# Patient Record
Sex: MALE | Race: WHITE | Employment: OTHER | URBAN - METROPOLITAN AREA
[De-identification: names, ages, dates, MRNs, and addresses within clinical notes are randomized per-mention and may not be internally consistent; named-entity substitution may affect disease eponyms.]

---

## 2017-01-09 ENCOUNTER — OFFICE VISIT (OUTPATIENT)
Dept: INTERNAL MEDICINE CLINIC | Age: 77
End: 2017-01-09

## 2017-01-09 VITALS
HEART RATE: 72 BPM | SYSTOLIC BLOOD PRESSURE: 104 MMHG | WEIGHT: 194 LBS | DIASTOLIC BLOOD PRESSURE: 70 MMHG | HEIGHT: 73 IN | BODY MASS INDEX: 25.71 KG/M2

## 2017-01-09 DIAGNOSIS — I48.0 PAROXYSMAL ATRIAL FIBRILLATION (HCC): Primary | ICD-10-CM

## 2017-01-09 DIAGNOSIS — Z79.01 ANTICOAGULATED ON COUMADIN: ICD-10-CM

## 2017-01-09 LAB
INTERNATIONAL NORMALIZATION RATIO, POC: 1.5
PROTHROMBIN TIME, POC: NORMAL

## 2017-01-09 PROCEDURE — 99212 OFFICE O/P EST SF 10 MIN: CPT | Performed by: NURSE PRACTITIONER

## 2017-01-09 PROCEDURE — 85610 PROTHROMBIN TIME: CPT | Performed by: NURSE PRACTITIONER

## 2017-01-30 ENCOUNTER — OFFICE VISIT (OUTPATIENT)
Dept: CARDIOLOGY CLINIC | Age: 77
End: 2017-01-30

## 2017-01-30 VITALS
DIASTOLIC BLOOD PRESSURE: 72 MMHG | BODY MASS INDEX: 25.33 KG/M2 | HEIGHT: 73 IN | WEIGHT: 191.12 LBS | SYSTOLIC BLOOD PRESSURE: 110 MMHG | HEART RATE: 68 BPM

## 2017-01-30 DIAGNOSIS — I42.9 CARDIOMYOPATHY (HCC): Chronic | ICD-10-CM

## 2017-01-30 DIAGNOSIS — Z95.2 S/P AVR (AORTIC VALVE REPLACEMENT): Chronic | ICD-10-CM

## 2017-01-30 DIAGNOSIS — I48.0 PAROXYSMAL ATRIAL FIBRILLATION (HCC): Primary | ICD-10-CM

## 2017-01-30 DIAGNOSIS — I34.0 NON-RHEUMATIC MITRAL REGURGITATION: Chronic | ICD-10-CM

## 2017-01-30 DIAGNOSIS — E78.00 PURE HYPERCHOLESTEROLEMIA: ICD-10-CM

## 2017-01-30 PROCEDURE — 99214 OFFICE O/P EST MOD 30 MIN: CPT | Performed by: INTERNAL MEDICINE

## 2017-01-30 PROCEDURE — 93000 ELECTROCARDIOGRAM COMPLETE: CPT | Performed by: INTERNAL MEDICINE

## 2017-02-06 PROCEDURE — 93224 XTRNL ECG REC UP TO 48 HRS: CPT | Performed by: INTERNAL MEDICINE

## 2017-02-10 ENCOUNTER — OFFICE VISIT (OUTPATIENT)
Dept: INTERNAL MEDICINE CLINIC | Age: 77
End: 2017-02-10

## 2017-02-10 VITALS
HEART RATE: 64 BPM | WEIGHT: 190.6 LBS | DIASTOLIC BLOOD PRESSURE: 68 MMHG | BODY MASS INDEX: 25.26 KG/M2 | SYSTOLIC BLOOD PRESSURE: 116 MMHG | HEIGHT: 73 IN

## 2017-02-10 DIAGNOSIS — I48.0 PAROXYSMAL ATRIAL FIBRILLATION (HCC): ICD-10-CM

## 2017-02-10 DIAGNOSIS — Z79.01 ANTICOAGULATED ON COUMADIN: Primary | ICD-10-CM

## 2017-02-10 LAB
INTERNATIONAL NORMALIZATION RATIO, POC: 1.5
PROTHROMBIN TIME, POC: NORMAL

## 2017-02-10 PROCEDURE — 85610 PROTHROMBIN TIME: CPT | Performed by: NURSE PRACTITIONER

## 2017-02-10 PROCEDURE — 99212 OFFICE O/P EST SF 10 MIN: CPT | Performed by: NURSE PRACTITIONER

## 2017-02-14 ENCOUNTER — TELEPHONE (OUTPATIENT)
Dept: CARDIOLOGY CLINIC | Age: 77
End: 2017-02-14

## 2017-02-15 ENCOUNTER — TELEPHONE (OUTPATIENT)
Dept: CARDIOLOGY CLINIC | Age: 77
End: 2017-02-15

## 2017-05-01 ENCOUNTER — OFFICE VISIT (OUTPATIENT)
Dept: CARDIOLOGY CLINIC | Age: 77
End: 2017-05-01

## 2017-05-01 ENCOUNTER — HOSPITAL ENCOUNTER (OUTPATIENT)
Dept: NON INVASIVE DIAGNOSTICS | Age: 77
Discharge: HOME | End: 2017-05-01
Attending: INTERNAL MEDICINE | Admitting: INTERNAL MEDICINE

## 2017-05-01 DIAGNOSIS — Z95.2 S/P AVR (AORTIC VALVE REPLACEMENT): Chronic | ICD-10-CM

## 2017-05-01 DIAGNOSIS — I48.0 PAROXYSMAL ATRIAL FIBRILLATION (HCC): ICD-10-CM

## 2017-05-01 DIAGNOSIS — M16.10 PRIMARY LOCALIZED OSTEOARTHROSIS, PELVIC REGION AND THIGH: ICD-10-CM

## 2017-05-01 DIAGNOSIS — I50.42 CHRONIC COMBINED SYSTOLIC AND DIASTOLIC CHF (CONGESTIVE HEART FAILURE) (HCC): Primary | Chronic | ICD-10-CM

## 2017-05-01 DIAGNOSIS — E55.9 VITAMIN D INSUFFICIENCY: ICD-10-CM

## 2017-05-01 DIAGNOSIS — I50.42 CHRONIC COMBINED SYSTOLIC AND DIASTOLIC CHF (CONGESTIVE HEART FAILURE) (HCC): Chronic | ICD-10-CM

## 2017-05-01 LAB
A/G RATIO: 1.4 (ref 1.1–2.2)
ALBUMIN SERPL-MCNC: 4.1 G/DL (ref 3.4–5)
ALP BLD-CCNC: 62 U/L (ref 40–129)
ALT SERPL-CCNC: 9 U/L (ref 10–40)
ANION GAP SERPL CALCULATED.3IONS-SCNC: 13 MMOL/L (ref 3–16)
AST SERPL-CCNC: 17 U/L (ref 15–37)
BASOPHILS ABSOLUTE: 0 K/UL (ref 0–0.2)
BASOPHILS RELATIVE PERCENT: 1.1 %
BILIRUB SERPL-MCNC: 0.5 MG/DL (ref 0–1)
BUN BLDV-MCNC: 27 MG/DL (ref 7–20)
CALCIUM SERPL-MCNC: 9.5 MG/DL (ref 8.3–10.6)
CHLORIDE BLD-SCNC: 101 MMOL/L (ref 99–110)
CHOLESTEROL, TOTAL: 108 MG/DL (ref 0–199)
CO2: 27 MMOL/L (ref 21–32)
CREAT SERPL-MCNC: 1 MG/DL (ref 0.8–1.3)
EOSINOPHILS ABSOLUTE: 0.1 K/UL (ref 0–0.6)
EOSINOPHILS RELATIVE PERCENT: 1.8 %
GFR AFRICAN AMERICAN: >60
GFR NON-AFRICAN AMERICAN: >60
GLOBULIN: 2.9 G/DL
GLUCOSE BLD-MCNC: 72 MG/DL (ref 70–99)
HCT VFR BLD CALC: 35.8 % (ref 40.5–52.5)
HDLC SERPL-MCNC: 61 MG/DL (ref 40–60)
HEMOGLOBIN: 11.9 G/DL (ref 13.5–17.5)
LDL CHOLESTEROL CALCULATED: 35 MG/DL
LV EF: 45 %
LVEF MODALITY: NORMAL
LYMPHOCYTES ABSOLUTE: 1.3 K/UL (ref 1–5.1)
LYMPHOCYTES RELATIVE PERCENT: 36.2 %
MCH RBC QN AUTO: 31.4 PG (ref 26–34)
MCHC RBC AUTO-ENTMCNC: 33.2 G/DL (ref 31–36)
MCV RBC AUTO: 94.5 FL (ref 80–100)
MONOCYTES ABSOLUTE: 0.3 K/UL (ref 0–1.3)
MONOCYTES RELATIVE PERCENT: 8.8 %
NEUTROPHILS ABSOLUTE: 1.9 K/UL (ref 1.7–7.7)
NEUTROPHILS RELATIVE PERCENT: 52.1 %
PDW BLD-RTO: 14.2 % (ref 12.4–15.4)
PLATELET # BLD: 149 K/UL (ref 135–450)
PMV BLD AUTO: 9.1 FL (ref 5–10.5)
POTASSIUM SERPL-SCNC: 4.4 MMOL/L (ref 3.5–5.1)
PRO-BNP: 393 PG/ML (ref 0–449)
RBC # BLD: 3.79 M/UL (ref 4.2–5.9)
SODIUM BLD-SCNC: 141 MMOL/L (ref 136–145)
TOTAL PROTEIN: 7 G/DL (ref 6.4–8.2)
TRIGL SERPL-MCNC: 58 MG/DL (ref 0–150)
VITAMIN D 25-HYDROXY: 40.6 NG/ML
VLDLC SERPL CALC-MCNC: 12 MG/DL
WBC # BLD: 3.6 K/UL (ref 4–11)

## 2017-05-01 PROCEDURE — 99214 OFFICE O/P EST MOD 30 MIN: CPT | Performed by: INTERNAL MEDICINE

## 2017-05-02 VITALS
HEART RATE: 64 BPM | WEIGHT: 193 LBS | HEIGHT: 73 IN | BODY MASS INDEX: 25.58 KG/M2 | DIASTOLIC BLOOD PRESSURE: 72 MMHG | SYSTOLIC BLOOD PRESSURE: 108 MMHG

## 2017-05-04 ENCOUNTER — OFFICE VISIT (OUTPATIENT)
Dept: CARDIOLOGY CLINIC | Age: 77
End: 2017-05-04

## 2017-05-04 VITALS
HEART RATE: 67 BPM | DIASTOLIC BLOOD PRESSURE: 62 MMHG | WEIGHT: 191.5 LBS | SYSTOLIC BLOOD PRESSURE: 98 MMHG | BODY MASS INDEX: 25.27 KG/M2

## 2017-05-04 DIAGNOSIS — I42.9 CARDIOMYOPATHY (HCC): Chronic | ICD-10-CM

## 2017-05-04 DIAGNOSIS — Z95.2 S/P AVR (AORTIC VALVE REPLACEMENT): Chronic | ICD-10-CM

## 2017-05-04 DIAGNOSIS — I48.0 PAROXYSMAL ATRIAL FIBRILLATION (HCC): Primary | Chronic | ICD-10-CM

## 2017-05-04 PROCEDURE — 93000 ELECTROCARDIOGRAM COMPLETE: CPT | Performed by: NURSE PRACTITIONER

## 2017-05-04 PROCEDURE — 99213 OFFICE O/P EST LOW 20 MIN: CPT | Performed by: NURSE PRACTITIONER

## 2017-06-01 ENCOUNTER — OFFICE VISIT (OUTPATIENT)
Dept: INTERNAL MEDICINE CLINIC | Age: 77
End: 2017-06-01

## 2017-06-01 VITALS
WEIGHT: 192 LBS | HEIGHT: 73 IN | HEART RATE: 64 BPM | BODY MASS INDEX: 25.45 KG/M2 | DIASTOLIC BLOOD PRESSURE: 64 MMHG | SYSTOLIC BLOOD PRESSURE: 98 MMHG

## 2017-06-01 DIAGNOSIS — I48.0 PAROXYSMAL ATRIAL FIBRILLATION (HCC): Primary | Chronic | ICD-10-CM

## 2017-06-01 DIAGNOSIS — M54.2 NECK PAIN: ICD-10-CM

## 2017-06-01 DIAGNOSIS — I50.42 CHRONIC COMBINED SYSTOLIC AND DIASTOLIC CHF (CONGESTIVE HEART FAILURE) (HCC): Chronic | ICD-10-CM

## 2017-06-01 PROCEDURE — 3288F FALL RISK ASSESSMENT DOCD: CPT | Performed by: INTERNAL MEDICINE

## 2017-06-01 PROCEDURE — G8510 SCR DEP NEG, NO PLAN REQD: HCPCS | Performed by: INTERNAL MEDICINE

## 2017-06-01 PROCEDURE — 99214 OFFICE O/P EST MOD 30 MIN: CPT | Performed by: INTERNAL MEDICINE

## 2017-06-01 ASSESSMENT — PATIENT HEALTH QUESTIONNAIRE - PHQ9
2. FEELING DOWN, DEPRESSED OR HOPELESS: 0
1. LITTLE INTEREST OR PLEASURE IN DOING THINGS: 0
SUM OF ALL RESPONSES TO PHQ QUESTIONS 1-9: 0
SUM OF ALL RESPONSES TO PHQ9 QUESTIONS 1 & 2: 0

## 2017-07-05 RX ORDER — ATORVASTATIN CALCIUM 10 MG/1
TABLET, FILM COATED ORAL
Qty: 30 TABLET | Refills: 11 | Status: SHIPPED | OUTPATIENT
Start: 2017-07-05 | End: 2018-07-05 | Stop reason: SDUPTHER

## 2017-08-31 ENCOUNTER — TELEPHONE (OUTPATIENT)
Dept: CARDIOLOGY CLINIC | Age: 77
End: 2017-08-31

## 2017-09-08 RX ORDER — SPIRONOLACTONE 25 MG/1
12.5 TABLET ORAL
Qty: 45 TABLET | Refills: 1 | Status: SHIPPED | OUTPATIENT
Start: 2017-09-08 | End: 2018-07-22 | Stop reason: SDUPTHER

## 2017-09-20 ENCOUNTER — NURSE ONLY (OUTPATIENT)
Dept: INTERNAL MEDICINE CLINIC | Age: 77
End: 2017-09-20

## 2017-09-20 DIAGNOSIS — Z23 NEED FOR INFLUENZA VACCINATION: Primary | ICD-10-CM

## 2017-09-20 PROCEDURE — 90662 IIV NO PRSV INCREASED AG IM: CPT | Performed by: INTERNAL MEDICINE

## 2017-09-20 PROCEDURE — G0008 ADMIN INFLUENZA VIRUS VAC: HCPCS | Performed by: INTERNAL MEDICINE

## 2017-10-06 ENCOUNTER — TELEPHONE (OUTPATIENT)
Dept: CARDIOLOGY CLINIC | Age: 77
End: 2017-10-06

## 2017-10-06 RX ORDER — METOPROLOL SUCCINATE 25 MG/1
12.5 TABLET, EXTENDED RELEASE ORAL 2 TIMES DAILY
Qty: 90 TABLET | Refills: 3 | Status: SHIPPED | OUTPATIENT
Start: 2017-10-06 | End: 2018-08-04 | Stop reason: SDUPTHER

## 2017-10-06 RX ORDER — HYDROCHLOROTHIAZIDE 25 MG/1
25 TABLET ORAL DAILY
Qty: 90 TABLET | Refills: 3 | Status: SHIPPED | OUTPATIENT
Start: 2017-10-06 | End: 2018-10-21 | Stop reason: SDUPTHER

## 2017-10-06 RX ORDER — LISINOPRIL 2.5 MG/1
TABLET ORAL
Qty: 90 TABLET | Refills: 3 | Status: SHIPPED | OUTPATIENT
Start: 2017-10-06 | End: 2018-10-23 | Stop reason: SDUPTHER

## 2017-10-06 NOTE — TELEPHONE ENCOUNTER
Medication Refill    When was your last appointment with cardiology?  (if 1year or longer, please schedule an appointment)    Medication needing refilled:metoprolol 25mg  Lisinopril 2.5mg     Doseage of the medication:    How are you taking this medication (QD, BID, TID, QID, PRN):    Patient want a 30 or 90 day supply called in:needs charged to a 90 day supply     Which Pharmacy are we sending the medication to:    Pharmacy Phone number:    Pharmacy Fax number:

## 2017-11-13 RX ORDER — MIDODRINE HYDROCHLORIDE 2.5 MG/1
2.5 TABLET ORAL DAILY
Qty: 90 TABLET | Refills: 3 | Status: SHIPPED | OUTPATIENT
Start: 2017-11-13 | End: 2018-11-07 | Stop reason: SDUPTHER

## 2017-11-20 ENCOUNTER — OFFICE VISIT (OUTPATIENT)
Dept: CARDIOLOGY CLINIC | Age: 77
End: 2017-11-20

## 2017-11-20 ENCOUNTER — HOSPITAL ENCOUNTER (OUTPATIENT)
Dept: OTHER | Age: 77
Discharge: HOME | End: 2017-11-20
Attending: INTERNAL MEDICINE | Admitting: INTERNAL MEDICINE

## 2017-11-20 VITALS
OXYGEN SATURATION: 97 % | SYSTOLIC BLOOD PRESSURE: 100 MMHG | BODY MASS INDEX: 24.25 KG/M2 | HEIGHT: 73 IN | WEIGHT: 183 LBS | HEART RATE: 72 BPM | DIASTOLIC BLOOD PRESSURE: 76 MMHG

## 2017-11-20 DIAGNOSIS — E55.9 VITAMIN D INSUFFICIENCY: ICD-10-CM

## 2017-11-20 DIAGNOSIS — I48.0 PAROXYSMAL ATRIAL FIBRILLATION (HCC): Chronic | ICD-10-CM

## 2017-11-20 DIAGNOSIS — I50.42 CHRONIC COMBINED SYSTOLIC AND DIASTOLIC CHF (CONGESTIVE HEART FAILURE) (HCC): Primary | Chronic | ICD-10-CM

## 2017-11-20 DIAGNOSIS — Z95.2 S/P AVR (AORTIC VALVE REPLACEMENT): Chronic | ICD-10-CM

## 2017-11-20 DIAGNOSIS — I50.42 CHRONIC COMBINED SYSTOLIC AND DIASTOLIC CHF (CONGESTIVE HEART FAILURE) (HCC): Chronic | ICD-10-CM

## 2017-11-20 DIAGNOSIS — E78.00 PURE HYPERCHOLESTEROLEMIA: ICD-10-CM

## 2017-11-20 DIAGNOSIS — I49.9 IRREGULAR HEART RATE: ICD-10-CM

## 2017-11-20 LAB
A/G RATIO: 1.2 (ref 1.1–2.2)
ALBUMIN SERPL-MCNC: 4.3 G/DL (ref 3.4–5)
ALP BLD-CCNC: 74 U/L (ref 40–129)
ALT SERPL-CCNC: 10 U/L (ref 10–40)
ANION GAP SERPL CALCULATED.3IONS-SCNC: 17 MMOL/L (ref 3–16)
AST SERPL-CCNC: 20 U/L (ref 15–37)
BILIRUB SERPL-MCNC: 0.4 MG/DL (ref 0–1)
BUN BLDV-MCNC: 20 MG/DL (ref 7–20)
CALCIUM SERPL-MCNC: 9.5 MG/DL (ref 8.3–10.6)
CHLORIDE BLD-SCNC: 97 MMOL/L (ref 99–110)
CHOLESTEROL, TOTAL: 123 MG/DL (ref 0–199)
CO2: 25 MMOL/L (ref 21–32)
CREAT SERPL-MCNC: 0.8 MG/DL (ref 0.8–1.3)
GFR AFRICAN AMERICAN: >60
GFR NON-AFRICAN AMERICAN: >60
GLOBULIN: 3.6 G/DL
GLUCOSE BLD-MCNC: 75 MG/DL (ref 70–99)
HCT VFR BLD CALC: 38.6 % (ref 40.5–52.5)
HDLC SERPL-MCNC: 74 MG/DL (ref 40–60)
HEMOGLOBIN: 12.9 G/DL (ref 13.5–17.5)
LDL CHOLESTEROL CALCULATED: 37 MG/DL
MCH RBC QN AUTO: 30.7 PG (ref 26–34)
MCHC RBC AUTO-ENTMCNC: 33.3 G/DL (ref 31–36)
MCV RBC AUTO: 92.1 FL (ref 80–100)
PDW BLD-RTO: 16.1 % (ref 12.4–15.4)
PLATELET # BLD: 154 K/UL (ref 135–450)
PMV BLD AUTO: 8.8 FL (ref 5–10.5)
POTASSIUM SERPL-SCNC: 4.7 MMOL/L (ref 3.5–5.1)
PRO-BNP: 611 PG/ML (ref 0–449)
RBC # BLD: 4.19 M/UL (ref 4.2–5.9)
SODIUM BLD-SCNC: 139 MMOL/L (ref 136–145)
TOTAL PROTEIN: 7.9 G/DL (ref 6.4–8.2)
TRIGL SERPL-MCNC: 59 MG/DL (ref 0–150)
VITAMIN D 25-HYDROXY: 48.4 NG/ML
VLDLC SERPL CALC-MCNC: 12 MG/DL
WBC # BLD: 3.8 K/UL (ref 4–11)

## 2017-11-20 PROCEDURE — 93000 ELECTROCARDIOGRAM COMPLETE: CPT | Performed by: INTERNAL MEDICINE

## 2017-11-20 PROCEDURE — 99214 OFFICE O/P EST MOD 30 MIN: CPT | Performed by: INTERNAL MEDICINE

## 2017-11-20 NOTE — PROGRESS NOTES
5 MG TABA Take 1-2 tablets by mouth      docusate sodium (COLACE) 100 MG capsule Take 100 mg by mouth 2 times daily as needed for Constipation      aspirin 81 MG tablet Take 81 mg by mouth 2 times daily       Ascorbic Acid (VITAMIN C) 500 MG tablet Take 1,000 mg by mouth daily. No current facility-administered medications for this visit.         Immunization History   Administered Date(s) Administered    Influenza Virus Vaccine 10/10/2012, 09/19/2013, 10/23/2014    Influenza, High Dose 09/29/2016, 09/20/2017    Pneumococcal 13-valent Conjugate ([id number]) 12/01/2015    Pneumococcal Conjugate 7-valent 11/19/2007    Pneumococcal Polysaccharide ([id number]) 11/19/2007    Tdap (Boostrix, Adacel) 10/12/2014       Patient Active Problem List   Diagnosis    Cardiomyopathy ([facility] [address]    Mitral regurgitation    Microscopic hematuria    Atrial fibrillation (HCC)    S/P AVR (aortic valve replacement)    Chronic combined systolic and diastolic CHF (congestive heart failure) (formerly [facility])    Arthritis of knee    Primary localized osteoarthrosis of shoulder region    Primary localized osteoarthrosis, pelvic region and thigh    H/O total shoulder replacement    Pure hypercholesterolemia       Past Medical History:   Diagnosis Date    Aortic insufficiency     Atrial fibrillation ([address]     cardioversion 8/10/12    Breast nodule 8/2012  right    excision-lumpectomy 8/2012    CAD (coronary artery disease)     Cardiomyopathy ([address] 8/2012    EF 20 %    CHF (congestive heart failure) (formerly [facility])     Hyperlipidemia     Hypertension     Microscopic hematuria 7/29/2012    Mitral regurgitation     Nausea & vomiting 7/29/2012    Osteoarthritis of knee     bilateral knees    Pneumonia     Rotator cuff tear 7/2/2013    Ventricular ectopy 7/29/2012     Past Surgical History:   Procedure Laterality Date    AORTIC VALVE REPLACEMENT  8/3/2012    moderate mitral regurg    CARDIAC SURGERY      COLONOSCOPY      CYST REMOVAL No headache, diplopia, change in muscle strength, numbness or tingling. No change in gait, balance, coordination, mood, affect, memory, mentation, behavior. · Psychiatric: No anxiety, no depression. · Endocrine: No malaise, fatigue or temperature intolerance. No excessive thirst, fluid intake, or urination. No tremor. · Hematologic/Lymphatic: No abnormal bruising or bleeding, blood clots or swollen lymph nodes. · Allergic/Immunologic: No nasal congestion or hives. Physical Examination:    Vitals:    11/20/17 1043   BP: 100/76   Pulse: 72   SpO2: 97%   Weight: 183 lb (83 kg)   Height: 6' 1\" (1.854 m)     Body mass index is 24.14 kg/m². Wt Readings from Last 3 Encounters:   11/20/17 183 lb (83 kg)   06/01/17 192 lb (87.1 kg)   05/04/17 191 lb 8 oz (86.9 kg)     BP Readings from Last 3 Encounters:   11/20/17 100/76   06/01/17 98/64   05/04/17 98/62      Retaken /72  Constitutional and General Appearance: Warm and dry, no apparent distress, normal coloration  HEENT:  Normocephalic, atraumatic  Respiratory:  · Normal excursion and expansion without use of accessory muscles  · Resp Auscultation: Normal breath sounds without dullness  Cardiovascular:  · The apical impulses not displaced  · Heart tones are crisp and normal  · JVP less than 8 cm H2O  · Regular rate and regular rhythm, normal D5A5, soft systolic murmur, no g/r/c  · Peripheral pulses are symmetrical and full  · There is no clubbing, cyanosis of the extremities.   · No edema  · Pedal Pulses: 2+ and equal   Abdomen:  · No masses or tenderness  · Liver/Spleen: No Abnormalities Noted  Neurological/Psychiatric:  · Alert and oriented in all spheres  · Moves all extremities well  · Exhibits normal gait balance and coordination  · No abnormalities of mood, affect, memory, mentation, or behavior are noted    Lab Data:  CBC:   Lab Results   Component Value Date    WBC 3.6 05/01/2017    WBC 4.1 10/17/2016    WBC 4.1 06/02/2016    RBC 3.79 05/01/2017 RBC 3.73 10/17/2016    RBC 3.39 06/02/2016    HGB 11.9 05/01/2017    HGB 11.7 10/17/2016    HGB 11.0 06/02/2016    HCT 35.8 05/01/2017    HCT 35.9 10/17/2016    HCT 32.5 06/02/2016    MCV 94.5 05/01/2017    MCV 96.2 10/17/2016    MCV 95.9 06/02/2016    RDW 14.2 05/01/2017    RDW 14.8 10/17/2016    RDW 16.0 06/02/2016     05/01/2017     10/17/2016     06/02/2016     BMP:   Lab Results   Component Value Date     05/01/2017     10/13/2016     04/26/2016    K 4.4 05/01/2017    K 5.0 10/13/2016    K 4.9 04/26/2016     05/01/2017     10/13/2016    CL 99 04/26/2016    CO2 27 05/01/2017    CO2 28 10/13/2016    CO2 26 04/26/2016    PHOS 3.0 12/19/2013    PHOS 3.7 12/06/2013    BUN 27 05/01/2017    BUN 24 10/13/2016    BUN 29 04/26/2016    CREATININE 1.0 05/01/2017    CREATININE 1.2 10/13/2016    CREATININE 1.3 04/26/2016     BNP:   Lab Results   Component Value Date    BNP 44 01/13/2014    BNP 59 06/12/2013     11/28/2012     PT/INR:   Lab Results   Component Value Date    PROTIME 25.7 01/08/2016    PROTIME 45.1 10/16/2015    PROTIME 32.8 08/07/2015    INR 1.5 02/10/2017    INR 1.5 01/09/2017    INR 2.0 12/01/2016    INR 2.8 03/04/2016    INR 2.5 02/05/2016    INR 2.1 01/08/2016     FASTING LIPID PANEL:  Lab Results   Component Value Date    CHOL 108 05/01/2017    HDL 61 05/01/2017    TRIG 58 05/01/2017       Echo 5/1/2017  Arrhythmia noted during exam.  LV systolic function is mildly reduced with an estimated ejection fraction  of 45%. There is inferior-lateral akinesis. There is concentric left ventricular  hypertrophy. Mild to moderate mitral regurgitation is present. The left atrium is dilated. A bioprosthetic aortic valve appears well seated with a maximum gradient of  41 mmHg and a mean gradient of 25 mmHg. Trivial aortic regurgitation is  present. There is mild tricuspid regurgitation with RVSP estimated at 36 mmHg.     4/9/14 echo: EF 45%, mild to moderate

## 2017-11-20 NOTE — PROGRESS NOTES
[address]   Advanced Heart Failure/Pulmonary Hypertension  Cardiac Evaluation      Nata Herrera  YOB: 1940    Date of Visit:  11/20/17      Chief Complaint   Patient presents with    Cardiac Valve Problem     No cardiac complaints    Congestive Heart Failure      History of Present Illness:   Hobart Genin Schirmer seen in follow up for CHF, CMP, s/p AVR 8/2012) and AF. An echo 11/2016 shows EF 92-82%, diastolic dysfunction, aortic valve replacement without stenosis RVSP normal. He had a holter and no atrial fibrillation found. Sees Dr. Claudene Hasting and was on amiodarone for ~ 4 years but it  has been discontinued 2/2017. Today, Nurys Mcgill states that he is doing well. His wife is here with him. He is active and his wife states he is busy doing a lot of things. He thinks that his HB is in regular rhythm. He will see Dr. Burton García in a few weeks. He denies chest pain, shortness of breath, palpitations or dizziness. We discussed that his echo  5/2017 showed an EF was normal . He has recent labs and they all looks good. His weight is down over the past 10 pounds and he has kept it down. He is still taking midodrine. Recent Hospitalization or Testing:   ECHO 4/2015  Summary  Moderate concentric left ventricular hypertrophy is present. EF is   estimated  at 55%  Mitral annular calcification is present. Mild mitral regurgitation is present. A porcine aortic valve appears well seated with a maximum gradient of 27  mmHg and a mean gradient of 17 mmHg. The aortic root is at the upper limits of normal.  There is mild tricuspid regurgitation with RVSP estimated at 33 mmHg. Trivial pulmonic regurgitation present. Echo Apr 2013: Upper limits of normal left ventricle size with borderline concentric left ventricular hypertrophy. The left ventricular systolic function is borderline reduced with an inferior wall motion abnormality and an ejection fraction of 45-50 %.  There is reversal of E/A inflow velocities across the mitral valve suggesting impaired left ventricular relaxation. Normally functioning porcine bioprosthetic valve in aortic position. Mean prosthetic gradient is 18 mm Hg. Mild MAC. There is mild tricuspid and pulmonic regurgitation. Trace of mitral regurgitation. RVSP estimated at 34 mmHg. Compared to last echo on 9/26/2012 with EF of 15%, the left ventricular systolic function has improved markedly. Echo Nov 2012: LVEF < 20%     Echo Sept 2012:   1. Severely dilated left ventricle with severe left ventricular dysfunction (15%)  2. Bioprosthetic noted in the aortic position with slight flow acceleration and slight perivalvular insufficiency. 3. Left ventricular hypertrophy. 4. Large pleural effusion. 5. Mildly elevated right ventricular systolic pressure of 40 mmHg. 6. Biatrial enlargement along with borderline right ventricle enlargement. Echo Jul 2012:   1. Severe LV systolic dysfunction. Severe left ventricular dilation. Ejection fraction is estimated at 20%. 2. Moderate to severe aortic regurgitation. 3. Moderate mitral regurgitation. 4. Recommend transesophageal echocardiography if clinically indicated for assessment of severe aortic regurgitation.      NYHA:  I-II    No Known Allergies  Current Outpatient Prescriptions   Medication Sig Dispense Refill    midodrine (PROAMATINE) 2.5 MG tablet TAKE 1 TABLET BY MOUTH DAILY 90 tablet 3    lisinopril (PRINIVIL;ZESTRIL) 2.5 MG tablet TAKE ONE TABLET BY MOUTH DAILY 90 tablet 3    metoprolol succinate (TOPROL XL) 25 MG extended release tablet Take 0.5 tablets by mouth 2 times daily 90 tablet 3    hydrochlorothiazide (HYDRODIURIL) 25 MG tablet Take 1 tablet by mouth daily 90 tablet 3    spironolactone (ALDACTONE) 25 MG tablet Take 0.5 tablets by mouth three times a week 45 tablet 1    atorvastatin (LIPITOR) 10 MG tablet TAKE 1 TABLET BY MOUTH ONE TIME A DAY  30 tablet 11    Cholecalciferol (VITAMIN D3) 3000 UNITS TABS 7/29/2012     Past Surgical History:   Procedure Laterality Date    AORTIC VALVE REPLACEMENT  8/3/2012    moderate mitral regurg    CARDIAC SURGERY      COLONOSCOPY      CYST REMOVAL      chest    ENDOSCOPY, COLON, DIAGNOSTIC      INGUINAL HERNIA REPAIR      bilateral in [address]      both    OTHER SURGICAL HISTORY  8/2/12    excision right breast mass (lumpectomy)    SHOULDER ARTHROPLASTY Right 2/2/15    right reverse total shoulder arthroplasty    TOTAL KNEE ARTHROPLASTY Left 12/4/13    TOTAL KNEE ARTHROPLASTY Right 2/3/14    RIGHT TOTAL KNEE REPLACEMENT-BAR       UPPER GASTROINTESTINAL ENDOSCOPY  12/24/13     Family History   Problem Relation Age of Onset    Marfan Syndrome Brother     Heart Disease Brother     Stroke Sister     Diabetes Sister     Heart Disease Sister     Heart Disease Sister     Cancer Brother      Social History     Social History    Marital status:      Spouse name: N/A    Number of children: N/A    Years of education: N/A     Occupational History    Not on file. Social History Main Topics    Smoking status: Former Smoker     Packs/day: 2.00     Years: 20.00     Quit date: 7/28/1970    Smokeless tobacco: Never Used    Alcohol use 1.2 oz/week     2 Cans of beer per week      Comment: rare    Drug use: No    Sexual activity: Not on file     Other Topics Concern    Not on file     Social History Narrative    No narrative on file       Review of Systems:   · Constitutional: there has been no unanticipated weight loss. There's been no change in energy level, sleep pattern, or activity level. · Eyes: No visual changes or diplopia. No scleral icterus. · ENT: No Headaches, hearing loss or vertigo. No mouth sores or sore throat. · Cardiovascular: Reviewed in HPI  · Respiratory: No cough or wheezing, no sputum production. No hematemesis. No SOB  · Gastrointestinal: No abdominal pain, appetite loss, blood in stools.  No change in bowel memory, mentation, or behavior are noted    Lab Data:  CBC:   Lab Results   Component Value Date    WBC 3.6 05/01/2017    WBC 4.1 10/17/2016    WBC 4.1 06/02/2016    RBC 3.79 05/01/2017    RBC 3.73 10/17/2016    RBC 3.39 06/02/2016    HGB 11.9 05/01/2017    HGB 11.7 10/17/2016    HGB 11.0 06/02/2016    HCT 35.8 05/01/2017    HCT 35.9 10/17/2016    HCT 32.5 06/02/2016    MCV 94.5 05/01/2017    MCV 96.2 10/17/2016    MCV 95.9 06/02/2016    RDW 14.2 05/01/2017    RDW 14.8 10/17/2016    RDW 16.0 06/02/2016     05/01/2017     10/17/2016     06/02/2016     BMP:   Lab Results   Component Value Date     05/01/2017     10/13/2016     04/26/2016    K 4.4 05/01/2017    K 5.0 10/13/2016    K 4.9 04/26/2016     05/01/2017     10/13/2016    CL 99 04/26/2016    CO2 27 05/01/2017    CO2 28 10/13/2016    CO2 26 04/26/2016    PHOS 3.0 12/19/2013    PHOS 3.7 12/06/2013    BUN 27 05/01/2017    BUN 24 10/13/2016    BUN 29 04/26/2016    CREATININE 1.0 05/01/2017    CREATININE 1.2 10/13/2016    CREATININE 1.3 04/26/2016     BNP:   Lab Results   Component Value Date    BNP 44 01/13/2014    BNP 59 06/12/2013     11/28/2012     PT/INR:   Lab Results   Component Value Date    PROTIME 25.7 01/08/2016    PROTIME 45.1 10/16/2015    PROTIME 32.8 08/07/2015    INR 1.5 02/10/2017    INR 1.5 01/09/2017    INR 2.0 12/01/2016    INR 2.8 03/04/2016    INR 2.5 02/05/2016    INR 2.1 01/08/2016     FASTING LIPID PANEL:  Lab Results   Component Value Date    CHOL 108 05/01/2017    HDL 61 05/01/2017    TRIG 58 05/01/2017       Echo 5/1/2017  Arrhythmia noted during exam.  LV systolic function is mildly reduced with an estimated ejection fraction  of 45%. There is inferior-lateral akinesis. There is concentric left ventricular  hypertrophy. Mild to moderate mitral regurgitation is present. The left atrium is dilated.   A bioprosthetic aortic valve appears well seated with a maximum gradient of  41 mmHg and a mean gradient of 25 mmHg. Trivial aortic regurgitation is  present. There is mild tricuspid regurgitation with RVSP estimated at 36 mmHg. 4/9/14 echo: EF 45%, mild to moderate TR, RVSP 46 mmHg. 4/22/15 echo: Moderate concentric left ventricular hypertrophy is present. Mitral annular calcification is present. Mild mitral regurgitation is present. A porcine aortic valve appears well seated with a maximum gradient of 27 mmHg and a mean gradient of 17 mmHg. The aortic root is at the upper limits of normal.  There is mild tricuspid regurgitation with RVSP estimated at 33 mmHg. Trivial pulmonic regurgitation present. Assessment:    1. Aortic insufficiency - 4/2013 normally functioning bioprosthetic AVR. 2. Atrial fibrillation - On Coumadin with INRs checked through [facility] coumadin clinic. 3. Combined, chronic heart failure - compensated on exam.     4. Hyponatremia -  Stable. 5. S/P AVR (aortic valve replacement) -  Normally functioning porcine bioprosthetic valve in aortic position. Mean prosthetic gradient is 18 mm Hg. 4/9/14 echo: bioprosthetic AV appears well seated with a maximum gradient of 35 mmHg and a mean gradient of 22 mmHg. 4/22/15 echo:  A porcine aortic valve appears well seated with a maximum gradient of 27 mmHg and a mean gradient of 17 mmHg  5/2017 echo  moderate mitral regurgitation is present. The left atrium is dilated. A bioprosthetic aortic valve appears well seated with a maximum gradient of 41 mmHg and a mean gradient of 25 mmHg. Trivial aortic regurgitation is present. There is mild tricuspid regurgitation with RVSP estimated at 36 mmHg. 6. SOB (shortness of breath) - no current complaints. 7.   Hyperkalemia-  5/2017 K+ 4.4  creat 1.0 Stable and managed by nephrology. 8.   Hyperlipidemia--TC  108   TG 58 HDL 35  LDL 61    Plan: Doing well from a CV standpoint. We made no change at this time in his treatment. EKG today shows SR with pvc.  Fasting labs

## 2017-12-07 ENCOUNTER — OFFICE VISIT (OUTPATIENT)
Dept: INTERNAL MEDICINE CLINIC | Age: 77
End: 2017-12-07

## 2017-12-07 VITALS
HEIGHT: 73 IN | SYSTOLIC BLOOD PRESSURE: 92 MMHG | BODY MASS INDEX: 24.52 KG/M2 | WEIGHT: 185 LBS | DIASTOLIC BLOOD PRESSURE: 64 MMHG | HEART RATE: 56 BPM

## 2017-12-07 DIAGNOSIS — Z86.79 HISTORY OF ATRIAL FIBRILLATION: ICD-10-CM

## 2017-12-07 DIAGNOSIS — I50.42 CHRONIC COMBINED SYSTOLIC AND DIASTOLIC CHF (CONGESTIVE HEART FAILURE) (HCC): Primary | Chronic | ICD-10-CM

## 2017-12-07 DIAGNOSIS — E78.00 PURE HYPERCHOLESTEROLEMIA: ICD-10-CM

## 2017-12-07 PROCEDURE — 99214 OFFICE O/P EST MOD 30 MIN: CPT | Performed by: INTERNAL MEDICINE

## 2017-12-07 NOTE — PROGRESS NOTES
Subjective:      Patient ID: Charles Todd is a 78 y.o. male. Chief complaint: CHF, A. fib, hyperlipidemia  HPI  The patient has congestive heart failure which is chronic. In 2012 his ejection fraction was reduced to less than 20%. He also has a history of bioprosthetic aortic valve. He is treated with medical management. His most recent echocardiogram showed a 45% LVEF. He is currently asymptomatic. He has a history of atrial fibrillation. He was seen by Dr. Dianne Hancock. Based on the absence of symptoms and a Holter monitor study which showed no atrial fibrillation, he was taken off of amiodarone and warfarin. He is currently taking aspirin 162 mg daily. He also has hyperlipidemia which is treated with atorvastatin. He takes this as directed, and he tolerates it well. Social History   Substance Use Topics    Smoking status: Former Smoker     Packs/day: 2.00     Years: 20.00     Quit date: 7/28/1970    Smokeless tobacco: Never Used    Alcohol use 1.2 oz/week     2 Cans of beer per week      Comment: rare      Review of Systems  Negative for chest pain, shortness of breath, urinary problems, change in bowel habits  Objective:   Physical Exam  BP 92/64 (Site: Left Arm, Position: Sitting, Cuff Size: Large Adult)   Pulse 56   Ht 6' 1\" (1.854 m)   Wt 185 lb (83.9 kg)   BMI 24.41 kg/m²    GEN: WN/WD, NAD  CV: regular rate and XYPZRE,9/1 systolic murmur  Resp: normal effort, clear auscultation bilaterally  No peripheral edema   Abd: soft, nontender to palpation.      Lab Results   Component Value Date    CREATININE 0.8 11/20/2017    BUN 20 11/20/2017     11/20/2017    K 4.7 11/20/2017    CL 97 (L) 11/20/2017    CO2 25 11/20/2017      Lab Results   Component Value Date    CHOL 123 11/20/2017    CHOL 108 05/01/2017    CHOL 122 10/29/2015     Lab Results   Component Value Date    TRIG 59 11/20/2017    TRIG 58 05/01/2017    TRIG 73 10/29/2015     Lab Results   Component Value Date    HDL 74 (H) 11/20/2017    HDL 61 (H) 05/01/2017    HDL 69 (H) 10/29/2015     Lab Results   Component Value Date    LDLCALC 37 11/20/2017    LDLCALC 35 05/01/2017    LDLCALC 38 10/29/2015     Lab Results   Component Value Date    LABVLDL 12 11/20/2017    LABVLDL 12 05/01/2017    LABVLDL 15 10/29/2015     No results found for: CHOLHDLRATIO     Assessment/Plan:      1. Chronic combined systolic and diastolic CHF (congestive heart failure) ([facility] [address]  He is asymptomatic. He has had a remarkable increase in his ejection fraction. He will remain on medical management with beta blocker and ARB. 2. Pure hypercholesterolemia  Well controlled  Continue atorvastatin      3. History of atrial fibrillation  Recent evidence of resolution. He was taken off of amiodarone and warfarin by electrophysiologist.  Continue aspirin 162 mg daily.             RTO 6 months or PRN

## 2018-01-03 ENCOUNTER — TELEPHONE (OUTPATIENT)
Dept: CARDIOLOGY CLINIC | Age: 78
End: 2018-01-03

## 2018-05-24 ENCOUNTER — HOSPITAL ENCOUNTER (OUTPATIENT)
Dept: NON INVASIVE DIAGNOSTICS | Age: 78
Discharge: HOME | End: 2018-05-24
Attending: INTERNAL MEDICINE | Admitting: INTERNAL MEDICINE

## 2018-05-24 ENCOUNTER — OFFICE VISIT (OUTPATIENT)
Dept: CARDIOLOGY CLINIC | Age: 78
End: 2018-05-24

## 2018-05-24 ENCOUNTER — HOSPITAL ENCOUNTER (OUTPATIENT)
Dept: OTHER | Age: 78
Discharge: HOME | End: 2018-05-24
Attending: INTERNAL MEDICINE | Admitting: INTERNAL MEDICINE

## 2018-05-24 VITALS
HEART RATE: 72 BPM | SYSTOLIC BLOOD PRESSURE: 104 MMHG | WEIGHT: 190 LBS | DIASTOLIC BLOOD PRESSURE: 74 MMHG | HEIGHT: 73 IN | BODY MASS INDEX: 25.18 KG/M2

## 2018-05-24 DIAGNOSIS — I50.42 CHRONIC COMBINED SYSTOLIC AND DIASTOLIC HEART FAILURE (HCC): ICD-10-CM

## 2018-05-24 DIAGNOSIS — I50.42 CHRONIC COMBINED SYSTOLIC AND DIASTOLIC CHF (CONGESTIVE HEART FAILURE) (HCC): Primary | Chronic | ICD-10-CM

## 2018-05-24 DIAGNOSIS — E78.00 PURE HYPERCHOLESTEROLEMIA: ICD-10-CM

## 2018-05-24 DIAGNOSIS — E55.9 VITAMIN D INSUFFICIENCY: ICD-10-CM

## 2018-05-24 DIAGNOSIS — I50.42 CHRONIC COMBINED SYSTOLIC AND DIASTOLIC CHF (CONGESTIVE HEART FAILURE) (HCC): Chronic | ICD-10-CM

## 2018-05-24 DIAGNOSIS — Z95.2 S/P AVR (AORTIC VALVE REPLACEMENT): Chronic | ICD-10-CM

## 2018-05-24 DIAGNOSIS — I48.0 PAROXYSMAL ATRIAL FIBRILLATION (HCC): Chronic | ICD-10-CM

## 2018-05-24 DIAGNOSIS — I34.0 NON-RHEUMATIC MITRAL REGURGITATION: Chronic | ICD-10-CM

## 2018-05-24 LAB
BASOPHILS ABSOLUTE: 0 K/UL (ref 0–0.2)
BASOPHILS RELATIVE PERCENT: 1.1 %
CHOLESTEROL, TOTAL: 124 MG/DL (ref 0–199)
EOSINOPHILS ABSOLUTE: 0.1 K/UL (ref 0–0.6)
EOSINOPHILS RELATIVE PERCENT: 2 %
HCT VFR BLD CALC: 37.2 % (ref 40.5–52.5)
HDLC SERPL-MCNC: 65 MG/DL (ref 40–60)
HEMOGLOBIN: 12.9 G/DL (ref 13.5–17.5)
LDL CHOLESTEROL CALCULATED: 45 MG/DL
LV EF: 45 %
LVEF MODALITY: NORMAL
LYMPHOCYTES ABSOLUTE: 1 K/UL (ref 1–5.1)
LYMPHOCYTES RELATIVE PERCENT: 36.1 %
MCH RBC QN AUTO: 31.4 PG (ref 26–34)
MCHC RBC AUTO-ENTMCNC: 34.6 G/DL (ref 31–36)
MCV RBC AUTO: 90.6 FL (ref 80–100)
MONOCYTES ABSOLUTE: 0.4 K/UL (ref 0–1.3)
MONOCYTES RELATIVE PERCENT: 12.5 %
NEUTROPHILS ABSOLUTE: 1.4 K/UL (ref 1.7–7.7)
NEUTROPHILS RELATIVE PERCENT: 48.3 %
PDW BLD-RTO: 14.7 % (ref 12.4–15.4)
PLATELET # BLD: 136 K/UL (ref 135–450)
PMV BLD AUTO: 8.6 FL (ref 5–10.5)
PRO-BNP: 443 PG/ML (ref 0–449)
RBC # BLD: 4.1 M/UL (ref 4.2–5.9)
TRIGL SERPL-MCNC: 68 MG/DL (ref 0–150)
VITAMIN D 25-HYDROXY: 42.8 NG/ML
VLDLC SERPL CALC-MCNC: 14 MG/DL
WBC # BLD: 2.8 K/UL (ref 4–11)

## 2018-05-24 PROCEDURE — 99214 OFFICE O/P EST MOD 30 MIN: CPT | Performed by: INTERNAL MEDICINE

## 2018-06-07 ENCOUNTER — OFFICE VISIT (OUTPATIENT)
Dept: INTERNAL MEDICINE CLINIC | Age: 78
End: 2018-06-07

## 2018-06-07 VITALS
BODY MASS INDEX: 25.08 KG/M2 | HEIGHT: 73 IN | SYSTOLIC BLOOD PRESSURE: 102 MMHG | WEIGHT: 189.2 LBS | HEART RATE: 64 BPM | DIASTOLIC BLOOD PRESSURE: 66 MMHG

## 2018-06-07 DIAGNOSIS — G89.29 CHRONIC NECK PAIN: ICD-10-CM

## 2018-06-07 DIAGNOSIS — E78.00 PURE HYPERCHOLESTEROLEMIA: ICD-10-CM

## 2018-06-07 DIAGNOSIS — I50.42 CHRONIC COMBINED SYSTOLIC AND DIASTOLIC CHF (CONGESTIVE HEART FAILURE) (HCC): Primary | ICD-10-CM

## 2018-06-07 DIAGNOSIS — M54.2 CHRONIC NECK PAIN: ICD-10-CM

## 2018-06-07 DIAGNOSIS — Z86.79 HISTORY OF ATRIAL FIBRILLATION: ICD-10-CM

## 2018-06-07 PROCEDURE — 3288F FALL RISK ASSESSMENT DOCD: CPT | Performed by: INTERNAL MEDICINE

## 2018-06-07 PROCEDURE — 99214 OFFICE O/P EST MOD 30 MIN: CPT | Performed by: INTERNAL MEDICINE

## 2018-06-07 PROCEDURE — G8510 SCR DEP NEG, NO PLAN REQD: HCPCS | Performed by: INTERNAL MEDICINE

## 2018-06-07 ASSESSMENT — PATIENT HEALTH QUESTIONNAIRE - PHQ9
SUM OF ALL RESPONSES TO PHQ QUESTIONS 1-9: 0
2. FEELING DOWN, DEPRESSED OR HOPELESS: 0
1. LITTLE INTEREST OR PLEASURE IN DOING THINGS: 0
SUM OF ALL RESPONSES TO PHQ9 QUESTIONS 1 & 2: 0

## 2018-07-05 RX ORDER — ATORVASTATIN CALCIUM 10 MG/1
TABLET, FILM COATED ORAL
Qty: 90 TABLET | Refills: 3 | Status: SHIPPED | OUTPATIENT
Start: 2018-07-05 | End: 2019-01-01 | Stop reason: SDUPTHER

## 2018-07-23 RX ORDER — SPIRONOLACTONE 25 MG/1
12.5 TABLET ORAL
Qty: 45 TABLET | Refills: 1 | Status: SHIPPED | OUTPATIENT
Start: 2018-07-23 | End: 2019-01-01 | Stop reason: SDUPTHER

## 2018-07-26 ENCOUNTER — ANTI-COAG VISIT (OUTPATIENT)
Dept: INTERNAL MEDICINE CLINIC | Age: 78
End: 2018-07-26

## 2018-08-06 RX ORDER — METOPROLOL SUCCINATE 25 MG/1
12.5 TABLET, EXTENDED RELEASE ORAL 2 TIMES DAILY
Qty: 90 TABLET | Refills: 3 | Status: SHIPPED | OUTPATIENT
Start: 2018-08-06 | End: 2019-02-15 | Stop reason: SDUPTHER

## 2018-10-10 DIAGNOSIS — Z23 NEED FOR INFLUENZA VACCINATION: Primary | ICD-10-CM

## 2018-10-10 PROCEDURE — G0008 ADMIN INFLUENZA VIRUS VAC: HCPCS | Performed by: INTERNAL MEDICINE

## 2018-10-10 PROCEDURE — 90662 IIV NO PRSV INCREASED AG IM: CPT | Performed by: INTERNAL MEDICINE

## 2018-10-22 RX ORDER — HYDROCHLOROTHIAZIDE 25 MG/1
25 TABLET ORAL DAILY
Qty: 90 TABLET | Refills: 3 | Status: SHIPPED | OUTPATIENT
Start: 2018-10-22 | End: 2019-01-01 | Stop reason: SDUPTHER

## 2018-10-23 RX ORDER — LISINOPRIL 2.5 MG/1
TABLET ORAL
Qty: 90 TABLET | Refills: 3 | Status: SHIPPED | OUTPATIENT
Start: 2018-10-23 | End: 2019-01-01 | Stop reason: SDUPTHER

## 2018-11-07 RX ORDER — MIDODRINE HYDROCHLORIDE 2.5 MG/1
2.5 TABLET ORAL DAILY
Qty: 90 TABLET | Refills: 3 | Status: SHIPPED | OUTPATIENT
Start: 2018-11-07 | End: 2019-01-01 | Stop reason: SDUPTHER

## 2018-11-15 ENCOUNTER — OFFICE VISIT (OUTPATIENT)
Dept: CARDIOLOGY CLINIC | Age: 78
End: 2018-11-15
Payer: COMMERCIAL

## 2018-11-15 ENCOUNTER — HOSPITAL ENCOUNTER (OUTPATIENT)
Age: 78
Discharge: HOME | End: 2018-11-15
Payer: COMMERCIAL

## 2018-11-15 VITALS
HEART RATE: 72 BPM | BODY MASS INDEX: 25.18 KG/M2 | HEIGHT: 73 IN | DIASTOLIC BLOOD PRESSURE: 74 MMHG | WEIGHT: 190 LBS | SYSTOLIC BLOOD PRESSURE: 100 MMHG

## 2018-11-15 DIAGNOSIS — I50.42 CHRONIC COMBINED SYSTOLIC AND DIASTOLIC CHF (CONGESTIVE HEART FAILURE) (HCC): Primary | Chronic | ICD-10-CM

## 2018-11-15 DIAGNOSIS — I50.42 CHRONIC COMBINED SYSTOLIC AND DIASTOLIC CHF (CONGESTIVE HEART FAILURE) (HCC): Chronic | ICD-10-CM

## 2018-11-15 DIAGNOSIS — Z95.2 S/P AVR (AORTIC VALVE REPLACEMENT): Chronic | ICD-10-CM

## 2018-11-15 DIAGNOSIS — E78.00 PURE HYPERCHOLESTEROLEMIA: Chronic | ICD-10-CM

## 2018-11-15 DIAGNOSIS — E55.9 VITAMIN D INSUFFICIENCY: ICD-10-CM

## 2018-11-15 DIAGNOSIS — I34.0 NON-RHEUMATIC MITRAL REGURGITATION: Chronic | ICD-10-CM

## 2018-11-15 LAB
A/G RATIO: 1.4 (ref 1.1–2.2)
ALBUMIN SERPL-MCNC: 4.5 G/DL (ref 3.4–5)
ALP BLD-CCNC: 77 U/L (ref 40–129)
ALT SERPL-CCNC: 9 U/L (ref 10–40)
ANION GAP SERPL CALCULATED.3IONS-SCNC: 13 MMOL/L (ref 3–16)
AST SERPL-CCNC: 17 U/L (ref 15–37)
ATYPICAL LYMPHOCYTE RELATIVE PERCENT: 1 % (ref 0–6)
BASOPHILS ABSOLUTE: 0 K/UL (ref 0–0.2)
BASOPHILS RELATIVE PERCENT: 0 %
BILIRUB SERPL-MCNC: 0.5 MG/DL (ref 0–1)
BUN BLDV-MCNC: 23 MG/DL (ref 7–20)
CALCIUM SERPL-MCNC: 9.7 MG/DL (ref 8.3–10.6)
CHLORIDE BLD-SCNC: 101 MMOL/L (ref 99–110)
CHOLESTEROL, TOTAL: 115 MG/DL (ref 0–199)
CO2: 27 MMOL/L (ref 21–32)
CREAT SERPL-MCNC: 1.1 MG/DL (ref 0.8–1.3)
EOSINOPHILS ABSOLUTE: 0 K/UL (ref 0–0.6)
EOSINOPHILS RELATIVE PERCENT: 1 %
GFR AFRICAN AMERICAN: >60
GFR NON-AFRICAN AMERICAN: >60
GLOBULIN: 3.2 G/DL
GLUCOSE BLD-MCNC: 80 MG/DL (ref 70–99)
HCT VFR BLD CALC: 37.9 % (ref 40.5–52.5)
HDLC SERPL-MCNC: 59 MG/DL (ref 40–60)
HEMATOLOGY PATH CONSULT: YES
HEMOGLOBIN: 12.7 G/DL (ref 13.5–17.5)
LDL CHOLESTEROL CALCULATED: 44 MG/DL
LYMPHOCYTES ABSOLUTE: 0.9 K/UL (ref 1–5.1)
LYMPHOCYTES RELATIVE PERCENT: 50 %
MCH RBC QN AUTO: 29.6 PG (ref 26–34)
MCHC RBC AUTO-ENTMCNC: 33.6 G/DL (ref 31–36)
MCV RBC AUTO: 87.9 FL (ref 80–100)
MONOCYTES ABSOLUTE: 0.3 K/UL (ref 0–1.3)
MONOCYTES RELATIVE PERCENT: 18 %
NEUTROPHILS ABSOLUTE: 0.5 K/UL (ref 1.7–7.7)
NEUTROPHILS RELATIVE PERCENT: 30 %
PDW BLD-RTO: 14.6 % (ref 12.4–15.4)
PLATELET # BLD: 139 K/UL (ref 135–450)
PMV BLD AUTO: 8.7 FL (ref 5–10.5)
POTASSIUM SERPL-SCNC: 4.4 MMOL/L (ref 3.5–5.1)
PRO-BNP: 517 PG/ML (ref 0–449)
RBC # BLD: 4.31 M/UL (ref 4.2–5.9)
SLIDE REVIEW: ABNORMAL
SODIUM BLD-SCNC: 141 MMOL/L (ref 136–145)
TOTAL PROTEIN: 7.7 G/DL (ref 6.4–8.2)
TRIGL SERPL-MCNC: 59 MG/DL (ref 0–150)
VITAMIN D 25-HYDROXY: 44.1 NG/ML
VLDLC SERPL CALC-MCNC: 12 MG/DL
WBC # BLD: 1.8 K/UL (ref 4–11)

## 2018-11-15 PROCEDURE — 36415 COLL VENOUS BLD VENIPUNCTURE: CPT

## 2018-11-15 PROCEDURE — 99214 OFFICE O/P EST MOD 30 MIN: CPT | Performed by: INTERNAL MEDICINE

## 2018-11-15 PROCEDURE — 82306 VITAMIN D 25 HYDROXY: CPT

## 2018-11-15 PROCEDURE — 80053 COMPREHEN METABOLIC PANEL: CPT

## 2018-11-15 PROCEDURE — 80061 LIPID PANEL: CPT

## 2018-11-15 PROCEDURE — 85025 COMPLETE CBC W/AUTO DIFF WBC: CPT

## 2018-11-15 PROCEDURE — 83880 ASSAY OF NATRIURETIC PEPTIDE: CPT

## 2018-11-15 NOTE — PROGRESS NOTES
KNEE ARTHROPLASTY Right 2/3/14    RIGHT TOTAL KNEE REPLACEMENT-BAR       UPPER GASTROINTESTINAL ENDOSCOPY  12/24/13     Family History   Problem Relation Age of Onset    Marfan Syndrome Brother     Heart Disease Brother     Stroke Sister     Diabetes Sister     Heart Disease Sister     Heart Disease Sister     Cancer Brother      Social History     Social History    Marital status:      Spouse name: N/A    Number of children: N/A    Years of education: N/A     Occupational History    Not on file. Social History Main Topics    Smoking status: Former Smoker     Packs/day: 2.00     Years: 20.00     Quit date: 7/28/1970    Smokeless tobacco: Never Used    Alcohol use 1.2 oz/week     2 Cans of beer per week      Comment: rare    Drug use: No    Sexual activity: Not on file     Other Topics Concern    Not on file     Social History Narrative    No narrative on file       Review of Systems:   · Constitutional: there has been no unanticipated weight loss. There's been no change in energy level, sleep pattern, or activity level. · Eyes: No visual changes or diplopia. No scleral icterus. · ENT: No Headaches, hearing loss or vertigo. No mouth sores or sore throat. · Cardiovascular: Reviewed in HPI  · Respiratory: No cough or wheezing, no sputum production. No hematemesis. No SOB  · Gastrointestinal: No abdominal pain, appetite loss, blood in stools. No change in bowel or bladder habits. · Genitourinary: No dysuria, trouble voiding, or hematuria. · Musculoskeletal:  No gait disturbance, weakness. · Integumentary: No rash or pruritis. · Neurological: No headache, diplopia, change in muscle strength, numbness or tingling. No change in gait, balance, coordination, mood, affect, memory, mentation, behavior. · Psychiatric: No anxiety, no depression. · Endocrine: No malaise, fatigue or temperature intolerance. No excessive thirst, fluid intake, or urination.  No 11/20/2017     05/01/2017     BMP:   Lab Results   Component Value Date     11/20/2017     05/01/2017     10/13/2016    K 4.7 11/20/2017    K 4.4 05/01/2017    K 5.0 10/13/2016    CL 97 11/20/2017     05/01/2017     10/13/2016    CO2 25 11/20/2017    CO2 27 05/01/2017    CO2 28 10/13/2016    PHOS 3.0 12/19/2013    PHOS 3.7 12/06/2013    BUN 20 11/20/2017    BUN 27 05/01/2017    BUN 24 10/13/2016    CREATININE 0.8 11/20/2017    CREATININE 1.0 05/01/2017    CREATININE 1.2 10/13/2016     BNP:   Lab Results   Component Value Date    BNP 44 01/13/2014    BNP 59 06/12/2013     11/28/2012     PT/INR:   Lab Results   Component Value Date    PROTIME 25.7 01/08/2016    PROTIME 45.1 10/16/2015    PROTIME 32.8 08/07/2015    INR 1.5 02/10/2017    INR 1.5 01/09/2017    INR 2.0 12/01/2016    INR 2.8 03/04/2016    INR 2.5 02/05/2016    INR 2.1 01/08/2016     FASTING LIPID PANEL:  Lab Results   Component Value Date    CHOL 124 05/24/2018    HDL 65 05/24/2018    TRIG 68 05/24/2018       Diagnostics    Echo 5/24/2018  Mildly dilated left ventricle. Mild concentric left ventricular hypertrophy. Mildly decreased left ventricular systolic function with anterolateral and  posterolateral hypokinesis. EStimated EF 45%. E/e'=5. Diastolic filling parameters suggests grade II diastolic  dysfunction. Mild mitral regurgitation. A bioprosthetic aortic valve appears well seated with a maximum gradient of  29 mmHg and a mean gradient of 17 mmHg. Trace aortic regurgitation. The aortic root is mildly dilated. The ascending aorta is mildly dilated. 4.0cm  Mild-to-moderate tricuspid regurgitation. RVSP 34mmHg    . 4/9/14 echo: EF 45%, mild to moderate TR, RVSP 46 mmHg. 4/22/15 echo: Moderate concentric left ventricular hypertrophy is present. Mitral annular calcification is present. Mild mitral regurgitation is present.  A porcine aortic valve appears well seated with a maximum gradient of 27

## 2018-11-16 ENCOUNTER — TELEPHONE (OUTPATIENT)
Dept: INTERNAL MEDICINE CLINIC | Age: 78
End: 2018-11-16

## 2018-11-16 DIAGNOSIS — D72.819 LEUKOPENIA, UNSPECIFIED TYPE: Primary | ICD-10-CM

## 2018-11-16 LAB — HEMATOLOGY PATH CONSULT: NORMAL

## 2019-01-01 ENCOUNTER — OFFICE VISIT (OUTPATIENT)
Dept: INTERNAL MEDICINE CLINIC | Age: 79
End: 2019-01-01
Payer: COMMERCIAL

## 2019-01-01 ENCOUNTER — APPOINTMENT (OUTPATIENT)
Dept: GENERAL RADIOLOGY | Age: 79
End: 2019-01-01
Payer: COMMERCIAL

## 2019-01-01 ENCOUNTER — HOSPITAL ENCOUNTER (OUTPATIENT)
Dept: OCCUPATIONAL THERAPY | Age: 79
Setting detail: THERAPIES SERIES
Discharge: HOME | End: 2019-08-19
Payer: COMMERCIAL

## 2019-01-01 ENCOUNTER — OFFICE VISIT (OUTPATIENT)
Dept: ORTHOPEDIC SURGERY | Age: 79
End: 2019-01-01
Payer: COMMERCIAL

## 2019-01-01 ENCOUNTER — HOSPITAL ENCOUNTER (OUTPATIENT)
Dept: OCCUPATIONAL THERAPY | Age: 79
Setting detail: THERAPIES SERIES
Discharge: HOME | End: 2019-08-07
Payer: COMMERCIAL

## 2019-01-01 ENCOUNTER — OFFICE VISIT (OUTPATIENT)
Dept: CARDIOLOGY CLINIC | Age: 79
End: 2019-01-01
Payer: COMMERCIAL

## 2019-01-01 ENCOUNTER — HOSPITAL ENCOUNTER (OUTPATIENT)
Dept: NON INVASIVE DIAGNOSTICS | Age: 79
Discharge: HOME | End: 2019-09-11
Payer: COMMERCIAL

## 2019-01-01 ENCOUNTER — HOSPITAL ENCOUNTER (OUTPATIENT)
Dept: CARDIAC CATH/INVASIVE PROCEDURES | Age: 79
Discharge: HOME | End: 2019-08-02
Attending: INTERNAL MEDICINE | Admitting: INTERNAL MEDICINE
Payer: COMMERCIAL

## 2019-01-01 ENCOUNTER — HOSPITAL ENCOUNTER (OUTPATIENT)
Dept: NON INVASIVE DIAGNOSTICS | Age: 79
Discharge: HOME | End: 2019-07-25
Payer: COMMERCIAL

## 2019-01-01 ENCOUNTER — TELEPHONE (OUTPATIENT)
Dept: INTERNAL MEDICINE CLINIC | Age: 79
End: 2019-01-01

## 2019-01-01 ENCOUNTER — HOSPITAL ENCOUNTER (EMERGENCY)
Age: 79
Discharge: HOME | End: 2019-07-31
Attending: EMERGENCY MEDICINE
Payer: COMMERCIAL

## 2019-01-01 ENCOUNTER — TELEPHONE (OUTPATIENT)
Dept: CARDIOLOGY CLINIC | Age: 79
End: 2019-01-01

## 2019-01-01 ENCOUNTER — HOSPITAL ENCOUNTER (OUTPATIENT)
Dept: NON INVASIVE DIAGNOSTICS | Age: 79
Discharge: HOME | End: 2019-06-05
Payer: COMMERCIAL

## 2019-01-01 ENCOUNTER — HOSPITAL ENCOUNTER (OUTPATIENT)
Age: 79
Discharge: HOME | End: 2019-06-05
Payer: COMMERCIAL

## 2019-01-01 VITALS
TEMPERATURE: 98.3 F | BODY MASS INDEX: 26.55 KG/M2 | SYSTOLIC BLOOD PRESSURE: 112 MMHG | DIASTOLIC BLOOD PRESSURE: 76 MMHG | HEIGHT: 72 IN | HEART RATE: 86 BPM | WEIGHT: 196 LBS

## 2019-01-01 VITALS
BODY MASS INDEX: 25.06 KG/M2 | DIASTOLIC BLOOD PRESSURE: 72 MMHG | HEIGHT: 72 IN | HEART RATE: 84 BPM | SYSTOLIC BLOOD PRESSURE: 110 MMHG | WEIGHT: 185 LBS

## 2019-01-01 VITALS
WEIGHT: 193 LBS | HEART RATE: 70 BPM | HEIGHT: 72 IN | DIASTOLIC BLOOD PRESSURE: 60 MMHG | SYSTOLIC BLOOD PRESSURE: 110 MMHG | BODY MASS INDEX: 26.14 KG/M2

## 2019-01-01 VITALS
OXYGEN SATURATION: 95 % | HEART RATE: 75 BPM | WEIGHT: 189 LBS | HEIGHT: 73 IN | SYSTOLIC BLOOD PRESSURE: 110 MMHG | BODY MASS INDEX: 25.05 KG/M2 | DIASTOLIC BLOOD PRESSURE: 68 MMHG

## 2019-01-01 VITALS
SYSTOLIC BLOOD PRESSURE: 110 MMHG | BODY MASS INDEX: 24.92 KG/M2 | DIASTOLIC BLOOD PRESSURE: 70 MMHG | HEART RATE: 76 BPM | HEIGHT: 73 IN | WEIGHT: 188 LBS

## 2019-01-01 VITALS
DIASTOLIC BLOOD PRESSURE: 64 MMHG | WEIGHT: 189 LBS | SYSTOLIC BLOOD PRESSURE: 102 MMHG | BODY MASS INDEX: 25.6 KG/M2 | HEART RATE: 72 BPM | TEMPERATURE: 98.5 F | HEIGHT: 72 IN

## 2019-01-01 VITALS
HEIGHT: 73 IN | RESPIRATION RATE: 16 BRPM | HEART RATE: 86 BPM | BODY MASS INDEX: 24.92 KG/M2 | WEIGHT: 188 LBS | TEMPERATURE: 98 F | DIASTOLIC BLOOD PRESSURE: 75 MMHG | OXYGEN SATURATION: 96 % | SYSTOLIC BLOOD PRESSURE: 114 MMHG

## 2019-01-01 VITALS
SYSTOLIC BLOOD PRESSURE: 110 MMHG | HEART RATE: 81 BPM | BODY MASS INDEX: 24.65 KG/M2 | OXYGEN SATURATION: 96 % | TEMPERATURE: 97.7 F | HEIGHT: 73 IN | RESPIRATION RATE: 18 BRPM | DIASTOLIC BLOOD PRESSURE: 76 MMHG | WEIGHT: 186 LBS

## 2019-01-01 VITALS
HEIGHT: 73 IN | DIASTOLIC BLOOD PRESSURE: 80 MMHG | WEIGHT: 190 LBS | OXYGEN SATURATION: 74 % | BODY MASS INDEX: 25.18 KG/M2 | SYSTOLIC BLOOD PRESSURE: 106 MMHG | HEART RATE: 97 BPM

## 2019-01-01 VITALS
OXYGEN SATURATION: 95 % | WEIGHT: 186 LBS | SYSTOLIC BLOOD PRESSURE: 108 MMHG | HEIGHT: 73 IN | DIASTOLIC BLOOD PRESSURE: 68 MMHG | BODY MASS INDEX: 24.65 KG/M2 | HEART RATE: 72 BPM

## 2019-01-01 VITALS
BODY MASS INDEX: 26.14 KG/M2 | WEIGHT: 193 LBS | HEART RATE: 68 BPM | SYSTOLIC BLOOD PRESSURE: 108 MMHG | HEIGHT: 72 IN | DIASTOLIC BLOOD PRESSURE: 68 MMHG

## 2019-01-01 VITALS — WEIGHT: 188.05 LBS | HEIGHT: 73 IN | BODY MASS INDEX: 24.92 KG/M2

## 2019-01-01 VITALS
OXYGEN SATURATION: 95 % | DIASTOLIC BLOOD PRESSURE: 74 MMHG | RESPIRATION RATE: 18 BRPM | SYSTOLIC BLOOD PRESSURE: 110 MMHG | HEART RATE: 86 BPM

## 2019-01-01 VITALS
BODY MASS INDEX: 25.34 KG/M2 | WEIGHT: 192 LBS | DIASTOLIC BLOOD PRESSURE: 64 MMHG | SYSTOLIC BLOOD PRESSURE: 112 MMHG | HEART RATE: 80 BPM

## 2019-01-01 DIAGNOSIS — I50.42 CHRONIC COMBINED SYSTOLIC AND DIASTOLIC CHF (CONGESTIVE HEART FAILURE) (HCC): Chronic | ICD-10-CM

## 2019-01-01 DIAGNOSIS — I35.0 MODERATE AORTIC STENOSIS: ICD-10-CM

## 2019-01-01 DIAGNOSIS — I34.0 NON-RHEUMATIC MITRAL REGURGITATION: Chronic | ICD-10-CM

## 2019-01-01 DIAGNOSIS — I35.0 MODERATE AORTIC STENOSIS: Primary | ICD-10-CM

## 2019-01-01 DIAGNOSIS — I10 ESSENTIAL HYPERTENSION: ICD-10-CM

## 2019-01-01 DIAGNOSIS — Z00.00 ROUTINE GENERAL MEDICAL EXAMINATION AT A HEALTH CARE FACILITY: Primary | ICD-10-CM

## 2019-01-01 DIAGNOSIS — I35.0 AORTIC VALVE STENOSIS, ETIOLOGY OF CARDIAC VALVE DISEASE UNSPECIFIED: ICD-10-CM

## 2019-01-01 DIAGNOSIS — E78.00 PURE HYPERCHOLESTEROLEMIA: Chronic | ICD-10-CM

## 2019-01-01 DIAGNOSIS — I48.0 PAF (PAROXYSMAL ATRIAL FIBRILLATION) (HCC): ICD-10-CM

## 2019-01-01 DIAGNOSIS — Z79.01 ANTICOAGULATED ON COUMADIN: ICD-10-CM

## 2019-01-01 DIAGNOSIS — S68.119A FINGER AMPUTATION, TRAUMATIC, INITIAL ENCOUNTER: Primary | ICD-10-CM

## 2019-01-01 DIAGNOSIS — I50.42 CHRONIC COMBINED SYSTOLIC AND DIASTOLIC CHF (CONGESTIVE HEART FAILURE) (HCC): Primary | Chronic | ICD-10-CM

## 2019-01-01 DIAGNOSIS — Z95.2 S/P AVR (AORTIC VALVE REPLACEMENT): ICD-10-CM

## 2019-01-01 DIAGNOSIS — I48.0 PAF (PAROXYSMAL ATRIAL FIBRILLATION) (HCC): Primary | ICD-10-CM

## 2019-01-01 DIAGNOSIS — I35.1 NONRHEUMATIC AORTIC VALVE INSUFFICIENCY: Primary | ICD-10-CM

## 2019-01-01 DIAGNOSIS — Z95.2 S/P AVR (AORTIC VALVE REPLACEMENT): Chronic | ICD-10-CM

## 2019-01-01 DIAGNOSIS — D68.32 WARFARIN-INDUCED COAGULOPATHY (HCC): Primary | ICD-10-CM

## 2019-01-01 DIAGNOSIS — E78.00 HYPERCHOLESTEREMIA: ICD-10-CM

## 2019-01-01 DIAGNOSIS — J06.9 ACUTE URI: Primary | ICD-10-CM

## 2019-01-01 DIAGNOSIS — T45.515A WARFARIN-INDUCED COAGULOPATHY (HCC): Primary | ICD-10-CM

## 2019-01-01 DIAGNOSIS — Z23 NEED FOR INFLUENZA VACCINATION: ICD-10-CM

## 2019-01-01 DIAGNOSIS — I35.1 NONRHEUMATIC AORTIC VALVE INSUFFICIENCY: ICD-10-CM

## 2019-01-01 DIAGNOSIS — D72.819 LEUKOPENIA, UNSPECIFIED TYPE: Primary | ICD-10-CM

## 2019-01-01 DIAGNOSIS — E78.00 PURE HYPERCHOLESTEROLEMIA: ICD-10-CM

## 2019-01-01 DIAGNOSIS — J06.9 ACUTE URI: ICD-10-CM

## 2019-01-01 DIAGNOSIS — S61.320A LACERATION OF RIGHT INDEX FINGER WITH FOREIGN BODY AND DAMAGE TO NAIL, INITIAL ENCOUNTER: Primary | ICD-10-CM

## 2019-01-01 DIAGNOSIS — Z23 NEED FOR 23-POLYVALENT PNEUMOCOCCAL POLYSACCHARIDE VACCINE: ICD-10-CM

## 2019-01-01 LAB
A/G RATIO: 1.2 (ref 1.1–2.2)
A/G RATIO: 1.2 (ref 1.1–2.2)
ALBUMIN SERPL-MCNC: 3.9 G/DL (ref 3.4–5)
ALBUMIN SERPL-MCNC: 4.1 G/DL (ref 3.4–5)
ALBUMIN SERPL-MCNC: 4.4 G/DL (ref 3.4–5)
ALP BLD-CCNC: 75 U/L (ref 40–129)
ALP BLD-CCNC: 98 U/L (ref 40–129)
ALT SERPL-CCNC: 10 U/L (ref 10–40)
ALT SERPL-CCNC: 16 U/L (ref 10–40)
ANION GAP SERPL CALCULATED.3IONS-SCNC: 10 MMOL/L (ref 3–16)
ANION GAP SERPL CALCULATED.3IONS-SCNC: 10 MMOL/L (ref 3–16)
ANION GAP SERPL CALCULATED.3IONS-SCNC: 17 MMOL/L (ref 3–16)
AST SERPL-CCNC: 16 U/L (ref 15–37)
AST SERPL-CCNC: 25 U/L (ref 15–37)
BILIRUB SERPL-MCNC: 0.4 MG/DL (ref 0–1)
BILIRUB SERPL-MCNC: 0.5 MG/DL (ref 0–1)
BUN BLDV-MCNC: 18 MG/DL (ref 7–20)
BUN BLDV-MCNC: 28 MG/DL (ref 7–20)
BUN BLDV-MCNC: 29 MG/DL (ref 7–20)
CALCIUM SERPL-MCNC: 9.3 MG/DL (ref 8.3–10.6)
CALCIUM SERPL-MCNC: 9.7 MG/DL (ref 8.3–10.6)
CALCIUM SERPL-MCNC: 9.7 MG/DL (ref 8.3–10.6)
CHLORIDE BLD-SCNC: 95 MMOL/L (ref 99–110)
CHLORIDE BLD-SCNC: 98 MMOL/L (ref 99–110)
CHLORIDE BLD-SCNC: 99 MMOL/L (ref 99–110)
CHOLESTEROL, TOTAL: 98 MG/DL (ref 0–199)
CO2: 27 MMOL/L (ref 21–32)
CO2: 28 MMOL/L (ref 21–32)
CO2: 31 MMOL/L (ref 21–32)
CREAT SERPL-MCNC: 1 MG/DL (ref 0.8–1.3)
CREAT SERPL-MCNC: 1.1 MG/DL (ref 0.8–1.3)
CREAT SERPL-MCNC: 1.2 MG/DL (ref 0.8–1.3)
GFR AFRICAN AMERICAN: >60
GFR NON-AFRICAN AMERICAN: 59
GFR NON-AFRICAN AMERICAN: >60
GFR NON-AFRICAN AMERICAN: >60
GLOBULIN: 3.3 G/DL
GLOBULIN: 3.4 G/DL
GLUCOSE BLD-MCNC: 83 MG/DL (ref 70–99)
GLUCOSE BLD-MCNC: 87 MG/DL (ref 70–99)
GLUCOSE BLD-MCNC: 88 MG/DL (ref 70–99)
HCT VFR BLD CALC: 37 % (ref 40.5–52.5)
HDLC SERPL-MCNC: 38 MG/DL (ref 40–60)
HEMOGLOBIN: 12.3 G/DL (ref 13.5–17.5)
INTERNATIONAL NORMALIZATION RATIO, POC: 1.7
INTERNATIONAL NORMALIZATION RATIO, POC: 2.1
INTERNATIONAL NORMALIZATION RATIO, POC: 2.5
INTERNATIONAL NORMALIZATION RATIO, POC: 2.7
INTERNATIONAL NORMALIZATION RATIO, POC: 5.9
LDL CHOLESTEROL CALCULATED: 46 MG/DL
LEFT VENTRICULAR EJECTION FRACTION MODE: NORMAL
LV EF: 40 %
LV EF: 40 %
LV EF: 50 %
LVEF MODALITY: NORMAL
LVEF MODALITY: NORMAL
MCH RBC QN AUTO: 28.9 PG (ref 26–34)
MCHC RBC AUTO-ENTMCNC: 33.1 G/DL (ref 31–36)
MCV RBC AUTO: 87.2 FL (ref 80–100)
PDW BLD-RTO: 15.9 % (ref 12.4–15.4)
PHOSPHORUS: 3.4 MG/DL (ref 2.5–4.9)
PLATELET # BLD: 158 K/UL (ref 135–450)
PMV BLD AUTO: 8.5 FL (ref 5–10.5)
POTASSIUM SERPL-SCNC: 4.6 MMOL/L (ref 3.5–5.1)
POTASSIUM SERPL-SCNC: 4.8 MMOL/L (ref 3.5–5.1)
POTASSIUM SERPL-SCNC: 4.8 MMOL/L (ref 3.5–5.1)
PRO-BNP: 643 PG/ML (ref 0–449)
PROTHROMBIN TIME, POC: NORMAL
RBC # BLD: 4.24 M/UL (ref 4.2–5.9)
SODIUM BLD-SCNC: 136 MMOL/L (ref 136–145)
SODIUM BLD-SCNC: 139 MMOL/L (ref 136–145)
SODIUM BLD-SCNC: 140 MMOL/L (ref 136–145)
TOTAL PROTEIN: 7.2 G/DL (ref 6.4–8.2)
TOTAL PROTEIN: 7.5 G/DL (ref 6.4–8.2)
TRIGL SERPL-MCNC: 70 MG/DL (ref 0–150)
VLDLC SERPL CALC-MCNC: 14 MG/DL
WBC # BLD: 2.1 K/UL (ref 4–11)

## 2019-01-01 PROCEDURE — 85610 PROTHROMBIN TIME: CPT | Performed by: NURSE PRACTITIONER

## 2019-01-01 PROCEDURE — G0438 PPPS, INITIAL VISIT: HCPCS | Performed by: NURSE PRACTITIONER

## 2019-01-01 PROCEDURE — 90653 IIV ADJUVANT VACCINE IM: CPT | Performed by: NURSE PRACTITIONER

## 2019-01-01 PROCEDURE — 99283 EMERGENCY DEPT VISIT LOW MDM: CPT

## 2019-01-01 PROCEDURE — 93306 TTE W/DOPPLER COMPLETE: CPT

## 2019-01-01 PROCEDURE — 99214 OFFICE O/P EST MOD 30 MIN: CPT | Performed by: INTERNAL MEDICINE

## 2019-01-01 PROCEDURE — 93350 STRESS TTE ONLY: CPT

## 2019-01-01 PROCEDURE — 36415 COLL VENOUS BLD VENIPUNCTURE: CPT

## 2019-01-01 PROCEDURE — G0009 ADMIN PNEUMOCOCCAL VACCINE: HCPCS | Performed by: NURSE PRACTITIONER

## 2019-01-01 PROCEDURE — 99212 OFFICE O/P EST SF 10 MIN: CPT | Performed by: NURSE PRACTITIONER

## 2019-01-01 PROCEDURE — 99213 OFFICE O/P EST LOW 20 MIN: CPT | Performed by: NURSE PRACTITIONER

## 2019-01-01 PROCEDURE — G8510 SCR DEP NEG, NO PLAN REQD: HCPCS | Performed by: INTERNAL MEDICINE

## 2019-01-01 PROCEDURE — 93312 ECHO TRANSESOPHAGEAL: CPT

## 2019-01-01 PROCEDURE — 90732 PPSV23 VACC 2 YRS+ SUBQ/IM: CPT | Performed by: NURSE PRACTITIONER

## 2019-01-01 PROCEDURE — 99215 OFFICE O/P EST HI 40 MIN: CPT | Performed by: INTERNAL MEDICINE

## 2019-01-01 PROCEDURE — 2580000003 HC RX 258: Performed by: INTERNAL MEDICINE

## 2019-01-01 PROCEDURE — 99212 OFFICE O/P EST SF 10 MIN: CPT | Performed by: ORTHOPAEDIC SURGERY

## 2019-01-01 PROCEDURE — G0008 ADMIN INFLUENZA VIRUS VAC: HCPCS | Performed by: NURSE PRACTITIONER

## 2019-01-01 PROCEDURE — 99203 OFFICE O/P NEW LOW 30 MIN: CPT | Performed by: ORTHOPAEDIC SURGERY

## 2019-01-01 PROCEDURE — 73140 X-RAY EXAM OF FINGER(S): CPT

## 2019-01-01 PROCEDURE — 93017 CV STRESS TEST TRACING ONLY: CPT

## 2019-01-01 PROCEDURE — 93325 DOPPLER ECHO COLOR FLOW MAPG: CPT

## 2019-01-01 PROCEDURE — L3933 FO W/O JOINTS CF: HCPCS

## 2019-01-01 PROCEDURE — 99152 MOD SED SAME PHYS/QHP 5/>YRS: CPT

## 2019-01-01 PROCEDURE — 83880 ASSAY OF NATRIURETIC PEPTIDE: CPT

## 2019-01-01 PROCEDURE — 99213 OFFICE O/P EST LOW 20 MIN: CPT | Performed by: INTERNAL MEDICINE

## 2019-01-01 PROCEDURE — 6360000002 HC RX W HCPCS: Performed by: INTERNAL MEDICINE

## 2019-01-01 PROCEDURE — 6370000000 HC RX 637 (ALT 250 FOR IP): Performed by: EMERGENCY MEDICINE

## 2019-01-01 PROCEDURE — 80053 COMPREHEN METABOLIC PANEL: CPT

## 2019-01-01 PROCEDURE — 7100000010 HC PHASE II RECOVERY - FIRST 15 MIN

## 2019-01-01 RX ORDER — MIDODRINE HYDROCHLORIDE 2.5 MG/1
2.5 TABLET ORAL DAILY
Qty: 30 TABLET | Refills: 11 | Status: ON HOLD | OUTPATIENT
Start: 2019-01-01 | End: 2020-01-01 | Stop reason: HOSPADM

## 2019-01-01 RX ORDER — HYDROCHLOROTHIAZIDE 25 MG/1
25 TABLET ORAL DAILY
Qty: 90 TABLET | Refills: 3 | Status: ON HOLD | OUTPATIENT
Start: 2019-01-01 | End: 2020-01-01 | Stop reason: HOSPADM

## 2019-01-01 RX ORDER — SPIRONOLACTONE 25 MG/1
12.5 TABLET ORAL
Qty: 45 TABLET | Refills: 1 | Status: ON HOLD | OUTPATIENT
Start: 2019-01-01 | End: 2020-01-01 | Stop reason: HOSPADM

## 2019-01-01 RX ORDER — DOBUTAMINE HYDROCHLORIDE 200 MG/100ML
5 INJECTION INTRAVENOUS CONTINUOUS
Status: DISCONTINUED | OUTPATIENT
Start: 2019-01-01 | End: 2019-01-01 | Stop reason: HOSPADM

## 2019-01-01 RX ORDER — CEFDINIR 300 MG/1
300 CAPSULE ORAL 2 TIMES DAILY
Qty: 14 CAPSULE | Refills: 0 | Status: SHIPPED | OUTPATIENT
Start: 2019-01-01 | End: 2019-01-01

## 2019-01-01 RX ORDER — ATORVASTATIN CALCIUM 10 MG/1
TABLET, FILM COATED ORAL
Qty: 90 TABLET | Refills: 2 | Status: ON HOLD | OUTPATIENT
Start: 2019-01-01 | End: 2020-01-01 | Stop reason: HOSPADM

## 2019-01-01 RX ORDER — CLINDAMYCIN HYDROCHLORIDE 150 MG/1
450 CAPSULE ORAL 3 TIMES DAILY
Qty: 90 CAPSULE | Refills: 0 | Status: SHIPPED | OUTPATIENT
Start: 2019-01-01 | End: 2019-01-01

## 2019-01-01 RX ORDER — SODIUM CHLORIDE 9 MG/ML
INJECTION, SOLUTION INTRAVENOUS CONTINUOUS
Status: DISCONTINUED | OUTPATIENT
Start: 2019-01-01 | End: 2019-01-01 | Stop reason: HOSPADM

## 2019-01-01 RX ORDER — LISINOPRIL 2.5 MG/1
TABLET ORAL
Qty: 90 TABLET | Refills: 3 | Status: ON HOLD | OUTPATIENT
Start: 2019-01-01 | End: 2020-01-01 | Stop reason: HOSPADM

## 2019-01-01 RX ORDER — HYDROCODONE BITARTRATE AND ACETAMINOPHEN 5; 325 MG/1; MG/1
1 TABLET ORAL EVERY 6 HOURS PRN
Qty: 14 TABLET | Refills: 0 | Status: SHIPPED | OUTPATIENT
Start: 2019-01-01 | End: 2019-01-01

## 2019-01-01 RX ORDER — CLINDAMYCIN HYDROCHLORIDE 150 MG/1
450 CAPSULE ORAL ONCE
Status: COMPLETED | OUTPATIENT
Start: 2019-01-01 | End: 2019-01-01

## 2019-01-01 RX ORDER — WARFARIN SODIUM 5 MG/1
5 TABLET ORAL DAILY
Qty: 90 TABLET | Refills: 3 | Status: ON HOLD | OUTPATIENT
Start: 2019-01-01 | End: 2020-01-01 | Stop reason: HOSPADM

## 2019-01-01 RX ADMIN — SODIUM CHLORIDE: 900 INJECTION, SOLUTION INTRAVENOUS at 09:41

## 2019-01-01 RX ADMIN — CLINDAMYCIN HYDROCHLORIDE 450 MG: 150 CAPSULE ORAL at 20:40

## 2019-01-01 ASSESSMENT — LIFESTYLE VARIABLES
HOW OFTEN DO YOU HAVE SIX OR MORE DRINKS ON ONE OCCASION: 0
AUDIT-C TOTAL SCORE: 3
HOW OFTEN DO YOU HAVE A DRINK CONTAINING ALCOHOL: 3
HOW MANY STANDARD DRINKS CONTAINING ALCOHOL DO YOU HAVE ON A TYPICAL DAY: 0

## 2019-01-01 ASSESSMENT — ENCOUNTER SYMPTOMS
SINUS PAIN: 1
NAUSEA: 0
BLOOD IN STOOL: 0
DIARRHEA: 0
BLOOD IN STOOL: 0
SWOLLEN GLANDS: 0
SORE THROAT: 1
COUGH: 1
WHEEZING: 0
RHINORRHEA: 1
COUGH: 1

## 2019-01-01 ASSESSMENT — PATIENT HEALTH QUESTIONNAIRE - PHQ9
SUM OF ALL RESPONSES TO PHQ QUESTIONS 1-9: 0
2. FEELING DOWN, DEPRESSED OR HOPELESS: 0
SUM OF ALL RESPONSES TO PHQ9 QUESTIONS 1 & 2: 0
1. LITTLE INTEREST OR PLEASURE IN DOING THINGS: 0
SUM OF ALL RESPONSES TO PHQ QUESTIONS 1-9: 0

## 2019-02-15 RX ORDER — METOPROLOL SUCCINATE 25 MG/1
12.5 TABLET, EXTENDED RELEASE ORAL 2 TIMES DAILY
Qty: 90 TABLET | Refills: 3 | Status: ON HOLD | OUTPATIENT
Start: 2019-02-15 | End: 2020-01-01 | Stop reason: HOSPADM

## 2019-02-25 ENCOUNTER — TELEPHONE (OUTPATIENT)
Dept: INTERNAL MEDICINE CLINIC | Age: 79
End: 2019-02-25

## 2019-06-05 NOTE — PROGRESS NOTES
spironolactone (ALDACTONE) 25 MG tablet TAKE 0.5 TABLETS BY MOUTH THREE TIMES A WEEK 45 tablet 1    atorvastatin (LIPITOR) 10 MG tablet TAKE 1 TABLET BY MOUTH ONE TIME A DAY  90 tablet 3    Cholecalciferol (VITAMIN D3) 3000 UNITS TABS Take 2,000 Units by mouth daily       ondansetron (ZOFRAN-ODT) 4 MG disintegrating tablet Take 4 mg by mouth every 8 hours as needed for Nausea or Vomiting      aspirin 81 MG tablet Take 81 mg by mouth 2 times daily       Ascorbic Acid (VITAMIN C) 500 MG tablet Take 1,000 mg by mouth daily. No current facility-administered medications for this visit.         Immunization History   Administered Date(s) Administered    Influenza Virus Vaccine 10/10/2012, 09/19/2013, 10/23/2014    Influenza, High Dose (Fluzone 65 yrs and older) 09/29/2016, 09/20/2017, 10/10/2018    Pneumococcal 13-valent Conjugate ([id number]) 12/01/2015    Pneumococcal Conjugate 7-valent 11/19/2007    Pneumococcal Polysaccharide ([id number]) 11/19/2007    Tdap (Boostrix, Adacel) 10/12/2014       Patient Active Problem List   Diagnosis    Cardiomyopathy ([address]    Mitral regurgitation    Microscopic hematuria    S/P AVR (aortic valve replacement)    Chronic combined systolic and diastolic CHF (congestive heart failure) (HCC)    Arthritis of knee    Primary localized osteoarthrosis of shoulder region    Primary localized osteoarthrosis, pelvic region and thigh    H/O total shoulder replacement    Pure hypercholesterolemia       Past Medical History:   Diagnosis Date    Aortic insufficiency     Atrial fibrillation ([address]     cardioversion 8/10/12    Breast nodule 8/2012  right    excision-lumpectomy 8/2012    CAD (coronary artery disease)     Cardiomyopathy ([address] 8/2012    EF 20 %    CHF (congestive heart failure) (HCC)     Hyperlipidemia     Hypertension     Microscopic hematuria 7/29/2012    Mitral regurgitation     Nausea & vomiting 7/29/2012    Osteoarthritis of knee     bilateral knees    Pneumonia     Rotator cuff tear 2013    Ventricular ectopy 2012     Past Surgical History:   Procedure Laterality Date    AORTIC VALVE REPLACEMENT  8/3/2012    moderate mitral regurg    CARDIAC SURGERY      COLONOSCOPY      CYST REMOVAL      chest    ENDOSCOPY, COLON, DIAGNOSTIC      INGUINAL HERNIA REPAIR      bilateral in [address] [place] [address]      both    OTHER SURGICAL HISTORY  12    excision right breast mass (lumpectomy)    SHOULDER ARTHROPLASTY Right 2/2/15    right reverse total shoulder arthroplasty    TOTAL KNEE ARTHROPLASTY Left 13    TOTAL KNEE ARTHROPLASTY Right 2/3/14    RIGHT TOTAL KNEE REPLACEMENT-BAR       UPPER GASTROINTESTINAL ENDOSCOPY  13     Family History   Problem Relation Age of Onset    Marfan Syndrome Brother     Heart Disease Brother     Stroke Sister     Diabetes Sister     Heart Disease Sister     Heart Disease Sister     Cancer Brother      Social History     Socioeconomic History    Marital status:      Spouse name: Not on file    Number of children: Not on file    Years of education: Not on file    Highest education level: Not on file   Occupational History    Not on file   Social Needs    Financial resource strain: Not on file    Food insecurity:     Worry: Not on file     Inability: Not on file    Transportation needs:     Medical: Not on file     Non-medical: Not on file   Tobacco Use    Smoking status: Former Smoker     Packs/day: 2.00     Years: 20.00     Pack years: 40.00     Last attempt to quit: 1970     Years since quittin.8    Smokeless tobacco: Never Used   Substance and Sexual Activity    Alcohol use:  Yes     Alcohol/week: 1.2 oz     Types: 2 Cans of beer per week     Comment: rare    Drug use: No    Sexual activity: Not on file   Lifestyle    Physical activity:     Days per week: Not on file     Minutes per session: Not on file    Stress: Not on file   Relationships    Social connections:     Talks on phone: Not on file     Gets together: Not on file     Attends Jehovah's witness service: Not on file     Active member of club or organization: Not on file     Attends meetings of clubs or organizations: Not on file     Relationship status: Not on file    Intimate partner violence:     Fear of current or ex partner: Not on file     Emotionally abused: Not on file     Physically abused: Not on file     Forced sexual activity: Not on file   Other Topics Concern    Not on file   Social History Narrative    Not on file       Review of Systems:   · Constitutional: there has been no unanticipated weight loss. There's been no change in energy level, sleep pattern, or activity level. · Eyes: No visual changes or diplopia. No scleral icterus. · ENT: No Headaches, hearing loss or vertigo. No mouth sores or sore throat. · Cardiovascular: Reviewed in HPI  · Respiratory: No cough or wheezing, no sputum production. No hematemesis. No SOB  · Gastrointestinal: No abdominal pain, appetite loss, blood in stools. No change in bowel or bladder habits. · Genitourinary: No dysuria, trouble voiding, or hematuria. · Musculoskeletal:  No gait disturbance, weakness. · Integumentary: No rash or pruritis. · Neurological: No headache, diplopia, change in muscle strength, numbness or tingling. No change in gait, balance, coordination, mood, affect, memory, mentation, behavior. · Psychiatric: No anxiety, no depression. · Endocrine: No malaise, fatigue or temperature intolerance. No excessive thirst, fluid intake, or urination. No tremor. · Hematologic/Lymphatic: No abnormal bruising or bleeding, blood clots or swollen lymph nodes. · Allergic/Immunologic: No nasal congestion or hives.     Physical Examination:    Vitals:    06/05/19 1503   BP: 106/80   Pulse: 97   SpO2: (!) 74%   Weight: 190 lb (86.2 kg)   Height: 6' 1\" (1.854 m)       Wt Readings from Last 3 Encounters:   06/05/19 190 lb (86.2 kg)   03/25/19 diastolic  dysfunction. Mild mitral regurgitation. A bioprosthetic aortic valve appears well seated with a maximum gradient of  29 mmHg and a mean gradient of 17 mmHg. Trace aortic regurgitation. The aortic root is mildly dilated. The ascending aorta is mildly dilated. 4.0cm  Mild-to-moderate tricuspid regurgitation. RVSP 34mmHg      Echo 5/1/2017  Arrhythmia noted during exam.  LV systolic function is mildly reduced with an estimated ejection fraction  of 45%. There is inferior-lateral akinesis. There is concentric left ventricular  hypertrophy. Mild to moderate mitral regurgitation is present. The left atrium is dilated. A bioprosthetic aortic valve appears well seated with a maximum gradient of  41 mmHg and a mean gradient of 25 mmHg. Trivial aortic regurgitation is  present. There is mild tricuspid regurgitation with RVSP estimated at 36 mmHg      4/22/15 echo: Moderate concentric left ventricular hypertrophy is present. Mitral annular calcification is present. Mild mitral regurgitation is present. A porcine aortic valve appears well seated with a maximum gradient of 27 mmHg and a mean gradient of 17 mmHg. The aortic root is at the upper limits of normal. There is mild tricuspid regurgitation with RVSP estimated at 33 mmHg. Trivial pulmonic regurgitation present. EF estimated at 55%. Mitral annular calcification is present. Mild mitral regurgitation is present. A porcine aortic valve appears well seated with a maximum gradient of 27 mmHg and a mean gradient of 17 mmHg. The aortic root is at the upper limits of normal.There is mild tricuspid regurgitation with RVSP estimated at 33 mmHg. Trivial pulmonic regurgitation present. 4/9/14 echo: EF 45%, mild to moderate TR, RVSP 46 mmHg. Echo Apr 2013:   Upper limits of normal left ventricle size with borderline concentric left ventricular hypertrophy.  The left ventricular systolic function is borderline reduced with an inferior wall motion abnormality and an ejection fraction of 45-50 %. There is reversal of E/A inflow velocities across the mitral valve suggesting impaired left ventricular relaxation. Normally functioning porcine bioprosthetic valve in aortic position. Mean prosthetic gradient is 18 mm Hg. Mild MAC. There is mild tricuspid and pulmonic regurgitation. Trace of mitral regurgitation. RVSP estimated at 34 mmHg. Compared to last echo on 9/26/2012 with EF of 15%, the left ventricular systolic function has improved markedly. Echo Nov 2012: LVEF < 20%     Echo Sept 2012:   1. Severely dilated left ventricle with severe left ventricular dysfunction (15%)  2. Bioprosthetic noted in the aortic position with slight flow acceleration and slight perivalvular insufficiency. 3. Left ventricular hypertrophy. 4. Large pleural effusion. 5. Mildly elevated right ventricular systolic pressure of 40 mmHg. 6. Biatrial enlargement along with borderline right ventricle enlargement. Echo Jul 2012:   1. Severe LV systolic dysfunction. Severe left ventricular dilation. Ejection fraction is estimated at 20%. 2. Moderate to severe aortic regurgitation. 3. Moderate mitral regurgitation. 4. Recommend transesophageal echocardiography if clinically indicated for assessment of severe aortic regurgitation. Dr Cesar  Note  PROCEDURE: 8-3-2012 (post op course complicated by A-fib and Hypotension)     1. Aortic valve replacement with a 25 mm Epic porcine valve. 2.  Transesophageal echocardiography performed by Anesthesia. 3.  Bilateral parasternal intercostal nerve block x5 with 0.25% Marcaine. Labs were reviewed including labs from other hospital systems through [facility]. Cardiac testing was reviewed including echos, nuclear scans, cardiac catheterization, including from other hospital systems through [facility]. Assessment:    1. Moderate aortic stenosis    2. S/P AVR (aortic valve replacement)    3.  Chronic combined systolic and diastolic CHF (congestive heart failure) ([facility]    4. Pure hypercholesterolemia    5. Non-rheumatic mitral regurgitation        1. Moderate aortic stenosis:  6-5-19 Echo A bioprosthetic artificial aortic valve appears well seated with a maximum  gradient of 61 mmHg and a mean gradient of 35mmHg, gradients are higher than previously reported. This suggest aortic stenosis that could be severe given   the LV dysfunction. He is not SOB. No leg edema. Referral to Dr. Altagracia Cordova for evaluation for TAVR/surveillance of aortic valve. 2. S/P AVR (aortic valve replacement) 8-2012 per Dr. Juana Mccall. 3. Chronic combined systolic and diastolic CHF (congestive heart failure) ([address] :  Compensated by exam.  Has some fatigue. EF 40% which is a slight drop. 4. Pure hypercholesterolemia:  11-15-18  Chol 115, HDL 59, LDL 44, TG 59.  Well   Controlled. Taking Lipitor daily. 5. Non-rheumatic mitral regurgitation:  6-5-19 Echo Mild mitral Regurg. Stable           Plan:   1. Labs CMP, BNP  2. Referral to Dr. Altagracia Cordova for Aortic Stenosis. AVR in 2012. 3.  See Dr. Ferrell Me in 6 months. 4.  Continue same medications. Scribe's attestation: This note was scribed in the presence of Margie Lang M.D. By Conrad Mensah RN     The scribe's documentation has been prepared under my direction and personally reviewed by me in its entirety. I confirm that the note above accurately reflects all work, treatment, procedures, and medical decision making performed by me. Spent 50 minutes with patient reviewing chart, examining patient, and developing plan of care.         QUALITY MEASURES  1. Tobacco Cessation Counseling: NA  2. Retake of BP if >140/90: NA   3. Documentation to PCP/referring for new patient:  Sent to PCP at close of office visit  4. CAD patient on anti-platelet: NA  5. CAD patient on STATIN therapy: Yes  6.  Patient with CHF and aFib on anticoagulation: NA      I appreciate the opportunity of cooperating in the care of this individual.    Hardik Lucio M.D/, [facility]

## 2019-06-05 NOTE — PATIENT INSTRUCTIONS
Plan:   1. Labs CMP, BNP  2. Referral to Dr. Lois Jacobson for Aortic Stenosis. AVR in 2012. 3.  See Dr. Antoni Luevano in 6 months. 4.  Continue same medications.

## 2019-07-17 NOTE — PROGRESS NOTES
[address]     H+P // CONSULT // OUTPATIENT VISIT // [facility]     Referring Doctor Rosy Zavaleta MD   Encounter Type Chronic     CHIEF COMPLAINT     Visit Type Followup   Symptoms None   Problems AI s/p SAVR, AFIB, HTN, CHOL     HISTORY OF PRESENT ILLNESS      GEN - Doing well. Mild fatigue and sob. Recent echo with moderate narrowing of bio AVR.  AI - Denies cp, sob, dizziness, syncope, palpitations.  AFIB - postop, no known recurrence.  HTN - Ambulatory BP readings in good range   CHOL - Last cholesterol reviewed and in good range.  MED - Compliant with CV meds listed below without notable side effects     COMPLIANCE     Category Meds Diet Salt Exercise Tobacco Alcohol Drugs   Compliant [x] [x] [x] [] [x] [x] [x]   [x]Counseling given on all above above categories    HISTORY/ALLERGIES/ROS     MedHx:  has a past medical history of Aortic insufficiency, Atrial fibrillation ([facility] Breast nodule, CAD (coronary artery disease), Cardiomyopathy ([address] CHF (congestive heart failure) ([facility] Hyperlipidemia, Hypertension, Microscopic hematuria, Mitral regurgitation, Nausea & vomiting, Osteoarthritis of knee, Pneumonia, Rotator cuff tear, and Ventricular ectopy. SurgHx:  has a past surgical history that includes Inguinal hernia repair; other surgical history (8/2/12); Aortic valve replacement (8/3/2012); Cardiac surgery; Colonoscopy; Endoscopy, colon, diagnostic; Total knee arthroplasty (Left, 12/4/13); Upper gastrointestinal endoscopy (12/24/13); Total knee arthroplasty (Right, 2/3/14); joint replacement; cyst removal; and Total shoulder arthroplasty (Right, 2/2/15). SocHx:   reports that he quit smoking about 49 years ago. He has a 40.00 pack-year smoking history. He has never used smokeless tobacco. He reports that he drinks about 2.0 standard drinks of alcohol per week. He reports that he does not use drugs.    FamHx: family history includes Cancer in his brother; Diabetes in

## 2019-07-31 NOTE — ED NOTES
Bed: 24  Expected date:   Expected time:   Means of arrival:   Comments:  Avel Ferrell 2., RN  07/31/19 3220

## 2019-08-01 NOTE — ED PROVIDER NOTES
ARTHROPLASTY Right 2/3/14    RIGHT TOTAL KNEE REPLACEMENT-BAR       UPPER GASTROINTESTINAL ENDOSCOPY  13     Family History   Problem Relation Age of Onset    Marfan Syndrome Brother     Heart Disease Brother     Stroke Sister     Diabetes Sister     Heart Disease Sister     Heart Disease Sister     Cancer Brother      Social History     Socioeconomic History    Marital status:      Spouse name: Not on file    Number of children: Not on file    Years of education: Not on file    Highest education level: Not on file   Occupational History    Not on file   Social Needs    Financial resource strain: Not on file    Food insecurity:     Worry: Not on file     Inability: Not on file    Transportation needs:     Medical: Not on file     Non-medical: Not on file   Tobacco Use    Smoking status: Former Smoker     Packs/day: 2.00     Years: 20.00     Pack years: 40.00     Last attempt to quit: 1970     Years since quittin.0    Smokeless tobacco: Never Used   Substance and Sexual Activity    Alcohol use:  Yes     Alcohol/week: 2.0 standard drinks     Types: 2 Cans of beer per week     Comment: rare    Drug use: No    Sexual activity: Not on file   Lifestyle    Physical activity:     Days per week: Not on file     Minutes per session: Not on file    Stress: Not on file   Relationships    Social connections:     Talks on phone: Not on file     Gets together: Not on file     Attends Anabaptism service: Not on file     Active member of club or organization: Not on file     Attends meetings of clubs or organizations: Not on file     Relationship status: Not on file    Intimate partner violence:     Fear of current or ex partner: Not on file     Emotionally abused: Not on file     Physically abused: Not on file     Forced sexual activity: Not on file   Other Topics Concern    Not on file   Social History Narrative    Not on file     Current Facility-Administered Medications

## 2019-08-19 PROBLEM — S68.119A FINGER AMPUTATION, TRAUMATIC, INITIAL ENCOUNTER: Status: ACTIVE | Noted: 2019-01-01

## 2019-08-19 NOTE — PROGRESS NOTES
Assessment: 72-year-old male with right index finger  tip injury with associated avulsion while using hedge clippers  Soft tissue injury with no obvious exposed bone, possible small tuft fracture and limited nailbed involvement    Treatment Plan: The patient is doing well. He has completed a course of oral antibiotics with no signs of infection. His wound appears stable with small eschar remaining distally and dried blood and clot. I did not remove the remainder of the clot secondary the patient being on anticoagulation but his wound appears healthy. I recommended a soft dressing and remodeling his tip protector today. He may continue and begin to perform finger range of motion formally. I did discuss with him monitoring his healing and he should begin to develop secondary healing of this area over the next 2 to 3 weeks. If he has continued to have any delayed healing, new swelling redness or pain he is to call for follow-up. The patient otherwise prefers to follow-up on an as-needed basis but I did offer him follow-up in 2 to 3 weeks for repeat evaluation    No follow-ups on file. History of Present Illness  Isela Moreno is a 70 y.o. male, right-hand-dominant, retired, presenting with history of right index finger injury  Date of injury: 7/31/2019  Mechanism of injury: The patient was working at home and using a  when the tool slipped and cut the tip of his right index finger. He sustained a soft tissue avulsion and immediately presented to the emergency department at [facility]  There, the wound was cleansed thoroughly and hemostasis was achieved with an occlusive dressing. Images obtained demonstrated possibly a small edward of fracture at the tuft along with arthritic changes. He was seen last week and provided with a tip protector and wound care instructions. He has completed a course of oral antibiotics as prescribed by emergency department.   He has been doing well Findings:    Office Procedures:  No orders of the defined types were placed in this encounter. Quintin May MD  Orthopaedic Surgeon, [address]    Contact Information:  Javier Casillas: [phone]  Clinical )    This dictation was performed with a verbal recognition program [facility]) and it was checked for errors. It is possible that there are still dictated errors within this office note. If so, please bring any errors to my attention for an addendum. All efforts were made to ensure that this office note is accurate.

## 2019-08-21 NOTE — FLOWSHEET NOTE
[facility] [address]    Occupational Therapy Daily Treatment Note    Date:  2019    Patient Name:  Linda Resendiz    :  1940 MRN: [id number]    Restrictions/Precautions:  Wound tip of thumb  Medical/Treatment Diagnosis Information:  ·   S68.119A finger amputation, traumatic R IF                      Insurance/Certification information:     Physician Information:   Dr. Madeleine Alex      Visit# / total visits: 2    Subjective: Don't need as much bandage as I did before.     Objective:    Splinting:  [] Fabrication of:  [x] Orthotic/Prosthetic Management, subsequent encounter (18591)  [] Orthotic management and training (fitting and assessment) (37453)  [x] Comments: adjusted tip protector to accommodate decreased edema and decreased girth of needed bandage    Total Treatment Minutes:      Assessment:     [x]  Patient tolerated treatment well []  Patient limited by fatique    []  Patient limited by pain []  Patient limited by other medical complications   []  Other:     Patient Requires Follow-up:  []  Yes  []  No    Plan: Hand Therapy to follow up for splint modifications as needed    Electronically signed by: GEORGINA Chang/LUKE, [address]

## 2019-09-06 PROBLEM — I48.0 PAF (PAROXYSMAL ATRIAL FIBRILLATION) (HCC): Status: ACTIVE | Noted: 2019-01-01

## 2019-09-06 PROBLEM — I10 ESSENTIAL HYPERTENSION: Status: ACTIVE | Noted: 2019-01-01

## 2019-09-06 NOTE — PROGRESS NOTES
PLAN     ~AI   Date EF Detail   Sx   Sob, fatigue   Hx 8/12  SAVR with 25mm Epic Porcine   NYHA   []I         [x]II           []III          []IV   TTE 7/12  7/12  9/12  4/13  4/14  4/15  4/16  5/17  5/18  6/19  9/19 20%  20%  15%  45%  45%  55%  55%  45%  45%  40%  50% Sev AI, mod MR  SAVR Max gradient 47  SAVR MG 27, Mild perivalvular regurg  SAVR MG 18  SAVR MG 22, tr AI  SAVR MG 17  SAVR MG 21  SAVR MG 25, tr AI  SAVR MG 17, trivial AI  SAVR MG 35  SAVR MG 34, tr AI   DEBBIE 8/19  Unexplained elevation in gradients, possible microthrombus   Plan   Continue observation, repeat echo 6 months  Continue AC for possible microthrombus  Transition from eliquis to coumadin due to cost and patient preference. ~Afib   Date Detail   Type  Parox, postop, ac stopped by ep given no recurrence   R/R  regular   R/RM  Reviewed   CVS  >1   AC/AP  None   Plan  Continue current medical regimen at doses noted above   ~HTN  Today BP Controlled   Counseling Counseled on diet/salt, exercise and ideal body weight   Plan Continue current meds at doses above   ~Hyperchol  Goal LDL <70   Counseling Counseled on diet, exercise and ideal body weight   Plan PCP liver/lipid surveillance, continue current meds at doses above   ~Followup  Interval: 6 months with echo    [address], am scribing for and in the presence of Irene Salcedo MD.   Abhijeet Siu 09/06/19 11:19 AM   Provider Fabian Juarez is working as a scribe for and in the presence of me (Irene Salcedo MD). Working as a scribe, Abhijeet Hankins may have prepopulated components of this note with my historical  intellectual property under my direct supervision. Any additions to this intellectual property were performed in my presence and at my direction.   Furthermore, the content and accuracy of this note have been reviewed by me Irene Salcedo MD).  9/11/2019 12:58 PM

## 2019-09-11 NOTE — LETTER
Gen Alert, coop, no distress Heart  Rrr, 3/6   Head NC, AT, no abnorm Abd  Soft, NT, +BS, no mass, no OM   Eyes PER, conj/corn clear Ext  Ext nl, AT, no C/C/E   Nose Nares nl, no drain, NT Pulse 2+ and symmetric   Throat Lips, mucosa, tongue nl Skin Col/text/turg nl, no vis rash/les   Neck S/S, TM, NT, no bruit/JVD Psych Nl mood and affect   Lung CTA-B, unlabored, no DTP Lymph   No cervical or axillary LA   Ch wall NT, no deform Neuro  Nl gross M/S exam     ASSESSMENT AND PLAN     ~AI   Date EF Detail   Sx   Sob, fatigue   Hx 8/12  SAVR with 25mm Epic Porcine   NYHA   []I         [x]II           []III          []IV   TTE 7/12  7/12  9/12  4/13  4/14  4/15  4/16  5/17  5/18  6/19  9/19 20%  20%  15%  45%  45%  55%  55%  45%  45%  40%  50% Sev AI, mod MR  SAVR Max gradient 47  SAVR MG 27, Mild perivalvular regurg  SAVR MG 18  SAVR MG 22, tr AI  SAVR MG 17  SAVR MG 21  SAVR MG 25, tr AI  SAVR MG 17, trivial AI  SAVR MG 35  SAVR MG 34, tr AI   DEBBIE 8/19  Unexplained elevation in gradients, possible microthrombus   Plan   Continue observation, repeat echo 6 months  Continue AC for possible microthrombus  Transition from eliquis to coumadin due to cost and patient preference.     ~Afib   Date Detail   Type  Parox, postop, ac stopped by ep given no recurrence   R/R  regular   R/RM  Reviewed   CVS  >1   AC/AP  None   Plan  Continue current medical regimen at doses noted above   ~HTN  Today BP Controlled   Counseling Counseled on diet/salt, exercise and ideal body weight   Plan Continue current meds at doses above   ~Hyperchol  Goal LDL <70   Counseling Counseled on diet, exercise and ideal body weight   Plan PCP liver/lipid surveillance, continue current meds at doses above   ~Followup  Interval: 6 months with echo    [address], am scribing for and in the presence of Maria M Sneed MD.   Baylee Siu 09/06/19 11:19 AM   Provider Attestation

## 2020-01-01 ENCOUNTER — TELEPHONE (OUTPATIENT)
Dept: INTERNAL MEDICINE CLINIC | Age: 80
End: 2020-01-01

## 2020-01-01 ENCOUNTER — APPOINTMENT (OUTPATIENT)
Dept: GENERAL RADIOLOGY | Age: 80
DRG: 853 | End: 2020-01-01
Payer: COMMERCIAL

## 2020-01-01 ENCOUNTER — ANESTHESIA EVENT (OUTPATIENT)
Dept: OPERATING ROOM | Age: 80
DRG: 853 | End: 2020-01-01
Payer: COMMERCIAL

## 2020-01-01 ENCOUNTER — OFFICE VISIT (OUTPATIENT)
Dept: INTERNAL MEDICINE CLINIC | Age: 80
End: 2020-01-01
Payer: COMMERCIAL

## 2020-01-01 ENCOUNTER — APPOINTMENT (OUTPATIENT)
Dept: GENERAL RADIOLOGY | Age: 80
DRG: 840 | End: 2020-01-01
Attending: INTERNAL MEDICINE
Payer: COMMERCIAL

## 2020-01-01 ENCOUNTER — ANESTHESIA (OUTPATIENT)
Dept: OPERATING ROOM | Age: 80
DRG: 853 | End: 2020-01-01
Payer: COMMERCIAL

## 2020-01-01 ENCOUNTER — APPOINTMENT (OUTPATIENT)
Dept: CT IMAGING | Age: 80
DRG: 853 | End: 2020-01-01
Payer: COMMERCIAL

## 2020-01-01 ENCOUNTER — TELEPHONE (OUTPATIENT)
Dept: CARDIOLOGY CLINIC | Age: 80
End: 2020-01-01

## 2020-01-01 ENCOUNTER — HOSPITAL ENCOUNTER (INPATIENT)
Age: 80
LOS: 18 days | Discharge: HOSPICE | DRG: 840 | End: 2020-03-10
Attending: INTERNAL MEDICINE | Admitting: INTERNAL MEDICINE
Payer: COMMERCIAL

## 2020-01-01 ENCOUNTER — ANESTHESIA (OUTPATIENT)
Dept: ENDOSCOPY | Age: 80
DRG: 853 | End: 2020-01-01
Payer: COMMERCIAL

## 2020-01-01 ENCOUNTER — HOSPITAL ENCOUNTER (INPATIENT)
Age: 80
LOS: 24 days | Discharge: ANOTHER HOSPITAL | DRG: 853 | End: 2020-02-21
Attending: EMERGENCY MEDICINE | Admitting: HOSPITALIST
Payer: COMMERCIAL

## 2020-01-01 ENCOUNTER — TELEPHONE (OUTPATIENT)
Dept: OTHER | Age: 80
End: 2020-01-01

## 2020-01-01 ENCOUNTER — ANESTHESIA EVENT (OUTPATIENT)
Dept: ENDOSCOPY | Age: 80
DRG: 853 | End: 2020-01-01
Payer: COMMERCIAL

## 2020-01-01 VITALS
WEIGHT: 184.97 LBS | HEIGHT: 73 IN | DIASTOLIC BLOOD PRESSURE: 55 MMHG | RESPIRATION RATE: 22 BRPM | SYSTOLIC BLOOD PRESSURE: 87 MMHG | OXYGEN SATURATION: 95 % | TEMPERATURE: 98.4 F | BODY MASS INDEX: 24.51 KG/M2 | HEART RATE: 80 BPM

## 2020-01-01 VITALS — SYSTOLIC BLOOD PRESSURE: 109 MMHG | OXYGEN SATURATION: 100 % | DIASTOLIC BLOOD PRESSURE: 78 MMHG | TEMPERATURE: 97.2 F

## 2020-01-01 VITALS
HEIGHT: 73 IN | BODY MASS INDEX: 25.71 KG/M2 | SYSTOLIC BLOOD PRESSURE: 90 MMHG | WEIGHT: 194 LBS | RESPIRATION RATE: 16 BRPM | HEART RATE: 118 BPM | DIASTOLIC BLOOD PRESSURE: 71 MMHG | TEMPERATURE: 98.5 F | OXYGEN SATURATION: 97 %

## 2020-01-01 VITALS
SYSTOLIC BLOOD PRESSURE: 96 MMHG | DIASTOLIC BLOOD PRESSURE: 64 MMHG | HEART RATE: 56 BPM | WEIGHT: 174 LBS | HEIGHT: 72 IN | BODY MASS INDEX: 23.57 KG/M2

## 2020-01-01 VITALS
TEMPERATURE: 98.5 F | HEIGHT: 72 IN | DIASTOLIC BLOOD PRESSURE: 60 MMHG | BODY MASS INDEX: 24.11 KG/M2 | SYSTOLIC BLOOD PRESSURE: 72 MMHG | WEIGHT: 178 LBS | HEART RATE: 60 BPM

## 2020-01-01 VITALS
WEIGHT: 181 LBS | HEART RATE: 68 BPM | TEMPERATURE: 98.5 F | OXYGEN SATURATION: 98 % | SYSTOLIC BLOOD PRESSURE: 122 MMHG | DIASTOLIC BLOOD PRESSURE: 76 MMHG | BODY MASS INDEX: 24.55 KG/M2

## 2020-01-01 VITALS — DIASTOLIC BLOOD PRESSURE: 58 MMHG | OXYGEN SATURATION: 99 % | SYSTOLIC BLOOD PRESSURE: 88 MMHG

## 2020-01-01 VITALS — RESPIRATION RATE: 18 BRPM | DIASTOLIC BLOOD PRESSURE: 58 MMHG | SYSTOLIC BLOOD PRESSURE: 76 MMHG

## 2020-01-01 VITALS
HEART RATE: 64 BPM | BODY MASS INDEX: 24.43 KG/M2 | WEIGHT: 180.4 LBS | HEIGHT: 72 IN | DIASTOLIC BLOOD PRESSURE: 64 MMHG | TEMPERATURE: 99.2 F | SYSTOLIC BLOOD PRESSURE: 100 MMHG

## 2020-01-01 LAB
A/G RATIO: 0.8 (ref 1.1–2.2)
A/G RATIO: 1 (ref 1.1–2.2)
ABO/RH: NORMAL
ACANTHOCYTES: ABNORMAL
ALBUMIN SERPL-MCNC: 1.9 G/DL (ref 3.4–5)
ALBUMIN SERPL-MCNC: 2 G/DL (ref 3.4–5)
ALBUMIN SERPL-MCNC: 2 G/DL (ref 3.4–5)
ALBUMIN SERPL-MCNC: 2.1 G/DL (ref 3.4–5)
ALBUMIN SERPL-MCNC: 2.2 G/DL (ref 3.4–5)
ALBUMIN SERPL-MCNC: 2.3 G/DL (ref 3.4–5)
ALBUMIN SERPL-MCNC: 2.4 G/DL (ref 3.4–5)
ALBUMIN SERPL-MCNC: 2.5 G/DL (ref 3.4–5)
ALBUMIN SERPL-MCNC: 2.6 G/DL (ref 3.4–5)
ALBUMIN SERPL-MCNC: 2.6 G/DL (ref 3.4–5)
ALBUMIN SERPL-MCNC: 2.7 G/DL (ref 3.4–5)
ALBUMIN SERPL-MCNC: 2.8 G/DL (ref 3.4–5)
ALBUMIN SERPL-MCNC: 3 G/DL (ref 3.4–5)
ALP BLD-CCNC: 60 U/L (ref 40–129)
ALP BLD-CCNC: 64 U/L (ref 40–129)
ALP BLD-CCNC: 78 U/L (ref 40–129)
ALP BLD-CCNC: 81 U/L (ref 40–129)
ALT SERPL-CCNC: 15 U/L (ref 10–40)
ALT SERPL-CCNC: 19 U/L (ref 10–40)
ALT SERPL-CCNC: 7 U/L (ref 10–40)
ALT SERPL-CCNC: 9 U/L (ref 10–40)
AMIODARONE LEVEL: 1.1 UG/ML (ref 0.5–2)
ANION GAP SERPL CALCULATED.3IONS-SCNC: 10 MMOL/L (ref 3–16)
ANION GAP SERPL CALCULATED.3IONS-SCNC: 11 MMOL/L (ref 3–16)
ANION GAP SERPL CALCULATED.3IONS-SCNC: 12 MMOL/L (ref 3–16)
ANION GAP SERPL CALCULATED.3IONS-SCNC: 13 MMOL/L (ref 3–16)
ANION GAP SERPL CALCULATED.3IONS-SCNC: 14 MMOL/L (ref 3–16)
ANION GAP SERPL CALCULATED.3IONS-SCNC: 15 MMOL/L (ref 3–16)
ANION GAP SERPL CALCULATED.3IONS-SCNC: 16 MMOL/L (ref 3–16)
ANION GAP SERPL CALCULATED.3IONS-SCNC: 18 MMOL/L (ref 3–16)
ANION GAP SERPL CALCULATED.3IONS-SCNC: 5 MMOL/L (ref 3–16)
ANION GAP SERPL CALCULATED.3IONS-SCNC: 5 MMOL/L (ref 3–16)
ANION GAP SERPL CALCULATED.3IONS-SCNC: 7 MMOL/L (ref 3–16)
ANION GAP SERPL CALCULATED.3IONS-SCNC: 8 MMOL/L (ref 3–16)
ANION GAP SERPL CALCULATED.3IONS-SCNC: 9 MMOL/L (ref 3–16)
ANION GAP SERPL CALCULATED.3IONS-SCNC: 9 MMOL/L (ref 3–16)
ANISOCYTOSIS: ABNORMAL
ANTIBODY SCREEN: NORMAL
APTT: 23.4 SEC (ref 24.2–36.2)
APTT: 25.5 SEC (ref 24.2–36.2)
APTT: 48.6 SEC (ref 24.2–36.2)
APTT: 50.9 SEC (ref 24.2–36.2)
APTT: 53 SEC (ref 24.2–36.2)
APTT: 62.5 SEC (ref 24.2–36.2)
APTT: 82.2 SEC (ref 24.2–36.2)
AST SERPL-CCNC: 16 U/L (ref 15–37)
AST SERPL-CCNC: 16 U/L (ref 15–37)
AST SERPL-CCNC: 29 U/L (ref 15–37)
AST SERPL-CCNC: 8 U/L (ref 15–37)
ATYPICAL LYMPHOCYTE RELATIVE PERCENT: 3 % (ref 0–6)
ATYPICAL LYMPHOCYTE RELATIVE PERCENT: 4 % (ref 0–6)
ATYPICAL LYMPHOCYTE RELATIVE PERCENT: 8 % (ref 0–6)
BANDED NEUTROPHILS RELATIVE PERCENT: 1 % (ref 0–7)
BANDED NEUTROPHILS RELATIVE PERCENT: 1 % (ref 0–7)
BANDED NEUTROPHILS RELATIVE PERCENT: 2 % (ref 0–7)
BANDED NEUTROPHILS RELATIVE PERCENT: 2 % (ref 0–7)
BANDED NEUTROPHILS RELATIVE PERCENT: 3 % (ref 0–7)
BANDED NEUTROPHILS RELATIVE PERCENT: 3 % (ref 0–7)
BANDED NEUTROPHILS RELATIVE PERCENT: 4 % (ref 0–7)
BANDED NEUTROPHILS RELATIVE PERCENT: 6 % (ref 0–7)
BANDED NEUTROPHILS RELATIVE PERCENT: 6 % (ref 0–7)
BANDED NEUTROPHILS RELATIVE PERCENT: 9 % (ref 0–7)
BASE EXCESS VENOUS: 6.3 MMOL/L (ref -2–3)
BASE EXCESS VENOUS: 8 (ref -3–3)
BASOPHILIC STIPPLING: ABNORMAL
BASOPHILS ABSOLUTE: 0 K/UL (ref 0–0.2)
BASOPHILS ABSOLUTE: 0.1 K/UL (ref 0–0.2)
BASOPHILS RELATIVE PERCENT: 0 %
BASOPHILS RELATIVE PERCENT: 0.8 %
BASOPHILS RELATIVE PERCENT: 0.9 %
BASOPHILS RELATIVE PERCENT: 1 %
BASOPHILS RELATIVE PERCENT: 1.2 %
BASOPHILS RELATIVE PERCENT: 2.1 %
BETA-2 MICROGLOBULIN: 7.3 MG/L (ref 1.1–2.4)
BILIRUB SERPL-MCNC: 0.4 MG/DL (ref 0–1)
BILIRUB SERPL-MCNC: 0.5 MG/DL (ref 0–1)
BILIRUB SERPL-MCNC: 0.7 MG/DL (ref 0–1)
BILIRUB SERPL-MCNC: 0.7 MG/DL (ref 0–1)
BILIRUBIN DIRECT: 0.3 MG/DL (ref 0–0.3)
BILIRUBIN DIRECT: 0.4 MG/DL (ref 0–0.3)
BILIRUBIN URINE: ABNORMAL
BILIRUBIN URINE: NEGATIVE
BILIRUBIN URINE: NEGATIVE
BILIRUBIN, INDIRECT: 0.3 MG/DL (ref 0–1)
BILIRUBIN, INDIRECT: 0.4 MG/DL (ref 0–1)
BLASTS RELATIVE PERCENT: 1 %
BLOOD BANK DISPENSE STATUS: NORMAL
BLOOD BANK PRODUCT CODE: NORMAL
BLOOD CULTURE, ROUTINE: ABNORMAL
BLOOD CULTURE, ROUTINE: ABNORMAL
BLOOD CULTURE, ROUTINE: NORMAL
BLOOD, URINE: ABNORMAL
BLOOD, URINE: ABNORMAL
BLOOD, URINE: NEGATIVE
BPU ID: NORMAL
BUN BLDV-MCNC: 19 MG/DL (ref 7–20)
BUN BLDV-MCNC: 21 MG/DL (ref 7–20)
BUN BLDV-MCNC: 23 MG/DL (ref 7–20)
BUN BLDV-MCNC: 29 MG/DL (ref 7–20)
BUN BLDV-MCNC: 30 MG/DL (ref 7–20)
BUN BLDV-MCNC: 30 MG/DL (ref 7–20)
BUN BLDV-MCNC: 31 MG/DL (ref 7–20)
BUN BLDV-MCNC: 31 MG/DL (ref 7–20)
BUN BLDV-MCNC: 32 MG/DL (ref 7–20)
BUN BLDV-MCNC: 32 MG/DL (ref 7–20)
BUN BLDV-MCNC: 33 MG/DL (ref 7–20)
BUN BLDV-MCNC: 34 MG/DL (ref 7–20)
BUN BLDV-MCNC: 35 MG/DL (ref 7–20)
BUN BLDV-MCNC: 35 MG/DL (ref 7–20)
BUN BLDV-MCNC: 36 MG/DL (ref 7–20)
BUN BLDV-MCNC: 36 MG/DL (ref 7–20)
BUN BLDV-MCNC: 38 MG/DL (ref 7–20)
BUN BLDV-MCNC: 39 MG/DL (ref 7–20)
BUN BLDV-MCNC: 39 MG/DL (ref 7–20)
BUN BLDV-MCNC: 40 MG/DL (ref 7–20)
BUN BLDV-MCNC: 40 MG/DL (ref 7–20)
BUN BLDV-MCNC: 41 MG/DL (ref 7–20)
BUN BLDV-MCNC: 43 MG/DL (ref 7–20)
BUN BLDV-MCNC: 44 MG/DL (ref 7–20)
BUN BLDV-MCNC: 45 MG/DL (ref 7–20)
BUN BLDV-MCNC: 46 MG/DL (ref 7–20)
BUN BLDV-MCNC: 47 MG/DL (ref 7–20)
BUN BLDV-MCNC: 48 MG/DL (ref 7–20)
BUN BLDV-MCNC: 49 MG/DL (ref 7–20)
BUN BLDV-MCNC: 50 MG/DL (ref 7–20)
BUN BLDV-MCNC: 54 MG/DL (ref 7–20)
BUN BLDV-MCNC: 54 MG/DL (ref 7–20)
BUN BLDV-MCNC: 56 MG/DL (ref 7–20)
BUN BLDV-MCNC: 57 MG/DL (ref 7–20)
BUN BLDV-MCNC: 57 MG/DL (ref 7–20)
BUN BLDV-MCNC: 59 MG/DL (ref 7–20)
BUN BLDV-MCNC: 59 MG/DL (ref 7–20)
BURR CELLS: ABNORMAL
C DIFF TOXIN/ANTIGEN: ABNORMAL
CALCIUM SERPL-MCNC: 7.2 MG/DL (ref 8.3–10.6)
CALCIUM SERPL-MCNC: 7.2 MG/DL (ref 8.3–10.6)
CALCIUM SERPL-MCNC: 7.3 MG/DL (ref 8.3–10.6)
CALCIUM SERPL-MCNC: 7.4 MG/DL (ref 8.3–10.6)
CALCIUM SERPL-MCNC: 7.5 MG/DL (ref 8.3–10.6)
CALCIUM SERPL-MCNC: 7.6 MG/DL (ref 8.3–10.6)
CALCIUM SERPL-MCNC: 7.7 MG/DL (ref 8.3–10.6)
CALCIUM SERPL-MCNC: 7.8 MG/DL (ref 8.3–10.6)
CALCIUM SERPL-MCNC: 7.9 MG/DL (ref 8.3–10.6)
CALCIUM SERPL-MCNC: 7.9 MG/DL (ref 8.3–10.6)
CALCIUM SERPL-MCNC: 8 MG/DL (ref 8.3–10.6)
CALCIUM SERPL-MCNC: 8 MG/DL (ref 8.3–10.6)
CALCIUM SERPL-MCNC: 8.3 MG/DL (ref 8.3–10.6)
CALCIUM SERPL-MCNC: 8.4 MG/DL (ref 8.3–10.6)
CALCIUM SERPL-MCNC: 8.5 MG/DL (ref 8.3–10.6)
CALCIUM SERPL-MCNC: 8.6 MG/DL (ref 8.3–10.6)
CALCIUM SERPL-MCNC: 8.7 MG/DL (ref 8.3–10.6)
CALCIUM SERPL-MCNC: 8.8 MG/DL (ref 8.3–10.6)
CALCIUM SERPL-MCNC: 8.9 MG/DL (ref 8.3–10.6)
CALCIUM SERPL-MCNC: 8.9 MG/DL (ref 8.3–10.6)
CALCIUM SERPL-MCNC: 9 MG/DL (ref 8.3–10.6)
CALCIUM SERPL-MCNC: 9 MG/DL (ref 8.3–10.6)
CARBOXYHEMOGLOBIN: 1.9 % (ref 0–1.5)
CASTS: ABNORMAL /LPF
CASTS: ABNORMAL /LPF
CHLORIDE BLD-SCNC: 101 MMOL/L (ref 99–110)
CHLORIDE BLD-SCNC: 103 MMOL/L (ref 99–110)
CHLORIDE BLD-SCNC: 104 MMOL/L (ref 99–110)
CHLORIDE BLD-SCNC: 105 MMOL/L (ref 99–110)
CHLORIDE BLD-SCNC: 105 MMOL/L (ref 99–110)
CHLORIDE BLD-SCNC: 106 MMOL/L (ref 99–110)
CHLORIDE BLD-SCNC: 107 MMOL/L (ref 99–110)
CHLORIDE BLD-SCNC: 85 MMOL/L (ref 99–110)
CHLORIDE BLD-SCNC: 88 MMOL/L (ref 99–110)
CHLORIDE BLD-SCNC: 90 MMOL/L (ref 99–110)
CHLORIDE BLD-SCNC: 92 MMOL/L (ref 99–110)
CHLORIDE BLD-SCNC: 94 MMOL/L (ref 99–110)
CHLORIDE BLD-SCNC: 95 MMOL/L (ref 99–110)
CHLORIDE BLD-SCNC: 96 MMOL/L (ref 99–110)
CHLORIDE BLD-SCNC: 98 MMOL/L (ref 99–110)
CHLORIDE BLD-SCNC: 99 MMOL/L (ref 99–110)
CHLORIDE BLD-SCNC: 99 MMOL/L (ref 99–110)
CLARITY: ABNORMAL
CO2: 19 MMOL/L (ref 21–32)
CO2: 20 MMOL/L (ref 21–32)
CO2: 22 MMOL/L (ref 21–32)
CO2: 23 MMOL/L (ref 21–32)
CO2: 24 MMOL/L (ref 21–32)
CO2: 25 MMOL/L (ref 21–32)
CO2: 26 MMOL/L (ref 21–32)
CO2: 26 MMOL/L (ref 21–32)
CO2: 27 MMOL/L (ref 21–32)
CO2: 28 MMOL/L (ref 21–32)
CO2: 29 MMOL/L (ref 21–32)
CO2: 30 MMOL/L (ref 21–32)
CO2: 32 MMOL/L (ref 21–32)
CO2: 33 MMOL/L (ref 21–32)
CO2: 35 MMOL/L (ref 21–32)
CO2: 38 MMOL/L (ref 21–32)
CO2: 38 MMOL/L (ref 21–32)
CO2: 40 MMOL/L (ref 21–32)
CO2: 42 MMOL/L (ref 21–32)
CO2: 43 MMOL/L (ref 21–32)
COLOR: YELLOW
CORTISOL TOTAL: 17 UG/DL
CREAT SERPL-MCNC: 1 MG/DL (ref 0.8–1.3)
CREAT SERPL-MCNC: 1.1 MG/DL (ref 0.8–1.3)
CREAT SERPL-MCNC: 1.2 MG/DL (ref 0.8–1.3)
CREAT SERPL-MCNC: 1.3 MG/DL (ref 0.8–1.3)
CREAT SERPL-MCNC: 1.4 MG/DL (ref 0.8–1.3)
CREAT SERPL-MCNC: 1.5 MG/DL (ref 0.8–1.3)
CREAT SERPL-MCNC: 1.5 MG/DL (ref 0.8–1.3)
CREAT SERPL-MCNC: 1.9 MG/DL (ref 0.8–1.3)
CULTURE, BLOOD 2: NORMAL
DES-AMIOD: 0.4 UG/ML
DESCRIPTION BLOOD BANK: NORMAL
DIGOXIN LEVEL: 1.1 NG/ML (ref 0.8–2)
DIGOXIN LEVEL: 1.1 NG/ML (ref 0.8–2)
DIGOXIN LEVEL: 1.3 NG/ML (ref 0.8–2)
DIGOXIN LEVEL: 1.5 NG/ML (ref 0.8–2)
DIGOXIN LEVEL: 1.8 NG/ML (ref 0.8–2)
DIGOXIN LEVEL: 2.3 NG/ML (ref 0.8–2)
DIGOXIN LEVEL: 2.4 NG/ML (ref 0.8–2)
DIGOXIN LEVEL: 3.4 NG/ML (ref 0.8–2)
EKG ATRIAL RATE: 111 BPM
EKG ATRIAL RATE: 138 BPM
EKG ATRIAL RATE: 142 BPM
EKG ATRIAL RATE: 236 BPM
EKG ATRIAL RATE: 292 BPM
EKG ATRIAL RATE: 70 BPM
EKG DIAGNOSIS: NORMAL
EKG P AXIS: 216 DEGREES
EKG P AXIS: 33 DEGREES
EKG P AXIS: 9 DEGREES
EKG P-R INTERVAL: 330 MS
EKG P-R INTERVAL: 96 MS
EKG Q-T INTERVAL: 258 MS
EKG Q-T INTERVAL: 282 MS
EKG Q-T INTERVAL: 292 MS
EKG Q-T INTERVAL: 316 MS
EKG Q-T INTERVAL: 360 MS
EKG Q-T INTERVAL: 370 MS
EKG QRS DURATION: 104 MS
EKG QRS DURATION: 106 MS
EKG QRS DURATION: 108 MS
EKG QRS DURATION: 110 MS
EKG QRS DURATION: 112 MS
EKG QRS DURATION: 98 MS
EKG QTC CALCULATION (BAZETT): 399 MS
EKG QTC CALCULATION (BAZETT): 402 MS
EKG QTC CALCULATION (BAZETT): 409 MS
EKG QTC CALCULATION (BAZETT): 433 MS
EKG QTC CALCULATION (BAZETT): 459 MS
EKG QTC CALCULATION (BAZETT): 489 MS
EKG R AXIS: -18 DEGREES
EKG R AXIS: -39 DEGREES
EKG R AXIS: -42 DEGREES
EKG R AXIS: -45 DEGREES
EKG R AXIS: -46 DEGREES
EKG R AXIS: 37 DEGREES
EKG T AXIS: 110 DEGREES
EKG T AXIS: 119 DEGREES
EKG T AXIS: 125 DEGREES
EKG T AXIS: 144 DEGREES
EKG T AXIS: 237 DEGREES
EKG T AXIS: 91 DEGREES
EKG VENTRICULAR RATE: 111 BPM
EKG VENTRICULAR RATE: 118 BPM
EKG VENTRICULAR RATE: 127 BPM
EKG VENTRICULAR RATE: 142 BPM
EKG VENTRICULAR RATE: 146 BPM
EKG VENTRICULAR RATE: 70 BPM
EOSINOPHILS ABSOLUTE: 0 K/UL (ref 0–0.6)
EOSINOPHILS ABSOLUTE: 0.1 K/UL (ref 0–0.6)
EOSINOPHILS ABSOLUTE: 0.2 K/UL (ref 0–0.6)
EOSINOPHILS ABSOLUTE: 0.3 K/UL (ref 0–0.6)
EOSINOPHILS RELATIVE PERCENT: 0 %
EOSINOPHILS RELATIVE PERCENT: 0.2 %
EOSINOPHILS RELATIVE PERCENT: 0.3 %
EOSINOPHILS RELATIVE PERCENT: 0.7 %
EOSINOPHILS RELATIVE PERCENT: 1 %
EOSINOPHILS RELATIVE PERCENT: 2 %
EOSINOPHILS RELATIVE PERCENT: 2 %
EOSINOPHILS RELATIVE PERCENT: 2.9 %
EOSINOPHILS RELATIVE PERCENT: 3 %
EOSINOPHILS RELATIVE PERCENT: 4 %
EOSINOPHILS RELATIVE PERCENT: 8.2 %
EPITHELIAL CELLS, UA: 0 /HPF (ref 0–5)
EPITHELIAL CELLS, UA: 1 /HPF (ref 0–5)
EPITHELIAL CELLS, UA: 3 /HPF (ref 0–5)
FERRITIN: 1461 NG/ML (ref 30–400)
FINAL REPORT: NORMAL
GFR AFRICAN AMERICAN: 42
GFR AFRICAN AMERICAN: 55
GFR AFRICAN AMERICAN: 55
GFR AFRICAN AMERICAN: 59
GFR AFRICAN AMERICAN: >60
GFR NON-AFRICAN AMERICAN: 35
GFR NON-AFRICAN AMERICAN: 45
GFR NON-AFRICAN AMERICAN: 45
GFR NON-AFRICAN AMERICAN: 49
GFR NON-AFRICAN AMERICAN: 53
GFR NON-AFRICAN AMERICAN: 59
GFR NON-AFRICAN AMERICAN: >60
GLOBULIN: 2.7 G/DL
GLOBULIN: 4 G/DL
GLUCOSE BLD-MCNC: 101 MG/DL (ref 70–99)
GLUCOSE BLD-MCNC: 103 MG/DL (ref 70–99)
GLUCOSE BLD-MCNC: 106 MG/DL (ref 70–99)
GLUCOSE BLD-MCNC: 107 MG/DL (ref 70–99)
GLUCOSE BLD-MCNC: 110 MG/DL (ref 70–99)
GLUCOSE BLD-MCNC: 110 MG/DL (ref 70–99)
GLUCOSE BLD-MCNC: 114 MG/DL (ref 70–99)
GLUCOSE BLD-MCNC: 114 MG/DL (ref 70–99)
GLUCOSE BLD-MCNC: 115 MG/DL (ref 70–99)
GLUCOSE BLD-MCNC: 115 MG/DL (ref 70–99)
GLUCOSE BLD-MCNC: 117 MG/DL (ref 70–99)
GLUCOSE BLD-MCNC: 118 MG/DL (ref 70–99)
GLUCOSE BLD-MCNC: 118 MG/DL (ref 70–99)
GLUCOSE BLD-MCNC: 119 MG/DL (ref 70–99)
GLUCOSE BLD-MCNC: 119 MG/DL (ref 70–99)
GLUCOSE BLD-MCNC: 120 MG/DL (ref 70–99)
GLUCOSE BLD-MCNC: 121 MG/DL (ref 70–99)
GLUCOSE BLD-MCNC: 122 MG/DL (ref 70–99)
GLUCOSE BLD-MCNC: 122 MG/DL (ref 70–99)
GLUCOSE BLD-MCNC: 123 MG/DL (ref 70–99)
GLUCOSE BLD-MCNC: 123 MG/DL (ref 70–99)
GLUCOSE BLD-MCNC: 124 MG/DL (ref 70–99)
GLUCOSE BLD-MCNC: 124 MG/DL (ref 70–99)
GLUCOSE BLD-MCNC: 125 MG/DL (ref 70–99)
GLUCOSE BLD-MCNC: 128 MG/DL (ref 70–99)
GLUCOSE BLD-MCNC: 128 MG/DL (ref 70–99)
GLUCOSE BLD-MCNC: 129 MG/DL (ref 70–99)
GLUCOSE BLD-MCNC: 131 MG/DL (ref 70–99)
GLUCOSE BLD-MCNC: 133 MG/DL (ref 70–99)
GLUCOSE BLD-MCNC: 136 MG/DL (ref 70–99)
GLUCOSE BLD-MCNC: 136 MG/DL (ref 70–99)
GLUCOSE BLD-MCNC: 137 MG/DL (ref 70–99)
GLUCOSE BLD-MCNC: 137 MG/DL (ref 70–99)
GLUCOSE BLD-MCNC: 138 MG/DL (ref 70–99)
GLUCOSE BLD-MCNC: 138 MG/DL (ref 70–99)
GLUCOSE BLD-MCNC: 139 MG/DL (ref 70–99)
GLUCOSE BLD-MCNC: 140 MG/DL (ref 70–99)
GLUCOSE BLD-MCNC: 140 MG/DL (ref 70–99)
GLUCOSE BLD-MCNC: 143 MG/DL (ref 70–99)
GLUCOSE BLD-MCNC: 147 MG/DL (ref 70–99)
GLUCOSE BLD-MCNC: 148 MG/DL (ref 70–99)
GLUCOSE BLD-MCNC: 148 MG/DL (ref 70–99)
GLUCOSE BLD-MCNC: 151 MG/DL (ref 70–99)
GLUCOSE BLD-MCNC: 151 MG/DL (ref 70–99)
GLUCOSE BLD-MCNC: 157 MG/DL (ref 70–99)
GLUCOSE BLD-MCNC: 157 MG/DL (ref 70–99)
GLUCOSE BLD-MCNC: 158 MG/DL (ref 70–99)
GLUCOSE BLD-MCNC: 159 MG/DL (ref 70–99)
GLUCOSE BLD-MCNC: 159 MG/DL (ref 70–99)
GLUCOSE BLD-MCNC: 162 MG/DL (ref 70–99)
GLUCOSE BLD-MCNC: 162 MG/DL (ref 70–99)
GLUCOSE BLD-MCNC: 176 MG/DL (ref 70–99)
GLUCOSE BLD-MCNC: 177 MG/DL (ref 70–99)
GLUCOSE BLD-MCNC: 178 MG/DL (ref 70–99)
GLUCOSE BLD-MCNC: 178 MG/DL (ref 70–99)
GLUCOSE BLD-MCNC: 179 MG/DL (ref 70–99)
GLUCOSE BLD-MCNC: 182 MG/DL (ref 70–99)
GLUCOSE BLD-MCNC: 182 MG/DL (ref 70–99)
GLUCOSE BLD-MCNC: 184 MG/DL (ref 70–99)
GLUCOSE BLD-MCNC: 188 MG/DL (ref 70–99)
GLUCOSE BLD-MCNC: 195 MG/DL (ref 70–99)
GLUCOSE BLD-MCNC: 199 MG/DL (ref 70–99)
GLUCOSE BLD-MCNC: 199 MG/DL (ref 70–99)
GLUCOSE BLD-MCNC: 207 MG/DL (ref 70–99)
GLUCOSE BLD-MCNC: 208 MG/DL (ref 70–99)
GLUCOSE BLD-MCNC: 208 MG/DL (ref 70–99)
GLUCOSE BLD-MCNC: 209 MG/DL (ref 70–99)
GLUCOSE BLD-MCNC: 210 MG/DL (ref 70–99)
GLUCOSE BLD-MCNC: 214 MG/DL (ref 70–99)
GLUCOSE BLD-MCNC: 216 MG/DL (ref 70–99)
GLUCOSE BLD-MCNC: 221 MG/DL (ref 70–99)
GLUCOSE BLD-MCNC: 227 MG/DL (ref 70–99)
GLUCOSE BLD-MCNC: 232 MG/DL (ref 70–99)
GLUCOSE BLD-MCNC: 238 MG/DL (ref 70–99)
GLUCOSE BLD-MCNC: 242 MG/DL (ref 70–99)
GLUCOSE BLD-MCNC: 246 MG/DL (ref 70–99)
GLUCOSE BLD-MCNC: 250 MG/DL (ref 70–99)
GLUCOSE BLD-MCNC: 254 MG/DL (ref 70–99)
GLUCOSE BLD-MCNC: 268 MG/DL (ref 70–99)
GLUCOSE BLD-MCNC: 275 MG/DL (ref 70–99)
GLUCOSE BLD-MCNC: 277 MG/DL (ref 70–99)
GLUCOSE BLD-MCNC: 280 MG/DL (ref 70–99)
GLUCOSE BLD-MCNC: 280 MG/DL (ref 70–99)
GLUCOSE BLD-MCNC: 282 MG/DL (ref 70–99)
GLUCOSE BLD-MCNC: 297 MG/DL (ref 70–99)
GLUCOSE BLD-MCNC: 310 MG/DL (ref 70–99)
GLUCOSE BLD-MCNC: 321 MG/DL (ref 70–99)
GLUCOSE BLD-MCNC: 77 MG/DL (ref 70–99)
GLUCOSE BLD-MCNC: 80 MG/DL (ref 70–99)
GLUCOSE BLD-MCNC: 83 MG/DL (ref 70–99)
GLUCOSE BLD-MCNC: 87 MG/DL (ref 70–99)
GLUCOSE BLD-MCNC: 94 MG/DL (ref 70–99)
GLUCOSE BLD-MCNC: 94 MG/DL (ref 70–99)
GLUCOSE BLD-MCNC: 95 MG/DL (ref 70–99)
GLUCOSE BLD-MCNC: 95 MG/DL (ref 70–99)
GLUCOSE BLD-MCNC: 97 MG/DL (ref 70–99)
GLUCOSE BLD-MCNC: 99 MG/DL (ref 70–99)
GLUCOSE URINE: NEGATIVE MG/DL
GRAM STAIN RESULT: ABNORMAL
HCO3 VENOUS: 28.1 MMOL/L (ref 24–28)
HCO3 VENOUS: 31.7 MMOL/L (ref 23–29)
HCT VFR BLD CALC: 20.1 % (ref 40.5–52.5)
HCT VFR BLD CALC: 20.2 % (ref 40.5–52.5)
HCT VFR BLD CALC: 20.4 % (ref 40.5–52.5)
HCT VFR BLD CALC: 21.1 % (ref 40.5–52.5)
HCT VFR BLD CALC: 21.4 % (ref 40.5–52.5)
HCT VFR BLD CALC: 22.2 % (ref 40.5–52.5)
HCT VFR BLD CALC: 22.4 % (ref 40.5–52.5)
HCT VFR BLD CALC: 22.4 % (ref 40.5–52.5)
HCT VFR BLD CALC: 22.5 % (ref 40.5–52.5)
HCT VFR BLD CALC: 22.5 % (ref 40.5–52.5)
HCT VFR BLD CALC: 22.7 % (ref 40.5–52.5)
HCT VFR BLD CALC: 22.7 % (ref 40.5–52.5)
HCT VFR BLD CALC: 22.9 % (ref 40.5–52.5)
HCT VFR BLD CALC: 23 % (ref 40.5–52.5)
HCT VFR BLD CALC: 23.2 % (ref 40.5–52.5)
HCT VFR BLD CALC: 23.7 % (ref 40.5–52.5)
HCT VFR BLD CALC: 23.8 % (ref 40.5–52.5)
HCT VFR BLD CALC: 23.8 % (ref 40.5–52.5)
HCT VFR BLD CALC: 23.9 % (ref 40.5–52.5)
HCT VFR BLD CALC: 24 % (ref 40.5–52.5)
HCT VFR BLD CALC: 24.1 % (ref 40.5–52.5)
HCT VFR BLD CALC: 24.1 % (ref 40.5–52.5)
HCT VFR BLD CALC: 24.4 % (ref 40.5–52.5)
HCT VFR BLD CALC: 24.5 % (ref 40.5–52.5)
HCT VFR BLD CALC: 24.7 % (ref 40.5–52.5)
HCT VFR BLD CALC: 24.8 % (ref 40.5–52.5)
HCT VFR BLD CALC: 24.8 % (ref 40.5–52.5)
HCT VFR BLD CALC: 24.9 % (ref 40.5–52.5)
HCT VFR BLD CALC: 24.9 % (ref 40.5–52.5)
HCT VFR BLD CALC: 25.3 % (ref 40.5–52.5)
HCT VFR BLD CALC: 25.4 % (ref 40.5–52.5)
HCT VFR BLD CALC: 25.6 % (ref 40.5–52.5)
HCT VFR BLD CALC: 26.1 % (ref 40.5–52.5)
HCT VFR BLD CALC: 26.3 % (ref 40.5–52.5)
HCT VFR BLD CALC: 26.8 % (ref 40.5–52.5)
HCT VFR BLD CALC: 27.2 % (ref 40.5–52.5)
HCT VFR BLD CALC: 27.2 % (ref 40.5–52.5)
HCT VFR BLD CALC: 27.4 % (ref 40.5–52.5)
HCT VFR BLD CALC: 27.8 % (ref 40.5–52.5)
HCT VFR BLD CALC: 28.1 % (ref 40.5–52.5)
HCT VFR BLD CALC: 28.2 % (ref 40.5–52.5)
HCT VFR BLD CALC: 28.4 % (ref 40.5–52.5)
HCT VFR BLD CALC: 28.5 % (ref 40.5–52.5)
HCT VFR BLD CALC: 30.1 % (ref 40.5–52.5)
HCT VFR BLD CALC: 30.4 % (ref 40.5–52.5)
HCT VFR BLD CALC: 31.1 % (ref 40.5–52.5)
HCT VFR BLD CALC: 31.3 % (ref 40.5–52.5)
HCT VFR BLD CALC: 32.7 % (ref 40.5–52.5)
HCT VFR BLD CALC: 34.4 % (ref 40.5–52.5)
HEMATOLOGY PATH CONSULT: NO
HEMATOLOGY PATH CONSULT: NORMAL
HEMATOLOGY PATH CONSULT: NORMAL
HEMATOLOGY PATH CONSULT: YES
HEMATOLOGY PATH CONSULT: YES
HEMOGLOBIN, VEN, REDUCED: 40.6 %
HEMOGLOBIN: 10.2 G/DL (ref 13.5–17.5)
HEMOGLOBIN: 10.3 G/DL (ref 13.5–17.5)
HEMOGLOBIN: 10.6 G/DL (ref 13.5–17.5)
HEMOGLOBIN: 11.1 G/DL (ref 13.5–17.5)
HEMOGLOBIN: 6.4 G/DL (ref 13.5–17.5)
HEMOGLOBIN: 6.5 G/DL (ref 13.5–17.5)
HEMOGLOBIN: 6.7 G/DL (ref 13.5–17.5)
HEMOGLOBIN: 6.9 G/DL (ref 13.5–17.5)
HEMOGLOBIN: 7 G/DL (ref 13.5–17.5)
HEMOGLOBIN: 7.3 G/DL (ref 13.5–17.5)
HEMOGLOBIN: 7.3 G/DL (ref 13.5–17.5)
HEMOGLOBIN: 7.4 G/DL (ref 13.5–17.5)
HEMOGLOBIN: 7.5 G/DL (ref 13.5–17.5)
HEMOGLOBIN: 7.6 G/DL (ref 13.5–17.5)
HEMOGLOBIN: 7.7 G/DL (ref 13.5–17.5)
HEMOGLOBIN: 7.8 G/DL (ref 13.5–17.5)
HEMOGLOBIN: 7.9 G/DL (ref 13.5–17.5)
HEMOGLOBIN: 8 G/DL (ref 13.5–17.5)
HEMOGLOBIN: 8.1 G/DL (ref 13.5–17.5)
HEMOGLOBIN: 8.2 G/DL (ref 13.5–17.5)
HEMOGLOBIN: 8.3 G/DL (ref 13.5–17.5)
HEMOGLOBIN: 8.4 G/DL (ref 13.5–17.5)
HEMOGLOBIN: 8.5 G/DL (ref 13.5–17.5)
HEMOGLOBIN: 8.7 G/DL (ref 13.5–17.5)
HEMOGLOBIN: 8.8 G/DL (ref 13.5–17.5)
HEMOGLOBIN: 8.8 G/DL (ref 13.5–17.5)
HEMOGLOBIN: 8.9 G/DL (ref 13.5–17.5)
HEMOGLOBIN: 8.9 G/DL (ref 13.5–17.5)
HEMOGLOBIN: 9.1 G/DL (ref 13.5–17.5)
HEMOGLOBIN: 9.1 G/DL (ref 13.5–17.5)
HEMOGLOBIN: 9.2 G/DL (ref 13.5–17.5)
HEMOGLOBIN: 9.3 G/DL (ref 13.5–17.5)
HEMOGLOBIN: 9.3 G/DL (ref 13.5–17.5)
HEMOGLOBIN: 9.8 G/DL (ref 13.5–17.5)
HEMOGLOBIN: 9.8 G/DL (ref 13.5–17.5)
HIV AG/AB: NORMAL
HIV ANTIGEN: NORMAL
HIV-1 ANTIBODY: NORMAL
HIV-2 AB: NORMAL
HYALINE CASTS: 2 /LPF (ref 0–8)
HYPOCHROMIA: ABNORMAL
IMMATURE RETIC FRACT: 0.56 (ref 0.21–0.37)
INFLUENZA A ANTIBODY: NORMAL
INFLUENZA B ANTIBODY: NORMAL
INR BLD: 1.19 (ref 0.86–1.14)
INR BLD: 1.2 (ref 0.86–1.14)
INR BLD: 1.24 (ref 0.86–1.14)
INR BLD: 1.25 (ref 0.86–1.14)
INR BLD: 1.26 (ref 0.86–1.14)
INR BLD: 1.39 (ref 0.86–1.14)
INR BLD: 1.41 (ref 0.86–1.14)
INR BLD: 1.46 (ref 0.86–1.14)
INR BLD: 1.49 (ref 0.86–1.14)
INR BLD: 1.5 (ref 0.86–1.14)
INR BLD: 1.54 (ref 0.86–1.14)
INR BLD: 1.64 (ref 0.86–1.14)
INR BLD: 1.67 (ref 0.86–1.14)
INR BLD: 1.99 (ref 0.86–1.14)
INR BLD: 10.3 (ref 0.86–1.14)
INR BLD: 2 (ref 0.86–1.14)
INR BLD: 2.05 (ref 0.86–1.14)
INR BLD: 2.1 (ref 0.86–1.14)
INR BLD: 2.65 (ref 0.86–1.14)
INR BLD: 5.68 (ref 0.86–1.14)
INR BLD: 8.81 (ref 0.86–1.14)
INTERNATIONAL NORMALIZATION RATIO, POC: 3.7
IRON SATURATION: 7 % (ref 20–50)
IRON: 12 UG/DL (ref 59–158)
KETONES, URINE: 15 MG/DL
KETONES, URINE: NEGATIVE MG/DL
KETONES, URINE: NEGATIVE MG/DL
L. PNEUMOPHILA SEROGP 1 UR AG: NORMAL
L. PNEUMOPHILA SEROGP 1 UR AG: NORMAL
LACTATE DEHYDROGENASE: 182 U/L (ref 100–190)
LACTATE DEHYDROGENASE: 187 U/L (ref 100–190)
LACTATE DEHYDROGENASE: 194 U/L (ref 100–190)
LACTATE DEHYDROGENASE: 200 U/L (ref 100–190)
LACTATE DEHYDROGENASE: 202 U/L (ref 100–190)
LACTATE DEHYDROGENASE: 204 U/L (ref 100–190)
LACTATE DEHYDROGENASE: 204 U/L (ref 100–190)
LACTATE DEHYDROGENASE: 209 U/L (ref 100–190)
LACTATE DEHYDROGENASE: 209 U/L (ref 100–190)
LACTATE DEHYDROGENASE: 210 U/L (ref 100–190)
LACTATE DEHYDROGENASE: 211 U/L (ref 100–190)
LACTATE DEHYDROGENASE: 219 U/L (ref 100–190)
LACTATE DEHYDROGENASE: 220 U/L (ref 100–190)
LACTATE DEHYDROGENASE: 220 U/L (ref 100–190)
LACTATE DEHYDROGENASE: 231 U/L (ref 100–190)
LACTATE DEHYDROGENASE: 251 U/L (ref 100–190)
LACTATE DEHYDROGENASE: 276 U/L (ref 100–190)
LACTATE DEHYDROGENASE: 421 U/L (ref 100–190)
LACTATE: 0.68 MMOL/L (ref 0.4–2)
LACTIC ACID, SEPSIS: 1.1 MMOL/L (ref 0.4–1.9)
LACTIC ACID, SEPSIS: 1.9 MMOL/L (ref 0.4–1.9)
LACTIC ACID: 0.7 MMOL/L (ref 0.4–2)
LACTIC ACID: 1.3 MMOL/L (ref 0.4–2)
LACTIC ACID: 1.5 MMOL/L (ref 0.4–2)
LACTIC ACID: 2 MMOL/L (ref 0.4–2)
LEFT VENTRICULAR EJECTION FRACTION MODE: NORMAL
LEUKOCYTE ESTERASE, URINE: NEGATIVE
LIPASE: 24 U/L (ref 13–60)
LV EF: 30 %
LV EF: 30 %
LV EF: 38 %
LVEF MODALITY: NORMAL
LVEF MODALITY: NORMAL
LYMPHOCYTES ABSOLUTE: 0.7 K/UL (ref 1–5.1)
LYMPHOCYTES ABSOLUTE: 1.2 K/UL (ref 1–5.1)
LYMPHOCYTES ABSOLUTE: 1.3 K/UL (ref 1–5.1)
LYMPHOCYTES ABSOLUTE: 1.3 K/UL (ref 1–5.1)
LYMPHOCYTES ABSOLUTE: 1.4 K/UL (ref 1–5.1)
LYMPHOCYTES ABSOLUTE: 1.4 K/UL (ref 1–5.1)
LYMPHOCYTES ABSOLUTE: 1.5 K/UL (ref 1–5.1)
LYMPHOCYTES ABSOLUTE: 1.6 K/UL (ref 1–5.1)
LYMPHOCYTES ABSOLUTE: 1.6 K/UL (ref 1–5.1)
LYMPHOCYTES ABSOLUTE: 1.7 K/UL (ref 1–5.1)
LYMPHOCYTES ABSOLUTE: 1.8 K/UL (ref 1–5.1)
LYMPHOCYTES ABSOLUTE: 2 K/UL (ref 1–5.1)
LYMPHOCYTES ABSOLUTE: 2 K/UL (ref 1–5.1)
LYMPHOCYTES ABSOLUTE: 2.2 K/UL (ref 1–5.1)
LYMPHOCYTES ABSOLUTE: 2.6 K/UL (ref 1–5.1)
LYMPHOCYTES ABSOLUTE: 2.9 K/UL (ref 1–5.1)
LYMPHOCYTES ABSOLUTE: 3 K/UL (ref 1–5.1)
LYMPHOCYTES ABSOLUTE: 3 K/UL (ref 1–5.1)
LYMPHOCYTES ABSOLUTE: 3.1 K/UL (ref 1–5.1)
LYMPHOCYTES ABSOLUTE: 3.5 K/UL (ref 1–5.1)
LYMPHOCYTES RELATIVE PERCENT: 30 %
LYMPHOCYTES RELATIVE PERCENT: 34 %
LYMPHOCYTES RELATIVE PERCENT: 37 %
LYMPHOCYTES RELATIVE PERCENT: 38 %
LYMPHOCYTES RELATIVE PERCENT: 39 %
LYMPHOCYTES RELATIVE PERCENT: 40 %
LYMPHOCYTES RELATIVE PERCENT: 41 %
LYMPHOCYTES RELATIVE PERCENT: 41 %
LYMPHOCYTES RELATIVE PERCENT: 42.6 %
LYMPHOCYTES RELATIVE PERCENT: 43 %
LYMPHOCYTES RELATIVE PERCENT: 46 %
LYMPHOCYTES RELATIVE PERCENT: 50.5 %
LYMPHOCYTES RELATIVE PERCENT: 51.9 %
LYMPHOCYTES RELATIVE PERCENT: 57 %
LYMPHOCYTES RELATIVE PERCENT: 58.1 %
LYMPHOCYTES RELATIVE PERCENT: 59 %
LYMPHOCYTES RELATIVE PERCENT: 61 %
LYMPHOCYTES RELATIVE PERCENT: 69 %
LYMPHOCYTES RELATIVE PERCENT: 70 %
LYMPHOCYTES RELATIVE PERCENT: 77 %
LYMPHOCYTES RELATIVE PERCENT: 77.8 %
LYMPHOCYTES RELATIVE PERCENT: 78.1 %
LYMPHOCYTES RELATIVE PERCENT: 82.8 %
MACROCYTES: ABNORMAL
MACROCYTES: ABNORMAL
MAGNESIUM: 1.6 MG/DL (ref 1.8–2.4)
MAGNESIUM: 1.6 MG/DL (ref 1.8–2.4)
MAGNESIUM: 1.7 MG/DL (ref 1.8–2.4)
MAGNESIUM: 1.8 MG/DL (ref 1.8–2.4)
MAGNESIUM: 1.9 MG/DL (ref 1.8–2.4)
MAGNESIUM: 2 MG/DL (ref 1.8–2.4)
MAGNESIUM: 2 MG/DL (ref 1.8–2.4)
MAGNESIUM: 2.1 MG/DL (ref 1.8–2.4)
MAGNESIUM: 2.2 MG/DL (ref 1.8–2.4)
MAGNESIUM: 2.4 MG/DL (ref 1.8–2.4)
MCH RBC QN AUTO: 26.2 PG (ref 26–34)
MCH RBC QN AUTO: 26.2 PG (ref 26–34)
MCH RBC QN AUTO: 26.3 PG (ref 26–34)
MCH RBC QN AUTO: 26.4 PG (ref 26–34)
MCH RBC QN AUTO: 26.5 PG (ref 26–34)
MCH RBC QN AUTO: 26.7 PG (ref 26–34)
MCH RBC QN AUTO: 26.9 PG (ref 26–34)
MCH RBC QN AUTO: 27.1 PG (ref 26–34)
MCH RBC QN AUTO: 27.3 PG (ref 26–34)
MCH RBC QN AUTO: 27.4 PG (ref 26–34)
MCH RBC QN AUTO: 27.6 PG (ref 26–34)
MCH RBC QN AUTO: 27.7 PG (ref 26–34)
MCH RBC QN AUTO: 27.8 PG (ref 26–34)
MCH RBC QN AUTO: 27.9 PG (ref 26–34)
MCH RBC QN AUTO: 28 PG (ref 26–34)
MCH RBC QN AUTO: 28 PG (ref 26–34)
MCH RBC QN AUTO: 28.2 PG (ref 26–34)
MCH RBC QN AUTO: 28.5 PG (ref 26–34)
MCHC RBC AUTO-ENTMCNC: 31.6 G/DL (ref 31–36)
MCHC RBC AUTO-ENTMCNC: 32.1 G/DL (ref 31–36)
MCHC RBC AUTO-ENTMCNC: 32.1 G/DL (ref 31–36)
MCHC RBC AUTO-ENTMCNC: 32.2 G/DL (ref 31–36)
MCHC RBC AUTO-ENTMCNC: 32.3 G/DL (ref 31–36)
MCHC RBC AUTO-ENTMCNC: 32.4 G/DL (ref 31–36)
MCHC RBC AUTO-ENTMCNC: 32.5 G/DL (ref 31–36)
MCHC RBC AUTO-ENTMCNC: 32.6 G/DL (ref 31–36)
MCHC RBC AUTO-ENTMCNC: 32.7 G/DL (ref 31–36)
MCHC RBC AUTO-ENTMCNC: 32.8 G/DL (ref 31–36)
MCHC RBC AUTO-ENTMCNC: 32.9 G/DL (ref 31–36)
MCHC RBC AUTO-ENTMCNC: 33 G/DL (ref 31–36)
MCHC RBC AUTO-ENTMCNC: 33.1 G/DL (ref 31–36)
MCHC RBC AUTO-ENTMCNC: 33.2 G/DL (ref 31–36)
MCHC RBC AUTO-ENTMCNC: 33.3 G/DL (ref 31–36)
MCHC RBC AUTO-ENTMCNC: 33.4 G/DL (ref 31–36)
MCHC RBC AUTO-ENTMCNC: 33.5 G/DL (ref 31–36)
MCHC RBC AUTO-ENTMCNC: 33.5 G/DL (ref 31–36)
MCHC RBC AUTO-ENTMCNC: 33.6 G/DL (ref 31–36)
MCHC RBC AUTO-ENTMCNC: 33.6 G/DL (ref 31–36)
MCHC RBC AUTO-ENTMCNC: 34 G/DL (ref 31–36)
MCV RBC AUTO: 80.6 FL (ref 80–100)
MCV RBC AUTO: 80.7 FL (ref 80–100)
MCV RBC AUTO: 80.8 FL (ref 80–100)
MCV RBC AUTO: 80.8 FL (ref 80–100)
MCV RBC AUTO: 80.9 FL (ref 80–100)
MCV RBC AUTO: 81 FL (ref 80–100)
MCV RBC AUTO: 81.1 FL (ref 80–100)
MCV RBC AUTO: 81.3 FL (ref 80–100)
MCV RBC AUTO: 81.3 FL (ref 80–100)
MCV RBC AUTO: 81.7 FL (ref 80–100)
MCV RBC AUTO: 81.8 FL (ref 80–100)
MCV RBC AUTO: 81.9 FL (ref 80–100)
MCV RBC AUTO: 82 FL (ref 80–100)
MCV RBC AUTO: 82.5 FL (ref 80–100)
MCV RBC AUTO: 82.9 FL (ref 80–100)
MCV RBC AUTO: 82.9 FL (ref 80–100)
MCV RBC AUTO: 83.3 FL (ref 80–100)
MCV RBC AUTO: 83.3 FL (ref 80–100)
MCV RBC AUTO: 83.4 FL (ref 80–100)
MCV RBC AUTO: 83.6 FL (ref 80–100)
MCV RBC AUTO: 83.6 FL (ref 80–100)
MCV RBC AUTO: 83.7 FL (ref 80–100)
MCV RBC AUTO: 83.9 FL (ref 80–100)
MCV RBC AUTO: 84 FL (ref 80–100)
MCV RBC AUTO: 84 FL (ref 80–100)
MCV RBC AUTO: 84.1 FL (ref 80–100)
MCV RBC AUTO: 84.3 FL (ref 80–100)
MCV RBC AUTO: 84.3 FL (ref 80–100)
MCV RBC AUTO: 84.4 FL (ref 80–100)
MCV RBC AUTO: 84.5 FL (ref 80–100)
MCV RBC AUTO: 84.6 FL (ref 80–100)
MCV RBC AUTO: 84.6 FL (ref 80–100)
MCV RBC AUTO: 84.8 FL (ref 80–100)
MCV RBC AUTO: 84.9 FL (ref 80–100)
MCV RBC AUTO: 84.9 FL (ref 80–100)
MCV RBC AUTO: 85.2 FL (ref 80–100)
MCV RBC AUTO: 86.3 FL (ref 80–100)
MCV RBC AUTO: 86.5 FL (ref 80–100)
MCV RBC AUTO: 86.6 FL (ref 80–100)
METAMYELOCYTES RELATIVE PERCENT: 1 %
METAMYELOCYTES RELATIVE PERCENT: 1 %
METAMYELOCYTES RELATIVE PERCENT: 2 %
METAMYELOCYTES RELATIVE PERCENT: 3 %
METAMYELOCYTES RELATIVE PERCENT: 4 %
METHEMOGLOBIN VENOUS: 0.3 % (ref 0–1.5)
MICROCYTES: ABNORMAL
MICROSCOPIC EXAMINATION: YES
MONO TEST: NEGATIVE
MONOCYTES ABSOLUTE: 0 K/UL (ref 0–1.3)
MONOCYTES ABSOLUTE: 0.1 K/UL (ref 0–1.3)
MONOCYTES ABSOLUTE: 0.1 K/UL (ref 0–1.3)
MONOCYTES ABSOLUTE: 0.3 K/UL (ref 0–1.3)
MONOCYTES ABSOLUTE: 0.3 K/UL (ref 0–1.3)
MONOCYTES ABSOLUTE: 0.4 K/UL (ref 0–1.3)
MONOCYTES ABSOLUTE: 0.5 K/UL (ref 0–1.3)
MONOCYTES ABSOLUTE: 0.6 K/UL (ref 0–1.3)
MONOCYTES ABSOLUTE: 0.7 K/UL (ref 0–1.3)
MONOCYTES ABSOLUTE: 0.8 K/UL (ref 0–1.3)
MONOCYTES ABSOLUTE: 1.1 K/UL (ref 0–1.3)
MONOCYTES ABSOLUTE: 1.1 K/UL (ref 0–1.3)
MONOCYTES ABSOLUTE: 2.6 K/UL (ref 0–1.3)
MONOCYTES RELATIVE PERCENT: 1 %
MONOCYTES RELATIVE PERCENT: 1.3 %
MONOCYTES RELATIVE PERCENT: 1.8 %
MONOCYTES RELATIVE PERCENT: 1.9 %
MONOCYTES RELATIVE PERCENT: 10 %
MONOCYTES RELATIVE PERCENT: 12 %
MONOCYTES RELATIVE PERCENT: 13 %
MONOCYTES RELATIVE PERCENT: 13 %
MONOCYTES RELATIVE PERCENT: 14 %
MONOCYTES RELATIVE PERCENT: 14 %
MONOCYTES RELATIVE PERCENT: 16 %
MONOCYTES RELATIVE PERCENT: 18 %
MONOCYTES RELATIVE PERCENT: 18 %
MONOCYTES RELATIVE PERCENT: 20 %
MONOCYTES RELATIVE PERCENT: 21 %
MONOCYTES RELATIVE PERCENT: 22 %
MONOCYTES RELATIVE PERCENT: 22.9 %
MONOCYTES RELATIVE PERCENT: 28 %
MONOCYTES RELATIVE PERCENT: 3 %
MONOCYTES RELATIVE PERCENT: 4.8 %
MONOCYTES RELATIVE PERCENT: 5 %
MONOCYTES RELATIVE PERCENT: 8 %
MONOCYTES RELATIVE PERCENT: 9 %
MONONUCLEAR UNIDENTIFIED CELLS: 2 %
MRSA SCREEN RT-PCR: NORMAL
MYELOCYTE PERCENT: 1 %
MYELOCYTE PERCENT: 1 %
MYELOCYTE PERCENT: 2 %
MYELOCYTE PERCENT: 2 %
NEUTROPHILS ABSOLUTE: 0.2 K/UL (ref 1.7–7.7)
NEUTROPHILS ABSOLUTE: 0.3 K/UL (ref 1.7–7.7)
NEUTROPHILS ABSOLUTE: 0.4 K/UL (ref 1.7–7.7)
NEUTROPHILS ABSOLUTE: 0.5 K/UL (ref 1.7–7.7)
NEUTROPHILS ABSOLUTE: 0.8 K/UL (ref 1.7–7.7)
NEUTROPHILS ABSOLUTE: 1 K/UL (ref 1.7–7.7)
NEUTROPHILS ABSOLUTE: 1.2 K/UL (ref 1.7–7.7)
NEUTROPHILS ABSOLUTE: 1.6 K/UL (ref 1.7–7.7)
NEUTROPHILS ABSOLUTE: 1.6 K/UL (ref 1.7–7.7)
NEUTROPHILS ABSOLUTE: 1.7 K/UL (ref 1.7–7.7)
NEUTROPHILS ABSOLUTE: 1.8 K/UL (ref 1.7–7.7)
NEUTROPHILS ABSOLUTE: 2 K/UL (ref 1.7–7.7)
NEUTROPHILS ABSOLUTE: 2.1 K/UL (ref 1.7–7.7)
NEUTROPHILS ABSOLUTE: 2.8 K/UL (ref 1.7–7.7)
NEUTROPHILS ABSOLUTE: 3.3 K/UL (ref 1.7–7.7)
NEUTROPHILS ABSOLUTE: 3.4 K/UL (ref 1.7–7.7)
NEUTROPHILS ABSOLUTE: 3.6 K/UL (ref 1.7–7.7)
NEUTROPHILS RELATIVE PERCENT: 12 %
NEUTROPHILS RELATIVE PERCENT: 12 %
NEUTROPHILS RELATIVE PERCENT: 13.7 %
NEUTROPHILS RELATIVE PERCENT: 17.4 %
NEUTROPHILS RELATIVE PERCENT: 19.2 %
NEUTROPHILS RELATIVE PERCENT: 19.5 %
NEUTROPHILS RELATIVE PERCENT: 20 %
NEUTROPHILS RELATIVE PERCENT: 23 %
NEUTROPHILS RELATIVE PERCENT: 27 %
NEUTROPHILS RELATIVE PERCENT: 29 %
NEUTROPHILS RELATIVE PERCENT: 29 %
NEUTROPHILS RELATIVE PERCENT: 32.7 %
NEUTROPHILS RELATIVE PERCENT: 34.7 %
NEUTROPHILS RELATIVE PERCENT: 35 %
NEUTROPHILS RELATIVE PERCENT: 35 %
NEUTROPHILS RELATIVE PERCENT: 36 %
NEUTROPHILS RELATIVE PERCENT: 37.6 %
NEUTROPHILS RELATIVE PERCENT: 43 %
NEUTROPHILS RELATIVE PERCENT: 45 %
NEUTROPHILS RELATIVE PERCENT: 45 %
NEUTROPHILS RELATIVE PERCENT: 46 %
NEUTROPHILS RELATIVE PERCENT: 47 %
NEUTROPHILS RELATIVE PERCENT: 49 %
NITRITE, URINE: NEGATIVE
NUCLEATED RED BLOOD CELLS: 1 /100 WBC
NUCLEATED RED BLOOD CELLS: 1 /100 WBC
O2 SAT, VEN: 59 %
O2 SAT, VEN: 70 %
OCCULT BLOOD SCREENING: NORMAL
ORGANISM: ABNORMAL
ORGANISM: ABNORMAL
OVALOCYTES: ABNORMAL
PCO2, VEN: 28.5 MMHG (ref 41–51)
PCO2, VEN: 47.3 MM HG (ref 40–50)
PDW BLD-RTO: 17.7 % (ref 12.4–15.4)
PDW BLD-RTO: 18.1 % (ref 12.4–15.4)
PDW BLD-RTO: 18.2 % (ref 12.4–15.4)
PDW BLD-RTO: 18.3 % (ref 12.4–15.4)
PDW BLD-RTO: 18.4 % (ref 12.4–15.4)
PDW BLD-RTO: 18.5 % (ref 12.4–15.4)
PDW BLD-RTO: 18.6 % (ref 12.4–15.4)
PDW BLD-RTO: 18.7 % (ref 12.4–15.4)
PDW BLD-RTO: 18.8 % (ref 12.4–15.4)
PDW BLD-RTO: 18.9 % (ref 12.4–15.4)
PDW BLD-RTO: 19 % (ref 12.4–15.4)
PDW BLD-RTO: 19.1 % (ref 12.4–15.4)
PDW BLD-RTO: 19.2 % (ref 12.4–15.4)
PDW BLD-RTO: 19.3 % (ref 12.4–15.4)
PDW BLD-RTO: 19.4 % (ref 12.4–15.4)
PDW BLD-RTO: 19.5 % (ref 12.4–15.4)
PDW BLD-RTO: 19.6 % (ref 12.4–15.4)
PDW BLD-RTO: 19.7 % (ref 12.4–15.4)
PDW BLD-RTO: 19.8 % (ref 12.4–15.4)
PDW BLD-RTO: 19.9 % (ref 12.4–15.4)
PDW BLD-RTO: 19.9 % (ref 12.4–15.4)
PDW BLD-RTO: 20.2 % (ref 12.4–15.4)
PDW BLD-RTO: 20.4 % (ref 12.4–15.4)
PERFORMED ON: ABNORMAL
PERFORMED ON: NORMAL
PH UA: 5 (ref 5–8)
PH UA: 5 (ref 5–8)
PH UA: 5.5 (ref 5–8)
PH VENOUS: 7.43 (ref 7.35–7.45)
PH VENOUS: 7.6 (ref 7.35–7.45)
PHOSPHORUS: 1.8 MG/DL (ref 2.5–4.9)
PHOSPHORUS: 2.2 MG/DL (ref 2.5–4.9)
PHOSPHORUS: 2.4 MG/DL (ref 2.5–4.9)
PHOSPHORUS: 2.5 MG/DL (ref 2.5–4.9)
PHOSPHORUS: 2.6 MG/DL (ref 2.5–4.9)
PHOSPHORUS: 2.6 MG/DL (ref 2.5–4.9)
PHOSPHORUS: 2.7 MG/DL (ref 2.5–4.9)
PHOSPHORUS: 2.8 MG/DL (ref 2.5–4.9)
PHOSPHORUS: 2.9 MG/DL (ref 2.5–4.9)
PHOSPHORUS: 3 MG/DL (ref 2.5–4.9)
PHOSPHORUS: 3.2 MG/DL (ref 2.5–4.9)
PHOSPHORUS: 3.4 MG/DL (ref 2.5–4.9)
PHOSPHORUS: 3.7 MG/DL (ref 2.5–4.9)
PLATELET # BLD: 100 K/UL (ref 135–450)
PLATELET # BLD: 100 K/UL (ref 135–450)
PLATELET # BLD: 103 K/UL (ref 135–450)
PLATELET # BLD: 105 K/UL (ref 135–450)
PLATELET # BLD: 107 K/UL (ref 135–450)
PLATELET # BLD: 107 K/UL (ref 135–450)
PLATELET # BLD: 109 K/UL (ref 135–450)
PLATELET # BLD: 110 K/UL (ref 135–450)
PLATELET # BLD: 111 K/UL (ref 135–450)
PLATELET # BLD: 112 K/UL (ref 135–450)
PLATELET # BLD: 116 K/UL (ref 135–450)
PLATELET # BLD: 117 K/UL (ref 135–450)
PLATELET # BLD: 118 K/UL (ref 135–450)
PLATELET # BLD: 119 K/UL (ref 135–450)
PLATELET # BLD: 120 K/UL (ref 135–450)
PLATELET # BLD: 121 K/UL (ref 135–450)
PLATELET # BLD: 122 K/UL (ref 135–450)
PLATELET # BLD: 125 K/UL (ref 135–450)
PLATELET # BLD: 126 K/UL (ref 135–450)
PLATELET # BLD: 128 K/UL (ref 135–450)
PLATELET # BLD: 128 K/UL (ref 135–450)
PLATELET # BLD: 134 K/UL (ref 135–450)
PLATELET # BLD: 140 K/UL (ref 135–450)
PLATELET # BLD: 150 K/UL (ref 135–450)
PLATELET # BLD: 155 K/UL (ref 135–450)
PLATELET # BLD: 156 K/UL (ref 135–450)
PLATELET # BLD: 157 K/UL (ref 135–450)
PLATELET # BLD: 158 K/UL (ref 135–450)
PLATELET # BLD: 158 K/UL (ref 135–450)
PLATELET # BLD: 160 K/UL (ref 135–450)
PLATELET # BLD: 162 K/UL (ref 135–450)
PLATELET # BLD: 164 K/UL (ref 135–450)
PLATELET # BLD: 172 K/UL (ref 135–450)
PLATELET # BLD: 173 K/UL (ref 135–450)
PLATELET # BLD: 173 K/UL (ref 135–450)
PLATELET # BLD: 186 K/UL (ref 135–450)
PLATELET # BLD: 70 K/UL (ref 135–450)
PLATELET # BLD: 80 K/UL (ref 135–450)
PLATELET # BLD: 82 K/UL (ref 135–450)
PLATELET # BLD: 84 K/UL (ref 135–450)
PLATELET SLIDE REVIEW: ABNORMAL
PLATELET SLIDE REVIEW: ADEQUATE
PMV BLD AUTO: 7.5 FL (ref 5–10.5)
PMV BLD AUTO: 7.5 FL (ref 5–10.5)
PMV BLD AUTO: 7.6 FL (ref 5–10.5)
PMV BLD AUTO: 7.6 FL (ref 5–10.5)
PMV BLD AUTO: 7.7 FL (ref 5–10.5)
PMV BLD AUTO: 7.7 FL (ref 5–10.5)
PMV BLD AUTO: 7.8 FL (ref 5–10.5)
PMV BLD AUTO: 7.9 FL (ref 5–10.5)
PMV BLD AUTO: 7.9 FL (ref 5–10.5)
PMV BLD AUTO: 8 FL (ref 5–10.5)
PMV BLD AUTO: 8.1 FL (ref 5–10.5)
PMV BLD AUTO: 8.2 FL (ref 5–10.5)
PMV BLD AUTO: 8.3 FL (ref 5–10.5)
PMV BLD AUTO: 8.4 FL (ref 5–10.5)
PMV BLD AUTO: 8.5 FL (ref 5–10.5)
PMV BLD AUTO: 8.5 FL (ref 5–10.5)
PMV BLD AUTO: 8.6 FL (ref 5–10.5)
PMV BLD AUTO: 8.7 FL (ref 5–10.5)
PMV BLD AUTO: 8.8 FL (ref 5–10.5)
PMV BLD AUTO: 8.9 FL (ref 5–10.5)
PMV BLD AUTO: 8.9 FL (ref 5–10.5)
PMV BLD AUTO: 9 FL (ref 5–10.5)
PO2, VEN: 25.4 MMHG (ref 25–40)
PO2, VEN: 36 MM HG
POC SAMPLE TYPE: ABNORMAL
POC SAMPLE TYPE: ABNORMAL
POIKILOCYTES: ABNORMAL
POLYCHROMASIA: ABNORMAL
POTASSIUM REFLEX MAGNESIUM: 4.7 MMOL/L (ref 3.5–5.1)
POTASSIUM SERPL-SCNC: 3.1 MMOL/L (ref 3.5–5.1)
POTASSIUM SERPL-SCNC: 3.4 MMOL/L (ref 3.5–5.1)
POTASSIUM SERPL-SCNC: 3.5 MMOL/L (ref 3.5–5.1)
POTASSIUM SERPL-SCNC: 3.5 MMOL/L (ref 3.5–5.1)
POTASSIUM SERPL-SCNC: 3.6 MMOL/L (ref 3.5–5.1)
POTASSIUM SERPL-SCNC: 3.7 MMOL/L (ref 3.5–5.1)
POTASSIUM SERPL-SCNC: 3.8 MMOL/L (ref 3.5–5.1)
POTASSIUM SERPL-SCNC: 3.9 MMOL/L (ref 3.5–5.1)
POTASSIUM SERPL-SCNC: 3.9 MMOL/L (ref 3.5–5.1)
POTASSIUM SERPL-SCNC: 4 MMOL/L (ref 3.5–5.1)
POTASSIUM SERPL-SCNC: 4.1 MMOL/L (ref 3.5–5.1)
POTASSIUM SERPL-SCNC: 4.2 MMOL/L (ref 3.5–5.1)
POTASSIUM SERPL-SCNC: 4.3 MMOL/L (ref 3.5–5.1)
POTASSIUM SERPL-SCNC: 4.4 MMOL/L (ref 3.5–5.1)
POTASSIUM SERPL-SCNC: 4.5 MMOL/L (ref 3.5–5.1)
POTASSIUM SERPL-SCNC: 4.6 MMOL/L (ref 3.5–5.1)
POTASSIUM SERPL-SCNC: 4.6 MMOL/L (ref 3.5–5.1)
POTASSIUM SERPL-SCNC: 4.8 MMOL/L (ref 3.5–5.1)
POTASSIUM SERPL-SCNC: 4.9 MMOL/L (ref 3.5–5.1)
PREALBUMIN: 7.3 MG/DL (ref 20–40)
PRELIMINARY: NORMAL
PRO-BNP: 3707 PG/ML (ref 0–449)
PRO-BNP: 6971 PG/ML (ref 0–449)
PRO-BNP: 7910 PG/ML (ref 0–449)
PROCALCITONIN: 1.11 NG/ML (ref 0–0.15)
PROCALCITONIN: 1.18 NG/ML (ref 0–0.15)
PROCALCITONIN: 1.39 NG/ML (ref 0–0.15)
PROCALCITONIN: 1.81 NG/ML (ref 0–0.15)
PROCALCITONIN: 2.42 NG/ML (ref 0–0.15)
PROCALCITONIN: 2.71 NG/ML (ref 0–0.15)
PROTEIN UA: 30 MG/DL
PROTEIN UA: 30 MG/DL
PROTEIN UA: NEGATIVE MG/DL
PROTHROMBIN TIME, POC: NORMAL
PROTHROMBIN TIME: 104.5 SEC (ref 10–13.2)
PROTHROMBIN TIME: 122.3 SEC (ref 10–13.2)
PROTHROMBIN TIME: 13.8 SEC (ref 10–13.2)
PROTHROMBIN TIME: 14 SEC (ref 10–13.2)
PROTHROMBIN TIME: 14.4 SEC (ref 10–13.2)
PROTHROMBIN TIME: 14.5 SEC (ref 10–13.2)
PROTHROMBIN TIME: 14.6 SEC (ref 10–13.2)
PROTHROMBIN TIME: 16.2 SEC (ref 10–13.2)
PROTHROMBIN TIME: 16.4 SEC (ref 10–13.2)
PROTHROMBIN TIME: 17 SEC (ref 10–13.2)
PROTHROMBIN TIME: 17.3 SEC (ref 10–13.2)
PROTHROMBIN TIME: 17.5 SEC (ref 10–13.2)
PROTHROMBIN TIME: 18 SEC (ref 10–13.2)
PROTHROMBIN TIME: 19.1 SEC (ref 10–13.2)
PROTHROMBIN TIME: 19.5 SEC (ref 10–13.2)
PROTHROMBIN TIME: 23.3 SEC (ref 10–13.2)
PROTHROMBIN TIME: 23.4 SEC (ref 10–13.2)
PROTHROMBIN TIME: 24 SEC (ref 10–13.2)
PROTHROMBIN TIME: 24.6 SEC (ref 10–13.2)
PROTHROMBIN TIME: 31 SEC (ref 10–13.2)
PROTHROMBIN TIME: 67.1 SEC (ref 10–13.2)
RAPID INFLUENZA  B AGN: NEGATIVE
RAPID INFLUENZA  B AGN: NEGATIVE
RAPID INFLUENZA A AGN: NEGATIVE
RAPID INFLUENZA A AGN: NEGATIVE
RBC # BLD: 2.36 M/UL (ref 4.2–5.9)
RBC # BLD: 2.4 M/UL (ref 4.2–5.9)
RBC # BLD: 2.52 M/UL (ref 4.2–5.9)
RBC # BLD: 2.62 M/UL (ref 4.2–5.9)
RBC # BLD: 2.65 M/UL (ref 4.2–5.9)
RBC # BLD: 2.67 M/UL (ref 4.2–5.9)
RBC # BLD: 2.68 M/UL (ref 4.2–5.9)
RBC # BLD: 2.68 M/UL (ref 4.2–5.9)
RBC # BLD: 2.69 M/UL (ref 4.2–5.9)
RBC # BLD: 2.71 M/UL (ref 4.2–5.9)
RBC # BLD: 2.73 M/UL (ref 4.2–5.9)
RBC # BLD: 2.74 M/UL (ref 4.2–5.9)
RBC # BLD: 2.75 M/UL (ref 4.2–5.9)
RBC # BLD: 2.77 M/UL (ref 4.2–5.9)
RBC # BLD: 2.78 M/UL (ref 4.2–5.9)
RBC # BLD: 2.8 M/UL (ref 4.2–5.9)
RBC # BLD: 2.82 M/UL (ref 4.2–5.9)
RBC # BLD: 2.84 M/UL (ref 4.2–5.9)
RBC # BLD: 2.84 M/UL (ref 4.2–5.9)
RBC # BLD: 2.86 M/UL (ref 4.2–5.9)
RBC # BLD: 2.88 M/UL (ref 4.2–5.9)
RBC # BLD: 2.88 M/UL (ref 4.2–5.9)
RBC # BLD: 2.93 M/UL (ref 4.2–5.9)
RBC # BLD: 2.96 M/UL (ref 4.2–5.9)
RBC # BLD: 2.96 M/UL (ref 4.2–5.9)
RBC # BLD: 2.98 M/UL (ref 4.2–5.9)
RBC # BLD: 3.04 M/UL (ref 4.2–5.9)
RBC # BLD: 3.05 M/UL (ref 4.2–5.9)
RBC # BLD: 3.06 M/UL (ref 4.2–5.9)
RBC # BLD: 3.1 M/UL (ref 4.2–5.9)
RBC # BLD: 3.1 M/UL (ref 4.2–5.9)
RBC # BLD: 3.13 M/UL (ref 4.2–5.9)
RBC # BLD: 3.31 M/UL (ref 4.2–5.9)
RBC # BLD: 3.49 M/UL (ref 4.2–5.9)
RBC # BLD: 3.52 M/UL (ref 4.2–5.9)
RBC # BLD: 3.74 M/UL (ref 4.2–5.9)
RBC # BLD: 3.74 M/UL (ref 4.2–5.9)
RBC # BLD: 3.79 M/UL (ref 4.2–5.9)
RBC # BLD: 3.87 M/UL (ref 4.2–5.9)
RBC # BLD: 4.04 M/UL (ref 4.2–5.9)
RBC # BLD: 4.2 M/UL (ref 4.2–5.9)
RBC UA: 291 /HPF (ref 0–4)
RBC UA: 9 /HPF (ref 0–4)
RBC UA: ABNORMAL /HPF (ref 0–4)
REASON FOR REJECTION: NORMAL
REJECTED TEST: NORMAL
REPORT: NORMAL
RESPIRATORY PANEL PCR: NORMAL
RESPIRATORY PANEL PCR: NORMAL
RETICULOCYTE ABSOLUTE COUNT: 0.22 M/UL
RETICULOCYTE COUNT PCT: 6.29 % (ref 0.5–2.18)
S PYO AG THROAT QL: POSITIVE
SCHISTOCYTES: ABNORMAL
SICKLE CELLS: ABNORMAL
SLIDE REVIEW: ABNORMAL
SMUDGE CELLS: PRESENT
SMUDGE CELLS: PRESENT
SODIUM BLD-SCNC: 130 MMOL/L (ref 136–145)
SODIUM BLD-SCNC: 132 MMOL/L (ref 136–145)
SODIUM BLD-SCNC: 133 MMOL/L (ref 136–145)
SODIUM BLD-SCNC: 134 MMOL/L (ref 136–145)
SODIUM BLD-SCNC: 135 MMOL/L (ref 136–145)
SODIUM BLD-SCNC: 136 MMOL/L (ref 136–145)
SODIUM BLD-SCNC: 136 MMOL/L (ref 136–145)
SODIUM BLD-SCNC: 137 MMOL/L (ref 136–145)
SODIUM BLD-SCNC: 138 MMOL/L (ref 136–145)
SODIUM BLD-SCNC: 139 MMOL/L (ref 136–145)
SODIUM BLD-SCNC: 140 MMOL/L (ref 136–145)
SODIUM BLD-SCNC: 141 MMOL/L (ref 136–145)
SODIUM BLD-SCNC: 142 MMOL/L (ref 136–145)
SODIUM BLD-SCNC: 142 MMOL/L (ref 136–145)
SODIUM BLD-SCNC: 143 MMOL/L (ref 136–145)
SPECIFIC GRAVITY UA: 1.01 (ref 1–1.03)
SPECIFIC GRAVITY UA: 1.02 (ref 1–1.03)
SPECIFIC GRAVITY UA: 1.02 (ref 1–1.03)
STOMATOCYTES: ABNORMAL
STREP PNEUMONIAE ANTIGEN, URINE: NORMAL
STREP PNEUMONIAE ANTIGEN, URINE: NORMAL
TCO2 CALC VENOUS: 29 MMOL/L
TCO2 CALC VENOUS: 33 MMOL/L
TEAR DROP CELLS: ABNORMAL
THROAT CULTURE: NORMAL
TOTAL IRON BINDING CAPACITY: 162 UG/DL (ref 260–445)
TOTAL PROTEIN: 5.3 G/DL (ref 6.4–8.2)
TOTAL PROTEIN: 5.3 G/DL (ref 6.4–8.2)
TOTAL PROTEIN: 6 G/DL (ref 6.4–8.2)
TOTAL PROTEIN: 7 G/DL (ref 6.4–8.2)
TRANSFERRIN: 123 MG/DL (ref 200–360)
TRANSFERRIN: 131 MG/DL (ref 200–360)
TROPONIN: 0.04 NG/ML
TROPONIN: 0.09 NG/ML
TSH REFLEX: 3.91 UIU/ML (ref 0.27–4.2)
URIC ACID, SERUM: 3.7 MG/DL (ref 3.5–7.2)
URIC ACID, SERUM: 3.8 MG/DL (ref 3.5–7.2)
URIC ACID, SERUM: 3.9 MG/DL (ref 3.5–7.2)
URIC ACID, SERUM: 3.9 MG/DL (ref 3.5–7.2)
URIC ACID, SERUM: 4.1 MG/DL (ref 3.5–7.2)
URIC ACID, SERUM: 4.1 MG/DL (ref 3.5–7.2)
URIC ACID, SERUM: 4.3 MG/DL (ref 3.5–7.2)
URIC ACID, SERUM: 4.4 MG/DL (ref 3.5–7.2)
URIC ACID, SERUM: 4.4 MG/DL (ref 3.5–7.2)
URIC ACID, SERUM: 4.7 MG/DL (ref 3.5–7.2)
URIC ACID, SERUM: 5.3 MG/DL (ref 3.5–7.2)
URIC ACID, SERUM: 5.3 MG/DL (ref 3.5–7.2)
URIC ACID, SERUM: 6.4 MG/DL (ref 3.5–7.2)
URIC ACID, SERUM: 6.7 MG/DL (ref 3.5–7.2)
URIC ACID, SERUM: 6.9 MG/DL (ref 3.5–7.2)
URIC ACID, SERUM: 7.1 MG/DL (ref 3.5–7.2)
URINE CULTURE, ROUTINE: NORMAL
URINE TYPE: ABNORMAL
UROBILINOGEN, URINE: 0.2 E.U./DL
UROBILINOGEN, URINE: 0.2 E.U./DL
UROBILINOGEN, URINE: 1 E.U./DL
VACUOLATED NEUTROPHILS: PRESENT
VANCOMYCIN TROUGH: 11.4 UG/ML (ref 10–20)
VANCOMYCIN TROUGH: 18.7 UG/ML (ref 10–20)
VITAMIN B-12: 420 PG/ML (ref 211–911)
VRE CULTURE: NORMAL
WBC # BLD: 0.9 K/UL (ref 4–11)
WBC # BLD: 0.9 K/UL (ref 4–11)
WBC # BLD: 1.3 K/UL (ref 4–11)
WBC # BLD: 1.4 K/UL (ref 4–11)
WBC # BLD: 1.9 K/UL (ref 4–11)
WBC # BLD: 10.4 K/UL (ref 4–11)
WBC # BLD: 2 K/UL (ref 4–11)
WBC # BLD: 2.6 K/UL (ref 4–11)
WBC # BLD: 2.9 K/UL (ref 4–11)
WBC # BLD: 3 K/UL (ref 4–11)
WBC # BLD: 3.1 K/UL (ref 4–11)
WBC # BLD: 3.2 K/UL (ref 4–11)
WBC # BLD: 3.2 K/UL (ref 4–11)
WBC # BLD: 3.3 K/UL (ref 4–11)
WBC # BLD: 3.3 K/UL (ref 4–11)
WBC # BLD: 3.4 K/UL (ref 4–11)
WBC # BLD: 3.7 K/UL (ref 4–11)
WBC # BLD: 3.8 K/UL (ref 4–11)
WBC # BLD: 3.9 K/UL (ref 4–11)
WBC # BLD: 4 K/UL (ref 4–11)
WBC # BLD: 4.1 K/UL (ref 4–11)
WBC # BLD: 4.1 K/UL (ref 4–11)
WBC # BLD: 4.3 K/UL (ref 4–11)
WBC # BLD: 4.5 K/UL (ref 4–11)
WBC # BLD: 4.6 K/UL (ref 4–11)
WBC # BLD: 4.7 K/UL (ref 4–11)
WBC # BLD: 5.6 K/UL (ref 4–11)
WBC # BLD: 5.6 K/UL (ref 4–11)
WBC # BLD: 5.7 K/UL (ref 4–11)
WBC # BLD: 6 K/UL (ref 4–11)
WBC # BLD: 6.3 K/UL (ref 4–11)
WBC # BLD: 6.5 K/UL (ref 4–11)
WBC # BLD: 6.9 K/UL (ref 4–11)
WBC # BLD: 6.9 K/UL (ref 4–11)
WBC # BLD: 7.1 K/UL (ref 4–11)
WBC # BLD: 8.2 K/UL (ref 4–11)
WBC # BLD: 8.6 K/UL (ref 4–11)
WBC # BLD: 9.4 K/UL (ref 4–11)
WBC # BLD: 9.9 K/UL (ref 4–11)
WBC UA: 1 /HPF (ref 0–5)
WBC UA: 3 /HPF (ref 0–5)
WBC UA: 4 /HPF (ref 0–5)
WOUND/ABSCESS: ABNORMAL

## 2020-01-01 PROCEDURE — 6360000002 HC RX W HCPCS: Performed by: NURSE PRACTITIONER

## 2020-01-01 PROCEDURE — 83735 ASSAY OF MAGNESIUM: CPT

## 2020-01-01 PROCEDURE — 80048 BASIC METABOLIC PNL TOTAL CA: CPT

## 2020-01-01 PROCEDURE — 6360000002 HC RX W HCPCS: Performed by: INTERNAL MEDICINE

## 2020-01-01 PROCEDURE — 97535 SELF CARE MNGMENT TRAINING: CPT

## 2020-01-01 PROCEDURE — 86923 COMPATIBILITY TEST ELECTRIC: CPT

## 2020-01-01 PROCEDURE — 6370000000 HC RX 637 (ALT 250 FOR IP): Performed by: PHYSICIAN ASSISTANT

## 2020-01-01 PROCEDURE — 6370000000 HC RX 637 (ALT 250 FOR IP): Performed by: INTERNAL MEDICINE

## 2020-01-01 PROCEDURE — 94660 CPAP INITIATION&MGMT: CPT

## 2020-01-01 PROCEDURE — 80069 RENAL FUNCTION PANEL: CPT

## 2020-01-01 PROCEDURE — 38525 BIOPSY/REMOVAL LYMPH NODES: CPT | Performed by: SURGERY

## 2020-01-01 PROCEDURE — 2000000000 HC ICU R&B

## 2020-01-01 PROCEDURE — 94150 VITAL CAPACITY TEST: CPT

## 2020-01-01 PROCEDURE — 84145 PROCALCITONIN (PCT): CPT

## 2020-01-01 PROCEDURE — 36592 COLLECT BLOOD FROM PICC: CPT

## 2020-01-01 PROCEDURE — 2580000003 HC RX 258: Performed by: INTERNAL MEDICINE

## 2020-01-01 PROCEDURE — 99221 1ST HOSP IP/OBS SF/LOW 40: CPT | Performed by: NURSE PRACTITIONER

## 2020-01-01 PROCEDURE — 2709999900 HC NON-CHARGEABLE SUPPLY: Performed by: INTERNAL MEDICINE

## 2020-01-01 PROCEDURE — 6360000002 HC RX W HCPCS: Performed by: SURGERY

## 2020-01-01 PROCEDURE — 97110 THERAPEUTIC EXERCISES: CPT

## 2020-01-01 PROCEDURE — 85018 HEMOGLOBIN: CPT

## 2020-01-01 PROCEDURE — 6370000000 HC RX 637 (ALT 250 FOR IP): Performed by: NURSE PRACTITIONER

## 2020-01-01 PROCEDURE — 2580000003 HC RX 258: Performed by: NURSE PRACTITIONER

## 2020-01-01 PROCEDURE — 3600000002 HC SURGERY LEVEL 2 BASE: Performed by: SURGERY

## 2020-01-01 PROCEDURE — 85027 COMPLETE CBC AUTOMATED: CPT

## 2020-01-01 PROCEDURE — 92526 ORAL FUNCTION THERAPY: CPT

## 2020-01-01 PROCEDURE — APPNB30 APP NON BILLABLE TIME 0-30 MINS: Performed by: NURSE PRACTITIONER

## 2020-01-01 PROCEDURE — 99233 SBSQ HOSP IP/OBS HIGH 50: CPT | Performed by: INTERNAL MEDICINE

## 2020-01-01 PROCEDURE — 6370000000 HC RX 637 (ALT 250 FOR IP): Performed by: SURGERY

## 2020-01-01 PROCEDURE — 36415 COLL VENOUS BLD VENIPUNCTURE: CPT

## 2020-01-01 PROCEDURE — 97166 OT EVAL MOD COMPLEX 45 MIN: CPT

## 2020-01-01 PROCEDURE — 2720000010 HC SURG SUPPLY STERILE: Performed by: SURGERY

## 2020-01-01 PROCEDURE — 94760 N-INVAS EAR/PLS OXIMETRY 1: CPT

## 2020-01-01 PROCEDURE — 93005 ELECTROCARDIOGRAM TRACING: CPT | Performed by: NURSE PRACTITIONER

## 2020-01-01 PROCEDURE — 87253 VIRUS INOCULATE TISSUE ADDL: CPT

## 2020-01-01 PROCEDURE — 2700000000 HC OXYGEN THERAPY PER DAY

## 2020-01-01 PROCEDURE — 2060000000 HC ICU INTERMEDIATE R&B

## 2020-01-01 PROCEDURE — 97530 THERAPEUTIC ACTIVITIES: CPT

## 2020-01-01 PROCEDURE — 94761 N-INVAS EAR/PLS OXIMETRY MLT: CPT

## 2020-01-01 PROCEDURE — 93306 TTE W/DOPPLER COMPLETE: CPT

## 2020-01-01 PROCEDURE — 6360000002 HC RX W HCPCS: Performed by: ANESTHESIOLOGY

## 2020-01-01 PROCEDURE — 2580000003 HC RX 258: Performed by: STUDENT IN AN ORGANIZED HEALTH CARE EDUCATION/TRAINING PROGRAM

## 2020-01-01 PROCEDURE — 71045 X-RAY EXAM CHEST 1 VIEW: CPT

## 2020-01-01 PROCEDURE — 85730 THROMBOPLASTIN TIME PARTIAL: CPT

## 2020-01-01 PROCEDURE — 74176 CT ABD & PELVIS W/O CONTRAST: CPT

## 2020-01-01 PROCEDURE — 6370000000 HC RX 637 (ALT 250 FOR IP): Performed by: HOSPITALIST

## 2020-01-01 PROCEDURE — 97116 GAIT TRAINING THERAPY: CPT

## 2020-01-01 PROCEDURE — 36430 TRANSFUSION BLD/BLD COMPNT: CPT

## 2020-01-01 PROCEDURE — 1200000000 HC SEMI PRIVATE

## 2020-01-01 PROCEDURE — 6370000000 HC RX 637 (ALT 250 FOR IP): Performed by: STUDENT IN AN ORGANIZED HEALTH CARE EDUCATION/TRAINING PROGRAM

## 2020-01-01 PROCEDURE — 87040 BLOOD CULTURE FOR BACTERIA: CPT

## 2020-01-01 PROCEDURE — 07BJ0ZZ EXCISION OF LEFT INGUINAL LYMPHATIC, OPEN APPROACH: ICD-10-PCS | Performed by: SURGERY

## 2020-01-01 PROCEDURE — 86900 BLOOD TYPING SEROLOGIC ABO: CPT

## 2020-01-01 PROCEDURE — 38500 BIOPSY/REMOVAL LYMPH NODES: CPT | Performed by: SURGERY

## 2020-01-01 PROCEDURE — 99024 POSTOP FOLLOW-UP VISIT: CPT | Performed by: SURGERY

## 2020-01-01 PROCEDURE — 2580000003 HC RX 258: Performed by: HOSPITALIST

## 2020-01-01 PROCEDURE — 2580000003 HC RX 258: Performed by: ANESTHESIOLOGY

## 2020-01-01 PROCEDURE — 85014 HEMATOCRIT: CPT

## 2020-01-01 PROCEDURE — P9016 RBC LEUKOCYTES REDUCED: HCPCS

## 2020-01-01 PROCEDURE — 2500000003 HC RX 250 WO HCPCS: Performed by: NURSE PRACTITIONER

## 2020-01-01 PROCEDURE — 85610 PROTHROMBIN TIME: CPT

## 2020-01-01 PROCEDURE — 99223 1ST HOSP IP/OBS HIGH 75: CPT | Performed by: INTERNAL MEDICINE

## 2020-01-01 PROCEDURE — 87449 NOS EACH ORGANISM AG IA: CPT

## 2020-01-01 PROCEDURE — 3609012400 HC EGD TRANSORAL BIOPSY SINGLE/MULTIPLE: Performed by: INTERNAL MEDICINE

## 2020-01-01 PROCEDURE — 99291 CRITICAL CARE FIRST HOUR: CPT | Performed by: INTERNAL MEDICINE

## 2020-01-01 PROCEDURE — 85025 COMPLETE CBC W/AUTO DIFF WBC: CPT

## 2020-01-01 PROCEDURE — 88342 IMHCHEM/IMCYTCHM 1ST ANTB: CPT

## 2020-01-01 PROCEDURE — 93010 ELECTROCARDIOGRAM REPORT: CPT | Performed by: INTERNAL MEDICINE

## 2020-01-01 PROCEDURE — 83615 LACTATE (LD) (LDH) ENZYME: CPT

## 2020-01-01 PROCEDURE — 83605 ASSAY OF LACTIC ACID: CPT

## 2020-01-01 PROCEDURE — G0328 FECAL BLOOD SCRN IMMUNOASSAY: HCPCS

## 2020-01-01 PROCEDURE — 84443 ASSAY THYROID STIM HORMONE: CPT

## 2020-01-01 PROCEDURE — 84550 ASSAY OF BLOOD/URIC ACID: CPT

## 2020-01-01 PROCEDURE — 2580000003 HC RX 258: Performed by: SURGERY

## 2020-01-01 PROCEDURE — 80202 ASSAY OF VANCOMYCIN: CPT

## 2020-01-01 PROCEDURE — 2709999900 HC NON-CHARGEABLE SUPPLY: Performed by: SURGERY

## 2020-01-01 PROCEDURE — 3600000012 HC SURGERY LEVEL 2 ADDTL 15MIN: Performed by: SURGERY

## 2020-01-01 PROCEDURE — 0DB68ZX EXCISION OF STOMACH, VIA NATURAL OR ARTIFICIAL OPENING ENDOSCOPIC, DIAGNOSTIC: ICD-10-PCS | Performed by: INTERNAL MEDICINE

## 2020-01-01 PROCEDURE — 80053 COMPREHEN METABOLIC PANEL: CPT

## 2020-01-01 PROCEDURE — 99231 SBSQ HOSP IP/OBS SF/LOW 25: CPT | Performed by: INTERNAL MEDICINE

## 2020-01-01 PROCEDURE — 86850 RBC ANTIBODY SCREEN: CPT

## 2020-01-01 PROCEDURE — 6360000002 HC RX W HCPCS: Performed by: HOSPITALIST

## 2020-01-01 PROCEDURE — 2500000003 HC RX 250 WO HCPCS: Performed by: HOSPITALIST

## 2020-01-01 PROCEDURE — 2580000003 HC RX 258

## 2020-01-01 PROCEDURE — 74018 RADEX ABDOMEN 1 VIEW: CPT

## 2020-01-01 PROCEDURE — 3E04305 INTRODUCTION OF OTHER ANTINEOPLASTIC INTO CENTRAL VEIN, PERCUTANEOUS APPROACH: ICD-10-PCS | Performed by: INTERNAL MEDICINE

## 2020-01-01 PROCEDURE — 0DBP8ZX EXCISION OF RECTUM, VIA NATURAL OR ARTIFICIAL OPENING ENDOSCOPIC, DIAGNOSTIC: ICD-10-PCS | Performed by: INTERNAL MEDICINE

## 2020-01-01 PROCEDURE — 99233 SBSQ HOSP IP/OBS HIGH 50: CPT | Performed by: NURSE PRACTITIONER

## 2020-01-01 PROCEDURE — 88184 FLOWCYTOMETRY/ TC 1 MARKER: CPT

## 2020-01-01 PROCEDURE — 82947 ASSAY GLUCOSE BLOOD QUANT: CPT

## 2020-01-01 PROCEDURE — 87804 INFLUENZA ASSAY W/OPTIC: CPT

## 2020-01-01 PROCEDURE — 51702 INSERT TEMP BLADDER CATH: CPT

## 2020-01-01 PROCEDURE — 84134 ASSAY OF PREALBUMIN: CPT

## 2020-01-01 PROCEDURE — 99232 SBSQ HOSP IP/OBS MODERATE 35: CPT | Performed by: NURSE PRACTITIONER

## 2020-01-01 PROCEDURE — 3700000000 HC ANESTHESIA ATTENDED CARE: Performed by: SURGERY

## 2020-01-01 PROCEDURE — 87641 MR-STAPH DNA AMP PROBE: CPT

## 2020-01-01 PROCEDURE — 82728 ASSAY OF FERRITIN: CPT

## 2020-01-01 PROCEDURE — 88185 FLOWCYTOMETRY/TC ADD-ON: CPT

## 2020-01-01 PROCEDURE — 80299 QUANTITATIVE ASSAY DRUG: CPT

## 2020-01-01 PROCEDURE — C9113 INJ PANTOPRAZOLE SODIUM, VIA: HCPCS | Performed by: HOSPITALIST

## 2020-01-01 PROCEDURE — 3609008300 HC SIGMOIDOSCOPY W/BIOPSY SINGLE/MULTIPLE: Performed by: INTERNAL MEDICINE

## 2020-01-01 PROCEDURE — 87324 CLOSTRIDIUM AG IA: CPT

## 2020-01-01 PROCEDURE — 0D9670Z DRAINAGE OF STOMACH WITH DRAINAGE DEVICE, VIA NATURAL OR ARTIFICIAL OPENING: ICD-10-PCS | Performed by: INTERNAL MEDICINE

## 2020-01-01 PROCEDURE — 87390 HIV-1 AG IA: CPT

## 2020-01-01 PROCEDURE — 6370000000 HC RX 637 (ALT 250 FOR IP)

## 2020-01-01 PROCEDURE — 81001 URINALYSIS AUTO W/SCOPE: CPT

## 2020-01-01 PROCEDURE — 80162 ASSAY OF DIGOXIN TOTAL: CPT

## 2020-01-01 PROCEDURE — 99232 SBSQ HOSP IP/OBS MODERATE 35: CPT | Performed by: SURGERY

## 2020-01-01 PROCEDURE — 6370000000 HC RX 637 (ALT 250 FOR IP): Performed by: FAMILY MEDICINE

## 2020-01-01 PROCEDURE — 85610 PROTHROMBIN TIME: CPT | Performed by: NURSE PRACTITIONER

## 2020-01-01 PROCEDURE — 93005 ELECTROCARDIOGRAM TRACING: CPT | Performed by: STUDENT IN AN ORGANIZED HEALTH CARE EDUCATION/TRAINING PROGRAM

## 2020-01-01 PROCEDURE — 6360000002 HC RX W HCPCS: Performed by: STUDENT IN AN ORGANIZED HEALTH CARE EDUCATION/TRAINING PROGRAM

## 2020-01-01 PROCEDURE — 83540 ASSAY OF IRON: CPT

## 2020-01-01 PROCEDURE — 84100 ASSAY OF PHOSPHORUS: CPT

## 2020-01-01 PROCEDURE — 94640 AIRWAY INHALATION TREATMENT: CPT

## 2020-01-01 PROCEDURE — 2580000003 HC RX 258: Performed by: PHYSICIAN ASSISTANT

## 2020-01-01 PROCEDURE — 0100U HC RESPIRPTHGN MULT REV TRANS & AMP PRB TECH 21 TRGT: CPT

## 2020-01-01 PROCEDURE — 93005 ELECTROCARDIOGRAM TRACING: CPT | Performed by: INTERNAL MEDICINE

## 2020-01-01 PROCEDURE — 86901 BLOOD TYPING SEROLOGIC RH(D): CPT

## 2020-01-01 PROCEDURE — 2500000003 HC RX 250 WO HCPCS: Performed by: INTERNAL MEDICINE

## 2020-01-01 PROCEDURE — 6360000002 HC RX W HCPCS

## 2020-01-01 PROCEDURE — 86701 HIV-1ANTIBODY: CPT

## 2020-01-01 PROCEDURE — APPSS15 APP SPLIT SHARED TIME 0-15 MINUTES: Performed by: NURSE PRACTITIONER

## 2020-01-01 PROCEDURE — 84466 ASSAY OF TRANSFERRIN: CPT

## 2020-01-01 PROCEDURE — 80076 HEPATIC FUNCTION PANEL: CPT

## 2020-01-01 PROCEDURE — 87081 CULTURE SCREEN ONLY: CPT

## 2020-01-01 PROCEDURE — 88341 IMHCHEM/IMCYTCHM EA ADD ANTB: CPT

## 2020-01-01 PROCEDURE — 82533 TOTAL CORTISOL: CPT

## 2020-01-01 PROCEDURE — 84484 ASSAY OF TROPONIN QUANT: CPT

## 2020-01-01 PROCEDURE — 3700000001 HC ADD 15 MINUTES (ANESTHESIA): Performed by: SURGERY

## 2020-01-01 PROCEDURE — 3700000000 HC ANESTHESIA ATTENDED CARE: Performed by: INTERNAL MEDICINE

## 2020-01-01 PROCEDURE — 99232 SBSQ HOSP IP/OBS MODERATE 35: CPT | Performed by: INTERNAL MEDICINE

## 2020-01-01 PROCEDURE — 2580000003 HC RX 258: Performed by: NURSE ANESTHETIST, CERTIFIED REGISTERED

## 2020-01-01 PROCEDURE — 83880 ASSAY OF NATRIURETIC PEPTIDE: CPT

## 2020-01-01 PROCEDURE — 3700000001 HC ADD 15 MINUTES (ANESTHESIA): Performed by: INTERNAL MEDICINE

## 2020-01-01 PROCEDURE — 7100000000 HC PACU RECOVERY - FIRST 15 MIN: Performed by: SURGERY

## 2020-01-01 PROCEDURE — C9113 INJ PANTOPRAZOLE SODIUM, VIA: HCPCS | Performed by: INTERNAL MEDICINE

## 2020-01-01 PROCEDURE — 2500000003 HC RX 250 WO HCPCS: Performed by: ANESTHESIOLOGY

## 2020-01-01 PROCEDURE — 87086 URINE CULTURE/COLONY COUNT: CPT

## 2020-01-01 PROCEDURE — 92610 EVALUATE SWALLOWING FUNCTION: CPT

## 2020-01-01 PROCEDURE — 6360000002 HC RX W HCPCS: Performed by: FAMILY MEDICINE

## 2020-01-01 PROCEDURE — 88305 TISSUE EXAM BY PATHOLOGIST: CPT

## 2020-01-01 PROCEDURE — P9047 ALBUMIN (HUMAN), 25%, 50ML: HCPCS | Performed by: INTERNAL MEDICINE

## 2020-01-01 PROCEDURE — 99214 OFFICE O/P EST MOD 30 MIN: CPT | Performed by: INTERNAL MEDICINE

## 2020-01-01 PROCEDURE — 6360000002 HC RX W HCPCS: Performed by: EMERGENCY MEDICINE

## 2020-01-01 PROCEDURE — 36569 INSJ PICC 5 YR+ W/O IMAGING: CPT

## 2020-01-01 PROCEDURE — 87880 STREP A ASSAY W/OPTIC: CPT | Performed by: INTERNAL MEDICINE

## 2020-01-01 PROCEDURE — 83690 ASSAY OF LIPASE: CPT

## 2020-01-01 PROCEDURE — 7100000001 HC PACU RECOVERY - ADDTL 15 MIN: Performed by: SURGERY

## 2020-01-01 PROCEDURE — 2500000003 HC RX 250 WO HCPCS: Performed by: SURGERY

## 2020-01-01 PROCEDURE — 82232 ASSAY OF BETA-2 PROTEIN: CPT

## 2020-01-01 PROCEDURE — 0DB58ZX EXCISION OF ESOPHAGUS, VIA NATURAL OR ARTIFICIAL OPENING ENDOSCOPIC, DIAGNOSTIC: ICD-10-PCS | Performed by: INTERNAL MEDICINE

## 2020-01-01 PROCEDURE — 82803 BLOOD GASES ANY COMBINATION: CPT

## 2020-01-01 PROCEDURE — 87804 INFLUENZA ASSAY W/OPTIC: CPT | Performed by: NURSE PRACTITIONER

## 2020-01-01 PROCEDURE — 6360000002 HC RX W HCPCS: Performed by: NURSE ANESTHETIST, CERTIFIED REGISTERED

## 2020-01-01 PROCEDURE — 97162 PT EVAL MOD COMPLEX 30 MIN: CPT

## 2020-01-01 PROCEDURE — 6360000004 HC RX CONTRAST MEDICATION

## 2020-01-01 PROCEDURE — 93005 ELECTROCARDIOGRAM TRACING: CPT | Performed by: EMERGENCY MEDICINE

## 2020-01-01 PROCEDURE — 2500000003 HC RX 250 WO HCPCS: Performed by: PHYSICIAN ASSISTANT

## 2020-01-01 PROCEDURE — 99222 1ST HOSP IP/OBS MODERATE 55: CPT | Performed by: SURGERY

## 2020-01-01 PROCEDURE — 74230 X-RAY XM SWLNG FUNCJ C+: CPT

## 2020-01-01 PROCEDURE — 87252 VIRUS INOCULATION TISSUE: CPT

## 2020-01-01 PROCEDURE — 0DB48ZX EXCISION OF ESOPHAGOGASTRIC JUNCTION, VIA NATURAL OR ARTIFICIAL OPENING ENDOSCOPIC, DIAGNOSTIC: ICD-10-PCS | Performed by: INTERNAL MEDICINE

## 2020-01-01 PROCEDURE — 2500000003 HC RX 250 WO HCPCS: Performed by: NURSE ANESTHETIST, CERTIFIED REGISTERED

## 2020-01-01 PROCEDURE — 87205 SMEAR GRAM STAIN: CPT

## 2020-01-01 PROCEDURE — 87070 CULTURE OTHR SPECIMN AEROBIC: CPT

## 2020-01-01 PROCEDURE — C9113 INJ PANTOPRAZOLE SODIUM, VIA: HCPCS | Performed by: SURGERY

## 2020-01-01 PROCEDURE — 99231 SBSQ HOSP IP/OBS SF/LOW 25: CPT | Performed by: NURSE PRACTITIONER

## 2020-01-01 PROCEDURE — 71250 CT THORAX DX C-: CPT

## 2020-01-01 PROCEDURE — 85045 AUTOMATED RETICULOCYTE COUNT: CPT

## 2020-01-01 PROCEDURE — 86702 HIV-2 ANTIBODY: CPT

## 2020-01-01 PROCEDURE — 82607 VITAMIN B-12: CPT

## 2020-01-01 PROCEDURE — 2500000003 HC RX 250 WO HCPCS

## 2020-01-01 PROCEDURE — C1751 CATH, INF, PER/CENT/MIDLINE: HCPCS

## 2020-01-01 PROCEDURE — 87103 BLOOD FUNGUS CULTURE: CPT

## 2020-01-01 PROCEDURE — 92611 MOTION FLUOROSCOPY/SWALLOW: CPT

## 2020-01-01 PROCEDURE — APPSS45 APP SPLIT SHARED TIME 31-45 MINUTES: Performed by: NURSE PRACTITIONER

## 2020-01-01 PROCEDURE — 97168 OT RE-EVAL EST PLAN CARE: CPT

## 2020-01-01 PROCEDURE — 99285 EMERGENCY DEPT VISIT HI MDM: CPT

## 2020-01-01 PROCEDURE — 99213 OFFICE O/P EST LOW 20 MIN: CPT | Performed by: NURSE PRACTITIONER

## 2020-01-01 PROCEDURE — 97140 MANUAL THERAPY 1/> REGIONS: CPT

## 2020-01-01 PROCEDURE — 96413 CHEMO IV INFUSION 1 HR: CPT

## 2020-01-01 PROCEDURE — 82040 ASSAY OF SERUM ALBUMIN: CPT

## 2020-01-01 PROCEDURE — 2500000003 HC RX 250 WO HCPCS: Performed by: STUDENT IN AN ORGANIZED HEALTH CARE EDUCATION/TRAINING PROGRAM

## 2020-01-01 PROCEDURE — 2580000003 HC RX 258: Performed by: EMERGENCY MEDICINE

## 2020-01-01 PROCEDURE — 96368 THER/DIAG CONCURRENT INF: CPT

## 2020-01-01 PROCEDURE — 70450 CT HEAD/BRAIN W/O DYE: CPT

## 2020-01-01 PROCEDURE — 96365 THER/PROPH/DIAG IV INF INIT: CPT

## 2020-01-01 PROCEDURE — 07B60ZZ EXCISION OF LEFT AXILLARY LYMPHATIC, OPEN APPROACH: ICD-10-PCS | Performed by: SURGERY

## 2020-01-01 PROCEDURE — 6360000002 HC RX W HCPCS: Performed by: PHYSICIAN ASSISTANT

## 2020-01-01 PROCEDURE — 02HV33Z INSERTION OF INFUSION DEVICE INTO SUPERIOR VENA CAVA, PERCUTANEOUS APPROACH: ICD-10-PCS | Performed by: INTERNAL MEDICINE

## 2020-01-01 PROCEDURE — P9040 RBC LEUKOREDUCED IRRADIATED: HCPCS

## 2020-01-01 PROCEDURE — 86308 HETEROPHILE ANTIBODY SCREEN: CPT

## 2020-01-01 PROCEDURE — 87150 DNA/RNA AMPLIFIED PROBE: CPT

## 2020-01-01 PROCEDURE — 97164 PT RE-EVAL EST PLAN CARE: CPT

## 2020-01-01 RX ORDER — SODIUM CHLORIDE 9 MG/ML
INJECTION, SOLUTION INTRAVENOUS
Status: COMPLETED
Start: 2020-01-01 | End: 2020-01-01

## 2020-01-01 RX ORDER — CIPROFLOXACIN 500 MG/1
500 TABLET, FILM COATED ORAL EVERY 12 HOURS SCHEDULED
Status: DISCONTINUED | OUTPATIENT
Start: 2020-01-01 | End: 2020-01-01

## 2020-01-01 RX ORDER — DEXTROSE MONOHYDRATE 50 MG/ML
100 INJECTION, SOLUTION INTRAVENOUS PRN
Status: DISCONTINUED | OUTPATIENT
Start: 2020-01-01 | End: 2020-01-01 | Stop reason: SDUPTHER

## 2020-01-01 RX ORDER — PANTOPRAZOLE SODIUM 40 MG/10ML
40 INJECTION, POWDER, LYOPHILIZED, FOR SOLUTION INTRAVENOUS 2 TIMES DAILY
Status: DISCONTINUED | OUTPATIENT
Start: 2020-01-01 | End: 2020-01-01

## 2020-01-01 RX ORDER — 0.9 % SODIUM CHLORIDE 0.9 %
20 INTRAVENOUS SOLUTION INTRAVENOUS ONCE
Status: COMPLETED | OUTPATIENT
Start: 2020-01-01 | End: 2020-01-01

## 2020-01-01 RX ORDER — PROPOFOL 10 MG/ML
INJECTION, EMULSION INTRAVENOUS PRN
Status: DISCONTINUED | OUTPATIENT
Start: 2020-01-01 | End: 2020-01-01 | Stop reason: SDUPTHER

## 2020-01-01 RX ORDER — BUPIVACAINE HYDROCHLORIDE AND EPINEPHRINE 2.5; 5 MG/ML; UG/ML
INJECTION, SOLUTION INFILTRATION; PERINEURAL
Status: COMPLETED | OUTPATIENT
Start: 2020-01-01 | End: 2020-01-01

## 2020-01-01 RX ORDER — LACTOBACILLUS RHAMNOSUS GG 10B CELL
1 CAPSULE ORAL
Status: DISCONTINUED | OUTPATIENT
Start: 2020-01-01 | End: 2020-01-01

## 2020-01-01 RX ORDER — DIGOXIN 0.25 MG/ML
250 INJECTION INTRAMUSCULAR; INTRAVENOUS EVERY 8 HOURS
Status: COMPLETED | OUTPATIENT
Start: 2020-01-01 | End: 2020-01-01

## 2020-01-01 RX ORDER — INSULIN LISPRO 100 [IU]/ML
0-3 INJECTION, SOLUTION INTRAVENOUS; SUBCUTANEOUS NIGHTLY
Status: DISCONTINUED | OUTPATIENT
Start: 2020-01-01 | End: 2020-01-01

## 2020-01-01 RX ORDER — FUROSEMIDE 10 MG/ML
40 INJECTION INTRAMUSCULAR; INTRAVENOUS ONCE
Status: COMPLETED | OUTPATIENT
Start: 2020-01-01 | End: 2020-01-01

## 2020-01-01 RX ORDER — DEXTROSE MONOHYDRATE 50 MG/ML
INJECTION, SOLUTION INTRAVENOUS
Status: DISPENSED
Start: 2020-01-01 | End: 2020-01-01

## 2020-01-01 RX ORDER — LIDOCAINE 4 G/G
1 PATCH TOPICAL DAILY PRN
Status: DISCONTINUED | OUTPATIENT
Start: 2020-01-01 | End: 2020-01-01 | Stop reason: HOSPADM

## 2020-01-01 RX ORDER — VALACYCLOVIR HYDROCHLORIDE 1 G/1
1000 TABLET, FILM COATED ORAL 2 TIMES DAILY
Qty: 6 TABLET | Refills: 0 | Status: SHIPPED | OUTPATIENT
Start: 2020-01-01 | End: 2020-01-01

## 2020-01-01 RX ORDER — AMIODARONE HYDROCHLORIDE 200 MG/1
400 TABLET ORAL 2 TIMES DAILY
Status: DISCONTINUED | OUTPATIENT
Start: 2020-01-01 | End: 2020-01-01 | Stop reason: HOSPADM

## 2020-01-01 RX ORDER — ALBUMIN (HUMAN) 12.5 G/50ML
25 SOLUTION INTRAVENOUS EVERY 12 HOURS
Status: COMPLETED | OUTPATIENT
Start: 2020-01-01 | End: 2020-01-01

## 2020-01-01 RX ORDER — ATORVASTATIN CALCIUM 10 MG/1
10 TABLET, FILM COATED ORAL DAILY
Status: DISCONTINUED | OUTPATIENT
Start: 2020-01-01 | End: 2020-01-01

## 2020-01-01 RX ORDER — ALLOPURINOL 100 MG/1
200 TABLET ORAL DAILY
Status: DISCONTINUED | OUTPATIENT
Start: 2020-01-01 | End: 2020-01-01

## 2020-01-01 RX ORDER — LIDOCAINE 4 G/G
1 PATCH TOPICAL DAILY PRN
COMMUNITY
Start: 2020-01-01

## 2020-01-01 RX ORDER — 0.9 % SODIUM CHLORIDE 0.9 %
1000 INTRAVENOUS SOLUTION INTRAVENOUS ONCE
Status: COMPLETED | OUTPATIENT
Start: 2020-01-01 | End: 2020-01-01

## 2020-01-01 RX ORDER — ONDANSETRON 2 MG/ML
8 INJECTION INTRAMUSCULAR; INTRAVENOUS EVERY 6 HOURS PRN
Status: CANCELLED | OUTPATIENT
Start: 2020-01-01

## 2020-01-01 RX ORDER — LORAZEPAM 2 MG/ML
1 CONCENTRATE ORAL EVERY 4 HOURS PRN
Qty: 30 ML | Refills: 0 | Status: SHIPPED | OUTPATIENT
Start: 2020-01-01 | End: 2020-03-17

## 2020-01-01 RX ORDER — DIGOXIN 125 MCG
125 TABLET ORAL DAILY
Status: DISCONTINUED | OUTPATIENT
Start: 2020-01-01 | End: 2020-01-01

## 2020-01-01 RX ORDER — MIDAZOLAM HYDROCHLORIDE 1 MG/ML
4 INJECTION INTRAMUSCULAR; INTRAVENOUS ONCE
Status: COMPLETED | OUTPATIENT
Start: 2020-01-01 | End: 2020-01-01

## 2020-01-01 RX ORDER — PREDNISONE 20 MG/1
20 TABLET ORAL DAILY
Status: COMPLETED | OUTPATIENT
Start: 2020-01-01 | End: 2020-01-01

## 2020-01-01 RX ORDER — SODIUM CHLORIDE 0.9 % (FLUSH) 0.9 %
10 SYRINGE (ML) INJECTION PRN
Status: CANCELLED | OUTPATIENT
Start: 2020-01-01

## 2020-01-01 RX ORDER — MORPHINE SULFATE 2 MG/ML
1 INJECTION, SOLUTION INTRAMUSCULAR; INTRAVENOUS EVERY 4 HOURS PRN
Status: DISCONTINUED | OUTPATIENT
Start: 2020-01-01 | End: 2020-01-01

## 2020-01-01 RX ORDER — DOBUTAMINE HYDROCHLORIDE 200 MG/100ML
2.5 INJECTION INTRAVENOUS CONTINUOUS
Status: DISCONTINUED | OUTPATIENT
Start: 2020-01-01 | End: 2020-01-01

## 2020-01-01 RX ORDER — FUROSEMIDE 10 MG/ML
20 INJECTION INTRAMUSCULAR; INTRAVENOUS ONCE
Status: COMPLETED | OUTPATIENT
Start: 2020-01-01 | End: 2020-01-01

## 2020-01-01 RX ORDER — UREA 10 %
3 LOTION (ML) TOPICAL NIGHTLY
Qty: 30 TABLET | COMMUNITY
Start: 2020-01-01

## 2020-01-01 RX ORDER — SODIUM CHLORIDE 0.9 % (FLUSH) 0.9 %
10 SYRINGE (ML) INJECTION PRN
Status: DISCONTINUED | OUTPATIENT
Start: 2020-01-01 | End: 2020-01-01 | Stop reason: HOSPADM

## 2020-01-01 RX ORDER — AMIODARONE HYDROCHLORIDE 200 MG/1
200 TABLET ORAL 2 TIMES DAILY
Status: DISCONTINUED | OUTPATIENT
Start: 2020-01-01 | End: 2020-01-01

## 2020-01-01 RX ORDER — POLYETHYLENE GLYCOL 3350 17 G/17G
17 POWDER, FOR SOLUTION ORAL 2 TIMES DAILY
Status: DISCONTINUED | OUTPATIENT
Start: 2020-01-01 | End: 2020-01-01

## 2020-01-01 RX ORDER — SODIUM CHLORIDE 0.9 % (FLUSH) 0.9 %
10 SYRINGE (ML) INJECTION EVERY 12 HOURS SCHEDULED
Status: DISCONTINUED | OUTPATIENT
Start: 2020-01-01 | End: 2020-01-01 | Stop reason: SDUPTHER

## 2020-01-01 RX ORDER — METOPROLOL TARTRATE 5 MG/5ML
INJECTION INTRAVENOUS
Status: COMPLETED
Start: 2020-01-01 | End: 2020-01-01

## 2020-01-01 RX ORDER — SODIUM CHLORIDE 0.9 % (FLUSH) 0.9 %
10 SYRINGE (ML) INJECTION EVERY 12 HOURS SCHEDULED
Status: DISCONTINUED | OUTPATIENT
Start: 2020-01-01 | End: 2020-01-01

## 2020-01-01 RX ORDER — MEXILETINE HYDROCHLORIDE 200 MG/1
200 CAPSULE ORAL EVERY 8 HOURS SCHEDULED
Status: DISCONTINUED | OUTPATIENT
Start: 2020-01-01 | End: 2020-01-01 | Stop reason: HOSPADM

## 2020-01-01 RX ORDER — MAGNESIUM HYDROXIDE 1200 MG/15ML
LIQUID ORAL CONTINUOUS PRN
Status: COMPLETED | OUTPATIENT
Start: 2020-01-01 | End: 2020-01-01

## 2020-01-01 RX ORDER — EPHEDRINE SULFATE/0.9% NACL/PF 50 MG/5 ML
SYRINGE (ML) INTRAVENOUS PRN
Status: DISCONTINUED | OUTPATIENT
Start: 2020-01-01 | End: 2020-01-01 | Stop reason: SDUPTHER

## 2020-01-01 RX ORDER — POTASSIUM CHLORIDE 20 MEQ/1
40 TABLET, EXTENDED RELEASE ORAL ONCE
Status: COMPLETED | OUTPATIENT
Start: 2020-01-01 | End: 2020-01-01

## 2020-01-01 RX ORDER — HYDROMORPHONE HCL 110MG/55ML
0.5 PATIENT CONTROLLED ANALGESIA SYRINGE INTRAVENOUS EVERY 5 MIN PRN
Status: DISCONTINUED | OUTPATIENT
Start: 2020-01-01 | End: 2020-01-01 | Stop reason: HOSPADM

## 2020-01-01 RX ORDER — AMIODARONE HYDROCHLORIDE 200 MG/1
400 TABLET ORAL DAILY
Status: DISCONTINUED | OUTPATIENT
Start: 2020-01-01 | End: 2020-01-01

## 2020-01-01 RX ORDER — SODIUM CHLORIDE 9 MG/ML
INJECTION, SOLUTION INTRAVENOUS CONTINUOUS
Status: DISCONTINUED | OUTPATIENT
Start: 2020-01-01 | End: 2020-01-01

## 2020-01-01 RX ORDER — HYDROCHLOROTHIAZIDE 25 MG/1
1 TABLET ORAL DAILY
Status: DISCONTINUED | OUTPATIENT
Start: 2020-01-01 | End: 2020-01-01

## 2020-01-01 RX ORDER — MIDODRINE HYDROCHLORIDE 5 MG/1
10 TABLET ORAL
Status: DISCONTINUED | OUTPATIENT
Start: 2020-01-01 | End: 2020-01-01 | Stop reason: HOSPADM

## 2020-01-01 RX ORDER — SODIUM CHLORIDE 0.9 % (FLUSH) 0.9 %
10 SYRINGE (ML) INJECTION EVERY 12 HOURS SCHEDULED
Status: DISCONTINUED | OUTPATIENT
Start: 2020-01-01 | End: 2020-01-01 | Stop reason: HOSPADM

## 2020-01-01 RX ORDER — PROCHLORPERAZINE MALEATE 10 MG
10 TABLET ORAL EVERY 6 HOURS PRN
Qty: 30 TABLET | Refills: 3 | Status: SHIPPED | OUTPATIENT
Start: 2020-01-01

## 2020-01-01 RX ORDER — AMIODARONE HYDROCHLORIDE 400 MG/1
400 TABLET ORAL DAILY
Qty: 30 TABLET | Refills: 3 | Status: ON HOLD | OUTPATIENT
Start: 2020-01-01 | End: 2020-01-01 | Stop reason: HOSPADM

## 2020-01-01 RX ORDER — ONDANSETRON 2 MG/ML
4 INJECTION INTRAMUSCULAR; INTRAVENOUS EVERY 6 HOURS PRN
Status: DISCONTINUED | OUTPATIENT
Start: 2020-01-01 | End: 2020-01-01 | Stop reason: HOSPADM

## 2020-01-01 RX ORDER — AMIODARONE HYDROCHLORIDE 200 MG/1
200 TABLET ORAL DAILY
Status: DISCONTINUED | OUTPATIENT
Start: 2020-01-01 | End: 2020-01-01

## 2020-01-01 RX ORDER — DIGOXIN 0.25 MG/ML
250 INJECTION INTRAMUSCULAR; INTRAVENOUS DAILY
Status: DISCONTINUED | OUTPATIENT
Start: 2020-01-01 | End: 2020-01-01 | Stop reason: CLARIF

## 2020-01-01 RX ORDER — DIGOXIN 0.25 MG/ML
250 INJECTION INTRAMUSCULAR; INTRAVENOUS ONCE
Status: DISCONTINUED | OUTPATIENT
Start: 2020-01-01 | End: 2020-01-01 | Stop reason: ALTCHOICE

## 2020-01-01 RX ORDER — PHYTONADIONE 5 MG/1
2.5 TABLET ORAL ONCE
Status: COMPLETED | OUTPATIENT
Start: 2020-01-01 | End: 2020-01-01

## 2020-01-01 RX ORDER — LEVOFLOXACIN 5 MG/ML
750 INJECTION, SOLUTION INTRAVENOUS ONCE
Status: COMPLETED | OUTPATIENT
Start: 2020-01-01 | End: 2020-01-01

## 2020-01-01 RX ORDER — WARFARIN SODIUM 2.5 MG/1
2.5 TABLET ORAL DAILY
Status: DISCONTINUED | OUTPATIENT
Start: 2020-01-01 | End: 2020-01-01

## 2020-01-01 RX ORDER — DEXTROSE MONOHYDRATE 50 MG/ML
100 INJECTION, SOLUTION INTRAVENOUS PRN
Status: DISCONTINUED | OUTPATIENT
Start: 2020-01-01 | End: 2020-01-01 | Stop reason: HOSPADM

## 2020-01-01 RX ORDER — NICOTINE POLACRILEX 4 MG
15 LOZENGE BUCCAL PRN
Status: DISCONTINUED | OUTPATIENT
Start: 2020-01-01 | End: 2020-01-01 | Stop reason: SDUPTHER

## 2020-01-01 RX ORDER — AMIODARONE HYDROCHLORIDE 200 MG/1
TABLET ORAL
Status: COMPLETED
Start: 2020-01-01 | End: 2020-01-01

## 2020-01-01 RX ORDER — MIDODRINE HYDROCHLORIDE 5 MG/1
10 TABLET ORAL
Status: DISCONTINUED | OUTPATIENT
Start: 2020-01-01 | End: 2020-01-01

## 2020-01-01 RX ORDER — PREDNISONE 1 MG/1
5 TABLET ORAL DAILY
Status: DISCONTINUED | OUTPATIENT
Start: 2020-01-01 | End: 2020-01-01

## 2020-01-01 RX ORDER — VALACYCLOVIR HYDROCHLORIDE 1 G/1
1000 TABLET, FILM COATED ORAL 2 TIMES DAILY
Status: DISCONTINUED | OUTPATIENT
Start: 2020-01-01 | End: 2020-01-01 | Stop reason: HOSPADM

## 2020-01-01 RX ORDER — FUROSEMIDE 20 MG/1
20 TABLET ORAL EVERY OTHER DAY
Qty: 60 TABLET | Refills: 3 | Status: ON HOLD | OUTPATIENT
Start: 2020-01-01 | End: 2020-01-01 | Stop reason: HOSPADM

## 2020-01-01 RX ORDER — SPIRONOLACTONE 25 MG/1
12.5 TABLET ORAL DAILY
Status: DISCONTINUED | OUTPATIENT
Start: 2020-01-01 | End: 2020-01-01

## 2020-01-01 RX ORDER — MORPHINE SULFATE 100 MG/5ML
10 SOLUTION ORAL
Qty: 30 ML | Refills: 0 | Status: SHIPPED | OUTPATIENT
Start: 2020-01-01 | End: 2020-03-17

## 2020-01-01 RX ORDER — MIDODRINE HYDROCHLORIDE 5 MG/1
5 TABLET ORAL
Status: DISCONTINUED | OUTPATIENT
Start: 2020-01-01 | End: 2020-01-01

## 2020-01-01 RX ORDER — DIGOXIN 0.25 MG/ML
125 INJECTION INTRAMUSCULAR; INTRAVENOUS ONCE
Status: COMPLETED | OUTPATIENT
Start: 2020-01-01 | End: 2020-01-01

## 2020-01-01 RX ORDER — SODIUM CHLORIDE 9 MG/ML
INJECTION, SOLUTION INTRAVENOUS CONTINUOUS PRN
Status: DISCONTINUED | OUTPATIENT
Start: 2020-01-01 | End: 2020-01-01 | Stop reason: SDUPTHER

## 2020-01-01 RX ORDER — POTASSIUM CHLORIDE 29.8 MG/ML
20 INJECTION INTRAVENOUS
Status: COMPLETED | OUTPATIENT
Start: 2020-01-01 | End: 2020-01-01

## 2020-01-01 RX ORDER — POLYETHYLENE GLYCOL 3350 17 G/17G
17 POWDER, FOR SOLUTION ORAL DAILY PRN
Status: DISCONTINUED | OUTPATIENT
Start: 2020-01-01 | End: 2020-01-01

## 2020-01-01 RX ORDER — PANTOPRAZOLE SODIUM 40 MG/1
40 TABLET, DELAYED RELEASE ORAL
Status: DISCONTINUED | OUTPATIENT
Start: 2020-01-01 | End: 2020-01-01 | Stop reason: HOSPADM

## 2020-01-01 RX ORDER — MIDODRINE HYDROCHLORIDE 5 MG/1
5 TABLET ORAL 3 TIMES DAILY PRN
Status: DISCONTINUED | OUTPATIENT
Start: 2020-01-01 | End: 2020-01-01 | Stop reason: HOSPADM

## 2020-01-01 RX ORDER — DOBUTAMINE HYDROCHLORIDE 200 MG/100ML
5 INJECTION INTRAVENOUS CONTINUOUS
Status: DISCONTINUED | OUTPATIENT
Start: 2020-01-01 | End: 2020-01-01

## 2020-01-01 RX ORDER — ONDANSETRON 2 MG/ML
4 INJECTION INTRAMUSCULAR; INTRAVENOUS ONCE
Status: DISCONTINUED | OUTPATIENT
Start: 2020-01-01 | End: 2020-01-01

## 2020-01-01 RX ORDER — ACETAMINOPHEN 650 MG/1
650 SUPPOSITORY RECTAL EVERY 6 HOURS PRN
Status: DISCONTINUED | OUTPATIENT
Start: 2020-01-01 | End: 2020-01-01 | Stop reason: HOSPADM

## 2020-01-01 RX ORDER — HEPARIN SODIUM 1000 [USP'U]/ML
40 INJECTION, SOLUTION INTRAVENOUS; SUBCUTANEOUS PRN
Status: DISCONTINUED | OUTPATIENT
Start: 2020-01-01 | End: 2020-01-01

## 2020-01-01 RX ORDER — VALACYCLOVIR HYDROCHLORIDE 500 MG/1
500 TABLET, FILM COATED ORAL 2 TIMES DAILY
Status: DISCONTINUED | OUTPATIENT
Start: 2020-01-01 | End: 2020-01-01

## 2020-01-01 RX ORDER — DIGOXIN 0.25 MG/ML
INJECTION INTRAMUSCULAR; INTRAVENOUS
Status: COMPLETED
Start: 2020-01-01 | End: 2020-01-01

## 2020-01-01 RX ORDER — WARFARIN SODIUM 1 MG/1
0.5 TABLET ORAL DAILY
Status: DISCONTINUED | OUTPATIENT
Start: 2020-01-01 | End: 2020-01-01

## 2020-01-01 RX ORDER — POLYETHYLENE GLYCOL 3350 17 G/17G
17 POWDER, FOR SOLUTION ORAL 2 TIMES DAILY
Status: DISCONTINUED | OUTPATIENT
Start: 2020-01-01 | End: 2020-01-01 | Stop reason: HOSPADM

## 2020-01-01 RX ORDER — HYDRALAZINE HYDROCHLORIDE 10 MG/1
10 TABLET, FILM COATED ORAL 4 TIMES DAILY
Status: DISCONTINUED | OUTPATIENT
Start: 2020-01-01 | End: 2020-01-01

## 2020-01-01 RX ORDER — MIDODRINE HYDROCHLORIDE 5 MG/1
2.5 TABLET ORAL DAILY
Status: DISCONTINUED | OUTPATIENT
Start: 2020-01-01 | End: 2020-01-01

## 2020-01-01 RX ORDER — ATORVASTATIN CALCIUM 10 MG/1
10 TABLET, FILM COATED ORAL DAILY
Status: DISCONTINUED | OUTPATIENT
Start: 2020-01-01 | End: 2020-01-01 | Stop reason: HOSPADM

## 2020-01-01 RX ORDER — MORPHINE SULFATE 2 MG/ML
4 INJECTION, SOLUTION INTRAMUSCULAR; INTRAVENOUS
Status: CANCELLED | OUTPATIENT
Start: 2020-01-01

## 2020-01-01 RX ORDER — FLUCONAZOLE 200 MG/1
200 TABLET ORAL DAILY
Status: DISCONTINUED | OUTPATIENT
Start: 2020-01-01 | End: 2020-01-01 | Stop reason: HOSPADM

## 2020-01-01 RX ORDER — INSULIN LISPRO 100 [IU]/ML
0-6 INJECTION, SOLUTION INTRAVENOUS; SUBCUTANEOUS
Status: DISCONTINUED | OUTPATIENT
Start: 2020-01-01 | End: 2020-01-01

## 2020-01-01 RX ORDER — DEXTROSE, SODIUM CHLORIDE, AND POTASSIUM CHLORIDE 5; .45; .15 G/100ML; G/100ML; G/100ML
INJECTION INTRAVENOUS
Status: DISPENSED
Start: 2020-01-01 | End: 2020-01-01

## 2020-01-01 RX ORDER — ONDANSETRON 2 MG/ML
INJECTION INTRAMUSCULAR; INTRAVENOUS PRN
Status: DISCONTINUED | OUTPATIENT
Start: 2020-01-01 | End: 2020-01-01 | Stop reason: SDUPTHER

## 2020-01-01 RX ORDER — POLYETHYLENE GLYCOL 3350 17 G/17G
17 POWDER, FOR SOLUTION ORAL DAILY PRN
Status: DISCONTINUED | OUTPATIENT
Start: 2020-01-01 | End: 2020-01-01 | Stop reason: HOSPADM

## 2020-01-01 RX ORDER — 0.9 % SODIUM CHLORIDE 0.9 %
20 INTRAVENOUS SOLUTION INTRAVENOUS ONCE
Status: DISCONTINUED | OUTPATIENT
Start: 2020-01-01 | End: 2020-01-01 | Stop reason: HOSPADM

## 2020-01-01 RX ORDER — HEPARIN SODIUM 10000 [USP'U]/100ML
18 INJECTION, SOLUTION INTRAVENOUS CONTINUOUS
Status: DISCONTINUED | OUTPATIENT
Start: 2020-01-01 | End: 2020-01-01

## 2020-01-01 RX ORDER — METHYLPREDNISOLONE SODIUM SUCCINATE 125 MG/2ML
60 INJECTION, POWDER, LYOPHILIZED, FOR SOLUTION INTRAMUSCULAR; INTRAVENOUS ONCE
Status: COMPLETED | OUTPATIENT
Start: 2020-01-01 | End: 2020-01-01

## 2020-01-01 RX ORDER — ACETAMINOPHEN 325 MG/1
650 TABLET ORAL EVERY 4 HOURS PRN
Status: DISCONTINUED | OUTPATIENT
Start: 2020-01-01 | End: 2020-01-01 | Stop reason: HOSPADM

## 2020-01-01 RX ORDER — BACITRACIN, NEOMYCIN, POLYMYXIN B 400; 3.5; 5 [USP'U]/G; MG/G; [USP'U]/G
OINTMENT TOPICAL
Status: ON HOLD | COMMUNITY
Start: 2020-01-01 | End: 2020-01-01 | Stop reason: HOSPADM

## 2020-01-01 RX ORDER — MAGNESIUM SULFATE IN WATER 40 MG/ML
2 INJECTION, SOLUTION INTRAVENOUS ONCE
Status: COMPLETED | OUTPATIENT
Start: 2020-01-01 | End: 2020-01-01

## 2020-01-01 RX ORDER — CIPROFLOXACIN 2 MG/ML
400 INJECTION, SOLUTION INTRAVENOUS EVERY 12 HOURS
Status: DISCONTINUED | OUTPATIENT
Start: 2020-01-01 | End: 2020-01-01

## 2020-01-01 RX ORDER — DIGOXIN 0.25 MG/ML
125 INJECTION INTRAMUSCULAR; INTRAVENOUS DAILY
Status: DISCONTINUED | OUTPATIENT
Start: 2020-01-01 | End: 2020-01-01 | Stop reason: SDUPTHER

## 2020-01-01 RX ORDER — 0.9 % SODIUM CHLORIDE 0.9 %
2000 INTRAVENOUS SOLUTION INTRAVENOUS ONCE
Status: COMPLETED | OUTPATIENT
Start: 2020-01-01 | End: 2020-01-01

## 2020-01-01 RX ORDER — FLUCONAZOLE 200 MG/1
200 TABLET ORAL DAILY
Qty: 7 TABLET | Refills: 0 | Status: SHIPPED | OUTPATIENT
Start: 2020-01-01 | End: 2020-03-17

## 2020-01-01 RX ORDER — DIGOXIN 0.25 MG/ML
INJECTION INTRAMUSCULAR; INTRAVENOUS
Status: DISPENSED
Start: 2020-01-01 | End: 2020-01-01

## 2020-01-01 RX ORDER — 0.9 % SODIUM CHLORIDE 0.9 %
500 INTRAVENOUS SOLUTION INTRAVENOUS ONCE
Status: COMPLETED | OUTPATIENT
Start: 2020-01-01 | End: 2020-01-01

## 2020-01-01 RX ORDER — GREEN TEA/HOODIA GORDONII 315-12.5MG
1 CAPSULE ORAL 2 TIMES DAILY
Qty: 42 TABLET | Refills: 0 | Status: ON HOLD | OUTPATIENT
Start: 2020-01-01 | End: 2020-01-01 | Stop reason: HOSPADM

## 2020-01-01 RX ORDER — DIGOXIN 125 MCG
125 TABLET ORAL DAILY
Qty: 30 TABLET | Refills: 3 | Status: ON HOLD | OUTPATIENT
Start: 2020-01-01 | End: 2020-01-01 | Stop reason: HOSPADM

## 2020-01-01 RX ORDER — CASTOR OIL AND BALSAM, PERU 788; 87 MG/G; MG/G
OINTMENT TOPICAL 2 TIMES DAILY
Qty: 1 TUBE | Refills: 0
Start: 2020-01-01

## 2020-01-01 RX ORDER — BACITRACIN, NEOMYCIN, POLYMYXIN B 400; 3.5; 5 [USP'U]/G; MG/G; [USP'U]/G
OINTMENT TOPICAL 2 TIMES DAILY PRN
Status: DISCONTINUED | OUTPATIENT
Start: 2020-01-01 | End: 2020-01-01 | Stop reason: HOSPADM

## 2020-01-01 RX ORDER — MIDAZOLAM HYDROCHLORIDE 1 MG/ML
INJECTION INTRAMUSCULAR; INTRAVENOUS
Status: DISPENSED
Start: 2020-01-01 | End: 2020-01-01

## 2020-01-01 RX ORDER — 0.9 % SODIUM CHLORIDE 0.9 %
250 INTRAVENOUS SOLUTION INTRAVENOUS ONCE
Status: DISCONTINUED | OUTPATIENT
Start: 2020-01-01 | End: 2020-01-01

## 2020-01-01 RX ORDER — MORPHINE SULFATE 2 MG/ML
2 INJECTION, SOLUTION INTRAMUSCULAR; INTRAVENOUS
Status: CANCELLED | OUTPATIENT
Start: 2020-01-01

## 2020-01-01 RX ORDER — DIPHENHYDRAMINE HYDROCHLORIDE 50 MG/ML
INJECTION INTRAMUSCULAR; INTRAVENOUS
Status: COMPLETED
Start: 2020-01-01 | End: 2020-01-01

## 2020-01-01 RX ORDER — DIPHENHYDRAMINE HYDROCHLORIDE 50 MG/ML
25 INJECTION INTRAMUSCULAR; INTRAVENOUS ONCE
Status: COMPLETED | OUTPATIENT
Start: 2020-01-01 | End: 2020-01-01

## 2020-01-01 RX ORDER — NOREPINEPHRINE BITARTRATE 1 MG/ML
INJECTION, SOLUTION INTRAVENOUS
Status: DISPENSED
Start: 2020-01-01 | End: 2020-01-01

## 2020-01-01 RX ORDER — SODIUM CHLORIDE 9 MG/ML
INJECTION, SOLUTION INTRAVENOUS CONTINUOUS
Status: CANCELLED | OUTPATIENT
Start: 2020-01-01

## 2020-01-01 RX ORDER — DOCOSANOL 100 MG/G
CREAM TOPICAL
Status: DISCONTINUED | OUTPATIENT
Start: 2020-01-01 | End: 2020-01-01 | Stop reason: HOSPADM

## 2020-01-01 RX ORDER — PREDNISONE 10 MG/1
10 TABLET ORAL DAILY
Status: DISCONTINUED | OUTPATIENT
Start: 2020-01-01 | End: 2020-01-01 | Stop reason: HOSPADM

## 2020-01-01 RX ORDER — POTASSIUM CHLORIDE 29.8 MG/ML
40 INJECTION INTRAVENOUS PRN
Status: DISCONTINUED | OUTPATIENT
Start: 2020-01-01 | End: 2020-01-01 | Stop reason: HOSPADM

## 2020-01-01 RX ORDER — 0.9 % SODIUM CHLORIDE 0.9 %
500 INTRAVENOUS SOLUTION INTRAVENOUS ONCE
Status: DISCONTINUED | OUTPATIENT
Start: 2020-01-01 | End: 2020-01-01 | Stop reason: HOSPADM

## 2020-01-01 RX ORDER — DIPHENHYDRAMINE HYDROCHLORIDE 50 MG/ML
25 INJECTION INTRAMUSCULAR; INTRAVENOUS EVERY 6 HOURS PRN
COMMUNITY
Start: 2020-01-01

## 2020-01-01 RX ORDER — INSULIN LISPRO 100 [IU]/ML
5 INJECTION, SOLUTION INTRAVENOUS; SUBCUTANEOUS
Status: DISCONTINUED | OUTPATIENT
Start: 2020-01-01 | End: 2020-01-01 | Stop reason: HOSPADM

## 2020-01-01 RX ORDER — DOBUTAMINE HYDROCHLORIDE 200 MG/100ML
1 INJECTION INTRAVENOUS CONTINUOUS
Status: DISCONTINUED | OUTPATIENT
Start: 2020-01-01 | End: 2020-01-01

## 2020-01-01 RX ORDER — SODIUM CHLORIDE 0.9 % (FLUSH) 0.9 %
10 SYRINGE (ML) INJECTION PRN
Status: DISCONTINUED | OUTPATIENT
Start: 2020-01-01 | End: 2020-01-01 | Stop reason: SDUPTHER

## 2020-01-01 RX ORDER — FUROSEMIDE 20 MG/1
20 TABLET ORAL EVERY OTHER DAY
Status: DISCONTINUED | OUTPATIENT
Start: 2020-01-01 | End: 2020-01-01 | Stop reason: HOSPADM

## 2020-01-01 RX ORDER — FENTANYL CITRATE 50 UG/ML
50 INJECTION, SOLUTION INTRAMUSCULAR; INTRAVENOUS EVERY 5 MIN PRN
Status: DISCONTINUED | OUTPATIENT
Start: 2020-01-01 | End: 2020-01-01 | Stop reason: HOSPADM

## 2020-01-01 RX ORDER — SODIUM CHLORIDE 9 MG/ML
INJECTION, SOLUTION INTRAVENOUS
Status: DISPENSED
Start: 2020-01-01 | End: 2020-01-01

## 2020-01-01 RX ORDER — DIPHENHYDRAMINE HCL 25 MG
25 TABLET ORAL ONCE
Status: COMPLETED | OUTPATIENT
Start: 2020-01-01 | End: 2020-01-01

## 2020-01-01 RX ORDER — ONDANSETRON 2 MG/ML
4 INJECTION INTRAMUSCULAR; INTRAVENOUS EVERY 6 HOURS PRN
Status: DISCONTINUED | OUTPATIENT
Start: 2020-01-01 | End: 2020-01-01

## 2020-01-01 RX ORDER — KETAMINE HCL IN NACL, ISO-OSM 100MG/10ML
SYRINGE (ML) INJECTION PRN
Status: DISCONTINUED | OUTPATIENT
Start: 2020-01-01 | End: 2020-01-01 | Stop reason: SDUPTHER

## 2020-01-01 RX ORDER — MIDAZOLAM HYDROCHLORIDE 1 MG/ML
INJECTION INTRAMUSCULAR; INTRAVENOUS
Status: COMPLETED
Start: 2020-01-01 | End: 2020-01-01

## 2020-01-01 RX ORDER — ACETAMINOPHEN 325 MG/1
650 TABLET ORAL ONCE
Status: COMPLETED | OUTPATIENT
Start: 2020-01-01 | End: 2020-01-01

## 2020-01-01 RX ORDER — PROCHLORPERAZINE EDISYLATE 5 MG/ML
10 INJECTION INTRAMUSCULAR; INTRAVENOUS EVERY 4 HOURS PRN
Status: DISCONTINUED | OUTPATIENT
Start: 2020-01-01 | End: 2020-01-01 | Stop reason: HOSPADM

## 2020-01-01 RX ORDER — AMIODARONE HYDROCHLORIDE 200 MG/1
400 TABLET ORAL DAILY
Status: DISCONTINUED | OUTPATIENT
Start: 2020-01-01 | End: 2020-01-01 | Stop reason: HOSPADM

## 2020-01-01 RX ORDER — DIPHENHYDRAMINE HYDROCHLORIDE 50 MG/ML
25 INJECTION INTRAMUSCULAR; INTRAVENOUS EVERY 6 HOURS PRN
Status: DISCONTINUED | OUTPATIENT
Start: 2020-01-01 | End: 2020-01-01 | Stop reason: HOSPADM

## 2020-01-01 RX ORDER — INSULIN LISPRO 100 [IU]/ML
0-6 INJECTION, SOLUTION INTRAVENOUS; SUBCUTANEOUS NIGHTLY
Status: DISCONTINUED | OUTPATIENT
Start: 2020-01-01 | End: 2020-01-01 | Stop reason: HOSPADM

## 2020-01-01 RX ORDER — MEXILETINE HYDROCHLORIDE 200 MG/1
200 CAPSULE ORAL EVERY 8 HOURS SCHEDULED
Qty: 90 CAPSULE | Refills: 3 | Status: SHIPPED | OUTPATIENT
Start: 2020-01-01

## 2020-01-01 RX ORDER — PROCHLORPERAZINE EDISYLATE 5 MG/ML
10 INJECTION INTRAMUSCULAR; INTRAVENOUS EVERY 6 HOURS PRN
Qty: 240 ML | Status: ON HOLD | COMMUNITY
Start: 2020-01-01 | End: 2020-01-01 | Stop reason: HOSPADM

## 2020-01-01 RX ORDER — FUROSEMIDE 10 MG/ML
40 INJECTION INTRAMUSCULAR; INTRAVENOUS DAILY
Status: DISCONTINUED | OUTPATIENT
Start: 2020-01-01 | End: 2020-01-01

## 2020-01-01 RX ORDER — UREA 10 %
3 LOTION (ML) TOPICAL NIGHTLY
Status: DISCONTINUED | OUTPATIENT
Start: 2020-01-01 | End: 2020-01-01 | Stop reason: HOSPADM

## 2020-01-01 RX ORDER — DIGOXIN 0.25 MG/ML
125 INJECTION INTRAMUSCULAR; INTRAVENOUS DAILY
Status: DISCONTINUED | OUTPATIENT
Start: 2020-01-01 | End: 2020-01-01

## 2020-01-01 RX ORDER — DIGOXIN 125 MCG
125 TABLET ORAL DAILY
Status: CANCELLED | OUTPATIENT
Start: 2020-01-01

## 2020-01-01 RX ORDER — ONDANSETRON 2 MG/ML
8 INJECTION INTRAMUSCULAR; INTRAVENOUS EVERY 6 HOURS PRN
Status: DISCONTINUED | OUTPATIENT
Start: 2020-01-01 | End: 2020-01-01

## 2020-01-01 RX ORDER — METRONIDAZOLE 250 MG/1
500 TABLET ORAL EVERY 8 HOURS SCHEDULED
Status: DISCONTINUED | OUTPATIENT
Start: 2020-01-01 | End: 2020-01-01

## 2020-01-01 RX ORDER — AMIODARONE HYDROCHLORIDE 200 MG/1
200 TABLET ORAL 2 TIMES DAILY
Status: COMPLETED | OUTPATIENT
Start: 2020-01-01 | End: 2020-01-01

## 2020-01-01 RX ORDER — HYDROMORPHONE HCL 110MG/55ML
0.25 PATIENT CONTROLLED ANALGESIA SYRINGE INTRAVENOUS EVERY 5 MIN PRN
Status: DISCONTINUED | OUTPATIENT
Start: 2020-01-01 | End: 2020-01-01 | Stop reason: HOSPADM

## 2020-01-01 RX ORDER — ONDANSETRON 2 MG/ML
8 INJECTION INTRAMUSCULAR; INTRAVENOUS EVERY 8 HOURS PRN
Status: DISCONTINUED | OUTPATIENT
Start: 2020-01-01 | End: 2020-01-01 | Stop reason: HOSPADM

## 2020-01-01 RX ORDER — MAGNESIUM SULFATE IN WATER 40 MG/ML
2 INJECTION, SOLUTION INTRAVENOUS PRN
Status: DISCONTINUED | OUTPATIENT
Start: 2020-01-01 | End: 2020-01-01 | Stop reason: HOSPADM

## 2020-01-01 RX ORDER — PROMETHAZINE HYDROCHLORIDE 25 MG/ML
12.5 INJECTION, SOLUTION INTRAMUSCULAR; INTRAVENOUS EVERY 6 HOURS PRN
Status: DISCONTINUED | OUTPATIENT
Start: 2020-01-01 | End: 2020-01-01

## 2020-01-01 RX ORDER — DIGOXIN 125 MCG
125 TABLET ORAL DAILY
Status: DISCONTINUED | OUTPATIENT
Start: 2020-01-01 | End: 2020-01-01 | Stop reason: HOSPADM

## 2020-01-01 RX ORDER — VASOPRESSIN 20 U/ML
INJECTION PARENTERAL PRN
Status: DISCONTINUED | OUTPATIENT
Start: 2020-01-01 | End: 2020-01-01 | Stop reason: SDUPTHER

## 2020-01-01 RX ORDER — MORPHINE SULFATE 4 MG/ML
4 INJECTION, SOLUTION INTRAMUSCULAR; INTRAVENOUS
Status: DISCONTINUED | OUTPATIENT
Start: 2020-01-01 | End: 2020-01-01

## 2020-01-01 RX ORDER — DOXYCYCLINE HYCLATE 100 MG
100 TABLET ORAL EVERY 12 HOURS SCHEDULED
Status: DISCONTINUED | OUTPATIENT
Start: 2020-01-01 | End: 2020-01-01

## 2020-01-01 RX ORDER — METHYLPREDNISOLONE SODIUM SUCCINATE 40 MG/ML
40 INJECTION, POWDER, LYOPHILIZED, FOR SOLUTION INTRAMUSCULAR; INTRAVENOUS DAILY
Status: DISCONTINUED | OUTPATIENT
Start: 2020-01-01 | End: 2020-01-01

## 2020-01-01 RX ORDER — ONDANSETRON 2 MG/ML
4 INJECTION INTRAMUSCULAR; INTRAVENOUS
Status: DISCONTINUED | OUTPATIENT
Start: 2020-01-01 | End: 2020-01-01 | Stop reason: HOSPADM

## 2020-01-01 RX ORDER — METHYLPREDNISOLONE SODIUM SUCCINATE 40 MG/ML
INJECTION, POWDER, LYOPHILIZED, FOR SOLUTION INTRAMUSCULAR; INTRAVENOUS
Status: DISPENSED
Start: 2020-01-01 | End: 2020-01-01

## 2020-01-01 RX ORDER — PREDNISONE 20 MG/1
20 TABLET ORAL DAILY
Status: DISCONTINUED | OUTPATIENT
Start: 2020-01-01 | End: 2020-01-01

## 2020-01-01 RX ORDER — LIDOCAINE HYDROCHLORIDE 20 MG/ML
INJECTION, SOLUTION INFILTRATION; PERINEURAL PRN
Status: DISCONTINUED | OUTPATIENT
Start: 2020-01-01 | End: 2020-01-01 | Stop reason: SDUPTHER

## 2020-01-01 RX ORDER — 0.9 % SODIUM CHLORIDE 0.9 %
300 INTRAVENOUS SOLUTION INTRAVENOUS ONCE
Status: COMPLETED | OUTPATIENT
Start: 2020-01-01 | End: 2020-01-01

## 2020-01-01 RX ORDER — FUROSEMIDE 40 MG/1
40 TABLET ORAL DAILY
Status: DISCONTINUED | OUTPATIENT
Start: 2020-01-01 | End: 2020-01-01

## 2020-01-01 RX ORDER — POTASSIUM CHLORIDE 29.8 MG/ML
INJECTION INTRAVENOUS
Status: COMPLETED
Start: 2020-01-01 | End: 2020-01-01

## 2020-01-01 RX ORDER — TRAMADOL HYDROCHLORIDE 50 MG/1
50 TABLET ORAL EVERY 8 HOURS PRN
Qty: 10 TABLET | Refills: 0 | Status: SHIPPED | OUTPATIENT
Start: 2020-01-01 | End: 2020-01-01

## 2020-01-01 RX ORDER — ONDANSETRON 2 MG/ML
4 INJECTION INTRAMUSCULAR; INTRAVENOUS EVERY 6 HOURS PRN
Status: CANCELLED | OUTPATIENT
Start: 2020-01-01

## 2020-01-01 RX ORDER — PROCHLORPERAZINE EDISYLATE 5 MG/ML
10 INJECTION INTRAMUSCULAR; INTRAVENOUS EVERY 6 HOURS PRN
Status: DISCONTINUED | OUTPATIENT
Start: 2020-01-01 | End: 2020-01-01 | Stop reason: HOSPADM

## 2020-01-01 RX ORDER — PANTOPRAZOLE SODIUM 40 MG/10ML
40 INJECTION, POWDER, LYOPHILIZED, FOR SOLUTION INTRAVENOUS DAILY
Status: DISCONTINUED | OUTPATIENT
Start: 2020-01-01 | End: 2020-01-01

## 2020-01-01 RX ORDER — SODIUM CHLORIDE 0.9 % (FLUSH) 0.9 %
10 SYRINGE (ML) INJECTION EVERY 12 HOURS SCHEDULED
Status: CANCELLED | OUTPATIENT
Start: 2020-01-01

## 2020-01-01 RX ORDER — SODIUM CHLORIDE 0.9 % (FLUSH) 0.9 %
10 SYRINGE (ML) INJECTION PRN
Status: DISCONTINUED | OUTPATIENT
Start: 2020-01-01 | End: 2020-01-01

## 2020-01-01 RX ORDER — METHYLPREDNISOLONE 4 MG/1
TABLET ORAL
Qty: 1 KIT | Refills: 0 | Status: SHIPPED | OUTPATIENT
Start: 2020-01-01 | End: 2020-01-01

## 2020-01-01 RX ORDER — ONDANSETRON 4 MG/1
8 TABLET, ORALLY DISINTEGRATING ORAL EVERY 8 HOURS PRN
Status: DISCONTINUED | OUTPATIENT
Start: 2020-01-01 | End: 2020-01-01 | Stop reason: HOSPADM

## 2020-01-01 RX ORDER — WARFARIN SODIUM 2 MG/1
2 TABLET ORAL DAILY
Status: DISCONTINUED | OUTPATIENT
Start: 2020-01-01 | End: 2020-01-01

## 2020-01-01 RX ORDER — ACETAMINOPHEN 325 MG/1
650 TABLET ORAL EVERY 4 HOURS PRN
Status: DISCONTINUED | OUTPATIENT
Start: 2020-01-01 | End: 2020-01-01

## 2020-01-01 RX ORDER — LACTOBACILLUS RHAMNOSUS GG 10B CELL
1 CAPSULE ORAL
Status: ON HOLD | COMMUNITY
Start: 2020-01-01 | End: 2020-01-01 | Stop reason: HOSPADM

## 2020-01-01 RX ORDER — PREDNISONE 10 MG/1
10 TABLET ORAL DAILY
Qty: 7 TABLET | Refills: 0 | Status: SHIPPED | OUTPATIENT
Start: 2020-01-01 | End: 2020-03-17

## 2020-01-01 RX ORDER — HEPARIN SODIUM 1000 [USP'U]/ML
80 INJECTION, SOLUTION INTRAVENOUS; SUBCUTANEOUS PRN
Status: DISCONTINUED | OUTPATIENT
Start: 2020-01-01 | End: 2020-01-01

## 2020-01-01 RX ORDER — DEXTROSE MONOHYDRATE 25 G/50ML
12.5 INJECTION, SOLUTION INTRAVENOUS PRN
Status: DISCONTINUED | OUTPATIENT
Start: 2020-01-01 | End: 2020-01-01 | Stop reason: HOSPADM

## 2020-01-01 RX ORDER — OXYCODONE HYDROCHLORIDE 5 MG/1
5 TABLET ORAL EVERY 4 HOURS PRN
Status: CANCELLED | OUTPATIENT
Start: 2020-01-01

## 2020-01-01 RX ORDER — OXYCODONE HYDROCHLORIDE 5 MG/1
5 TABLET ORAL EVERY 4 HOURS PRN
Refills: 0 | Status: ON HOLD | COMMUNITY
Start: 2020-01-01 | End: 2020-01-01

## 2020-01-01 RX ORDER — OXYCODONE HYDROCHLORIDE 5 MG/1
5 TABLET ORAL EVERY 4 HOURS PRN
Status: DISCONTINUED | OUTPATIENT
Start: 2020-01-01 | End: 2020-01-01 | Stop reason: HOSPADM

## 2020-01-01 RX ORDER — PROMETHAZINE HYDROCHLORIDE 25 MG/ML
12.5 INJECTION, SOLUTION INTRAMUSCULAR; INTRAVENOUS EVERY 6 HOURS PRN
Status: ON HOLD | COMMUNITY
Start: 2020-01-01 | End: 2020-01-01 | Stop reason: HOSPADM

## 2020-01-01 RX ORDER — LACTOBACILLUS RHAMNOSUS GG 10B CELL
1 CAPSULE ORAL
Status: DISCONTINUED | OUTPATIENT
Start: 2020-01-01 | End: 2020-01-01 | Stop reason: HOSPADM

## 2020-01-01 RX ORDER — ONDANSETRON 8 MG/1
8 TABLET, ORALLY DISINTEGRATING ORAL EVERY 8 HOURS PRN
Qty: 30 TABLET | Refills: 1 | Status: SHIPPED | OUTPATIENT
Start: 2020-01-01

## 2020-01-01 RX ORDER — BUPIVACAINE HYDROCHLORIDE AND EPINEPHRINE 5; 5 MG/ML; UG/ML
INJECTION, SOLUTION PERINEURAL
Status: COMPLETED | OUTPATIENT
Start: 2020-01-01 | End: 2020-01-01

## 2020-01-01 RX ORDER — ACETAMINOPHEN 325 MG/1
650 TABLET ORAL EVERY 4 HOURS PRN
Status: DISCONTINUED | OUTPATIENT
Start: 2020-01-01 | End: 2020-01-01 | Stop reason: SDUPTHER

## 2020-01-01 RX ORDER — POLYETHYLENE GLYCOL 3350 17 G/17G
17 POWDER, FOR SOLUTION ORAL 2 TIMES DAILY
Qty: 527 G | Refills: 1 | Status: SHIPPED | OUTPATIENT
Start: 2020-01-01 | End: 2020-03-22

## 2020-01-01 RX ORDER — PROCHLORPERAZINE EDISYLATE 5 MG/ML
10 INJECTION INTRAMUSCULAR; INTRAVENOUS EVERY 6 HOURS PRN
Status: DISCONTINUED | OUTPATIENT
Start: 2020-01-01 | End: 2020-01-01

## 2020-01-01 RX ORDER — PANTOPRAZOLE SODIUM 40 MG/1
40 TABLET, DELAYED RELEASE ORAL
Qty: 30 TABLET | Refills: 3 | Status: SHIPPED | OUTPATIENT
Start: 2020-01-01

## 2020-01-01 RX ORDER — POTASSIUM CHLORIDE 29.8 MG/ML
20 INJECTION INTRAVENOUS
Status: DISCONTINUED | OUTPATIENT
Start: 2020-01-01 | End: 2020-01-01

## 2020-01-01 RX ORDER — MORPHINE SULFATE 2 MG/ML
2 INJECTION, SOLUTION INTRAMUSCULAR; INTRAVENOUS
Status: DISCONTINUED | OUTPATIENT
Start: 2020-01-01 | End: 2020-01-01

## 2020-01-01 RX ORDER — CASTOR OIL AND BALSAM, PERU 788; 87 MG/G; MG/G
OINTMENT TOPICAL 2 TIMES DAILY
Status: DISCONTINUED | OUTPATIENT
Start: 2020-01-01 | End: 2020-01-01 | Stop reason: HOSPADM

## 2020-01-01 RX ORDER — ACETAMINOPHEN 325 MG/1
650 TABLET ORAL EVERY 6 HOURS PRN
Status: DISCONTINUED | OUTPATIENT
Start: 2020-01-01 | End: 2020-01-01 | Stop reason: HOSPADM

## 2020-01-01 RX ORDER — AMOXICILLIN AND CLAVULANATE POTASSIUM 875; 125 MG/1; MG/1
1 TABLET, FILM COATED ORAL 2 TIMES DAILY
Qty: 42 TABLET | Refills: 0 | Status: ON HOLD | OUTPATIENT
Start: 2020-01-01 | End: 2020-01-01 | Stop reason: HOSPADM

## 2020-01-01 RX ORDER — DIGOXIN 125 MCG
250 TABLET ORAL DAILY
Status: DISCONTINUED | OUTPATIENT
Start: 2020-01-01 | End: 2020-01-01

## 2020-01-01 RX ORDER — ASPIRIN 81 MG/1
81 TABLET ORAL DAILY
Status: DISCONTINUED | OUTPATIENT
Start: 2020-01-01 | End: 2020-01-01

## 2020-01-01 RX ORDER — SODIUM CHLORIDE, SODIUM LACTATE, POTASSIUM CHLORIDE, CALCIUM CHLORIDE 600; 310; 30; 20 MG/100ML; MG/100ML; MG/100ML; MG/100ML
INJECTION, SOLUTION INTRAVENOUS CONTINUOUS
Status: CANCELLED | OUTPATIENT
Start: 2020-01-01

## 2020-01-01 RX ORDER — DEXTROSE MONOHYDRATE 25 G/50ML
12.5 INJECTION, SOLUTION INTRAVENOUS PRN
Status: DISCONTINUED | OUTPATIENT
Start: 2020-01-01 | End: 2020-01-01 | Stop reason: SDUPTHER

## 2020-01-01 RX ORDER — NICOTINE POLACRILEX 4 MG
15 LOZENGE BUCCAL PRN
Status: DISCONTINUED | OUTPATIENT
Start: 2020-01-01 | End: 2020-01-01 | Stop reason: HOSPADM

## 2020-01-01 RX ORDER — LEVOFLOXACIN 5 MG/ML
500 INJECTION, SOLUTION INTRAVENOUS EVERY 24 HOURS
Status: DISCONTINUED | OUTPATIENT
Start: 2020-01-01 | End: 2020-01-01

## 2020-01-01 RX ORDER — MIDODRINE HYDROCHLORIDE 10 MG/1
10 TABLET ORAL
Qty: 90 TABLET | Refills: 3 | Status: ON HOLD | OUTPATIENT
Start: 2020-01-01 | End: 2020-01-01 | Stop reason: HOSPADM

## 2020-01-01 RX ORDER — NAPROXEN 250 MG/1
250 TABLET ORAL 2 TIMES DAILY WITH MEALS
Qty: 60 TABLET | Refills: 3 | Status: ON HOLD | OUTPATIENT
Start: 2020-01-01 | End: 2020-01-01 | Stop reason: HOSPADM

## 2020-01-01 RX ORDER — OXYCODONE HYDROCHLORIDE 5 MG/1
5 TABLET ORAL EVERY 4 HOURS PRN
Qty: 30 TABLET | Refills: 0
Start: 2020-01-01 | End: 2020-03-17

## 2020-01-01 RX ORDER — MEGESTROL ACETATE 40 MG/ML
200 SUSPENSION ORAL DAILY
Status: DISCONTINUED | OUTPATIENT
Start: 2020-01-01 | End: 2020-01-01

## 2020-01-01 RX ORDER — HYOSCYAMINE SULFATE 0.125 MG
125 TABLET ORAL EVERY 4 HOURS PRN
Qty: 30 TABLET | Refills: 1 | Status: SHIPPED | OUTPATIENT
Start: 2020-01-01 | End: 2020-03-17

## 2020-01-01 RX ORDER — DIGOXIN 0.25 MG/ML
500 INJECTION INTRAMUSCULAR; INTRAVENOUS ONCE
Status: COMPLETED | OUTPATIENT
Start: 2020-01-01 | End: 2020-01-01

## 2020-01-01 RX ORDER — LIDOCAINE HYDROCHLORIDE 10 MG/ML
5 INJECTION, SOLUTION EPIDURAL; INFILTRATION; INTRACAUDAL; PERINEURAL ONCE
Status: COMPLETED | OUTPATIENT
Start: 2020-01-01 | End: 2020-01-01

## 2020-01-01 RX ORDER — INSULIN LISPRO 100 [IU]/ML
0-12 INJECTION, SOLUTION INTRAVENOUS; SUBCUTANEOUS
Status: DISCONTINUED | OUTPATIENT
Start: 2020-01-01 | End: 2020-01-01 | Stop reason: HOSPADM

## 2020-01-01 RX ORDER — PROCHLORPERAZINE MALEATE 10 MG
10 TABLET ORAL EVERY 4 HOURS PRN
Status: DISCONTINUED | OUTPATIENT
Start: 2020-01-01 | End: 2020-01-01 | Stop reason: HOSPADM

## 2020-01-01 RX ORDER — ONDANSETRON 4 MG/1
8 TABLET, ORALLY DISINTEGRATING ORAL EVERY 8 HOURS PRN
Status: DISCONTINUED | OUTPATIENT
Start: 2020-01-01 | End: 2020-01-01

## 2020-01-01 RX ORDER — MAGNESIUM SULFATE IN WATER 40 MG/ML
INJECTION, SOLUTION INTRAVENOUS
Status: COMPLETED
Start: 2020-01-01 | End: 2020-01-01

## 2020-01-01 RX ORDER — NAPROXEN 250 MG/1
250 TABLET ORAL 2 TIMES DAILY WITH MEALS
Status: DISCONTINUED | OUTPATIENT
Start: 2020-01-01 | End: 2020-01-01 | Stop reason: HOSPADM

## 2020-01-01 RX ORDER — PROMETHAZINE HYDROCHLORIDE 25 MG/1
12.5 TABLET ORAL EVERY 6 HOURS PRN
Status: DISCONTINUED | OUTPATIENT
Start: 2020-01-01 | End: 2020-01-01

## 2020-01-01 RX ORDER — PROMETHAZINE HYDROCHLORIDE 25 MG/ML
12.5 INJECTION, SOLUTION INTRAMUSCULAR; INTRAVENOUS EVERY 6 HOURS PRN
Status: DISCONTINUED | OUTPATIENT
Start: 2020-01-01 | End: 2020-01-01 | Stop reason: HOSPADM

## 2020-01-01 RX ORDER — LABETALOL HYDROCHLORIDE 5 MG/ML
5 INJECTION, SOLUTION INTRAVENOUS EVERY 10 MIN PRN
Status: DISCONTINUED | OUTPATIENT
Start: 2020-01-01 | End: 2020-01-01 | Stop reason: HOSPADM

## 2020-01-01 RX ORDER — PREDNISONE 1 MG/1
5 TABLET ORAL
Status: DISCONTINUED | OUTPATIENT
Start: 2020-01-01 | End: 2020-01-01

## 2020-01-01 RX ORDER — FENTANYL CITRATE 50 UG/ML
25 INJECTION, SOLUTION INTRAMUSCULAR; INTRAVENOUS EVERY 5 MIN PRN
Status: DISCONTINUED | OUTPATIENT
Start: 2020-01-01 | End: 2020-01-01 | Stop reason: HOSPADM

## 2020-01-01 RX ORDER — AMIODARONE HYDROCHLORIDE 400 MG/1
400 TABLET ORAL 2 TIMES DAILY
Qty: 30 TABLET | Refills: 3 | Status: SHIPPED | OUTPATIENT
Start: 2020-01-01

## 2020-01-01 RX ORDER — ACETAMINOPHEN 325 MG/1
650 TABLET ORAL EVERY 4 HOURS PRN
Status: CANCELLED | OUTPATIENT
Start: 2020-01-01

## 2020-01-01 RX ORDER — OXYCODONE HYDROCHLORIDE 5 MG/1
10 TABLET ORAL EVERY 4 HOURS PRN
Status: CANCELLED | OUTPATIENT
Start: 2020-01-01

## 2020-01-01 RX ORDER — ONDANSETRON 4 MG/1
4 TABLET, ORALLY DISINTEGRATING ORAL ONCE
Status: COMPLETED | OUTPATIENT
Start: 2020-01-01 | End: 2020-01-01

## 2020-01-01 RX ORDER — OXYCODONE HYDROCHLORIDE 5 MG/1
10 TABLET ORAL EVERY 4 HOURS PRN
Status: DISCONTINUED | OUTPATIENT
Start: 2020-01-01 | End: 2020-01-01

## 2020-01-01 RX ORDER — ASPIRIN 81 MG/1
81 TABLET, CHEWABLE ORAL DAILY
Status: DISCONTINUED | OUTPATIENT
Start: 2020-01-01 | End: 2020-01-01 | Stop reason: HOSPADM

## 2020-01-01 RX ADMIN — INSULIN LISPRO 1 UNITS: 100 INJECTION, SOLUTION INTRAVENOUS; SUBCUTANEOUS at 21:27

## 2020-01-01 RX ADMIN — MEXILETINE HYDROCHLORIDE 200 MG: 200 CAPSULE ORAL at 13:49

## 2020-01-01 RX ADMIN — AMIODARONE HYDROCHLORIDE 0.5 MG/MIN: 50 INJECTION, SOLUTION INTRAVENOUS at 17:27

## 2020-01-01 RX ADMIN — SODIUM CHLORIDE: 9 INJECTION, SOLUTION INTRAVENOUS at 03:22

## 2020-01-01 RX ADMIN — INSULIN LISPRO 5 UNITS: 100 INJECTION, SOLUTION INTRAVENOUS; SUBCUTANEOUS at 17:39

## 2020-01-01 RX ADMIN — ASPIRIN 81 MG: 81 TABLET, COATED ORAL at 08:50

## 2020-01-01 RX ADMIN — MIDAZOLAM 5 MG: 1 INJECTION INTRAMUSCULAR; INTRAVENOUS at 04:35

## 2020-01-01 RX ADMIN — AMIODARONE HYDROCHLORIDE 400 MG: 200 TABLET ORAL at 09:17

## 2020-01-01 RX ADMIN — PREDNISONE 20 MG: 20 TABLET ORAL at 08:38

## 2020-01-01 RX ADMIN — ASPIRIN 81 MG 81 MG: 81 TABLET ORAL at 08:52

## 2020-01-01 RX ADMIN — Medication 10 ML: at 08:43

## 2020-01-01 RX ADMIN — NYSTATIN 5 ML: 100000 SUSPENSION ORAL at 10:41

## 2020-01-01 RX ADMIN — AMIODARONE HYDROCHLORIDE 200 MG: 200 TABLET ORAL at 09:02

## 2020-01-01 RX ADMIN — DOXYCYCLINE HYCLATE 100 MG: 100 TABLET, COATED ORAL at 19:47

## 2020-01-01 RX ADMIN — INSULIN LISPRO 2 UNITS: 100 INJECTION, SOLUTION INTRAVENOUS; SUBCUTANEOUS at 08:57

## 2020-01-01 RX ADMIN — Medication 10 ML: at 21:00

## 2020-01-01 RX ADMIN — PANTOPRAZOLE SODIUM 40 MG: 40 TABLET, DELAYED RELEASE ORAL at 06:28

## 2020-01-01 RX ADMIN — MIDODRINE HYDROCHLORIDE 10 MG: 5 TABLET ORAL at 20:24

## 2020-01-01 RX ADMIN — AZTREONAM 1 G: 1 INJECTION, POWDER, LYOPHILIZED, FOR SOLUTION INTRAMUSCULAR; INTRAVENOUS at 22:02

## 2020-01-01 RX ADMIN — ENOXAPARIN SODIUM 80 MG: 80 INJECTION SUBCUTANEOUS at 20:55

## 2020-01-01 RX ADMIN — PROPOFOL 20 MG: 10 INJECTION, EMULSION INTRAVENOUS at 13:48

## 2020-01-01 RX ADMIN — FLUCONAZOLE 200 MG: 200 TABLET ORAL at 14:29

## 2020-01-01 RX ADMIN — Medication 5 ML: at 12:04

## 2020-01-01 RX ADMIN — Medication 1 CAPSULE: at 09:41

## 2020-01-01 RX ADMIN — DOCOSANOL: 100 CREAM TOPICAL at 06:05

## 2020-01-01 RX ADMIN — PROMETHAZINE HYDROCHLORIDE 12.5 MG: 25 INJECTION INTRAMUSCULAR; INTRAVENOUS at 08:49

## 2020-01-01 RX ADMIN — Medication 10 ML: at 20:37

## 2020-01-01 RX ADMIN — PANTOPRAZOLE SODIUM 40 MG: 40 INJECTION, POWDER, FOR SOLUTION INTRAVENOUS at 10:17

## 2020-01-01 RX ADMIN — DOCOSANOL: 100 CREAM TOPICAL at 12:21

## 2020-01-01 RX ADMIN — PANTOPRAZOLE SODIUM 40 MG: 40 TABLET, DELAYED RELEASE ORAL at 06:31

## 2020-01-01 RX ADMIN — DOCOSANOL: 100 CREAM TOPICAL at 22:05

## 2020-01-01 RX ADMIN — TBO-FILGRASTIM 300 MCG: 300 INJECTION, SOLUTION SUBCUTANEOUS at 18:58

## 2020-01-01 RX ADMIN — MEXILETINE HYDROCHLORIDE 200 MG: 200 CAPSULE ORAL at 21:03

## 2020-01-01 RX ADMIN — MEXILETINE HYDROCHLORIDE 200 MG: 200 CAPSULE ORAL at 21:30

## 2020-01-01 RX ADMIN — AMIODARONE HYDROCHLORIDE 0.5 MG/MIN: 50 INJECTION, SOLUTION INTRAVENOUS at 14:33

## 2020-01-01 RX ADMIN — AMIODARONE HYDROCHLORIDE 200 MG: 200 TABLET ORAL at 09:01

## 2020-01-01 RX ADMIN — MUPIROCIN: 20 OINTMENT TOPICAL at 11:09

## 2020-01-01 RX ADMIN — AZTREONAM 1 G: 1 INJECTION, POWDER, LYOPHILIZED, FOR SOLUTION INTRAMUSCULAR; INTRAVENOUS at 20:40

## 2020-01-01 RX ADMIN — AMIODARONE HYDROCHLORIDE 0.5 MG/MIN: 50 INJECTION, SOLUTION INTRAVENOUS at 04:48

## 2020-01-01 RX ADMIN — PROPOFOL 20 MG: 10 INJECTION, EMULSION INTRAVENOUS at 08:40

## 2020-01-01 RX ADMIN — PANTOPRAZOLE SODIUM 40 MG: 40 TABLET, DELAYED RELEASE ORAL at 10:41

## 2020-01-01 RX ADMIN — MUPIROCIN: 20 OINTMENT TOPICAL at 22:24

## 2020-01-01 RX ADMIN — PANTOPRAZOLE SODIUM 40 MG: 40 TABLET, DELAYED RELEASE ORAL at 07:39

## 2020-01-01 RX ADMIN — Medication 10 ML: at 22:30

## 2020-01-01 RX ADMIN — DIGOXIN 125 MCG: 125 TABLET ORAL at 09:01

## 2020-01-01 RX ADMIN — Medication 10 ML: at 08:05

## 2020-01-01 RX ADMIN — INSULIN LISPRO 5 UNITS: 100 INJECTION, SOLUTION INTRAVENOUS; SUBCUTANEOUS at 12:46

## 2020-01-01 RX ADMIN — PHENYLEPHRINE HYDROCHLORIDE 100 MCG/MIN: 10 INJECTION INTRAVENOUS at 09:18

## 2020-01-01 RX ADMIN — VANCOMYCIN HYDROCHLORIDE 1000 MG: 1 INJECTION, POWDER, LYOPHILIZED, FOR SOLUTION INTRAVENOUS at 15:39

## 2020-01-01 RX ADMIN — Medication: at 10:24

## 2020-01-01 RX ADMIN — VANCOMYCIN HYDROCHLORIDE 1000 MG: 1 INJECTION, POWDER, LYOPHILIZED, FOR SOLUTION INTRAVENOUS at 02:36

## 2020-01-01 RX ADMIN — Medication 1 CAPSULE: at 08:29

## 2020-01-01 RX ADMIN — MIDODRINE HYDROCHLORIDE 10 MG: 5 TABLET ORAL at 16:52

## 2020-01-01 RX ADMIN — AZTREONAM 1 G: 1 INJECTION, POWDER, LYOPHILIZED, FOR SOLUTION INTRAMUSCULAR; INTRAVENOUS at 14:50

## 2020-01-01 RX ADMIN — MIDODRINE HYDROCHLORIDE 2.5 MG: 5 TABLET ORAL at 08:49

## 2020-01-01 RX ADMIN — INSULIN LISPRO 5 UNITS: 100 INJECTION, SOLUTION INTRAVENOUS; SUBCUTANEOUS at 12:07

## 2020-01-01 RX ADMIN — ALLOPURINOL 200 MG: 100 TABLET ORAL at 08:43

## 2020-01-01 RX ADMIN — DOXYCYCLINE HYCLATE 100 MG: 100 TABLET, COATED ORAL at 20:59

## 2020-01-01 RX ADMIN — FLUCONAZOLE 200 MG: 200 TABLET ORAL at 10:01

## 2020-01-01 RX ADMIN — Medication 10 ML: at 20:31

## 2020-01-01 RX ADMIN — Medication 10 ML: at 09:14

## 2020-01-01 RX ADMIN — PANTOPRAZOLE SODIUM 40 MG: 40 TABLET, DELAYED RELEASE ORAL at 05:02

## 2020-01-01 RX ADMIN — Medication 10 ML: at 10:02

## 2020-01-01 RX ADMIN — DOCOSANOL: 100 CREAM TOPICAL at 22:52

## 2020-01-01 RX ADMIN — ALLOPURINOL 200 MG: 100 TABLET ORAL at 08:16

## 2020-01-01 RX ADMIN — POTASSIUM CHLORIDE 20 MEQ: 400 INJECTION, SOLUTION INTRAVENOUS at 07:29

## 2020-01-01 RX ADMIN — ASPIRIN 81 MG: 81 TABLET, COATED ORAL at 08:16

## 2020-01-01 RX ADMIN — DOCOSANOL: 100 CREAM TOPICAL at 15:00

## 2020-01-01 RX ADMIN — ASPIRIN 81 MG: 81 TABLET, COATED ORAL at 10:20

## 2020-01-01 RX ADMIN — MEXILETINE HYDROCHLORIDE 200 MG: 200 CAPSULE ORAL at 05:41

## 2020-01-01 RX ADMIN — Medication 10 ML: at 21:58

## 2020-01-01 RX ADMIN — DOXYCYCLINE HYCLATE 100 MG: 100 TABLET, COATED ORAL at 21:45

## 2020-01-01 RX ADMIN — VALACYCLOVIR HYDROCHLORIDE 500 MG: 500 TABLET, FILM COATED ORAL at 22:29

## 2020-01-01 RX ADMIN — VALACYCLOVIR HYDROCHLORIDE 500 MG: 500 TABLET, FILM COATED ORAL at 21:30

## 2020-01-01 RX ADMIN — Medication 10 ML: at 09:42

## 2020-01-01 RX ADMIN — DOBUTAMINE IN DEXTROSE 2.5 MCG/KG/MIN: 200 INJECTION, SOLUTION INTRAVENOUS at 23:21

## 2020-01-01 RX ADMIN — Medication 125 MG: at 20:25

## 2020-01-01 RX ADMIN — INSULIN LISPRO 5 UNITS: 100 INJECTION, SOLUTION INTRAVENOUS; SUBCUTANEOUS at 11:28

## 2020-01-01 RX ADMIN — NYSTATIN 5 ML: 100000 SUSPENSION ORAL at 11:06

## 2020-01-01 RX ADMIN — ASPIRIN 81 MG: 81 TABLET, COATED ORAL at 10:01

## 2020-01-01 RX ADMIN — Medication 10 MG: at 12:32

## 2020-01-01 RX ADMIN — VALACYCLOVIR HYDROCHLORIDE 500 MG: 500 TABLET, FILM COATED ORAL at 08:51

## 2020-01-01 RX ADMIN — POTASSIUM CHLORIDE 20 MEQ: 29.8 INJECTION, SOLUTION INTRAVENOUS at 04:22

## 2020-01-01 RX ADMIN — PANTOPRAZOLE SODIUM 40 MG: 40 INJECTION, POWDER, FOR SOLUTION INTRAVENOUS at 08:49

## 2020-01-01 RX ADMIN — NYSTATIN 5 ML: 100000 SUSPENSION ORAL at 13:16

## 2020-01-01 RX ADMIN — PANTOPRAZOLE SODIUM 40 MG: 40 TABLET, DELAYED RELEASE ORAL at 06:32

## 2020-01-01 RX ADMIN — ENOXAPARIN SODIUM 80 MG: 80 INJECTION SUBCUTANEOUS at 09:40

## 2020-01-01 RX ADMIN — INSULIN LISPRO 1 UNITS: 100 INJECTION, SOLUTION INTRAVENOUS; SUBCUTANEOUS at 18:18

## 2020-01-01 RX ADMIN — NYSTATIN 5 ML: 100000 SUSPENSION ORAL at 20:14

## 2020-01-01 RX ADMIN — PREDNISONE 50 MG: 20 TABLET ORAL at 10:41

## 2020-01-01 RX ADMIN — MEXILETINE HYDROCHLORIDE 200 MG: 200 CAPSULE ORAL at 00:01

## 2020-01-01 RX ADMIN — DIGOXIN 125 MCG: 125 TABLET ORAL at 10:11

## 2020-01-01 RX ADMIN — MIDODRINE HYDROCHLORIDE 10 MG: 5 TABLET ORAL at 09:02

## 2020-01-01 RX ADMIN — VALACYCLOVIR HYDROCHLORIDE 1000 MG: 1 TABLET, FILM COATED ORAL at 10:20

## 2020-01-01 RX ADMIN — Medication 10 ML: at 20:19

## 2020-01-01 RX ADMIN — NYSTATIN 5 ML: 100000 SUSPENSION ORAL at 08:33

## 2020-01-01 RX ADMIN — INSULIN LISPRO 5 UNITS: 100 INJECTION, SOLUTION INTRAVENOUS; SUBCUTANEOUS at 08:58

## 2020-01-01 RX ADMIN — DOXYCYCLINE HYCLATE 100 MG: 100 TABLET, COATED ORAL at 08:33

## 2020-01-01 RX ADMIN — ATORVASTATIN CALCIUM 10 MG: 10 TABLET, FILM COATED ORAL at 08:38

## 2020-01-01 RX ADMIN — MUPIROCIN: 20 OINTMENT TOPICAL at 10:01

## 2020-01-01 RX ADMIN — AMIODARONE HYDROCHLORIDE 200 MG: 200 TABLET ORAL at 14:47

## 2020-01-01 RX ADMIN — ACETAMINOPHEN 650 MG: 325 TABLET, FILM COATED ORAL at 11:56

## 2020-01-01 RX ADMIN — ENOXAPARIN SODIUM 80 MG: 80 INJECTION SUBCUTANEOUS at 09:07

## 2020-01-01 RX ADMIN — MIDODRINE HYDROCHLORIDE 5 MG: 5 TABLET ORAL at 12:34

## 2020-01-01 RX ADMIN — SODIUM CHLORIDE 1000 ML: 9 INJECTION, SOLUTION INTRAVENOUS at 12:32

## 2020-01-01 RX ADMIN — Medication 125 MG: at 03:50

## 2020-01-01 RX ADMIN — MEXILETINE HYDROCHLORIDE 200 MG: 200 CAPSULE ORAL at 06:39

## 2020-01-01 RX ADMIN — ASPIRIN 81 MG 81 MG: 81 TABLET ORAL at 11:04

## 2020-01-01 RX ADMIN — FUROSEMIDE 40 MG: 10 INJECTION, SOLUTION INTRAMUSCULAR; INTRAVENOUS at 12:02

## 2020-01-01 RX ADMIN — WARFARIN SODIUM 2 MG: 2 TABLET ORAL at 17:59

## 2020-01-01 RX ADMIN — ONDANSETRON 4 MG: 2 INJECTION INTRAMUSCULAR; INTRAVENOUS at 07:50

## 2020-01-01 RX ADMIN — MORPHINE SULFATE 2 MG: 2 INJECTION, SOLUTION INTRAMUSCULAR; INTRAVENOUS at 06:38

## 2020-01-01 RX ADMIN — Medication 10 ML: at 22:20

## 2020-01-01 RX ADMIN — POTASSIUM CHLORIDE 20 MEQ: 29.8 INJECTION, SOLUTION INTRAVENOUS at 15:36

## 2020-01-01 RX ADMIN — ALLOPURINOL 200 MG: 100 TABLET ORAL at 09:19

## 2020-01-01 RX ADMIN — SODIUM CHLORIDE: 9 INJECTION, SOLUTION INTRAVENOUS at 08:03

## 2020-01-01 RX ADMIN — MIDODRINE HYDROCHLORIDE 10 MG: 5 TABLET ORAL at 12:00

## 2020-01-01 RX ADMIN — Medication: at 20:49

## 2020-01-01 RX ADMIN — INSULIN LISPRO 2 UNITS: 100 INJECTION, SOLUTION INTRAVENOUS; SUBCUTANEOUS at 20:30

## 2020-01-01 RX ADMIN — MORPHINE SULFATE 1 MG: 2 INJECTION, SOLUTION INTRAMUSCULAR; INTRAVENOUS at 21:45

## 2020-01-01 RX ADMIN — Medication 20 MG: at 13:41

## 2020-01-01 RX ADMIN — VALACYCLOVIR HYDROCHLORIDE 1000 MG: 1 TABLET, FILM COATED ORAL at 11:09

## 2020-01-01 RX ADMIN — Medication 3 MG: at 22:23

## 2020-01-01 RX ADMIN — Medication 125 MG: at 08:43

## 2020-01-01 RX ADMIN — DIGOXIN 125 MCG: 125 TABLET ORAL at 08:59

## 2020-01-01 RX ADMIN — Medication 10 ML: at 20:11

## 2020-01-01 RX ADMIN — NYSTATIN 5 ML: 100000 SUSPENSION ORAL at 14:07

## 2020-01-01 RX ADMIN — Medication 10 ML: at 21:04

## 2020-01-01 RX ADMIN — MEXILETINE HYDROCHLORIDE 200 MG: 200 CAPSULE ORAL at 06:01

## 2020-01-01 RX ADMIN — MEROPENEM 1 G: 1 INJECTION, POWDER, FOR SOLUTION INTRAVENOUS at 03:14

## 2020-01-01 RX ADMIN — SODIUM CHLORIDE 10 ML: 9 INJECTION, SOLUTION INTRAVENOUS at 11:05

## 2020-01-01 RX ADMIN — MORPHINE SULFATE 1 MG: 2 INJECTION, SOLUTION INTRAMUSCULAR; INTRAVENOUS at 08:27

## 2020-01-01 RX ADMIN — Medication 10 ML: at 11:18

## 2020-01-01 RX ADMIN — POLYETHYLENE GLYCOL 3350 17 G: 17 POWDER, FOR SOLUTION ORAL at 20:15

## 2020-01-01 RX ADMIN — SODIUM CHLORIDE 8 MG/HR: 9 INJECTION, SOLUTION INTRAVENOUS at 03:48

## 2020-01-01 RX ADMIN — INSULIN LISPRO 5 UNITS: 100 INJECTION, SOLUTION INTRAVENOUS; SUBCUTANEOUS at 08:50

## 2020-01-01 RX ADMIN — Medication 500 ML: at 09:22

## 2020-01-01 RX ADMIN — Medication 125 MG: at 09:06

## 2020-01-01 RX ADMIN — PANTOPRAZOLE SODIUM 40 MG: 40 TABLET, DELAYED RELEASE ORAL at 05:33

## 2020-01-01 RX ADMIN — NAPROXEN 250 MG: 250 TABLET ORAL at 08:29

## 2020-01-01 RX ADMIN — ATORVASTATIN CALCIUM 10 MG: 10 TABLET, FILM COATED ORAL at 08:09

## 2020-01-01 RX ADMIN — ACETAMINOPHEN 650 MG: 325 TABLET, FILM COATED ORAL at 22:17

## 2020-01-01 RX ADMIN — ENOXAPARIN SODIUM 80 MG: 80 INJECTION SUBCUTANEOUS at 21:44

## 2020-01-01 RX ADMIN — ATORVASTATIN CALCIUM 10 MG: 10 TABLET, FILM COATED ORAL at 11:04

## 2020-01-01 RX ADMIN — ENOXAPARIN SODIUM 80 MG: 80 INJECTION SUBCUTANEOUS at 20:48

## 2020-01-01 RX ADMIN — VALACYCLOVIR HYDROCHLORIDE 500 MG: 500 TABLET, FILM COATED ORAL at 20:57

## 2020-01-01 RX ADMIN — DIGOXIN 125 MCG: 0.25 INJECTION INTRAMUSCULAR; INTRAVENOUS at 09:41

## 2020-01-01 RX ADMIN — Medication: at 10:56

## 2020-01-01 RX ADMIN — TBO-FILGRASTIM 300 MCG: 300 INJECTION, SOLUTION SUBCUTANEOUS at 18:24

## 2020-01-01 RX ADMIN — VALACYCLOVIR HYDROCHLORIDE 500 MG: 500 TABLET, FILM COATED ORAL at 10:01

## 2020-01-01 RX ADMIN — HEPARIN SODIUM 18 UNITS/KG/HR: 10000 INJECTION, SOLUTION INTRAVENOUS at 13:26

## 2020-01-01 RX ADMIN — INSULIN LISPRO 2 UNITS: 100 INJECTION, SOLUTION INTRAVENOUS; SUBCUTANEOUS at 11:39

## 2020-01-01 RX ADMIN — DOXYCYCLINE HYCLATE 100 MG: 100 TABLET, COATED ORAL at 22:23

## 2020-01-01 RX ADMIN — ONDANSETRON 4 MG: 2 INJECTION INTRAMUSCULAR; INTRAVENOUS at 13:59

## 2020-01-01 RX ADMIN — ATORVASTATIN CALCIUM 10 MG: 10 TABLET, FILM COATED ORAL at 08:29

## 2020-01-01 RX ADMIN — ATORVASTATIN CALCIUM 10 MG: 10 TABLET, FILM COATED ORAL at 08:21

## 2020-01-01 RX ADMIN — MIDODRINE HYDROCHLORIDE 10 MG: 5 TABLET ORAL at 09:33

## 2020-01-01 RX ADMIN — Medication: at 21:01

## 2020-01-01 RX ADMIN — MIDODRINE HYDROCHLORIDE 10 MG: 5 TABLET ORAL at 17:25

## 2020-01-01 RX ADMIN — PANTOPRAZOLE SODIUM 40 MG: 40 TABLET, DELAYED RELEASE ORAL at 08:52

## 2020-01-01 RX ADMIN — WARFARIN SODIUM 0.5 MG: 1 TABLET ORAL at 18:13

## 2020-01-01 RX ADMIN — POLYETHYLENE GLYCOL 3350 17 G: 17 POWDER, FOR SOLUTION ORAL at 20:36

## 2020-01-01 RX ADMIN — VANCOMYCIN HYDROCHLORIDE 1000 MG: 1 INJECTION, POWDER, LYOPHILIZED, FOR SOLUTION INTRAVENOUS at 16:24

## 2020-01-01 RX ADMIN — INSULIN LISPRO 5 UNITS: 100 INJECTION, SOLUTION INTRAVENOUS; SUBCUTANEOUS at 09:18

## 2020-01-01 RX ADMIN — INSULIN LISPRO 2 UNITS: 100 INJECTION, SOLUTION INTRAVENOUS; SUBCUTANEOUS at 12:43

## 2020-01-01 RX ADMIN — MIDODRINE HYDROCHLORIDE 10 MG: 5 TABLET ORAL at 12:06

## 2020-01-01 RX ADMIN — ATORVASTATIN CALCIUM 10 MG: 10 TABLET, FILM COATED ORAL at 09:38

## 2020-01-01 RX ADMIN — ATORVASTATIN CALCIUM 10 MG: 10 TABLET, FILM COATED ORAL at 10:20

## 2020-01-01 RX ADMIN — INSULIN LISPRO 5 UNITS: 100 INJECTION, SOLUTION INTRAVENOUS; SUBCUTANEOUS at 17:27

## 2020-01-01 RX ADMIN — ATORVASTATIN CALCIUM 10 MG: 10 TABLET, FILM COATED ORAL at 08:16

## 2020-01-01 RX ADMIN — VANCOMYCIN HYDROCHLORIDE 1250 MG: 10 INJECTION, POWDER, LYOPHILIZED, FOR SOLUTION INTRAVENOUS at 14:02

## 2020-01-01 RX ADMIN — DIGOXIN 125 MCG: 0.25 INJECTION INTRAMUSCULAR; INTRAVENOUS at 05:15

## 2020-01-01 RX ADMIN — MIDODRINE HYDROCHLORIDE 10 MG: 5 TABLET ORAL at 09:32

## 2020-01-01 RX ADMIN — HEPARIN SODIUM 18 UNITS/KG/HR: 10000 INJECTION, SOLUTION INTRAVENOUS at 21:01

## 2020-01-01 RX ADMIN — DIPHENHYDRAMINE HYDROCHLORIDE 25 MG: 50 INJECTION, SOLUTION INTRAMUSCULAR; INTRAVENOUS at 15:50

## 2020-01-01 RX ADMIN — SODIUM PHOSPHATE, MONOBASIC, MONOHYDRATE 20 MMOL: 276; 142 INJECTION, SOLUTION INTRAVENOUS at 09:07

## 2020-01-01 RX ADMIN — INSULIN LISPRO 5 UNITS: 100 INJECTION, SOLUTION INTRAVENOUS; SUBCUTANEOUS at 12:56

## 2020-01-01 RX ADMIN — AMIODARONE HYDROCHLORIDE 200 MG: 200 TABLET ORAL at 20:59

## 2020-01-01 RX ADMIN — MEXILETINE HYDROCHLORIDE 200 MG: 200 CAPSULE ORAL at 20:14

## 2020-01-01 RX ADMIN — Medication 10 ML: at 21:02

## 2020-01-01 RX ADMIN — Medication 1 CAPSULE: at 08:49

## 2020-01-01 RX ADMIN — PROPOFOL 20 MG: 10 INJECTION, EMULSION INTRAVENOUS at 13:41

## 2020-01-01 RX ADMIN — AMIODARONE HYDROCHLORIDE 1 MG/MIN: 50 INJECTION, SOLUTION INTRAVENOUS at 18:49

## 2020-01-01 RX ADMIN — PREDNISONE 15 MG: 10 TABLET ORAL at 08:40

## 2020-01-01 RX ADMIN — POTASSIUM CHLORIDE 20 MEQ: 29.8 INJECTION, SOLUTION INTRAVENOUS at 09:23

## 2020-01-01 RX ADMIN — FUROSEMIDE 40 MG: 10 INJECTION, SOLUTION INTRAMUSCULAR; INTRAVENOUS at 16:05

## 2020-01-01 RX ADMIN — MIDODRINE HYDROCHLORIDE 10 MG: 5 TABLET ORAL at 08:28

## 2020-01-01 RX ADMIN — MIDODRINE HYDROCHLORIDE 10 MG: 5 TABLET ORAL at 10:07

## 2020-01-01 RX ADMIN — DOCOSANOL: 100 CREAM TOPICAL at 20:14

## 2020-01-01 RX ADMIN — DIGOXIN 500 MCG: 0.25 INJECTION INTRAMUSCULAR; INTRAVENOUS at 13:02

## 2020-01-01 RX ADMIN — MIDODRINE HYDROCHLORIDE 10 MG: 5 TABLET ORAL at 08:29

## 2020-01-01 RX ADMIN — Medication 10 ML: at 21:46

## 2020-01-01 RX ADMIN — PREDNISONE 10 MG: 10 TABLET ORAL at 11:09

## 2020-01-01 RX ADMIN — ENOXAPARIN SODIUM 80 MG: 80 INJECTION SUBCUTANEOUS at 09:23

## 2020-01-01 RX ADMIN — DOXYCYCLINE HYCLATE 100 MG: 100 TABLET, COATED ORAL at 21:43

## 2020-01-01 RX ADMIN — MIDODRINE HYDROCHLORIDE 10 MG: 5 TABLET ORAL at 17:22

## 2020-01-01 RX ADMIN — POTASSIUM CHLORIDE 20 MEQ: 29.8 INJECTION, SOLUTION INTRAVENOUS at 04:53

## 2020-01-01 RX ADMIN — MIDODRINE HYDROCHLORIDE 10 MG: 5 TABLET ORAL at 10:41

## 2020-01-01 RX ADMIN — MEXILETINE HYDROCHLORIDE 200 MG: 200 CAPSULE ORAL at 15:40

## 2020-01-01 RX ADMIN — MIDODRINE HYDROCHLORIDE 10 MG: 5 TABLET ORAL at 13:15

## 2020-01-01 RX ADMIN — PREDNISONE 20 MG: 20 TABLET ORAL at 08:32

## 2020-01-01 RX ADMIN — AZTREONAM 1 G: 1 INJECTION, POWDER, LYOPHILIZED, FOR SOLUTION INTRAMUSCULAR; INTRAVENOUS at 15:13

## 2020-01-01 RX ADMIN — ASPIRIN 81 MG: 81 TABLET, COATED ORAL at 08:39

## 2020-01-01 RX ADMIN — PREDNISONE 50 MG: 20 TABLET ORAL at 09:19

## 2020-01-01 RX ADMIN — MIDODRINE HYDROCHLORIDE 10 MG: 5 TABLET ORAL at 08:15

## 2020-01-01 RX ADMIN — AZTREONAM 1 G: 1 INJECTION, POWDER, LYOPHILIZED, FOR SOLUTION INTRAMUSCULAR; INTRAVENOUS at 05:58

## 2020-01-01 RX ADMIN — ENOXAPARIN SODIUM 80 MG: 80 INJECTION SUBCUTANEOUS at 22:24

## 2020-01-01 RX ADMIN — PANTOPRAZOLE SODIUM 40 MG: 40 INJECTION, POWDER, FOR SOLUTION INTRAVENOUS at 08:41

## 2020-01-01 RX ADMIN — ALLOPURINOL 200 MG: 100 TABLET ORAL at 08:45

## 2020-01-01 RX ADMIN — AMIODARONE HYDROCHLORIDE 400 MG: 200 TABLET ORAL at 08:25

## 2020-01-01 RX ADMIN — Medication 10 ML: at 09:46

## 2020-01-01 RX ADMIN — DIGOXIN 125 MCG: 0.25 INJECTION INTRAMUSCULAR; INTRAVENOUS at 03:54

## 2020-01-01 RX ADMIN — VALACYCLOVIR HYDROCHLORIDE 500 MG: 500 TABLET, FILM COATED ORAL at 20:24

## 2020-01-01 RX ADMIN — ACETAMINOPHEN 650 MG: 325 TABLET, FILM COATED ORAL at 18:51

## 2020-01-01 RX ADMIN — Medication: at 20:24

## 2020-01-01 RX ADMIN — MEXILETINE HYDROCHLORIDE 200 MG: 200 CAPSULE ORAL at 15:00

## 2020-01-01 RX ADMIN — TBO-FILGRASTIM 300 MCG: 300 INJECTION, SOLUTION SUBCUTANEOUS at 17:34

## 2020-01-01 RX ADMIN — INSULIN LISPRO 2 UNITS: 100 INJECTION, SOLUTION INTRAVENOUS; SUBCUTANEOUS at 20:27

## 2020-01-01 RX ADMIN — ACETAMINOPHEN 650 MG: 325 TABLET, FILM COATED ORAL at 18:34

## 2020-01-01 RX ADMIN — DOCOSANOL: 100 CREAM TOPICAL at 14:26

## 2020-01-01 RX ADMIN — PREDNISONE 20 MG: 20 TABLET ORAL at 09:17

## 2020-01-01 RX ADMIN — DOXYCYCLINE HYCLATE 100 MG: 100 TABLET, COATED ORAL at 20:00

## 2020-01-01 RX ADMIN — Medication 10 ML: at 20:15

## 2020-01-01 RX ADMIN — SODIUM CHLORIDE: 9 INJECTION, SOLUTION INTRAVENOUS at 13:13

## 2020-01-01 RX ADMIN — CIPROFLOXACIN HYDROCHLORIDE 500 MG: 500 TABLET, FILM COATED ORAL at 09:00

## 2020-01-01 RX ADMIN — ENOXAPARIN SODIUM 80 MG: 80 INJECTION SUBCUTANEOUS at 21:02

## 2020-01-01 RX ADMIN — Medication 125 MG: at 16:13

## 2020-01-01 RX ADMIN — DEXTROSE MONOHYDRATE 150 MG: 50 INJECTION, SOLUTION INTRAVENOUS at 14:11

## 2020-01-01 RX ADMIN — DOXYCYCLINE HYCLATE 100 MG: 100 TABLET, COATED ORAL at 08:48

## 2020-01-01 RX ADMIN — MIDODRINE HYDROCHLORIDE 10 MG: 5 TABLET ORAL at 17:30

## 2020-01-01 RX ADMIN — Medication 10 ML: at 20:25

## 2020-01-01 RX ADMIN — METHYLPREDNISOLONE SODIUM SUCCINATE 40 MG: 40 INJECTION, POWDER, FOR SOLUTION INTRAMUSCULAR; INTRAVENOUS at 15:37

## 2020-01-01 RX ADMIN — MIDODRINE HYDROCHLORIDE 10 MG: 5 TABLET ORAL at 12:20

## 2020-01-01 RX ADMIN — DIGOXIN 250 MCG: 0.25 INJECTION INTRAMUSCULAR; INTRAVENOUS at 21:03

## 2020-01-01 RX ADMIN — INSULIN LISPRO 1 UNITS: 100 INJECTION, SOLUTION INTRAVENOUS; SUBCUTANEOUS at 20:45

## 2020-01-01 RX ADMIN — TBO-FILGRASTIM 300 MCG: 300 INJECTION, SOLUTION SUBCUTANEOUS at 18:18

## 2020-01-01 RX ADMIN — METHYLPREDNISOLONE SODIUM SUCCINATE 40 MG: 40 INJECTION, POWDER, FOR SOLUTION INTRAMUSCULAR; INTRAVENOUS at 08:26

## 2020-01-01 RX ADMIN — POLYETHYLENE GLYCOL 3350 17 G: 17 POWDER, FOR SOLUTION ORAL at 22:33

## 2020-01-01 RX ADMIN — Medication 125 MG: at 04:36

## 2020-01-01 RX ADMIN — ENOXAPARIN SODIUM 80 MG: 80 INJECTION SUBCUTANEOUS at 22:43

## 2020-01-01 RX ADMIN — MIDODRINE HYDROCHLORIDE 5 MG: 5 TABLET ORAL at 10:46

## 2020-01-01 RX ADMIN — FUROSEMIDE 20 MG: 10 INJECTION, SOLUTION INTRAMUSCULAR; INTRAVENOUS at 01:07

## 2020-01-01 RX ADMIN — DIGOXIN 250 MCG: 0.25 INJECTION INTRAMUSCULAR; INTRAVENOUS at 05:25

## 2020-01-01 RX ADMIN — PREDNISONE 15 MG: 10 TABLET ORAL at 10:01

## 2020-01-01 RX ADMIN — Medication 10 ML: at 08:49

## 2020-01-01 RX ADMIN — METRONIDAZOLE 500 MG: 500 INJECTION, SOLUTION INTRAVENOUS at 21:28

## 2020-01-01 RX ADMIN — DOCOSANOL: 100 CREAM TOPICAL at 18:10

## 2020-01-01 RX ADMIN — DIGOXIN 125 MCG: 0.25 INJECTION INTRAMUSCULAR; INTRAVENOUS at 08:49

## 2020-01-01 RX ADMIN — MEXILETINE HYDROCHLORIDE 200 MG: 200 CAPSULE ORAL at 17:25

## 2020-01-01 RX ADMIN — INSULIN LISPRO 3 UNITS: 100 INJECTION, SOLUTION INTRAVENOUS; SUBCUTANEOUS at 20:14

## 2020-01-01 RX ADMIN — ENOXAPARIN SODIUM 80 MG: 80 INJECTION SUBCUTANEOUS at 20:30

## 2020-01-01 RX ADMIN — AMIODARONE HYDROCHLORIDE 200 MG: 200 TABLET ORAL at 20:01

## 2020-01-01 RX ADMIN — MUPIROCIN: 20 OINTMENT TOPICAL at 20:24

## 2020-01-01 RX ADMIN — FUROSEMIDE 10 MG/HR: 10 INJECTION, SOLUTION INTRAMUSCULAR; INTRAVENOUS at 13:57

## 2020-01-01 RX ADMIN — MEXILETINE HYDROCHLORIDE 200 MG: 200 CAPSULE ORAL at 21:44

## 2020-01-01 RX ADMIN — MEXILETINE HYDROCHLORIDE 200 MG: 200 CAPSULE ORAL at 14:45

## 2020-01-01 RX ADMIN — MUPIROCIN: 20 OINTMENT TOPICAL at 08:39

## 2020-01-01 RX ADMIN — DOCOSANOL: 100 CREAM TOPICAL at 12:09

## 2020-01-01 RX ADMIN — PROCHLORPERAZINE EDISYLATE 10 MG: 5 INJECTION INTRAMUSCULAR; INTRAVENOUS at 12:39

## 2020-01-01 RX ADMIN — MEXILETINE HYDROCHLORIDE 200 MG: 200 CAPSULE ORAL at 22:13

## 2020-01-01 RX ADMIN — PHENYLEPHRINE HYDROCHLORIDE 100 MCG: 10 INJECTION INTRAVENOUS at 13:47

## 2020-01-01 RX ADMIN — MEXILETINE HYDROCHLORIDE 200 MG: 200 CAPSULE ORAL at 21:02

## 2020-01-01 RX ADMIN — POLYETHYLENE GLYCOL 3350 17 G: 17 POWDER, FOR SOLUTION ORAL at 21:58

## 2020-01-01 RX ADMIN — MIDODRINE HYDROCHLORIDE 10 MG: 5 TABLET ORAL at 09:45

## 2020-01-01 RX ADMIN — MEXILETINE HYDROCHLORIDE 200 MG: 200 CAPSULE ORAL at 22:23

## 2020-01-01 RX ADMIN — VALACYCLOVIR HYDROCHLORIDE 500 MG: 500 TABLET, FILM COATED ORAL at 08:09

## 2020-01-01 RX ADMIN — DOXYCYCLINE HYCLATE 100 MG: 100 TABLET, COATED ORAL at 08:26

## 2020-01-01 RX ADMIN — Medication 10 ML: at 08:30

## 2020-01-01 RX ADMIN — AMIODARONE HYDROCHLORIDE 0.5 MG/MIN: 50 INJECTION, SOLUTION INTRAVENOUS at 03:50

## 2020-01-01 RX ADMIN — PANTOPRAZOLE SODIUM 40 MG: 40 TABLET, DELAYED RELEASE ORAL at 06:22

## 2020-01-01 RX ADMIN — SODIUM CHLORIDE: 9 INJECTION, SOLUTION INTRAVENOUS at 02:30

## 2020-01-01 RX ADMIN — Medication 10 MG: at 08:40

## 2020-01-01 RX ADMIN — MEXILETINE HYDROCHLORIDE 200 MG: 200 CAPSULE ORAL at 20:56

## 2020-01-01 RX ADMIN — INSULIN LISPRO 5 UNITS: 100 INJECTION, SOLUTION INTRAVENOUS; SUBCUTANEOUS at 13:11

## 2020-01-01 RX ADMIN — POTASSIUM CHLORIDE 20 MEQ: 400 INJECTION, SOLUTION INTRAVENOUS at 08:43

## 2020-01-01 RX ADMIN — Medication 10 ML: at 21:41

## 2020-01-01 RX ADMIN — DOCOSANOL: 100 CREAM TOPICAL at 10:42

## 2020-01-01 RX ADMIN — Medication 10 ML: at 08:38

## 2020-01-01 RX ADMIN — LIDOCAINE HYDROCHLORIDE 30 MG: 20 INJECTION, SOLUTION INFILTRATION; PERINEURAL at 08:34

## 2020-01-01 RX ADMIN — MIDODRINE HYDROCHLORIDE 2.5 MG: 5 TABLET ORAL at 09:00

## 2020-01-01 RX ADMIN — ENOXAPARIN SODIUM 80 MG: 80 INJECTION SUBCUTANEOUS at 20:26

## 2020-01-01 RX ADMIN — FUROSEMIDE 40 MG: 10 INJECTION, SOLUTION INTRAMUSCULAR; INTRAVENOUS at 08:26

## 2020-01-01 RX ADMIN — MIDODRINE HYDROCHLORIDE 10 MG: 5 TABLET ORAL at 11:51

## 2020-01-01 RX ADMIN — AMIODARONE HYDROCHLORIDE 0.5 MG/MIN: 50 INJECTION, SOLUTION INTRAVENOUS at 20:03

## 2020-01-01 RX ADMIN — PANTOPRAZOLE SODIUM 40 MG: 40 TABLET, DELAYED RELEASE ORAL at 06:53

## 2020-01-01 RX ADMIN — MIDODRINE HYDROCHLORIDE 5 MG: 5 TABLET ORAL at 08:26

## 2020-01-01 RX ADMIN — DIPHENHYDRAMINE HYDROCHLORIDE 25 MG: 50 INJECTION, SOLUTION INTRAMUSCULAR; INTRAVENOUS at 12:14

## 2020-01-01 RX ADMIN — Medication 10 ML: at 21:06

## 2020-01-01 RX ADMIN — VALACYCLOVIR HYDROCHLORIDE 500 MG: 500 TABLET, FILM COATED ORAL at 08:38

## 2020-01-01 RX ADMIN — ALLOPURINOL 200 MG: 100 TABLET ORAL at 08:39

## 2020-01-01 RX ADMIN — MIDODRINE HYDROCHLORIDE 10 MG: 5 TABLET ORAL at 10:18

## 2020-01-01 RX ADMIN — DIPHENHYDRAMINE HYDROCHLORIDE 25 MG: 50 INJECTION, SOLUTION INTRAMUSCULAR; INTRAVENOUS at 07:52

## 2020-01-01 RX ADMIN — Medication 10 ML: at 20:03

## 2020-01-01 RX ADMIN — ASPIRIN 81 MG 81 MG: 81 TABLET ORAL at 13:06

## 2020-01-01 RX ADMIN — INSULIN LISPRO 2 UNITS: 100 INJECTION, SOLUTION INTRAVENOUS; SUBCUTANEOUS at 18:50

## 2020-01-01 RX ADMIN — DIPHENHYDRAMINE HYDROCHLORIDE 25 MG: 50 INJECTION, SOLUTION INTRAMUSCULAR; INTRAVENOUS at 13:06

## 2020-01-01 RX ADMIN — PREDNISONE 20 MG: 20 TABLET ORAL at 08:20

## 2020-01-01 RX ADMIN — MIDODRINE HYDROCHLORIDE 5 MG: 5 TABLET ORAL at 08:19

## 2020-01-01 RX ADMIN — Medication 5 MG: at 13:42

## 2020-01-01 RX ADMIN — INSULIN LISPRO 5 UNITS: 100 INJECTION, SOLUTION INTRAVENOUS; SUBCUTANEOUS at 16:36

## 2020-01-01 RX ADMIN — Medication: at 20:53

## 2020-01-01 RX ADMIN — ASPIRIN 81 MG 81 MG: 81 TABLET ORAL at 10:27

## 2020-01-01 RX ADMIN — DOCOSANOL: 100 CREAM TOPICAL at 22:35

## 2020-01-01 RX ADMIN — MEXILETINE HYDROCHLORIDE 200 MG: 200 CAPSULE ORAL at 22:02

## 2020-01-01 RX ADMIN — ATORVASTATIN CALCIUM 10 MG: 10 TABLET, FILM COATED ORAL at 08:49

## 2020-01-01 RX ADMIN — HEPARIN SODIUM 18 UNITS/KG/HR: 10000 INJECTION, SOLUTION INTRAVENOUS at 04:17

## 2020-01-01 RX ADMIN — Medication 125 MG: at 09:00

## 2020-01-01 RX ADMIN — AZTREONAM 1 G: 1 INJECTION, POWDER, LYOPHILIZED, FOR SOLUTION INTRAMUSCULAR; INTRAVENOUS at 03:02

## 2020-01-01 RX ADMIN — NYSTATIN 5 ML: 100000 SUSPENSION ORAL at 22:51

## 2020-01-01 RX ADMIN — Medication 10 ML: at 08:40

## 2020-01-01 RX ADMIN — AMIODARONE HYDROCHLORIDE 400 MG: 200 TABLET ORAL at 12:42

## 2020-01-01 RX ADMIN — ENOXAPARIN SODIUM 80 MG: 80 INJECTION SUBCUTANEOUS at 20:24

## 2020-01-01 RX ADMIN — INSULIN LISPRO 2 UNITS: 100 INJECTION, SOLUTION INTRAVENOUS; SUBCUTANEOUS at 08:43

## 2020-01-01 RX ADMIN — PROCHLORPERAZINE EDISYLATE 10 MG: 5 INJECTION INTRAMUSCULAR; INTRAVENOUS at 09:48

## 2020-01-01 RX ADMIN — AMIODARONE HYDROCHLORIDE 200 MG: 200 TABLET ORAL at 10:45

## 2020-01-01 RX ADMIN — PHENYLEPHRINE HYDROCHLORIDE 80 MCG/MIN: 10 INJECTION INTRAVENOUS at 07:23

## 2020-01-01 RX ADMIN — AZTREONAM 1 G: 1 INJECTION, POWDER, LYOPHILIZED, FOR SOLUTION INTRAMUSCULAR; INTRAVENOUS at 22:04

## 2020-01-01 RX ADMIN — VANCOMYCIN HYDROCHLORIDE 1000 MG: 1 INJECTION, POWDER, LYOPHILIZED, FOR SOLUTION INTRAVENOUS at 02:49

## 2020-01-01 RX ADMIN — MEXILETINE HYDROCHLORIDE 200 MG: 200 CAPSULE ORAL at 06:32

## 2020-01-01 RX ADMIN — Medication 10 MG: at 08:35

## 2020-01-01 RX ADMIN — INSULIN LISPRO 5 UNITS: 100 INJECTION, SOLUTION INTRAVENOUS; SUBCUTANEOUS at 10:03

## 2020-01-01 RX ADMIN — SODIUM CHLORIDE: 9 INJECTION, SOLUTION INTRAVENOUS at 11:18

## 2020-01-01 RX ADMIN — MUPIROCIN: 20 OINTMENT TOPICAL at 21:01

## 2020-01-01 RX ADMIN — MEXILETINE HYDROCHLORIDE 200 MG: 200 CAPSULE ORAL at 15:50

## 2020-01-01 RX ADMIN — ASPIRIN 81 MG 81 MG: 81 TABLET ORAL at 08:32

## 2020-01-01 RX ADMIN — METRONIDAZOLE 500 MG: 250 TABLET, FILM COATED ORAL at 06:24

## 2020-01-01 RX ADMIN — VALACYCLOVIR HYDROCHLORIDE 500 MG: 500 TABLET, FILM COATED ORAL at 20:14

## 2020-01-01 RX ADMIN — MIDODRINE HYDROCHLORIDE 10 MG: 5 TABLET ORAL at 12:12

## 2020-01-01 RX ADMIN — DOCOSANOL: 100 CREAM TOPICAL at 11:28

## 2020-01-01 RX ADMIN — MEXILETINE HYDROCHLORIDE 200 MG: 200 CAPSULE ORAL at 21:27

## 2020-01-01 RX ADMIN — MIDODRINE HYDROCHLORIDE 10 MG: 5 TABLET ORAL at 09:01

## 2020-01-01 RX ADMIN — PROMETHAZINE HYDROCHLORIDE 12.5 MG: 25 INJECTION INTRAMUSCULAR; INTRAVENOUS at 02:57

## 2020-01-01 RX ADMIN — Medication 10 ML: at 09:17

## 2020-01-01 RX ADMIN — FUROSEMIDE 10 MG/HR: 10 INJECTION, SOLUTION INTRAMUSCULAR; INTRAVENOUS at 00:23

## 2020-01-01 RX ADMIN — Medication: at 21:08

## 2020-01-01 RX ADMIN — Medication 10 ML: at 10:43

## 2020-01-01 RX ADMIN — MIDODRINE HYDROCHLORIDE 10 MG: 5 TABLET ORAL at 13:43

## 2020-01-01 RX ADMIN — METHYLPREDNISOLONE SODIUM SUCCINATE 40 MG: 40 INJECTION, POWDER, FOR SOLUTION INTRAMUSCULAR; INTRAVENOUS at 13:28

## 2020-01-01 RX ADMIN — MIDODRINE HYDROCHLORIDE 10 MG: 5 TABLET ORAL at 17:38

## 2020-01-01 RX ADMIN — FUROSEMIDE 40 MG: 40 TABLET ORAL at 09:17

## 2020-01-01 RX ADMIN — INSULIN LISPRO 2 UNITS: 100 INJECTION, SOLUTION INTRAVENOUS; SUBCUTANEOUS at 08:25

## 2020-01-01 RX ADMIN — Medication: at 08:38

## 2020-01-01 RX ADMIN — PROCHLORPERAZINE EDISYLATE 10 MG: 5 INJECTION INTRAMUSCULAR; INTRAVENOUS at 08:30

## 2020-01-01 RX ADMIN — PHENYLEPHRINE HYDROCHLORIDE 100 MCG: 10 INJECTION INTRAVENOUS at 08:35

## 2020-01-01 RX ADMIN — Medication 10 ML: at 08:20

## 2020-01-01 RX ADMIN — Medication 10 ML: at 20:53

## 2020-01-01 RX ADMIN — ALLOPURINOL 200 MG: 100 TABLET ORAL at 09:33

## 2020-01-01 RX ADMIN — SODIUM CHLORIDE: 9 INJECTION, SOLUTION INTRAVENOUS at 21:44

## 2020-01-01 RX ADMIN — AMIODARONE HYDROCHLORIDE 400 MG: 200 TABLET ORAL at 09:41

## 2020-01-01 RX ADMIN — Medication 1 CAPSULE: at 10:11

## 2020-01-01 RX ADMIN — Medication 10 ML: at 10:25

## 2020-01-01 RX ADMIN — NYSTATIN 5 ML: 100000 SUSPENSION ORAL at 17:39

## 2020-01-01 RX ADMIN — Medication 10 ML: at 11:29

## 2020-01-01 RX ADMIN — DIGOXIN 125 MCG: 125 TABLET ORAL at 10:14

## 2020-01-01 RX ADMIN — AMIODARONE HYDROCHLORIDE 200 MG: 200 TABLET ORAL at 09:38

## 2020-01-01 RX ADMIN — AMIODARONE HYDROCHLORIDE 0.5 MG/MIN: 50 INJECTION, SOLUTION INTRAVENOUS at 05:38

## 2020-01-01 RX ADMIN — MIDODRINE HYDROCHLORIDE 5 MG: 5 TABLET ORAL at 11:51

## 2020-01-01 RX ADMIN — ATORVASTATIN CALCIUM 10 MG: 10 TABLET, FILM COATED ORAL at 08:51

## 2020-01-01 RX ADMIN — MIDODRINE HYDROCHLORIDE 10 MG: 5 TABLET ORAL at 17:01

## 2020-01-01 RX ADMIN — MEXILETINE HYDROCHLORIDE 200 MG: 200 CAPSULE ORAL at 21:51

## 2020-01-01 RX ADMIN — Medication 125 MG: at 22:42

## 2020-01-01 RX ADMIN — Medication 10 ML: at 10:46

## 2020-01-01 RX ADMIN — MIDODRINE HYDROCHLORIDE 10 MG: 5 TABLET ORAL at 17:16

## 2020-01-01 RX ADMIN — DOCOSANOL: 100 CREAM TOPICAL at 15:41

## 2020-01-01 RX ADMIN — ASPIRIN 81 MG 81 MG: 81 TABLET ORAL at 08:29

## 2020-01-01 RX ADMIN — DOXYCYCLINE HYCLATE 100 MG: 100 TABLET, COATED ORAL at 20:02

## 2020-01-01 RX ADMIN — PANTOPRAZOLE SODIUM 40 MG: 40 TABLET, DELAYED RELEASE ORAL at 08:45

## 2020-01-01 RX ADMIN — PROCHLORPERAZINE EDISYLATE 10 MG: 5 INJECTION INTRAMUSCULAR; INTRAVENOUS at 15:55

## 2020-01-01 RX ADMIN — Medication 10 ML: at 21:03

## 2020-01-01 RX ADMIN — MIDODRINE HYDROCHLORIDE 10 MG: 5 TABLET ORAL at 07:50

## 2020-01-01 RX ADMIN — PROMETHAZINE HYDROCHLORIDE 12.5 MG: 25 INJECTION INTRAMUSCULAR; INTRAVENOUS at 12:01

## 2020-01-01 RX ADMIN — SODIUM CHLORIDE: 9 INJECTION, SOLUTION INTRAVENOUS at 12:24

## 2020-01-01 RX ADMIN — Medication 10 MG: at 12:37

## 2020-01-01 RX ADMIN — ATORVASTATIN CALCIUM 10 MG: 10 TABLET, FILM COATED ORAL at 09:58

## 2020-01-01 RX ADMIN — MEXILETINE HYDROCHLORIDE 200 MG: 200 CAPSULE ORAL at 15:32

## 2020-01-01 RX ADMIN — ENOXAPARIN SODIUM 80 MG: 80 INJECTION SUBCUTANEOUS at 10:14

## 2020-01-01 RX ADMIN — ALLOPURINOL 200 MG: 100 TABLET ORAL at 09:17

## 2020-01-01 RX ADMIN — PREDNISONE 15 MG: 10 TABLET ORAL at 09:06

## 2020-01-01 RX ADMIN — Medication 10 ML: at 10:24

## 2020-01-01 RX ADMIN — POLYETHYLENE GLYCOL 3350 17 G: 17 POWDER, FOR SOLUTION ORAL at 09:01

## 2020-01-01 RX ADMIN — METOPROLOL TARTRATE 2.5 MG: 5 INJECTION INTRAVENOUS at 03:54

## 2020-01-01 RX ADMIN — CIPROFLOXACIN HYDROCHLORIDE 500 MG: 500 TABLET, FILM COATED ORAL at 21:16

## 2020-01-01 RX ADMIN — MIDODRINE HYDROCHLORIDE 10 MG: 5 TABLET ORAL at 13:36

## 2020-01-01 RX ADMIN — Medication 10 ML: at 08:00

## 2020-01-01 RX ADMIN — MIDODRINE HYDROCHLORIDE 10 MG: 5 TABLET ORAL at 11:04

## 2020-01-01 RX ADMIN — SODIUM CHLORIDE: 9 INJECTION, SOLUTION INTRAVENOUS at 11:43

## 2020-01-01 RX ADMIN — MIDODRINE HYDROCHLORIDE 10 MG: 5 TABLET ORAL at 18:26

## 2020-01-01 RX ADMIN — ASPIRIN 81 MG 81 MG: 81 TABLET ORAL at 10:08

## 2020-01-01 RX ADMIN — POTASSIUM CHLORIDE 20 MEQ: 29.8 INJECTION, SOLUTION INTRAVENOUS at 07:29

## 2020-01-01 RX ADMIN — ENOXAPARIN SODIUM 80 MG: 80 INJECTION SUBCUTANEOUS at 08:11

## 2020-01-01 RX ADMIN — FLUCONAZOLE 200 MG: 200 TABLET ORAL at 09:06

## 2020-01-01 RX ADMIN — ACETAMINOPHEN 650 MG: 325 TABLET, FILM COATED ORAL at 23:24

## 2020-01-01 RX ADMIN — ATORVASTATIN CALCIUM 10 MG: 10 TABLET, FILM COATED ORAL at 10:08

## 2020-01-01 RX ADMIN — MEXILETINE HYDROCHLORIDE 200 MG: 200 CAPSULE ORAL at 06:28

## 2020-01-01 RX ADMIN — Medication 10 ML: at 20:14

## 2020-01-01 RX ADMIN — NYSTATIN 5 ML: 100000 SUSPENSION ORAL at 20:18

## 2020-01-01 RX ADMIN — VANCOMYCIN HYDROCHLORIDE 1000 MG: 10 INJECTION, POWDER, LYOPHILIZED, FOR SOLUTION INTRAVENOUS at 00:44

## 2020-01-01 RX ADMIN — Medication 10 ML: at 20:26

## 2020-01-01 RX ADMIN — PREDNISONE 50 MG: 20 TABLET ORAL at 08:09

## 2020-01-01 RX ADMIN — INSULIN LISPRO 2 UNITS: 100 INJECTION, SOLUTION INTRAVENOUS; SUBCUTANEOUS at 17:36

## 2020-01-01 RX ADMIN — MIDODRINE HYDROCHLORIDE 10 MG: 5 TABLET ORAL at 08:52

## 2020-01-01 RX ADMIN — AMIODARONE HYDROCHLORIDE 1 MG/MIN: 50 INJECTION, SOLUTION INTRAVENOUS at 10:12

## 2020-01-01 RX ADMIN — SODIUM CHLORIDE 8 MG/HR: 9 INJECTION, SOLUTION INTRAVENOUS at 18:08

## 2020-01-01 RX ADMIN — ENOXAPARIN SODIUM 80 MG: 80 INJECTION SUBCUTANEOUS at 20:13

## 2020-01-01 RX ADMIN — Medication: at 11:43

## 2020-01-01 RX ADMIN — AMIODARONE HYDROCHLORIDE 1 MG/MIN: 50 INJECTION, SOLUTION INTRAVENOUS at 21:02

## 2020-01-01 RX ADMIN — VALACYCLOVIR HYDROCHLORIDE 500 MG: 500 TABLET, FILM COATED ORAL at 08:45

## 2020-01-01 RX ADMIN — HEPARIN SODIUM 18 UNITS/KG/HR: 10000 INJECTION, SOLUTION INTRAVENOUS at 11:47

## 2020-01-01 RX ADMIN — DIGOXIN 125 MCG: 0.25 INJECTION INTRAMUSCULAR; INTRAVENOUS at 08:41

## 2020-01-01 RX ADMIN — NYSTATIN 5 ML: 100000 SUSPENSION ORAL at 22:34

## 2020-01-01 RX ADMIN — DIGOXIN 125 MCG: 125 TABLET ORAL at 10:45

## 2020-01-01 RX ADMIN — ASPIRIN 81 MG: 81 TABLET, COATED ORAL at 08:38

## 2020-01-01 RX ADMIN — DIGOXIN 125 MCG: 125 TABLET ORAL at 08:29

## 2020-01-01 RX ADMIN — DIGOXIN 125 MCG: 0.25 INJECTION INTRAMUSCULAR; INTRAVENOUS at 13:20

## 2020-01-01 RX ADMIN — PROMETHAZINE HYDROCHLORIDE 12.5 MG: 25 INJECTION INTRAMUSCULAR; INTRAVENOUS at 13:25

## 2020-01-01 RX ADMIN — PROCHLORPERAZINE EDISYLATE 10 MG: 5 INJECTION INTRAMUSCULAR; INTRAVENOUS at 10:40

## 2020-01-01 RX ADMIN — INSULIN LISPRO 5 UNITS: 100 INJECTION, SOLUTION INTRAVENOUS; SUBCUTANEOUS at 12:02

## 2020-01-01 RX ADMIN — ASPIRIN 81 MG: 81 TABLET, COATED ORAL at 09:34

## 2020-01-01 RX ADMIN — INSULIN LISPRO 2 UNITS: 100 INJECTION, SOLUTION INTRAVENOUS; SUBCUTANEOUS at 17:55

## 2020-01-01 RX ADMIN — MUPIROCIN: 20 OINTMENT TOPICAL at 22:29

## 2020-01-01 RX ADMIN — AMIODARONE HYDROCHLORIDE 200 MG: 200 TABLET ORAL at 08:59

## 2020-01-01 RX ADMIN — AMIODARONE HYDROCHLORIDE 400 MG: 200 TABLET ORAL at 08:09

## 2020-01-01 RX ADMIN — Medication 125 MG: at 13:48

## 2020-01-01 RX ADMIN — PANTOPRAZOLE SODIUM 40 MG: 40 TABLET, DELAYED RELEASE ORAL at 05:56

## 2020-01-01 RX ADMIN — PREDNISONE 20 MG: 20 TABLET ORAL at 08:45

## 2020-01-01 RX ADMIN — WARFARIN SODIUM 0.5 MG: 1 TABLET ORAL at 17:16

## 2020-01-01 RX ADMIN — AMIODARONE HYDROCHLORIDE 0.5 MG/MIN: 50 INJECTION, SOLUTION INTRAVENOUS at 12:41

## 2020-01-01 RX ADMIN — DEXTROSE MONOHYDRATE 150 MG: 50 INJECTION, SOLUTION INTRAVENOUS at 16:39

## 2020-01-01 RX ADMIN — NYSTATIN 5 ML: 100000 SUSPENSION ORAL at 10:29

## 2020-01-01 RX ADMIN — FUROSEMIDE 20 MG: 20 TABLET ORAL at 08:29

## 2020-01-01 RX ADMIN — Medication 1 CAPSULE: at 08:28

## 2020-01-01 RX ADMIN — VALACYCLOVIR HYDROCHLORIDE 500 MG: 500 TABLET, FILM COATED ORAL at 09:06

## 2020-01-01 RX ADMIN — FLUCONAZOLE 200 MG: 200 TABLET ORAL at 08:40

## 2020-01-01 RX ADMIN — Medication 125 MG: at 02:49

## 2020-01-01 RX ADMIN — Medication 1 CAPSULE: at 09:44

## 2020-01-01 RX ADMIN — DIPHENHYDRAMINE HYDROCHLORIDE 25 MG: 50 INJECTION, SOLUTION INTRAMUSCULAR; INTRAVENOUS at 21:00

## 2020-01-01 RX ADMIN — AMIODARONE HYDROCHLORIDE 0.5 MG/MIN: 50 INJECTION, SOLUTION INTRAVENOUS at 08:36

## 2020-01-01 RX ADMIN — PREDNISONE 20 MG: 20 TABLET ORAL at 08:51

## 2020-01-01 RX ADMIN — Medication 1 CAPSULE: at 08:41

## 2020-01-01 RX ADMIN — SODIUM CHLORIDE: 9 INJECTION, SOLUTION INTRAVENOUS at 11:52

## 2020-01-01 RX ADMIN — Medication 10 ML: at 20:57

## 2020-01-01 RX ADMIN — VALACYCLOVIR HYDROCHLORIDE 500 MG: 500 TABLET, FILM COATED ORAL at 21:00

## 2020-01-01 RX ADMIN — AZTREONAM 1 G: 1 INJECTION, POWDER, LYOPHILIZED, FOR SOLUTION INTRAMUSCULAR; INTRAVENOUS at 22:09

## 2020-01-01 RX ADMIN — DIGOXIN 125 MCG: 125 TABLET ORAL at 10:08

## 2020-01-01 RX ADMIN — MIDODRINE HYDROCHLORIDE 10 MG: 5 TABLET ORAL at 08:09

## 2020-01-01 RX ADMIN — MIDODRINE HYDROCHLORIDE 10 MG: 5 TABLET ORAL at 12:39

## 2020-01-01 RX ADMIN — SODIUM CHLORIDE: 9 INJECTION, SOLUTION INTRAVENOUS at 04:16

## 2020-01-01 RX ADMIN — DOXYCYCLINE HYCLATE 100 MG: 100 TABLET, COATED ORAL at 08:19

## 2020-01-01 RX ADMIN — AMIODARONE HYDROCHLORIDE 1 MG/MIN: 50 INJECTION, SOLUTION INTRAVENOUS at 22:36

## 2020-01-01 RX ADMIN — FUROSEMIDE 40 MG: 10 INJECTION, SOLUTION INTRAMUSCULAR; INTRAVENOUS at 12:17

## 2020-01-01 RX ADMIN — PANTOPRAZOLE SODIUM 40 MG: 40 INJECTION, POWDER, FOR SOLUTION INTRAVENOUS at 09:42

## 2020-01-01 RX ADMIN — DOBUTAMINE IN DEXTROSE 1 MCG/KG/MIN: 200 INJECTION, SOLUTION INTRAVENOUS at 22:18

## 2020-01-01 RX ADMIN — DOCOSANOL: 100 CREAM TOPICAL at 23:12

## 2020-01-01 RX ADMIN — AZTREONAM 1 G: 1 INJECTION, POWDER, LYOPHILIZED, FOR SOLUTION INTRAMUSCULAR; INTRAVENOUS at 16:56

## 2020-01-01 RX ADMIN — DIGOXIN 125 MCG: 0.25 INJECTION INTRAMUSCULAR; INTRAVENOUS at 11:17

## 2020-01-01 RX ADMIN — Medication: at 08:11

## 2020-01-01 RX ADMIN — AMIODARONE HYDROCHLORIDE 1 MG/MIN: 50 INJECTION, SOLUTION INTRAVENOUS at 22:06

## 2020-01-01 RX ADMIN — DIGOXIN 125 MCG: 125 TABLET ORAL at 10:41

## 2020-01-01 RX ADMIN — PROCHLORPERAZINE EDISYLATE 10 MG: 5 INJECTION INTRAMUSCULAR; INTRAVENOUS at 09:02

## 2020-01-01 RX ADMIN — DIGOXIN 125 MCG: 125 TABLET ORAL at 09:37

## 2020-01-01 RX ADMIN — ATORVASTATIN CALCIUM 10 MG: 10 TABLET, FILM COATED ORAL at 08:45

## 2020-01-01 RX ADMIN — MIDAZOLAM HYDROCHLORIDE 4 MG: 1 INJECTION INTRAMUSCULAR; INTRAVENOUS at 05:05

## 2020-01-01 RX ADMIN — Medication 10 ML: at 20:01

## 2020-01-01 RX ADMIN — Medication: at 09:40

## 2020-01-01 RX ADMIN — ALBUMIN (HUMAN) 25 G: 0.25 INJECTION, SOLUTION INTRAVENOUS at 03:33

## 2020-01-01 RX ADMIN — NAPROXEN 250 MG: 250 TABLET ORAL at 16:24

## 2020-01-01 RX ADMIN — PROCHLORPERAZINE EDISYLATE 10 MG: 5 INJECTION INTRAMUSCULAR; INTRAVENOUS at 08:14

## 2020-01-01 RX ADMIN — AMIODARONE HYDROCHLORIDE 1 MG/MIN: 50 INJECTION, SOLUTION INTRAVENOUS at 13:44

## 2020-01-01 RX ADMIN — Medication: at 09:00

## 2020-01-01 RX ADMIN — SODIUM CHLORIDE: 9 INJECTION, SOLUTION INTRAVENOUS at 03:31

## 2020-01-01 RX ADMIN — MIDODRINE HYDROCHLORIDE 10 MG: 5 TABLET ORAL at 18:00

## 2020-01-01 RX ADMIN — METRONIDAZOLE 500 MG: 250 TABLET, FILM COATED ORAL at 22:01

## 2020-01-01 RX ADMIN — MEXILETINE HYDROCHLORIDE 200 MG: 200 CAPSULE ORAL at 06:23

## 2020-01-01 RX ADMIN — ATORVASTATIN CALCIUM 10 MG: 10 TABLET, FILM COATED ORAL at 21:15

## 2020-01-01 RX ADMIN — DIGOXIN 125 MCG: 125 TABLET ORAL at 09:58

## 2020-01-01 RX ADMIN — Medication: at 10:02

## 2020-01-01 RX ADMIN — ASPIRIN 81 MG 81 MG: 81 TABLET ORAL at 08:49

## 2020-01-01 RX ADMIN — MIDODRINE HYDROCHLORIDE 10 MG: 5 TABLET ORAL at 09:34

## 2020-01-01 RX ADMIN — SODIUM CHLORIDE 300 ML: 9 INJECTION, SOLUTION INTRAVENOUS at 15:54

## 2020-01-01 RX ADMIN — Medication 1 CAPSULE: at 09:01

## 2020-01-01 RX ADMIN — PANTOPRAZOLE SODIUM 40 MG: 40 TABLET, DELAYED RELEASE ORAL at 06:39

## 2020-01-01 RX ADMIN — ALTEPLASE 1 MG: 2.2 INJECTION, POWDER, LYOPHILIZED, FOR SOLUTION INTRAVENOUS at 21:29

## 2020-01-01 RX ADMIN — AMIODARONE HYDROCHLORIDE 200 MG: 200 TABLET ORAL at 08:52

## 2020-01-01 RX ADMIN — ALLOPURINOL 200 MG: 100 TABLET ORAL at 10:41

## 2020-01-01 RX ADMIN — PANTOPRAZOLE SODIUM 40 MG: 40 TABLET, DELAYED RELEASE ORAL at 05:58

## 2020-01-01 RX ADMIN — NOREPINEPHRINE BITARTRATE 10 MCG/MIN: 1 INJECTION, SOLUTION, CONCENTRATE INTRAVENOUS at 08:04

## 2020-01-01 RX ADMIN — Medication 10 ML: at 09:00

## 2020-01-01 RX ADMIN — Medication 10 ML: at 20:12

## 2020-01-01 RX ADMIN — SODIUM CHLORIDE: 9 INJECTION, SOLUTION INTRAVENOUS at 14:32

## 2020-01-01 RX ADMIN — MEXILETINE HYDROCHLORIDE 200 MG: 200 CAPSULE ORAL at 20:53

## 2020-01-01 RX ADMIN — POTASSIUM CHLORIDE 20 MEQ: 29.8 INJECTION, SOLUTION INTRAVENOUS at 06:05

## 2020-01-01 RX ADMIN — Medication 10 MG: at 12:47

## 2020-01-01 RX ADMIN — MEXILETINE HYDROCHLORIDE 200 MG: 200 CAPSULE ORAL at 15:10

## 2020-01-01 RX ADMIN — ASPIRIN 81 MG 81 MG: 81 TABLET ORAL at 08:19

## 2020-01-01 RX ADMIN — DOCOSANOL: 100 CREAM TOPICAL at 12:02

## 2020-01-01 RX ADMIN — VASOPRESSIN 1 UNITS: 20 INJECTION INTRAVENOUS at 12:38

## 2020-01-01 RX ADMIN — ATORVASTATIN CALCIUM 10 MG: 10 TABLET, FILM COATED ORAL at 08:40

## 2020-01-01 RX ADMIN — PANTOPRAZOLE SODIUM 40 MG: 40 TABLET, DELAYED RELEASE ORAL at 07:02

## 2020-01-01 RX ADMIN — DOCOSANOL: 100 CREAM TOPICAL at 19:05

## 2020-01-01 RX ADMIN — AMIODARONE HYDROCHLORIDE 400 MG: 200 TABLET ORAL at 21:08

## 2020-01-01 RX ADMIN — INSULIN LISPRO 5 UNITS: 100 INJECTION, SOLUTION INTRAVENOUS; SUBCUTANEOUS at 17:52

## 2020-01-01 RX ADMIN — NYSTATIN 5 ML: 100000 SUSPENSION ORAL at 16:01

## 2020-01-01 RX ADMIN — NAPROXEN 250 MG: 250 TABLET ORAL at 16:51

## 2020-01-01 RX ADMIN — ENOXAPARIN SODIUM 80 MG: 80 INJECTION SUBCUTANEOUS at 09:13

## 2020-01-01 RX ADMIN — DOBUTAMINE IN DEXTROSE 5 MCG/KG/MIN: 200 INJECTION, SOLUTION INTRAVENOUS at 16:04

## 2020-01-01 RX ADMIN — Medication 0.15 MCG/KG/MIN: at 21:20

## 2020-01-01 RX ADMIN — Medication 10 ML: at 15:43

## 2020-01-01 RX ADMIN — Medication 10 ML: at 21:56

## 2020-01-01 RX ADMIN — ATORVASTATIN CALCIUM 10 MG: 10 TABLET, FILM COATED ORAL at 09:20

## 2020-01-01 RX ADMIN — MIDODRINE HYDROCHLORIDE 10 MG: 5 TABLET ORAL at 12:46

## 2020-01-01 RX ADMIN — MEXILETINE HYDROCHLORIDE 200 MG: 200 CAPSULE ORAL at 07:39

## 2020-01-01 RX ADMIN — MIDODRINE HYDROCHLORIDE 10 MG: 5 TABLET ORAL at 11:56

## 2020-01-01 RX ADMIN — Medication 10 ML: at 20:06

## 2020-01-01 RX ADMIN — VANCOMYCIN HYDROCHLORIDE 1250 MG: 10 INJECTION, POWDER, LYOPHILIZED, FOR SOLUTION INTRAVENOUS at 09:20

## 2020-01-01 RX ADMIN — DOCOSANOL: 100 CREAM TOPICAL at 11:04

## 2020-01-01 RX ADMIN — MEXILETINE HYDROCHLORIDE 200 MG: 200 CAPSULE ORAL at 09:23

## 2020-01-01 RX ADMIN — ENOXAPARIN SODIUM 80 MG: 80 INJECTION SUBCUTANEOUS at 08:10

## 2020-01-01 RX ADMIN — MIDODRINE HYDROCHLORIDE 10 MG: 5 TABLET ORAL at 17:35

## 2020-01-01 RX ADMIN — PROMETHAZINE HYDROCHLORIDE 12.5 MG: 25 INJECTION INTRAMUSCULAR; INTRAVENOUS at 07:59

## 2020-01-01 RX ADMIN — SODIUM CHLORIDE 2000 ML: 9 INJECTION, SOLUTION INTRAVENOUS at 09:12

## 2020-01-01 RX ADMIN — MIDODRINE HYDROCHLORIDE 5 MG: 5 TABLET ORAL at 07:41

## 2020-01-01 RX ADMIN — Medication 10 ML: at 08:12

## 2020-01-01 RX ADMIN — Medication 10 ML: at 11:14

## 2020-01-01 RX ADMIN — Medication 1 CAPSULE: at 08:59

## 2020-01-01 RX ADMIN — INSULIN LISPRO 5 UNITS: 100 INJECTION, SOLUTION INTRAVENOUS; SUBCUTANEOUS at 13:13

## 2020-01-01 RX ADMIN — NYSTATIN 5 ML: 100000 SUSPENSION ORAL at 13:11

## 2020-01-01 RX ADMIN — INSULIN LISPRO 4 UNITS: 100 INJECTION, SOLUTION INTRAVENOUS; SUBCUTANEOUS at 11:57

## 2020-01-01 RX ADMIN — AMIODARONE HYDROCHLORIDE 1 MG/MIN: 50 INJECTION, SOLUTION INTRAVENOUS at 10:50

## 2020-01-01 RX ADMIN — DOXYCYCLINE HYCLATE 100 MG: 100 TABLET, COATED ORAL at 20:06

## 2020-01-01 RX ADMIN — INSULIN LISPRO 2 UNITS: 100 INJECTION, SOLUTION INTRAVENOUS; SUBCUTANEOUS at 11:20

## 2020-01-01 RX ADMIN — MIDODRINE HYDROCHLORIDE 10 MG: 5 TABLET ORAL at 17:34

## 2020-01-01 RX ADMIN — ONDANSETRON 4 MG: 2 INJECTION INTRAMUSCULAR; INTRAVENOUS at 08:33

## 2020-01-01 RX ADMIN — Medication 10 ML: at 22:35

## 2020-01-01 RX ADMIN — Medication 1 CAPSULE: at 10:41

## 2020-01-01 RX ADMIN — DOCOSANOL: 100 CREAM TOPICAL at 10:08

## 2020-01-01 RX ADMIN — AMIODARONE HYDROCHLORIDE 400 MG: 200 TABLET ORAL at 08:16

## 2020-01-01 RX ADMIN — SPIRONOLACTONE 12.5 MG: 25 TABLET ORAL at 09:01

## 2020-01-01 RX ADMIN — ATORVASTATIN CALCIUM 10 MG: 10 TABLET, FILM COATED ORAL at 08:50

## 2020-01-01 RX ADMIN — PROMETHAZINE HYDROCHLORIDE 12.5 MG: 25 INJECTION INTRAMUSCULAR; INTRAVENOUS at 05:53

## 2020-01-01 RX ADMIN — ALTEPLASE 2 MG: 2.2 INJECTION, POWDER, LYOPHILIZED, FOR SOLUTION INTRAVENOUS at 11:09

## 2020-01-01 RX ADMIN — MIDODRINE HYDROCHLORIDE 10 MG: 5 TABLET ORAL at 12:07

## 2020-01-01 RX ADMIN — ATORVASTATIN CALCIUM 10 MG: 10 TABLET, FILM COATED ORAL at 09:41

## 2020-01-01 RX ADMIN — Medication: at 09:07

## 2020-01-01 RX ADMIN — SODIUM CHLORIDE: 9 INJECTION, SOLUTION INTRAVENOUS at 18:26

## 2020-01-01 RX ADMIN — FUROSEMIDE 20 MG: 20 TABLET ORAL at 10:18

## 2020-01-01 RX ADMIN — ASPIRIN 81 MG 81 MG: 81 TABLET ORAL at 09:57

## 2020-01-01 RX ADMIN — INSULIN LISPRO 2 UNITS: 100 INJECTION, SOLUTION INTRAVENOUS; SUBCUTANEOUS at 17:16

## 2020-01-01 RX ADMIN — ONDANSETRON 4 MG: 2 INJECTION INTRAMUSCULAR; INTRAVENOUS at 10:51

## 2020-01-01 RX ADMIN — METHYLPREDNISOLONE SODIUM SUCCINATE 40 MG: 40 INJECTION, POWDER, FOR SOLUTION INTRAMUSCULAR; INTRAVENOUS at 08:41

## 2020-01-01 RX ADMIN — PANTOPRAZOLE SODIUM 40 MG: 40 TABLET, DELAYED RELEASE ORAL at 06:44

## 2020-01-01 RX ADMIN — PANTOPRAZOLE SODIUM 40 MG: 40 INJECTION, POWDER, FOR SOLUTION INTRAVENOUS at 08:26

## 2020-01-01 RX ADMIN — MIDODRINE HYDROCHLORIDE 10 MG: 5 TABLET ORAL at 10:27

## 2020-01-01 RX ADMIN — DOCOSANOL: 100 CREAM TOPICAL at 07:00

## 2020-01-01 RX ADMIN — ENOXAPARIN SODIUM 80 MG: 80 INJECTION SUBCUTANEOUS at 21:03

## 2020-01-01 RX ADMIN — PROMETHAZINE HYDROCHLORIDE 12.5 MG: 25 INJECTION INTRAMUSCULAR; INTRAVENOUS at 19:00

## 2020-01-01 RX ADMIN — Medication 125 MG: at 08:50

## 2020-01-01 RX ADMIN — PREDNISONE 20 MG: 20 TABLET ORAL at 08:09

## 2020-01-01 RX ADMIN — ASPIRIN 81 MG 81 MG: 81 TABLET ORAL at 09:37

## 2020-01-01 RX ADMIN — ALLOPURINOL 200 MG: 100 TABLET ORAL at 08:50

## 2020-01-01 RX ADMIN — ATORVASTATIN CALCIUM 10 MG: 10 TABLET, FILM COATED ORAL at 10:01

## 2020-01-01 RX ADMIN — ATORVASTATIN CALCIUM 10 MG: 10 TABLET, FILM COATED ORAL at 08:59

## 2020-01-01 RX ADMIN — INSULIN LISPRO 5 UNITS: 100 INJECTION, SOLUTION INTRAVENOUS; SUBCUTANEOUS at 18:52

## 2020-01-01 RX ADMIN — ASPIRIN 81 MG 81 MG: 81 TABLET ORAL at 10:18

## 2020-01-01 RX ADMIN — DOCOSANOL: 100 CREAM TOPICAL at 15:13

## 2020-01-01 RX ADMIN — Medication 10 ML: at 20:22

## 2020-01-01 RX ADMIN — AMIODARONE HYDROCHLORIDE 0.5 MG/MIN: 50 INJECTION, SOLUTION INTRAVENOUS at 14:51

## 2020-01-01 RX ADMIN — MIDODRINE HYDROCHLORIDE 5 MG: 5 TABLET ORAL at 17:30

## 2020-01-01 RX ADMIN — FUROSEMIDE 5 MG/HR: 10 INJECTION, SOLUTION INTRAMUSCULAR; INTRAVENOUS at 17:04

## 2020-01-01 RX ADMIN — MIDODRINE HYDROCHLORIDE 5 MG: 5 TABLET ORAL at 12:02

## 2020-01-01 RX ADMIN — ATORVASTATIN CALCIUM 10 MG: 10 TABLET, FILM COATED ORAL at 08:33

## 2020-01-01 RX ADMIN — MIDODRINE HYDROCHLORIDE 10 MG: 5 TABLET ORAL at 11:38

## 2020-01-01 RX ADMIN — Medication 10 ML: at 08:42

## 2020-01-01 RX ADMIN — MAGNESIUM SULFATE IN WATER 2 G: 40 INJECTION, SOLUTION INTRAVENOUS at 04:51

## 2020-01-01 RX ADMIN — VALACYCLOVIR HYDROCHLORIDE 500 MG: 500 TABLET, FILM COATED ORAL at 22:23

## 2020-01-01 RX ADMIN — TBO-FILGRASTIM 300 MCG: 300 INJECTION, SOLUTION SUBCUTANEOUS at 18:52

## 2020-01-01 RX ADMIN — AMIODARONE HYDROCHLORIDE 0.5 MG/MIN: 50 INJECTION, SOLUTION INTRAVENOUS at 20:07

## 2020-01-01 RX ADMIN — Medication 125 MG: at 02:00

## 2020-01-01 RX ADMIN — ACETAMINOPHEN 650 MG: 325 TABLET, FILM COATED ORAL at 17:24

## 2020-01-01 RX ADMIN — DIGOXIN 125 MCG: 125 TABLET ORAL at 08:52

## 2020-01-01 RX ADMIN — MIDODRINE HYDROCHLORIDE 2.5 MG: 5 TABLET ORAL at 15:09

## 2020-01-01 RX ADMIN — MORPHINE SULFATE 1 MG: 2 INJECTION, SOLUTION INTRAMUSCULAR; INTRAVENOUS at 11:41

## 2020-01-01 RX ADMIN — SODIUM CHLORIDE: 9 INJECTION, SOLUTION INTRAVENOUS at 04:36

## 2020-01-01 RX ADMIN — Medication 125 MG: at 14:29

## 2020-01-01 RX ADMIN — ALBUMIN (HUMAN) 25 G: 0.25 INJECTION, SOLUTION INTRAVENOUS at 15:36

## 2020-01-01 RX ADMIN — ASPIRIN 81 MG 81 MG: 81 TABLET ORAL at 08:59

## 2020-01-01 RX ADMIN — AMIODARONE HYDROCHLORIDE 1 MG/MIN: 50 INJECTION, SOLUTION INTRAVENOUS at 14:15

## 2020-01-01 RX ADMIN — ENOXAPARIN SODIUM 80 MG: 80 INJECTION SUBCUTANEOUS at 10:22

## 2020-01-01 RX ADMIN — MAGNESIUM SULFATE HEPTAHYDRATE 2 G: 40 INJECTION, SOLUTION INTRAVENOUS at 09:23

## 2020-01-01 RX ADMIN — ENOXAPARIN SODIUM 80 MG: 80 INJECTION SUBCUTANEOUS at 09:18

## 2020-01-01 RX ADMIN — VALACYCLOVIR HYDROCHLORIDE 500 MG: 500 TABLET, FILM COATED ORAL at 21:03

## 2020-01-01 RX ADMIN — PANTOPRAZOLE SODIUM 40 MG: 40 TABLET, DELAYED RELEASE ORAL at 06:06

## 2020-01-01 RX ADMIN — MEXILETINE HYDROCHLORIDE 200 MG: 200 CAPSULE ORAL at 06:40

## 2020-01-01 RX ADMIN — FUROSEMIDE 40 MG: 10 INJECTION, SOLUTION INTRAMUSCULAR; INTRAVENOUS at 15:37

## 2020-01-01 RX ADMIN — DOBUTAMINE IN DEXTROSE 2.5 MCG/KG/MIN: 200 INJECTION, SOLUTION INTRAVENOUS at 12:40

## 2020-01-01 RX ADMIN — Medication 10 ML: at 07:47

## 2020-01-01 RX ADMIN — Medication 125 MG: at 10:02

## 2020-01-01 RX ADMIN — VANCOMYCIN HYDROCHLORIDE 1250 MG: 10 INJECTION, POWDER, LYOPHILIZED, FOR SOLUTION INTRAVENOUS at 10:28

## 2020-01-01 RX ADMIN — ONDANSETRON 4 MG: 2 INJECTION INTRAMUSCULAR; INTRAVENOUS at 09:31

## 2020-01-01 RX ADMIN — Medication 1 CAPSULE: at 08:52

## 2020-01-01 RX ADMIN — INSULIN LISPRO 2 UNITS: 100 INJECTION, SOLUTION INTRAVENOUS; SUBCUTANEOUS at 12:05

## 2020-01-01 RX ADMIN — PANTOPRAZOLE SODIUM 40 MG: 40 TABLET, DELAYED RELEASE ORAL at 06:30

## 2020-01-01 RX ADMIN — SODIUM CHLORIDE 20 ML: 9 INJECTION, SOLUTION INTRAVENOUS at 05:11

## 2020-01-01 RX ADMIN — Medication 125 MG: at 15:50

## 2020-01-01 RX ADMIN — Medication 1 CAPSULE: at 09:45

## 2020-01-01 RX ADMIN — DIGOXIN 125 MCG: 0.25 INJECTION INTRAMUSCULAR; INTRAVENOUS at 08:27

## 2020-01-01 RX ADMIN — Medication 10 ML: at 07:51

## 2020-01-01 RX ADMIN — POLYETHYLENE GLYCOL 3350 17 G: 17 POWDER, FOR SOLUTION ORAL at 20:18

## 2020-01-01 RX ADMIN — ACETAMINOPHEN 650 MG: 325 TABLET ORAL at 00:34

## 2020-01-01 RX ADMIN — MIDODRINE HYDROCHLORIDE 10 MG: 5 TABLET ORAL at 12:50

## 2020-01-01 RX ADMIN — NOREPINEPHRINE BITARTRATE 5 MCG/MIN: 1 INJECTION, SOLUTION, CONCENTRATE INTRAVENOUS at 21:31

## 2020-01-01 RX ADMIN — MEXILETINE HYDROCHLORIDE 200 MG: 200 CAPSULE ORAL at 15:24

## 2020-01-01 RX ADMIN — MEXILETINE HYDROCHLORIDE 200 MG: 200 CAPSULE ORAL at 14:30

## 2020-01-01 RX ADMIN — ALLOPURINOL 200 MG: 100 TABLET ORAL at 08:09

## 2020-01-01 RX ADMIN — Medication 125 MG: at 02:09

## 2020-01-01 RX ADMIN — MIDODRINE HYDROCHLORIDE 10 MG: 5 TABLET ORAL at 12:42

## 2020-01-01 RX ADMIN — FLUCONAZOLE 200 MG: 200 TABLET ORAL at 11:08

## 2020-01-01 RX ADMIN — PHYTONADIONE 2.5 MG: 5 TABLET ORAL at 21:16

## 2020-01-01 RX ADMIN — INSULIN LISPRO 5 UNITS: 100 INJECTION, SOLUTION INTRAVENOUS; SUBCUTANEOUS at 13:19

## 2020-01-01 RX ADMIN — MIDODRINE HYDROCHLORIDE 10 MG: 5 TABLET ORAL at 18:50

## 2020-01-01 RX ADMIN — AMIODARONE HYDROCHLORIDE 200 MG: 200 TABLET ORAL at 10:08

## 2020-01-01 RX ADMIN — MIDODRINE HYDROCHLORIDE 10 MG: 5 TABLET ORAL at 08:59

## 2020-01-01 RX ADMIN — MEXILETINE HYDROCHLORIDE 200 MG: 200 CAPSULE ORAL at 13:48

## 2020-01-01 RX ADMIN — Medication 125 MG: at 03:30

## 2020-01-01 RX ADMIN — Medication 10 ML: at 22:51

## 2020-01-01 RX ADMIN — PHENYLEPHRINE HYDROCHLORIDE 100 MCG: 10 INJECTION INTRAVENOUS at 13:40

## 2020-01-01 RX ADMIN — MIDODRINE HYDROCHLORIDE 10 MG: 5 TABLET ORAL at 17:17

## 2020-01-01 RX ADMIN — ASPIRIN 81 MG 81 MG: 81 TABLET ORAL at 08:26

## 2020-01-01 RX ADMIN — AMIODARONE HYDROCHLORIDE 200 MG: 200 TABLET ORAL at 10:41

## 2020-01-01 RX ADMIN — MAGNESIUM SULFATE HEPTAHYDRATE 2 G: 40 INJECTION, SOLUTION INTRAVENOUS at 04:51

## 2020-01-01 RX ADMIN — DIGOXIN 125 MCG: 125 TABLET ORAL at 10:27

## 2020-01-01 RX ADMIN — PHENYLEPHRINE HYDROCHLORIDE 100 MCG: 10 INJECTION INTRAVENOUS at 08:45

## 2020-01-01 RX ADMIN — SODIUM CHLORIDE: 9 INJECTION, SOLUTION INTRAVENOUS at 13:52

## 2020-01-01 RX ADMIN — INSULIN LISPRO 1 UNITS: 100 INJECTION, SOLUTION INTRAVENOUS; SUBCUTANEOUS at 20:19

## 2020-01-01 RX ADMIN — PHENYLEPHRINE HYDROCHLORIDE 150 MCG/MIN: 10 INJECTION INTRAVENOUS at 15:40

## 2020-01-01 RX ADMIN — MIDODRINE HYDROCHLORIDE 10 MG: 5 TABLET ORAL at 11:57

## 2020-01-01 RX ADMIN — AMIODARONE HYDROCHLORIDE 1 MG/MIN: 50 INJECTION, SOLUTION INTRAVENOUS at 12:47

## 2020-01-01 RX ADMIN — PANTOPRAZOLE SODIUM 40 MG: 40 INJECTION, POWDER, FOR SOLUTION INTRAVENOUS at 15:37

## 2020-01-01 RX ADMIN — WARFARIN SODIUM 2.5 MG: 2.5 TABLET ORAL at 16:52

## 2020-01-01 RX ADMIN — MIDODRINE HYDROCHLORIDE 10 MG: 5 TABLET ORAL at 14:20

## 2020-01-01 RX ADMIN — DOCOSANOL: 100 CREAM TOPICAL at 18:48

## 2020-01-01 RX ADMIN — Medication 10 ML: at 17:15

## 2020-01-01 RX ADMIN — Medication 10 ML: at 08:58

## 2020-01-01 RX ADMIN — MEXILETINE HYDROCHLORIDE 200 MG: 200 CAPSULE ORAL at 14:29

## 2020-01-01 RX ADMIN — INSULIN LISPRO 1 UNITS: 100 INJECTION, SOLUTION INTRAVENOUS; SUBCUTANEOUS at 12:10

## 2020-01-01 RX ADMIN — VALACYCLOVIR HYDROCHLORIDE 500 MG: 500 TABLET, FILM COATED ORAL at 08:40

## 2020-01-01 RX ADMIN — AZTREONAM 1 G: 1 INJECTION, POWDER, LYOPHILIZED, FOR SOLUTION INTRAMUSCULAR; INTRAVENOUS at 06:04

## 2020-01-01 RX ADMIN — Medication: at 08:51

## 2020-01-01 RX ADMIN — VALACYCLOVIR HYDROCHLORIDE 500 MG: 500 TABLET, FILM COATED ORAL at 20:48

## 2020-01-01 RX ADMIN — MEXILETINE HYDROCHLORIDE 200 MG: 200 CAPSULE ORAL at 00:02

## 2020-01-01 RX ADMIN — AZTREONAM 1 G: 1 INJECTION, POWDER, LYOPHILIZED, FOR SOLUTION INTRAMUSCULAR; INTRAVENOUS at 06:55

## 2020-01-01 RX ADMIN — INSULIN LISPRO 4 UNITS: 100 INJECTION, SOLUTION INTRAVENOUS; SUBCUTANEOUS at 17:20

## 2020-01-01 RX ADMIN — POTASSIUM CHLORIDE 20 MEQ: 29.8 INJECTION, SOLUTION INTRAVENOUS at 14:02

## 2020-01-01 RX ADMIN — Medication 1 CAPSULE: at 10:45

## 2020-01-01 RX ADMIN — ENOXAPARIN SODIUM 80 MG: 80 INJECTION SUBCUTANEOUS at 21:27

## 2020-01-01 RX ADMIN — PROPOFOL 20 MG: 10 INJECTION, EMULSION INTRAVENOUS at 12:33

## 2020-01-01 RX ADMIN — VALACYCLOVIR HYDROCHLORIDE 500 MG: 500 TABLET, FILM COATED ORAL at 08:43

## 2020-01-01 RX ADMIN — INSULIN LISPRO 5 UNITS: 100 INJECTION, SOLUTION INTRAVENOUS; SUBCUTANEOUS at 18:19

## 2020-01-01 RX ADMIN — Medication: at 21:04

## 2020-01-01 RX ADMIN — NOREPINEPHRINE BITARTRATE 5 MCG/MIN: 1 INJECTION, SOLUTION, CONCENTRATE INTRAVENOUS at 19:05

## 2020-01-01 RX ADMIN — Medication 125 MG: at 03:22

## 2020-01-01 RX ADMIN — MEXILETINE HYDROCHLORIDE 200 MG: 200 CAPSULE ORAL at 06:14

## 2020-01-01 RX ADMIN — VASOPRESSIN 1 UNITS: 20 INJECTION INTRAVENOUS at 12:47

## 2020-01-01 RX ADMIN — MIDODRINE HYDROCHLORIDE 5 MG: 5 TABLET ORAL at 18:14

## 2020-01-01 RX ADMIN — ATORVASTATIN CALCIUM 10 MG: 10 TABLET, FILM COATED ORAL at 10:18

## 2020-01-01 RX ADMIN — Medication: at 21:30

## 2020-01-01 RX ADMIN — FUROSEMIDE 20 MG: 20 TABLET ORAL at 11:03

## 2020-01-01 RX ADMIN — VALACYCLOVIR HYDROCHLORIDE 500 MG: 500 TABLET, FILM COATED ORAL at 14:45

## 2020-01-01 RX ADMIN — ENOXAPARIN SODIUM 80 MG: 80 INJECTION SUBCUTANEOUS at 10:05

## 2020-01-01 RX ADMIN — METRONIDAZOLE 500 MG: 500 INJECTION, SOLUTION INTRAVENOUS at 11:18

## 2020-01-01 RX ADMIN — MIDODRINE HYDROCHLORIDE 10 MG: 5 TABLET ORAL at 09:18

## 2020-01-01 RX ADMIN — PHENYLEPHRINE HYDROCHLORIDE 100 MCG/MIN: 10 INJECTION INTRAVENOUS at 22:36

## 2020-01-01 RX ADMIN — Medication: at 22:24

## 2020-01-01 RX ADMIN — HEPARIN SODIUM 18 UNITS/KG/HR: 10000 INJECTION, SOLUTION INTRAVENOUS at 07:22

## 2020-01-01 RX ADMIN — MEROPENEM 1 G: 1 INJECTION, POWDER, FOR SOLUTION INTRAVENOUS at 20:13

## 2020-01-01 RX ADMIN — DIPHENHYDRAMINE HYDROCHLORIDE 25 MG: 50 INJECTION INTRAMUSCULAR; INTRAVENOUS at 13:37

## 2020-01-01 RX ADMIN — ENOXAPARIN SODIUM 80 MG: 80 INJECTION SUBCUTANEOUS at 11:44

## 2020-01-01 RX ADMIN — Medication 125 MG: at 14:15

## 2020-01-01 RX ADMIN — DOCOSANOL: 100 CREAM TOPICAL at 21:06

## 2020-01-01 RX ADMIN — Medication: at 20:27

## 2020-01-01 RX ADMIN — ASPIRIN 81 MG 81 MG: 81 TABLET ORAL at 09:01

## 2020-01-01 RX ADMIN — INSULIN LISPRO 5 UNITS: 100 INJECTION, SOLUTION INTRAVENOUS; SUBCUTANEOUS at 17:26

## 2020-01-01 RX ADMIN — DOCOSANOL: 100 CREAM TOPICAL at 05:56

## 2020-01-01 RX ADMIN — NYSTATIN 5 ML: 100000 SUSPENSION ORAL at 16:26

## 2020-01-01 RX ADMIN — HEPARIN SODIUM 18 UNITS/KG/HR: 10000 INJECTION, SOLUTION INTRAVENOUS at 01:34

## 2020-01-01 RX ADMIN — Medication 125 MG: at 08:32

## 2020-01-01 RX ADMIN — POTASSIUM CHLORIDE 20 MEQ: 29.8 INJECTION, SOLUTION INTRAVENOUS at 11:04

## 2020-01-01 RX ADMIN — MEXILETINE HYDROCHLORIDE 200 MG: 200 CAPSULE ORAL at 06:33

## 2020-01-01 RX ADMIN — NYSTATIN 5 ML: 100000 SUSPENSION ORAL at 20:36

## 2020-01-01 RX ADMIN — MIDODRINE HYDROCHLORIDE 10 MG: 5 TABLET ORAL at 12:05

## 2020-01-01 RX ADMIN — PREDNISONE 50 MG: 20 TABLET ORAL at 09:34

## 2020-01-01 RX ADMIN — MEXILETINE HYDROCHLORIDE 200 MG: 200 CAPSULE ORAL at 05:33

## 2020-01-01 RX ADMIN — INSULIN LISPRO 5 UNITS: 100 INJECTION, SOLUTION INTRAVENOUS; SUBCUTANEOUS at 14:12

## 2020-01-01 RX ADMIN — ALLOPURINOL 200 MG: 100 TABLET ORAL at 08:38

## 2020-01-01 RX ADMIN — INSULIN LISPRO 1 UNITS: 100 INJECTION, SOLUTION INTRAVENOUS; SUBCUTANEOUS at 21:01

## 2020-01-01 RX ADMIN — ATORVASTATIN CALCIUM 10 MG: 10 TABLET, FILM COATED ORAL at 09:17

## 2020-01-01 RX ADMIN — AMIODARONE HYDROCHLORIDE 1 MG/MIN: 50 INJECTION, SOLUTION INTRAVENOUS at 03:58

## 2020-01-01 RX ADMIN — FUROSEMIDE 40 MG: 40 TABLET ORAL at 08:09

## 2020-01-01 RX ADMIN — ACETAMINOPHEN 650 MG: 325 TABLET, FILM COATED ORAL at 07:41

## 2020-01-01 RX ADMIN — Medication 1 CAPSULE: at 07:50

## 2020-01-01 RX ADMIN — ASPIRIN 81 MG: 81 TABLET, COATED ORAL at 09:19

## 2020-01-01 RX ADMIN — INSULIN LISPRO 5 UNITS: 100 INJECTION, SOLUTION INTRAVENOUS; SUBCUTANEOUS at 08:14

## 2020-01-01 RX ADMIN — Medication 125 MG: at 21:03

## 2020-01-01 RX ADMIN — AMIODARONE HYDROCHLORIDE 200 MG: 200 TABLET ORAL at 10:28

## 2020-01-01 RX ADMIN — INSULIN LISPRO 4 UNITS: 100 INJECTION, SOLUTION INTRAVENOUS; SUBCUTANEOUS at 18:17

## 2020-01-01 RX ADMIN — NYSTATIN 5 ML: 100000 SUSPENSION ORAL at 20:07

## 2020-01-01 RX ADMIN — DIGOXIN 125 MCG: 125 TABLET ORAL at 09:38

## 2020-01-01 RX ADMIN — AMIODARONE HYDROCHLORIDE 400 MG: 200 TABLET ORAL at 08:50

## 2020-01-01 RX ADMIN — AMIODARONE HYDROCHLORIDE 200 MG: 200 TABLET ORAL at 11:03

## 2020-01-01 RX ADMIN — ASPIRIN 81 MG 81 MG: 81 TABLET ORAL at 09:38

## 2020-01-01 RX ADMIN — ATORVASTATIN CALCIUM 10 MG: 10 TABLET, FILM COATED ORAL at 10:28

## 2020-01-01 RX ADMIN — MEXILETINE HYDROCHLORIDE 200 MG: 200 CAPSULE ORAL at 13:31

## 2020-01-01 RX ADMIN — Medication 10 ML: at 19:59

## 2020-01-01 RX ADMIN — Medication 125 MG: at 21:08

## 2020-01-01 RX ADMIN — NYSTATIN 5 ML: 100000 SUSPENSION ORAL at 21:07

## 2020-01-01 RX ADMIN — SODIUM CHLORIDE 1000 ML: 9 INJECTION, SOLUTION INTRAVENOUS at 21:04

## 2020-01-01 RX ADMIN — MIDODRINE HYDROCHLORIDE 5 MG: 5 TABLET ORAL at 16:53

## 2020-01-01 RX ADMIN — DOCOSANOL: 100 CREAM TOPICAL at 14:20

## 2020-01-01 RX ADMIN — MEXILETINE HYDROCHLORIDE 200 MG: 200 CAPSULE ORAL at 06:30

## 2020-01-01 RX ADMIN — DOCOSANOL: 100 CREAM TOPICAL at 22:13

## 2020-01-01 RX ADMIN — MEROPENEM 1 G: 1 INJECTION, POWDER, FOR SOLUTION INTRAVENOUS at 11:25

## 2020-01-01 RX ADMIN — MIDODRINE HYDROCHLORIDE 5 MG: 5 TABLET ORAL at 13:06

## 2020-01-01 RX ADMIN — DOCOSANOL: 100 CREAM TOPICAL at 18:43

## 2020-01-01 RX ADMIN — PREDNISONE 10 MG: 10 TABLET ORAL at 10:20

## 2020-01-01 RX ADMIN — POLYETHYLENE GLYCOL 3350 17 G: 17 POWDER, FOR SOLUTION ORAL at 08:51

## 2020-01-01 RX ADMIN — METRONIDAZOLE 500 MG: 500 INJECTION, SOLUTION INTRAVENOUS at 12:59

## 2020-01-01 RX ADMIN — MIDAZOLAM 4 MG: 1 INJECTION INTRAMUSCULAR; INTRAVENOUS at 05:05

## 2020-01-01 RX ADMIN — Medication 125 MG: at 14:34

## 2020-01-01 RX ADMIN — PHENYLEPHRINE HYDROCHLORIDE 40 MCG/MIN: 10 INJECTION INTRAVENOUS at 03:22

## 2020-01-01 RX ADMIN — INSULIN LISPRO 2 UNITS: 100 INJECTION, SOLUTION INTRAVENOUS; SUBCUTANEOUS at 16:56

## 2020-01-01 RX ADMIN — SODIUM CHLORIDE 250 ML: 9 INJECTION, SOLUTION INTRAVENOUS at 12:00

## 2020-01-01 RX ADMIN — BRENTUXIMAB VEDOTIN 150 MG: 50 INJECTION, POWDER, LYOPHILIZED, FOR SOLUTION INTRAVENOUS at 18:22

## 2020-01-01 RX ADMIN — NYSTATIN 5 ML: 100000 SUSPENSION ORAL at 08:31

## 2020-01-01 RX ADMIN — DIGOXIN 125 MCG: 0.25 INJECTION INTRAMUSCULAR; INTRAVENOUS at 08:29

## 2020-01-01 RX ADMIN — DIPHENHYDRAMINE HYDROCHLORIDE 25 MG: 50 INJECTION, SOLUTION INTRAMUSCULAR; INTRAVENOUS at 13:37

## 2020-01-01 RX ADMIN — Medication: at 08:39

## 2020-01-01 RX ADMIN — INSULIN LISPRO 6 UNITS: 100 INJECTION, SOLUTION INTRAVENOUS; SUBCUTANEOUS at 17:28

## 2020-01-01 RX ADMIN — MIDODRINE HYDROCHLORIDE 10 MG: 5 TABLET ORAL at 08:10

## 2020-01-01 RX ADMIN — ASPIRIN 81 MG: 81 TABLET, COATED ORAL at 08:09

## 2020-01-01 RX ADMIN — Medication 0.03 MCG/KG/MIN: at 21:46

## 2020-01-01 RX ADMIN — INSULIN LISPRO 2 UNITS: 100 INJECTION, SOLUTION INTRAVENOUS; SUBCUTANEOUS at 13:05

## 2020-01-01 RX ADMIN — PROMETHAZINE HYDROCHLORIDE 12.5 MG: 25 INJECTION INTRAMUSCULAR; INTRAVENOUS at 00:13

## 2020-01-01 RX ADMIN — PANTOPRAZOLE SODIUM 40 MG: 40 TABLET, DELAYED RELEASE ORAL at 10:18

## 2020-01-01 RX ADMIN — ALBUMIN (HUMAN) 25 G: 0.25 INJECTION, SOLUTION INTRAVENOUS at 03:20

## 2020-01-01 RX ADMIN — NYSTATIN 5 ML: 100000 SUSPENSION ORAL at 16:56

## 2020-01-01 RX ADMIN — MIDODRINE HYDROCHLORIDE 10 MG: 5 TABLET ORAL at 18:05

## 2020-01-01 RX ADMIN — SODIUM CHLORIDE: 9 INJECTION, SOLUTION INTRAVENOUS at 16:35

## 2020-01-01 RX ADMIN — MIDODRINE HYDROCHLORIDE 10 MG: 5 TABLET ORAL at 17:06

## 2020-01-01 RX ADMIN — Medication: at 22:30

## 2020-01-01 RX ADMIN — ASPIRIN 81 MG: 81 TABLET, COATED ORAL at 08:43

## 2020-01-01 RX ADMIN — DIPHENHYDRAMINE HYDROCHLORIDE 25 MG: 50 INJECTION, SOLUTION INTRAMUSCULAR; INTRAVENOUS at 20:06

## 2020-01-01 RX ADMIN — MEXILETINE HYDROCHLORIDE 200 MG: 200 CAPSULE ORAL at 06:44

## 2020-01-01 RX ADMIN — DIGOXIN 250 MCG: 125 TABLET ORAL at 09:00

## 2020-01-01 RX ADMIN — DOCOSANOL: 100 CREAM TOPICAL at 21:52

## 2020-01-01 RX ADMIN — INSULIN LISPRO 1 UNITS: 100 INJECTION, SOLUTION INTRAVENOUS; SUBCUTANEOUS at 20:22

## 2020-01-01 RX ADMIN — MIDODRINE HYDROCHLORIDE 10 MG: 5 TABLET ORAL at 17:52

## 2020-01-01 RX ADMIN — MEXILETINE HYDROCHLORIDE 200 MG: 200 CAPSULE ORAL at 22:30

## 2020-01-01 RX ADMIN — PANTOPRAZOLE SODIUM 40 MG: 40 TABLET, DELAYED RELEASE ORAL at 05:41

## 2020-01-01 RX ADMIN — INSULIN LISPRO 2 UNITS: 100 INJECTION, SOLUTION INTRAVENOUS; SUBCUTANEOUS at 21:02

## 2020-01-01 RX ADMIN — Medication 125 MG: at 21:01

## 2020-01-01 RX ADMIN — ATORVASTATIN CALCIUM 10 MG: 10 TABLET, FILM COATED ORAL at 13:06

## 2020-01-01 RX ADMIN — METRONIDAZOLE 500 MG: 500 INJECTION, SOLUTION INTRAVENOUS at 04:39

## 2020-01-01 RX ADMIN — INSULIN LISPRO 5 UNITS: 100 INJECTION, SOLUTION INTRAVENOUS; SUBCUTANEOUS at 12:16

## 2020-01-01 RX ADMIN — Medication 125 MG: at 20:48

## 2020-01-01 RX ADMIN — AMIODARONE HYDROCHLORIDE 0.5 MG/MIN: 50 INJECTION, SOLUTION INTRAVENOUS at 23:03

## 2020-01-01 RX ADMIN — ONDANSETRON 4 MG: 2 INJECTION INTRAMUSCULAR; INTRAVENOUS at 10:17

## 2020-01-01 RX ADMIN — LIDOCAINE HYDROCHLORIDE 5 ML: 10 INJECTION, SOLUTION EPIDURAL; INFILTRATION; INTRACAUDAL; PERINEURAL at 17:57

## 2020-01-01 RX ADMIN — PROMETHAZINE HYDROCHLORIDE 12.5 MG: 25 INJECTION INTRAMUSCULAR; INTRAVENOUS at 21:55

## 2020-01-01 RX ADMIN — PROMETHAZINE HYDROCHLORIDE 12.5 MG: 25 INJECTION INTRAMUSCULAR; INTRAVENOUS at 04:32

## 2020-01-01 RX ADMIN — MIDODRINE HYDROCHLORIDE 10 MG: 5 TABLET ORAL at 13:53

## 2020-01-01 RX ADMIN — MAGNESIUM SULFATE HEPTAHYDRATE 2 G: 40 INJECTION, SOLUTION INTRAVENOUS at 10:09

## 2020-01-01 RX ADMIN — CIPROFLOXACIN 400 MG: 2 INJECTION, SOLUTION INTRAVENOUS at 11:18

## 2020-01-01 RX ADMIN — INSULIN LISPRO 5 UNITS: 100 INJECTION, SOLUTION INTRAVENOUS; SUBCUTANEOUS at 17:41

## 2020-01-01 RX ADMIN — VANCOMYCIN HYDROCHLORIDE 1500 MG: 10 INJECTION, POWDER, LYOPHILIZED, FOR SOLUTION INTRAVENOUS at 04:00

## 2020-01-01 RX ADMIN — PROCHLORPERAZINE EDISYLATE 10 MG: 5 INJECTION INTRAMUSCULAR; INTRAVENOUS at 03:38

## 2020-01-01 RX ADMIN — FLUCONAZOLE 200 MG: 200 TABLET ORAL at 10:20

## 2020-01-01 RX ADMIN — AMIODARONE HYDROCHLORIDE 200 MG: 200 TABLET ORAL at 08:32

## 2020-01-01 RX ADMIN — DOCOSANOL: 100 CREAM TOPICAL at 14:37

## 2020-01-01 RX ADMIN — PROCHLORPERAZINE EDISYLATE 10 MG: 5 INJECTION INTRAMUSCULAR; INTRAVENOUS at 08:59

## 2020-01-01 RX ADMIN — DIGOXIN 125 MCG: 125 TABLET ORAL at 08:28

## 2020-01-01 RX ADMIN — Medication 125 MG: at 09:10

## 2020-01-01 RX ADMIN — ATORVASTATIN CALCIUM 10 MG: 10 TABLET, FILM COATED ORAL at 10:45

## 2020-01-01 RX ADMIN — DIPHENHYDRAMINE HYDROCHLORIDE 25 MG: 25 TABLET ORAL at 17:29

## 2020-01-01 RX ADMIN — PREDNISONE 20 MG: 20 TABLET ORAL at 10:50

## 2020-01-01 RX ADMIN — VALACYCLOVIR HYDROCHLORIDE 1000 MG: 1 TABLET, FILM COATED ORAL at 21:08

## 2020-01-01 RX ADMIN — FUROSEMIDE 40 MG: 10 INJECTION, SOLUTION INTRAMUSCULAR; INTRAVENOUS at 11:41

## 2020-01-01 RX ADMIN — MIDODRINE HYDROCHLORIDE 10 MG: 5 TABLET ORAL at 08:57

## 2020-01-01 RX ADMIN — Medication 10 MG: at 13:48

## 2020-01-01 RX ADMIN — AMIODARONE HYDROCHLORIDE 0.5 MG/MIN: 50 INJECTION, SOLUTION INTRAVENOUS at 12:37

## 2020-01-01 RX ADMIN — AMIODARONE HYDROCHLORIDE 1 MG/MIN: 50 INJECTION, SOLUTION INTRAVENOUS at 05:25

## 2020-01-01 RX ADMIN — PANTOPRAZOLE SODIUM 40 MG: 40 TABLET, DELAYED RELEASE ORAL at 06:14

## 2020-01-01 RX ADMIN — AMIODARONE HYDROCHLORIDE 1 MG/MIN: 50 INJECTION, SOLUTION INTRAVENOUS at 14:26

## 2020-01-01 RX ADMIN — MIDODRINE HYDROCHLORIDE 5 MG: 5 TABLET ORAL at 12:01

## 2020-01-01 RX ADMIN — Medication 1 CAPSULE: at 11:06

## 2020-01-01 RX ADMIN — VANCOMYCIN HYDROCHLORIDE 1000 MG: 1 INJECTION, POWDER, LYOPHILIZED, FOR SOLUTION INTRAVENOUS at 17:24

## 2020-01-01 RX ADMIN — DOXYCYCLINE HYCLATE 100 MG: 100 TABLET, COATED ORAL at 08:41

## 2020-01-01 RX ADMIN — MIDODRINE HYDROCHLORIDE 10 MG: 5 TABLET ORAL at 09:17

## 2020-01-01 RX ADMIN — ASPIRIN 81 MG 81 MG: 81 TABLET ORAL at 10:40

## 2020-01-01 RX ADMIN — MIDODRINE HYDROCHLORIDE 5 MG: 5 TABLET ORAL at 17:16

## 2020-01-01 RX ADMIN — SODIUM CHLORIDE: 9 INJECTION, SOLUTION INTRAVENOUS at 00:34

## 2020-01-01 RX ADMIN — MIDODRINE HYDROCHLORIDE 10 MG: 5 TABLET ORAL at 12:17

## 2020-01-01 RX ADMIN — INSULIN LISPRO 3 UNITS: 100 INJECTION, SOLUTION INTRAVENOUS; SUBCUTANEOUS at 12:43

## 2020-01-01 RX ADMIN — Medication 5 MG: at 13:55

## 2020-01-01 RX ADMIN — Medication: at 20:14

## 2020-01-01 RX ADMIN — MEXILETINE HYDROCHLORIDE 200 MG: 200 CAPSULE ORAL at 20:24

## 2020-01-01 RX ADMIN — ATORVASTATIN CALCIUM 10 MG: 10 TABLET, FILM COATED ORAL at 09:01

## 2020-01-01 RX ADMIN — LEVOFLOXACIN 750 MG: 5 INJECTION, SOLUTION INTRAVENOUS at 12:10

## 2020-01-01 RX ADMIN — DOCOSANOL: 100 CREAM TOPICAL at 06:25

## 2020-01-01 RX ADMIN — ATORVASTATIN CALCIUM 10 MG: 10 TABLET, FILM COATED ORAL at 09:06

## 2020-01-01 RX ADMIN — DOCOSANOL: 100 CREAM TOPICAL at 06:03

## 2020-01-01 RX ADMIN — MIDODRINE HYDROCHLORIDE 10 MG: 5 TABLET ORAL at 16:24

## 2020-01-01 RX ADMIN — ENOXAPARIN SODIUM 80 MG: 80 INJECTION SUBCUTANEOUS at 09:24

## 2020-01-01 RX ADMIN — INSULIN LISPRO 6 UNITS: 100 INJECTION, SOLUTION INTRAVENOUS; SUBCUTANEOUS at 14:07

## 2020-01-01 RX ADMIN — Medication 10 ML: at 10:17

## 2020-01-01 RX ADMIN — MEXILETINE HYDROCHLORIDE 200 MG: 200 CAPSULE ORAL at 16:13

## 2020-01-01 RX ADMIN — DOXYCYCLINE HYCLATE 100 MG: 100 TABLET, COATED ORAL at 13:06

## 2020-01-01 RX ADMIN — MIDODRINE HYDROCHLORIDE 5 MG: 5 TABLET ORAL at 16:31

## 2020-01-01 RX ADMIN — DIGOXIN 250 MCG: 0.25 INJECTION INTRAMUSCULAR; INTRAVENOUS at 05:54

## 2020-01-01 RX ADMIN — NYSTATIN 5 ML: 100000 SUSPENSION ORAL at 20:15

## 2020-01-01 RX ADMIN — Medication 125 MG: at 20:40

## 2020-01-01 RX ADMIN — PHENYLEPHRINE HYDROCHLORIDE 80 MCG/MIN: 10 INJECTION INTRAVENOUS at 17:40

## 2020-01-01 RX ADMIN — Medication 10 ML: at 19:58

## 2020-01-01 RX ADMIN — Medication 10 ML: at 11:17

## 2020-01-01 RX ADMIN — ATORVASTATIN CALCIUM 10 MG: 10 TABLET, FILM COATED ORAL at 09:34

## 2020-01-01 RX ADMIN — Medication 10 ML: at 08:23

## 2020-01-01 RX ADMIN — DOCOSANOL: 100 CREAM TOPICAL at 05:03

## 2020-01-01 RX ADMIN — PREDNISONE 50 MG: 20 TABLET ORAL at 08:16

## 2020-01-01 RX ADMIN — NYSTATIN 5 ML: 100000 SUSPENSION ORAL at 17:09

## 2020-01-01 RX ADMIN — POTASSIUM PHOSPHATE, MONOBASIC AND POTASSIUM PHOSPHATE, DIBASIC 20 MMOL: 224; 236 INJECTION, SOLUTION, CONCENTRATE INTRAVENOUS at 08:52

## 2020-01-01 RX ADMIN — AMIODARONE HYDROCHLORIDE 0.5 MG/MIN: 50 INJECTION, SOLUTION INTRAVENOUS at 04:39

## 2020-01-01 RX ADMIN — NYSTATIN 5 ML: 100000 SUSPENSION ORAL at 12:44

## 2020-01-01 RX ADMIN — MEXILETINE HYDROCHLORIDE 200 MG: 200 CAPSULE ORAL at 14:26

## 2020-01-01 RX ADMIN — ASPIRIN 81 MG 81 MG: 81 TABLET ORAL at 08:42

## 2020-01-01 RX ADMIN — MEXILETINE HYDROCHLORIDE 200 MG: 200 CAPSULE ORAL at 15:07

## 2020-01-01 RX ADMIN — INSULIN LISPRO 3 UNITS: 100 INJECTION, SOLUTION INTRAVENOUS; SUBCUTANEOUS at 18:37

## 2020-01-01 RX ADMIN — POTASSIUM CHLORIDE 40 MEQ: 1500 TABLET, EXTENDED RELEASE ORAL at 17:24

## 2020-01-01 RX ADMIN — SPIRONOLACTONE 12.5 MG: 25 TABLET ORAL at 09:38

## 2020-01-01 RX ADMIN — PROPOFOL 30 MG: 10 INJECTION, EMULSION INTRAVENOUS at 12:37

## 2020-01-01 RX ADMIN — MIDODRINE HYDROCHLORIDE 10 MG: 5 TABLET ORAL at 16:51

## 2020-01-01 RX ADMIN — Medication: at 12:47

## 2020-01-01 RX ADMIN — ATORVASTATIN CALCIUM 10 MG: 10 TABLET, FILM COATED ORAL at 08:43

## 2020-01-01 RX ADMIN — SODIUM CHLORIDE 20 ML: 9 INJECTION, SOLUTION INTRAVENOUS at 06:50

## 2020-01-01 RX ADMIN — NYSTATIN 5 ML: 100000 SUSPENSION ORAL at 12:21

## 2020-01-01 RX ADMIN — MEXILETINE HYDROCHLORIDE 200 MG: 200 CAPSULE ORAL at 14:34

## 2020-01-01 RX ADMIN — MIDODRINE HYDROCHLORIDE 10 MG: 5 TABLET ORAL at 14:15

## 2020-01-01 RX ADMIN — Medication 125 MG: at 03:14

## 2020-01-01 RX ADMIN — AMIODARONE HYDROCHLORIDE 200 MG: 200 TABLET ORAL at 09:57

## 2020-01-01 RX ADMIN — POLYETHYLENE GLYCOL 3350 17 G: 17 POWDER, FOR SOLUTION ORAL at 12:44

## 2020-01-01 RX ADMIN — MEXILETINE HYDROCHLORIDE 200 MG: 200 CAPSULE ORAL at 22:42

## 2020-01-01 RX ADMIN — Medication 20 ML: at 06:50

## 2020-01-01 RX ADMIN — Medication: at 20:30

## 2020-01-01 RX ADMIN — ASPIRIN 81 MG: 81 TABLET, COATED ORAL at 08:45

## 2020-01-01 RX ADMIN — Medication 10 ML: at 20:49

## 2020-01-01 RX ADMIN — Medication 125 MG: at 08:38

## 2020-01-01 RX ADMIN — MIDODRINE HYDROCHLORIDE 10 MG: 5 TABLET ORAL at 17:08

## 2020-01-01 RX ADMIN — MIDODRINE HYDROCHLORIDE 10 MG: 5 TABLET ORAL at 09:41

## 2020-01-01 RX ADMIN — AMIODARONE HYDROCHLORIDE 0.5 MG/MIN: 50 INJECTION, SOLUTION INTRAVENOUS at 03:15

## 2020-01-01 RX ADMIN — ONDANSETRON 4 MG: 4 TABLET, ORALLY DISINTEGRATING ORAL at 13:57

## 2020-01-01 RX ADMIN — Medication 0.12 MCG/KG/MIN: at 15:55

## 2020-01-01 RX ADMIN — INSULIN LISPRO 6 UNITS: 100 INJECTION, SOLUTION INTRAVENOUS; SUBCUTANEOUS at 16:34

## 2020-01-01 RX ADMIN — MEXILETINE HYDROCHLORIDE 200 MG: 200 CAPSULE ORAL at 06:22

## 2020-01-01 RX ADMIN — MEXILETINE HYDROCHLORIDE 200 MG: 200 CAPSULE ORAL at 05:58

## 2020-01-01 RX ADMIN — MEXILETINE HYDROCHLORIDE 200 MG: 200 CAPSULE ORAL at 13:14

## 2020-01-01 RX ADMIN — INSULIN LISPRO 2 UNITS: 100 INJECTION, SOLUTION INTRAVENOUS; SUBCUTANEOUS at 17:39

## 2020-01-01 RX ADMIN — NYSTATIN 5 ML: 100000 SUSPENSION ORAL at 16:52

## 2020-01-01 RX ADMIN — ACETAMINOPHEN 650 MG: 325 TABLET, FILM COATED ORAL at 00:06

## 2020-01-01 RX ADMIN — Medication 10 ML: at 09:13

## 2020-01-01 RX ADMIN — DOCOSANOL: 100 CREAM TOPICAL at 16:01

## 2020-01-01 RX ADMIN — AMIODARONE HYDROCHLORIDE 1 MG/MIN: 50 INJECTION, SOLUTION INTRAVENOUS at 05:41

## 2020-01-01 RX ADMIN — Medication 10 ML: at 19:47

## 2020-01-01 RX ADMIN — DIGOXIN 125 MCG: 125 TABLET ORAL at 10:06

## 2020-01-01 RX ADMIN — SODIUM CHLORIDE: 9 INJECTION, SOLUTION INTRAVENOUS at 08:30

## 2020-01-01 RX ADMIN — IOHEXOL 50 ML: 240 INJECTION, SOLUTION INTRATHECAL; INTRAVASCULAR; INTRAVENOUS; ORAL at 09:42

## 2020-01-01 RX ADMIN — VANCOMYCIN HYDROCHLORIDE 1000 MG: 1 INJECTION, POWDER, LYOPHILIZED, FOR SOLUTION INTRAVENOUS at 02:44

## 2020-01-01 RX ADMIN — AMIODARONE HYDROCHLORIDE 0.5 MG/MIN: 50 INJECTION, SOLUTION INTRAVENOUS at 05:52

## 2020-01-01 RX ADMIN — MEXILETINE HYDROCHLORIDE 200 MG: 200 CAPSULE ORAL at 06:53

## 2020-01-01 RX ADMIN — Medication 5 MG: at 13:47

## 2020-01-01 RX ADMIN — ASPIRIN 81 MG: 81 TABLET, COATED ORAL at 09:41

## 2020-01-01 RX ADMIN — AMIODARONE HYDROCHLORIDE 1 MG/MIN: 50 INJECTION, SOLUTION INTRAVENOUS at 03:22

## 2020-01-01 RX ADMIN — ENOXAPARIN SODIUM 80 MG: 80 INJECTION SUBCUTANEOUS at 08:51

## 2020-01-01 RX ADMIN — Medication 1 CAPSULE: at 13:06

## 2020-01-01 RX ADMIN — FUROSEMIDE 5 MG/HR: 10 INJECTION, SOLUTION INTRAMUSCULAR; INTRAVENOUS at 08:17

## 2020-01-01 RX ADMIN — INSULIN LISPRO 3 UNITS: 100 INJECTION, SOLUTION INTRAVENOUS; SUBCUTANEOUS at 17:08

## 2020-01-01 RX ADMIN — SPIRONOLACTONE 12.5 MG: 25 TABLET ORAL at 08:59

## 2020-01-01 RX ADMIN — NYSTATIN 5 ML: 100000 SUSPENSION ORAL at 18:44

## 2020-01-01 RX ADMIN — AMIODARONE HYDROCHLORIDE 0.5 MG/MIN: 50 INJECTION, SOLUTION INTRAVENOUS at 00:51

## 2020-01-01 RX ADMIN — AMIODARONE HYDROCHLORIDE 200 MG: 200 TABLET ORAL at 22:17

## 2020-01-01 RX ADMIN — MEXILETINE HYDROCHLORIDE 200 MG: 200 CAPSULE ORAL at 06:06

## 2020-01-01 RX ADMIN — ONDANSETRON 4 MG: 2 INJECTION INTRAMUSCULAR; INTRAVENOUS at 07:44

## 2020-01-01 RX ADMIN — NYSTATIN 5 ML: 100000 SUSPENSION ORAL at 09:40

## 2020-01-01 RX ADMIN — DIGOXIN 125 MCG: 0.25 INJECTION INTRAMUSCULAR; INTRAVENOUS at 01:48

## 2020-01-01 RX ADMIN — ACETAMINOPHEN 650 MG: 325 TABLET ORAL at 17:29

## 2020-01-01 RX ADMIN — PANTOPRAZOLE SODIUM 40 MG: 40 TABLET, DELAYED RELEASE ORAL at 06:36

## 2020-01-01 RX ADMIN — Medication 250 ML: at 12:00

## 2020-01-01 RX ADMIN — DOCOSANOL: 100 CREAM TOPICAL at 15:10

## 2020-01-01 RX ADMIN — MORPHINE SULFATE 1 MG: 2 INJECTION, SOLUTION INTRAMUSCULAR; INTRAVENOUS at 17:29

## 2020-01-01 RX ADMIN — ALBUMIN (HUMAN) 25 G: 0.25 INJECTION, SOLUTION INTRAVENOUS at 15:05

## 2020-01-01 RX ADMIN — MAGNESIUM SULFATE HEPTAHYDRATE 2 G: 40 INJECTION, SOLUTION INTRAVENOUS at 17:09

## 2020-01-01 RX ADMIN — DOCOSANOL: 100 CREAM TOPICAL at 18:40

## 2020-01-01 RX ADMIN — VANCOMYCIN HYDROCHLORIDE 1000 MG: 10 INJECTION, POWDER, LYOPHILIZED, FOR SOLUTION INTRAVENOUS at 12:05

## 2020-01-01 RX ADMIN — WARFARIN SODIUM 0.5 MG: 1 TABLET ORAL at 21:29

## 2020-01-01 RX ADMIN — INSULIN LISPRO 4 UNITS: 100 INJECTION, SOLUTION INTRAVENOUS; SUBCUTANEOUS at 12:52

## 2020-01-01 RX ADMIN — Medication 5 ML: at 18:24

## 2020-01-01 RX ADMIN — AMIODARONE HYDROCHLORIDE 1 MG/MIN: 50 INJECTION, SOLUTION INTRAVENOUS at 20:31

## 2020-01-01 RX ADMIN — AZTREONAM 1 G: 1 INJECTION, POWDER, LYOPHILIZED, FOR SOLUTION INTRAMUSCULAR; INTRAVENOUS at 11:17

## 2020-01-01 RX ADMIN — Medication 1 CAPSULE: at 08:27

## 2020-01-01 RX ADMIN — MEGESTROL ACETATE 200 MG: 40 SUSPENSION ORAL at 08:23

## 2020-01-01 RX ADMIN — Medication 10 ML: at 10:56

## 2020-01-01 RX ADMIN — AMIODARONE HYDROCHLORIDE 200 MG: 200 TABLET ORAL at 08:21

## 2020-01-01 RX ADMIN — OXYCODONE 10 MG: 5 TABLET ORAL at 22:33

## 2020-01-01 RX ADMIN — MIDODRINE HYDROCHLORIDE 5 MG: 5 TABLET ORAL at 16:52

## 2020-01-01 RX ADMIN — DOXYCYCLINE HYCLATE 100 MG: 100 TABLET, COATED ORAL at 10:45

## 2020-01-01 RX ADMIN — PANTOPRAZOLE SODIUM 40 MG: 40 TABLET, DELAYED RELEASE ORAL at 06:01

## 2020-01-01 RX ADMIN — ENOXAPARIN SODIUM 80 MG: 80 INJECTION SUBCUTANEOUS at 08:39

## 2020-01-01 RX ADMIN — INSULIN LISPRO 4 UNITS: 100 INJECTION, SOLUTION INTRAVENOUS; SUBCUTANEOUS at 12:18

## 2020-01-01 RX ADMIN — NYSTATIN 5 ML: 100000 SUSPENSION ORAL at 10:20

## 2020-01-01 RX ADMIN — Medication: at 10:07

## 2020-01-01 RX ADMIN — MUPIROCIN: 20 OINTMENT TOPICAL at 09:08

## 2020-01-01 RX ADMIN — INSULIN LISPRO 2 UNITS: 100 INJECTION, SOLUTION INTRAVENOUS; SUBCUTANEOUS at 13:16

## 2020-01-01 RX ADMIN — ONDANSETRON 4 MG: 2 INJECTION INTRAMUSCULAR; INTRAVENOUS at 20:24

## 2020-01-01 RX ADMIN — AMIODARONE HYDROCHLORIDE 400 MG: 200 TABLET ORAL at 08:10

## 2020-01-01 RX ADMIN — Medication 10 ML: at 10:07

## 2020-01-01 RX ADMIN — ATORVASTATIN CALCIUM 10 MG: 10 TABLET, FILM COATED ORAL at 09:02

## 2020-01-01 RX ADMIN — ATORVASTATIN CALCIUM 10 MG: 10 TABLET, FILM COATED ORAL at 08:27

## 2020-01-01 RX ADMIN — PREDNISONE 20 MG: 20 TABLET ORAL at 08:43

## 2020-01-01 RX ADMIN — POTASSIUM CHLORIDE 20 MEQ: 400 INJECTION, SOLUTION INTRAVENOUS at 05:54

## 2020-01-01 RX ADMIN — Medication 1 CAPSULE: at 10:08

## 2020-01-01 RX ADMIN — INSULIN LISPRO 6 UNITS: 100 INJECTION, SOLUTION INTRAVENOUS; SUBCUTANEOUS at 12:00

## 2020-01-01 RX ADMIN — DIGOXIN 125 MCG: 125 TABLET ORAL at 09:02

## 2020-01-01 RX ADMIN — ATORVASTATIN CALCIUM 10 MG: 10 TABLET, FILM COATED ORAL at 09:00

## 2020-01-01 RX ADMIN — ASPIRIN 81 MG 81 MG: 81 TABLET ORAL at 08:30

## 2020-01-01 RX ADMIN — Medication 3 MG: at 21:08

## 2020-01-01 RX ADMIN — NYSTATIN 5 ML: 100000 SUSPENSION ORAL at 17:17

## 2020-01-01 RX ADMIN — CIPROFLOXACIN HYDROCHLORIDE 500 MG: 500 TABLET, FILM COATED ORAL at 19:58

## 2020-01-01 RX ADMIN — POTASSIUM CHLORIDE 20 MEQ: 400 INJECTION, SOLUTION INTRAVENOUS at 04:53

## 2020-01-01 RX ADMIN — AMIODARONE HYDROCHLORIDE 400 MG: 200 TABLET ORAL at 11:08

## 2020-01-01 RX ADMIN — MIDODRINE HYDROCHLORIDE 5 MG: 5 TABLET ORAL at 11:41

## 2020-01-01 RX ADMIN — ASPIRIN 81 MG: 81 TABLET, COATED ORAL at 09:17

## 2020-01-01 RX ADMIN — Medication 10 ML: at 09:38

## 2020-01-01 RX ADMIN — AMIODARONE HYDROCHLORIDE 1 MG/MIN: 50 INJECTION, SOLUTION INTRAVENOUS at 11:29

## 2020-01-01 RX ADMIN — INSULIN LISPRO 5 UNITS: 100 INJECTION, SOLUTION INTRAVENOUS; SUBCUTANEOUS at 16:57

## 2020-01-01 RX ADMIN — DOCOSANOL: 100 CREAM TOPICAL at 23:22

## 2020-01-01 RX ADMIN — AMIODARONE HYDROCHLORIDE 400 MG: 200 TABLET ORAL at 09:33

## 2020-01-01 RX ADMIN — INSULIN LISPRO 2 UNITS: 100 INJECTION, SOLUTION INTRAVENOUS; SUBCUTANEOUS at 08:11

## 2020-01-01 RX ADMIN — Medication 1 CAPSULE: at 09:00

## 2020-01-01 RX ADMIN — Medication: at 09:19

## 2020-01-01 RX ADMIN — NYSTATIN 5 ML: 100000 SUSPENSION ORAL at 12:07

## 2020-01-01 RX ADMIN — ASPIRIN 81 MG 81 MG: 81 TABLET ORAL at 10:45

## 2020-01-01 RX ADMIN — NYSTATIN 5 ML: 100000 SUSPENSION ORAL at 12:14

## 2020-01-01 RX ADMIN — AMIODARONE HYDROCHLORIDE 1 MG/MIN: 50 INJECTION, SOLUTION INTRAVENOUS at 06:21

## 2020-01-01 RX ADMIN — Medication 1 CAPSULE: at 09:34

## 2020-01-01 RX ADMIN — ATORVASTATIN CALCIUM 10 MG: 10 TABLET, FILM COATED ORAL at 08:42

## 2020-01-01 RX ADMIN — INSULIN LISPRO 5 UNITS: 100 INJECTION, SOLUTION INTRAVENOUS; SUBCUTANEOUS at 17:21

## 2020-01-01 RX ADMIN — PROPOFOL 20 MG: 10 INJECTION, EMULSION INTRAVENOUS at 08:34

## 2020-01-01 RX ADMIN — MEXILETINE HYDROCHLORIDE 200 MG: 200 CAPSULE ORAL at 21:21

## 2020-01-01 RX ADMIN — Medication 10 ML: at 10:19

## 2020-01-01 RX ADMIN — PHYTONADIONE 2.5 MG: 5 TABLET ORAL at 04:42

## 2020-01-01 RX ADMIN — AMIODARONE HYDROCHLORIDE 1 MG/MIN: 50 INJECTION, SOLUTION INTRAVENOUS at 03:40

## 2020-01-01 RX ADMIN — PHYTONADIONE 5 MG: 10 INJECTION, EMULSION INTRAMUSCULAR; INTRAVENOUS; SUBCUTANEOUS at 11:27

## 2020-01-01 RX ADMIN — TBO-FILGRASTIM 300 MCG: 300 INJECTION, SOLUTION SUBCUTANEOUS at 17:39

## 2020-01-01 RX ADMIN — ATORVASTATIN CALCIUM 10 MG: 10 TABLET, FILM COATED ORAL at 10:40

## 2020-01-01 RX ADMIN — METHYLPREDNISOLONE SODIUM SUCCINATE 60 MG: 125 INJECTION, POWDER, FOR SOLUTION INTRAMUSCULAR; INTRAVENOUS at 13:56

## 2020-01-01 RX ADMIN — ASPIRIN 81 MG: 81 TABLET, COATED ORAL at 09:06

## 2020-01-01 RX ADMIN — Medication 0.1 MCG/KG/MIN: at 05:44

## 2020-01-01 RX ADMIN — Medication 10 ML: at 08:53

## 2020-01-01 RX ADMIN — SODIUM CHLORIDE 500 ML: 9 INJECTION, SOLUTION INTRAVENOUS at 09:22

## 2020-01-01 RX ADMIN — ATORVASTATIN CALCIUM 10 MG: 10 TABLET, FILM COATED ORAL at 09:37

## 2020-01-01 RX ADMIN — DIGOXIN 125 MCG: 125 TABLET ORAL at 08:32

## 2020-01-01 RX ADMIN — ATORVASTATIN CALCIUM 10 MG: 10 TABLET, FILM COATED ORAL at 20:18

## 2020-01-01 RX ADMIN — DIGOXIN 125 MCG: 125 TABLET ORAL at 11:04

## 2020-01-01 RX ADMIN — PHENYLEPHRINE HYDROCHLORIDE 100 MCG: 10 INJECTION INTRAVENOUS at 13:53

## 2020-01-01 RX ADMIN — INSULIN LISPRO 4 UNITS: 100 INJECTION, SOLUTION INTRAVENOUS; SUBCUTANEOUS at 17:39

## 2020-01-01 RX ADMIN — NAPROXEN 250 MG: 250 TABLET ORAL at 17:33

## 2020-01-01 RX ADMIN — PANTOPRAZOLE SODIUM 40 MG: 40 TABLET, DELAYED RELEASE ORAL at 06:33

## 2020-01-01 RX ADMIN — DILTIAZEM HYDROCHLORIDE 5 MG/HR: 5 INJECTION INTRAVENOUS at 10:42

## 2020-01-01 RX ADMIN — FUROSEMIDE 40 MG: 40 TABLET ORAL at 14:31

## 2020-01-01 RX ADMIN — SODIUM CHLORIDE: 9 INJECTION, SOLUTION INTRAVENOUS at 07:42

## 2020-01-01 RX ADMIN — DIPHENHYDRAMINE HYDROCHLORIDE 25 MG: 50 INJECTION, SOLUTION INTRAMUSCULAR; INTRAVENOUS at 22:32

## 2020-01-01 RX ADMIN — MEXILETINE HYDROCHLORIDE 200 MG: 200 CAPSULE ORAL at 07:01

## 2020-01-01 ASSESSMENT — ENCOUNTER SYMPTOMS
EYE ITCHING: 0
TROUBLE SWALLOWING: 0
CONSTIPATION: 0
SINUS PAIN: 1
BACK PAIN: 0
SHORTNESS OF BREATH: 1
NAUSEA: 0
SORE THROAT: 0
SHORTNESS OF BREATH: 0
EYE REDNESS: 0
WHEEZING: 0
SHORTNESS OF BREATH: 0
WHEEZING: 0
DIARRHEA: 0
NAUSEA: 0
DIARRHEA: 0
SORE THROAT: 0
EYE DISCHARGE: 0
COUGH: 0
SORE THROAT: 0
SHORTNESS OF BREATH: 1
SORE THROAT: 0
SINUS PRESSURE: 1
RHINORRHEA: 1
COUGH: 0
SHORTNESS OF BREATH: 0
SHORTNESS OF BREATH: 1
COUGH: 0
NAUSEA: 0
TROUBLE SWALLOWING: 0
TROUBLE SWALLOWING: 0
COUGH: 0
VOMITING: 0
WHEEZING: 0
EYE REDNESS: 0
RHINORRHEA: 1
ABDOMINAL PAIN: 0
ABDOMINAL PAIN: 0
EYE DISCHARGE: 0
PHOTOPHOBIA: 0
SHORTNESS OF BREATH: 1
WHEEZING: 0
SINUS PAIN: 1
ABDOMINAL PAIN: 0
RHINORRHEA: 0
VOMITING: 0
CONSTIPATION: 0
WHEEZING: 0
SHORTNESS OF BREATH: 0
CONSTIPATION: 0
COUGH: 0
NAUSEA: 0
CONSTIPATION: 0
RHINORRHEA: 0
DIARRHEA: 0
TROUBLE SWALLOWING: 0
EYE PAIN: 0
SORE THROAT: 1
NAUSEA: 0
NAUSEA: 0
CHEST TIGHTNESS: 0
CONSTIPATION: 0
COUGH: 0
CONSTIPATION: 0
COUGH: 0
CONSTIPATION: 0
BACK PAIN: 0
NAUSEA: 0
COUGH: 0
EYE DISCHARGE: 0
EYE REDNESS: 0
BACK PAIN: 0
SHORTNESS OF BREATH: 0
TROUBLE SWALLOWING: 0
DIARRHEA: 0
SINUS PRESSURE: 1
RHINORRHEA: 0
RHINORRHEA: 0
NAUSEA: 0
RHINORRHEA: 0
SORE THROAT: 0
EYE DISCHARGE: 0
RHINORRHEA: 0
WHEEZING: 0
ABDOMINAL PAIN: 0
DIARRHEA: 0
DIARRHEA: 0
SHORTNESS OF BREATH: 1
SORE THROAT: 0
WHEEZING: 0
SORE THROAT: 0
COUGH: 0
EYE DISCHARGE: 0
CONSTIPATION: 0
BACK PAIN: 0
BLOOD IN STOOL: 0
NAUSEA: 0
EYE DISCHARGE: 0
EYE REDNESS: 0
RHINORRHEA: 0
EYE REDNESS: 0
SHORTNESS OF BREATH: 0
ABDOMINAL PAIN: 0
SHORTNESS OF BREATH: 0
WHEEZING: 0
TROUBLE SWALLOWING: 0
EYE REDNESS: 0
EYE REDNESS: 0
BACK PAIN: 0
CONSTIPATION: 0
EYE DISCHARGE: 0
ABDOMINAL PAIN: 0
WHEEZING: 0
SHORTNESS OF BREATH: 0
BACK PAIN: 0
EYE REDNESS: 0
SORE THROAT: 0
DIARRHEA: 0
EYE DISCHARGE: 0
SHORTNESS OF BREATH: 0
EYE DISCHARGE: 0
CONSTIPATION: 0
EYE REDNESS: 0
DIARRHEA: 0
EYE DISCHARGE: 0
TROUBLE SWALLOWING: 0
RHINORRHEA: 0
TROUBLE SWALLOWING: 0
BACK PAIN: 0
SORE THROAT: 1
ABDOMINAL PAIN: 0
DIARRHEA: 0
TROUBLE SWALLOWING: 0
RHINORRHEA: 0
WHEEZING: 0
COUGH: 0
EYE REDNESS: 0
ABDOMINAL PAIN: 0
DIARRHEA: 0
BACK PAIN: 0
SORE THROAT: 0
BACK PAIN: 0
TROUBLE SWALLOWING: 0
NAUSEA: 0

## 2020-01-01 ASSESSMENT — PAIN SCALES - GENERAL
PAINLEVEL_OUTOF10: 0
PAINLEVEL_OUTOF10: 4
PAINLEVEL_OUTOF10: 0
PAINLEVEL_OUTOF10: 6
PAINLEVEL_OUTOF10: 0
PAINLEVEL_OUTOF10: 6
PAINLEVEL_OUTOF10: 0
PAINLEVEL_OUTOF10: 6
PAINLEVEL_OUTOF10: 0
PAINLEVEL_OUTOF10: 1
PAINLEVEL_OUTOF10: 0
PAINLEVEL_OUTOF10: 1
PAINLEVEL_OUTOF10: 0
PAINLEVEL_OUTOF10: 0
PAINLEVEL_OUTOF10: 3
PAINLEVEL_OUTOF10: 0
PAINLEVEL_OUTOF10: 5
PAINLEVEL_OUTOF10: 7
PAINLEVEL_OUTOF10: 0
PAINLEVEL_OUTOF10: 3
PAINLEVEL_OUTOF10: 0
PAINLEVEL_OUTOF10: 5
PAINLEVEL_OUTOF10: 0
PAINLEVEL_OUTOF10: 8
PAINLEVEL_OUTOF10: 0
PAINLEVEL_OUTOF10: 3
PAINLEVEL_OUTOF10: 0
PAINLEVEL_OUTOF10: 3
PAINLEVEL_OUTOF10: 0
PAINLEVEL_OUTOF10: 4
PAINLEVEL_OUTOF10: 0
PAINLEVEL_OUTOF10: 5
PAINLEVEL_OUTOF10: 0
PAINLEVEL_OUTOF10: 1
PAINLEVEL_OUTOF10: 0

## 2020-01-01 ASSESSMENT — PAIN DESCRIPTION - PAIN TYPE
TYPE: ACUTE PAIN

## 2020-01-01 ASSESSMENT — PULMONARY FUNCTION TESTS
PIF_VALUE: 0
PIF_VALUE: 0
PIF_VALUE: 1
PIF_VALUE: 0
PIF_VALUE: 1
PIF_VALUE: 0
PIF_VALUE: 0
PIF_VALUE: 1
PIF_VALUE: 0
PIF_VALUE: 1
PIF_VALUE: 0
PIF_VALUE: 1
PIF_VALUE: 0
PIF_VALUE: 1
PIF_VALUE: 0
PIF_VALUE: 1
PIF_VALUE: 0
PIF_VALUE: 0
PIF_VALUE: 1
PIF_VALUE: 0
PIF_VALUE: 1
PIF_VALUE: 1
PIF_VALUE: 0
PIF_VALUE: 0
PIF_VALUE: 1
PIF_VALUE: 0
PIF_VALUE: 1
PIF_VALUE: 0
PIF_VALUE: 1
PIF_VALUE: 1
PIF_VALUE: 0
PIF_VALUE: 1
PIF_VALUE: 0
PIF_VALUE: 1
PIF_VALUE: 1
PIF_VALUE: 0
PIF_VALUE: 1
PIF_VALUE: 1
PIF_VALUE: 0
PIF_VALUE: 0

## 2020-01-01 ASSESSMENT — PAIN - FUNCTIONAL ASSESSMENT
PAIN_FUNCTIONAL_ASSESSMENT: 0-10
PAIN_FUNCTIONAL_ASSESSMENT: PREVENTS OR INTERFERES SOME ACTIVE ACTIVITIES AND ADLS

## 2020-01-01 ASSESSMENT — PAIN DESCRIPTION - LOCATION
LOCATION: HIP
LOCATION: NECK
LOCATION: HIP

## 2020-01-01 ASSESSMENT — PAIN DESCRIPTION - DESCRIPTORS
DESCRIPTORS: ACHING
DESCRIPTORS: ACHING

## 2020-01-01 ASSESSMENT — PAIN DESCRIPTION - PROGRESSION: CLINICAL_PROGRESSION: NOT CHANGED

## 2020-01-01 ASSESSMENT — PAIN DESCRIPTION - FREQUENCY: FREQUENCY: INTERMITTENT

## 2020-01-01 ASSESSMENT — PATIENT HEALTH QUESTIONNAIRE - PHQ9
2. FEELING DOWN, DEPRESSED OR HOPELESS: 0
SUM OF ALL RESPONSES TO PHQ QUESTIONS 1-9: 0
SUM OF ALL RESPONSES TO PHQ QUESTIONS 1-9: 0
1. LITTLE INTEREST OR PLEASURE IN DOING THINGS: 0
SUM OF ALL RESPONSES TO PHQ9 QUESTIONS 1 & 2: 0

## 2020-01-01 ASSESSMENT — PAIN SCALES - WONG BAKER

## 2020-01-01 ASSESSMENT — PAIN DESCRIPTION - ORIENTATION
ORIENTATION: LEFT
ORIENTATION: POSTERIOR
ORIENTATION: LEFT

## 2020-01-01 ASSESSMENT — COPD QUESTIONNAIRES: CAT_SEVERITY: MODERATE

## 2020-01-01 ASSESSMENT — PAIN DESCRIPTION - ONSET: ONSET: ON-GOING

## 2020-01-02 NOTE — PATIENT INSTRUCTIONS
your diabetes medications. It is not known whether this medicine will harm an unborn baby. Tell your doctor if you are pregnant or plan to become pregnant. It is not known whether methylprednisolone passes into breast milk or if it could affect the nursing baby. Tell your doctor if you are breast-feeding. How should I take methylprednisolone? Follow all directions on your prescription label. Your doctor may occasionally change your dose. Do not use this medicine in larger or smaller amounts or for longer than recommended. Methylprednisolone is sometimes taken every other day. Follow your doctor's dosing instructions very carefully. Your dose needs may change if you have unusual stress such as a serious illness, fever or infection, or if you have surgery or a medical emergency. Tell your doctor about any such situation that affects you. This medicine can cause unusual results with certain medical tests. Tell any doctor who treats you that you are using methylprednisolone. You should not stop using methylprednisolone suddenly. Follow your doctor's instructions about tapering your dose. Wear a medical alert tag or carry an ID card stating that you take methylprednisolone. Any medical care provider who treats you should know that you take steroid medication. If you need surgery, tell the surgeon ahead of time that you are using methylprednisolone. You may need to stop using the medicine for a short time. Store at room temperature away from moisture and heat. What happens if I miss a dose? Call your doctor for instructions if you miss a dose of methylprednisolone. What happens if I overdose? Seek emergency medical attention or call the Poison Help line at 1-158.986.8955. An overdose of methylprednisolone is not expected to produce life threatening symptoms.  However, long term use of high steroid doses can lead to symptoms such as thinning skin, easy bruising, changes in the shape or location of body fat

## 2020-01-02 NOTE — PROGRESS NOTES
Office Visit  1/2/2020    Subjective:  Chief Complaint   Patient presents with    Sinus Problem     x2 months. HPI:  Zak oBlivar is a 70 y.o. male who presents to the clinic today for follow up. Pt is here to f/u on a sinus infection. He was seen 11/27/19 and placed on cefdinir. He reports that he took this as prescribed. Symptoms improved, but did not resolve. Started flonase which helped as well as OTC regimens. States he can have a lot grade temp at home around . None at this time. No ear pain. No sore throat. Denies N/V, diarrhea or abdominal pain. Denies CP, SOB or wheezing. Recent ill contacts? No  Tobacco use or second hand smoke exposure - No   Condition better, worsening, or stable - Improving this week with mucinex    Review of Systems   Constitutional: Positive for fever (low grade). Negative for appetite change, chills, diaphoresis, fatigue and unexpected weight change. HENT: Positive for rhinorrhea and sinus pressure. Negative for congestion, drooling, ear discharge, ear pain, hearing loss, postnasal drip, sneezing, sore throat and trouble swallowing. Eyes: Negative for photophobia, pain, discharge, redness, itching and visual disturbance. Respiratory: Negative for cough, shortness of breath and wheezing. Cardiovascular: Negative for chest pain, palpitations and leg swelling. Gastrointestinal: Negative for abdominal pain, constipation, diarrhea, nausea and vomiting. Skin: Negative for pallor and rash. Neurological: Negative for dizziness, weakness, light-headedness, numbness and headaches. Allergies   Allergen Reactions    Clindamycin/Lincomycin Rash    Cefdinir Rash     Current Outpatient Rx   Medication Sig Dispense Refill    methylPREDNISolone (MEDROL DOSEPACK) 4 MG tablet Take by mouth.  1 kit 0    midodrine (PROAMATINE) 2.5 MG tablet TAKE 1 TABLET BY MOUTH DAILY 30 tablet 11    lisinopril (PRINIVIL;ZESTRIL) 2.5 MG tablet TAKE ONE TABLET BY MOUTH DAILY 90 tablet 3    hydrochlorothiazide (HYDRODIURIL) 25 MG tablet TAKE 1 TABLET BY MOUTH DAILY 90 tablet 3    spironolactone (ALDACTONE) 25 MG tablet TAKE 0.5 TABLETS BY MOUTH THREE TIMES A WEEK 45 tablet 1    warfarin (COUMADIN) 5 MG tablet Take 1 tablet by mouth daily 90 tablet 3    atorvastatin (LIPITOR) 10 MG tablet TAKE 1 TABLET BY MOUTH ONE TIME A DAY  90 tablet 2    metoprolol succinate (TOPROL XL) 25 MG extended release tablet TAKE 0.5 TABLETS BY MOUTH 2 TIMES DAILY 90 tablet 3    Cholecalciferol (VITAMIN D3) 3000 UNITS TABS Take 2,000 Units by mouth daily       ondansetron (ZOFRAN-ODT) 4 MG disintegrating tablet Take 4 mg by mouth every 8 hours as needed for Nausea or Vomiting      aspirin 81 MG tablet Take 81 mg by mouth daily       Ascorbic Acid (VITAMIN C) 500 MG tablet Take 1,000 mg by mouth once a week        Patient Active Problem List   Diagnosis    Cardiomyopathy ([facility] [address]    Nonrheumatic aortic valve insufficiency    Mitral regurgitation    Microscopic hematuria    S/P AVR (aortic valve replacement)    Chronic combined systolic and diastolic CHF (congestive heart failure) ([facility])    Arthritis of knee    Primary localized osteoarthrosis of shoulder region    Primary localized osteoarthrosis, pelvic region and thigh    H/O total shoulder replacement    Hypercholesteremia    Finger amputation, traumatic, initial encounter    PAF (paroxysmal atrial fibrillation) ([facility])    Essential hypertension     Wt Readings from Last 3 Encounters:   01/02/20 181 lb (82.1 kg)   12/16/19 185 lb (83.9 kg)   12/10/19 189 lb (85.7 kg)     BP Readings from Last 3 Encounters:   01/02/20 122/76   12/16/19 110/72   12/10/19 102/64     The ASCVD Risk score (Guerda Goncalves, et al., 2013) failed to calculate for the following reasons:     The valid total cholesterol range is 130 to 320 mg/dL    PHQ-9 Total Score: 0 (1/2/2020 10:54 AM)    Objective/Physical Exam:  /76   Pulse 68   Temp 98.5 °F (36.9 °C)

## 2020-01-13 NOTE — PROGRESS NOTES
MOUTH THREE TIMES A WEEK 45 tablet 1    warfarin (COUMADIN) 5 MG tablet Take 1 tablet by mouth daily 90 tablet 3    atorvastatin (LIPITOR) 10 MG tablet TAKE 1 TABLET BY MOUTH ONE TIME A DAY  90 tablet 2    metoprolol succinate (TOPROL XL) 25 MG extended release tablet TAKE 0.5 TABLETS BY MOUTH 2 TIMES DAILY 90 tablet 3    Cholecalciferol (VITAMIN D3) 3000 UNITS TABS Take 2,000 Units by mouth daily       ondansetron (ZOFRAN-ODT) 4 MG disintegrating tablet Take 4 mg by mouth every 8 hours as needed for Nausea or Vomiting      aspirin 81 MG tablet Take 81 mg by mouth daily       Ascorbic Acid (VITAMIN C) 500 MG tablet Take 1,000 mg by mouth once a week        No current facility-administered medications for this visit. Review of Systems   Constitutional: Negative for chills and fever. HENT: Positive for congestion and rhinorrhea. \"95% better\"   Gastrointestinal: Negative for blood in stool. Genitourinary: Negative for hematuria. All other systems reviewed and are negative. OBJECTIVE:  Physical Exam  Constitutional:       Appearance: Normal appearance. HENT:      Head: Normocephalic and atraumatic. Nose:      Right Sinus: No maxillary sinus tenderness or frontal sinus tenderness. Left Sinus: No maxillary sinus tenderness or frontal sinus tenderness. Pulmonary:      Effort: Pulmonary effort is normal.      Breath sounds: Normal breath sounds. Skin:     General: Skin is warm and dry. Neurological:      Mental Status: He is alert.         BP 96/64   Pulse 56   Ht 6' (1.829 m)   Wt 174 lb (78.9 kg)   BMI 23.60 kg/m²      PHQ Scores 1/2/2020 11/7/2019 3/25/2019 6/7/2018 6/1/2017 12/10/2014   PHQ2 Score 0 0 0 0 0 0   PHQ9 Score 0 0 0 0 0 0     Interpretation of Total Score Depression Severity: 1-4 = Minimal depression, 5-9 = Mild depression, 10-14 = Moderate depression, 15-19 = Moderately severe depression, 20-27 =Severe depression        ASSESSMENT/PLAN:  Ingrid Lowe was seen today for other and office visit for anticoagulation management. Diagnoses and all orders for this visit:    Protracted URI  -Protracted URI symptoms for about 6 weeks. He was given antibiotics and steroids. There was consideration for a sinus CT if not improving but today he states he is \"95% better. \"    PAF (paroxysmal atrial fibrillation) ([facility])  -Chronic, stable  -Continue anticoagulation    Anticoagulated on Coumadin  - INR 2.7 --> 5.9 --> 2.5 --> 3.7  - Fluctuations in INR are felt to be related to antibiotic and steroid for the above issues.   - Hold warfarin x1 day, see flow sheets  -     POCT INR      JEANIE Crockett - CNP

## 2020-01-23 NOTE — PROGRESS NOTES
for this visit. Review of Systems   Constitutional: Positive for fever. HENT: Positive for congestion, sinus pressure, sinus pain and sore throat. OBJECTIVE:  Physical Exam  Constitutional:       General: He is not in acute distress. Appearance: Normal appearance. He is ill-appearing. He is not toxic-appearing. HENT:      Head: Normocephalic and atraumatic. Nose:      Right Sinus: Frontal sinus tenderness present. Left Sinus: Frontal sinus tenderness present. Mouth/Throat:      Mouth: Mucous membranes are moist.      Pharynx: Oropharynx is clear. Pulmonary:      Effort: Pulmonary effort is normal.      Breath sounds: Normal breath sounds. Skin:     General: Skin is warm and dry. Neurological:      Mental Status: He is alert. /64 (Site: Left Upper Arm, Position: Sitting, Cuff Size: Large Adult)   Pulse 64   Temp 99.2 °F (37.3 °C) (Oral)   Ht 6' (1.829 m)   Wt 180 lb 6.4 oz (81.8 kg)   BMI 24.47 kg/m²      PHQ Scores 1/2/2020 11/7/2019 3/25/2019 6/7/2018 6/1/2017 12/10/2014   PHQ2 Score 0 0 0 0 0 0   PHQ9 Score 0 0 0 0 0 0     Interpretation of Total Score Depression Severity: 1-4 = Minimal depression, 5-9 = Mild depression, 10-14 = Moderate depression, 15-19 = Moderately severe depression, 20-27 =Severe depression        ASSESSMENT/PLAN:  Ankit Hopson was seen today for head congestion. Diagnoses and all orders for this visit:    Chronic pansinusitis  -     amoxicillin-clavulanate (AUGMENTIN) 875-125 MG per tablet; Take 1 tablet by mouth 2 times daily for 21 days  -     Lactobacillus (PROBIOTIC ACIDOPHILUS) TABS; Take 1 tablet by mouth 2 times daily for 21 days    Head congestion  -     POCT Influenza A/B    Nasal congestion  -     POCT Influenza A/B    Low grade fever  -     POCT Influenza A/B    -Flu negative  -Recurrent sinus symptoms after antibiotic and steroids. Given the recurrent nature, suspicious for chronic sinusitis.   We discussed options including sinus CT, referral to ENT, or treatment for chronic sinus infection. He is inclined to try treatment. We discussed the risks of long-term antibiotics and he would like to proceed. 3 weeks of antibiotics with probiotic. He will need weekly INR as previous antibiotic caused warfarin induced coagulopathy.   He will take one half his normal Coumadin while antibiotic until checked next week      Patricia Mackenzie, JEANIE - CNP

## 2020-01-28 PROBLEM — A41.9 SEPSIS (HCC): Status: ACTIVE | Noted: 2020-01-01

## 2020-01-28 NOTE — ED NOTES
Attempted to straight cath pt. Blood noted and stopped. Mynor Devlin made aware and no new orders received.      Ervin Sellers RN  01/28/20 1407

## 2020-01-28 NOTE — CONSULTS
has a 40.00 pack-year smoking history. He has never used smokeless tobacco.  · Alcohol use:   reports current alcohol use of about 2.0 standard drinks of alcohol per week. · Currently lives in: [place] [address]  ·  reports no history of drug use. Assessment:     The patient is a 70 y.o. old male who  has a past medical history of Aortic insufficiency, Atrial fibrillation ([facility] Breast nodule (8/2012  right), CAD (coronary artery disease), Cardiomyopathy ([facility] (8/2012), CHF (congestive heart failure) ([facility] Hyperlipidemia, Hypertension, Microscopic hematuria (7/29/2012), Mitral regurgitation, Nausea & vomiting (7/29/2012), Osteoarthritis of knee, Pneumonia, Rotator cuff tear (7/2/2013), and Ventricular ectopy (7/29/2012). with following problems:    · Sepsis with  tachypnea, tachycardia, hypotension, acute kidney injury  · Leukopenia, seems to be chronic  · Ethmoid sinusitis  · Bibasilar atelectasis versus pneumonia  · Has a bioprosthetic aortic valve  · Essential hypertension  · Mixed hyperlipidemia  · Multiple antibiotic allergies  · Ex-heavy smoker      Discussion:      The patient was having flulike symptoms with myalgias and nasal congestion for past few days. He apparently also had diarrhea. Strep antigen testing positive in PCPs office earlier today    In the ER, he was noted to be tachypneic, tachycardic and hypotensive. He was admitted.         Plan:     Diagnostic Workup:    · Agree with blood cultures  · We will order baseline HAV screen  · Urine Legionella and pneumococcal antigen  · Respiratory viral PCR panel  · We will order sputum culture  · Will order nasal MRSA screen  · Check pro calcitonin level  · 2D echo to rule out any valvular vegetations given bioprosthetic aortic valve  · Continue to follow fever curve, WBC count and blood cultures  · Follow up on liverand renal functions closely    Antimicrobials:    · I talked with the ER physician earlier today and have recommended IV vancomycin  Check Vancomycin trough before the 5th dose. Target vancomycin trough of around 15. Keep vancomycin trough below 20 at all times. Avoid increasing the dose of vancomycin above a total of 4 grams in a 24-hour period in patients younger than 45 years and above 3 grams in a 24-hour period in a patient of age 39 years or older. Continue to monitor serum creatinine and Vanco levels closely, while the patient is on I/v Vancomycin. · Also recommended IV Flagyl 500 mg every 8 hours  · I had recommended IV Levaquin due to his multiple allergies. The patient unfortunately developed allergic reaction to Levaquin as well and it has been held. · The patient has tolerated IV and oral ciprofloxacin well in 2012. We will start him on p.o. ciprofloxacin 500 mg every 12 hours  · Continue to monitor his vitals closely  · Cough and deep eating exercising  · Aspiration precaution  · If respiratory viral PCR panel comes back positive and his procalcitonin is low, will consider stopping antibiotics early on  · DVT prophylaxis  · Discussed the above plan with patient and RN   · Discussed all above with patient's family at bedside    Drug Monitoring:    · Continue serial monitoring for antibiotic toxicity as follows: *Vanco trough, CMP  · Continue to watch for following: new or worsening fever, hypotension, hives, lip swelling and redness or purulence at vascular access sites. I/v access Management:    · Continue to monitor i.v access sites for erythema, induration, discharge or tenderness. · As always, continue efforts to minimizetubes/lines/drains as clinically appropriate to reduce chances of line associated infections. Current isolation precautions: There are no current isolations documented for this patient.      Patient education and counseling:      · The patient was educated in detailabout the side-effects of various antibiotics and things to watch for like new rashes, lip swelling, severe reaction, worsening diarrhea, break through fever etc.  · Discussed patient'scondition and what to expect. All of the patient's questions were addressed in a satisfactory manner and patient verbalized understanding all instructions. Level of complexity of visit: High     Risk of Complications/Morbidity: High     · Illness(es)/ Infection present that pose threat to life/bodily function. · There is potential for severe exacerbation of infection/side effects of treatment. · Therapy requires intensive monitoring for antimicrobial agent toxicity. Thank you for involving me in the care of your patient. I will continue to follow. If you have any additional questions, please do not hesitate to contact me. Subjective:     Presenting complaint in ER:     Chief Complaint   Patient presents with    Sinusitis     pt arrived via squad. sore throat and sinus infection for a couple of months. pts doctor concerned for septic, low BP and low immune system. HPI: Meeta Urias is a 70 y.o. male patient, who was seen at the request of Dr. Laura Rangel MD.    History was obtained from chart review and the patient. The patient was admitted on 1/28/2020. I have been consulted to see the patient for above mentioned reason(s). The patient has multiple medical comorbidities, and presented to the ER for not feeling well for past few days. The patient apparently had sinus-like symptoms with the sore throat and fever and tachypnea and tachycardia and was apparently diagnosed with strep throat by his PCP recently via rapid throat screen for strep. He has been on Augmentin, however his symptoms are worsening and he was becoming hypotensive, so his PCP got concerned and patient was sent to the ER. In the ER, patient was noted to be hypotensive, tachycardic, tachypneic. He was also leukopenic. He was admitted for concern for sepsis      I have been asked to see the patient for this complicated infection. Past Medical History: All past medical history reviewed today. Past Medical History:   Diagnosis Date    Aortic insufficiency     Atrial fibrillation [facility])     cardioversion 8/10/12    Breast nodule 8/2012  right    excision-lumpectomy 8/2012    CAD (coronary artery disease)     Cardiomyopathy ([address] 8/2012    EF 20 %    [facility]congest[facility]     Hyperlipidemia     Hypertension     Microscopic hematuria 7/29/2012    Mitral regurgitation     Nausea & vomiting 7/29/2012    Osteoarthritis of knee     bilateral knees    Pneumonia     Rotator cuff tear 7/2/2013    Ventricular ectopy 7/29/2012         Past Surgical History: All pastsurgical history was reviewed today. Past Surgical History:   Procedure Laterality Date    AORTIC VALVE REPLACEMENT  8/3/2012    moderate mitral regurg    CARDIAC SURGERY      COLONOSCOPY      CYST REMOVAL      chest    ENDOSCOPY, COLON, DIAGNOSTIC      INGUINAL HERNIA REPAIR      bilateral in [address]      both    OTHER SURGICAL HISTORY  8/2/12    excision right breast mass (lumpectomy)    SHOULDER ARTHROPLASTY Right 2/2/15    right reverse total shoulder arthroplasty    TOTAL KNEE ARTHROPLASTY Left 12/4/13    TOTAL KNEE ARTHROPLASTY Right 2/3/14    [facility]       UPPER GASTROINTESTINAL ENDOSCOPY  12/24/13         Family History: All family history was reviewed today. Problem Relation Age of Onset    Marfan Syndrome Brother     Heart Disease Brother     Stroke Sister     Diabetes Sister     Heart Disease Sister     Heart Disease Sister     Cancer Brother          Medications: All current and past medications were reviewed. Not in a hospital admission.      sodium chloride flush  10 mL Intravenous 2 times per day    metroNIDAZOLE  500 mg Intravenous Once    [START ON 1/29/2020] atorvastatin  10 mg Oral Daily    [START ON 1/29/2020] aspirin  81 mg Oral Daily    sodium chloride flush  10 mL Intravenous 2 times per day    metroNIDAZOLE  500 mg Intravenous Q8H    levofloxacin  500 mg Intravenous Q24H    midodrine  2.5 mg Oral Daily    digoxin  125 mcg Intravenous Daily    methylPREDNISolone sodium              REVIEW OF SYSTEMS:       Review of Systems   Constitutional: Positive for fatigue and fever. Negative for chills and diaphoresis. HENT: Positive for congestion and sore throat. Negative for ear discharge, ear pain, rhinorrhea and trouble swallowing. Tends to get yellowish creamy discharge from the sinuses   Eyes: Negative for discharge and redness. Respiratory: Negative for cough, shortness of breath and wheezing. Cardiovascular: Negative for chest pain and leg swelling. Gastrointestinal: Negative for abdominal pain, constipation, diarrhea and nausea. Endocrine: Negative for polyuria. Genitourinary: Negative for dysuria, flank pain, frequency, hematuria and urgency. Musculoskeletal: Negative for back pain and myalgias. Skin: Negative for rash. Neurological: Negative for dizziness, seizures and headaches. Hematological: Does not bruise/bleed easily. Psychiatric/Behavioral: Negative for hallucinations and suicidal ideas. All other systems reviewed and are negative. Objective:       PHYSICAL EXAM:      Vitals:   Vitals:    01/28/20 1410 01/28/20 1420 01/28/20 1430 01/28/20 1440   BP: (!) 83/57 (!) 80/51 (!) 80/40    Pulse: 109 116 110 99   Resp: 18 23 22 21   Temp:       TempSrc:       SpO2: 98% 96% 97% 96%   Weight:       Height:           Physical Exam  Vitals signs and nursing note reviewed. Constitutional:       Appearance: Normal appearance. He is well-developed. Comments: Appears tired, hypotensive   HENT:      Head: Normocephalic and atraumatic. Right Ear: External ear normal.      Left Ear: External ear normal.      Nose: Nose normal. No congestion or rhinorrhea.       Mouth/Throat:      Mouth: Mucous membranes are moist. Pharynx: No oropharyngeal exudate or posterior oropharyngeal erythema. Eyes:      General: No scleral icterus. Right eye: No discharge. Left eye: No discharge. Conjunctiva/sclera: Conjunctivae normal.      Pupils: Pupils are equal, round, and reactive to light. Neck:      Musculoskeletal: Normal range of motion and neck supple. No neck rigidity or muscular tenderness. Cardiovascular:      Rate and Rhythm: Normal rate and regular rhythm. Pulses: Normal pulses. Heart sounds: No murmur. No friction rub. Pulmonary:      Effort: Pulmonary effort is normal. No respiratory distress. Breath sounds: Normal breath sounds. No stridor. No wheezing, rhonchi or rales. Abdominal:      General: Bowel sounds are normal.      Palpations: Abdomen is soft. Tenderness: There is no abdominal tenderness. There is no right CVA tenderness, left CVA tenderness, guarding or rebound. Musculoskeletal: Normal range of motion. General: No swelling or tenderness. Lymphadenopathy:      Cervical: No cervical adenopathy. Skin:     General: Skin is warm and dry. Coloration: Skin is not jaundiced. Findings: No erythema or rash. Neurological:      General: No focal deficit present. Mental Status: He is alert and oriented to person, place, and time. Mental status is at baseline. Motor: No abnormal muscle tone. Psychiatric:         Mood and Affect: Mood normal.         Behavior: Behavior normal.         Thought Content: Thought content normal.           Lines: All vascular access sites are healthy with no local erythema, discharge or tenderness. Intake and output:     I/O last 3 completed shifts: In: 3250 [IV Piggyback:3250]  Out: -     Lab Data:   All available labs were reviewed by me today.      CBC:   Recent Labs     01/28/20  0901   WBC 3.9*   RBC 4.20   HGB 11.1*   HCT 34.4*      MCV 81.9   MCH 26.3   MCHC 32.1   RDW 18.3*        BMP:  Recent Labs 01/28/20  0902   *   K 4.7   CL 92*   CO2 20*   BUN 56*   CREATININE 1.9*   CALCIUM 8.6   GLUCOSE 80        Hepatic FunctionPanel:   Lab Results   Component Value Date    ALKPHOS 64 01/28/2020    ALT 9 01/28/2020    AST 29 01/28/2020    PROT 7.0 01/28/2020    PROT 6.2 11/28/2012    BILITOT 0.5 01/28/2020    BILIDIR <0.2 10/17/2016    IBILI see below 10/17/2016    LABALBU 3.0 01/28/2020       CPK:   Lab Results   Component Value Date    CKTOTAL 168 07/28/2012     ESR:   Lab Results   Component Value Date    SEDRATE 75 (H) 11/30/2012     CRP: No results found for: CRP      Imaging: All pertinent images and reports for the current visit were reviewed by meduring this visit. XR CHEST PORTABLE   Final Result   1. Streaky bibasilar opacities likely reflect subsegmental atelectasis versus   scarring rather than an infectious/inflammatory process. 2. The visible lungs are without pneumothorax, pleural effusion or new focal   airspace opacity. 3. Stable enlargement of the cardiopericardial silhouette. CT Head WO Contrast   Final Result   No acute intracranial abnormality. Ethmoid sinusitis. Outside records:    Labs, Microbiology, Radiology and pertinent results from Care everywhere, if available, were reviewed as a part ofthe consultation.       Problem list:       Patient Active Problem List   Diagnosis Code    Elevated troponin R79.89    Cardiomyopathy ([facility] I42.9    Nonrheumatic aortic valve insufficiency I35.1    Mitral regurgitation I34.0    Microscopic hematuria R31.29    S/P AVR (aortic valve replacement) Z95.2    Chronic combined systolic and diastolic CHF (congestive heart failure) ([facility] I50.42    Arthritis of knee M17.10    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    Primary localized osteoarthrosis, 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    H/O total shoulder replacement Z96.619    Hypercholesteremia E78.00    Finger amputation, traumatic, initial

## 2020-01-28 NOTE — ED NOTES
Pt increased hoarseness and confusion. Mague Blanchard RN  01/28/20 5774      Dr Tobi Diaz made aware of this and vitals. Lactic order received.       Mague Blanchard RN  01/28/20 3249

## 2020-01-28 NOTE — ED PROVIDER NOTES
(AUGMENTIN) 875-125 MG PER TABLET    Take 1 tablet by mouth 2 times daily for 21 days    ASCORBIC ACID (VITAMIN C) 500 MG TABLET    Take 1,000 mg by mouth once a week     ASPIRIN 81 MG TABLET    Take 81 mg by mouth daily     ATORVASTATIN (LIPITOR) 10 MG TABLET    TAKE 1 TABLET BY MOUTH ONE TIME A DAY     CHOLECALCIFEROL (VITAMIN D3) 3000 UNITS TABS    Take 2,000 Units by mouth daily     HYDROCHLOROTHIAZIDE (HYDRODIURIL) 25 MG TABLET    TAKE 1 TABLET BY MOUTH DAILY    LACTOBACILLUS (PROBIOTIC ACIDOPHILUS) TABS    Take 1 tablet by mouth 2 times daily for 21 days    LISINOPRIL (PRINIVIL;ZESTRIL) 2.5 MG TABLET    TAKE ONE TABLET BY MOUTH DAILY    METOPROLOL SUCCINATE (TOPROL XL) 25 MG EXTENDED RELEASE TABLET    TAKE 0.5 TABLETS BY MOUTH 2 TIMES DAILY    MIDODRINE (PROAMATINE) 2.5 MG TABLET    TAKE 1 TABLET BY MOUTH DAILY    ONDANSETRON (ZOFRAN-ODT) 4 MG DISINTEGRATING TABLET    Take 4 mg by mouth every 8 hours as needed for Nausea or Vomiting    SPIRONOLACTONE (ALDACTONE) 25 MG TABLET    TAKE 0.5 TABLETS BY MOUTH THREE TIMES A WEEK    WARFARIN (COUMADIN) 5 MG TABLET    Take 1 tablet by mouth daily         ALLERGIES     Clindamycin/lincomycin and Cefdinir    FAMILYHISTORY       Family History   Problem Relation Age of Onset    Marfan Syndrome Brother     Heart Disease Brother     Stroke Sister     Diabetes Sister     Heart Disease Sister     Heart Disease Sister     Cancer Brother           SOCIAL HISTORY       Social History     Tobacco Use    Smoking status: Former Smoker     Packs/day: 2.00     Years: 20.00     Pack years: 40.00     Last attempt to quit: 1970     Years since quittin.5    Smokeless tobacco: Never Used   Substance Use Topics    Alcohol use:  Yes     Alcohol/week: 2.0 standard drinks     Types: 2 Cans of beer per week     Comment: rare    Drug use: No       SCREENINGS             PHYSICAL EXAM    (up to 7 for level 4, 8 or more for level 5)     ED Triage Vitals   BP Temp Temp src Abnormal; Notable for the following components:       Result Value    WBC 3.9 (*)     Hemoglobin 11.1 (*)     Hematocrit 34.4 (*)     RDW 18.3 (*)     Anisocytosis 1+ (*)     Polychromasia Occasional (*)     Poikilocytes Occasional (*)     Jessenia Cells Occasional (*)     Ovalocytes Occasional (*)     All other components within normal limits    Narrative:     Performed at:  [facility] [address] [facility] [place] [address] [employer]   Phone [phone]   COMPREHENSIVE METABOLIC PANEL W/ REFLEX TO MG FOR LOW K - Abnormal; Notable for the following components:    Sodium 130 (*)     Chloride 92 (*)     CO2 20 (*)     Anion Gap 18 (*)     BUN 56 (*)     CREATININE 1.9 (*)     GFR Non- 35 (*)     GFR  42 (*)     Alb 3.0 (*)     Albumin/Globulin Ratio 0.8 (*)     ALT 9 (*)     All other components within normal limits    Narrative:     Performed at:  [facility] [address], [place], [facility] [employer]   Phone [phone]   PROTIME-INR - Abnormal; Notable for the following components:    Protime 104.5 (*)     INR 8.81 (*)     All other components within normal limits    Narrative:     [facility] [phone],  Coag results called to and read back by araceli macias rn, 01/28/2020 09:44,  by Priscila Hayden  Performed at:  [facility] [address], [place], [facility] [employer]   Phone [phone]   TROPONIN - Abnormal; Notable for the following components:    Troponin 0.04 (*)     All other components within normal limits    Narrative:     Performed at:  [facility] [address] [place]   Phone [phone]   RAPID INFLUENZA A/B ANTIGENS    Narrative:     Performed at:  [facility] [address], [place], [facility] [employer]   Phone [phone]   CULTURE BLOOD #1   CULTURE BLOOD #2   URINE CULTURE   LEGIONELLA ANTIGEN, URINE in time range)   0.9 % sodium chloride bolus (0 mLs Intravenous Stopped 1/28/20 1012)       The patient's detailed history of present illness is documented as above. Upon arrival to the emergency department the patient's vital signs are as documented. The patient is noted to be hypotensive, tachycardic and afebrile. Physical examination findings are as above. IV access was obtained. Laboratory testing and work-up was initiated. Initial EKG demonstrates a atrial fibrillation with a rapid ventricular response. Rate in the room has been multiple and variable from 108 up to 140. Because the initial presentation appeared most consistent with sepsis we did initiate treatment with IV fluids. 2 L of normal saline were initiated. When it was noted the patient was still having ongoing difficulties with tachycardia and had improvement of his hypotension with this we then started a Cardizem drip. Patient's initial CBC does continue to demonstrate evidence of leukopenia but today is actually improved at 3.9. He is anemic at 11 and 34.4 respectively but there is no associated thrombocytopenia. Today's absolute neutrophil count is noted to be normal at 1.7 despite his history of neutropenia in the past.  BUN is 56 creatinine is 1.9 which is a mild acute kidney injury although he does normally have baseline elevated creatinine. His sodium is 130 chloride 92 CO2 at 20 and a gap of 18 but the patient is already receiving IV fluids as documented above has of his sepsis and associated hypotension. Influenza screening is negative rapid strep screening was positive mono was negative. Troponin has mild chronic elevation today at 0.04. Take acid is 2 when he is super therapeutic with an INR today of 8.81. Portable chest x-ray demonstrates streaky bibasilar opacities of atelectasis versus the possibility of infectious or inflammatory process. Patient had already been initiated on broad-spectrum antibiotics.   We did discuss this with Dr. Lucille Morales due to the acute kidney injury and the patient was placed on vancomycin, Levaquin, and Flagyl. Urinalysis is currently outstanding and will not change her treatment whereas Levaquin would be able to cover any potential for uropathogen. The head demonstrates no evidence of acute intercranial abnormality and the patient does have evidence of chronic ethmoid sinusitis clinically and radiographically. Antibiotic coverage again has already been initiated. Patient did have improvement in his blood pressure with his fluid resuscitation. He continues to have mild elevation his heart rate. Patient will require ongoing care management on an inpatient basis. Case was discussed directly with Dr. Mario Barraza. Patient will be admitted to the medical surgical services for ongoing care management. Of note there are documented pulse oximetry measures in the chart reported to be hypoxic which are not accurate and the patient has not had any evidence of hypoxia while he has been here in the emergency department setting. FINAL IMPRESSION      1. Septic shock ([address]    2. Atrial fibrillation with rapid ventricular response (HCC)    3. Elevated international normalized ratio (INR)    4. Streptococcal pharyngitis    5. Leukopenia, unspecified type    6. Chronic ethmoidal sinusitis    7.  TYRELL (acute kidney injury) ([address]    8. chronic elevated troponin          DISPOSITION/PLAN   DISPOSITION: Admit medical stable condition         (Please note that portions of this note were completed with a voice recognition program.  Efforts were made to edit the dictations but occasionally words are mis-transcribed.)    Lori Barajas PA-C (electronically signed)           Jannet Lambert PA-C  01/28/20 8808

## 2020-01-28 NOTE — ED NOTES
Dr Magali Lawrence and Te Barnes, [place] made aware pt and pts family requesting updated on POC.      Gabi Hancock RN  01/28/20 8930

## 2020-01-28 NOTE — ED NOTES
pts hands and feet more swollen. Pt denies SOB. Paged DR Bethany Mane.       Ricki Palomino, RN  01/28/20 4357

## 2020-01-28 NOTE — ED NOTES
Pharmacy Medication History Note      List of current medications patient is taking is complete. Source of information: Family    Changes made to medication list:  Medications flagged for removal (include reason, ex. noncompliance):  N/A    Medications removed (include reason, ex. therapy complete or physician discontinued):  N/A    Medications added/doses adjusted:  N/A    Other notes (ex. Recent course of antibiotics, Coumadin dosing):  Augmentin 875mg BID started 1/23 for 7 days  INR 2.5 on 12/16 taking 5mg QD, monitored by Dr. [place]  Denies use of other OTC or herbal medications. Last dose times updated. [facility]    No current facility-administered medications on file prior to encounter.         Current Outpatient Medications on File Prior to Encounter   Medication Sig Dispense Refill    amoxicillin-clavulanate (AUGMENTIN) 875-125 MG per tablet Take 1 tablet by mouth 2 times daily for 21 days 42 tablet 0    Lactobacillus (PROBIOTIC ACIDOPHILUS) TABS Take 1 tablet by mouth 2 times daily for 21 days 42 tablet 0    midodrine (PROAMATINE) 2.5 MG tablet TAKE 1 TABLET BY MOUTH DAILY 30 tablet 11    lisinopril (PRINIVIL;ZESTRIL) 2.5 MG tablet TAKE ONE TABLET BY MOUTH DAILY 90 tablet 3    hydrochlorothiazide (HYDRODIURIL) 25 MG tablet TAKE 1 TABLET BY MOUTH DAILY 90 tablet 3    spironolactone (ALDACTONE) 25 MG tablet TAKE 0.5 TABLETS BY MOUTH THREE TIMES A WEEK 45 tablet 1    warfarin (COUMADIN) 5 MG tablet Take 1 tablet by mouth daily 90 tablet 3    atorvastatin (LIPITOR) 10 MG tablet TAKE 1 TABLET BY MOUTH ONE TIME A DAY  90 tablet 2    metoprolol succinate (TOPROL XL) 25 MG extended release tablet TAKE 0.5 TABLETS BY MOUTH 2 TIMES DAILY 90 tablet 3    Cholecalciferol (VITAMIN D3) 3000 UNITS TABS Take 2,000 Units by mouth daily       ondansetron (ZOFRAN-ODT) 4 MG disintegrating tablet Take 4 mg by mouth every 8 hours as needed for Nausea or Vomiting      aspirin 81 MG tablet Take 81 mg by mouth daily Ascorbic Acid (VITAMIN C) 500 MG tablet Take 1,000 mg by mouth once a week

## 2020-01-28 NOTE — CONSULTS
ARTHROPLASTY Right 2/2/15    right reverse total shoulder arthroplasty    TOTAL KNEE ARTHROPLASTY Left 13    TOTAL KNEE ARTHROPLASTY Right 2/3/14    RIGHT TOTAL KNEE REPLACEMENT-BAR       UPPER GASTROINTESTINAL ENDOSCOPY  13       Allergies   Allergen Reactions    Clindamycin/Lincomycin Rash    Levaquin [Levofloxacin]      Rash, swollen neck    Cefdinir Rash       Social History:  Reviewed. reports that he quit smoking about 49 years ago. He has a 40.00 pack-year smoking history. He has never used smokeless tobacco. He reports current alcohol use of about 2.0 standard drinks of alcohol per week. He reports that he does not use drugs. Family History:  Reviewed. family history includes Cancer in his brother; Diabetes in his sister; Heart Disease in his brother, sister, and sister; Marfan Syndrome in his brother; Stroke in his sister. Review of System:  All other systems reviewed and are negative except for that noted above. Pertinent negatives are:     · General: negative for fever, chills   · Ophthalmic ROS: negative for - eye pain or loss of vision  · ENT ROS: negative for - headaches, sore throat   · Respiratory: negative for - cough, sputum  · Cardiovascular: Reviewed in HPI  · Gastrointestinal: negative for - abdominal pain, diarrhea, N/V  · Hematology: negative for - bleeding, blood clots, bruising or jaundice  · Genito-Urinary:  negative for - Dysuria or incontinence  · Musculoskeletal: negative for - Joint swelling, muscle pain  · Neurological: negative for - confusion, dizziness, headaches   · Psychiatric: No anxiety, no depression.   · Dermatological: negative for - rash    Physical Examination:  Vitals:    20 1610   BP: (!) 82/49   Pulse: 108   Resp: 26   Temp:    SpO2: 94%      In: 3250   Out: 175    Wt Readings from Last 3 Encounters:   20 178 lb (80.7 kg)   20 178 lb (80.7 kg)   20 180 lb 6.4 oz (81.8 kg)     Temp  Av °F (36.7 °C)  Min: 96.8 °F (36 shoulder region 02/02/2015    Arthritis of knee 12/05/2013    Chronic combined systolic and diastolic CHF (congestive heart failure) ([facility] 03/17/2013    S/P AVR (aortic valve replacement) 09/26/2012    Elevated troponin 07/29/2012    [facility] 07/29/2012    Nonrheumatic aortic valve insufficiency 07/29/2012    Mitral regurgitation 07/29/2012    Microscopic hematuria 07/29/2012      Active Hospital Problems    Diagnosis Date Noted    Sepsis ([facility] [A41.9] 01/28/2020    PAF (paroxysmal atrial fibrillation) ([facility] [I48.0] 09/06/2019    Essential hypertension [I10] 09/06/2019    Hypercholesteremia [E78.00] 12/01/2015    Chronic combined systolic and diastolic CHF (congestive heart failure) ([facility] [I50.42] 03/17/2013    S/P AVR (aortic valve replacement) [Z95.2] 09/26/2012    Cardiomyopathy ([facility] [I42.9] 07/29/2012    Mitral regurgitation [I34.0] 07/29/2012    Elevated troponin [R79.89] 07/29/2012       Assessment:       Plan:      - paroxysmal atrial fibrillation     This episode can be due to acute infection   Will decrease IV cardizem due to low BP and add PO digoxin. May use amiodarone for rate control if needed due to low BP     On warfarin   - if remains in Atrial fibrillation after current acute issues have resolved, will do cardioversion     - Elevated INR   Due to drug interaction and also likely infection   Warfarin is held    - SAVR   Followed by Dr. Kimo Wood      - sepsis and hypotension      On IV fluid and antibiotics   ID following   On midodrine now   Off BP meds      - Hx of cardiomyopathy      His last EF had improved from 20% to 50% but now has edema and need reassessment of EF   Echo    - Hyponatremia   Adequate intake    - TYRELL   Due to sepsis and hypoperfusion     I independently reviewed echo     Thank you for allowing me to participate in the care of Joann Romero Schirmer     NOTE: This report was transcribed using voice recognition software.  Every effort was made to ensure

## 2020-01-28 NOTE — ED PROVIDER NOTES
leukopenia. He sees Dr. Elton Pyle for regular checkups on that. Seen initially at Dr. Sally Sanchez office and referred here due to his symptoms as well as profoundly low blood pressure in the office. The patient reports poor p.o. intake for multiple days and has acute kidney injury. He is receiving hydration, IV antibiotics. We did consult with Dr. Nemo Malik, infectious disease specialist, for antibiotic recommendations due to his medication allergies and infectious process. He has recommended vancomycin, Levaquin, Flagyl and he will officially consult. The patient's rhythm is currently atrial fibrillation fibrillation with rapid response, currently on Cardizem drip though with careful management due to patient's low blood pressures. He has hyper coagulated on warfarin but no active bleeding so we have not initiated reversal.  Plan is to admit for further care. For further details of [facility] encounter, please see documentation by advanced practice provider Lottie Mayberry PA-C. Results for orders placed or performed during the hospital encounter of 01/28/20   Rapid influenza A/B antigens   Result Value Ref Range    Rapid Influenza A Ag Negative Negative    Rapid Influenza B Ag Negative Negative   CBC Auto Differential   Result Value Ref Range    WBC 3.9 (L) 4.0 - 11.0 K/uL    RBC 4.20 4. 20 - 5.90 M/uL    Hemoglobin 11.1 (L) 13.5 - 17.5 g/dL    Hematocrit 34.4 (L) 40.5 - 52.5 %    MCV 81.9 80.0 - 100.0 fL    MCH 26.3 26.0 - 34.0 pg    MCHC 32.1 31.0 - 36.0 g/dL    RDW 18.3 (H) 12.4 - 15.4 %    Platelets 392 179 - 182 K/uL    MPV 8.5 5.0 - 10.5 fL    PLATELET SLIDE REVIEW Adequate     SLIDE REVIEW see below     Neutrophils % 43.0 %    Lymphocytes % 46.0 %    Monocytes % 9.0 %    Eosinophils % 2.0 %    Basophils % 0.0 %    Neutrophils Absolute 1.7 1.7 - 7.7 K/uL    Lymphocytes Absolute 1.8 1.0 - 5.1 K/uL    Monocytes Absolute 0.4 0.0 - 1.3 K/uL    Eosinophils Absolute 0.1 0.0 - 0.6

## 2020-01-28 NOTE — PLAN OF CARE
Problem: OXYGENATION/RESPIRATORY FUNCTION  Goal: Patient will maintain patent airway  Outcome: Ongoing  Goal: Patient will achieve/maintain normal respiratory rate/effort  Description  Respiratory rate and effort will be within normal limits for the patient  Outcome: Ongoing     Problem: HEMODYNAMIC STATUS  Goal: Patient has stable vital signs and fluid balance  Outcome: Ongoing     Problem: FLUID AND ELECTROLYTE IMBALANCE  Goal: Fluid and electrolyte balance are achieved/maintained  Outcome: Ongoing     Problem: ACTIVITY INTOLERANCE/IMPAIRED MOBILITY  Goal: Mobility/activity is maintained at optimum level for patient  Outcome: Ongoing     Problem: Infection:  Goal: Will remain free from infection  Description  Will remain free from infection  Outcome: Ongoing     Problem: Safety:  Goal: Free from accidental physical injury  Description  Free from accidental physical injury  Outcome: Ongoing  Goal: Free from intentional harm  Description  Free from intentional harm  Outcome: Ongoing     Problem: Daily Care:  Goal: Daily care needs are met  Description  Daily care needs are met  Outcome: Ongoing     Problem: Pain:  Goal: Patient's pain/discomfort is manageable  Description  Patient's pain/discomfort is manageable  Outcome: Ongoing     Problem: Skin Integrity:  Goal: Skin integrity will stabilize  Description  Skin integrity will stabilize  Outcome: Ongoing     Problem: Discharge Planning:  Goal: Patients continuum of care needs are met  Description  Patients continuum of care needs are met  Outcome: Ongoing

## 2020-01-28 NOTE — PROGRESS NOTES
HCT 34.4 (L) 01/28/2020    MCV 81.9 01/28/2020     01/28/2020       Lab Results   Component Value Date    CREATININE 1.2 11/07/2019    BUN 28 (H) 11/07/2019     11/07/2019    K 4.8 11/07/2019    CL 95 (L) 11/07/2019    CO2 27 11/07/2019     Rapid strep +    A/P   Diagnosis Orders   1. Hypotension, unspecified hypotension type     2. Other decreased white blood cell (WBC) count     3. Sore throat  POCT rapid strep A     The patient is presenting with ongoing fevers, hypotension, tachycardia in spite of recent initiation of Augmentin for recurrent upper respiratory infection. This is complicated by chronic leukopenia. My concern is for sepsis secondary to acute streptococcal infection with underlying immunocompromise state and possible bacteremia. Given the acuity of illness I recommended the patient be taken to the emergency room by squad. The patient and his wife are in agreement. I contacted the emergency department and spoke with ED physician Dr. Wilder Morales to give patient report. The patient was taken emergently to [facility] for medical care.

## 2020-01-28 NOTE — H&P
GFRAA 42 01/28/2020    GFRAA >60 06/12/2013    LABGLOM 35 01/28/2020    GLUCOSE 80 01/28/2020       Chest Xray:   EKG:    I visualized CXR images and EKG strips        Problem List  Active Problems:    Cardiomyopathy [facility])    Mitral regurgitation    S/P AVR (aortic valve replacement)    Chronic combined systolic and diastolic CHF (congestive heart failure) (HCC)    Hypercholesteremia    PAF (paroxysmal atrial fibrillation) ([facility]    Essential hypertension    Sepsis ([facility]  Resolved Problems:    * No resolved hospital problems. *        Assessment/Plan:   Sepsis  -Patient with myelo dysplastic syndrome and chronic leukopenia  -Recently diagnosed with sinusitis but not improving.  -To be hypotensive sent to ER by PCP. -ID consult for ER continue bank Levaquin and Flagyl for now  -Await blood culture further recommendation    2.   Supratherapeutic INR  -Patient is status post aortic valve replacement  -INR was 8.9  -Active signs of bleeding will hold off any reversal for now  -Hold Coumadin pharmacy to dose    A. fib with RVR  -Started on Cardizem drip continue for now try to wean  -Cardiology consulted      Acute renal failure  -Likely prerenal in setting of above  =-Normal saline IV fluid  -Hold any diuretics for now        Fabian Vieyra MD    1/28/2020 11:56 AM

## 2020-01-29 NOTE — PROGRESS NOTES
Report received from night shift RN. Patient is awake in bed. A&O x4. A-fib on monitor. Levophed infusing for titration to MAP 65, BP 93/65 (73). SpO2 100% on RA. Shift assessment completed and charted, see flow sheet. Patient denies pain. Still feeling a little nausea and Zofran. Patient has no further request at this time. Questions answered. Call light within reach. Bed in low position with wheels locked. Encouraged to call for nurse as needed throughout the shift. Will continue to monitor. Plan of care discussed.  Sebastián Barraza RN

## 2020-01-29 NOTE — PROGRESS NOTES
· Mouth/Throat: Oropharynx is clear and moist.   · Eyes: Conjunctivae normal. EOM are normal.   · Neck: Neck supple. No rigidity. No JVD present. · Cardiovascular: Normal rate, irregular rhythm, S1&S2. · Pulmonary/Chest: Bilateral respiratory sounds. No wheezes, No rhonchi. · Abdominal: Soft. Bowel sounds present. No distension, No tenderness. · Musculoskeletal: No tenderness. + edema    · Lymphadenopathy: Has no cervical adenopathy. · Neurological: Alert and oriented. Cranial nerve appears intact, No Gross deficit   · Skin: Skin is warm and dry. No rash noted. · Psychiatric: Has a normal behavior     Labs, diagnostic and imaging results reviewed. Reviewed. Recent Labs     01/28/20  0902 01/29/20  0212   * 134*   K 4.7 3.6   CL 92* 98*   CO2 20* 20*   BUN 56* 44*   CREATININE 1.9* 1.5*     Recent Labs     01/28/20  0901 01/29/20  0212   WBC 3.9* 3.2*   HGB 11.1* 10.6*   HCT 34.4* 32.7*   MCV 81.9 81.0    173     Lab Results   Component Value Date    CKTOTAL 168 07/28/2012    TROPONINI 0.04 01/28/2020     Estimated Creatinine Clearance: 47 mL/min (A) (based on SCr of 1.5 mg/dL (H)).    Lab Results   Component Value Date    BNP 44 01/13/2014    BNP 59 06/12/2013     11/28/2012     Lab Results   Component Value Date    PROTIME 122.3 01/29/2020    PROTIME 104.5 01/28/2020    PROTIME 25.7 01/08/2016    PROTIME 45.1 10/16/2015    PROTIME 32.8 08/07/2015    INR 10.30 01/29/2020    INR 8.81 01/28/2020    INR 3.7 01/13/2020    INR 2.5 12/16/2019    INR 5.9 12/10/2019    INR 2.8 03/04/2016     Lab Results   Component Value Date    CHOL 98 11/07/2019    HDL 38 11/07/2019    TRIG 70 11/07/2019       Scheduled Meds:   lidocaine 1 % injection  5 mL Intradermal Once    sodium chloride flush  10 mL Intravenous 2 times per day    sodium chloride flush  10 mL Intravenous 2 times per day    aspirin  81 mg Oral Daily    sodium chloride flush  10 mL Intravenous 2 times per day    metroNIDAZOLE  500 mg Intravenous Q8H    midodrine  2.5 mg Oral Daily    digoxin  250 mcg Oral Daily    ciprofloxacin  500 mg Oral 2 times per day    atorvastatin  10 mg Oral Daily     Continuous Infusions:   diltiazem (CARDIZEM) 125 mg in dextrose 5% 125 mL infusion Stopped (01/28/20 2106)    sodium chloride 150 mL/hr at 01/29/20 0742    norepinephrine 0.03 mcg/kg/min (01/29/20 0659)     PRN Meds:sodium chloride, sodium chloride flush, sodium chloride flush, sodium chloride flush, magnesium hydroxide, ondansetron, acetaminophen, diphenhydrAMINE     Patient Active Problem List    Diagnosis Date Noted    Sepsis ([facility] 01/28/2020    Septic shock ([facility]     Atrial fibrillation with rapid ventricular response (HCC)     Elevated international normalized ratio (INR)     Streptococcal pharyngitis     Leukopenia     Chronic ethmoidal sinusitis     TYRELL (acute kidney injury) ([facility]     Ex-heavy cigarette smoker (20-39 per day)     PAF (paroxysmal atrial fibrillation) ([facility] 09/06/2019    Essential hypertension 09/06/2019    Finger amputation, traumatic, 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 Primary localized osteoarthrosis, pelvic region and thigh 05/04/2015    H/O total shoulder replacement 05/04/2015    Primary localized osteoarthrosis of shoulder region 02/02/2015    Arthritis of knee 12/05/2013    Chronic combined systolic and diastolic CHF (congestive heart failure) ([facility] 03/17/2013    S/P AVR (aortic valve replacement) 09/26/2012    Elevated troponin 07/29/2012    Cardi[facility] 07/29/2012    Nonrheumatic aortic valve insufficiency 07/29/2012    Mitral regurgitation 07/29/2012    Microscopic hematuria 07/29/2012      Active Hospital Problems    Diagnosis Date Noted    Sepsis ([facility] [A41.9] 01/28/2020    PAF (paroxysmal atrial fibrillation) ([facility] [I48.0] 09/06/2019    Essential hypertension [I10] 09/06/2019    Hypercholesteremia [E78.00] 12/01/2015    Chronic combined systolic and diastolic CHF (congestive heart failure) ([facility] [I50.42] 03/17/2013    S/P AVR (aortic valve replacement) [Z95.2] 09/26/2012    [facility] [I42.9] 07/29/2012    Mitral regurgitation [I34.0] 07/29/2012    Elevated troponin [R79.89] 07/29/2012       Assessment:       Plan:           - paroxysmal atrial fibrillation                 This episode can be due to acute infection. He has converted to sinus but has frequent PAC and PVC   Rate is acceptable for level of illness              PO digoxin to be continued for now in case Atrial fibrillation recurs.  Will reduce the dose   Dig level in 2 days   May use amiodarone for rate control if needed due to low BP and Atrial fibrillation recurs                 On warfarin                 - Elevated INR              Due to drug interaction and also likely infection              Warfarin is held   Increased today   Liver dysfunction due to hypoperfusion, need to assess RT heart function/possibility of severe TR/vegetation     - SAVR              Followed by Dr. Sheila Doyle   Mean gradient below 30 mmHg but EF is lower and underestimated it.        - sepsis and hypotension                            On IV fluid and antibiotics              ID following              On midodrine now              Off BP meds   On levophed and improved          - Hx of cardiomyopathy                            His last EF had improved from 20% to 50% but now has edema and   Echo shows reduction in EF   This can be due to stress (stress induced cardiomyopathy)   Will continue supportive care   Reduce IV fluid   Resume cardiac meds when acute illness is better and BP improves off pressors       - Hyponatremia              Adequate intake   improved     - TYRELL              Due to sepsis and hypoperfusion   improved                  There is a small mobile element on the mitral valve, I reviewed his previous echo's and can see the same in those studies and believe that it is a ruptured chordae and not a vegetation. Will continue monitoring his clinical course and if any doubts, a DEBBIE can be done. However, there is no need for it at this time. NOTE: This report was transcribed using voice recognition software. Every effort was made to ensure accuracy, however, inadvertent computerized transcription errors may be present.

## 2020-01-29 NOTE — PROGRESS NOTES
Dr. Lewis Sekiu at bedside with interdisciplinary team for rounds. Patient discussed, examined and orders received.  explained plan of care. No questions answered.   Alda Vazquez RN

## 2020-01-29 NOTE — PROGRESS NOTES
Shift assessment. Patient awake in bed, alert and oriented, family at bedside. Pt is hypotensive, order received for levo. PIV x2, IV fluids and cardizem infusing. Pt voids per urinal. Pt in a-fib on monitor with elevated INR. All questions answered, POC updated, call light in reach.

## 2020-01-29 NOTE — FLOWSHEET NOTE
01/29/20 1200   Vitals   Temp 98.5 °F (36.9 °C)   Temp Source Temporal   Pulse 73   Heart Rate Source Monitor   Resp 22   BP 85/60   MAP (mmHg) 67   BP Location Left upper arm   BP Upper/Lower Upper   BP Method Automatic   Patient Position Semi fowlers   Level of Consciousness 0   MEWS Score 3   Patient Currently in Pain No   Cardiac Rhythm Atrial fibrillation   Ectopy PVC   Ectopy Frequency Occasional   Oxygen Therapy   SpO2 94 %   Pulse Oximeter Device Mode Continuous   Pulse Oximeter Device Location Finger   O2 Device None (Room air)   Pain Assessment   Pain Assessment 0-10   Pain Level 0   Reassessment completed and charted, see flow. No acute changes noted. Patient resting quietly in bed. Titrating levophed map 65.

## 2020-01-29 NOTE — PROGRESS NOTES
and time. Motor skills grossly intact. SKIN:  normal skin color, texture, turgor and no jaundice. Appears intact. DATA:  General Labs:    CBC:   Recent Labs     01/28/20  0901 01/29/20 0212   WBC 3.9* 3.2*   HGB 11.1* 10.6*   HCT 34.4* 32.7*   MCV 81.9 81.0    173     BMP:   Recent Labs     01/28/20  0902 01/29/20 0212   * 134*   K 4.7 3.6   CL 92* 98*   CO2 20* 20*   BUN 56* 44*   CREATININE 1.9* 1.5*     LIVER PROFILE:   Recent Labs     01/28/20 0902   AST 29   ALT 9*   BILITOT 0.5   ALKPHOS 64     PT/INR:    Lab Results   Component Value Date    PROTIME 122.3 01/29/2020    PROTIME 104.5 01/28/2020    PROTIME 25.7 01/08/2016    PROTIME 45.1 10/16/2015    PROTIME 32.8 08/07/2015    INR 10.30 01/29/2020    INR 8.81 01/28/2020    INR 3.7 01/13/2020    INR 2.5 12/16/2019    INR 5.9 12/10/2019    INR 2.8 03/04/2016     PTT:    Lab Results   Component Value Date    APTT 30.9 02/02/2015    APTT 37.8 01/23/2015    APTT 44.0 10/12/2014     Magnesium:    Lab Results   Component Value Date    MG 2.4 09/26/2012    MG 2.6 09/25/2012    MG 2.2 08/10/2012       Imaging:  Ct Head Wo Contrast    Result Date: 1/28/2020  EXAMINATION: CT OF THE HEAD WITHOUT CONTRAST  1/28/2020 9:05 am TECHNIQUE: CT of the head was performed without the administration of intravenous contrast. Dose modulation, iterative reconstruction, and/or weight based adjustment of the mA/kV was utilized to reduce the radiation dose to as low as reasonably achievable. COMPARISON: None. HISTORY: ORDERING SYSTEM PROVIDED HISTORY: fever, chronic sinus pain TECHNOLOGIST PROVIDED HISTORY: Has a \"code stroke\" or \"stroke alert\" been called? ->No Reason for exam:->fever, chronic sinus pain Reason for Exam: fever, chronic sinus pain; mental status change Acuity: Unknown Type of Exam: Unknown FINDINGS: BRAIN/VENTRICLES: There is no acute intracranial hemorrhage, mass effect or midline shift. No abnormal extra-axial fluid collection.   The gray-white opacities are redemonstrated likely reflecting subsegmental atelectasis versus scarring. There is partial imaging of a right shoulder replacement. 1. Streaky bibasilar opacities likely reflect subsegmental atelectasis versus scarring rather than an infectious/inflammatory process. 2. The visible lungs are without pneumothorax, pleural effusion or new focal airspace opacity. 3. Stable enlargement of the cardiopericardial silhouette. Assessment & Plan:    Sepsis syndrome patient has had fever despite treatment with cefdinir and augmentin had allergic reaction to levaquin earlier today  Tested positive for strep in pcp office. Blood cultures pending    Coagulopathy :supratherapeutic inr no bleeding at present  Probably due to antibiotics with warfarin  -on Vit K-rec. Liq as GI sx w N and V,if not work may consider IV  -watch daily INR -today 10.30-got Vit K 5 mg in AM     Atrial fibrillation with rapid ventricular response    Renal failure with significant prerenal component receiving iv hydration    Myelodysplastic syndrome has adequate granulocyte count has not required treatment follows with dr Henry Crooks     MDS, IPSS intermediate risk-1. 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 Flow cytometry unremarkable. Blasts were less than 3%. Chromosomal study negative. FISH for MDS/myeloid disorder negative. Iron storage increased. Ringed sideroblasts not identified. Pancytopenia. B12, folate, iron study showed no deficiency. Serum protein electrophoresis did not show monoclonal protein. Arthritis with some stiffness. MICHELE and rheumatoid factor negative. CHF and aortic bioprosthetic valve well-seated with moderate stenosis. On coumadin.      WBC 3.2,HGB 10.6, no Transfusion needed  Still hypotensive ,on Levophed  INR 10.3,received Vit K 5 mg inb AM,no active bleeding,if any bleeding RN to inform ICU team ,need FFP  Pt with tissue AVR  Respiratory PCR pending    Other Mx per ICU team    [facility] team follow tomorrow  I have discussed the above stated plan with the patient and wife they verbalized understanding and agreed with the plan.      Corry Larkin MD  1/29/2020, 8:41 AM

## 2020-01-29 NOTE — CONSULTS
[facility]       UPPER GASTROINTESTINAL ENDOSCOPY  12/24/13      Current Medications:    Current Facility-Administered Medications   Medication Dose Route Frequency Provider Last Rate Last Dose    sodium chloride flush 0.9 % injection 10 mL  10 mL Intravenous 2 times per day Arley Aguilar PA-C        sodium chloride flush 0.9 % injection 10 mL  10 mL Intravenous PRN Arley Aguilar PA-C        diltiazem 125 mg in dextrose 5 % 125 mL infusion  5 mg/hr Intravenous Continuous Dayday Jane MD 2.5 mL/hr at 01/28/20 1700 2.5 mg/hr at 01/28/20 1700    [START ON 1/29/2020] atorvastatin (LIPITOR) tablet 10 mg  10 mg Oral Daily Dayday Jane MD       [employer] [START ON 1/29/2020] aspirin chewable tablet 81 mg  81 mg Oral Daily Dayday Jane MD        sodium chloride flush 0.9 % injection 10 mL  10 mL Intravenous 2 times per day Fabian Vieyra MD        sodium chloride flush 0.9 % injection 10 mL  10 mL Intravenous PRN Fabian Vieyra MD        magnesium hydroxide (MILK OF MAGNESIA) 400 MG/5ML suspension 30 mL  30 mL Oral Daily PRN Dayday Jane MD        ondansetron [facility]) injection 4 mg  4 mg Intravenous Q6H PRN Dayday Jane MD        acetaminophen (TYLENOL) tablet 650 mg  650 mg Oral Q4H PRN Dayday Jane MD        metronidazole (FLAGYL) 500 mg in NaCl 100 mL IVPB premix  500 mg Intravenous Q8H Dayday Jane MD        midodrine (PROAMATINE) tablet 2.5 mg  2.5 mg Oral Daily Dayday Jane MD   2.5 mg at 01/28/20 1509    0.9 % sodium chloride infusion   Intravenous Continuous Dayday Jane  mL/hr at 01/28/20 1352      methylPREDNISolone sodium (SOLU-MEDROL) 40 MG injection             [START ON 1/29/2020] digoxin (LANOXIN) tablet 250 mcg  250 mcg Oral Daily Nichol Seay MD        ciprofloxacin (CIPRO) tablet 500 mg  500 mg Oral 2 times per day Deonna Pugh MD         Allergies:     Allergies   Allergen Reactions    Clindamycin/Lincomycin Rash    Levaquin [Levofloxacin]      Rash, swollen neck    Adia Harris      Social History:    reports that he quit smoking about 49 years ago. He has a 40.00 pack-year smoking history. He has never used smokeless tobacco. He reports current alcohol use of about 2.0 standard drinks of alcohol per week. He reports that he does not use drugs.  lives at home with wife   Family History:     family history includes Cancer in his brother; Diabetes in his sister; Heart Disease in his brother, sister, and sister; Marfan Syndrome in his brother; Stroke in his sister. REVIEW OF SYSTEMS:      · Constitutional:has had fevers and chills has not been eating or drinking much Eyes: No visual changes or diplopia. No scleral icterus. · ENT: No Headaches, hearing loss or vertigo. No mouth sores or sore throat. Has sore throat and has noted enlarged l;ymph nodes in posterior cervical area  ·  cardiovascular has had aortic valve replacement   · Respiratory: No cough or wheezing, no sputum production. No hemoptysis. · Gastrointestinal: No abdominal pain, appetite loss, blood in stools. No change in bowel habits. · Genitourinary: No dysuria, trouble voiding, or hematuria. · Musculoskeletal:   No generalized weakness. No joint complaints. · Integumentary: No rash or pruritis. · Neurological: No headache, diplopia. No change in gait, balance, or coordination. No focal neurological deficits including weakness, numbness, or tingling. · Endocrine: No temperature intolerance. No excessive thirst, fluid intake, or urination. · Hematologic/Lymphatic: No abnormal bruising or ecchymoses, blood clots or swollen lymph nodes. · Allergic/Immunologic: No nasal congestion or hives.   ·     PHYSICAL EXAM:    Vitals:  Vitals:    01/28/20 1800   BP: (!) 92/52   Pulse: 95   Resp: 21   Temp:    SpO2: 96%      CONSTITUTIONAL:  awake, alert, cooperative, no apparent distress  EYES:  pupils equal, round and reactive to light, sclera clear and conjunctiva normal  ENT:  normocepalic, without obvious abnormality, atramatic  NECK:  supple, symmetrical, no jugular venous distension and no carotid bruits  HEMATOLOGIC/LYMPHATICS:has enlarged cervical nodes   LUNGS:  No increased work of breathing and clear to auscultation  CARDIOVASCULAR irregu;ar  rate and rhythm, normal S1 and S2, no murmur noted  ABDOMEN:  Normal bowel sounds x 4, soft, non-distended, non-tender, no masses palpated, no hepatosplenomegally  MUSCULOSKELETAL:  full range of motion noted, tone is normal  EXTREMITIES: no LE edema  NEUROLOGIC:  Awake, alert, oriented to name, place and time. Motor skills grossly intact. SKIN:  normal skin color, texture, turgor and no jaundice. Appears intact. DATA:  General Labs:    CBC:   Recent Labs     01/28/20  0901   WBC 3.9*   HGB 11.1*   HCT 34.4*   MCV 81.9        BMP:   Recent Labs     01/28/20  0902   *   K 4.7   CL 92*   CO2 20*   BUN 56*   CREATININE 1.9*     LIVER PROFILE:   Recent Labs     01/28/20  0902   AST 29   ALT 9*   BILITOT 0.5   ALKPHOS 64     PT/INR:    Lab Results   Component Value Date    PROTIME 104.5 01/28/2020    PROTIME 25.7 01/08/2016    PROTIME 45.1 10/16/2015    PROTIME 32.8 08/07/2015    INR 8.81 01/28/2020    INR 3.7 01/13/2020    INR 2.5 12/16/2019    INR 5.9 12/10/2019    INR 2.8 03/04/2016    INR 2.5 02/05/2016     PTT:    Lab Results   Component Value Date    APTT 30.9 02/02/2015    APTT 37.8 01/23/2015    APTT 44.0 10/12/2014     Magnesium:    Lab Results   Component Value Date    MG 2.4 09/26/2012    MG 2.6 09/25/2012    MG 2.2 08/10/2012       Imaging:  Ct Head Wo Contrast    Result Date: 1/28/2020  EXAMINATION: CT OF THE HEAD WITHOUT CONTRAST  1/28/2020 9:05 am TECHNIQUE: CT of the head was performed without the administration of intravenous contrast. Dose modulation, iterative reconstruction, and/or weight based adjustment of the mA/kV was utilized to reduce the radiation dose to as low as reasonably achievable. COMPARISON: None.  HISTORY: ORDERING SYSTEM negative. CHF and aortic bioprosthetic valve well-seated with moderate stenosis. On coumadin. Patient remains hypotensive tonight hospitalist is aware We will follow up tomorrow                I have discussed the above stated plan with the patient and they verbalized understanding and agreed with the plan. Thank you for allowing us to participate in this patients care.     Petra Martinez MD  1/28/2020, 8:01 PM

## 2020-01-29 NOTE — PLAN OF CARE
Problem: OXYGENATION/RESPIRATORY FUNCTION  Goal: Patient will maintain patent airway  Outcome: Ongoing  Goal: Patient will achieve/maintain normal respiratory rate/effort  Description  Respiratory rate and effort will be within normal limits for the patient  Outcome: Ongoing     Problem: HEMODYNAMIC STATUS  Goal: Patient has stable vital signs and fluid balance  Outcome: Ongoing     Problem: FLUID AND ELECTROLYTE IMBALANCE  Goal: Fluid and electrolyte balance are achieved/maintained  Outcome: Ongoing     Problem: ACTIVITY INTOLERANCE/IMPAIRED MOBILITY  Goal: Mobility/activity is maintained at optimum level for patient  Outcome: Ongoing     Problem: Infection:  Goal: Will remain free from infection  Description  Will remain free from infection  Outcome: Ongoing     Problem: Safety:  Goal: Free from accidental physical injury  Description  Free from accidental physical injury  Outcome: Ongoing  Goal: Free from intentional harm  Description  Free from intentional harm  Outcome: Ongoing     Problem: Daily Care:  Goal: Daily care needs are met  Description  Daily care needs are met  Outcome: Ongoing     Problem: Pain:  Goal: Patient's pain/discomfort is manageable  Description  Patient's pain/discomfort is manageable  Outcome: Ongoing     Problem: Skin Integrity:  Goal: Skin integrity will stabilize  Description  Skin integrity will stabilize  Outcome: Ongoing     Problem: Discharge Planning:  Goal: Patients continuum of care needs are met  Description  Patients continuum of care needs are met  Outcome: Ongoing     Problem: Falls - Risk of:  Goal: Will remain free from falls  Description  Will remain free from falls  Outcome: Ongoing  Goal: Absence of physical injury  Description  Absence of physical injury  Outcome: Ongoing

## 2020-01-29 NOTE — CARE COORDINATION
Discharge Planning Assessment    RN/SW discharge planner met with patient/ (and family member) to discuss reason for admission, current living situation, and potential needs at the time of discharge    Demographics/Insurance verified Yes      Current type of dwelling: house     Patient from ECF/SW confirmed with: N/A    Living arrangements: with wife    Level of function/Support: independent    PCP: Dr. Edwina Head    Last Visit to PCP: yesterday - sent to ER from office    DME: walker, cane, shower seat, BSC    Active with any community resources/agencies/skilled home care: No    Medication compliance issues: No    Financial issues that could impact healthcare: No    Currently admitted to ICU on Levophed gtt, IV Flagyl, and IV Vanco.    Tentative discharge plan:  Home w/c [facility])  and wife    Discussed with patient and/or family that on the day of discharge home tentative time of discharge will be between 10 AM and noon.     Transportation at the time of discharge:  Wife    Waleska Grandchild RN BSN  Case Management  [phone]

## 2020-01-29 NOTE — CONSULTS
 TOTAL KNEE ARTHROPLASTY Right 2/3/14    RIGHT TOTAL KNEE REPLACEMENT-BAR       UPPER GASTROINTESTINAL ENDOSCOPY  12/24/13     Current Medications:    Current Facility-Administered Medications: sodium chloride (OCEAN, BABY AYR) 0.65 % nasal spray 1 spray, 1 spray, Each Nostril, PRN  lidocaine PF 1 % injection 5 mL, 5 mL, Intradermal, Once  sodium chloride flush 0.9 % injection 10 mL, 10 mL, Intravenous, 2 times per day  sodium chloride flush 0.9 % injection 10 mL, 10 mL, Intravenous, PRN  vancomycin (VANCOCIN) 1,250 mg in dextrose 5 % 250 mL IVPB, 1,250 mg, Intravenous, Q24H  prochlorperazine (COMPAZINE) injection 10 mg, 10 mg, Intravenous, Q6H PRN  sodium chloride flush 0.9 % injection 10 mL, 10 mL, Intravenous, 2 times per day  sodium chloride flush 0.9 % injection 10 mL, 10 mL, Intravenous, PRN  diltiazem 125 mg in dextrose 5 % 125 mL infusion, 5 mg/hr, Intravenous, Continuous  aspirin chewable tablet 81 mg, 81 mg, Oral, Daily  sodium chloride flush 0.9 % injection 10 mL, 10 mL, Intravenous, 2 times per day  sodium chloride flush 0.9 % injection 10 mL, 10 mL, Intravenous, PRN  magnesium hydroxide (MILK OF MAGNESIA) 400 MG/5ML suspension 30 mL, 30 mL, Oral, Daily PRN  ondansetron (ZOFRAN) injection 4 mg, 4 mg, Intravenous, Q6H PRN  acetaminophen (TYLENOL) tablet 650 mg, 650 mg, Oral, Q4H PRN  metronidazole (FLAGYL) 500 mg in NaCl 100 mL IVPB premix, 500 mg, Intravenous, Q8H  midodrine (PROAMATINE) tablet 2.5 mg, 2.5 mg, Oral, Daily  0.9 % sodium chloride infusion, , Intravenous, Continuous  digoxin (LANOXIN) tablet 250 mcg, 250 mcg, Oral, Daily  ciprofloxacin (CIPRO) tablet 500 mg, 500 mg, Oral, 2 times per day  norepinephrine (LEVOPHED) 16 mg in dextrose 5% 250 mL infusion, 2 mcg/min, Intravenous, Continuous  atorvastatin (LIPITOR) tablet 10 mg, 10 mg, Oral, Daily  diphenhydrAMINE (BENADRYL) injection 25 mg, 25 mg, Intravenous, Q6H PRN    Allergies   Allergen Reactions    Clindamycin/Lincomycin Rash    VITALS:  BP (!) 80/58   Pulse 87   Temp 98.2 °F (36.8 °C) (Temporal)   Resp 22   Ht 6' 1\" (1.854 m)   Wt 180 lb 5.4 oz (81.8 kg)   SpO2 95%   BMI 23.79 kg/m²      24HR INTAKE/OUTPUT:      Intake/Output Summary (Last 24 hours) at 1/29/2020 1037  Last data filed at 1/29/2020 0439  Gross per 24 hour   Intake 5733 ml   Output 825 ml   Net 4908 ml     CONSTITUTIONAL:  awake, alert, cooperative, no apparent distress, and appears stated age  NECK:  Supple, symmetrical, trachea midline, no adenopathy, thyroid symmetric, not enlarged and no tenderness, skin normal  LUNGS:  no increased work of breathing and clear to auscultation. No accessory muscle use  CARDIOVASCULAR: S1 and S2, no edema and no JVD  ABDOMEN:  normal bowel sounds, non-distended and no masses palpated, and no tenderness to palpation. No hepatospleenomegaly  LYMPHADENOPATHY:  no axillary or supraclavicular adenopathy. No cervical adnenopathy  PSYCHIATRIC: Oriented to person place and time. No obvious depression or anxiety. MUSCULOSKELETAL: No obvious misalignment or effusion of the joints. No clubbing, cyanosis of the digits. SKIN:  normal skin color, texture, turgor and no redness, warmth, or swelling. No palpable nodules    DATA:    Old records have been reviewed    CBC:  Recent Labs     01/28/20  0901 01/29/20  0212   WBC 3.9* 3.2*   RBC 4.20 4.04*   HGB 11.1* 10.6*   HCT 34.4* 32.7*    173   MCV 81.9 81.0   MCH 26.3 26.2   MCHC 32.1 32.4   RDW 18.3* 17.7*      BMP:  Recent Labs     01/28/20  0902 01/29/20  0212   * 134*   K 4.7 3.6   CL 92* 98*   CO2 20* 20*   BUN 56* 44*   CREATININE 1.9* 1.5*   CALCIUM 8.6 7.9*   GLUCOSE 80 159*      ABG:  No results for input(s): PHART, CNJ3LFH, PO2ART, HKC2NKX, N1HWVSAK, BEART in the last 72 hours.     Lab Results   Component Value Date    BNP 44 01/13/2014     Lab Results   Component Value Date    CKTOTAL 168 07/28/2012    TROPONINI 0.04 (H) 01/28/2020       Cultures:     Abx:    Radiology Review:  All pertinent images / reports were reviewed as a part of this visit. Assessment:     1. Severe sepsis  · Febrile, tachycardic and hypotensive  · Initial lactic acid is borderline at 1.5  · Procalcitonin was elevated at 1.39  · I reviewed chest imaging  · Chest x-ray is clear of obvious infiltrate  · However, with elevated procalcitonin, obtain CT scan of the chest.  · He is on broad-spectrum antibiotics pending culture results  · ID is following  · He did receive fluid resuscitation he did receive fluid resuscitation of 4 L normal saline  · However, he is requiring norepinephrine to support mean arterial pressure greater than 65    2. Acute kidney injury  · Initial creatinine is 1.9  · BUN and creatinine have improved somewhat with fluid resuscitation  · Urine output is adequate    3. A. fib with RVR  · On digoxin and IV Cardizem  · Has converted back to normal sinus rhythm with frequent PACs and occasional PVCs  · Cardiology is following    4. Coagulopathy  · INR is 10  · Likely the combination of warfarin and antibiotics superimposed on his acute illness  · Received 2 doses of p.o. vitamin K 2.5 mg each  · Repeat INR  · May need IV vitamin K    5. Cardiomyopathy  · EF around 20%  · Chest x-ray revealing stable cardiomegaly without obvious pulmonary edema  · CT chest ordered    . Total critical care time caring for this patient with life threatening illness, including direct patient contact, management of life support systems, review of data including imaging and labs, discussions with other team members and physicians is at least 32 minutes so far today, excluding procedures.

## 2020-01-30 NOTE — PROGRESS NOTES
[address]   Electrophysiology Progress Note     Date: 1/30/2020  Admit Date: 1/28/2020     Reason for consultation: Atrial fibrillation    Chief Complaint:   Chief Complaint   Patient presents with    Sinusitis     pt arrived via squad. sore throat and sinus infection for a couple of months. pts doctor concerned for septic, low BP and low immune system. History of Present Illness: History obtained from patient and medical record. Rose Glass is a 70 y.o. male with a past medical history of aortic stenosis s/p AVR, atrial fibrillation, HLD, HTN, and microthrombus. He had atrial fibrillation following his valve surgery, but he was taken off anti-coagulation due to no recurrence for several years. However, he was recently restarted on Coumadin due to concern for microthrombus. Pt presented to hospital with flu like symptoms including myalgia and nasal congestion for the past week. In the ER, he was noted to be tachypneic, tachycardia, and hypotension. He was found to be in atrial fibrillation and was started on a cardizem gtt. Interval Hx: Today, he is being seen for follow-up. He is back in atrial fibrillation with accelerated rate up to 150s. He is having frequent PVCs with short runs of NSVT. He denies any symptoms with these rhythms. His biggest complaint is nausea and poor appetite. He is unable to take oral medications due to persistent nausea. Plans for lymph node biopsy tomorrow. Family at bedside. Patient seen and examined. Clinical notes reviewed. Telemetry reviewed. No major events overnight. Denies having chest pain, palpitations, shortness of breath, orthopnea/PND, cough, or dizziness at the time of this visit. Allergies: Allergies   Allergen Reactions    Clindamycin/Lincomycin Rash    Levaquin [Levofloxacin]      Rash, swollen neck    Cefdinir Rash     Home Meds:  Prior to Visit Medications    Medication Sig Taking?  Authorizing Provider amoxicillin-clavulanate (AUGMENTIN) 875-125 MG per tablet Take 1 tablet by mouth 2 times daily for 21 days Yes JEANIE Santamaria CNP   Lactobacillus (PROBIOTIC ACIDOPHILUS) TABS Take 1 tablet by mouth 2 times daily for 21 days Yes JEANIE Santamaria CNP   midodrine (PROAMATINE) 2.5 MG tablet TAKE 1 TABLET BY MOUTH DAILY Yes Marely Pearson MD   lisinopril (PRINIVIL;ZESTRIL) 2.5 MG tablet TAKE ONE TABLET BY MOUTH DAILY Yes Marely Pearson MD   hydrochlorothiazide (HYDRODIURIL) 25 MG tablet TAKE 1 TABLET BY MOUTH DAILY Yes Laci Cantu MD   spironolactone (ALDACTONE) 25 MG tablet TAKE 0.5 TABLETS BY MOUTH THREE TIMES A WEEK Yes Marely Pearson MD   warfarin (COUMADIN) 5 MG tablet Take 1 tablet by mouth daily Yes Laci Cantu MD   atorvastatin (LIPITOR) 10 MG tablet TAKE 1 TABLET BY MOUTH ONE TIME A DAY  Yes Marely Pearson MD   metoprolol succinate (TOPROL XL) 25 MG extended release tablet TAKE 0.5 TABLETS BY MOUTH 2 TIMES DAILY Yes Marely Pearson MD   Cholecalciferol (VITAMIN D3) 3000 UNITS TABS Take 2,000 Units by mouth daily  Yes Historical Provider, MD   ondansetron (ZOFRAN-ODT) 4 MG disintegrating tablet Take 4 mg by mouth every 8 hours as needed for Nausea or Vomiting Yes Historical Provider, MD   aspirin 81 MG tablet Take 81 mg by mouth daily  Yes Historical Provider, MD   Ascorbic Acid (VITAMIN C) 500 MG tablet Take 1,000 mg by mouth once a week  Yes Historical Provider, MD      Scheduled Meds:   pantoprazole  40 mg Intravenous Daily    ciprofloxacin  400 mg Intravenous Q12H    metroNIDAZOLE  500 mg Intravenous Q8H    digoxin  125 mcg Intravenous Daily    sodium chloride flush  10 mL Intravenous 2 times per day    vancomycin  1,250 mg Intravenous Q24H    lactobacillus  1 capsule Oral Daily with breakfast    aspirin  81 mg Oral Daily    midodrine  2.5 mg Oral Daily    atorvastatin  10 mg Oral Daily     Continuous Infusions:   heparin (porcine) 18 Units/kg/hr (01/30/20 1160)  diltiazem (CARDIZEM) 125 mg in dextrose 5% 125 mL infusion Stopped (01/28/20 2106)    sodium chloride 100 mL/hr at 01/30/20 1118    norepinephrine 0.09 mcg/kg/min (01/30/20 0306)     PRN Meds:promethazine, heparin (porcine), heparin (porcine), sodium chloride, sodium chloride flush, prochlorperazine, magnesium hydroxide, ondansetron, acetaminophen, diphenhydrAMINE     Past Medical History:  Past Medical History:   Diagnosis Date    Aortic insufficiency     Atrial fibrillation ([facility] [address]     cardioversion 8/10/12    Breast nodule 8/2012  right    excision-lumpectomy 8/2012    CAD (coronary artery disease)     Cardiomyopathy ([facility] [address] 8/2012    EF 20 %    CHF [facility]congestive heart failure) ([facility]     Hyperlipidemia     Hypertension     Microscopic hematuria 7/29/2012    Mitral regurgitation     Nausea & vomiting 7/29/2012    Osteoarthritis of knee     bilateral knees    Pneumonia     Rotator cuff tear 7/2/2013    Ventricular ectopy 7/29/2012        Past Surgical History:    has a past surgical history that includes Inguinal hernia repair; other surgical history (8/2/12); Aortic valve replacement (8/3/2012); Cardiac surgery; Colonoscopy; Endoscopy, colon, diagnostic; Total knee arthroplasty (Left, 12/4/13); Upper gastrointestinal endoscopy (12/24/13); Total knee arthroplasty (Right, 2/3/14); joint replacement; cyst removal; and Total shoulder arthroplasty (Right, 2/2/15). Social History:  Reviewed. reports that he quit smoking about 49 years ago. He has a 40.00 pack-year smoking history. He has never used smokeless tobacco. He reports current alcohol use of about 2.0 standard drinks of alcohol per week. He reports that he does not use drugs. Family History:  Reviewed. family history includes Cancer in his brother; Diabetes in his sister; Heart Disease in his brother, sister, and sister; Marfan Syndrome in his brother; Stroke in his sister. Review of Systems:  · Constitutional: Positive for fatigue. Negative for fever, night sweats, chills, weight changes  · Skin: + Lymphadenopathy. Negative for rash, dry skin, pruritus, bruising, bleeding, blood clots, or changes in skin pigment  · HEENT: Negative for vision changes, ringing in the ears, sore throat, dysphagia, or swollen lymph nodes  · Respiratory: Reviewed in HPI  · Cardiovascular: Reviewed in HPI  · Gastrointestinal: Positive for N/V, poor appetite  · Genito-Urinary: Negative for dysuria, incontinence, urgency, or hematuria  · Musculoskeletal: Positive for generalized weakness  · Neurological/Psych: Negative for confusion, seizures, headaches, balance issues or TIA-like symptoms. No anxiety, depression, or insomnia    Physical Examination:  Vitals:    01/30/20 1200   BP: (!) 96/52   Pulse: 120   Resp: 18   Temp: 98.8 °F (37.1 °C)   SpO2: 90%      In: 2194.8 [I.V.:2194.8]  Out: 665    Wt Readings from Last 3 Encounters:   01/30/20 186 lb 1.1 oz (84.4 kg)   01/28/20 178 lb (80.7 kg)   01/23/20 180 lb 6.4 oz (81.8 kg)       Intake/Output Summary (Last 24 hours) at 1/30/2020 1304  Last data filed at 1/30/2020 0600  Gross per 24 hour   Intake 3990.97 ml   Output 965 ml   Net 3025.97 ml       Telemetry: Personally Reviewed  - Atrial fibrillation with RVR  · Constitutional: Cooperative and in no apparent distress, and appears acutely ill  · Skin: Warm and pink; scattered bruising on extremities. No pallor, cyanosis, or clubbing. + Lymphadenopathy  · HEENT: Symmetric and normocephalic. PERRL, EOM intact. Conjunctiva pink with clear sclera. Mucus membranes pink and moist. Teeth intact. Thyroid smooth without nodules or goiter. · Cardiovascular: Tachycardic rate and irregular rhythm. S1/S2 present without murmurs, rubs, or gallops. Peripheral pulses 2+, capillary refill < 3 seconds. No elevation of JVP. 2+ BLE/1+ BUE edema  · Respiratory: Respirations symmetric and unlabored. Lungs clear to auscultation on left, no wheezing, crackles, or rhonchi.  Scant fine crackles with RVSP of 27mmHg    Echo: 1/29/20   -Left ventricular cavity size is mildly dilated. There is mild concentric   left ventricular hypertrophy.   -Overall left ventricular systolic function appears severely reduced with an   ejection fraction in the 30% range.   -There is severe hypokinesis of the apical wall and moderate hypokinesis of   the inferior walls.   -Indeterminate diastolic function due to irregular heart rhythm. -The mitral valve leaflets appear myxomatous. -Mild to moderate mitral regurgitation.   -A bioprosthetic artificial aortic valve appears well seated with a maximum   velocity of 3.3m/s and a mean gradient of 26mmHg. This suggests at least   moderate aortic stenosis. -Trivial perivalvular aortic regurgitation.   -Mild to moderate tricuspid regurgitation.   -Estimated pulmonary artery systolic pressure is mildly elevated at 40-45   mmHg assuming a right atrial pressure of 8mmHg.   -The left atrium is dilated. CT Chest: 1/29/20  1. Extensive lymphadenopathy as well as suspected mild splenomegaly,   suspicious for lymphoproliferative disorder.  New areas of nodular soft   tissue density in the left breast may represent associated intramammary lymph   nodes but could also represent subcutaneous involvement. 2. Trace bilateral pleural effusions with bilateral lower lobe passive   atelectasis. 3. Mild cardiomegaly status post aortic valve replacement.      Problem List:   Patient Active Problem List    Diagnosis Date Noted    Sepsis ([facility] [address] 01/28/2020    Septic shock (HCC)     Atrial fibrillation with rapid ventricular response (HCC)     Elevated international normalized ratio (INR)     Streptococcal pharyngitis     Leukopenia     Chronic ethmoidal sinusitis     TYRELL (acute kidney injury) ([address]     Ex-heavy cigarette smoker (20-39 per day)     PAF (paroxysmal atrial fibrillation) ([facility] [address] 09/06/2019    Essential hypertension 09/06/2019    Finger amputation, traumatic, 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 Primary localized osteoarthrosis, pelvic region and thigh 05/04/2015    H/O total shoulder replacement 05/04/2015    Hyponatremia 04/22/2015    Primary localized osteoarthrosis of shoulder region 02/02/2015    Arthritis of knee 12/05/2013    Chronic combined systolic and diastolic CHF (congestive heart failure) ([facility] [address] 03/17/2013    S/P AVR (aortic valve replacement) 09/26/2012    Elevated troponin 07/29/2012    Cardiomyopathy ([facility] [address] 07/29/2012    Nonrheumatic aortic valve insufficiency 07/29/2012    Mitral regurgitation 07/29/2012    Microscopic hematuria 07/29/2012        Assessment and Plan:     1. Paroxysmal permanent Atrial Fibrillation  - Currently in atrial fibrillation with elevated rate up to 150s   ~ Likely exacerbated by acute illness/infection     - Due to his hypotension and acute illness, amiodarone is the best option for rhythm control. I have discussed with patient side effects of amiodarone including but not limited to thyroid disease, discoloration of skin and cornea and pulmonary fibrosis. Patient/family would like to continue with it.     ~ Will bolus and start on IV infusion now; plan for short term use during acute illness    ~ AST 29, ALT 9 (1/28/20)     - [id number] score: at least 5 (Age x2, HTN,  CHF, CAD) ; [id number] Vasc score and anticoagulation discussed. High risk for stroke and thromboembolism. Anticoagulation is recommended. Risk of bleeding was discussed. ~ Coumadin on hold for possible procedures; placed on heparin gtt      - Treatment options including cardioversion, rate control strategy, antiarrhythmics, anticoagulation and possible ablation were discussed with patient. Risks, benefits and alternative of each treatment options were explained. All questions answered    2. PVC, NSVT   - Noted on tele; asymptomatic   - Will start amiodarone gtt to suppress   - Keep K+ >4 and mag >2    ~ Will replace    3.  Elevated

## 2020-01-30 NOTE — PROGRESS NOTES
[facility]   Progress Note      1/30/2020 11:20 AM        Name: Forbes Hawthorne Schirmer .               Admitted: 1/28/2020    SUBJECTIVE:  He is resting comfortably in bed, had nausea this morning, remains hypotensive    Reviewed interval ancillary notes    Current Medications  promethazine (PHENERGAN) injection 12.5 mg, Q6H PRN  pantoprazole (PROTONIX) injection 40 mg, Daily  heparin (porcine) injection 6,750 Units, PRN  heparin (porcine) injection 3,380 Units, PRN  heparin 25,000 units in dextrose 5% 250 mL infusion, Continuous  ciprofloxacin (CIPRO) IVPB 400 mg, Q12H  metronidazole (FLAGYL) 500 mg in NaCl 100 mL IVPB premix, Q8H  digoxin (LANOXIN) injection 125 mcg, Daily  sodium chloride (OCEAN, BABY AYR) 0.65 % nasal spray 1 spray, PRN  sodium chloride flush 0.9 % injection 10 mL, 2 times per day  sodium chloride flush 0.9 % injection 10 mL, PRN  vancomycin (VANCOCIN) 1,250 mg in dextrose 5 % 250 mL IVPB, Q24H  prochlorperazine (COMPAZINE) injection 10 mg, Q6H PRN  lactobacillus (CULTURELLE) capsule 1 capsule, Daily with breakfast  diltiazem 125 mg in dextrose 5 % 125 mL infusion, Continuous  aspirin chewable tablet 81 mg, Daily  magnesium hydroxide (MILK OF MAGNESIA) 400 MG/5ML suspension 30 mL, Daily PRN  ondansetron (ZOFRAN) injection 4 mg, Q6H PRN  acetaminophen (TYLENOL) tablet 650 mg, Q4H PRN  midodrine (PROAMATINE) tablet 2.5 mg, Daily  0.9 % sodium chloride infusion, Continuous  norepinephrine (LEVOPHED) 16 mg in dextrose 5% 250 mL infusion, Continuous  atorvastatin (LIPITOR) tablet 10 mg, Daily  diphenhydrAMINE (BENADRYL) injection 25 mg, Q6H PRN        Objective:  BP (!) 93/58   Pulse 115   Temp 98.8 °F (37.1 °C) (Temporal)   Resp 25   Ht 6' 1\" (1.854 m)   Wt 186 lb 1.1 oz (84.4 kg)   SpO2 (!) 88%   BMI 24.55 kg/m²     Intake/Output Summary (Last 24 hours) at 1/30/2020 1120  Last data filed at 1/30/2020 0600  Gross per 24 hour   Intake 3990.97 ml   Output 965 ml   Net 3025.97 ml      Wt Readings from Last 3 Encounters:   01/30/20 186 lb 1.1 oz (84.4 kg)   01/28/20 178 lb (80.7 kg)   01/23/20 180 lb 6.4 oz (81.8 kg)       General appearance:  Appears comfortable  Eyes: Sclera clear. Pupils equal.  ENT: Moist oral mucosa. Trachea midline, no adenopathy. Cardiovascular: Regular rhythm, normal S1, S2. No murmur. No edema in lower extremities  Respiratory: Not using accessory muscles. Good inspiratory effort. Clear to auscultation bilaterally, no wheeze or crackles. GI: Abdomen soft, no tenderness, not distended  Musculoskeletal: No cyanosis in digits, neck supple  Neurology: CN 2-12 grossly intact. No speech or motor deficits  Psych: Normal affect. Alert and oriented in time, place and person  Skin: Warm, dry, normal turgor    Labs and Tests:  CBC:   Recent Labs     01/28/20  0901 01/29/20  0212 01/30/20  0345   WBC 3.9* 3.2* 6.5   HGB 11.1* 10.6* 10.2*    173 158     BMP:    Recent Labs     01/28/20  0902 01/29/20  0212 01/30/20  0345   * 134* 140   K 4.7 3.6 3.5   CL 92* 98* 105   CO2 20* 20* 22   BUN 56* 44* 31*   CREATININE 1.9* 1.5* 1.1   GLUCOSE 80 159* 114*     Hepatic:   Recent Labs     01/28/20  0902   AST 29   ALT 9*   BILITOT 0.5   ALKPHOS 64       ASSESSMENT AND PLAN    Active Problems:    Elevated troponin    Cardiomyopathy (HCC)    Mitral regurgitation    S/P AVR (aortic valve replacement)    Chronic combined systolic and diastolic CHF (congestive heart failure) ([facility])    Hyponatremia    Hypercholesteremia    PAF (paroxysmal atrial fibrillation) ([facility])    Essential hypertension    Sepsis ([facility] [address]  Resolved Problems:    * No resolved hospital problems. *      Sepsis syndrome patient has had fever despite treatment with cefdinir and augmentin had allergic reaction to levaquin 1/29/2020. Tested positive for strep in pcp office.  Blood cultures pending    Lymphadenopathy on CT Chest  - general surgery consulted  for possible excisional biopsy     Coagulopathy :supratherapeutic inr

## 2020-01-30 NOTE — PROGRESS NOTES
Reassessment complete, see flowsheets. Pt remains oriented x4 and can follow commands. Pt currently sinus tachycardia. Pt remains on room air. Pt remains on Levophed gtt for BP management, see MAR. Pt currently having nausea/vomiting-PRN Compazine given @0338, see MAR. Pt repositioned for comfort and bed pad changed. Pt expresses no other needs at this time. Bed in lowest position and alarm on. Will continue to monitor.

## 2020-01-30 NOTE — PROGRESS NOTES
Reassessment completed, see doc flowsheets. Patient resting in bed, face flushed and warm to touch. Has rash on face and neck. Denies itching. Still has intermitten nausea. Afib on monitor. Rate controlled since starting amiodarone.

## 2020-01-30 NOTE — PROGRESS NOTES
Pt assessment complete, see flowsheets. Medications given, see MAR. Pt is oriented x4 and is able to follow commands. Pt currently in Afib. Pt on room air. Pt on Levophed gtt for BP management, see MAR. Pt has slight residual rash to L shoulder, present during AM shift. Pt able to turn self/repositioned and uses bedside urinal. Pt expresses no other needs at this time. Bed in lowest position and alarm on, . Will continue to monitor.

## 2020-01-30 NOTE — PLAN OF CARE
Problem: OXYGENATION/RESPIRATORY FUNCTION  Goal: Patient will maintain patent airway  1/29/2020 2226 by Hany Saini RN  Outcome: Ongoing  1/29/2020 1513 by Sona Corona RN  Outcome: Ongoing  Goal: Patient will achieve/maintain normal respiratory rate/effort  Description  Respiratory rate and effort will be within normal limits for the patient  1/29/2020 2226 by Hany Saini RN  Outcome: Ongoing  1/29/2020 1513 by Sona Corona RN  Outcome: Ongoing     Problem: HEMODYNAMIC STATUS  Goal: Patient has stable vital signs and fluid balance  1/29/2020 2226 by Hany Saini RN  Outcome: Ongoing  1/29/2020 1513 by Sona Corona RN  Outcome: Ongoing     Problem: FLUID AND ELECTROLYTE IMBALANCE  Goal: Fluid and electrolyte balance are achieved/maintained  1/29/2020 2226 by Hany Saini RN  Outcome: Ongoing  1/29/2020 1513 by Sona Corona RN  Outcome: Ongoing     Problem: ACTIVITY INTOLERANCE/IMPAIRED MOBILITY  Goal: Mobility/activity is maintained at optimum level for patient  1/29/2020 2226 by Hany Saini RN  Outcome: Ongoing  1/29/2020 1513 by Sona Corona RN  Outcome: Ongoing     Problem: Infection:  Goal: Will remain free from infection  Description  Will remain free from infection  1/29/2020 2226 by Hany Saini RN  Outcome: Ongoing  1/29/2020 1513 by Sona Corona RN  Outcome: Ongoing     Problem: Safety:  Goal: Free from accidental physical injury  Description  Free from accidental physical injury  1/29/2020 2226 by Hany Saini RN  Outcome: Ongoing  1/29/2020 1513 by Sona Corona RN  Outcome: Ongoing  Goal: Free from intentional harm  Description  Free from intentional harm  1/29/2020 2226 by Hany Saini RN  Outcome: Ongoing  1/29/2020 1513 by Sona Corona RN  Outcome: Ongoing     Problem: Daily Care:  Goal: Daily care needs are met  Description  Daily care needs are met  1/29/2020 2226 by Hany Saini RN  Outcome: Ongoing  1/29/2020 1513 by Raya Frost RN  Outcome: Ongoing     Problem: Pain:  Goal: Patient's pain/discomfort is manageable  Description  Patient's pain/discomfort is manageable  1/29/2020 2226 by Clarke Catalan RN  Outcome: Ongoing  1/29/2020 1513 by Raya Frost RN  Outcome: Ongoing     Problem: Skin Integrity:  Goal: Skin integrity will stabilize  Description  Skin integrity will stabilize  1/29/2020 2226 by Clarke Catalan RN  Outcome: Ongoing  1/29/2020 1513 by Raya Frost RN  Outcome: Ongoing     Problem: Discharge Planning:  Goal: Patients continuum of care needs are met  Description  Patients continuum of care needs are met  1/29/2020 2226 by Clarke Catalan RN  Outcome: Ongoing  1/29/2020 1513 by Raya Frost RN  Outcome: Ongoing     Problem: Falls - Risk of:  Goal: Will remain free from falls  Description  Will remain free from falls  1/29/2020 2226 by Clarke Catalan RN  Outcome: Ongoing  1/29/2020 1513 by Raya Frost RN  Outcome: Ongoing  Goal: Absence of physical injury  Description  Absence of physical injury  1/29/2020 2226 by Clarke Catalan RN  Outcome: Ongoing  1/29/2020 1513 by Raya Frost RN  Outcome: Ongoing

## 2020-01-30 NOTE — CONSULTS
regurg    CARDIAC SURGERY      COLONOSCOPY      CYST REMOVAL      chest    ENDOSCOPY, COLON, DIAGNOSTIC      INGUINAL HERNIA REPAIR      bilateral in 1996    JOINT REPLACEMENT      both    OTHER SURGICAL HISTORY  8/2/12    excision right breast mass (lumpectomy)    SHOULDER ARTHROPLASTY Right 2/2/15    right reverse total shoulder arthroplasty    TOTAL KNEE ARTHROPLASTY Left 12/4/13    TOTAL KNEE ARTHROPLASTY Right 2/3/14    RIGHT TOTAL KNEE REPLACEMENT-BAR       UPPER GASTROINTESTINAL ENDOSCOPY  12/24/13       Scheduled Meds:   pantoprazole  40 mg Intravenous Daily    ciprofloxacin  400 mg Intravenous Q12H    metroNIDAZOLE  500 mg Intravenous Q8H    digoxin  125 mcg Intravenous Daily    sodium chloride flush  10 mL Intravenous 2 times per day    vancomycin  1,250 mg Intravenous Q24H    lactobacillus  1 capsule Oral Daily with breakfast    aspirin  81 mg Oral Daily    midodrine  2.5 mg Oral Daily    atorvastatin  10 mg Oral Daily     Continuous Infusions:   heparin (porcine) 18 Units/kg/hr (01/30/20 1147)    amiodarone 1 mg/min (01/30/20 1426)    Followed by   Deion Barker amiodarone      sodium chloride 100 mL/hr at 01/30/20 1118    norepinephrine 0.09 mcg/kg/min (01/30/20 0306)     PRN Meds:.promethazine, heparin (porcine), heparin (porcine), sodium chloride, sodium chloride flush, prochlorperazine, magnesium hydroxide, ondansetron, acetaminophen, diphenhydrAMINE    Prior to Admission medications    Medication Sig Start Date End Date Taking?  Authorizing Provider   amoxicillin-clavulanate (AUGMENTIN) 875-125 MG per tablet Take 1 tablet by mouth 2 times daily for 21 days 1/23/20 2/13/20 Yes JEANIE Gaytan CNP   Lactobacillus (PROBIOTIC ACIDOPHILUS) TABS Take 1 tablet by mouth 2 times daily for 21 days 1/23/20 2/13/20 Yes JEANIE Gaytan CNP   midodrine (PROAMATINE) 2.5 MG tablet TAKE 1 TABLET BY MOUTH DAILY 10/29/19  Yes Michael Hogan MD   lisinopril (PRINIVIL;ZESTRIL) 2.5 MG tablet TAKE ONE TABLET BY MOUTH DAILY 10/14/19  Yes Desean Boswell MD   hydrochlorothiazide (HYDRODIURIL) 25 MG tablet TAKE 1 TABLET BY MOUTH DAILY 10/11/19  Yes Kamilah Sandhu MD   spironolactone (ALDACTONE) 25 MG tablet TAKE 0.5 TABLETS BY MOUTH THREE TIMES A WEEK 19  Yes Desean Boswell MD   warfarin (COUMADIN) 5 MG tablet Take 1 tablet by mouth daily 19  Yes Kamilah Sandhu MD   atorvastatin (LIPITOR) 10 MG tablet TAKE 1 TABLET BY MOUTH ONE TIME A DAY  19  Yes Desean Boswell MD   metoprolol succinate (TOPROL XL) 25 MG extended release tablet TAKE 0.5 TABLETS BY MOUTH 2 TIMES DAILY 2/15/19  Yes Desean Boswell MD   Cholecalciferol (VITAMIN D3) 3000 UNITS TABS Take 2,000 Units by mouth daily    Yes Historical Provider, MD   ondansetron (ZOFRAN-ODT) 4 MG disintegrating tablet Take 4 mg by mouth every 8 hours as needed for Nausea or Vomiting   Yes Historical Provider, MD   aspirin 81 MG tablet Take 81 mg by mouth daily    Yes Historical Provider, MD   Ascorbic Acid (VITAMIN C) 500 MG tablet Take 1,000 mg by mouth once a week    Yes Historical Provider, MD        Allergies:  Clindamycin/lincomycin; Levaquin [levofloxacin]; and Cefdinir    Social History:   Social History     Socioeconomic History    Marital status:      Spouse name: None    Number of children: None    Years of education: None    Highest education level: None   Occupational History    None   Social Needs    Financial resource strain: None    Food insecurity:     Worry: None     Inability: None    Transportation needs:     Medical: None     Non-medical: None   Tobacco Use    Smoking status: Former Smoker     Packs/day: 2.00     Years: 20.00     Pack years: 40.00     Last attempt to quit: 1970     Years since quittin.5    Smokeless tobacco: Never Used   Substance and Sexual Activity    Alcohol use:  Yes     Alcohol/week: 2.0 standard drinks     Types: 2 Cans of beer per week     Comment: rare    Drug 01/30/20 0415 95/61 -- -- 111 16 (!) 86 % --   01/30/20 0400 (!) 97/59 -- -- 118 18 (!) 89 % --   01/30/20 0345 119/86 -- -- 117 30 91 % --   01/30/20 0330 96/73 98.8 °F (37.1 °C) Temporal 125 14 92 % --   01/30/20 0315 (!) 84/55 -- -- 119 19 90 % --   01/30/20 0300 (!) 86/52 -- -- 112 20 (!) 88 % --   01/30/20 0245 (!) 75/55 -- -- 104 15 (!) 84 % --   01/30/20 0230 91/62 -- -- 95 20 92 % --   01/30/20 0215 91/70 -- -- 136 24 (!) 90 % --   01/30/20 0200 97/62 -- -- 114 22 92 % --   01/30/20 0145 (!) 81/51 -- -- 99 21 (!) 89 % --   01/30/20 0130 (!) 78/54 -- -- 101 18 91 % --   01/30/20 0115 (!) 78/53 -- -- 91 16 (!) 73 % --   01/30/20 0100 (!) 78/57 -- -- 108 12 (!) 89 % --   01/30/20 0045 (!) 86/55 -- -- 89 20 91 % --   01/30/20 0030 (!) 92/57 -- -- 118 18 (!) 87 % --   01/30/20 0015 99/66 -- -- 85 19 (!) 89 % --   01/30/20 0003 84/62 -- -- 103 (!) 32 (!) 89 % 186 lb 1.1 oz (84.4 kg)   01/30/20 0000 84/62 98.1 °F (36.7 °C) Temporal 93 18 (!) 88 % --   01/29/20 2345 (!) 86/53 -- -- 98 17 (!) 82 % --   01/29/20 2330 98/66 -- -- 90 19 91 % --   01/29/20 2315 (!) 92/56 -- -- 93 21 92 % --   01/29/20 2300 95/67 -- -- 102 27 92 % --   01/29/20 2245 (!) 83/52 -- -- 85 15 (!) 88 % --   01/29/20 2215 88/60 -- -- 97 21 (!) 89 % --   01/29/20 2200 93/64 -- -- 85 15 (!) 85 % --   01/29/20 2145 94/63 -- -- 106 20 (!) 88 % --   01/29/20 2130 93/65 -- -- 97 24 96 % --   01/29/20 2115 (!) 70/50 -- -- 90 19 91 % --   01/29/20 2100 91/61 -- -- 90 18 93 % --   01/29/20 2045 (!) 83/53 -- -- 91 22 92 % --   01/29/20 2030 89/62 -- -- 102 21 97 % --   01/29/20 2015 90/68 -- -- 96 19 94 % --   01/29/20 2000 (!) 86/51 -- -- 97 25 98 % --   01/29/20 1946 91/62 98.2 °F (36.8 °C) Temporal 93 25 96 % --   01/29/20 1945 91/62 -- -- 94 23 -- --   01/29/20 1930 (!) 97/54 -- -- 103 20 -- --   01/29/20 1915 (!) 83/49 -- -- 123 23 -- --   01/29/20 1600 (!) 90/52 98.2 °F (36.8 °C) Temporal 90 18 98 % --      TEMPERATURE HISTORY 24H: Temp (24hrs), Av.4 °F (36.9 °C), Min:98.1 °F (36.7 °C), Max:98.8 °F (37.1 °C)    BLOOD PRESSURE HISTORY: Systolic (42EFN), MFI:51 , Min:70 , AOP:755    Diastolic (83OXV), ZF, Min:45, Max:86    Admission Weight: 178 lb (80.7 kg)       Intake/Output Summary (Last 24 hours) at 2020 1549  Last data filed at 2020 0600  Gross per 24 hour   Intake 2194.78 ml   Output 665 ml   Net 1529.78 ml     Drain/tube Output:         Physical Exam:  CONSTITUTIONAL:  alert, no apparent distress   NEUROLOGIC:  Mental Status Exam:  Level of Alertness:   awake  Orientation:  Oriented to person, place, time  EYES:  sclera clear  HENT:  normocepalic, without obvious abnormality  NECK:  supple, symmetrical, trachea midline  LUNGS:  clear to auscultation  CARDIOVASCULAR:  regular rate and rhythm   ABDOMEN: soft, non-distended, non-tender  Lymph nodes: small left cervical, bilateral axillary and inguinal adenopathy, non-tender  SKIN:  no bruising or bleeding, normal skin color, texture, turgor and no redness, warmth, or swelling      Labs:    CBC:    Recent Labs     20  0901 20  0212 20  0345   WBC 3.9* 3.2* 6.5   HGB 11.1* 10.6* 10.2*   HCT 34.4* 32.7* 31.1*    173 158     BMP:    Recent Labs     20  0902 20  0212 20  0345   * 134* 140   K 4.7 3.6 3.5   CL 92* 98* 105   CO2 20* 20* 22   BUN 56* 44* 31*   CREATININE 1.9* 1.5* 1.1   GLUCOSE 80 159* 114*     Hepatic:   Recent Labs     20  0902   AST 29   ALT 9*   BILITOT 0.5   ALKPHOS 64     Amylase: No results for input(s): AMYLASE in the last 72 hours. Lipase: No results for input(s): LIPASE in the last 72 hours. Mag:      Recent Labs     20  1220   MG 1.60*      Phos:   No results for input(s): PHOS in the last 72 hours.    Coags:   Recent Labs     20  1022 20  1501 20  0345 20  1105   INR 5.68* 2.65* 1.50*  --    APTT  --   --   --  23.4*       Imaging:  I have personally reviewed the following films:  Ct Head Wo Contrast    Result Date: 1/28/2020  EXAMINATION: CT OF THE HEAD WITHOUT CONTRAST  1/28/2020 9:05 am TECHNIQUE: CT of the head was performed without the administration of intravenous contrast. Dose modulation, iterative reconstruction, and/or weight based adjustment of the mA/kV was utilized to reduce the radiation dose to as low as reasonably achievable. COMPARISON: None. HISTORY: ORDERING SYSTEM PROVIDED HISTORY: fever, chronic sinus pain TECHNOLOGIST PROVIDED HISTORY: Has a \"code stroke\" or \"stroke alert\" been called? ->No Reason for exam:->fever, chronic sinus pain Reason for Exam: fever, chronic sinus pain; mental status change Acuity: Unknown Type of Exam: Unknown FINDINGS: BRAIN/VENTRICLES: There is no acute intracranial hemorrhage, mass effect or midline shift. No abnormal extra-axial fluid collection. The gray-white differentiation is maintained without evidence of an acute infarct. Physiologic calcification is present within the basal ganglia. There is prominence of the ventricles and sulci due to global parenchymal volume loss. There are nonspecific areas of hypoattenuation within the periventricular and subcortical white matter, which likely represent chronic microvascular ischemic change. ORBITS: The visualized portion of the orbits demonstrate no acute abnormality. SINUSES: There is dense opacification of the bilateral ethmoid sinuses. SOFT TISSUES/SKULL: No acute abnormality of the visualized skull or soft tissues. No acute intracranial abnormality. Ethmoid sinusitis. Ct Chest Wo Contrast    Result Date: 1/29/2020  EXAMINATION: CT OF THE CHEST WITHOUT CONTRAST 1/29/2020 6:59 pm TECHNIQUE: CT of the chest was performed without the administration of intravenous contrast. Multiplanar reformatted images are provided for review.  Dose modulation, iterative reconstruction, and/or weight based adjustment of the mA/kV was utilized to reduce the radiation dose to as low as reasonably of hepatic segments 5 and 8. Suspected splenomegaly. Nonenlarged to mildly enlarged gastrohepatic and portohepatic lymph nodes. SOFT TISSUES/BONES:   Increased size of numerous nonenlarged bilateral cervical, supraclavicular, and axillary lymph nodes; for reference, 2.6 cm x 1.9 cm left level 1 axillary node. Unchanged 1.5 cm x 1.1 cm nodular soft tissue density in the right breast, suggesting a benign etiology. Areas of new nodular soft tissue density in the left breast; for reference, 0.0 cm x 0.7 cm lateral to the left nipple. Mild subcutaneous edema. Partial included changes of right shoulder arthroplasty. Healed median sternotomy. Diffuse osseous demineralization. No acute fractures nor suspicious osseous lesions. 1. Extensive lymphadenopathy as well as suspected mild splenomegaly, suspicious for lymphoproliferative disorder. New areas of nodular soft tissue density in the left breast may represent associated intramammary lymph nodes but could also represent subcutaneous involvement. 2. Trace bilateral pleural effusions with bilateral lower lobe passive atelectasis. 3. Mild cardiomegaly status post aortic valve replacement. Xr Chest Portable    Result Date: 1/28/2020  EXAMINATION: ONE XRAY VIEW OF THE CHEST 1/28/2020 3:47 pm COMPARISON: Prior study(s) most recent 01/28/2020. HISTORY: ORDERING SYSTEM PROVIDED HISTORY: swelling hands/feet TECHNOLOGIST PROVIDED HISTORY: Reason for exam:->swelling hands/feet FINDINGS: The heart is moderately enlarged but unchanged. Prior surgery evident with sternotomy wires as well as right shoulder replacement. No acute airspace disease. Pulmonary vessels are normal.  No significant change from prior study. Stable cardiomegaly. No acute cardiac or pulmonary disease evident. Xr Chest Portable    Result Date: 1/28/2020  EXAMINATION: ONE XRAY VIEW OF THE CHEST 1/28/2020 9:03 am COMPARISON: Frontal and lateral views of the chest 01/25/2015, 02/05/2014. (20-39 per day)     Adenopathy  Atrial fibrillation with RVR  History of mechanical heart valve requiring anticoagulation  Supratherapeutic INR  CHF  Hypotension  Acute renal failure    Plan:  1. Will check AXR to evaluate nausea and vomiting of unclear etiology. If ileus, may benefit from NG decompression. 2. Cervical, axillary, inguinal adenopathy palpable. Will image the abdomen over the weekend to evaluate inguinal adenopathy, abdominal lymph node basins as well as evaluate nausea and vomiting to clarify other etiologies. 3. Reduce diet to clear liquids as patient is nauseated and additionally as he is on pressors. Do not recommend advancement of diet while on levophed from surgical standpoint  4. Will need medical stabilization prior to general anesthesia. Tentatively on OR schedule for Monday 2/3/20 afternoon. If not able to be stabilized, will perform electively  5. May anticoagulate from surgical standpoint, can hold heparin 6 hours before and restart 8 hours after surgery  6. Defer management of remainder of medical comorbidities to primary and consulting teams    This plan was discussed at length with the patient. He was understanding and in agreement with the plan  Thank you for the consult and allowing me to participate in the care of this patient. I look forward to following him this admission    Rafael Napier MD, FACS  1/30/2020  3:49 PM

## 2020-01-30 NOTE — PROGRESS NOTES
 Zoster Live (Zostavax) 09/03/2013       Known drug allergies: All allergies were reviewed and updated    Allergies   Allergen Reactions    Clindamycin/Lincomycin Rash    Levaquin [Levofloxacin]      Rash, swollen neck    Cefdinir Rash       Social history:     Social History:  All social andepidemiologic history was reviewed and updated by me today as needed. · Tobacco use:   reports that he quit smoking about 49 years ago. He has a 40.00 pack-year smoking history. He has never used smokeless tobacco.  · Alcohol use:   reports current alcohol use of about 2.0 standard drinks of alcohol per week. · Currently lives in: [address] [place] [address] Road  ·  reports no history of drug use. Assessment:     The patient is a 70 y.o. old male who  has a past medical history of Aortic insufficiency, Atrial fibrillation ([facility] [place] [address] Breast nodule (8/2012  right), CAD (coronary artery disease), Cardiomyopathy ([facility] [place] [address] (8/2012), CHF (congestive heart failure) ([facility] [place] [address] Hyperlipidemia, Hypertension, Microscopic hematuria (7/29/2012), Mitral regurgitation, Nausea & vomiting (7/29/2012), Osteoarthritis of knee, Pneumonia, Rotator cuff tear (7/2/2013), and Ventricular ectopy (7/29/2012). with following problems:    · Sepsis with  tachypnea, tachycardia, hypotension, acute kidney injury-had a fever of 100.4  · Leukopenia-this is ongoing and chronic  · Ethmoid sinusitis-should be covered with Cipro  · Bibasilar atelectasis versus pneumonia  · Has a bioprosthetic aortic valve-no endocarditis on 2D echo  · Essential hypertension-blood pressure stable  · Mixed hyperlipidemia  · Multiple antibiotic allergies  · Ex-heavy smoker      Discussion:      The patient is on IV vancomycin, oral ciprofloxacin and Flagyl. He did spike a fever 100.4 today    Respiratory viral PCR panel came back negative. Blood cultures and urine cultures are running negative. Nasal MRSA screen is negative.   Serum creatinine is 1.1    He had a CT scan of the complexity of visit: High         Thank you for involving me in the care of your patient. I will continue to follow. If you have anyadditional questions, please do not hesitate to contact me. Subjective: Interval history: Patient was seen and examined at bedside. Interval history was obtained. The patient had fever today no diarrhea. Had A. fib with RVR today. REVIEW OF SYSTEMS:    Review of Systems   Constitutional: Positive for fatigue and fever. Negative for chills and diaphoresis. HENT: Negative for ear discharge, ear pain, rhinorrhea, sore throat and trouble swallowing. Eyes: Negative for discharge and redness. Respiratory: Negative for cough, shortness of breath and wheezing. Cardiovascular: Negative for chest pain and leg swelling. Gastrointestinal: Negative for abdominal pain, constipation, diarrhea and nausea. Endocrine: Negative for polyuria. Genitourinary: Negative for dysuria, flank pain, frequency, hematuria and urgency. Musculoskeletal: Negative for back pain and myalgias. Skin: Negative for rash. Neurological: Negative for dizziness, seizures and headaches. Hematological: Does not bruise/bleed easily. Psychiatric/Behavioral: Negative for hallucinations and suicidal ideas. All other systems reviewed and are negative. Past Medical History: All past medical history reviewed today.     Past Medical History:   Diagnosis Date    Aortic insufficiency     Atrial fibrillation [facility])     cardioversion 8/10/12    Breast nodule 8/2012  right    excision-lumpectomy 8/2012    CAD (coronary artery disease)     Cardiomyopathy ([facility] [place] [address] 8/2012    EF 20 %    CHF (congestive heart failure) (HCC)     Hyperlipidemia     Hypertension     Microscopic hematuria 7/29/2012    Mitral regurgitation     Nausea & vomiting 7/29/2012    Osteoarthritis of knee     bilateral knees    Pneumonia     Rotator cuff tear 7/2/2013    Ventricular ectopy 7/29/2012       Past Surgical History: All past surgical history was reviewed today. Past Surgical History:   Procedure Laterality Date    AORTIC VALVE REPLACEMENT  8/3/2012    moderate mitral regurg    CARDIAC SURGERY      COLONOSCOPY      CYST REMOVAL      chest    ENDOSCOPY, COLON, DIAGNOSTIC      INGUINAL HERNIA REPAIR      bilateral in [address]      both    OTHER SURGICAL HISTORY  8/2/12    excision right breast mass (lumpectomy)    SHOULDER ARTHROPLASTY Right 2/2/15    right reverse total shoulder arthroplasty    TOTAL KNEE ARTHROPLASTY Left 12/4/13    TOTAL KNEE ARTHROPLASTY Right 2/3/14    RIGHT TOTAL KNEE REPLACEMENT-BAR       UPPER GASTROINTESTINAL ENDOSCOPY  12/24/13       Family History: All family history was reviewed today. Problem Relation Age of Onset    Marfan Syndrome Brother     Heart Disease Brother     Stroke Sister     Diabetes Sister     Heart Disease Sister     Heart Disease Sister     Cancer Brother        Objective:       PHYSICAL EXAM:      Vitals:   Vitals:    01/30/20 1400 01/30/20 1500 01/30/20 1602 01/30/20 1629   BP: (!) 98/54 113/74 (!) 97/56    Pulse: 135 111 113    Resp: 20 19 21 24   Temp:   100.4 °F (38 °C)    TempSrc:   Temporal    SpO2: 91% 100% 94% 97%   Weight:       Height:           Physical Exam  Vitals signs and nursing note reviewed. Constitutional:       Appearance: Normal appearance. He is well-developed. Comments: Appears tired. HENT:      Head: Normocephalic and atraumatic. Right Ear: External ear normal.      Left Ear: External ear normal.      Nose: Nose normal. No congestion or rhinorrhea. Mouth/Throat:      Mouth: Mucous membranes are moist.      Pharynx: No oropharyngeal exudate or posterior oropharyngeal erythema. Eyes:      General: No scleral icterus. Right eye: No discharge. Left eye: No discharge. Conjunctiva/sclera: Conjunctivae normal.      Pupils: Pupils are equal, round, and reactive to light. 56* 44* 31*   CREATININE 1.9* 1.5* 1.1   CALCIUM 8.6 7.9* 7.9*   GLUCOSE 80 159* 114*        Hepatic Function Panel:   Lab Results   Component Value Date    ALKPHOS 64 01/28/2020    ALT 9 01/28/2020    AST 29 01/28/2020    PROT 7.0 01/28/2020    PROT 6.2 11/28/2012    BILITOT 0.5 01/28/2020    BILIDIR <0.2 10/17/2016    IBILI see below 10/17/2016    LABALBU 3.0 01/28/2020       CPK:   Lab Results   Component Value Date    CKTOTAL 168 07/28/2012     ESR:   Lab Results   Component Value Date    SEDRATE 75 (H) 11/30/2012     CRP: No results found for: CRP        Imaging: All pertinent images and reports for the current visit were reviewed by me during this visit. CT Chest WO Contrast   Final Result   1. Extensive lymphadenopathy as well as suspected mild splenomegaly,   suspicious for lymphoproliferative disorder. New areas of nodular soft   tissue density in the left breast may represent associated intramammary lymph   nodes but could also represent subcutaneous involvement. 2. Trace bilateral pleural effusions with bilateral lower lobe passive   atelectasis. 3. Mild cardiomegaly status post aortic valve replacement. XR CHEST PORTABLE   Final Result   Stable cardiomegaly. No acute cardiac or pulmonary disease evident. XR CHEST PORTABLE   Final Result   1. Streaky bibasilar opacities likely reflect subsegmental atelectasis versus   scarring rather than an infectious/inflammatory process. 2. The visible lungs are without pneumothorax, pleural effusion or new focal   airspace opacity. 3. Stable enlargement of the cardiopericardial silhouette. CT Head WO Contrast   Final Result   No acute intracranial abnormality. Ethmoid sinusitis. XR ABDOMEN (KUB) (SINGLE AP VIEW)    (Results Pending)       Medications: All current and past medications were reviewed.      pantoprazole  40 mg Intravenous Daily    ciprofloxacin  400 mg Intravenous Q12H    metroNIDAZOLE  500 mg Intravenous Q8H    digoxin  125 mcg Intravenous Daily    magnesium sulfate  2 g Intravenous Once    sodium chloride flush  10 mL Intravenous 2 times per day    vancomycin  1,250 mg Intravenous Q24H    lactobacillus  1 capsule Oral Daily with breakfast    aspirin  81 mg Oral Daily    midodrine  2.5 mg Oral Daily    atorvastatin  10 mg Oral Daily        heparin (porcine) 18 Units/kg/hr (01/30/20 1147)    amiodarone 1 mg/min (01/30/20 1426)    Followed by   Tomi Rodriguez amiodarone      sodium chloride 100 mL/hr at 01/30/20 1118    norepinephrine 0.12 mcg/kg/min (01/30/20 1555)       promethazine, heparin (porcine), heparin (porcine), sodium chloride, sodium chloride flush, prochlorperazine, magnesium hydroxide, ondansetron, acetaminophen, diphenhydrAMINE      Problem list:       Patient Active Problem List   Diagnosis Code    Elevated troponin R79.89    Cardiomyopathy ([facility] [address] I42.9    Nonrheumatic aortic valve insufficiency I35.1    Mitral regurgitation I34.0    Microscopic hematuria R31.29    S/P AVR (aortic valve replacement) Z95.2    Chronic combined systolic and diastolic CHF (congestive heart failure) ([facility]) I50.42    Arthritis of knee M17.10    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    Hyponatremia E87.1    Primary localized osteoarthrosis, 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    H/O total shoulder replacement Z96.619    Hypercholesteremia E78.00    Finger amputation, traumatic, 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    PAF (paroxysmal atrial fibrillation) ([facility] [address] I48.0    Essential hypertension I10    Sepsis ([facility] [address] A41.9    Septic shock ([facility] [address] A41.9, R65.21    Atrial fibrillation with rapid ventricular response ([address] I48.91    Elevated international normalized ratio (INR) R79.1    Streptococcal pharyngitis J02.0    Leukopenia D72.819    Chronic ethmoidal sinusitis J32.2    TYRELL (acute kidney injury) ([address] N17.9    Ex-heavy cigarette smoker (20-39 per day) C24.565       Please note that this

## 2020-01-30 NOTE — PROGRESS NOTES
[facility] PROGRESS NOTE    1/30/2020 1:22 PM        Name: Norberto Davis Schirmer . Admitted: 1/28/2020  Primary Care Provider: Sara Sadler MD (Tel: [phone])                        Subjective:  . No acute events overnight. Resting well. Pain control. Diet ok. Labs reviewed  Denies any chest pain sob.      Reviewed interval ancillary notes    Current Medications  promethazine (PHENERGAN) injection 12.5 mg, Q6H PRN  pantoprazole (PROTONIX) injection 40 mg, Daily  heparin (porcine) injection 6,750 Units, PRN  heparin (porcine) injection 3,380 Units, PRN  heparin 25,000 units in dextrose 5% 250 mL infusion, Continuous  ciprofloxacin (CIPRO) IVPB 400 mg, Q12H  metronidazole (FLAGYL) 500 mg in NaCl 100 mL IVPB premix, Q8H  digoxin (LANOXIN) injection 125 mcg, Daily  sodium chloride (OCEAN, BABY AYR) 0.65 % nasal spray 1 spray, PRN  sodium chloride flush 0.9 % injection 10 mL, 2 times per day  sodium chloride flush 0.9 % injection 10 mL, PRN  vancomycin (VANCOCIN) 1,250 mg in dextrose 5 % 250 mL IVPB, Q24H  prochlorperazine (COMPAZINE) injection 10 mg, Q6H PRN  lactobacillus (CULTURELLE) capsule 1 capsule, Daily with breakfast  diltiazem 125 mg in dextrose 5 % 125 mL infusion, Continuous  aspirin chewable tablet 81 mg, Daily  magnesium hydroxide (MILK OF MAGNESIA) 400 MG/5ML suspension 30 mL, Daily PRN  ondansetron (ZOFRAN) injection 4 mg, Q6H PRN  acetaminophen (TYLENOL) tablet 650 mg, Q4H PRN  midodrine (PROAMATINE) tablet 2.5 mg, Daily  0.9 % sodium chloride infusion, Continuous  norepinephrine (LEVOPHED) 16 mg in dextrose 5% 250 mL infusion, Continuous  atorvastatin (LIPITOR) tablet 10 mg, Daily  diphenhydrAMINE (BENADRYL) injection 25 mg, Q6H PRN        Objective:  BP (!) 96/52   Pulse 120   Temp 98.8 °F (37.1 °C) (Temporal)   Resp 18   Ht 6' 1\" (1.854 m)   [employer] 186 lb 1.1 oz (84.4 kg)   SpO2 90%   BMI 24.55 kg/m²     Intake/Output Summary (Last 24 hours) at 1/30/2020 1322  Last data filed at 1/30/2020 0600  Gross per 24 hour   Intake 3990.97 ml   Output 965 ml   Net 3025.97 ml      Wt Readings from Last 3 Encounters:   01/30/20 186 lb 1.1 oz (84.4 kg)   01/28/20 178 lb (80.7 kg)   01/23/20 180 lb 6.4 oz (81.8 kg)       General appearance:  Appears comfortable  Eyes: Sclera clear. Pupils equal.  ENT: Moist oral mucosa. Trachea midline, no adenopathy. Cardiovascular: Regular rhythm, normal S1, S2. No murmur. No edema in lower extremities  Respiratory: Not using accessory muscles. Good inspiratory effort. Clear to auscultation bilaterally, no wheeze or crackles. GI: Abdomen soft, no tenderness, not distended, normal bowel sounds  Musculoskeletal: No cyanosis in digits, neck supple  Neurology: CN 2-12 grossly intact. No speech or motor deficits  Psych: Normal affect.  Alert and oriented in time, place and person  Skin: Warm, dry, normal turgor    Labs and Tests:  CBC:   Recent Labs     01/28/20  0901 01/29/20  0212 01/30/20  0345   WBC 3.9* 3.2* 6.5   HGB 11.1* 10.6* 10.2*    173 158     BMP:    Recent Labs     01/28/20  0902 01/29/20  0212 01/30/20  0345   * 134* 140   K 4.7 3.6 3.5   CL 92* 98* 105   CO2 20* 20* 22   BUN 56* 44* 31*   CREATININE 1.9* 1.5* 1.1   GLUCOSE 80 159* 114*     Hepatic:   Recent Labs     01/28/20  0902   AST 29   ALT 9*   BILITOT 0.5   ALKPHOS 64       Discussed care with family and patient             Spent 30  minutes with patient and family at bedside and on unit reviewing medical records and labs, spent greater than 50% time counseling patient and family on diagnosis and plan   Problem List  Active Problems:    Elevated troponin    Cardiomyopathy ([facility] [address]    Mitral regurgitation    S/P AVR (aortic valve replacement)    Chronic combined systolic and diastolic CHF (congestive heart failure) ([address]    Hyponatremia

## 2020-01-30 NOTE — PROGRESS NOTES
Left message with answering service for Dr Smith Seen reguarding abdominal Xray and persistent nausea.

## 2020-01-30 NOTE — ANESTHESIA PRE PROCEDURE
Department of Anesthesiology  Preprocedure Note       Name:  Glenna Ontiveros   Age:  70 y.o.  :  1940                                          MRN:  [id number]         Date:  2020      Surgeon: Rula Ward):  Javed Chapa MD    Procedure: LEFT AXILLARY LYMPH NODE BIOPSY (Left Axilla)    Medications prior to admission:   Prior to Admission medications    Medication Sig Start Date End Date Taking?  Authorizing Provider   amoxicillin-clavulanate (AUGMENTIN) 875-125 MG per tablet Take 1 tablet by mouth 2 times daily for 21 days 20 Yes JEANIE Leahy CNP   Lactobacillus (PROBIOTIC ACIDOPHILUS) TABS Take 1 tablet by mouth 2 times daily for 21 days 20 Yes JEANIE Leahy CNP   midodrine (PROAMATINE) 2.5 MG tablet TAKE 1 TABLET BY MOUTH DAILY 10/29/19  Yes Bharat Packer MD   lisinopril (PRINIVIL;ZESTRIL) 2.5 MG tablet TAKE ONE TABLET BY MOUTH DAILY 10/14/19  Yes Bharat Packer MD   hydrochlorothiazide (HYDRODIURIL) 25 MG tablet TAKE 1 TABLET BY MOUTH DAILY 10/11/19  Yes Dalila Chapman MD   spironolactone (ALDACTONE) 25 MG tablet TAKE 0.5 TABLETS BY MOUTH THREE TIMES A WEEK 19  Yes Bharat Packer MD   warfarin (COUMADIN) 5 MG tablet Take 1 tablet by mouth daily 19  Yes Dalila Chapman MD   atorvastatin (LIPITOR) 10 MG tablet TAKE 1 TABLET BY MOUTH ONE TIME A DAY  19  Yes Bharat Packer MD   metoprolol succinate (TOPROL XL) 25 MG extended release tablet TAKE 0.5 TABLETS BY MOUTH 2 TIMES DAILY 2/15/19  Yes Bharat Packer MD   Cholecalciferol (VITAMIN D3) 3000 UNITS TABS Take 2,000 Units by mouth daily    Yes Historical Provider, MD   ondansetron (ZOFRAN-ODT) 4 MG disintegrating tablet Take 4 mg by mouth every 8 hours as needed for Nausea or Vomiting   Yes Historical Provider, MD   aspirin 81 MG tablet Take 81 mg by mouth daily    Yes Historical Provider, MD   Ascorbic Acid (VITAMIN C) 500 MG tablet Take 1,000 mg by mouth once a week    Yes Historical Provider, MD       Current medications:    Current Facility-Administered Medications   Medication Dose Route Frequency Provider Last Rate Last Dose    promethazine (PHENERGAN) injection 12.5 mg  12.5 mg Intravenous Q6H PRN Luz Maria Quintero MD   12.5 mg at 01/30/20 0849    pantoprazole (PROTONIX) injection 40 mg  40 mg Intravenous Daily Luz Maria Quintero MD   40 mg at 01/30/20 0849    heparin (porcine) injection 6,750 Units  80 Units/kg Intravenous PRN Rosa Isela Carlin MD        heparin (porcine) injection 3,380 Units  40 Units/kg Intravenous PRN Rosa Isela Carlin MD        heparin 25,000 units in dextrose 5% 250 mL infusion  18 Units/kg/hr Intravenous Continuous Rosa Isela Carlin MD 15.2 mL/hr at 01/30/20 1147 18 Units/kg/hr at 01/30/20 1147    ciprofloxacin (CIPRO) IVPB 400 mg  400 mg Intravenous Q12H Rosa Isela Carlin MD   Stopped at 01/30/20 1218    metronidazole (FLAGYL) 500 mg in NaCl 100 mL IVPB premix  500 mg Intravenous Q8H Rosa Isela Carlin MD   Stopped at 01/30/20 1218    digoxin (LANOXIN) injection 125 mcg  125 mcg Intravenous Daily Rosa Isela Carlin MD   125 mcg at 01/30/20 1117    sodium chloride (OCEAN, BABY AYR) 0.65 % nasal spray 1 spray  1 spray Each Nostril PRN Rosa Isela Carlin MD        sodium chloride flush 0.9 % injection 10 mL  10 mL Intravenous 2 times per day Karri Hurst MD   10 mL at 01/30/20 1118    sodium chloride flush 0.9 % injection 10 mL  10 mL Intravenous PRN Karri Hurst MD        vancomycin (VANCOCIN) 1,250 mg in dextrose 5 % 250 mL IVPB  1,250 mg Intravenous Q24H Eliz James MD   Stopped at 01/29/20 1050    prochlorperazine (COMPAZINE) injection 10 mg  10 mg Intravenous Q6H PRN Rosa Isela Carlin MD   10 mg at 01/30/20 4593    lactobacillus (CULTURELLE) capsule 1 capsule  1 capsule Oral Daily with breakfast Derrick Sanchez MD        diltiazem 125 mg in dextrose 5 % 125 mL infusion  5 mg/hr Intravenous Continuous Luz Maria Wilton, MD   Stopped at 01/28/20 2106    aspirin chewable tablet 81 mg  81 mg Oral Daily Dayday Jane MD   Stopped at 01/29/20 0900    magnesium hydroxide (MILK OF MAGNESIA) 400 MG/5ML suspension 30 mL  30 mL Oral Daily PRN Dayday Jane MD        ondansetron [facility]) injection 4 mg  4 mg Intravenous Q6H PRN Dayday Jane MD   4 mg at 01/29/20 0750    acetaminophen (TYLENOL) tablet 650 mg  650 mg Oral Q4H PRN Dayday Jane MD        midodrine (PROAMATINE) tablet 2.5 mg  2.5 mg Oral Daily Dayday Jane MD   2.5 mg at 01/29/20 0900    0.9 % sodium chloride infusion   Intravenous Continuous Dayday Jane  mL/hr at 01/30/20 1118      norepinephrine (LEVOPHED) 16 mg in dextrose 5% 250 mL infusion  2 mcg/min Intravenous Continuous Dayday Jane MD 6.9 mL/hr at 01/30/20 0306 0.09 mcg/kg/min at 01/30/20 0306    atorvastatin (LIPITOR) tablet 10 mg  10 mg Oral Daily Joleen Martines MD   10 mg at 01/29/20 0900    diphenhydrAMINE (BENADRYL) injection 25 mg  25 mg Intravenous Q6H PRN Joleen Martines MD           Allergies:     Allergies   Allergen Reactions    Clindamycin/Lincomycin Rash    Levaquin [Levofloxacin]      Rash, swollen neck    Cefdinir Rash       Problem List:    Patient Active Problem List   Diagnosis Code    Elevated troponin R79.89    Cardiomyopathy ([facility] [address] I42.9    Nonrheumatic aortic valve insufficiency I35.1    Mitral regurgitation I34.0    Microscopic hematuria R31.29    S/P AVR (aortic valve replacement) Z95.2    Chronic combined systolic and diastolic CHF (congestive heart failure) ([facility]) I50.42    Arthritis of knee M17.10    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    Hyponatremia E87.1    Primary localized osteoarthrosis, 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    H/O total shoulder replacement Z96.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    Hypercholesteremia E78.00    Finger amputation, traumatic, 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    PAF (paroxysmal atrial fibrillation) ([facility]) I48.0    Essential hypertension I10    per week     Comment: rare                                Counseling given: Not Answered      Vital Signs (Current):   Vitals:    01/30/20 1000 01/30/20 1100 01/30/20 1117 01/30/20 1200   BP: 107/79 (!) 81/50  (!) 96/52   Pulse: 103 94 115 120   Resp: 19 23 18   Temp:    37.1 °C (98.8 °F)   TempSrc:    Temporal   SpO2: 98% 97%  90%   Weight:       Height:                                                  BP Readings from Last 3 Encounters:   01/30/20 (!) 96/52   01/28/20 (!) 72/60   01/23/20 100/64       NPO Status:                                                                                 BMI:   Wt Readings from Last 3 Encounters:   01/30/20 186 lb 1.1 oz (84.4 kg)   01/28/20 178 lb (80.7 kg)   01/23/20 180 lb 6.4 oz (81.8 kg)     Body mass index is 24.55 kg/m². CBC:   Lab Results   Component Value Date    WBC 6.5 01/30/2020    RBC 3.79 01/30/2020    HGB 10.2 01/30/2020    HCT 31.1 01/30/2020    MCV 82.0 01/30/2020    RDW 18.3 01/30/2020     01/30/2020       CMP:   Lab Results   Component Value Date     01/30/2020    K 3.5 01/30/2020    K 4.7 01/28/2020     01/30/2020    CO2 22 01/30/2020    BUN 31 01/30/2020    CREATININE 1.1 01/30/2020    GFRAA >60 01/30/2020    GFRAA >60 06/12/2013    AGRATIO 0.8 01/28/2020    LABGLOM >60 01/30/2020    GLUCOSE 114 01/30/2020    PROT 7.0 01/28/2020    PROT 6.2 11/28/2012    CALCIUM 7.9 01/30/2020    BILITOT 0.5 01/28/2020    ALKPHOS 64 01/28/2020    AST 29 01/28/2020    ALT 9 01/28/2020       POC Tests: No results for input(s): POCGLU, POCNA, POCK, POCCL, POCBUN, POCHEMO, POCHCT in the last 72 hours.     Coags:   Lab Results   Component Value Date    PROTIME 17.5 01/30/2020    PROTIME 25.7 01/08/2016    INR 1.50 01/30/2020    APTT 23.4 01/30/2020       HCG (If Applicable): No results found for: PREGTESTUR, PREGSERUM, HCG, HCGQUANT     ABGs:   Lab Results   Component Value Date    PHART 7.39 08/04/2012    PO2ART 75 08/04/2012    FBB8NJR 38 08/04/2012

## 2020-01-30 NOTE — PROGRESS NOTES
Reassessment complete, see flowsheets. Pt remains oriented x4 and can follow commands. Pt in/out of Afib/NSR. Pt remains on room air. Pt remains on Levophed gtt for BP management, see MAR. Pt repositioned for comfort. Pt expresses no other needs at this time. Bed in lowest position and alarm on. Will continue to monitor.

## 2020-01-30 NOTE — PROGRESS NOTES
Patient still have bouts of nausea and vomiting, emesis looks like mucous. Cardiology NP here and updated on status. Orders given. Will continue to monitor.

## 2020-01-30 NOTE — PROGRESS NOTES
about 2.0 standard drinks of alcohol per week. Patient currently lives independently  Environmental/chemical exposure: None known    Family History:       Problem Relation Age of Onset    Marfan Syndrome Brother     Heart Disease Brother     Stroke Sister     Diabetes Sister     Heart Disease Sister     Heart Disease Sister     Cancer Brother      REVIEW OF SYSTEMS:    CONSTITUTIONAL:  negative for fevers, chills, diaphoresis, activity change, appetite change, fatigue, night sweats and unexpected weight change. EYES:  negative for blurred vision, eye discharge, visual disturbance and icterus  HEENT:  negative for hearing loss, tinnitus, ear drainage, sinus pressure, nasal congestion, epistaxis and snoring  RESPIRATORY:  See HPI  CARDIOVASCULAR:  negative for chest pain, palpitations, exertional chest pressure/discomfort, edema, syncope  GASTROINTESTINAL:  negative for nausea, vomiting, diarrhea, constipation, blood in stool and abdominal pain  GENITOURINARY:  negative for frequency, dysuria, urinary incontinence, decreased urine volume, and hematuria  HEMATOLOGIC/LYMPHATIC:  negative for easy bruising, bleeding and lymphadenopathy  ALLERGIC/IMMUNOLOGIC:  negative for recurrent infections, angioedema, anaphylaxis and drug reactions  ENDOCRINE:  negative for weight changes and diabetic symptoms including polyuria, polydipsia and polyphagia  MUSCULOSKELETAL:  negative for  pain, joint swelling, decreased range of motion and muscle weakness  NEUROLOGICAL:  negative for headaches, slurred speech, unilateral weakness  PSYCHIATRIC/BEHAVIORAL: negative for hallucinations, behavioral problems, confusion and agitation.      Objective:   PHYSICAL EXAM:      VITALS:  BP (!) 93/58   Pulse 148   Temp 98.8 °F (37.1 °C) (Temporal)   Resp 25   Ht 6' 1\" (1.854 m)   Wt 186 lb 1.1 oz (84.4 kg)   SpO2 (!) 88%   BMI 24.55 kg/m²      24HR INTAKE/OUTPUT:      Intake/Output Summary (Last 24 hours) at 1/30/2020 1018  Last data filed at 1/30/2020 0600  Gross per 24 hour   Intake 3990.97 ml   Output 1065 ml   Net 2925.97 ml     CONSTITUTIONAL:  awake, alert, cooperative, no apparent distress, and appears stated age  NECK:  Supple, symmetrical, trachea midline, no adenopathy, thyroid symmetric, not enlarged and no tenderness, skin normal  LUNGS:  no increased work of breathing and clear to auscultation. No accessory muscle use  CARDIOVASCULAR: S1 and S2, no edema and no JVD  ABDOMEN:  normal bowel sounds, non-distended and no masses palpated, and no tenderness to palpation. No hepatospleenomegaly  LYMPHADENOPATHY:  no axillary or supraclavicular adenopathy. No cervical adnenopathy  PSYCHIATRIC: Oriented to person place and time. No obvious depression or anxiety. MUSCULOSKELETAL: No obvious misalignment or effusion of the joints. No clubbing, cyanosis of the digits. SKIN:  normal skin color, texture, turgor and no redness, warmth, or swelling. No palpable nodules    DATA:    Old records have been reviewed    CBC:  Recent Labs     01/28/20  0901 01/29/20  0212 01/30/20  0345   WBC 3.9* 3.2* 6.5   RBC 4.20 4.04* 3.79*   HGB 11.1* 10.6* 10.2*   HCT 34.4* 32.7* 31.1*    173 158   MCV 81.9 81.0 82.0   MCH 26.3 26.2 26.9   MCHC 32.1 32.4 32.8   RDW 18.3* 17.7* 18.3*      BMP:  Recent Labs     01/28/20  0902 01/29/20  0212 01/30/20  0345   * 134* 140   K 4.7 3.6 3.5   CL 92* 98* 105   CO2 20* 20* 22   BUN 56* 44* 31*   CREATININE 1.9* 1.5* 1.1   CALCIUM 8.6 7.9* 7.9*   GLUCOSE 80 159* 114*      ABG:  No results for input(s): PHART, VKO8UYO, PO2ART, XXQ6JYH, Z1FAXRIL, BEART in the last 72 hours. Lab Results   Component Value Date    BNP 44 01/13/2014     Lab Results   Component Value Date    CKTOTAL 168 07/28/2012    TROPONINI 0.04 (H) 01/28/2020       Cultures:     Abx:    Radiology Review:  All pertinent images / reports were reviewed as a part of this visit. Assessment:     1.   Severe sepsis  · Febrile, tachycardic and

## 2020-01-31 NOTE — CARE COORDINATION
Chart reviewed for discharge planning. Barrier(s) to discharge-  Amiodarone gtt, Heparin gtt    Tentative discharge plan- wife wants to take home but has SNF list. PT/OT has not worked with him yet    Tentative discharge date-  TBD    Remains in ICU. NG tube. 1 L O2 - having surgical excision/biopsy of inguinal adenopathy palpable on Monday. *Case management will continue to follow progress and update discharge plan as needed.     Romeo Gonzalez RN BSN  Case Management

## 2020-01-31 NOTE — PROGRESS NOTES
Reassessment complete, see flowsheets. Pt in NSR. Pt remains oriented x4 and follows commands. Pt continues to have nausea, requesting PRN Phenergan, given @ 0432, see MAR. Pt remains on 1 L nasal cannula. Pt NG remains clamped. Pt off Levophed gtt, BP 93/68 MAP 76. Pt remains on Amiodarone, NS, and Heparin gtts, see MAR. Pt repositioned for comfort with gown/partial linen change. Pt expresses no other needs at this time. Bed in lowest position and alarm on. Call light within reach. Will continue to monitor.

## 2020-01-31 NOTE — PROGRESS NOTES
[facility]        Progress Note    Patient Name: Romain Sánchez  MRN: [id number]  YOB: 1940  Date of Evaluation: 1/31/2020    Chief Complaint / Reason for Consult: Adenopathy    Subjective:  No acute events overnight  Denies abdominal pain  No further nausea or vomiting since NGT was placed last night  Passing flatus, BM last night  Resting in bed at this time      Vital Signs:  Patient Vitals for the past 24 hrs:   BP Temp Temp src Pulse Resp SpO2 Weight   01/31/20 0915 81/60 -- -- 85 18 -- --   01/31/20 0900 90/61 -- -- 93 21 -- --   01/31/20 0845 (!) 88/52 -- -- 76 15 -- --   01/31/20 0841 96/60 99 °F (37.2 °C) -- 80 20 94 % --   01/31/20 0830 (!) 85/59 -- -- 76 16 -- --   01/31/20 0815 (!) 91/58 -- -- 111 21 -- --   01/31/20 0800 94/61 -- -- 105 23 -- --   01/31/20 0745 94/66 -- -- 72 17 -- --   01/31/20 0730 (!) 88/56 -- -- 95 20 -- --   01/31/20 0700 90/63 -- -- 77 19 90 % --   01/31/20 0645 (!) 93/58 -- -- 77 17 (!) 89 % --   01/31/20 0630 93/62 -- -- 79 17 (!) 90 % --   01/31/20 0615 97/61 -- -- 72 16 96 % --   01/31/20 0600 95/65 -- -- 77 15 96 % --   01/31/20 0545 (!) 106/57 -- -- 80 18 93 % --   01/31/20 0530 (!) 77/42 -- -- 85 14 93 % --   01/31/20 0515 (!) 72/52 -- -- 79 15 92 % --   01/31/20 0500 (!) 83/59 -- -- 94 17 (!) 85 % --   01/31/20 0445 (!) 86/50 -- -- 94 15 90 % --   01/31/20 0430 100/75 -- -- 77 19 (!) 89 % --   01/31/20 0415 93/68 98.6 °F (37 °C) Temporal 73 20 99 % --   01/31/20 0400 104/60 -- -- 74 19 99 % --   01/31/20 0345 95/70 -- -- 80 18 100 % --   01/31/20 0330 99/60 -- -- 85 23 100 % --   01/31/20 0315 100/62 -- -- 82 19 98 % --   01/31/20 0300 103/67 -- -- 80 19 99 % --   01/31/20 0245 98/61 -- -- 78 19 100 % --   01/31/20 0230 96/66 -- -- 89 19 100 % --   01/31/20 0215 100/61 -- -- 82 17 99 % --   01/31/20 0200 99/61 -- -- 89 18 98 % --   01/31/20 0145 94/73 -- -- 86 16 98 % --   01/31/20 0130 94/63 -- -- 80 18 98 % -- chronic sinus pain TECHNOLOGIST PROVIDED HISTORY: Has a \"code stroke\" or \"stroke alert\" been called? ->No Reason for exam:->fever, chronic sinus pain Reason for Exam: fever, chronic sinus pain; mental status change Acuity: Unknown Type of Exam: Unknown FINDINGS: BRAIN/VENTRICLES: There is no acute intracranial hemorrhage, mass effect or midline shift. No abnormal extra-axial fluid collection. The gray-white differentiation is maintained without evidence of an acute infarct. Physiologic calcification is present within the basal ganglia. There is prominence of the ventricles and sulci due to global parenchymal volume loss. There are nonspecific areas of hypoattenuation within the periventricular and subcortical white matter, which likely represent chronic microvascular ischemic change. ORBITS: The visualized portion of the orbits demonstrate no acute abnormality. SINUSES: There is dense opacification of the bilateral ethmoid sinuses. SOFT TISSUES/SKULL: No acute abnormality of the visualized skull or soft tissues. No acute intracranial abnormality. Ethmoid sinusitis. Ct Chest Wo Contrast    Result Date: 1/29/2020  EXAMINATION: CT OF THE CHEST WITHOUT CONTRAST 1/29/2020 6:59 pm TECHNIQUE: CT of the chest was performed without the administration of intravenous contrast. Multiplanar reformatted images are provided for review. Dose modulation, iterative reconstruction, and/or weight based adjustment of the mA/kV was utilized to reduce the radiation dose to as low as reasonably achievable. COMPARISON: Chest radiograph 01/28/2020; chest, abdomen, and pelvis CTA 07/29/2012 HISTORY: ORDERING SYSTEM PROVIDED HISTORY: dyspnea TECHNOLOGIST PROVIDED HISTORY: Reason for exam:->dyspnea Reason for Exam: dyspnea Acuity: Acute Type of Exam: Initial Relevant Medical/Surgical History: dyspnea FINDINGS: Lack of intravenous contrast administration, partially limiting evaluation of the soft tissues and vasculature.  MEDIASTINUM: soft tissue density in the left breast; for reference, 0.0 cm x 0.7 cm lateral to the left nipple. Mild subcutaneous edema. Partial included changes of right shoulder arthroplasty. Healed median sternotomy. Diffuse osseous demineralization. No acute fractures nor suspicious osseous lesions. 1. Extensive lymphadenopathy as well as suspected mild splenomegaly, suspicious for lymphoproliferative disorder. New areas of nodular soft tissue density in the left breast may represent associated intramammary lymph nodes but could also represent subcutaneous involvement. 2. Trace bilateral pleural effusions with bilateral lower lobe passive atelectasis. 3. Mild cardiomegaly status post aortic valve replacement. Xr Chest Portable    Result Date: 1/28/2020  EXAMINATION: ONE XRAY VIEW OF THE CHEST 1/28/2020 3:47 pm COMPARISON: Prior study(s) most recent 01/28/2020. HISTORY: ORDERING SYSTEM PROVIDED HISTORY: swelling hands/feet TECHNOLOGIST PROVIDED HISTORY: Reason for exam:->swelling hands/feet FINDINGS: The heart is moderately enlarged but unchanged. Prior surgery evident with sternotomy wires as well as right shoulder replacement. No acute airspace disease. Pulmonary vessels are normal.  No significant change from prior study. Stable cardiomegaly. No acute cardiac or pulmonary disease evident. Xr Chest Portable    Result Date: 1/28/2020  EXAMINATION: ONE XRAY VIEW OF THE CHEST 1/28/2020 9:03 am COMPARISON: Frontal and lateral views of the chest 01/25/2015, 02/05/2014. HISTORY: ORDERING SYSTEM PROVIDED HISTORY: fever TECHNOLOGIST PROVIDED HISTORY: Reason for exam:->fever Reason for Exam: fever Acuity: Acute Type of Exam: Initial FINDINGS: The cardiopericardial silhouette is again noted to be enlarged but stable. Atherosclerotic calcification of the thoracic aorta is again noted. Surgical changes of a median sternotomy again noted.  The visible lungs are without pneumothorax, pleural effusion or new focal airspace opacity. Streaky bibasilar opacities are redemonstrated likely reflecting subsegmental atelectasis versus scarring. There is partial imaging of a right shoulder replacement. 1. Streaky bibasilar opacities likely reflect subsegmental atelectasis versus scarring rather than an infectious/inflammatory process. 2. The visible lungs are without pneumothorax, pleural effusion or new focal airspace opacity. 3. Stable enlargement of the cardiopericardial silhouette. Xr Abdomen For Ng/og/ne Tube Placement    Result Date: 1/30/2020  EXAMINATION: ONE SUPINE XRAY VIEW(S) OF THE ABDOMEN 1/30/2020 7:02 pm COMPARISON: None. HISTORY: ORDERING SYSTEM PROVIDED HISTORY: Confirmation of course of NG/OG/NE tube and location of tip of tube TECHNOLOGIST PROVIDED HISTORY: Reason for exam:->Confirmation of course of NG/OG/NE tube and location of tip of tube Portable? ->Yes Reason for Exam: Confirmation of course of NG/OG/NE tube and location of tip of tube Acuity: Unknown Type of Exam: Unknown FINDINGS: The tip of the nasogastric tube and side hole/port are in satisfactory position in the region of the stomach. There is left-greater-than-right bibasilar airspace disease. Prior sternal splitting procedure. The tip of the nasogastric tube is in good position.        Scheduled Meds:   midodrine  5 mg Oral TID WC    furosemide  40 mg Intravenous Once    pantoprazole  40 mg Intravenous Daily    digoxin  125 mcg Intravenous Daily    doxycycline hyclate  100 mg Oral 2 times per day    sodium chloride flush  10 mL Intravenous 2 times per day    lactobacillus  1 capsule Oral Daily with breakfast    aspirin  81 mg Oral Daily    atorvastatin  10 mg Oral Daily     Continuous Infusions:   heparin (porcine) 18 Units/kg/hr (01/31/20 1763)    amiodarone 0.5 mg/min (01/30/20 2003)    sodium chloride 50 mL/hr at 01/31/20 1149    norepinephrine 0.03 mcg/kg/min (01/31/20 0917)     PRN Meds:.promethazine, heparin (porcine),

## 2020-01-31 NOTE — PLAN OF CARE
Problem: HEMODYNAMIC STATUS  Goal: Patient has stable vital signs and fluid balance  1/30/2020 1905 by Dina Crum RN  Outcome: Ongoing     Problem: FLUID AND ELECTROLYTE IMBALANCE  Goal: Fluid and electrolyte balance are achieved/maintained  1/30/2020 1905 by Dina Crum RN  Outcome: Ongoing     Problem: Infection:  Goal: Will remain free from infection  Description  Will remain free from infection  Outcome: Ongoing     Problem: Safety:  Goal: Free from accidental physical injury  Description  Free from accidental physical injury  Outcome: Ongoing     Problem: Falls - Risk of:  Goal: Will remain free from falls  Description  Will remain free from falls  Outcome: Ongoing

## 2020-01-31 NOTE — PROGRESS NOTES
amiodarone 0.5 mg/min (01/30/20 2003)    sodium chloride 100 mL/hr at 01/31/20 0416    norepinephrine 0.03 mcg/kg/min (01/31/20 0917)     PRN Meds:promethazine, heparin (porcine), heparin (porcine), sodium chloride, sodium chloride flush, prochlorperazine, magnesium hydroxide, ondansetron, acetaminophen, diphenhydrAMINE     Past Medical History:  Past Medical History:   Diagnosis Date    Aortic insufficiency     Atrial fibrillation ([facility] [address]     cardioversion 8/10/12    Breast nodule 8/2012  right    excision-lumpectomy 8/2012    CAD (coronary artery disease)     Cardiomyopathy ([facility] [address] 8/2012    EF 20 %    [facility]     Hyperlipidemia     Hypertension     Microscopic hematuria 7/29/2012    Mitral regurgitation     Nausea & vomiting 7/29/2012    Osteoarthritis of knee     bilateral knees    Pneumonia     Rotator cuff tear 7/2/2013    Ventricular ectopy 7/29/2012        Past Surgical History:    has a past surgical history that includes Inguinal hernia repair; other surgical history (8/2/12); Aortic valve replacement (8/3/2012); Cardiac surgery; Colonoscopy; Endoscopy, colon, diagnostic; Total knee arthroplasty (Left, 12/4/13); Upper gastrointestinal endoscopy (12/24/13); Total knee arthroplasty (Right, 2/3/14); joint replacement; cyst removal; and Total shoulder arthroplasty (Right, 2/2/15). Social History:  Reviewed. reports that he quit smoking about 49 years ago. He has a 40.00 pack-year smoking history. He has never used smokeless tobacco. He reports current alcohol use of about 2.0 standard drinks of alcohol per week. He reports that he does not use drugs. Family History:  Reviewed. family history includes Cancer in his brother; Diabetes in his sister; Heart Disease in his brother, sister, and sister; Marfan Syndrome in his brother; Stroke in his sister. Review of Systems:  · Constitutional: Positive for fatigue.  Negative for fever, night sweats, chills, weight moderate regurgitation with RVSP of 27mmHg    Echo: 1/29/20   -Left ventricular cavity size is mildly dilated. There is mild concentric   left ventricular hypertrophy.   -Overall left ventricular systolic function appears severely reduced with an   ejection fraction in the 30% range.   -There is severe hypokinesis of the apical wall and moderate hypokinesis of   the inferior walls.   -Indeterminate diastolic function due to irregular heart rhythm. -The mitral valve leaflets appear myxomatous. -Mild to moderate mitral regurgitation.   -A bioprosthetic artificial aortic valve appears well seated with a maximum   velocity of 3.3m/s and a mean gradient of 26mmHg. This suggests at least   moderate aortic stenosis. -Trivial perivalvular aortic regurgitation.   -Mild to moderate tricuspid regurgitation.   -Estimated pulmonary artery systolic pressure is mildly elevated at 40-45   mmHg assuming a right atrial pressure of 8mmHg.   -The left atrium is dilated. CT Chest: 1/29/20  1. Extensive lymphadenopathy as well as suspected mild splenomegaly,   suspicious for lymphoproliferative disorder.  New areas of nodular soft   tissue density in the left breast may represent associated intramammary lymph   nodes but could also represent subcutaneous involvement. 2. Trace bilateral pleural effusions with bilateral lower lobe passive   atelectasis. 3. Mild cardiomegaly status post aortic valve replacement.      Problem List:   Patient Active Problem List    Diagnosis Date Noted    Sepsis ([facility] [address] 01/28/2020    Septic shock (HCC)     Atrial fibrillation with rapid ventricular response (HCC)     Elevated international normalized ratio (INR)     Streptococcal pharyngitis     Leukopenia     Chronic ethmoidal sinusitis     TYRELL (acute kidney injury) ([address]     Ex-heavy cigarette smoker (20-39 per day)     PAF (paroxysmal atrial fibrillation) ([facility] [address] 09/06/2019    Essential hypertension 09/06/2019    Finger amputation, AVR (2012)   - Moderate stenosis per echo (1/29/20)   - Followed by Dr. Joshua Dorman    5. Sepsis, hypotension   - On ABX   - On midodrine and levophed gtt   - BP meds on hold    6. Chronic systolic heart failure  - Appears slightly decompensated   ~ EF 30% per echo; previously 50%   ~ Scant RLL crackles and generalized edema, now on 1L of oxygen and weight trending up  - Unable to resume BB, ACEI due to hypotension   - Will give one time dose of IV lasix 20 mg today   - Will reduce IVF to 50 ml/hr due to inability to take PO/fluids    7. TYRELL   - Resolved with IVF    - Continue to monitor I&Os    8. Lymphadenopathy   - Noted on CT   - Surgery consulted for possible excisional biopsy tomorrow    9. Ileus   - Noted on KUB    - NG tube in place    Multiple medical conditions with risk of decompensation. All pertinent information and plan of care discussed with the EP physician. All questions and concerns were addressed to the patient/family. Alternatives to my treatment were discussed. I have discussed the above stated plan with patient and family and the nurse. The patient and family verbalized understanding and agreed with the plan. Thank you for allowing to us to participate in the care of Kaiden Simon Schirmer.     JEANIE Willis-CNP  [address]   Office: [phone]

## 2020-01-31 NOTE — PROGRESS NOTES
Pt assessment complete, see flowsheets. Medications given, see MAR. Pt currently in Afib. Pt is awake and oriented x4 and can follow commands. Pt stating having nausea all day, does not want nausea medication at this time. Pt on 2L nasal cannula. Pt has NG to L nare clamped, initial Xray verified placement. Pt able to use urinal and bedside commode. Pt currently on Levophed, Amiodarone, NS, and Heparin gtt, see MAR. Pt able to turn self. Pt repositioned for comfort and sacral border placed on pt. Pt expresses no other needs at this time. Bed in lowest position and alarm on. Call light within reach. Will continue to monitor.

## 2020-01-31 NOTE — PROGRESS NOTES
Problems:    * No resolved hospital problems. *       Assessment & Plan:   1. Severe sepsis  -Unclear source likely is flulike symptoms  -Culture still negative.  -Patient was tested strep antigen positive at PCPs office earlier  -Continue antibiotics per ID recommendation  -Cardiac echo ordered to rule out any vegetation  -Continue pressors wean as tolerated  -Judicious use of fluid due to history of CHF      2. Supratherapeutic INR-now resolved. -Continue heparin drip for now. -Improved now.   Coumadin is below therapeutic now will probably need some sort of anticoagulation bridge until INR is therapeutic  -We will discuss with pharmacy    CASEY garrett with RVR  Heart rate still not controlled  -Continue amiodarone  -Likely secondary to sepsis  -Further recommendation per EP cardiology    Chronic combined CHF  -Compensated no acute concern            Diet: Diet NPO Effective Now Exceptions are: Ice Chips, Sips with Meds  Code:Full Code  DVT PPX lovenox   Continue ICU care for now      Kit Freeman MD   1/31/2020 11:01 AM

## 2020-01-31 NOTE — PROGRESS NOTES
P Pulmonary and Critical Care   Progress Note      Reason for Consult: Septic shock  Requesting Physician: Dr. Corine Dsouza:   [facility]:                The patient is a 70 y.o. male with significant past medical history of:      Diagnosis Date    Aortic insufficiency     Atrial fibrillation ([facility] [address]     cardioversion 8/10/12    Breast nodule 8/2012  right    excision-lumpectomy 8/2012    CAD (coronary artery disease)     Cardiomyopathy ([facility] [address] 8/2012    EF 20 %    CHF (congestive heart failure) ([facility] [address]     Hyperlipidemia     Hypertension     Microscopic hematuria 7/29/2012    Mitral regurgitation     Nausea & vomiting 7/29/2012    Osteoarthritis of knee     bilateral knees    Pneumonia     Rotator cuff tear 7/2/2013    Ventricular ectopy 7/29/2012     Interval history: Still having difficulty with nausea. Now has NG tube.       Past Surgical History:        Procedure Laterality Date    AORTIC VALVE REPLACEMENT  8/3/2012    moderate mitral regurg    CARDIAC SURGERY      COLONOSCOPY      CYST REMOVAL      chest    ENDOSCOPY, COLON, DIAGNOSTIC      INGUINAL HERNIA REPAIR      bilateral in 1996    JOINT REPLACEMENT      both    OTHER SURGICAL HISTORY  8/2/12    excision right breast mass (lumpectomy)    SHOULDER ARTHROPLASTY Right 2/2/15    right reverse total shoulder arthroplasty    TOTAL KNEE ARTHROPLASTY Left 12/4/13    TOTAL KNEE ARTHROPLASTY Right 2/3/14    RIGHT TOTAL KNEE REPLACEMENT-BAR       UPPER GASTROINTESTINAL ENDOSCOPY  12/24/13     Current Medications:    Current Facility-Administered Medications: midodrine (PROAMATINE) tablet 5 mg, 5 mg, Oral, TID WC  promethazine (PHENERGAN) injection 12.5 mg, 12.5 mg, Intravenous, Q6H PRN  pantoprazole (PROTONIX) injection 40 mg, 40 mg, Intravenous, Daily  heparin (porcine) injection 6,750 Units, 80 Units/kg, Intravenous, PRN  heparin (porcine) injection 3,380 Units, 40 Units/kg, Intravenous, PRN  heparin 25,000 units in independently  Environmental/chemical exposure: None known    Family History:       Problem Relation Age of Onset    Marfan Syndrome Brother     Heart Disease Brother     Stroke Sister     Diabetes Sister     Heart Disease Sister     Heart Disease Sister     Cancer Brother      REVIEW OF SYSTEMS:    CONSTITUTIONAL:  negative for fevers, chills, diaphoresis, activity change, appetite change, fatigue, night sweats and unexpected weight change. EYES:  negative for blurred vision, eye discharge, visual disturbance and icterus  HEENT:  negative for hearing loss, tinnitus, ear drainage, sinus pressure, nasal congestion, epistaxis and snoring  RESPIRATORY:  See HPI  CARDIOVASCULAR:  negative for chest pain, palpitations, exertional chest pressure/discomfort, edema, syncope  GASTROINTESTINAL:  negative for nausea, vomiting, diarrhea, constipation, blood in stool and abdominal pain  GENITOURINARY:  negative for frequency, dysuria, urinary incontinence, decreased urine volume, and hematuria  HEMATOLOGIC/LYMPHATIC:  negative for easy bruising, bleeding and lymphadenopathy  ALLERGIC/IMMUNOLOGIC:  negative for recurrent infections, angioedema, anaphylaxis and drug reactions  ENDOCRINE:  negative for weight changes and diabetic symptoms including polyuria, polydipsia and polyphagia  MUSCULOSKELETAL:  negative for  pain, joint swelling, decreased range of motion and muscle weakness  NEUROLOGICAL:  negative for headaches, slurred speech, unilateral weakness  PSYCHIATRIC/BEHAVIORAL: negative for hallucinations, behavioral problems, confusion and agitation.      Objective:   PHYSICAL EXAM:      VITALS:  BP 81/60   Pulse 85   Temp 99 °F (37.2 °C)   Resp 18   Ht 6' 1\" (1.854 m)   Wt 194 lb 0.1 oz (88 kg)   SpO2 94%   BMI 25.60 kg/m²      24HR INTAKE/OUTPUT:      Intake/Output Summary (Last 24 hours) at 1/31/2020 1000  Last data filed at 1/31/2020 0844  Gross per 24 hour   Intake 3483.66 ml   Output 1000 ml   Net 2483.66 require norepinephrine and support of persistent hypotension    2. Acute kidney injury  · Creatinine has improved    3. A. fib with RVR  · Heart rate controlled on amiodarone  · Cardiology following    4. Coagulopathy  · Therapeutic on heparin  · Coumadin on hold for now as he is likely to need additional procedures    5. Cardiomyopathy  · EF around 20%  · On amiodarone  · Cardiology following    6.  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

## 2020-01-31 NOTE — PROGRESS NOTES
[facility]   Progress Note      1/31/2020 8:53 AM        Name: Edd Mccune Schirmer . Admitted: 1/28/2020    SUBJECTIVE:  He is resting comfortably in bed, has NG in place, BP remains low.       Reviewed interval ancillary notes    Current Medications  promethazine (PHENERGAN) injection 12.5 mg, Q6H PRN  pantoprazole (PROTONIX) injection 40 mg, Daily  heparin (porcine) injection 6,750 Units, PRN  heparin (porcine) injection 3,380 Units, PRN  heparin 25,000 units in dextrose 5% 250 mL infusion, Continuous  digoxin (LANOXIN) injection 125 mcg, Daily  amiodarone (CORDARONE) 450 mg in sodium chloride 0.9 % 250 mL infusion, Continuous  doxycycline hyclate (VIBRA-TABS) tablet 100 mg, 2 times per day  sodium chloride (OCEAN, BABY AYR) 0.65 % nasal spray 1 spray, PRN  sodium chloride flush 0.9 % injection 10 mL, 2 times per day  sodium chloride flush 0.9 % injection 10 mL, PRN  prochlorperazine (COMPAZINE) injection 10 mg, Q6H PRN  lactobacillus (CULTURELLE) capsule 1 capsule, Daily with breakfast  aspirin chewable tablet 81 mg, Daily  magnesium hydroxide (MILK OF MAGNESIA) 400 MG/5ML suspension 30 mL, Daily PRN  ondansetron (ZOFRAN) injection 4 mg, Q6H PRN  acetaminophen (TYLENOL) tablet 650 mg, Q4H PRN  midodrine (PROAMATINE) tablet 2.5 mg, Daily  0.9 % sodium chloride infusion, Continuous  norepinephrine (LEVOPHED) 16 mg in dextrose 5% 250 mL infusion, Continuous  atorvastatin (LIPITOR) tablet 10 mg, Daily  diphenhydrAMINE (BENADRYL) injection 25 mg, Q6H PRN        Objective:  BP 96/60   Pulse 80   Temp 99 °F (37.2 °C)   Resp 20   Ht 6' 1\" (1.854 m)   Wt 194 lb 0.1 oz (88 kg)   SpO2 94%   BMI 25.60 kg/m²     Intake/Output Summary (Last 24 hours) at 1/31/2020 0853  Last data filed at 1/31/2020 0844  Gross per 24 hour   Intake 3483.66 ml   Output 1000 ml   Net 2483.66 ml      Wt Readings from Last 3 Encounters:   01/31/20 194 lb 0.1 oz (88 kg)   01/28/20 178 lb (80.7 kg)   01/23/20 180 lb 6.4 oz (81.8 kg)       General appearance:  Appears comfortable  Eyes: Sclera clear. Pupils equal.  ENT: Moist oral mucosa. Trachea midline, no adenopathy. Cardiovascular: Regular rhythm, normal S1, S2. No murmur. No edema in lower extremities  Respiratory: Not using accessory muscles. Good inspiratory effort. Clear to auscultation bilaterally, no wheeze or crackles. GI: Abdomen soft, no tenderness, not distended  Musculoskeletal: No cyanosis in digits, neck supple  Neurology: CN 2-12 grossly intact. No speech or motor deficits  Psych: Normal affect. Alert and oriented in time, place and person  Skin: Warm, dry, normal turgor    Labs and Tests:  CBC:   Recent Labs     01/30/20  0345 01/30/20  2350 01/31/20  0553   WBC 6.5 8.6 6.9   HGB 10.2* 9.8* 9.3*    140 118*     BMP:    Recent Labs     01/29/20  0212 01/30/20  0345 01/31/20  0553   * 140 140   K 3.6 3.5 3.8   CL 98* 105 106   CO2 20* 22 23   BUN 44* 31* 21*   CREATININE 1.5* 1.1 1.0   GLUCOSE 159* 114* 124*     Hepatic:   Recent Labs     01/28/20  0902 01/31/20  0553   AST 29 8*   ALT 9* 7*   BILITOT 0.5 0.4   ALKPHOS 64 60       ASSESSMENT AND PLAN    Active Problems:    Elevated troponin    Cardiomyopathy (HCC)    Mitral regurgitation    S/P AVR (aortic valve replacement)    Chronic combined systolic and diastolic CHF (congestive heart failure) ([facility])    Hyponatremia    Hypercholesteremia    PAF (paroxysmal atrial fibrillation) ([facility])    Essential hypertension    Sepsis ([facility] [address]  Resolved Problems:    * No resolved hospital problems. *      Sepsis syndrome patient has had fever despite treatment with cefdinir and augmentin had allergic reaction to levaquin 1/29/2020. Tested positive for strep in pcp office. Blood cultures pending    Lymphadenopathy on CT Chest  - general surgery consulted  for possible excisional biopsy     Coagulopathy :supratherapeutic inr no bleeding at present  Probably due to antibiotics with warfarin  -on Vit K-rec.  Liq as GI sx

## 2020-01-31 NOTE — PROGRESS NOTES
was reviewed and updated by me today as needed. · Tobacco use:   reports that he quit smoking about 49 years ago. He has a 40.00 pack-year smoking history. He has never used smokeless tobacco.  · Alcohol use:   reports current alcohol use of about 2.0 standard drinks of alcohol per week. · Currently lives in: [address] [place] [address] Road  ·  reports no history of drug use. Assessment:     The patient is a 70 y.o. old male who  has a past medical history of Aortic insufficiency, Atrial fibrillation ([facility] [address] Breast nodule (8/2012  right), CAD (coronary artery disease), Cardiomyopathy ([facility] [address] (8/2012), CHF (congestive heart failure) ([facility] [address] Hyperlipidemia, Hypertension, Microscopic hematuria (7/29/2012), Mitral regurgitation, Nausea & vomiting (7/29/2012), Osteoarthritis of knee, Pneumonia, Rotator cuff tear (7/2/2013), and Ventricular ectopy (7/29/2012). with following problems:    · Sepsis with  tachypnea, tachycardia, hypotension, acute kidney injury-improved  · Leukopenia-now resolved, white cell count is 6900  · Ethmoid sinusitis-covered with doxycycline  · Bibasilar atelectasis versus pneumonia  · Has a bioprosthetic aortic valve-no endocarditis on 2D echo  · Essential hypertension-BP controlled  · Mixed hyperlipidemia  · Multiple antibiotic allergies  · Ex-heavy smoker      Discussion:      I had de-escalated his antibiotics to oral doxycycline yesterday. He is tolerating the antibiotic okay. White cell count is 6900. Respiratory viral PCR panel negative. Blood cultures remain negative. Serum creatinine 1.0. On and off fever may be secondary to underlying lymphoproliferative disease    Plan:     Diagnostic Workup:    · General surgery plans for abdominal lymph node biopsy noted.   In addition to surgical pathology, if there is enough tissue, will recommend sending it for bacterial fungal and AFB cultures  · Continue to follow  fever curve, WBC count and blood cultures  · Follow up on liver and renal function    Antimicrobials:    · Will continue p.o. doxycycline 100 mg every 12 hour  · Continue to monitor his vitals closely  · Continue to watch for any worsening of respiratory status  · I if the work-up shows no signs of infection, may plan to stop the antibiotic next week  · DVT prophylaxis  · Discussed all above with patient and RN      Drug Monitoring:    · Continue monitoring for antibiotic toxicity as follows: CMP  · Continue to watch for following: new or worsening fever, new hypotension, hives, lip swelling and redness or purulence at vascular access sites. I/v access Management:    · Continue to monitor i.v access sites for erythema, induration, discharge or tenderness. · As always, continue efforts to minimize tubes/lines/drains as clinically appropriate to reduce chances of line associated infections. Patient education and counseling:       · The patient was educated in detail about the side-effects of various antibiotics and things to watch for like new rashes, lip swelling, severe reaction, worsening diarrhea, break through fever etc.  · Discussed patient's condition and what to expect. All of the patient's questions were addressed in a satisfactory manner and patient verbalized understanding all instructions. Thank you for involving me in the care of your patient. I will continue to follow. If you have anyadditional questions, please do not hesitate to contact me. Subjective: Interval history: Patient was seen and examined at bedside. Interval history was obtained. He is on doxycycline and is tolerating antibiotic okay. Fever curve is coming down. No diarrhea     REVIEW OF SYSTEMS:    Review of Systems   Constitutional: Positive for fatigue. Negative for chills, diaphoresis and fever. HENT: Negative for ear discharge, ear pain, rhinorrhea, sore throat and trouble swallowing. Eyes: Negative for discharge and redness. Respiratory: Positive for shortness of breath. Negative for cough and wheezing. Cardiovascular: Negative for chest pain and leg swelling. Gastrointestinal: Negative for abdominal pain, constipation, diarrhea and nausea. Endocrine: Negative for polyuria. Genitourinary: Negative for dysuria, flank pain, frequency, hematuria and urgency. Musculoskeletal: Negative for back pain and myalgias. Skin: Negative for rash. Neurological: Negative for dizziness, seizures and headaches. Hematological: Does not bruise/bleed easily. Psychiatric/Behavioral: Negative for hallucinations and suicidal ideas. All other systems reviewed and are negative. Past Medical History: All past medical history reviewed today. Past Medical History:   Diagnosis Date    Aortic insufficiency     Atrial fibrillation [facility])     cardioversion 8/10/12    Breast nodule 8/2012  right    excision-lumpectomy 8/2012    CAD (coronary artery disease)     Cardiomyopathy ([facility] [address] 8/2012    EF 20 %    CH[facility]congestive heart failure) ([facility])     Hyperlipidemia     Hypertension     Microscopic hematuria 7/29/2012    Mitral regurgitation     Nausea & vomiting 7/29/2012    Osteoarthritis of knee     bilateral knees    Pneumonia     Rotator cuff tear 7/2/2013    Ventricular ectopy 7/29/2012       Past Surgical History: All past surgical history was reviewed today.     Past Surgical History:   Procedure Laterality Date    AORTIC VALVE REPLACEMENT  8/3/2012    moderate mitral regurg    CARDIAC SURGERY      COLONOSCOPY      CYST REMOVAL      chest    ENDOSCOPY, COLON, DIAGNOSTIC      INGUINAL HERNIA REPAIR      bilateral in [facility]      both    OTHER SURGICAL HISTORY  8/2/12    excision right breast mass (lumpectomy)    SHOULDER ARTHROPLASTY Right 2/2/15    right reverse total shoulder arthroplasty    TOTAL KNEE ARTHROPLASTY Left 12/4/13    TOTAL KNEE ARTHROPLASTY Right 2/3/14    RIGHT TOTAL KNEE REPLACEMENT-BAR       UPPER GASTROINTESTINAL ENDOSCOPY 12/24/13       Family History: All family history was reviewed today. Problem Relation Age of Onset    Marfan Syndrome Brother     Heart Disease Brother     Stroke Sister     Diabetes Sister     Heart Disease Sister     Heart Disease Sister     Cancer Brother        Objective:       PHYSICAL EXAM:      Vitals:   Vitals:    01/31/20 1345 01/31/20 1400 01/31/20 1415 01/31/20 1430   BP: (!) 82/55 (!) 87/50 84/61 (!) 89/53   Pulse: 91 106 72 83   Resp: 15 24 17 20   Temp:       TempSrc:       SpO2:  99%  97%   Weight:       Height:           Physical Exam  Vitals signs and nursing note reviewed. Constitutional:       Appearance: Normal appearance. He is well-developed. HENT:      Head: Normocephalic and atraumatic. Right Ear: External ear normal.      Left Ear: External ear normal.      Nose: Nose normal. No congestion or rhinorrhea. Mouth/Throat:      Mouth: Mucous membranes are moist.      Pharynx: No oropharyngeal exudate or posterior oropharyngeal erythema. Eyes:      General: No scleral icterus. Right eye: No discharge. Left eye: No discharge. Conjunctiva/sclera: Conjunctivae normal.      Pupils: Pupils are equal, round, and reactive to light. Neck:      Musculoskeletal: Normal range of motion and neck supple. No neck rigidity or muscular tenderness. Cardiovascular:      Rate and Rhythm: Normal rate and regular rhythm. Pulses: Normal pulses. Heart sounds: No murmur. No friction rub. Pulmonary:      Effort: Pulmonary effort is normal. No respiratory distress. Breath sounds: No stridor. Rales (Bilateral, more at the bases) present. No wheezing or rhonchi. Abdominal:      General: Bowel sounds are normal.      Palpations: Abdomen is soft. Tenderness: There is no abdominal tenderness. There is no right CVA tenderness, left CVA tenderness, guarding or rebound. Musculoskeletal: Normal range of motion.          General: No swelling or tenderness. Lymphadenopathy:      Cervical: No cervical adenopathy. Skin:     General: Skin is warm and dry. Coloration: Skin is not jaundiced. Findings: No erythema or rash. Neurological:      General: No focal deficit present. Mental Status: He is alert and oriented to person, place, and time. Mental status is at baseline. Motor: No abnormal muscle tone. Psychiatric:         Mood and Affect: Mood normal.         Behavior: Behavior normal.         Thought Content: Thought content normal.         Lines: All vascular access sites are healthy with no local erythema, discharge or tenderness. Intake and output:    I/O last 3 completed shifts: In: 3107.9 [I.V.:3107.9]  Out: 2000 [Urine:2000]    Lab Data:   All available labs and old records have been reviewed by me. CBC:  Recent Labs     01/30/20  0345 01/30/20  2350 01/31/20  0553   WBC 6.5 8.6 6.9   RBC 3.79* 3.74* 3.52*   HGB 10.2* 9.8* 9.3*   HCT 31.1* 30.1* 28.4*    140 118*   MCV 82.0 80.6 80.9   MCH 26.9 26.2 26.5   MCHC 32.8 32.5 32.8   RDW 18.3* 18.1* 18.4*   BANDSPCT  --   --  9*        BMP:  Recent Labs     01/29/20  0212 01/30/20  0345 01/31/20  0553   * 140 140   K 3.6 3.5 3.8   CL 98* 105 106   CO2 20* 22 23   BUN 44* 31* 21*   CREATININE 1.5* 1.1 1.0   CALCIUM 7.9* 7.9* 7.6*   GLUCOSE 159* 114* 124*        Hepatic Function Panel:   Lab Results   Component Value Date    ALKPHOS 60 01/31/2020    ALT 7 01/31/2020    AST 8 01/31/2020    PROT 5.3 01/31/2020    PROT 6.2 11/28/2012    BILITOT 0.4 01/31/2020    BILIDIR <0.2 10/17/2016    IBILI see below 10/17/2016    LABALBU 2.6 01/31/2020       CPK:   Lab Results   Component Value Date    CKTOTAL 168 07/28/2012     ESR:   Lab Results   Component Value Date    SEDRATE 75 (H) 11/30/2012     CRP: No results found for: CRP        Imaging: All pertinent images and reports for the current visit were reviewed by me during this visit.     XR ABDOMEN FOR NG/OG/NE TUBE phone number provided below with my electronic signature. Any pictures or media included in this note were obtained after taking informed verbal consent from the patient and with their approval to include those in the patient's medical record.     Masha Engle MD, MPH  1/31/2020 , 4:32 PM   [facility]   Office: [phone]  Fax: [phone]  Tuesday AM clinic:   [address] [place] [address] 120  Thursday AM [facility]:[id number], [place]

## 2020-02-01 NOTE — PROGRESS NOTES
Patient was transported to and from CT scan via bed with portable monitor, oxygen and transport meds with RN in attendance. Patient tolerated well.

## 2020-02-01 NOTE — PROGRESS NOTES
[facility]   Electrophysiology Progress Note     Admit Date: 2020     Reason for follow up: Atrial fibrillation     HPI and Interval History:   Patient seen and examined. Clinical notes reviewed. Telemetry reviewed. No new complaint today. No major events overnight. Denies having chest pain, shortness of breath, dyspnea on exertion, Orthopnea, PND at the time of this visit. Still the same  Review of System:  All other systems reviewed and are negative except for that noted above. Pertinent negatives are:     · General: negative for fever, chills   · Ophthalmic ROS: negative for - eye pain or loss of vision  · ENT ROS: negative for - headaches, sore throat   · Respiratory: negative for - cough, sputum  · Cardiovascular: Reviewed in HPI  · Gastrointestinal: negative for - abdominal pain, diarrhea, N/V  · Hematology: negative for - bleeding, blood clots, bruising or jaundice  · Genito-Urinary:  negative for - Dysuria or incontinence  · Musculoskeletal: negative for - Joint swelling, muscle pain  · Neurological: negative for - confusion, dizziness, headaches   · Psychiatric: No anxiety, no depression. · Dermatological: negative for - rash      Physical Examination:  Vitals:    20 1200   BP: 99/71   Pulse: 95   Resp: 16   Temp: 98.8 °F (37.1 °C)   SpO2: 100%      In: 665 [I.V.:665]  Out: 0    Wt Readings from Last 3 Encounters:   20 192 lb 4.8 oz (87.2 kg)   20 178 lb (80.7 kg)   20 180 lb 6.4 oz (81.8 kg)     Temp  Av °F (37.2 °C)  Min: 98.6 °F (37 °C)  Max: 99.4 °F (37.4 °C)  Pulse  Av.2  Min: 83  Max: 121  BP  Min: 89/66  Max: 114/73  SpO2  Av.6 %  Min: 92 %  Max: 100 %    Intake/Output Summary (Last 24 hours) at 2020 1252  Last data filed at 2020 2200  Gross per 24 hour   Intake 1371 ml   Output 275 ml   Net 1096 ml       · Telemetry:Atrial fibrillation, PVC  · Constitutional: Oriented. No distress. Acute ill  · Head: Normocephalic and atraumatic.

## 2020-02-01 NOTE — PROGRESS NOTES
Assessment completed, opens eyes to name, oriented x 4, denies complaints of pain or discomfort. Heparin gtt infusing at 18 units/kg/hr, amiodarone gtt infusing at 0.5 mg/min, levophed gtt infusing at 0.03 mcg/kg/min. PICC line to left upper arm, site unremarkable. Peripheral IV to left forearm, site unremarkable. NG clamped.

## 2020-02-01 NOTE — PROGRESS NOTES
Assessment completed and unchanged from initial assessment this morning. Awakens easily to name, oriented x 4. Heparin and amiodarone gtts continue unchanged. PICC to left upper arm, site unremarkable. NG to left nares, clamped.

## 2020-02-01 NOTE — PROGRESS NOTES
[facility]    2/1/2020 1:44 PM        Name: Kaiden Leighirmer . Admitted: 1/28/2020  Primary Care Provider: Nathan Hodge MD (Tel: [phone])                        Subjective:  . No acute events overnight. Resting well. Pain control. .   Labs reviewed  Denies any chest pain sob. -Still having trouble with diet  -NG tube in place.   Some rest.    Reviewed interval ancillary notes    Current Medications  methylPREDNISolone sodium (SOLU-MEDROL) injection 40 mg, Daily  midodrine (PROAMATINE) tablet 5 mg, TID WC  promethazine (PHENERGAN) injection 12.5 mg, Q6H PRN  pantoprazole (PROTONIX) injection 40 mg, Daily  heparin (porcine) injection 6,750 Units, PRN  heparin (porcine) injection 3,380 Units, PRN  heparin 25,000 units in dextrose 5% 250 mL infusion, Continuous  digoxin (LANOXIN) injection 125 mcg, Daily  amiodarone (CORDARONE) 450 mg in sodium chloride 0.9 % 250 mL infusion, Continuous  doxycycline hyclate (VIBRA-TABS) tablet 100 mg, 2 times per day  sodium chloride (OCEAN, BABY AYR) 0.65 % nasal spray 1 spray, PRN  sodium chloride flush 0.9 % injection 10 mL, 2 times per day  sodium chloride flush 0.9 % injection 10 mL, PRN  prochlorperazine (COMPAZINE) injection 10 mg, Q6H PRN  lactobacillus (CULTURELLE) capsule 1 capsule, Daily with breakfast  aspirin chewable tablet 81 mg, Daily  magnesium hydroxide (MILK OF MAGNESIA) 400 MG/5ML suspension 30 mL, Daily PRN  ondansetron (ZOFRAN) injection 4 mg, Q6H PRN  acetaminophen (TYLENOL) tablet 650 mg, Q4H PRN  0.9 % sodium chloride infusion, Continuous  norepinephrine (LEVOPHED) 16 mg in dextrose 5% 250 mL infusion, Continuous  atorvastatin (LIPITOR) tablet 10 mg, Daily  diphenhydrAMINE (BENADRYL) injection 25 mg, Q6H PRN        Objective:  BP 98/61 Comment: back from CT  Pulse 101   Temp 99.8 °F (37.7 °C) (Temporal)   Resp 19   Ht 6' 1\" (1.854 m)   Wt 191 lb 12.8 oz (87 kg)   SpO2 96%   BMI 25.30 kg/m²     Intake/Output Summary (Last 24 hours) at 2/1/2020 1344  Last data filed at 2/1/2020 1051  Gross per 24 hour   Intake 3216.12 ml   Output 1390 ml   Net 1826.12 ml      Wt Readings from Last 3 Encounters:   02/01/20 191 lb 12.8 oz (87 kg)   01/28/20 178 lb (80.7 kg)   01/23/20 180 lb 6.4 oz (81.8 kg)       General appearance:  Appears comfortable  Eyes: Sclera clear. Pupils equal.  ENT: Moist oral mucosa. Trachea midline, no adenopathy. Cardiovascular: Regular rhythm, normal S1, S2. No murmur. No edema in lower extremities  Respiratory: Not using accessory muscles. Good inspiratory effort. Clear to auscultation bilaterally, no wheeze or crackles. GI: Abdomen soft, no tenderness, not distended, normal bowel sounds  Musculoskeletal: No cyanosis in digits, neck supple  Neurology: CN 2-12 grossly intact. No speech or motor deficits  Psych: Normal affect.  Alert and oriented in time, place and person  Skin: Warm, dry, normal turgor    Labs and Tests:  CBC:   Recent Labs     01/30/20  2350 01/31/20  0553 02/01/20  0500   WBC 8.6 6.9 7.1   HGB 9.8* 9.3* 9.8*    118* 109*     BMP:    Recent Labs     01/30/20  0345 01/31/20  0553 02/01/20  0500    140 142   K 3.5 3.8 3.6    106 106   CO2 22 23 25   BUN 31* 21* 19   CREATININE 1.1 1.0 1.1   GLUCOSE 114* 124* 101*     Hepatic:   Recent Labs     01/31/20  0553   AST 8*   ALT 7*   BILITOT 0.4   ALKPHOS 60       Discussed care with family and patient             Spent 30  minutes with patient and family at bedside and on unit reviewing medical records and labs, spent greater than 50% time counseling patient and family on diagnosis and plan   Problem List  Active Problems:    Elevated troponin    Cardiomyopathy ([address]    Mitral regurgitation    S/P AVR (aortic valve replacement)    Chronic combined systolic and diastolic CHF (congestive heart failure)

## 2020-02-01 NOTE — PROGRESS NOTES
Assessment completed and unchanged, alert and oriented x 4, heparin and amiodarone gtts continue unchanged. NS infusing at 50 ml/hr. NG remains clamped, denies complaints of pain or nausea. Patient requesting to get up into chair. Assisted up to chair with CGA and front wheeled rolling walker. Patient tolerated well without any difficulty.

## 2020-02-01 NOTE — PROGRESS NOTES
[facility] [address]   Electrophysiology Progress Note     Admit Date: 2020     Reason for follow up: Atrial fibrillation     HPI and Interval History:   Patient seen and examined. Clinical notes reviewed. Telemetry reviewed. No new complaint today. No major events overnight. Denies having chest pain, shortness of breath, dyspnea on exertion, Orthopnea, PND at the time of this visit. Still the same  Review of System:  All other systems reviewed and are negative except for that noted above. Pertinent negatives are:     · General: negative for fever, chills   · Ophthalmic ROS: negative for - eye pain or loss of vision  · ENT ROS: negative for - headaches, sore throat   · Respiratory: negative for - cough, sputum  · Cardiovascular: Reviewed in HPI  · Gastrointestinal: negative for - abdominal pain, diarrhea, N/V  · Hematology: negative for - bleeding, blood clots, bruising or jaundice  · Genito-Urinary:  negative for - Dysuria or incontinence  · Musculoskeletal: negative for - Joint swelling, muscle pain  · Neurological: negative for - confusion, dizziness, headaches   · Psychiatric: No anxiety, no depression. · Dermatological: negative for - rash      Physical Examination:  Vitals:    20 0700   BP: 96/71   Pulse: 90   Resp: 17   Temp:    SpO2: 100%      In: 1212.1 [I.V.:1212.1]  Out: 915    Wt Readings from Last 3 Encounters:   20 191 lb 12.8 oz (87 kg)   20 178 lb (80.7 kg)   20 180 lb 6.4 oz (81.8 kg)     Temp  Av.7 °F (37.1 °C)  Min: 97.9 °F (36.6 °C)  Max: 99 °F (37.2 °C)  Pulse  Av.5  Min: 72  Max: 112  BP  Min: 81/60  Max: 107/69  SpO2  Av.9 %  Min: 82 %  Max: 100 %    Intake/Output Summary (Last 24 hours) at 2020 0834  Last data filed at 2020 0600  Gross per 24 hour   Intake 2216.12 ml   Output 2615 ml   Net -398.88 ml       · Telemetry:Atrial fibrillation, PVC  · Constitutional: Oriented. No distress. Acute ill  · Head: Normocephalic and atraumatic. times per day    lactobacillus  1 capsule Oral Daily with breakfast    aspirin  81 mg Oral Daily    atorvastatin  10 mg Oral Daily     Continuous Infusions:   heparin (porcine) 18 Units/kg/hr (01/31/20 2101)    amiodarone 0.5 mg/min (02/01/20 0439)    sodium chloride 50 mL/hr at 01/31/20 1635    norepinephrine Stopped (01/31/20 2310)     PRN Meds:promethazine, heparin (porcine), heparin (porcine), sodium chloride, sodium chloride flush, prochlorperazine, magnesium hydroxide, ondansetron, acetaminophen, diphenhydrAMINE     Patient Active Problem List    Diagnosis Date Noted    Sepsis ([facility] 01/28/2020    Sept[facility] shock ([facility]     Atrial fibrillation with rapid ventricular response ([facility]     Elevated international normalized ratio (INR)     Streptococcal pharyngitis     Leukopenia     Chronic ethmoidal sinusitis     TYRELL (acute kidney injury) ([facility]     Ex-heavy cigarette smoker (20-39 per day)     PAF (paroxysmal atrial fibrillation) ([facility] 09/06/2019    Essential hypertension 09/06/2019    Finger amputation, traumatic, 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 Primary localized osteoarthrosis, pelvic region and thigh 05/04/2015    H/O total shoulder replacement 05/04/2015    Hyponatremia 04/22/2015    Primary localized osteoarthrosis of shoulder region 02/02/2015    Arthritis of knee 12/05/2013    Chronic combined systolic and diastolic CHF (congestive heart failure) ([facility] 03/17/2013    S/P AVR (aortic valve replacement) 09/26/2012    Elevated troponin 07/29/2012    Cardiomyopathy ([facility] 07/29/2012    Nonrheumatic aortic valve insufficiency 07/29/2012    Mitral regurgitation 07/29/2012    Microscopic hematuria 07/29/2012      Active Hospital Problems    Diagnosis Date Noted    Sepsis ([facility] [A41.9] 01/28/2020    PAF (paroxysmal atrial fibrillation) ([facility] [I48.0] 09/06/2019    Essential hypertension [I10] 09/06/2019    Hypercholesteremia [E78.00] 12/01/2015    Hyponatremia [E87.1] 04/22/2015    Chronic combined systolic and diastolic CHF (congestive heart failure) ([facility] [I50.42] 03/17/2013    S/P AVR (aortic valve replacement) [Z95.2] 09/26/2012    Cardiomyopathy ([facility] [I42.9] 07/29/2012    Mitral regurgitation [I34.0] 07/29/2012    Elevated troponin [R79.89] 07/29/2012       Assessment:       Plan:           - paroxysmal atrial fibrillation         Rate is acceptable for level of illness             continue amiodarone and digoxin for rate control due to low BP                 was On warfarin, now heparin                 - Elevated INR             resolved     - SAVR              Followed by Dr. Sallee Fothergill   Mean gradient below 30 mmHg but EF is lower and underestimated it.        - sepsis and hypotension                            On IV fluid and antibiotics              ID following              On midodrine now          - Hx of cardiomyopathy                              Resume cardiac meds when acute illness is better and BP improves off pressors    - lymphadenopathy/ MDS   Followed by Oncology   Excisional biopsy being considered                         There is a small mobile element on the mitral valve, I reviewed his previous echo's and can see the same in those studies and believe that it is a ruptured chordae and not a vegetation. Will continue monitoring his clinical course and if any doubts, a DEBBIE can be done. However, there is no need for it at this time. NOTE: This report was transcribed using voice recognition software. Every effort was made to ensure accuracy, however, inadvertent computerized transcription errors may be present.

## 2020-02-01 NOTE — PLAN OF CARE
Problem: OXYGENATION/RESPIRATORY FUNCTION  Goal: Patient will achieve/maintain normal respiratory rate/effort  Description  Respiratory rate and effort will be within normal limits for the patient  2/1/2020 1205 by Hany Flanagan RN  Outcome: Ongoing  2/1/2020 0147 by Venus Cabello RN  Outcome: Ongoing     Problem: HEMODYNAMIC STATUS  Goal: Patient has stable vital signs and fluid balance  2/1/2020 1205 by Hany Flanagan RN  Outcome: Ongoing  2/1/2020 0147 by Venus Cabello RN  Outcome: Ongoing     Problem: ACTIVITY INTOLERANCE/IMPAIRED MOBILITY  Goal: Mobility/activity is maintained at optimum level for patient  2/1/2020 1205 by Hany Flanagan RN  Outcome: Ongoing  2/1/2020 0147 by Venus Cabello RN  Outcome: Ongoing     Problem: Falls - Risk of:  Goal: Will remain free from falls  Description  Will remain free from falls  2/1/2020 1205 by Hany Flanagan RN  Outcome: Ongoing  2/1/2020 0147 by Venus Cabello RN  Outcome: Ongoing     Problem: Risk for Impaired Skin Integrity  Goal: Tissue integrity - skin and mucous membranes  Description  Structural intactness and normal physiological function of skin and  mucous membranes.   2/1/2020 1205 by Hany Flanagan RN  Outcome: Ongoing  2/1/2020 0147 by Venus Cabello RN  Outcome: Ongoing

## 2020-02-01 NOTE — PROGRESS NOTES
Assessment competed, alert and oriented x 4, denies complaints of pain or discomfort, does c/o slight nausea but states it is better than usual. NG clamped to left nares. Amiodarone gtt at 0.5 mg/min infusing via left upper arm PICC line, site unremarkable. Heparin gtt infusing at 18 units/kg/hr infusing via left forearm peripheral IV, site unremarkable. Attempted to wean off oxygen from 1L to room air but saturations dropped into the 80's after 10 minutes. Placed back on 1L/NC.

## 2020-02-01 NOTE — PROGRESS NOTES
Message sent to Dr. Flory Rivero via [employer] re: \"noticed the rash on patient's face is becoming a deeper pink and on neck . I gave benedryl right before going to CT. He's had this rash throughout his stay. I haven't given his doxycycline yet due to needing the abdominal CT, getting ready to catch up with his meds now, do you want me to do anything different? \" initially and after family voiced concerns this was a new reaction send additional message: \"do you need to stop anything? family is concerned he is having a new reaction to something\"  Orders received to treat symptomatically and orders were placed by MD.

## 2020-02-01 NOTE — PROGRESS NOTES
Reassessment complete, see flowsheets. Pt remains alert and oriented x4 and can follow commands. Pt stating having nausea and requesting PRN Phenergan, given @ 0013, see MAR. Pt remains on 1L nasal cannula. Pt NG remains clamped. Pt off Levophed, BP 88/60 MAP 65. Pt continues on Amiodarone, NS, and Heparin gtt, see MAR. Pt repositioned for comfort. Pt expresses no other needs at this time. Bed in lowest position and alarm on. Call light within reach. Will continue to monitor.

## 2020-02-02 NOTE — PROGRESS NOTES
P Pulmonary and Critical Care   Progress Note      Reason for Consult: Septic shock  Requesting Physician: Dr. Hess Go:   [address] [facility]:                The patient is a 70 y.o. male with significant past medical history of:      Diagnosis Date    Aortic insufficiency     Atrial fibrillation ([facility]     cardioversion 8/10/12    Breast nodule 8/2012  right    excision-lumpectomy 8/2012    CAD (coronary artery disease)     Cardiomyopathy ([facility] 8/2012    EF 20 %    CHF (congestive heart failure) ([facility]     Hyperlipidemia     Hypertension     Microscopic hematuria 7/29/2012    Mitral regurgitation     Nausea & vomiting 7/29/2012    Osteoarthritis of knee     bilateral knees    Pneumonia     Rotator cuff tear 7/2/2013    Ventricular ectopy 7/29/2012     Interval history: Nausea has improved. Continues to have NG tube to low wall suction. Had CT abdomen pelvis yesterday. This was most remarkable for lymphadenopathy. Also has some evidence of anasarca.       Past Surgical History:        Procedure Laterality Date    AORTIC VALVE REPLACEMENT  8/3/2012    moderate mitral regurg    CARDIAC SURGERY      COLONOSCOPY      CYST REMOVAL      chest    ENDOSCOPY, COLON, DIAGNOSTIC      INGUINAL HERNIA REPAIR      bilateral in [facility] [address]      both    OTHER SURGICAL HISTORY  8/2/12    excision right breast mass (lumpectomy)    SHOULDER ARTHROPLASTY Right 2/2/15    right reverse total shoulder arthroplasty    TOTAL KNEE ARTHROPLASTY Left 12/4/13    TOTAL KNEE ARTHROPLASTY Right 2/3/14    RIGHT TOTAL KNEE REPLACEMENT-BAR       UPPER GASTROINTESTINAL ENDOSCOPY  12/24/13     Current Medications:    Current Facility-Administered Medications: lidocaine 4 % external patch 1 patch, 1 patch, Transdermal, Daily PRN  methylPREDNISolone sodium (SOLU-MEDROL) injection 40 mg, 40 mg, Intravenous, Daily  lip balm petroleum free (PHYTOPLEX) stick, , Topical, PRN  midodrine (PROAMATINE) tablet 5 mg, 5 mg, Oral, TID WC  promethazine (PHENERGAN) injection 12.5 mg, 12.5 mg, Intravenous, Q6H PRN  pantoprazole (PROTONIX) injection 40 mg, 40 mg, Intravenous, Daily  heparin (porcine) injection 6,750 Units, 80 Units/kg, Intravenous, PRN  heparin (porcine) injection 3,380 Units, 40 Units/kg, Intravenous, PRN  heparin 25,000 units in dextrose 5% 250 mL infusion, 18 Units/kg/hr, Intravenous, Continuous  digoxin (LANOXIN) injection 125 mcg, 125 mcg, Intravenous, Daily  [COMPLETED] amiodarone (CORDARONE) 150 mg in dextrose 5 % 100 mL bolus, 150 mg, Intravenous, Once **FOLLOWED BY** [] amiodarone (CORDARONE) 450 mg in sodium chloride 0.9 % 250 mL infusion, 1 mg/min, Intravenous, Continuous **FOLLOWED BY** amiodarone (CORDARONE) 450 mg in sodium chloride 0.9 % 250 mL infusion, 0.5 mg/min, Intravenous, Continuous  doxycycline hyclate (VIBRA-TABS) tablet 100 mg, 100 mg, Oral, 2 times per day  sodium chloride (OCEAN, BABY AYR) 0.65 % nasal spray 1 spray, 1 spray, Each Nostril, PRN  sodium chloride flush 0.9 % injection 10 mL, 10 mL, Intravenous, 2 times per day  sodium chloride flush 0.9 % injection 10 mL, 10 mL, Intravenous, PRN  prochlorperazine (COMPAZINE) injection 10 mg, 10 mg, Intravenous, Q6H PRN  lactobacillus (CULTURELLE) capsule 1 capsule, 1 capsule, Oral, Daily with breakfast  aspirin chewable tablet 81 mg, 81 mg, Oral, Daily  magnesium hydroxide (MILK OF MAGNESIA) 400 MG/5ML suspension 30 mL, 30 mL, Oral, Daily PRN  ondansetron (ZOFRAN) injection 4 mg, 4 mg, Intravenous, Q6H PRN  acetaminophen (TYLENOL) tablet 650 mg, 650 mg, Oral, Q4H PRN  0.9 % sodium chloride infusion, , Intravenous, Continuous  norepinephrine (LEVOPHED) 16 mg in dextrose 5% 250 mL infusion, 2 mcg/min, Intravenous, Continuous  atorvastatin (LIPITOR) tablet 10 mg, 10 mg, Oral, Daily  diphenhydrAMINE (BENADRYL) injection 25 mg, 25 mg, Intravenous, Q6H PRN    Allergies   Allergen Reactions    Clindamycin/Lincomycin Rash   

## 2020-02-02 NOTE — PROGRESS NOTES
Shift assessment complete, see flowsheet. Pt awake, A/Ox4. Complaining of hip pain, and itching. PRN tylenol and benadryl given. Pt on O2 via NC at 1LPM. NG to left nare clamped. Pt currently on amiodarone, heparin and NS gtts. Pt expresses no other needs at this time, will continue to monitor.

## 2020-02-02 NOTE — PROGRESS NOTES
Physical Therapy    Facility/Department: [facility] ICU  Initial Assessment    NAME: Jack Capone  : 1940  MRN: [id number]    Date of Service: 2020    Discharge Recommendations:  Breck Ledger Schirmer scored a 18/24 on the AM-PAC short mobility form. Current research shows that an AM-PAC score of 18 or greater is typically associated with a discharge to the patient's home setting. Based on the patients AM-PAC score and their current functional mobility deficits, it is recommended that the patient have 2-3 sessions per week of Physical Therapy at d/c to increase the patients independence. S Level 1   PT Equipment Recommendations  Equipment Needed: No    Assessment   Body structures, Functions, Activity limitations: Decreased functional mobility ; Decreased strength;Decreased endurance;Decreased balance  Assessment: Patient not at baseline function and would benefit from skilled PT to address above decficits and facilitate return to baseline function. Would benefit from 24 hour supervision initially upon discharge in which spouse is able to provide. Treatment Diagnosis: decreased functional mobility, impaired gait, decreased endurance  Prognosis: Good  Decision Making: Medium Complexity  Clinical Presentation: evolving  PT Education: Goals; Home Exercise Program;PT Role;Plan of Care;General Safety  Patient Education: d/c recommendations  Barriers to Learning: none  REQUIRES PT FOLLOW UP: Yes  Activity Tolerance  Activity Tolerance: Patient Tolerated treatment well       Patient Diagnosis(es): The primary encounter diagnosis was Septic shock ([address] Diagnoses of Atrial fibrillation with rapid ventricular response ([address] Elevated international normalized ratio (INR), Streptococcal pharyngitis, Leukopenia, unspecified type, Chronic ethmoidal sinusitis, TYRELL (acute kidney injury) ([address] and chronic elevated troponin were also pertinent to this visit.      has a past medical history of Aortic insufficiency,

## 2020-02-02 NOTE — PROGRESS NOTES
Occupational Therapy. Occupational Therapy Initial Assessment  Date: 2020   Patient Name: Luis Alberto Fallon  MRN: [id number]     : 1940    Date of Service: 2020    Discharge Recommendations:. Mathilda Dallas Schirmer scored a 14/24 on the AM-PAC ADL Inpatient form. Current research shows that an AM-PAC score of 18 or greater is typically associated with a discharge to the patient's home setting. Based on the patients AM-PAC score and their current ADL deficits, it is recommended that the patient have 2-3 sessions per week of Occupational Therapy at d/c to increase the patients independence. HOME HEALTH CARE: LEVEL 3 SAFETY     - Initial home health evaluation to occur within 24-48 hours, in patient home   - Therapy evaluations in home within 24-48 hours of discharge; including DME and home safety   - Frontload therapy 5 days, then 3x a week   - Therapy to evaluate if patient has [address] needs for personal care   -  evaluation within 24-48 hours, includes evaluation of resources and insurance to determine [place], [place], LTC, and Medicaid options        OT Equipment Recommendations  Equipment Needed: No    Assessment   Performance deficits / Impairments: Decreased functional mobility ; Decreased high-level IADLs;Decreased ADL status; Decreased endurance;Decreased balance;Decreased strength;Decreased fine motor control  Assessment: Pt presents with the above deficits impacting daily occupational performance and would benefit from continued skilled OT services.    Treatment Diagnosis: Decreased mobility, ADL status, ADL transfers, high level IADL's, endurance, FM control, strength and balance associated with Sepsis  Prognosis: Good  Decision Making: Medium Complexity  OT Education: OT Role;Plan of Care;Orientation;Precautions;Transfer Training;Equipment  REQUIRES OT FOLLOW UP: Yes  Activity Tolerance  Activity Tolerance: Patient Tolerated treatment well  Safety Devices  Safety Devices in place: Yes  Type of devices: All fall risk precautions in place; Patient at risk for falls;Call light within reach; Left in chair;Chair alarm in place;Nurse notified;Gait belt  Restraints  Initially in place: No           Patient Diagnosis(es): The primary encounter diagnosis was Septic shock ([facility] Diagnoses of Atrial fibrillation with rapid ventricular response ([facility] Elevated international normalized ratio (INR), Streptococcal pharyngitis, Leukopenia, unspecified type, Chronic ethmoidal sinusitis, TYRELL (acute kidney injury) ([facility] and chronic elevated troponin were also pertinent to this visit. has a past medical history of Aortic insufficiency, Atrial fibrillation ([facility] Breast nodule, CAD (coronary artery disease), Cardiomyopathy ([facility] CHF (congestive heart failure) ([facility] Hyperlipidemia, Hypertension, Microscopic hematuria, Mitral regurgitation, Nausea & vomiting, Osteoarthritis of knee, Pneumonia, Rotator cuff tear, and Ventricular ectopy. has a past surgical history that includes Inguinal hernia repair; other surgical history (8/2/12); Aortic valve replacement (8/3/2012); Cardiac surgery; Colonoscopy; Endoscopy, colon, diagnostic; Total knee arthroplasty (Left, 12/4/13); Upper gastrointestinal endoscopy (12/24/13); Total knee arthroplasty (Right, 2/3/14); joint replacement; cyst removal; and Total shoulder arthroplasty (Right, 2/2/15). Treatment Diagnosis: Decreased mobility, ADL status, ADL transfers, high level IADL's, endurance, FM control, strength and balance associated with Sepsis      Restrictions  Restrictions/Precautions  Restrictions/Precautions: General Precautions, Fall Risk(HIGH FALL RISK)  Required Braces or Orthoses?: No  Position Activity Restriction  Other position/activity restrictions: Diapk Santo is a 70 y.o. male presents to the emergency department by emergency medical services from the office of Dr. Boby Sims.   Is reported that the patient has not been feeling well for the last couple of days. He has been on antibiotics for sinus-like symptoms as well sore throat pain. He has had febrile illness despite this. Concerns arose today because the patient had a febrile illness was tachycardic and hypotensive with documented infection positive for streptococcal pharyngitis. Found to have severe sepsis. Concern for lymphoma with L axillary lymph node biopsy performed. Subjective   General  Chart Reviewed: Yes  Family / Caregiver Present: Yes  Diagnosis: Sepsis  Subjective  Subjective: Pt supine in bed on arrival and agreeable for session. RN approval prior to session. Patient Currently in Pain: Yes  Pain Assessment  Pain Assessment: 0-10  Pain Level: 3  Pain Type: Acute pain  Pain Location: Hip  Pain Orientation: Left    Social/Functional History  Social/Functional History  Lives With: Spouse  Type of Home: House  Home Layout: Two level, Bed/Bath upstairs(13 steps between levels with rail)  Home Access: Stairs to enter without rails  Entrance Stairs - Number of Steps: 2 OLLIE  Bathroom Shower/Tub: Walk-in shower  Bathroom Toilet: Standard  Bathroom Equipment: Shower chair, 3-in-1 commode, Toilet raiser  Home Equipment: Rolling walker, Cane  Receives Help From: Family  ADL Assistance: Independent  Homemaking Assistance: Independent  Homemaking Responsibilities: Yes  Ambulation Assistance: Independent  Transfer Assistance: Independent  Active : Yes  Mode of Transportation: Car  Additional Comments: Reports 1 \"fall\" involving slipping off side of bed when sitting. Objective   Vision: Impaired  Vision Exceptions: Wears glasses for reading  Hearing: Exceptions to [facility]  Hearing Exceptions: Hard of hearing/hearing concerns    Orientation  Overall Orientation Status: Within Functional Limits  Observation/Palpation  Posture: Good  Observation: RUE edema - reported significant increase in edema compared to yesterday - alerted nursing. Retrograde massage performed on RUE.  Pt tolerated well. Pt also educated on self AROM exercises for hand, wrist, elbow and feet for edema management. Pt verbalized and demo'd understanding. Balance  Sitting Balance: Supervision  Standing Balance: Contact guard assistance  Functional Mobility  Activity: Other  Assist Level: Contact guard assistance  Functional Mobility Comments: CGA ambulation in room and hallway x>270' total using RW with IV follow. 1 LOB but pt able to self-correct. Steady gait otherwise. No c/o SOB or pain. Pt reporting relief in pain in L hip after walking. ADL  Feeding: Setup  Tone RUE  RUE Tone: Normotonic  Tone LUE  LUE Tone: Normotonic  Coordination  Coordination and Movement description: Fine motor impairments;Right UE;Left UE  Quality of Movement Other  Comment: Noted edema in B hands. Pt reporting this date edema in R hand increased overnight--no noted warmth in RUE. RN aware. Transfers  Stand Step Transfers: Contact guard assistance  Sit to stand: Contact guard assistance  Stand to sit: Contact guard assistance  Vision - Basic Assessment  Prior Vision: Wears glasses only for reading  Visual History: No significant visual history  Patient Visual Report: No visual complaint reported.   Cognition  Overall Cognitive Status: WFL  Perception  Overall Perceptual Status: WFL     Sensation  Overall Sensation Status: WFL        LUE AROM (degrees)  LUE AROM : WFL  Left Hand AROM (degrees)  Left Hand AROM: WFL  RUE AROM (degrees)  RUE AROM : WFL  Right Hand AROM (degrees)  Right Hand AROM: WFL  LUE Strength  Gross LUE Strength: WFL  RUE Strength  Gross RUE Strength: WFL     Hand Dominance  Hand Dominance: Right             Plan   Plan  Times per week: 3-5x/week  Times per day: Daily  Current Treatment Recommendations: Strengthening, Gait Training, Safety Education & Training, Balance Training, Patient/Caregiver Education & Training, Self-Care / ADL, Functional Mobility Training, Equipment Evaluation, Education, & procurement,

## 2020-02-02 NOTE — PROGRESS NOTES
Jennifer Fay is a 70 y.o. male patient.     CC-N/V lymphadenopathy    HPI-80 year old male seen in follow up N/V and lymphadenopathy   Current Facility-Administered Medications   Medication Dose Route Frequency Provider Last Rate Last Dose    lidocaine 4 % external patch 1 patch  1 patch Transdermal Daily PRN Nathan Morris MD   1 patch at 02/02/20 0840    morphine (PF) injection 1 mg  1 mg Intravenous Q4H PRN Dayday Jane MD   1 mg at 02/02/20 1141    methylPREDNISolone sodium (SOLU-MEDROL) injection 40 mg  40 mg Intravenous Daily Dayday Jane MD   40 mg at 02/02/20 0841    lip balm petroleum free (PHYTOPLEX) stick   Topical PRN Lawanda Marie MD        midodrine (PROAMATINE) tablet 5 mg  5 mg Oral TID WC Lawanda Marie MD   5 mg at 02/02/20 1141    promethazine (PHENERGAN) injection 12.5 mg  12.5 mg Intravenous Q6H PRN Nathan Morris MD   12.5 mg at 02/02/20 0257    pantoprazole (PROTONIX) injection 40 mg  40 mg Intravenous Daily Nathan Morris MD   40 mg at 02/02/20 0841    heparin (porcine) injection 6,750 Units  80 Units/kg Intravenous PRN Lawanda Marie MD        heparin (porcine) injection 3,380 Units  40 Units/kg Intravenous PRN Lawanda Marie MD        heparin 25,000 units in dextrose 5% 250 mL infusion  18 Units/kg/hr Intravenous Continuous Lawanda Marie MD 15.2 mL/hr at 02/02/20 0722 18 Units/kg/hr at 02/02/20 0722    digoxin (LANOXIN) injection 125 mcg  125 mcg Intravenous Daily Lawanda Marie MD   125 mcg at 02/02/20 0841    amiodarone (CORDARONE) 450 mg in sodium chloride 0.9 % 250 mL infusion  0.5 mg/min Intravenous Continuous JEANIE Yanez CNP 16.7 mL/hr at 02/01/20 2007 0.5 mg/min at 02/01/20 2007    doxycycline hyclate (VIBRA-TABS) tablet 100 mg  100 mg Oral 2 times per day Ashli Daily MD   100 mg at 02/02/20 0841    sodium chloride (OCEAN, BABY AYR) 0.65 % nasal spray 1 spray  1 spray Each Nostril PRN Lawanda Marie MD        sodium chloride flush 0.9 % injection 10 mL  10 mL Intravenous 2 times per day Radha Hussein MD   10 mL at 02/02/20 0840    sodium chloride flush 0.9 % injection 10 mL  10 mL Intravenous PRN Radha Hussein MD        prochlorperazine (COMPAZINE) injection 10 mg  10 mg Intravenous Q6H PRN Ari Dsouza MD   10 mg at 02/01/20 0948    lactobacillus (CULTURELLE) capsule 1 capsule  1 capsule Oral Daily with breakfast Vani Rock MD   1 capsule at 02/02/20 0841    aspirin chewable tablet 81 mg  81 mg Oral Daily Dayday Jane MD   81 mg at 02/02/20 0842    magnesium hydroxide (MILK OF MAGNESIA) 400 MG/5ML suspension 30 mL  30 mL Oral Daily PRN Dayday Jane MD        [facility]etron [facility]) injection 4 mg  4 mg Intravenous Q6H PRN Dayday Jane MD   4 mg at 01/29/20 0750    acetaminophen (TYLENOL) tablet 650 mg  650 mg Oral Q4H PRN Dayday Jane MD   650 mg at 02/02/20 0741    0.9 % sodium chloride infusion   Intravenous Continuous JEANIE Brown - CNP 50 mL/hr at 02/01/20 1152      norepinephrine (LEVOPHED) 16 mg in dextrose 5% 250 mL infusion  2 mcg/min Intravenous Continuous Ari Dsouza MD   Stopped at 01/31/20 2310    atorvastatin (LIPITOR) tablet 10 mg  10 mg Oral Daily Ari Dsouza MD   10 mg at 02/02/20 9452    diphenhydrAMINE (BENADRYL) injection 25 mg  25 mg Intravenous Q6H PRN Ari Dsouza MD   25 mg at 02/02/20 1554     Allergies   Allergen Reactions    Clindamycin/Lincomycin Rash    Levaquin [Levofloxacin]      Rash, swollen neck    Cefdinir Rash     Active Problems:    Elevated troponin    Cardiomyopathy (HCC)    Mitral regurgitation    S/P AVR (aortic valve replacement)    Chronic combined systolic and diastolic CHF (congestive heart failure) (HCC)    Hyponatremia    Hypercholesteremia    PAF (paroxysmal atrial fibrillation) ([facility] [address]    Essential hypertension    Sepsis ([facility] [address]  Resolved Problems:    * No resolved hospital problems.  *    Blood pressure 99/71, pulse 95,

## 2020-02-02 NOTE — PROGRESS NOTES
Shift assessment. Patient awake in bed, alert and oriented, O2 via NC at 1 Lpm. Heparin and amiodarone gtts infusing by PICC in LUE, patient voids per urinal. NG clamped at 63cm. Patient denies needs, call light in reach.

## 2020-02-02 NOTE — PROGRESS NOTES
regurgitation    S/P AVR (aortic valve replacement)    Chronic combined systolic and diastolic CHF (congestive heart failure) ([facility])    Hyponatremia    Hypercholesteremia    PAF (paroxysmal atrial fibrillation) ([facility])    Essential hypertension    Sepsis ([facility] [address]  Resolved Problems:    * No resolved hospital problems. *       Assessment & Plan:   1. Severe sepsis  -Unclear source likely is flulike symptoms  -Culture still negative.  -Patient was tested strep antigen positive at PCPs office earlier  -Continue antibiotics per ID recommendation  Off pressors. D  -Judicious use of fluid due to history of CHF      2. Supratherapeutic INR-now resolved. -Continue heparin drip for now. With goal to switch to Coumadin on p.o.         A. fib with RVR  Heart rate still not controlled  -Continue amiodarone  -Likely secondary to sepsis  -Further recommendation per EP cardiology    Lymphadenopathy  -Plan for biopsy tomorrow remove NG tube today      Rash  -Noted on body for patient  -Unclear etiology.  -At this time patient is on minimal medication that would have possibly cause it  -Symptomatic c treatment at this time      Chronic combined CHF  -Compensated no acute concern            Diet: Diet NPO, After Midnight Exceptions are: Sips with Meds  DIET CLEAR LIQUID;  Diet NPO, After Midnight Exceptions are: Sips with Meds  Code:Full Code  DVT PPX lovenox   Continue ICU care for now      Angie Tadeo MD   2/2/2020 1:01 PM

## 2020-02-03 NOTE — PROGRESS NOTES
are: Sips with Meds  Code:Full Code  DVT PPX lovenox   Continue ICU care for now      Corazon Hernandez MD   2/3/2020 1:29 PM

## 2020-02-03 NOTE — ANESTHESIA PRE PROCEDURE
medications:    No current facility-administered medications for this visit. No current outpatient medications on file.      Facility-Administered Medications Ordered in Other Visits   Medication Dose Route Frequency Provider Last Rate Last Dose    furosemide (LASIX) injection 40 mg  40 mg Intravenous Daily JEANIE Pickett CNP        lidocaine 4 % external patch 1 patch  1 patch Transdermal Daily PRN Fabian Vieyra MD   1 patch at 02/02/20 0840    morphine (PF) injection 1 mg  1 mg Intravenous Q4H PRN Dayday Jane MD   1 mg at 02/03/20 0827    methylPREDNISolone sodium (SOLU-MEDROL) injection 40 mg  40 mg Intravenous Daily Fabian Vieyra MD   40 mg at 02/02/20 0841    lip balm petroleum free (PHYTOPLEX) stick   Topical PRN Dirk Hernandez MD        midodrine (PROAMATINE) tablet 5 mg  5 mg Oral TID WC Dirk Hernandez MD   5 mg at 02/02/20 1652    promethazine (PHENERGAN) injection 12.5 mg  12.5 mg Intravenous Q6H PRN Fabian Vieyra MD   12.5 mg at 02/02/20 0257    pantoprazole (PROTONIX) injection 40 mg  40 mg Intravenous Daily Dayday Jane MD   40 mg at 02/02/20 0841    heparin (porcine) injection 6,750 Units  80 Units/kg Intravenous PRN Dirk Hernandez MD        heparin (porcine) injection 3,380 Units  40 Units/kg Intravenous PRN Dirk Hernandez MD        heparin 25,000 units in dextrose 5% 250 mL infusion  18 Units/kg/hr Intravenous Continuous Dirk Hernandez MD   Stopped at 02/03/20 0554    digoxin (LANOXIN) injection 125 mcg  125 mcg Intravenous Daily Dirk Hernandez MD   125 mcg at 02/03/20 8699    amiodarone (CORDARONE) 450 mg in sodium chloride 0.9 % 250 mL infusion  0.5 mg/min Intravenous Continuous JEANIE Pickett CNP 16.7 mL/hr at 02/03/20 0836 0.5 mg/min at 02/03/20 0836    doxycycline hyclate (VIBRA-TABS) tablet 100 mg  100 mg Oral 2 times per day Deonna Pugh MD   100 mg at 02/02/20 2145    sodium chloride (OCEAN, BABY AYR) 0.65 % nasal spray 1 spray

## 2020-02-03 NOTE — PROGRESS NOTES
for 21 days Yes JEANIE Murdock - CNP   midodrine (PROAMATINE) 2.5 MG tablet TAKE 1 TABLET BY MOUTH DAILY Yes Lou Carpenter MD   lisinopril (PRINIVIL;ZESTRIL) 2.5 MG tablet TAKE ONE TABLET BY MOUTH DAILY Yes Lou Carpenter MD   hydrochlorothiazide (HYDRODIURIL) 25 MG tablet TAKE 1 TABLET BY MOUTH DAILY Yes Claudell Chill, MD   spironolactone (ALDACTONE) 25 MG tablet TAKE 0.5 TABLETS BY MOUTH THREE TIMES A WEEK Yes Lou Carpenter MD   warfarin (COUMADIN) 5 MG tablet Take 1 tablet by mouth daily Yes Claudell Chill, MD   atorvastatin (LIPITOR) 10 MG tablet TAKE 1 TABLET BY MOUTH ONE TIME A DAY  Yes Lou Carpenter MD   metoprolol succinate (TOPROL XL) 25 MG extended release tablet TAKE 0.5 TABLETS BY MOUTH 2 TIMES DAILY Yes Lou Carpenter MD   Cholecalciferol (VITAMIN D3) 3000 UNITS TABS Take 2,000 Units by mouth daily  Yes Historical Provider, MD   ondansetron (ZOFRAN-ODT) 4 MG disintegrating tablet Take 4 mg by mouth every 8 hours as needed for Nausea or Vomiting Yes Historical Provider, MD   aspirin 81 MG tablet Take 81 mg by mouth daily  Yes Historical Provider, MD   Ascorbic Acid (VITAMIN C) 500 MG tablet Take 1,000 mg by mouth once a week  Yes Historical Provider, MD      Scheduled Meds:   methylPREDNISolone  40 mg Intravenous Daily    midodrine  5 mg Oral TID WC    pantoprazole  40 mg Intravenous Daily    digoxin  125 mcg Intravenous Daily    doxycycline hyclate  100 mg Oral 2 times per day    sodium chloride flush  10 mL Intravenous 2 times per day    lactobacillus  1 capsule Oral Daily with breakfast    aspirin  81 mg Oral Daily    atorvastatin  10 mg Oral Daily     Continuous Infusions:   heparin (porcine) Stopped (02/03/20 0554)    amiodarone 0.5 mg/min (02/03/20 0836)    sodium chloride 50 mL/hr at 02/01/20 1152    norepinephrine Stopped (01/31/20 2310)     PRN Meds:lidocaine, morphine, lip balm petroleum free, promethazine, heparin (porcine), heparin (porcine), sodium mood    Pertinent labs, diagnostic, device, and imaging results reviewed as a part of this visit    Labs:    BMP:   Recent Labs     20  0500 20  0517 20  0600    141 140   K 3.6 3.9 4.2    103 104   CO2 25 24 26   BUN 19 23* 29*   CREATININE 1.1 1.0 1.1     Estimated Creatinine Clearance: 64 mL/min (based on SCr of 1.1 mg/dL). CBC:   Recent Labs     20  0500 20  0517 20  0600   WBC 7.1 6.5 6.9   HGB 9.8* 10.3* 9.2*   HCT 30.4* 31.3* 28.5*   MCV 81.3 80.8 81.7   * 112* 110*     Thyroid:   Lab Results   Component Value Date    TSH 1.51 2016    C6LBIOD 0.89 2014     Lipids:   Lab Results   Component Value Date    CHOL 98 2019    HDL 38 2019    TRIG 70 2019     LFTS:   Lab Results   Component Value Date    ALT 7 2020    AST 8 2020    ALKPHOS 60 2020    PROT 5.3 2020    PROT 6.2 2012    AGRATIO 1.0 2020    BILITOT 0.4 2020     Cardiac Enzymes:   Lab Results   Component Value Date    CKTOTAL 168 2012    TROPONINI 0.04 2020    TROPONINI 0.110 2013    TROPONINI 0.110 2013     Coags:   Lab Results   Component Value Date    PROTIME 23.3 2020    PROTIME 25.7 2016    INR 1.99 2020       EC20  AF with RVR and PVCs    ECHO: 2019   Left ventricle - mildly dilated, borderline function with EF of 50%, inferolateral basal hypokinesis   Mitral valve - mild regurgitation, annular calcification   Aortic valve - bio valve well seated, moderate stenosis with mean gradient   of 34mmHg, trivial regurgitation   Aorta - root 4.0cm, ascending 4.4cm   Tricuspid valve - mild to moderate regurgitation with RVSP of 27mmHg    Echo: 20   -Left ventricular cavity size is mildly dilated.  There is mild concentric   left ventricular hypertrophy.   -Overall left ventricular systolic function appears severely reduced with an   ejection fraction in the 30% range.   -There Arthritis of knee 12/05/2013    Chronic combined systolic and diastolic CHF (congestive heart failure) ([facility] [address] 03/17/2013    S/P AVR (aortic valve replacement) 09/26/2012    Elevated troponin 07/29/2012    Cardiomyopathy ([facility] [address] 07/29/2012    Nonrheumatic aortic valve insufficiency 07/29/2012    Mitral regurgitation 07/29/2012    Microscopic hematuria 07/29/2012        Assessment and Plan:     1. Paroxysmal permanent Atrial Fibrillation  - Currently in atrial fibrillation, HR 90-110s. Periods of NSR with PACs   ~ Likely exacerbated by acute illness/infection   ~ Modest HR control acceptable given acute issues     -  Continue digoxin 125 mcg IV daily; switch to PO tomorrow    - Continue amiodarone gtt at 0.5 mg/min for now due to inability to take PO (Will switch to 400 mg QD x1 week, then 200 mg QD once able to take PO)    ~ AST 29, ALT 9 (1/28/20)   - Will consider adding low dose BB tomorrow if BP tolerates     - [id number] score: at least 5 (Age x2, HTN,  CHF, CAD) ; [id number] Vasc score and anticoagulation discussed. High risk for stroke and thromboembolism. Anticoagulation is recommended. Risk of bleeding was discussed. ~ Coumadin on hold for possible procedures; heparin gtt stopped for biopsy today    2. PVC, NSVT   - Noted on tele; asymptomatic   - No further NSVT, occasional PVCs   - Continue amiodarone gtt for now   - Keep K+ >4 and mag >2    3. Elevated INR   - Due to drug interaction and antibiotic interaction   - Resolved with Vitamin K   - Resume coumadin once procedure completed    4. Aortic Stenosis   - S/p AVR (2012)   - Moderate stenosis per echo (1/29/20)   - Followed by Dr. Reece Fernando    5. Sepsis, hypotension   - On ABX   - On midodrine and levophed gtt   - BP meds on hold    6.  Chronic systolic heart failure  - Appears slightly decompensated   ~ EF 30% per echo; previously 50%   ~ Scant RLL crackles and generalized edema, now on 2L of oxygen and weight trending up   - Stop IVF   - Start lasix 40

## 2020-02-03 NOTE — PROGRESS NOTES
date    Lymphadenopathy on CT Chest  - general surgery consulted  for possible excisional biopsy     Coagulopathy :supratherapeutic inr no bleeding at present  Probably due to antibiotics with warfarin  -on Vit K-rec. 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 Flow cytometry unremarkable. Blasts were less than 3%. Chromosomal study negative. FISH for MDS/myeloid disorder negative. Iron storage increased. Ringed sideroblasts not identified. Cody Longoria CNP  [facility]    Patient was seen with JENIFER this morning. Still feeling weak. Generalized edema. Hemoglobin and platelet count relatively stable. Coagulopathy. Excisional biopsy of the lymph node will be planned. Explained to the patient and his wife. Katrin Saha.  Adela Kong MD, vasiliy [facility]  Hematology and Oncology  [facility]  [phone]

## 2020-02-03 NOTE — PROGRESS NOTES
[facility]        Progress Note    Patient Name: Carlitos Rivera  MRN: [id number]  YOB: 1940  Date of Evaluation: 2/3/2020    Chief Complaint / Reason for Consult: Adenopathy    Subjective:  No acute events overnight  Denies abdominal pain; complains of left hip pain  No nausea or vomiting since NGT remove  Resting in bed at this time      Vital Signs:  Patient Vitals for the past 24 hrs:   BP Temp Temp src Pulse Resp SpO2   20 0500 -- 98.2 °F (36.8 °C) Temporal 98 15 --   20 0000 108/71 98.7 °F (37.1 °C) Temporal 96 17 99 %   20 2000 105/78 98.5 °F (36.9 °C) Temporal 102 19 100 %   20 1800 103/75 98.6 °F (37 °C) Temporal 85 16 94 %   20 1600 -- 98 °F (36.7 °C) Temporal 85 15 --   20 1500 111/84 -- -- -- -- 98 %   20 1400 114/86 98.6 °F (37 °C) Temporal 80 15 99 %   20 1300 95/66 -- -- -- -- 100 %   20 1200 99/71 98.8 °F (37.1 °C) Temporal 95 16 100 %   20 1100 103/77 -- -- -- -- 99 %   20 1000 89/66 98.6 °F (37 °C) Temporal 98 17 99 %      TEMPERATURE HISTORY 24H: Temp (24hrs), Av.5 °F (36.9 °C), Min:98 °F (36.7 °C), Max:98.8 °F (37.1 °C)    BLOOD PRESSURE HISTORY: Systolic (15YFR), EIS:552 , Min:89 , QUK:879    Diastolic (07BFX), UBT:53, Min:66, Max:86      Intake/Output:  I/O last 3 completed shifts: In: 1628.5 [I.V.:1628.5]  Out: 350 [Urine:350]  No intake/output data recorded.   Drain/tube Output:       Physical Exam:  General: awake, alert, oriented to  person, place, time  Cardiovascular:  irregular rhythm, tachycardic, no murmur noted  Lungs: clear to ausculation   Abdomen: soft, nontender, nondistended    Labs:  CBC:    Recent Labs     20  0500 20  0517 20  0600   WBC 7.1 6.5 6.9   HGB 9.8* 10.3* 9.2*   HCT 30.4* 31.3* 28.5*   * 112* 110*     BMP:    Recent Labs     20  0500 20  0517 20  0600    141 140   K 3.6 3.9 4.2    103 104   CO2 25 24 26   BUN 19 23* 29*   CREATININE 1.1 1.0 1.1   GLUCOSE 101* 121* 110*     Hepatic:   No results for input(s): AST, ALT, ALB, BILITOT, ALKPHOS in the last 72 hours. Amylase: No results for input(s): AMYLASE in the last 72 hours. Lipase: No results for input(s): LIPASE in the last 72 hours. Mag:      No results for input(s): MG in the last 72 hours. Phos:     No results for input(s): PHOS in the last 72 hours. Coags:   Recent Labs     02/01/20  0500 02/01/20  0508 02/02/20  0517 02/03/20  0600   INR 1.64*  --  1.67* 1.99*   APTT  --  53.0*  --   --        Cultures:  Anaerobic culture  No results for input(s): LABANAE in the last 72 hours. Blood culture  No results for input(s): BC in the last 72 hours. Blood culture 2  No results for input(s): BLOODCULT2 in the last 72 hours. Body fluid culture  No results for input(s): BLOODCULT2 in the last 72 hours. Surgical culture  No results for input(s): CXSURG in the last 72 hours. Fecal occult  No results for input(s): OCCULTBLDFEC in the last 72 hours. Gram stain  No results for input(s): LABGRAM in the last 72 hours. Stool culture 1  No results for input(s): CXST in the last 72 hours. Stool culture 2  No results for input(s): STOOLCULT2 in the last 72 hours. Urine culture  No results for input(s): LABURIN in the last 72 hours. Wound abscess  No results for input(s): WNDABS in the last 72 hours. Pathology:  No relevant pathology     Imaging:  I have personally reviewed the following films:    EXAMINATION:   CT OF THE ABDOMEN AND PELVIS WITHOUT CONTRAST 2/1/2020 12:16 pm       TECHNIQUE:   CT of the abdomen and pelvis was performed without the administration of   intravenous contrast. Multiplanar reformatted images are provided for review.    Dose modulation, iterative reconstruction, and/or weight based adjustment of   the mA/kV was utilized to reduce the radiation dose to as low as reasonably   achievable.     COMPARISON:   12/23/2013       HISTORY:   ORDERING SYSTEM PROVIDED HISTORY: Lymphadenopathy, nausea/vomiting   TECHNOLOGIST PROVIDED HISTORY:   Reason for exam:->Lymphadenopathy, nausea/vomiting   Additional Contrast?->Oral   Reason for Exam: Lymphadenopathy, nausea/vomiting   Acuity: Acute   Type of Exam: Initial       FINDINGS:   Lower Chest: Small bilateral pleural effusions are seen. Saqib Neighbours is adjacent   consolidation in the lung bases.  Aortic valve calcification is seen.  Heart   is enlarged.  NG tube is seen in the stomach       Organs: There is mild splenomegaly.       There is subtle lobular contour to the liver.  Hypodense nodule is seen in   the right hepatic lobe, similar to prior, measuring 1.6 cm, likely cyst.       Increased density seen within the gallbladder, either stones or sludge.  No   pancreatic calcification.       No hydronephrosis on right or left.       Stone seen in the right kidney measuring 1.6 cm.       GI/Bowel: Scattered colonic diverticula are seen.  No significant small or   large bowel distention noted.       Pelvis: Mildly prominent inguinal and iliac chain nodes are seen. Atherosclerotic change seen in the iliacs.  Iliac arteries are mildly dilated   and tortuous.  Edema and fluid is seen in the presacral space.       Postsurgical changes seen in the inguinal regions       Peritoneum/Retroperitoneum: Scattered mildly enlarged retroperitoneal nodes   are seen. Harjit Grapes left periaortic node measures 2.8 cm x 2.3 cm, previously   2.2 cm x 1.3 cm       A 2nd node in the periaortic region at the level of the renal hilum measures   1.8 cm x 2.1 cm, previously subcentimeter in size       Bones/Soft Tissues:  There is body wall anasarca.  Degenerative changes are   seen in the spine.  Degenerative changes are seen in the hips.           Impression   Increased retroperitoneal adenopathy compared to 2013 raising the question of   underlying lymphoma.       Mild body wall anasarca and

## 2020-02-03 NOTE — BRIEF OP NOTE
[facility] Lap[facility]  Brief Operative Note  Pt Name: Dg Lorenzana  CSN: [id number]  YOB: 1940    Date of Procedure: 2/3/2020    Pre-operative Diagnosis: Left inguinal adenopathy    Post-operative Diagnosis: same     Procedure: Excision of left inguinal lymph node    Surgeon(s):  Ruben Austin MD     Anesthesia:  MAC (sedation) with local anesthetic    Findings: Full note dictated, Dictation Job Number: [id number]    Estimated Blood Loss: less than 50  ml    Complications: None    Specimens: left inguinal lymph node  ID Type Source Tests Collected by Time Destination   A : A-LEFT INGUINAL LYMPH NODE BX Tissue Tissue SURGICAL PATHOLOGY Ruben Austin MD 2/3/2020 1208       Implants:  * No implants in log *    Drains: none   [REMOVED] NG/OG/NJ/NE Tube Nasogastric 18 fr Left nostril (Removed)   Surrounding Skin Dry; Intact 2/2/2020 12:00 PM   Securement device Yes 2/2/2020 12:00 PM   Status Clamped 2/2/2020 12:00 PM   Placement Verified by External Catheter Length 2/2/2020 12:00 PM   NG/OG/NJ/NE External Measurement (cm) 63 cm 2/2/2020 12:00 PM   Free Water Flush (mL) 250 mL 2/1/2020 10:51 AM   Output (mL) 0 ml 2/1/2020 10:00 PM       Condition: stable     Disposition and Post-operative plan: PACU, ICU     Rafael Berry MD, FACS  2/3/2020  12:53 PM

## 2020-02-03 NOTE — PROGRESS NOTES
Shift assessment. Patient awake in chair, alert and oriented, heparin and amio gtt infusing with NS. LUE PICC. Pt voids per urinal. O2 via NC at 2 Lpm. POC updated, call light in reach.

## 2020-02-03 NOTE — PROGRESS NOTES
Infectious Diseases   Progress Note      Admission Date: 1/28/2020  Hospital Day: Hospital Day: 7   Attending: Fabian Vieyra MD  Date of service: 2/3/2020     Chief complaint/ Reason for consult: The patient was seen today for the following:    · Sepsis with  tachypnea, tachycardia, hypotension, acute kidney injury  · Leukopenia, seems to be chronic  · Ethmoid sinusitis  · Bibasilar atelectasis versus pneumonia    Microbiology:        I have reviewed allavailable micro lab data and cultures    · Blood culture (2/2) - collected on 1/28/2020: In process  · Nasal MRSA screen, urine Legionella and pneumococcal antigen- collectedon 1/28/2020: Negative  · Urine culture  - collected on 1/28/2020: In process      Antibiotics and immunizations:       Current antibiotics: All antibiotics and their doses were reviewed by me    Recent Abx Admin                   doxycycline hyclate (VIBRA-TABS) tablet 100 mg (mg) 100 mg Given 02/02/20 8832                  Immunization History: All immunization history was reviewed by me today. Immunization History   Administered Date(s) Administered    Influenza 10/10/2012    Influenza Virus Vaccine 09/19/2013, 10/23/2014    Influenza, High Dose (Fluzone 65 yrs and older) 10/15/2015, 09/29/2016, 09/20/2017, 10/10/2018    Influenza, Triv, inactivated, subunit, adjuvanted, IM (Fluad 65 yrs and older) 10/08/2019    Pneumococcal Conjugate 13-valent ([id number]) 12/01/2015    Pneumococcal Conjugate 7-valent (Prevnar7) 11/19/2007    Pneumococcal Polysaccharide ([id number]) 11/19/2007, 11/07/2019    Tdap (Boostrix, Adacel) 10/12/2014    Zoster Live (Zostavax) 09/03/2013       Known drug allergies:      All allergies were reviewed and updated    Allergies   Allergen Reactions    Clindamycin/Lincomycin Rash    Levaquin [Levofloxacin]      Rash, swollen neck    Cefdinir Rash       Social history:     Social History:  All social andepidemiologic history was reviewed and updated by me continue p.o. doxycycline 100 mg every 12 hours  · He was started on doxycycline on January 30. If he remains afebrile, stop doxycycline after 1 week course  · Discussed the importance of antibiotic compliance  · Further work-up can be done as an outpatient. No objection for discharge from my standpoint whenever okay with other teams continue to watch for any worsening rash  · Continue to watch for new fever  · Continue to watch for any new diarrhea  · Surgical site care  · DVT prophylaxis  · Discussed all above with patient and RN      Drug Monitoring:    · Continue monitoring for antibiotic toxicity as follows CMP  · Continue to watch for following: new or worsening fever, new hypotension, hives, lip swelling and redness or purulence at vascular access sites. I/v access Management:    · Continue to monitor i.v access sites for erythema, induration, discharge or tenderness. · As always, continue efforts to minimize tubes/lines/drains as clinically appropriate to reduce chances of line associated infections. Patient education and counseling:        · The patient was educated in detail about the side-effects of various antibiotics and things to watch for like new rashes, lip swelling, severe reaction, worsening diarrhea, break through fever etc.  · Discussed patient's condition and what to expect. All of the patient's questions were addressed in a satisfactory manner and patient verbalized understanding all instructions. Doxycyline/minocycline related instructions: Take Doxycyline/minocycline with a full glass of water and stay upright or sitting up after taking it for 30 minutes as it can cause food pipe irritation (pill esophagitis). Use sunscreen when going in bright sun while on Doxycycline/minocycline as it can cause photosensitivity. Take 1 hour before or 2 hour after dairy, calcium, iron, magnesium, aluminum or zinc.      Thank you for involving me in the care of your patient.  I will continue Surgical History: All past surgical history was reviewed today. Past Surgical History:   Procedure Laterality Date    AORTIC VALVE REPLACEMENT  8/3/2012    moderate mitral regurg    CARDIAC SURGERY      COLONOSCOPY      CYST REMOVAL      chest    ENDOSCOPY, COLON, DIAGNOSTIC      INGUINAL HERNIA REPAIR      bilateral in [address]      both    OTHER SURGICAL HISTORY  8/2/12    excision right breast mass (lumpectomy)    SHOULDER ARTHROPLASTY Right 2/2/15    right reverse total shoulder arthroplasty    TOTAL KNEE ARTHROPLASTY Left 12/4/13    TOTAL KNEE ARTHROPLASTY Right 2/3/14    RIGHT TOTAL KNEE REPLACEMENT-BAR       UPPER GASTROINTESTINAL ENDOSCOPY  12/24/13       Family History: All family history was reviewed today. Problem Relation Age of Onset    Marfan Syndrome Brother     Heart Disease Brother     Stroke Sister     Diabetes Sister     Heart Disease Sister     Heart Disease Sister     Cancer Brother        Objective:       PHYSICAL EXAM:      Vitals:   Vitals:    02/02/20 2000 02/03/20 0000 02/03/20 0500 02/03/20 1203   BP: 105/78 108/71  (!) 98/55   Pulse: 102 96 98 121   Resp: 19 17 15 18   Temp: 98.5 °F (36.9 °C) 98.7 °F (37.1 °C) 98.2 °F (36.8 °C) 98 °F (36.7 °C)   TempSrc: Temporal Temporal Temporal Temporal   SpO2: 100% 99%  95%   Weight:    192 lb (87.1 kg)   Height:    6' 1\" (1.854 m)       Physical Exam  Vitals signs and nursing note reviewed. Constitutional:       Appearance: Normal appearance. He is well-developed. HENT:      Head: Normocephalic and atraumatic. Right Ear: External ear normal.      Left Ear: External ear normal.      Nose: Nose normal. No congestion or rhinorrhea. Mouth/Throat:      Mouth: Mucous membranes are moist.      Pharynx: No oropharyngeal exudate or posterior oropharyngeal erythema. Eyes:      General: No scleral icterus. Right eye: No discharge. Left eye: No discharge.       Conjunctiva/sclera: Conjunctivae normal.      Pupils: Pupils are equal, round, and reactive to light. Neck:      Musculoskeletal: Normal range of motion and neck supple. No neck rigidity or muscular tenderness. Cardiovascular:      Rate and Rhythm: Normal rate and regular rhythm. Pulses: Normal pulses. Heart sounds: No murmur. No friction rub. Pulmonary:      Effort: Pulmonary effort is normal. No respiratory distress. Breath sounds: Normal breath sounds. No stridor. No wheezing, rhonchi or rales. Abdominal:      General: Bowel sounds are normal.      Palpations: Abdomen is soft. Tenderness: There is no abdominal tenderness. There is no right CVA tenderness, left CVA tenderness, guarding or rebound. Musculoskeletal: Normal range of motion. General: No swelling or tenderness. Lymphadenopathy:      Cervical: No cervical adenopathy. Skin:     General: Skin is warm and dry. Coloration: Skin is not jaundiced. Findings: Rash present. No erythema. Comments: Resolving maculopapular rash on the body. Neurological:      General: No focal deficit present. Mental Status: He is alert and oriented to person, place, and time. Mental status is at baseline. Motor: No abnormal muscle tone. Psychiatric:         Mood and Affect: Mood normal.         Behavior: Behavior normal.         Thought Content: Thought content normal.         Lines: All vascular access sites are healthy with no local erythema, discharge or tenderness. Intake and output:    I/O last 3 completed shifts: In: 1628.5 [I.V.:1628.5]  Out: 350 [Urine:350]    Lab Data:   All available labs and old records have been reviewed by me.     CBC:  Recent Labs     02/01/20  0500 02/02/20  0517 02/03/20  0600   WBC 7.1 6.5 6.9   RBC 3.74* 3.87* 3.49*   HGB 9.8* 10.3* 9.2*   HCT 30.4* 31.3* 28.5*   * 112* 110*   MCV 81.3 80.8 81.7   MCH 26.3 26.5 26.4   MCHC 32.3 32.8 32.3   RDW 18.6* 18.6* 18.6*   NRBC  --   --  1*  1* BANDSPCT 3  --  2        BMP:  Recent Labs     02/01/20  0500 02/02/20  0517 02/03/20  0600    141 140   K 3.6 3.9 4.2    103 104   CO2 25 24 26   BUN 19 23* 29*   CREATININE 1.1 1.0 1.1   CALCIUM 7.3* 7.6* 7.4*   GLUCOSE 101* 121* 110*        Hepatic Function Panel:   Lab Results   Component Value Date    ALKPHOS 60 01/31/2020    ALT 7 01/31/2020    AST 8 01/31/2020    PROT 5.3 01/31/2020    PROT 6.2 11/28/2012    BILITOT 0.4 01/31/2020    BILIDIR <0.2 10/17/2016    IBILI see below 10/17/2016    LABALBU 2.6 01/31/2020       CPK:   Lab Results   Component Value Date    CKTOTAL 168 07/28/2012     ESR:   Lab Results   Component Value Date    SEDRATE 75 (H) 11/30/2012     CRP: No results found for: CRP        Imaging: All pertinent images and reports for the current visit were reviewed by me during this visit. CT ABDOMEN PELVIS WO CONTRAST Additional Contrast? Oral (via NGT)   Final Result   Increased retroperitoneal adenopathy compared to 2013 raising the question of   underlying lymphoma. Mild body wall anasarca and trace pelvic fluid suggesting fluid overload. Nonobstructing right renal stone         XR ABDOMEN FOR NG/OG/NE TUBE PLACEMENT   Final Result   The tip of the nasogastric tube is in good position. XR ABDOMEN (KUB) (SINGLE AP VIEW)   Final Result   Mild to moderate gaseous distention of small bowel in the mid abdomen,   compatible with ileus versus less likely obstruction. Consider further   evaluation with small bowel follow-through to better evaluate bowel motility. Calcifications within the right mid abdomen, likely representing renal   calculi as described on previous examinations. CT Chest WO Contrast   Final Result   1. Extensive lymphadenopathy as well as suspected mild splenomegaly,   suspicious for lymphoproliferative disorder.   New areas of nodular soft   tissue density in the left breast may represent associated intramammary lymph   nodes but could also represent subcutaneous involvement. 2. Trace bilateral pleural effusions with bilateral lower lobe passive   atelectasis. 3. Mild cardiomegaly status post aortic valve replacement. XR CHEST PORTABLE   Final Result   Stable cardiomegaly. No acute cardiac or pulmonary disease evident. XR CHEST PORTABLE   Final Result   1. Streaky bibasilar opacities likely reflect subsegmental atelectasis versus   scarring rather than an infectious/inflammatory process. 2. The visible lungs are without pneumothorax, pleural effusion or new focal   airspace opacity. 3. Stable enlargement of the cardiopericardial silhouette. CT Head WO Contrast   Final Result   No acute intracranial abnormality. Ethmoid sinusitis. Medications: All current and past medications were reviewed.      furosemide  40 mg Intravenous Daily    methylPREDNISolone  40 mg Intravenous Daily    midodrine  5 mg Oral TID WC    pantoprazole  40 mg Intravenous Daily    digoxin  125 mcg Intravenous Daily    doxycycline hyclate  100 mg Oral 2 times per day    sodium chloride flush  10 mL Intravenous 2 times per day    lactobacillus  1 capsule Oral Daily with breakfast    aspirin  81 mg Oral Daily    atorvastatin  10 mg Oral Daily        heparin (porcine) Stopped (02/03/20 0554)    amiodarone 0.5 mg/min (02/03/20 0836)    norepinephrine Stopped (01/31/20 7890)       lidocaine, morphine, lip balm petroleum free, promethazine, heparin (porcine), heparin (porcine), sodium chloride, sodium chloride flush, prochlorperazine, magnesium hydroxide, ondansetron, acetaminophen, diphenhydrAMINE      Problem list:       Patient Active Problem List   Diagnosis Code    Elevated troponin R79.89    Cardiomyopathy ([facility] [address] I42.9    Nonrheumatic aortic valve insufficiency I35.1    Mitral regurgitation I34.0    Microscopic hematuria R31.29    S/P AVR (aortic valve replacement) Z95.2    Chronic combined systolic and

## 2020-02-03 NOTE — PROGRESS NOTES
[facility]  Daily Progress Note    Admit Date:  1/28/2020     CC:  AFIB, AS, CHF, LA/MDS  Subj:  Today, doing fair. Increasing weight and sob. Obj:   /71   Pulse 98   Temp 98.2 °F (36.8 °C) (Temporal)   Resp 15   Ht 6' 1\" (1.854 m)   Wt 192 lb 4.8 oz (87.2 kg)   SpO2 99%   BMI 25.37 kg/m²       Intake/Output Summary (Last 24 hours) at 2/3/2020 1202  Last data filed at 2/2/2020 1500  Gross per 24 hour   Intake 1628.5 ml   Output 350 ml   Net 1278.5 ml     Gen Alert, coop, no distress Heart  irr irreg   Head NC, AT, no abnorm Abd  Soft, NT, +BS, no mass, no OM   Eyes PERRLA, conj/corn clear Ext  Ext nl, AT, no C/C, +edema   Nose Nares nl, no drain, NT Pulse decr   Throat Lips, mucosa, tongue nl Skin Color/text/turg nl, no rash/lesions   Neck S/S, TM, NT, no bruit/JVD Psych Nl mood and affect   Lung decr bases Lymph   No cervical or axillary LA   Ch wall NT, no deform Neuro  Nl gross M/S exam     Medications:    furosemide  40 mg Intravenous Daily    methylPREDNISolone  40 mg Intravenous Daily    midodrine  5 mg Oral TID WC    pantoprazole  40 mg Intravenous Daily    digoxin  125 mcg Intravenous Daily    doxycycline hyclate  100 mg Oral 2 times per day    sodium chloride flush  10 mL Intravenous 2 times per day    lactobacillus  1 capsule Oral Daily with breakfast    aspirin  81 mg Oral Daily    atorvastatin  10 mg Oral Daily      heparin (porcine) Stopped (02/03/20 0554)    amiodarone 0.5 mg/min (02/03/20 0836)    norepinephrine Stopped (01/31/20 2310)       Lab Data: Relevant and available CV data reviewed  CBC:   Recent Labs     02/01/20  0500 02/02/20  0517 02/03/20  0600   WBC 7.1 6.5 6.9   HGB 9.8* 10.3* 9.2*   * 112* 110*     BMP:    Recent Labs     02/01/20  0500 02/02/20  0517 02/03/20  0600    141 140   K 3.6 3.9 4.2   CO2 25 24 26   BUN 19 23* 29*   CREATININE 1.1 1.0 1.1     BNP:  No results for input(s): BNP in the last 72 hours.     Plan:  ~Afib  Rate

## 2020-02-03 NOTE — PROGRESS NOTES
Pulmonary & Critical Care Medicine ICU Progress Note      ASSESSMENT AND PLAN:     Fluid Overload with edema   Furosemide  Myelodysplastic Syndrome   Lymph node biopsy done today      DISPOSITION:   OK to transfer out of ICU   Will sign off when transferred out of ICU        REASON FOR VISIT:  Fluid overload      UPDATE:   Lymph node biopsy done today. CHIEF COMPLAINT:  swollen    HISTORY:  Generalized edema. REVIEW OF SYMPTOMS:  Cardiovascular:  No chest pain or palpitations. IV:   heparin (porcine) 18 Units/kg/hr (02/03/20 1556)    amiodarone 0.5 mg/min (02/03/20 0836)    norepinephrine Stopped (01/31/20 2310)     No intake or output data in the 24 hours ending 02/03/20 0294    MEDICATIONS:  Scheduled Meds:   furosemide  40 mg Intravenous Daily    methylPREDNISolone  40 mg Intravenous Daily    midodrine  5 mg Oral TID WC    pantoprazole  40 mg Intravenous Daily    digoxin  125 mcg Intravenous Daily    doxycycline hyclate  100 mg Oral 2 times per day    sodium chloride flush  10 mL Intravenous 2 times per day    lactobacillus  1 capsule Oral Daily with breakfast    aspirin  81 mg Oral Daily    atorvastatin  10 mg Oral Daily     PRN Meds:  lidocaine, morphine, lip balm petroleum free, promethazine, heparin (porcine), heparin (porcine), sodium chloride, sodium chloride flush, prochlorperazine, magnesium hydroxide, ondansetron, acetaminophen, diphenhydrAMINE      PHYSICAL EXAM:  Vital Signs: BP (!) 93/55   Pulse 79   Temp 97.2 °F (36.2 °C) (Temporal)   Resp 14   Ht 6' 1\" (1.854 m)   Wt 192 lb (87.1 kg)   SpO2 98%   BMI 25.33 kg/m²      Gen:   No distress. Breathing comfortably at rest.  Resp:   No accessory muscle use. No chest tenderness to palpation. Clear lungs. CV:   PMI is normal. No murmur or gallop. Edematous. Psych:  Awake and alert. Oriented. Normal mood.        LAB RESULTS:  CBC:   Recent Labs     02/01/20  0500 02/02/20  0517 02/03/20  0600   WBC 7.1 6.5 6.9

## 2020-02-04 NOTE — PROGRESS NOTES
Shift assessment. Patient awake in bed, alert and oriented. O2 via NC at 2 Lpm, patient voids per urinal. PICC LUE. Heparin and amiodarone infusing with NS at 25. Patient repositions self. Call light in reach.

## 2020-02-04 NOTE — PROGRESS NOTES
[facility]  Daily Progress Note    Admit Date:  1/28/2020     CC:  AFIB, AS, CHF, LA/MDS  Subj:  Today, doing fair. BP running 90s. Massively fluid overloaded. Obj:   BP (!) 82/54   Pulse 101   Temp 98.1 °F (36.7 °C) (Temporal)   Resp 28   Ht 6' 1\" (1.854 m)   Wt 198 lb 10.2 oz (90.1 kg)   SpO2 95%   BMI 26.21 kg/m²       Intake/Output Summary (Last 24 hours) at 2/4/2020 1514  Last data filed at 2/4/2020 1413  Gross per 24 hour   Intake 3159 ml   Output 1350 ml   Net 1809 ml     Gen Alert, coop, no distress Heart  irr irreg   Head NC, AT, no abnorm Abd  Soft, NT, +BS, no mass, no OM   Eyes PERRLA, conj/corn clear Ext  Ext nl, AT, no C/C, +edema   Nose Nares nl, no drain, NT Pulse decr   Throat Lips, mucosa, tongue nl Skin Color/text/turg nl, no rash/lesions   Neck S/S, TM, NT, no bruit/JVD Psych Nl mood and affect   Lung decr bases Lymph   No cervical or axillary LA   Ch wall NT, no deform Neuro  Nl gross M/S exam     Medications:    [START ON 2/5/2020] digoxin  125 mcg Oral Daily    warfarin  0.5 mg Oral Daily    [START ON 2/5/2020] predniSONE  20 mg Oral Daily    furosemide  40 mg Intravenous Daily    midodrine  5 mg Oral TID WC    doxycycline hyclate  100 mg Oral 2 times per day    sodium chloride flush  10 mL Intravenous 2 times per day    lactobacillus  1 capsule Oral Daily with breakfast    aspirin  81 mg Oral Daily    atorvastatin  10 mg Oral Daily      amiodarone 0.5 mg/min (02/04/20 1237)    norepinephrine Stopped (01/31/20 2310)       Lab Data: Relevant and available CV data reviewed  CBC:   Recent Labs     02/02/20 0517 02/03/20  0600 02/04/20  0520   WBC 6.5 6.9 5.7   HGB 10.3* 9.2* 8.2*   * 110* 105*     BMP:    Recent Labs     02/02/20 0517 02/03/20  0600 02/04/20  0520    140 142   K 3.9 4.2 4.5   CO2 24 26 25   BUN 23* 29* 35*   CREATININE 1.0 1.1 1.3     BNP:  No results for input(s): BNP in the last 72 hours.     Plan:  ~Afib  Rate controlled, on

## 2020-02-04 NOTE — CARE COORDINATION
Chart reviewed for discharge planning. Barrier(s) to discharge-  Amio gtt, Hep gtt    Tentative discharge plan- home w/hhc    Tentative discharge date-TBD    Plan is for home if he is able to get up his feet. Referral sent to [facility] as a back up, although I'm not sure they are in network with insurance. *Case management will continue to follow progress and update discharge plan as needed.     Dionne Grant RN BSN  Case Management  [phone]

## 2020-02-04 NOTE — PROGRESS NOTES
CREATININE 1.0 1.1 1.3   GLUCOSE 121* 110* 131*     Hepatic:   No results for input(s): AST, ALT, ALB, BILITOT, ALKPHOS in the last 72 hours. Discussed care with family and patient             Spent 30  minutes with patient and family at bedside and on unit reviewing medical records and labs, spent greater than 50% time counseling patient and family on diagnosis and plan   Problem List  Active Problems:    Elevated troponin    Cardiomyopathy ([facility] [address]    Mitral regurgitation    S/P AVR (aortic valve replacement)    Chronic combined systolic and diastolic CHF (congestive heart failure) ([facility] [address]    Encounter for medication counseling    Hyponatremia    Hypercholesteremia    PAF (paroxysmal atrial fibrillation) ([facility] [address]    Essential hypertension    [facility]eps[facility]    Tobacco abuse counseling    Lymphadenopathy    Hypervolemia  Resolved Problems:    * No resolved hospital problems. *       Assessment & Plan:   1. Severe sepsis  -Unclear source likely is flulike symptoms  -Culture still negative.  -Patient is on p.o. doxycycline now. -Appreciate ID recommendation    2. Supratherapeutic INR-now resolved. -His INR now 2.1.  -Heparin drip to be stopped will resume p.o. Coumadin today monitor INR      A. fib with RVR  Heart rate still not controlled  On amiodarone  -Likely secondary to sepsis  -Further recommendation per EP cardiology    Lymphadenopathy  -Patient underwent biopsy further rec per surgery      Rash  -Noted on body for patient  -Proving. Switch to p.o. steroids now    Chronic combined CHF-volume overload from all IV fluids he got  -Continue IV Lasix for now  -Monitor blood pressure and renal function hopefully can switch to home dosing          Diet: DIET CARDIAC;   Dietary Nutrition Supplements: Standard High Calorie Oral Supplement  Code:Full Code  DVT PPX lovenox   Continue ICU care for now      Chandler Goldmann, MD   2/4/2020 12:14 PM

## 2020-02-04 NOTE — PROGRESS NOTES
Pulmonary & Critical Care Medicine ICU Progress Note      ASSESSMENT AND PLAN:     Fluid Overload with edema   Furosemide  Myelodysplastic Syndrome   Lymph node biopsy done yesterday  Atrial Fibrillation  · Cardiology gave OK to resume Warfarin  · INR is greater than 2 so Heparin infusion stopped  · Order for Pharmacy to manage Warfarin entered in chart    DISPOSITION:   OK to transfer out of ICU  Will sign off now        REASON FOR VISIT:  Fluid overload      UPDATE:   No new problems. CHIEF COMPLAINT:  swelling    HISTORY:  Generalized edema. REVIEW OF SYMPTOMS:  Cardiovascular:  No chest pain or palpitations. IV:   amiodarone 0.5 mg/min (02/04/20 1237)    norepinephrine Stopped (01/31/20 2310)       Intake/Output Summary (Last 24 hours) at 2/4/2020 1249  Last data filed at 2/4/2020 0941  Gross per 24 hour   Intake 2592 ml   Output 1050 ml   Net 1542 ml       MEDICATIONS:  Scheduled Meds:   [START ON 2/5/2020] digoxin  125 mcg Oral Daily    warfarin  0.5 mg Oral Daily    [START ON 2/5/2020] predniSONE  20 mg Oral Daily    furosemide  40 mg Intravenous Daily    midodrine  5 mg Oral TID WC    doxycycline hyclate  100 mg Oral 2 times per day    sodium chloride flush  10 mL Intravenous 2 times per day    lactobacillus  1 capsule Oral Daily with breakfast    aspirin  81 mg Oral Daily    atorvastatin  10 mg Oral Daily     PRN Meds:  ondansetron, morphine **OR** morphine, oxyCODONE **OR** oxyCODONE, acetaminophen, lidocaine, lip balm petroleum free, promethazine, sodium chloride, sodium chloride flush, prochlorperazine, magnesium hydroxide, diphenhydrAMINE      PHYSICAL EXAM:  Vital Signs: BP (!) 82/54   Pulse 101   Temp 98.7 °F (37.1 °C) (Temporal)   Resp 28   Ht 6' 1\" (1.854 m)   Wt 198 lb 10.2 oz (90.1 kg)   SpO2 95%   BMI 26.21 kg/m²      Gen:   No distress. Breathing comfortably at rest.  Resp:   No accessory muscle use. No wheezing.      CV:   PMI is normal. No murmur or

## 2020-02-04 NOTE — PROGRESS NOTES
[address]   Electrophysiology Progress Note     Date: 2/4/2020  Admit Date: 1/28/2020     Reason for follow up: Atrial fibrillation    Chief Complaint:   Chief Complaint   Patient presents with    Sinusitis     pt arrived via squad. sore throat and sinus infection for a couple of months. pts doctor concerned for septic, low BP and low immune system. History of Present Illness: History obtained from patient and medical record. Zak Bolivar is a 70 y.o. male with a past medical history of HTN, HLD, microthrombus, AS s/p AVR, and atrial fibrillation. He has history of afib s/p OHS, but he had no known recurrence and his AC was stopped. He was recently restarted on warfarin for concern of microthrombus. He presented with flu-like symptoms and  Is being treated for sepsis, lymphadenopathy s/p biopsy, rash and CHF. Interval Hx: Today, he is being seen for follow up. He remains in afib HR 70's-100's. He did have afib in the past after his OHS, and did not have any known recurrence until now. He reports significant extremity and scrotal edema. He has some generalized weakness, but is getting out of bed up to chair and occasionally ambulating in girard. Denies having chest pain, palpitations, shortness of breath, orthopnea/PND, cough, or dizziness. Patient seen and examined. Clinical notes reviewed. Telemetry reviewed. Allergies: Allergies   Allergen Reactions    Clindamycin/Lincomycin Rash    Levaquin [Levofloxacin]      Rash, swollen neck    Cefdinir Rash       Home Meds:  Prior to Visit Medications    Medication Sig Taking?  Authorizing Provider   amoxicillin-clavulanate (AUGMENTIN) 875-125 MG per tablet Take 1 tablet by mouth 2 times daily for 21 days Yes JEANIE Garcia CNP   Lactobacillus (PROBIOTIC ACIDOPHILUS) TABS Take 1 tablet by mouth 2 times daily for 21 days Yes JEANIE Garcia CNP   midodrine (PROAMATINE) 2.5 MG tablet TAKE 1 TABLET BY MOUTH Diagnosis Date    Aortic insufficiency     Atrial fibrillation [facility])     cardioversion 8/10/12    Breast nodule 8/2012  right    excision-lumpectomy 8/2012    CAD (coronary artery disease)     Cardiomyopathy ([facility] [address] 8/2012    EF 20 %    CHF (congestive heart failure) (HCC)     Hyperlipidemia     Hypertension     Microscopic hematuria 7/29/2012    Mitral regurgitation     Nausea & vomiting 7/29/2012    Osteoarthritis of knee     bilateral knees    Pneumonia     Rotator cuff tear 7/2/2013    Ventricular ectopy 7/29/2012      Past Surgical History:    has a past surgical history that includes Inguinal hernia repair; other surgical history (8/2/12); Aortic valve replacement (8/3/2012); Cardiac surgery; Colonoscopy; Endoscopy, colon, diagnostic; Total knee arthroplasty (Left, 12/4/13); Upper gastrointestinal endoscopy (12/24/13); Total knee arthroplasty (Right, 2/3/14); joint replacement; cyst removal; Total shoulder arthroplasty (Right, 2/2/15); and lymph node biopsy (Left, 2/3/2020). Social History:  Reviewed. reports that he quit smoking about 49 years ago. He has a 40.00 pack-year smoking history. He has never used smokeless tobacco. He reports current alcohol use of about 2.0 standard drinks of alcohol per week. He reports that he does not use drugs. Family History:  Reviewed. family history includes Cancer in his brother; Diabetes in his sister; Heart Disease in his brother, sister, and sister; Marfan Syndrome in his brother; Stroke in his sister. Denies family history of sudden cardiac death, arrhythmia, premature CAD    Review of Systems:  · Constitutional: Negative for fever, night sweats, chills.  + weight gain, + weakness  · Skin: Negative for rash, dry skin, pruritus, bruising, bleeding, blood clots, or changes in skin pigment  · HEENT: Negative for vision changes, ringing in the ears, sore throat, dysphagia, or swollen lymph nodes  · Respiratory: Reviewed in HPI  · Cardiovascular: part of this visit    Labs:    BMP:   Recent Labs     20  0517 20  0600 20  0520    140 142   K 3.9 4.2 4.5    104 106   CO2 24 26 25   BUN 23* 29* 35*   CREATININE 1.0 1.1 1.3     Estimated Creatinine Clearance: 54 mL/min (based on SCr of 1.3 mg/dL). CBC:   Recent Labs     20  0520  0620  0520   WBC 6.5 6.9 5.7   HGB 10.3* 9.2* 8.2*   HCT 31.3* 28.5* 25.4*   MCV 80.8 81.7 81.8   * 110* 105*     Thyroid:   Lab Results   Component Value Date    TSH 1.51 2016    E1QICZL 0.89 2014     Lipids:   Lab Results   Component Value Date    CHOL 98 2019    HDL 38 2019    TRIG 70 2019     LFTS:   Lab Results   Component Value Date    ALT 7 2020    AST 8 2020    ALKPHOS 60 2020    PROT 5.3 2020    PROT 6.2 2012    AGRATIO 1.0 2020    BILITOT 0.4 2020     Cardiac Enzymes:   Lab Results   Component Value Date    CKTOTAL 168 2012    TROPONINI 0.04 2020    TROPONINI 0.110 2013    TROPONINI 0.110 2013     Coags:   Lab Results   Component Value Date    PROTIME 24.6 2020    PROTIME 25.7 2016    INR 2.10 2020     EC20 afib RVR w/ PACs    ECHO: 20   Summary   -Left ventricular cavity size is mildly dilated. There is mild concentric   left ventricular hypertrophy.   -Overall left ventricular systolic function appears severely reduced with an   ejection fraction in the 30% range.   -There is severe hypokinesis of the apical wall and moderate hypokinesis of   the inferior walls.   -Indeterminate diastolic function due to irregular heart rhythm. -The mitral valve leaflets appear myxomatous. -Mild to moderate mitral regurgitation.   -A bioprosthetic artificial aortic valve appears well seated with a maximum   velocity of 3.3m/s and a mean gradient of 26mmHg. This suggests at least   moderate aortic stenosis.    -Trivial perivalvular aortic

## 2020-02-04 NOTE — PROGRESS NOTES
Infectious Diseases   Progress Note      Admission Date: 1/28/2020  Hospital Day: Hospital Day: 8   Attending: Chandler Goldmann, MD  Date of service: 2/4/2020     Chief complaint/ Reason for consult: The patient was seen today for the following:    · Sepsis with  tachypnea, tachycardia, hypotension, acute kidney injury  · Leukopenia, seems to be chronic  · Ethmoid sinusitis  · Bibasilar atelectasis versus pneumonia    Microbiology:        I have reviewed allavailable micro lab data and cultures    · Blood culture (2/2) - collected on 1/28/2020: In process  · Nasal MRSA screen, urine Legionella and pneumococcal antigen- collectedon 1/28/2020: Negative  · Urine culture  - collected on 1/28/2020: In process      Antibiotics and immunizations:       Current antibiotics: All antibiotics and their doses were reviewed by me    Recent Abx Admin                   doxycycline hyclate (VIBRA-TABS) tablet 100 mg (mg) 100 mg Given 02/04/20 0826     100 mg Given 02/03/20 2223                  Immunization History: All immunization history was reviewed by me today. Immunization History   Administered Date(s) Administered    Influenza 10/10/2012    Influenza Virus Vaccine 09/19/2013, 10/23/2014    Influenza, High Dose (Fluzone 65 yrs and older) 10/15/2015, 09/29/2016, 09/20/2017, 10/10/2018    Influenza, Triv, inactivated, subunit, adjuvanted, IM (Fluad 65 yrs and older) 10/08/2019    Pneumococcal Conjugate 13-valent (Ylpurwq40) 12/01/2015    Pneumococcal Conjugate 7-valent (Prevnar7) 11/19/2007    Pneumococcal Polysaccharide ([id number]) 11/19/2007, 11/07/2019    Tdap (Boostrix, Adacel) 10/12/2014    Zoster Live (Zostavax) 09/03/2013       Known drug allergies:      All allergies were reviewed and updated    Allergies   Allergen Reactions    Clindamycin/Lincomycin Rash    Levaquin [Levofloxacin]      Rash, swollen neck    Cefdinir Rash       Social history:     Social History:  All social andepidemiologic history tomorrow  · Continue to watch for new fever  · Continue to watch for any new diarrhea  · Discussed all above with patient and RN  · Discussed with patient wife at bedside    Drug Monitoring:    · Continue monitoring for antibiotic toxicity as follows: CMP  · Continue to watch for following: new or worsening fever, new hypotension, hives, lip swelling and redness or purulence at vascular access sites. I/v access Management:    · Continue to monitor i.v access sites for erythema, induration, discharge or tenderness. · As always, continue efforts to minimize tubes/lines/drains as clinically appropriate to reduce chances of line associated infections. Patient education and counseling:       · The patient was educated in detail about the side-effects of various antibiotics and things to watch for like new rashes, lip swelling, severe reaction, worsening diarrhea, break through fever etc.  · Discussed patient's condition and what to expect. All of the patient's questions were addressed in a satisfactory manner and patient verbalized understanding all instructions. Smoking cessation/ Tobacco use disorder counseling:    I have counseled the patient extensively about risks of smoking and have encouraged the patient to maintain a healthy smoke-free lifestyle. Information was given about various smoking cessation programs and their websites like www.smokefree.gov, [place]. quitlogix. org as well as help lines like 1-800-QUIT-NOW and 1-800-LUNG-[place]. TIME SPENT TODAY:     - Spent over  25  minutes on visit (including interval history, physical exam, review of data including labs, cultures, imaging, development and implementation of treatment plan and coordination of complex care). - Over 50% of time spent with patient face to face on counseling and education. Thanks for allowing me to participate in your patient's care.  I will sign off today, but will be available to answer any further questions or concerns that moderate mitral regurg    CARDIAC SURGERY      COLONOSCOPY      CYST REMOVAL      chest    ENDOSCOPY, COLON, DIAGNOSTIC      INGUINAL HERNIA REPAIR      bilateral in [address]      both    LYMPH NODE BIOPSY Left 2/3/2020    INGUINAL LYMPH NODE BIOPSY EXCISION performed by Rosemary Daniel MD at [address] [facility],[address]  8/2/12    excision right breast mass (lumpectomy)    SHOULDER ARTHROPLASTY Right 2/2/15    right reverse total shoulder arthroplasty    TOTAL KNEE ARTHROPLASTY Left 12/4/13    TOTAL KNEE ARTHROPLASTY Right 2/3/14    RIGHT TOTAL KNEE REPLACEMENT-BAR       UPPER GASTROINTESTINAL ENDOSCOPY  12/24/13       Family History: All family history was reviewed today. Problem Relation Age of Onset    Marfan Syndrome Brother     Heart Disease Brother     Stroke Sister     Diabetes Sister     Heart Disease Sister     Heart Disease Sister     Cancer Brother        Objective:       PHYSICAL EXAM:      Vitals:   Vitals:    02/04/20 0827 02/04/20 0900 02/04/20 0941 02/04/20 1240   BP: 90/69   (!) 82/54   Pulse: 114   101   Resp: 19   28   Temp: 98.7 °F (37.1 °C)   98.1 °F (36.7 °C)   TempSrc: Temporal   Temporal   SpO2: 100% 97% 95% 95%   Weight:       Height:           Physical Exam  Vitals signs and nursing note reviewed. Constitutional:       Appearance: Normal appearance. He is well-developed. HENT:      Head: Normocephalic and atraumatic. Right Ear: External ear normal.      Left Ear: External ear normal.      Nose: Nose normal. No congestion or rhinorrhea. Mouth/Throat:      Mouth: Mucous membranes are moist.      Pharynx: No oropharyngeal exudate or posterior oropharyngeal erythema. Eyes:      General: No scleral icterus. Right eye: No discharge. Left eye: No discharge. Conjunctiva/sclera: Conjunctivae normal.      Pupils: Pupils are equal, round, and reactive to light.    Neck:      Musculoskeletal: Normal range of motion and neck supple. No neck rigidity or muscular tenderness. Cardiovascular:      Rate and Rhythm: Normal rate and regular rhythm. Pulses: Normal pulses. Heart sounds: No murmur. No friction rub. Pulmonary:      Effort: Pulmonary effort is normal. No respiratory distress. Breath sounds: Normal breath sounds. No stridor. No wheezing, rhonchi or rales. Abdominal:      General: Bowel sounds are normal.      Palpations: Abdomen is soft. Tenderness: There is no abdominal tenderness. There is no right CVA tenderness, left CVA tenderness, guarding or rebound. Musculoskeletal: Normal range of motion. General: No swelling or tenderness. Lymphadenopathy:      Cervical: No cervical adenopathy. Skin:     General: Skin is warm and dry. Coloration: Skin is not jaundiced. Findings: No erythema or rash. Neurological:      General: No focal deficit present. Mental Status: He is alert and oriented to person, place, and time. Mental status is at baseline. Motor: No abnormal muscle tone. Psychiatric:         Mood and Affect: Mood normal.         Behavior: Behavior normal.         Thought Content: Thought content normal.         Lines: All vascular access sites are healthy with no local erythema, discharge or tenderness. Intake and output:    I/O last 3 completed shifts: In: 3159 [P.O.:360; I.V.:2799]  Out: 1350 [Urine:1350]    Lab Data:   All available labs and old records have been reviewed by me.     CBC:  Recent Labs     02/02/20 0517 02/03/20  0600 02/04/20  0520   WBC 6.5 6.9 5.7   RBC 3.87* 3.49* 3.10*   HGB 10.3* 9.2* 8.2*   HCT 31.3* 28.5* 25.4*   * 110* 105*   MCV 80.8 81.7 81.8   MCH 26.5 26.4 26.5   MCHC 32.8 32.3 32.3   RDW 18.6* 18.6* 19.3*   NRBC  --  1*  1*  --    BANDSPCT  --  2 3        BMP:  Recent Labs     02/02/20 0517 02/03/20  0600 02/04/20  0520    140 142   K 3.9 4.2 4.5    104 106   CO2 24 26 25   BUN 23* 29* 35*   CREATININE 1.0 1.1 1.3   CALCIUM 7.6* 7.4* 7.4*   GLUCOSE 121* 110* 131*        Hepatic Function Panel:   Lab Results   Component Value Date    ALKPHOS 60 01/31/2020    ALT 7 01/31/2020    AST 8 01/31/2020    PROT 5.3 01/31/2020    PROT 6.2 11/28/2012    BILITOT 0.4 01/31/2020    BILIDIR <0.2 10/17/2016    IBILI see below 10/17/2016    LABALBU 2.6 01/31/2020       CPK:   Lab Results   Component Value Date    CKTOTAL 168 07/28/2012     ESR:   Lab Results   Component Value Date    SEDRATE 75 (H) 11/30/2012     CRP: No results found for: CRP        Imaging: All pertinent images and reports for the current visit were reviewed by me during this visit. CT ABDOMEN PELVIS WO CONTRAST Additional Contrast? Oral (via NGT)   Final Result   Increased retroperitoneal adenopathy compared to 2013 raising the question of   underlying lymphoma. Mild body wall anasarca and trace pelvic fluid suggesting fluid overload. Nonobstructing right renal stone         XR ABDOMEN FOR NG/OG/NE TUBE PLACEMENT   Final Result   The tip of the nasogastric tube is in good position. XR ABDOMEN (KUB) (SINGLE AP VIEW)   Final Result   Mild to moderate gaseous distention of small bowel in the mid abdomen,   compatible with ileus versus less likely obstruction. Consider further   evaluation with small bowel follow-through to better evaluate bowel motility. Calcifications within the right mid abdomen, likely representing renal   calculi as described on previous examinations. CT Chest WO Contrast   Final Result   1. Extensive lymphadenopathy as well as suspected mild splenomegaly,   suspicious for lymphoproliferative disorder. New areas of nodular soft   tissue density in the left breast may represent associated intramammary lymph   nodes but could also represent subcutaneous involvement. 2. Trace bilateral pleural effusions with bilateral lower lobe passive   atelectasis.    3. Mild cardiomegaly status post aortic valve

## 2020-02-04 NOTE — PROGRESS NOTES
pelvis was performed without the administration of intravenous contrast. Multiplanar reformatted images are provided for review. Dose modulation, iterative reconstruction, and/or weight based adjustment of the mA/kV was utilized to reduce the radiation dose to as low as reasonably achievable. COMPARISON: 12/23/2013 HISTORY: ORDERING SYSTEM PROVIDED HISTORY: Lymphadenopathy, nausea/vomiting TECHNOLOGIST PROVIDED HISTORY: Reason for exam:->Lymphadenopathy, nausea/vomiting Additional Contrast?->Oral Reason for Exam: Lymphadenopathy, nausea/vomiting Acuity: Acute Type of Exam: Initial FINDINGS: Lower Chest: Small bilateral pleural effusions are seen. There is adjacent consolidation in the lung bases. Aortic valve calcification is seen. Heart is enlarged. NG tube is seen in the stomach Organs: There is mild splenomegaly. There is subtle lobular contour to the liver. Hypodense nodule is seen in the right hepatic lobe, similar to prior, measuring 1.6 cm, likely cyst. Increased density seen within the gallbladder, either stones or sludge. No pancreatic calcification. No hydronephrosis on right or left. Stone seen in the right kidney measuring 1.6 cm. GI/Bowel: Scattered colonic diverticula are seen. No significant small or large bowel distention noted. Pelvis: Mildly prominent inguinal and iliac chain nodes are seen. Atherosclerotic change seen in the iliacs. Iliac arteries are mildly dilated and tortuous. Edema and fluid is seen in the presacral space. Postsurgical changes seen in the inguinal regions Peritoneum/Retroperitoneum: Scattered mildly enlarged retroperitoneal nodes are seen. .  Index left periaortic node measures 2.8 cm x 2.3 cm, previously 2.2 cm x 1.3 cm A 2nd node in the periaortic region at the level of the renal hilum measures 1.8 cm x 2.1 cm, previously subcentimeter in size Bones/Soft Tissues: There is body wall anasarca. Degenerative changes are seen in the spine.   Degenerative changes are seen to shower  4. PRN analgesics and antiemetics--minimizing narcotics as tolerated  5. Okay to anticoagulate from a surgical perspective  6. Management of medical comorbid etiologies per primary team and consulting services    EDUCATION:  Educated patient on plan of care and disease process--all questions answered. Lafe Kobus Schirmer is stable from surgical standpoint. Will follow as needed. Please call with questions or concerns. Available for a port-a-cath if warranted. Thank you for allowing us to participate in [address] [place] [address]. Plans discussed with patient and nursing. Reviewed and discussed with Dr. Alisson Spencer. Signed:  JEANIE Woods - CNP  2/4/2020 9:03 AM    I have personally performed a face to face diagnostic evaluation on this patient. I have interviewed and examined the patient and I agree with the assessment above. In summary, my findings and plan are the following:   Mr. Neal Hare is a 70 y.o. male who presents with   Adenopathy  Atrial fibrillation with RVR  History of mechanical heart valve requiring anticoagulation  Supratherapeutic INR  CHF  Hypotension  Acute renal failure  Ileus     Plan:  1. Left inguinal incision clean dry and intact, no sign of hematoma or complication. No wound care necessary. May bathe from surgical standpoint  2. No activity restrictions from surgical standpoint  3. Pathology pending, if malignant and port needed, will be available  4. May anticoagulate from surgical standpoint  5. Defer management of remainder of medical comorbidities to primary and consulting teams  6. Will follow peripherally, please call with questions    Armen Regan.  Alisson Spencer MD, FACS  2/4/2020  1:37 PM

## 2020-02-04 NOTE — PROGRESS NOTES
Oncology and Hematology Care   Progress Note      2/4/2020 8:47 AM        Name: Alireza Lyle Schirmer . Admitted: 1/28/2020    SUBJECTIVE:  Doing okay. Had lymph node biopsy yesterday. No pain at site. No bleeding. Afebrile.      Reviewed interval ancillary notes    Current Medications  furosemide (LASIX) injection 40 mg, Daily  sodium chloride flush 0.9 % injection 10 mL, 2 times per day  sodium chloride flush 0.9 % injection 10 mL, PRN  ondansetron (ZOFRAN) injection 4 mg, Q6H PRN  morphine (PF) injection 2 mg, Q2H PRN    Or  morphine injection 4 mg, Q2H PRN  oxyCODONE (ROXICODONE) immediate release tablet 5 mg, Q4H PRN    Or  oxyCODONE (ROXICODONE) immediate release tablet 10 mg, Q4H PRN  acetaminophen (TYLENOL) tablet 650 mg, Q4H PRN  lidocaine 4 % external patch 1 patch, Daily PRN  methylPREDNISolone sodium (SOLU-MEDROL) injection 40 mg, Daily  lip balm petroleum free (PHYTOPLEX) stick, PRN  midodrine (PROAMATINE) tablet 5 mg, TID WC  promethazine (PHENERGAN) injection 12.5 mg, Q6H PRN  pantoprazole (PROTONIX) injection 40 mg, Daily  heparin (porcine) injection 6,750 Units, PRN  heparin (porcine) injection 3,380 Units, PRN  heparin 25,000 units in dextrose 5% 250 mL infusion, Continuous  digoxin (LANOXIN) injection 125 mcg, Daily  amiodarone (CORDARONE) 450 mg in sodium chloride 0.9 % 250 mL infusion, Continuous  doxycycline hyclate (VIBRA-TABS) tablet 100 mg, 2 times per day  sodium chloride (OCEAN, BABY AYR) 0.65 % nasal spray 1 spray, PRN  sodium chloride flush 0.9 % injection 10 mL, 2 times per day  sodium chloride flush 0.9 % injection 10 mL, PRN  prochlorperazine (COMPAZINE) injection 10 mg, Q6H PRN  lactobacillus (CULTURELLE) capsule 1 capsule, Daily with breakfast  aspirin chewable tablet 81 mg, Daily  magnesium hydroxide (MILK OF MAGNESIA) 400 MG/5ML suspension 30 mL, Daily PRN  norepinephrine (LEVOPHED) 16 mg in dextrose 5% 250 mL infusion, Continuous  atorvastatin (LIPITOR) tablet 10 mg, [facility])    Essential hypertension    Sepsis ([facility] [address]    Tobacco abuse counseling    Lymphadenopathy    Hypervolemia  Resolved Problems:    * No resolved hospital problems. *      Sepsis syndrome patient has had fever despite treatment with cefdinir and Augmentin. Had allergic reaction to levaquin 1/29/2020. Tested positive for strep in pcp office. Blood cultures from 1/28/2020, no growth to date.     Lymphadenopathy on CT Chest  - Had excisional inguinal lymph node biopsy on 2/3/2020, pathology pending.      Coagulopathy: Supratherapeutic INR, no bleeding at present. Probably due to antibiotics with warfarin.  - Has received Vitamin K.   - Currently on heparin drip. - INR 2.1 today. 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 Laine Lieberman. Counts stable.      MDS, IPSS intermediate risk-1. 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 Flow cytometry unremarkable. Blasts were less than 3%. Chromosomal study negative. FISH for MDS/myeloid disorder negative. Iron storage increased. Ringed sideroblasts not identified.        Diaz Arzate, [address] [facility]  Oncology Hematology [address] [facility].  [phone]      Patient remains unstable hypotensive with arrythmias.  Lymph node biopsy results pending

## 2020-02-04 NOTE — PROGRESS NOTES
Assessment completed, alert and oriented x 4, assisted up to chair with walker, gait steady but weak. Chair alarm in place. Heparin gtt restarted at 15 units/kg/hr and amiodarone gtt infusing at 0.5 mg/min both via left upper arm PICC, site unremarkable. Oxygen at 2L/NC, weaning to off see flow sheet for titration. Denies complaints of pain or discomfort at this time. Rash continues over entire body.

## 2020-02-05 NOTE — PROGRESS NOTES
hydroxide, diphenhydrAMINE     Past Medical History:  Past Medical History:   Diagnosis Date    Aortic insufficiency     Atrial fibrillation (HCC)     cardioversion 8/10/12    Breast nodule 8/2012  right    excision-lumpectomy 8/2012    CAD (coronary artery disease)     Cardiomyopathy ([facility] [address] 8/2012    EF 20 %    CHF (congestive heart failure) (HCC)     Hyperlipidemia     Hypertension     Microscopic hematuria 7/29/2012    Mitral regurgitation     Nausea & vomiting 7/29/2012    Osteoarthritis of knee     bilateral knees    Pneumonia     Rotator cuff tear 7/2/2013    Ventricular ectopy 7/29/2012      Past Surgical History:    has a past surgical history that includes Inguinal hernia repair; other surgical history (8/2/12); Aortic valve replacement (8/3/2012); Cardiac surgery; Colonoscopy; Endoscopy, colon, diagnostic; Total knee arthroplasty (Left, 12/4/13); Upper gastrointestinal endoscopy (12/24/13); Total knee arthroplasty (Right, 2/3/14); joint replacement; cyst removal; Total shoulder arthroplasty (Right, 2/2/15); and lymph node biopsy (Left, 2/3/2020). Social History:  Reviewed. reports that he quit smoking about 49 years ago. He has a 40.00 pack-year smoking history. He has never used smokeless tobacco. He reports current alcohol use of about 2.0 standard drinks of alcohol per week. He reports that he does not use drugs. Family History:  Reviewed. family history includes Cancer in his brother; Diabetes in his sister; Heart Disease in his brother, sister, and sister; Marfan Syndrome in his brother; Stroke in his sister. Denies family history of sudden cardiac death, arrhythmia, premature CAD    Review of Systems:  · Constitutional: Negative for fever, night sweats, chills.  + weight gain, + weakness  · Skin: Negative for rash, dry skin, pruritus, bruising, bleeding, blood clots, or changes in skin pigment  · HEENT: Negative for vision changes, ringing in the ears, sore throat, dysphagia, mood    Pertinent labs, diagnostic, device, and imaging results reviewed as a part of this visit    Labs:    BMP:   Recent Labs     20  0600 20  0520 20  0645    142 143   K 4.2 4.5 4.2    106 103   CO2 26 25 30   BUN 29* 35* 39*   CREATININE 1.1 1.3 1.4*     Estimated Creatinine Clearance: 50 mL/min (A) (based on SCr of 1.4 mg/dL (H)). CBC:   Recent Labs     20  0600 20  0520 20  0645   WBC 6.9 5.7 5.6   HGB 9.2* 8.2* 8.0*   HCT 28.5* 25.4* 24.7*   MCV 81.7 81.8 81.3   * 105* 116*     Thyroid:   Lab Results   Component Value Date    TSH 1.51 2016    I3YRCJB 0.89 2014     Lipids:   Lab Results   Component Value Date    CHOL 98 2019    HDL 38 2019    TRIG 70 2019     LFTS:   Lab Results   Component Value Date    ALT 7 2020    AST 8 2020    ALKPHOS 60 2020    PROT 5.3 2020    PROT 6.2 2012    AGRATIO 1.0 2020    BILITOT 0.4 2020     Cardiac Enzymes:   Lab Results   Component Value Date    CKTOTAL 168 2012    TROPONINI 0.04 2020    TROPONINI 0.110 2013    TROPONINI 0.110 2013     Coags:   Lab Results   Component Value Date    PROTIME 24.0 2020    PROTIME 25.7 2016    INR 2.05 2020     EC20 afib RVR w/ PACs    ECHO: 20   Summary   -Left ventricular cavity size is mildly dilated. There is mild concentric   left ventricular hypertrophy.   -Overall left ventricular systolic function appears severely reduced with an   ejection fraction in the 30% range.   -There is severe hypokinesis of the apical wall and moderate hypokinesis of   the inferior walls.   -Indeterminate diastolic function due to irregular heart rhythm. -The mitral valve leaflets appear myxomatous. -Mild to moderate mitral regurgitation.   -A bioprosthetic artificial aortic valve appears well seated with a maximum   velocity of 3.3m/s and a mean gradient of 26mmHg.  This suggests at least   moderate aortic stenosis. -Trivial perivalvular aortic regurgitation.   -Mild to moderate tricuspid regurgitation.   -Estimated pulmonary artery systolic pressure is mildly elevated at 40-45   mmHg assuming a right atrial pressure of 8mmHg.   -The left atrium is dilated.   9/11/19  Summary   Left ventricle - mildly dilated, borderline function with EF of 50%,   inferolateral basal hypokinesis   Mitral valve - mild regurgitation, annular calcification   Aortic valve - bio valve well seated, moderate stenosis with mean gradient   of 34mmHg, trivial regurgitation   Aorta - root 4.0cm, ascending 4.4cm   Tricuspid valve - mild to moderate regurgitation with RVSP of 27mmHg    Problem List:   Patient Active Problem List    Diagnosis Date Noted    Tobacco abuse counseling     Lymphadenopathy     Hypervolemia     Sepsis ([address] 01/28/2020    Septic shock ([address]     Atrial fibrillation with rapid ventricular response ([address]     Elevated international normalized ratio (INR)     Streptococcal pharyngitis     Leukopenia     Chronic ethmoidal sinusitis     TYRELL (acute kidney injury) ([address]     Ex-heavy cigarette smoker (20-39 per day)     PAF (paroxysmal atrial fibrillation) ([address] 09/06/2019    Essential hypertension 09/06/2019    Finger amputation, traumatic, 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 Primary localized osteoarthrosis, pelvic region and thigh 05/04/2015    H/O total shoulder replacement 05/04/2015    Hyponatremia 04/22/2015    Primary localized osteoarthrosis of shoulder region 02/02/2015    Encounter for medication counseling 01/13/2014    Arthritis of knee 12/05/2013    Chronic combined systolic and diastolic CHF (congestive heart failure) ([address] 03/17/2013    S/P AVR (aortic valve replacement) 09/26/2012    Elevated troponin 07/29/2012    Cardiomyopathy ([address] 07/29/2012    Nonrheumatic aortic valve insufficiency 07/29/2012    Mitral regurgitation 07/29/2012    Microscopic hematuria 07/29/2012      Assessment:  Paroxysmal Atrial Fibrillation  - Remains in afib HR has been 's, but now 120's-130's with dobutamine  - On amiodarone 200 mg bid and digoxin 125 mcg   - Limited on antiarrythmic choices due to persistent hypotension   - Denies symptoms with afib  - [id number] score: 3 (age, HTN); [id number] Vasc score and anticoagulation discussed. High risk for stroke and thromboembolism. Anticoagulation is recommended.   ~ On warfarin  - Afib risk factors including age, HTN, obesity, inactivity and PITA were discussed with patient. Risk factor modification recommended   ~ TSH 3.91 (1/30/20)    - Treatment options including cardioversion, rate control strategy, antiarrhythmics, anticoagulation and possible ablation were discussed with patient. Risks, benefits and alternative of each treatment options were explained.  All questions answered    ~ Continue amiodarone therapy and follow OP after acute illness resolved  PVC/NSVT   - no recurrent NSVT, and occasional PVC on monitor  Hypotension   -  Persistent with midodrine   - Started Dobutamine, however he has become more hypotensive and tachycardic   - Discussed with Dr. Kathya Luna and does not feel primacor is appropriate at this time due to hypotension   - Could consider volume expander such as albumin   AS   - S/p AVR (2012)   - Moderate stenosis per echo (1/29/20)   - Follows with Dr. Keke Patel  Acute sepsis   - On abx   - per primary team and ID    Acute on chronic systolic heart failure   - Remains significantly edematous                ~ EF 30% per recent echo   - Down 2 L on lasix drip    - Low Na and high protein diet  TYRELL-resolved  Lymphadenopathy   - S/p surgical bx  Ileus- improving   - NG out and taking PO   - Poor dietary intake  Plan:  - Continue PO amiodarone 200 mg bid and digoxin  - Consider CV as OP when acute illness resolved if remains in afib  - Stop Dobutamine if HR remains elevated and BP does not improve     All pertinent information and plan of care discussed with the EP physician. Multiple medical conditions with risk of decompensation. All questions and concerns were addressed to the patient. Alternatives to my treatment were discussed. I have discussed the above stated plan with patient and spouse and the nurse. The patient and spouse verbalized understanding and agreed with the plan. Thank you for allowing to us to participate in the care of Jessica Flaangan Schirmer.     JEANIE Lorenzo-CNP  [facility]   Office: [phone]

## 2020-02-05 NOTE — PROGRESS NOTES
[facility] Oncology    Chart reviewed    On Tx for Sepsis-ID and ICM team on case    S/P Left Inguinal Exc.  LN Bx on 2/4/2020-pending final flow and path results- Appreciated Gen Sx    Hx of MDS-counts stable    Dr. Laure Aguila primary oncologist will follow    [facility] team will follow tomorrow    MD Bob Carter MD   Hem-Oncology/ [address] office    O: [phone]  Toll free: [phone]    Can send message on Perfect serve if any urgent need

## 2020-02-05 NOTE — PROGRESS NOTES
Reassessment and VS completed. See flowsheet. Pt much less swollen in extremities and genitals. UO great-see I&O flowsheet. Pt still refusing to be repositioned. Diet lemon lime soda provided as pt requests. Denies further needs. Call light in reach.

## 2020-02-05 NOTE — PROGRESS NOTES
Patient requests placement of catheter due to difficulty using urinal due to penile swelling since a lasix gtt was being started, order was received to place to get adequate I & O. Indwelling catheter inserted with 16 Lao coude catheter to gravity drainage. Had a little difficulty passing catheter but patient denied any discomfort or pain. Urine return was initially bloody but cleared immediately to pale yellow urine.

## 2020-02-05 NOTE — PROGRESS NOTES
[address]   Progress Note  Cardiology      [facility]   Admission date:  1/28/2020  CC-f/up for a.fib and hypotension  Subjective:  He remains very weak with very poor appetite    Objective:  Medications/Labs all Reviewed     digoxin  125 mcg Oral Daily    warfarin  0.5 mg Oral Daily    predniSONE  20 mg Oral Daily    amiodarone  200 mg Oral BID    midodrine  5 mg Oral TID WC    doxycycline hyclate  100 mg Oral 2 times per day    sodium chloride flush  10 mL Intravenous 2 times per day    lactobacillus  1 capsule Oral Daily with breakfast    aspirin  81 mg Oral Daily    atorvastatin  10 mg Oral Daily       BMP:   Lab Results   Component Value Date     02/05/2020    K 4.2 02/05/2020    K 4.7 01/28/2020     02/05/2020    CO2 30 02/05/2020    BUN 39 02/05/2020    CREATININE 1.4 02/05/2020    MG 1.90 01/31/2020     CBC:    Lab Results   Component Value Date    WBC 5.6 02/05/2020    RBC 3.04 02/05/2020    HGB 8.0 02/05/2020    HCT 24.7 02/05/2020    MCV 81.3 02/05/2020    RDW 19.1 02/05/2020     02/05/2020      PT/INR:    Lab Results   Component Value Date    INR 2.05 (H) 02/05/2020    PROTIME 24.0 (H) 02/05/2020     Cardiac Enzymes:    Lab Results   Component Value Date    CKTOTAL 168 07/28/2012     Lab Results   Component Value Date    TROPONINI 0.04 (H) 01/28/2020    TROPONINI 0.110 (H) 12/22/2013    TROPONINI 0.110 (H) 12/22/2013     BNP:    Lab Results   Component Value Date    BNP 44 01/13/2014     FASTING LIPID PANEL:    Lab Results   Component Value Date    CHOL 98 11/07/2019    HDL 38 11/07/2019    TRIG 70 11/07/2019       Physical Examination:    BP (!) 87/58   Pulse 92   Temp 98.4 °F (36.9 °C) (Temporal)   Resp 21   Ht 6' 1\" (1.854 m)   Wt 201 lb 1 oz (91.2 kg)   SpO2 96%   BMI 26.53 kg/m²    Positive JVD  Respiratory:  · Resp Assessment: Normal respiratory effort  · Resp Auscultation: Clear to auscultation bilaterally

## 2020-02-06 NOTE — PROGRESS NOTES
Nutrition Assessment    Type and Reason for Visit: Reassess    Nutrition Recommendations:   1. Offer Ensure Clear or Ensure Enlive with every meal  2. Encourage PO intake  3. Document all PO intake in flow sheets     Nutrition Assessment: Pt remains nutritionally compromised. PO intake continues to be less than 50% of meals. Pt attributes this to nausea, decreased appetite and \"nothing tasting right. \" Pt is drinking the Ensure Enlive but states the consistency is a little \"thick\"; will trial Ensure Clear today in addition to Ensure Enlive. Malnutrition Assessment:  · Malnutrition Status: At risk for malnutrition  · Context: Acute illness or injury  · Findings of the 6 clinical characteristics of malnutrition (Minimum of 2 out of 6 clinical characteristics is required to make the diagnosis of moderate or severe Protein Calorie Malnutrition based on AND/ASPEN Guidelines):  1. Energy Intake-Less than or equal to 50% of estimated energy requirement, Greater than or equal to 7 days    2. Weight Loss-No significant weight loss,    3. Fat Loss-No significant subcutaneous fat loss,    4. Muscle Loss-No significant muscle mass loss,    5. Fluid Accumulation-Severe fluid accumulation, Extremities  6.  Strength-Not measured    Nutrition Risk Level: High    Nutrient Needs:  · Estimated Daily Total Kcal: 8246-7813  · Estimated Daily Protein (g):  grams  · Estimated Daily Total Fluid (ml/day): 1 mL per kcal     Nutrition Diagnosis:   · Problem: Inadequate oral intake  · Etiology: related to Insufficient energy/nutrient consumption     Signs and symptoms:  as evidenced by Intake 0-25%, Intake 25-50%    Objective Information:  · Nutrition-Focused Physical Findings: +2 BUE and BLE edema. Pitting perineal edema. +12.2 liters.    · Wound Type: Surgical Wound  · Current Nutrition Therapies:  · Oral Diet Orders: General   · Oral Diet intake: 1-25%, 26-50%  · Oral Nutrition Supplement (ONS) Orders: Standard High Calorie Oral Supplement  · ONS intake: 51-75%  · Anthropometric Measures:  · Ht: 6' 1\" (185.4 cm)   · Current Body Wt: 192 lb (87.1 kg)  · Admission Body Wt: 180 lb (81.6 kg)  · Ideal Body Wt: 184 lb (83.5 kg)   · BMI Classification: BMI 25.0 - 29.9 Overweight    Nutrition Interventions:   Continue current diet, Modify current ONS  Continued Inpatient Monitoring, Education Not Indicated    Nutrition Evaluation:   · Evaluation: Progressing toward goals   · Goals: Pt will consume at least 50% of meals and supplements     · Monitoring: Meal Intake, Supplement Intake, Diet Tolerance, Nausea or Vomiting      Electronically signed by Ramiro Pedraza RD, LD on 2/6/20 at 10:01 AM    Contact Number: [phone]

## 2020-02-06 NOTE — PROGRESS NOTES
Oncology and Hematology Care   Progress Note      2/6/2020 8:41 AM        Name: Xavier Graver Schirmer . Admitted: 1/28/2020    SUBJECTIVE:  Doing okay. Still in A. Fib and hypotensive. Tolerated RBC transfusion this morning.      Reviewed interval ancillary notes    Current Medications  sodium chloride 0.9 % infusion,   neomycin-bacitracin-polymyxin (NEOSPORIN) ointment, BID PRN  digoxin (LANOXIN) tablet 125 mcg, Daily  warfarin (COUMADIN) tablet 0.5 mg, Daily  predniSONE (DELTASONE) tablet 20 mg, Daily  furosemide (LASIX) 100 mg in dextrose 5 % 100 mL infusion, Continuous  amiodarone (CORDARONE) tablet 200 mg, BID  ondansetron (ZOFRAN) injection 4 mg, Q6H PRN  morphine (PF) injection 2 mg, Q2H PRN    Or  morphine injection 4 mg, Q2H PRN  oxyCODONE (ROXICODONE) immediate release tablet 5 mg, Q4H PRN    Or  oxyCODONE (ROXICODONE) immediate release tablet 10 mg, Q4H PRN  acetaminophen (TYLENOL) tablet 650 mg, Q4H PRN  lidocaine 4 % external patch 1 patch, Daily PRN  lip balm petroleum free (PHYTOPLEX) stick, PRN  midodrine (PROAMATINE) tablet 5 mg, TID WC  promethazine (PHENERGAN) injection 12.5 mg, Q6H PRN  doxycycline hyclate (VIBRA-TABS) tablet 100 mg, 2 times per day  sodium chloride (OCEAN, BABY AYR) 0.65 % nasal spray 1 spray, PRN  sodium chloride flush 0.9 % injection 10 mL, 2 times per day  sodium chloride flush 0.9 % injection 10 mL, PRN  prochlorperazine (COMPAZINE) injection 10 mg, Q6H PRN  lactobacillus (CULTURELLE) capsule 1 capsule, Daily with breakfast  aspirin chewable tablet 81 mg, Daily  magnesium hydroxide (MILK OF MAGNESIA) 400 MG/5ML suspension 30 mL, Daily PRN  atorvastatin (LIPITOR) tablet 10 mg, Daily  diphenhydrAMINE (BENADRYL) injection 25 mg, Q6H PRN        Objective:  BP (!) 96/59   Pulse 77   Temp 97.5 °F (36.4 °C)   Resp 16   Ht 6' 1\" (1.854 m)   Wt 192 lb 7.4 oz (87.3 kg)   SpO2 98%   BMI 25.39 kg/m²     Intake/Output Summary (Last 24 hours) at 2/6/2020 0841  Last data filed at 2/6/2020 0540  Gross per 24 hour   Intake 555.25 ml   Output 3100 ml   Net -2544.75 ml      Wt Readings from Last 3 Encounters:   02/06/20 192 lb 7.4 oz (87.3 kg)   01/28/20 178 lb (80.7 kg)   01/23/20 180 lb 6.4 oz (81.8 kg)       General appearance:  Appears comfortable  Eyes: Sclera clear. Pupils equal.  ENT: Moist oral mucosa. Trachea midline, no adenopathy. Cardiovascular: Regular rhythm, normal S1, S2. No murmur. No edema in lower extremities  Respiratory: Not using accessory muscles. Good inspiratory effort. Clear to auscultation bilaterally, no wheeze or crackles. GI: Abdomen soft, no tenderness, not distended  Musculoskeletal: No cyanosis in digits, neck supple  Neurology: CN 2-12 grossly intact. No speech or motor deficits  Psych: Normal affect. Alert and oriented in time, place and person  Skin: Warm, dry, normal turgor      Labs and Tests:  CBC:   Recent Labs     02/04/20  0520 02/05/20  0645 02/06/20  0136   WBC 5.7 5.6 6.3   HGB 8.2* 8.0* 6.9*   * 116* 100*     BMP:    Recent Labs     02/04/20  0520 02/05/20  0645 02/06/20  0136    143 139   K 4.5 4.2 4.0    103 99   CO2 25 30 32   BUN 35* 39* 43*   CREATININE 1.3 1.4* 1.4*   GLUCOSE 131* 121* 121*     Hepatic: No results for input(s): AST, ALT, ALB, BILITOT, ALKPHOS in the last 72 hours. ASSESSMENT AND PLAN    Active Problems:    Elevated troponin    Cardiomyopathy (HCC)    Mitral regurgitation    S/P AVR (aortic valve replacement)    Chronic combined systolic and diastolic CHF (congestive heart failure) ([facility])    Encounter for medication counseling    Hyponatremia    Hypercholesteremia    PAF (paroxysmal atrial fibrillation) ([facility] [address]    Essential hypertension    Sepsis ([facility] [address]    Tobacco abuse counseling    Lymphadenopathy    Hypervolemia  Resolved Problems:    * No resolved hospital problems. *         Sepsis syndrome patient has had fever despite treatment with cefdinir and Augmentin.  Had allergic reaction to levaquin

## 2020-02-06 NOTE — PROGRESS NOTES
Spoke with Emre Hazel, NP again about continued hypotension. Last b/p 71/50 with a MAP of 54. Order to start Dobutamine again at lower rate of only 1mcg/kg/min. Ok to give night dose of PO Amiodarone.

## 2020-02-06 NOTE — PROGRESS NOTES
Assessment and VS complete. See flowsheet. Pt A&O. States he feels as if he still has a pill \"stuck\" in his throat from earlier today. Night time meds given in applesauce- tolerated very well. Fresh ice water provided. Pulled up and repositioned in bed for comfort- refusing to turn more to the side. Still has red, bumpy rash scattered t/o. HOB lowered d/t hypotension- tolerating well. O2 at 2L/min added for desats to 83% on RA while sleeping. POC discussed. Pt denies further needs. Call light in reach.

## 2020-02-06 NOTE — PROGRESS NOTES
Blood consent signed and placed on chart. First unit of blood initiated. No s/s of blood transfusion reactions. Tolerating well so far. Will continue to monitor. POC discussed at length with pt. Urine sample collected and sent to lab. Kathryn care provided and bed pad changed. No incontinence.

## 2020-02-06 NOTE — CARE COORDINATION
Chart reviewed for discharge planning. Barrier(s) to discharge- lasix gtt    Tentative discharge plan- wife wants to take him home    Tentative discharge date- TBD    Wife will not agree to a SNF - stating she wants to take him home but she does not have anyone to help her at home. She asked me to check and see if [facility] is in network with [facility], and they are not. Made sure she still had list - she will review with her daughters. *Case management will continue to follow progress and update discharge plan as needed.     Morgan Aguilar RN BSN  Case Management   [phone]

## 2020-02-06 NOTE — PROGRESS NOTES
8/10/12    Breast nodule 8/2012  right    excision-lumpectomy 8/2012    CAD (coronary artery disease)     Cardiomyopathy ([facility] [address] 8/2012    EF 20 %    CH[facility]congestive heart failure) ([facility])     Hyperlipidemia     Hypertension     Microscopic hematuria 7/29/2012    Mitral regurgitation     Nausea & vomiting 7/29/2012    Osteoarthritis of knee     bilateral knees    Pneumonia     Rotator cuff tear 7/2/2013    Ventricular ectopy 7/29/2012      Past Surgical History:    has a past surgical history that includes Inguinal hernia repair; other surgical history (8/2/12); Aortic valve replacement (8/3/2012); Cardiac surgery; Colonoscopy; Endoscopy, colon, diagnostic; Total knee arthroplasty (Left, 12/4/13); Upper gastrointestinal endoscopy (12/24/13); Total knee arthroplasty (Right, 2/3/14); joint replacement; cyst removal; Total shoulder arthroplasty (Right, 2/2/15); and lymph node biopsy (Left, 2/3/2020). Social History:  Reviewed. reports that he quit smoking about 49 years ago. He has a 40.00 pack-year smoking history. He has never used smokeless tobacco. He reports current alcohol use of about 2.0 standard drinks of alcohol per week. He reports that he does not use drugs. Family History:  Reviewed. family history includes Cancer in his brother; Diabetes in his sister; Heart Disease in his brother, sister, and sister; Marfan Syndrome in his brother; Stroke in his sister. Denies family history of sudden cardiac death, arrhythmia, premature CAD    Review of Systems:  · Constitutional: Negative for fever, night sweats, chills.  + weakness  · Skin: Negative for rash, dry skin, pruritus, bruising, bleeding, blood clots, or changes in skin pigment  · HEENT: Negative for vision changes, ringing in the ears, sore throat, dysphagia, or swollen lymph nodes  · Respiratory: Reviewed in HPI  · Cardiovascular: Reviewed in HPI  · Gastrointestinal: Negative for abdominal pain, N/V/D, constipation, or black/tarry stools, passing gas   · Genito-Urinary: Negative for dysuria, incontinence, urgency, or hematuria,   · Musculoskeletal: Negative for joint swelling, muscle pain, or injuries  · Neurological/Psych: Negative for confusion, seizures, headaches, balance issues or TIA-like symptoms. No anxiety, depression, or insomnia    Physical Examination:  Vitals:    02/06/20 1000   BP: (!) 87/51   Pulse: 98   Resp: 15   Temp:    SpO2: 100%      In: 871.9 [P.O.:360; I.V.:195.3; Blood:316.7]  Out: 2375    Wt Readings from Last 3 Encounters:   02/06/20 192 lb 7.4 oz (87.3 kg)   01/28/20 178 lb (80.7 kg)   01/23/20 180 lb 6.4 oz (81.8 kg)       Intake/Output Summary (Last 24 hours) at 2/6/2020 1205  Last data filed at 2/6/2020 0830  Gross per 24 hour   Intake 871.92 ml   Output 2850 ml   Net -1978.08 ml     Telemetry: Personally Reviewed Atrial fibrillation   · Constitutional: Cooperative and in no apparent distress, and appears well nourished  · Skin: Warm and pink; no pallor, cyanosis, bruising, or clubbing  · HEENT: Symmetric and normocephalic. PERRL, EOM intact. Conjunctiva pink with clear sclera. Mucus membranes pink and moist. Teeth intact. Thyroid smooth without nodules or goiter. · Cardiovascular: irregular and rhythm. S1 & S2, no murmurs, rubs, or gallops. Peripheral pulses 2+, capillary refill < 3 seconds. Negative elevation of JVP. +peripheral edema  · Respiratory: Respirations symmetric and unlabored. Lungs crackles bilaterally, no wheezing or rhonchi  · Gastrointestinal: Abdomen soft and round. Bowel sounds normoactive in all quadrants without tenderness or masses. · Musculoskeletal: Bilateral upper and lower extremity strength 5/5 with full ROM, + generalized weakness  · Neurologic/Psych: Awake and orientated to person, place and time.  Calm affect, appropriate mood    Pertinent labs, diagnostic, device, and imaging results reviewed as a part of this visit    Labs:    BMP:   Recent Labs     02/04/20  0520 02/05/20  0609 regurgitation.   -Mild to moderate tricuspid regurgitation.   -Estimated pulmonary artery systolic pressure is mildly elevated at 40-45   mmHg assuming a right atrial pressure of 8mmHg.   -The left atrium is dilated.   9/11/19  Summary   Left ventricle - mildly dilated, borderline function with EF of 50%,   inferolateral basal hypokinesis   Mitral valve - mild regurgitation, annular calcification   Aortic valve - bio valve well seated, moderate stenosis with mean gradient   of 34mmHg, trivial regurgitation   Aorta - root 4.0cm, ascending 4.4cm   Tricuspid valve - mild to moderate regurgitation with RVSP of 27mmHg    Problem List:   Patient Active Problem List    Diagnosis Date Noted    Tobacco abuse counseling     Lymphadenopathy     Hypervolemia     Sepsis ([address] 01/28/2020    Septic shock ([address]     Atrial fibrillation with rapid ventricular response ([address]     Elevated international normalized ratio (INR)     Streptococcal pharyngitis     Leukopenia     Chronic ethmoidal sinusitis     TYRELL (acute kidney injury) ([address]     Ex-heavy cigarette smoker (20-39 per day)     PAF (paroxysmal atrial fibrillation) ([facility] [address] 09/06/2019    Essential hypertension 09/06/2019    Finger amputation, traumatic, 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 Primary localized osteoarthrosis, pelvic region and thigh 05/04/2015    H/O total shoulder replacement 05/04/2015    Hyponatremia 04/22/2015    Primary localized osteoarthrosis of shoulder region 02/02/2015    Encounter for medication counseling 01/13/2014    Arthritis of knee 12/05/2013    Chronic combined systolic and diastolic CHF (congestive heart failure) ([address] 03/17/2013    S/P AVR (aortic valve replacement) 09/26/2012    Elevated troponin 07/29/2012    Cardiomyopathy ([address] 07/29/2012    Nonrheumatic aortic valve insufficiency 07/29/2012    Mitral regurgitation 07/29/2012    Microscopic hematuria 07/29/2012      Assessment:  Paroxysmal Atrial Fibrillation  - Remains in afib on monitor rate controlled 80-90's  - On amiodarone 200 mg bid and digoxin 125 mcg daily   - Limited on antiarrythmic choices due to persistent hypotension   - Denies symptoms with afib  - [id number] score: 3 (age, HTN); [id number] Vasc score and anticoagulation discussed. High risk for stroke and thromboembolism. Anticoagulation is recommended.   ~ On warfarin  - Afib risk factors including age, HTN, obesity, inactivity and PITA were discussed with patient. Risk factor modification recommended   ~ TSH 3.91 (1/30/20)    - Treatment options including cardioversion, rate control strategy, antiarrhythmics, anticoagulation and possible ablation were discussed with patient. Risks, benefits and alternative of each treatment options were explained.  All questions answered    ~ Continue amiodarone therapy and follow OP after acute illness resolved  PVC/NSVT   - no recurrent NSVT, and occasional PVC on monitor  Hypotension   -  Persistent with midodrine   - Started Dobutamine, however he has become more hypotensive and tachycardic   - Discussed with Dr. Donnie Hebert and does not feel primacor is appropriate at this time due to hypotension   - Could consider volume expander such as albumin   AS   - S/p AVR (2012)   - Moderate stenosis per echo (1/29/20)   - Follows with Dr. Joseph Zuluaga  Acute sepsis   - On abx   - per primary team and ID    Acute on chronic systolic heart failure   - Remains significantly edematous                ~ EF 30% per recent echo   - Lasix gtt discontinued overnight due to hypotension    - Low Na and high protein diet  TYRELL-resolved  Lymphadenopathy   - S/p surgical bx  Ileus- Resolved  Acute anemia/Myelodysplastic syndrome    - Received PRBCs today   - Heme/onc following  Plan:  - Decrease amiodarone to 200 mg daily and continue digoxin 125 mcg daily   - Continue AC with warfarin as tolerated   - Consider CV as OP when acute illness resolved if remains in afib   - EP will follow as needed, please call with rhythm changes     All pertinent information and plan of care discussed with the EP physician. Multiple medical conditions with risk of decompensation. All questions and concerns were addressed to the patient. Alternatives to my treatment were discussed. I have discussed the above stated plan with patient and spouse and the nurse. The patient and spouse verbalized understanding and agreed with the plan. Thank you for allowing to us to participate in the care of Lucretia Muller Schirmer.     JEANIE Allison-CNP  [facility], [place], operated [facility]   Office: [phone]

## 2020-02-06 NOTE — PROGRESS NOTES
Paged Cardiology again regarding pt's EKG changes with frequent ectopy and continued Afib with a range from . Still hypotensive with a b/p of 71/54. Also, H&H of 6.9/21.1.

## 2020-02-06 NOTE — PROGRESS NOTES
[address]   Progress Note  Cardiology      Quinten Graver Schirmer   Admission date:  1/28/2020  CC-f/up for a.fib and hypotension  Subjective:  He remains very weak with very poor appetite  Was given dobutamine yesterday but blood pressure dropped. This was stopped last night. He received a unit of PRBC that likely helped his pressure. Today he is in a chair. On lasix drip. Wife and daughter at bedside. Complains of having difficulty swallowing  Lymph node biopsy still pending.     Objective:  Medications/Labs all Reviewed     amiodarone  200 mg Oral BID    [START ON 2/7/2020] amiodarone  200 mg Oral Daily    digoxin  125 mcg Oral Daily    warfarin  0.5 mg Oral Daily    predniSONE  20 mg Oral Daily    midodrine  5 mg Oral TID WC    doxycycline hyclate  100 mg Oral 2 times per day    sodium chloride flush  10 mL Intravenous 2 times per day    lactobacillus  1 capsule Oral Daily with breakfast    aspirin  81 mg Oral Daily    atorvastatin  10 mg Oral Daily       BMP:   Lab Results   Component Value Date     02/06/2020    K 4.0 02/06/2020    K 4.7 01/28/2020    CL 99 02/06/2020    CO2 32 02/06/2020    BUN 43 02/06/2020    CREATININE 1.4 02/06/2020    MG 1.70 02/06/2020     CBC:    Lab Results   Component Value Date    WBC 6.3 02/06/2020    RBC 2.62 02/06/2020    HGB 9.1 02/06/2020    HCT 27.4 02/06/2020    MCV 80.7 02/06/2020    RDW 19.0 02/06/2020     02/06/2020      PT/INR:    Lab Results   Component Value Date    INR 2.00 (H) 02/06/2020    PROTIME 23.4 (H) 02/06/2020     Cardiac Enzymes:    Lab Results   Component Value Date    CKTOTAL 168 07/28/2012     Lab Results   Component Value Date    TROPONINI 0.04 (H) 01/28/2020    TROPONINI 0.110 (H) 12/22/2013    TROPONINI 0.110 (H) 12/22/2013     BNP:    Lab Results   Component Value Date    BNP 44 01/13/2014     FASTING LIPID PANEL:    Lab Results   Component Value Date    CHOL 98 11/07/2019    HDL 38 11/07/2019    TRIG 70

## 2020-02-06 NOTE — PROGRESS NOTES
[facility]    2/6/2020 4:09 PM        Name: Jade Rivas Schirmer . Admitted: 1/28/2020  Primary Care Provider: Ritu Lantigua MD (Tel: [phone])                           Hospital course:   82-year-old gentleman who was admitted for severe sepsis. Secondary to possibly unknown source patient was positive for strep B- at PCPs office. Blood culture so far has remained negative. Infectious disease is following. Patient also noted to be A. fib RVR. Was started on amiodarone drip. Patient with history of valve disease on Coumadin which has been held but is on heparin drip. Found to have lymphadenopathy underwent biopsy. Now back on coumadin    - on steroids for levaquin induced rash    S: had tachy lastnight and dobutamine drip stopped. says he is having trouble swallowing still. No pain in throat. No fever. Still has diffuse body rash.         Reviewed interval ancillary notes    Current Medications  amiodarone (CORDARONE) tablet 200 mg, BID  [START ON 2/7/2020] amiodarone (CORDARONE) tablet 200 mg, Daily  midodrine (PROAMATINE) tablet 10 mg, TID WC  neomycin-bacitracin-polymyxin (NEOSPORIN) ointment, BID PRN  digoxin (LANOXIN) tablet 125 mcg, Daily  warfarin (COUMADIN) tablet 0.5 mg, Daily  predniSONE (DELTASONE) tablet 20 mg, Daily  furosemide (LASIX) 100 mg in dextrose 5 % 100 mL infusion, Continuous  ondansetron (ZOFRAN) injection 4 mg, Q6H PRN  morphine (PF) injection 2 mg, Q2H PRN    Or  morphine injection 4 mg, Q2H PRN  oxyCODONE (ROXICODONE) immediate release tablet 5 mg, Q4H PRN    Or  oxyCODONE (ROXICODONE) immediate release tablet 10 mg, Q4H PRN  acetaminophen (TYLENOL) tablet 650 mg, Q4H PRN  lidocaine 4 % external patch 1 patch, Daily PRN  lip balm petroleum free (PHYTOPLEX) stick, PRN  promethazine (PHENERGAN) injection 12.5 mg, Q6H PRN  doxycycline hyclate (VIBRA-TABS) tablet 100 mg, 2 times per day  sodium chloride (OCEAN, BABY AYR) 0.65 % nasal spray 1 spray, PRN  sodium chloride flush 0.9 % injection 10 mL, 2 times per day  sodium chloride flush 0.9 % injection 10 mL, PRN  prochlorperazine (COMPAZINE) injection 10 mg, Q6H PRN  lactobacillus (CULTURELLE) capsule 1 capsule, Daily with breakfast  aspirin chewable tablet 81 mg, Daily  magnesium hydroxide (MILK OF MAGNESIA) 400 MG/5ML suspension 30 mL, Daily PRN  atorvastatin (LIPITOR) tablet 10 mg, Daily  diphenhydrAMINE (BENADRYL) injection 25 mg, Q6H PRN        Objective:  BP (!) 80/52   Pulse 90   Temp 97.7 °F (36.5 °C) (Temporal)   Resp 19   Ht 6' 1\" (1.854 m)   Wt 192 lb 7.4 oz (87.3 kg)   SpO2 100%   BMI 25.39 kg/m²     Intake/Output Summary (Last 24 hours) at 2/6/2020 1609  Last data filed at 2/6/2020 1500  Gross per 24 hour   Intake 964.42 ml   Output 5675 ml   Net -4710.58 ml      Wt Readings from Last 3 Encounters:   02/06/20 192 lb 7.4 oz (87.3 kg)   01/28/20 178 lb (80.7 kg)   01/23/20 180 lb 6.4 oz (81.8 kg)       General appearance:  Appears comfortable  Eyes: Sclera clear. Pupils equal.  ENT: Moist oral mucosa. Trachea midline, no adenopathy. Cardiovascular: Regular rhythm, normal S1, S2. No murmur. No edema in lower extremities  Respiratory: Not using accessory muscles. Good inspiratory effort. Clear to auscultation bilaterally, no wheeze or crackles. GI: Abdomen soft, no tenderness, not distended, normal bowel sounds  Musculoskeletal: No cyanosis in digits, neck supple  Neurology: CN 2-12 grossly intact. No speech or motor deficits  Psych: Normal affect.  Alert and oriented in time, place and person  Skin:  Diffuse blotchy rash all over the body     Labs and Tests:  CBC:   Recent Labs     02/04/20  0520 02/05/20  0645 02/06/20  0136 02/06/20  1101   WBC 5.7 5.6 6.3  --    HGB 8.2* 8.0* 6.9* 9.1*   * 116* 100*  --      BMP:    Recent Labs     02/04/20  0520

## 2020-02-06 NOTE — PROGRESS NOTES
Speech Language Pathology  CLINICAL BEDSIDE SWALLOWING EVALUATION  Speech Therapy Department    Patient Name:  Latosha Armando  :  1940  Pain level:Pt denies pain at this time  Medical Diagnosis:   Sepsis ([address] [A41.9]  Sepsis ([address] [A41.9]  Sepsis ([address] [A41.9]  Sepsis ([address] [A41.9]     HPI:Patient is a 69 yo male who was admitted from Dr. Jordon Finley office when he presented for his visit with tachycardia from A. fib and RVR as well as significant hypotension. He had been fighting a sinus infection since about 2019. He had multiple courses of outpatient antibiotic therapy. Additional history is that he tested positive for strep in the office yesterday. He reported fever as high as 101 degrees. He is known to have cardiomyopathy with an ejection fraction of around 20%. He has a remote smoking history but no history of pulmonary problems in the chart. He also has a history of myelodysplasia with a white count normally around 3000. Chest X-ray completed on 20 with the following results:  Impression   1. Streaky bibasilar opacities likely reflect subsegmental atelectasis versus   scarring rather than an infectious/inflammatory process. 2. The visible lungs are without pneumothorax, pleural effusion or new focal   airspace opacity. 3. Stable enlargement of the cardiopericardial silhouette. Speech Therapy/Clinical Swallow Evaluation order received. Per nurse report, Pt experienced a \"choking\" episode with his medication this am. Currently the Pt is alert and verbally responsive. Pt reports occasional sensation of solids becoming \"stuck\" in his throat since admission. Pt also reports occasional coughing with liquids prior to this admission. Pt's wife also reports Pt has been selecting very soft foods from menu due to reported \"diffficulty swallowing\" solid textures.     Treatment Diagnosis: Mild/moderate Oropharyngeal Dysphagia    Impressions: Pt demonstrates adequate oral motor strength and ROM for completion of OME with no facial asymmetry noted. Po trials of thins, nectars, purees and solids given to assess for po texture tolerance. Clinical symptoms of pharyngeal pooling with delayed and intermittently \"effortful\" bolus transfer through pharynx noted with all textures. Reduced laryngeal excursion with suspected delayed/reduced pharyngeal clearing after the swallow also noted with all textures. No overt signs of aspiration noted with any texture. However, audible swallows, consistent with delayed airway protection and delayed bolus transfer through pharynx noted with thin liquids. Intermittent change in vocal quality also associated with silent penetration/aspiration noted with thin liquids via straw. Therefore, an MBS is indicated to further assess extent of oropharyngeal dysfunction and to rule out SILENT aspiration during or after the swallow. Dietary Recommendations: Dysphagia III diet with thin liquids/No straws/meds with applesauce pending MBS results    Strategies: 90 degree positioning with all p.o. intake; small bites/sips; alternate textures through meal; reduce rate of intake; No straws    Treatment/Goals: Speech therapy for dysphagia tx 3-5 times per week. ST.) Pt will tolerate recommended diet without s/s of aspiration   2.) Pt will tolerate MBS to r/o aspiration and determine appropriate diet/liquid level, with further diet and treatment recommendations as indicated.      Oral motor Exam:  Dentition:adequate  Labial/Facial:WFL  Lingual: mild reduced strength and ROM  Voice: weak; raspy    Oral Phase:   Reduced A-P propulsion  Apparent premature bolus loss to pharynx  Uncleared oral stasis with solids    Pharyngeal Phase:  Apparent pharyngeal pooling  Delayed swallow initiation; Effortful swallow initiation with increased textures  Decreased laryngeal elevation via palpation   Apparent decreased pharyngeal clearing delayed second swallows noted  Change in vocal quality with thins via straw  Audible swallows with thins via cup     Patient/Family Education:Education, results and recommendations given to the Pt and nurse, who verbalized understanding    Timed Code Treatment:0 min    Total Treatment Time:40 min    Discharge Recommendations: Speech Therapy for Speech/Dysphagia treatment at discharge.     Charanjit ESPINAL-ZUB#[id number]

## 2020-02-06 NOTE — PROGRESS NOTES
Paged Cardiology regarding hypotension. Pt still on Lasix gtt at 10mg/hr. Awaiting return call. Pt states he is not lightheaded- feels ok. Lasix gtt paused.

## 2020-02-07 NOTE — PROGRESS NOTES
Patient had an SPO2 of 99% on 2 L. The O2 has been turned down to 1L. Will continue to monitor for any changes.

## 2020-02-07 NOTE — PROGRESS NOTES
Intake 509.17 ml   Output 7800 ml   Net -7290.83 ml      Wt Readings from Last 3 Encounters:   02/07/20 178 lb 12.7 oz (81.1 kg)   01/28/20 178 lb (80.7 kg)   01/23/20 180 lb 6.4 oz (81.8 kg)       General appearance:  Appears comfortable  Eyes: Sclera clear. Pupils equal.  ENT: Moist oral mucosa. Trachea midline, no adenopathy. Cardiovascular: Regular rhythm, normal S1, S2. No murmur. No edema in lower extremities  Respiratory: Not using accessory muscles. Good inspiratory effort. Clear to auscultation bilaterally, no wheeze or crackles. GI: Abdomen soft, no tenderness, not distended  Musculoskeletal: No cyanosis in digits, neck supple  Neurology: CN 2-12 grossly intact. No speech or motor deficits  Psych: Normal affect. Alert and oriented in time, place and person  Skin: Warm, dry, normal turgor      Labs and Tests:  CBC:   Recent Labs     02/05/20  0645 02/06/20  0136 02/06/20  1101 02/07/20  0320   WBC 5.6 6.3  --  10.4   HGB 8.0* 6.9* 9.1* 8.8*   * 100*  --  128*     BMP:    Recent Labs     02/05/20  0645 02/06/20  0136 02/07/20  0320    139 141   K 4.2 4.0 3.7    99 92*   CO2 30 32 38*   BUN 39* 43* 44*   CREATININE 1.4* 1.4* 1.3   GLUCOSE 121* 121* 110*     Hepatic: No results for input(s): AST, ALT, ALB, BILITOT, ALKPHOS in the last 72 hours. ASSESSMENT AND PLAN    Active Problems:    Elevated troponin    Cardiomyopathy (HCC)    Mitral regurgitation    S/P AVR (aortic valve replacement)    Chronic combined systolic and diastolic CHF (congestive heart failure) ([facility])    Encounter for medication counseling    Hyponatremia    Hypercholesteremia    PAF (paroxysmal atrial fibrillation) ([facility]    Essential hypertension    Sepsis ([facility] [address]    Tobacco abuse counseling    Lymphadenopathy    Hypervolemia  Resolved Problems:    * No resolved hospital problems.  *      Hodgkin's Lymphoma  - Final pathology from inguinal lymph node biopsy pending.   - He will require chemotherapy.      Coagulopathy: Supratherapeutic INR, no bleeding at present. Probably due to antibiotics with warfarin.  - Has received Vitamin K.   - Warfarin has been resumed. - INR 2 today.     Atrial fibrillation with rapid ventricular response. Cardiology managing.      Renal failure with significant prerenal component receiving IV hydration     Anemia  - Hgb 8.8 today s/p transfusion 2/6/2020.     Myelodysplastic syndrome has adequate granulocyte count has not required treatment. Lawrence Maldonado, [facility]  Oncology Hematology [address] [facility].  [phone]    Patient was seen with EJNIFER this morning. Generalized weakness. Pathology from inguinal lymph node biopsy consistent with lymphoma. Discussed with Dr. Maggie Bowden the pathologist later today. Firm diagnosis cannot be made. It could be Hodgkin's lymphoma or T-cell lymphoma. Specimen will be sent out for second opinion. Dr. Maggie Bowden and I feel we need excisional biopsy of the left axillary lymph node. Hopefully this can be performed under local anesthesia early next week. Discussed with the wife and his daughter. Cliff Taylor.  Francisco Lay MD, [place] [id number]  Hematology and Oncology  [facility]  [phone]

## 2020-02-07 NOTE — PROCEDURES
[facility] SPEECH THERAPY  MODIFIED BARIUM SWALLOW EVALUATION    Patient's Name: Meeta REECEB: 1940  Medical Diagnosis: Sepsis ([address] [A41.9]  Treatment Diagnosis: Dysphagia    Ordering MD: Dr. Brian Lowery   Radiologist: Dr. Charan Barkley   Date of Evaluation: 2/7/2020  Type of Study: Modified Barium Swallowing Study (MBS)  Diet Prior to Study: Dysphagia III/soft solids diet with thin liquids, meds with applesauce  Pain Level: Denies     Impression:  Modified Barium Swallow evaluation completed on 2/7/2020. Results indicate oropharyngeal dysphagia. Successive drinks of thin and Mildly Thick (Nectar) Liquids revealed laryngeal penetration during the swallow. Material entered the airway, remained above the vocal folds and was not ejected from the airway. No aspiration was definitively viewed during this study. Oral phase was characterized prolonged mastication with prolonged time noted for bolus transport. Minimal oral residue was noted post swallow. Pharyngeal phase was characterized by pharyngeal pooling, delayed swallow initiation, minimally delayed pharyngeal clearing and minimal collection of pharyngeal stasis post swallow. Overall pharyngeal clearing was grossly functional. Decreased upper esophageal opening was noted during the swallow. Pt was able to clear barium tablet from the pharynx although he reported sensation of pill being \"stuck\" in his throat. Belching was noted after the study. Laryngeal penetration may be contributing factor to pt's sensation of something \"stuck\" in his throat. Pt appeared to benefit from use of small bites/sips, slow rate of intake and single sips of liquids. Recommend continuation of Dysphagia III Soft and Bite-Sized diet level with thin liquids with strict aspiration precautions.      Aspiration/Penetration Risk:  Mildly increased with thin liquids and Mildly Thick (Nectar) Liquids      Recommendations:    Diet Level: Dysphagia III diet with thin liquids/No straws/meds

## 2020-02-07 NOTE — PROGRESS NOTES
Tolerance: Patient Tolerated treatment well;Patient limited by fatigue     Patient Diagnosis(es): The primary encounter diagnosis was Septic shock ([address] Diagnoses of Atrial fibrillation with rapid ventricular response ([address] Elevated international normalized ratio (INR), Streptococcal pharyngitis, Leukopenia, unspecified type, Chronic ethmoidal sinusitis, TYRELL (acute kidney injury) ([address] and chronic elevated troponin were also pertinent to this visit. has a past medical history of Aortic insufficiency, Atrial fibrillation ([address] Breast nodule, CAD (coronary artery disease), Cardiomyopathy ([address] CHF (congestive heart failure) ([address] Hyperlipidemia, Hypertension, Microscopic hematuria, Mitral regurgitation, Nausea & vomiting, Osteoarthritis of knee, Pneumonia, Rotator cuff tear, and Ventricular ectopy. has a past surgical history that includes Inguinal hernia repair; other surgical history (8/2/12); Aortic valve replacement (8/3/2012); Cardiac surgery; Colonoscopy; Endoscopy, colon, diagnostic; Total knee arthroplasty (Left, 12/4/13); Upper gastrointestinal endoscopy (12/24/13); Total knee arthroplasty (Right, 2/3/14); joint replacement; cyst removal; Total shoulder arthroplasty (Right, 2/2/15); and lymph node biopsy (Left, 2/3/2020). Restrictions  Restrictions/Precautions  Restrictions/Precautions: General Precautions, Fall Risk(HIGH FALL RISK)  Required Braces or Orthoses?: No  Position Activity Restriction  Other position/activity restrictions: Dg Lorenzana is a 70 y.o. male presents to the emergency department by emergency medical services from the office of Dr. Cala Goldberg. Is reported that the patient has not been feeling well for the last couple of days. He has been on antibiotics for sinus-like symptoms as well sore throat pain. He has had febrile illness despite this.   Concerns arose today because the patient had a febrile illness was tachycardic and hypotensive with documented infection positive for streptococcal pharyngitis. Found to have severe sepsis. Concern for lymphoma with L axillary lymph node biopsy performed. Subjective   General  Chart Reviewed: Yes  Response To Previous Treatment: Patient with no complaints from previous session. Family / Caregiver Present: Yes  Subjective  Subjective: Pt denied pain at rest, agreeable to attempting therapy. Family initially expressing some confusion, believing pt not able to get OOB, but clarified with RN, Angelia. General Comment  Comments: supine in bed upon arrival  Pain Screening  Patient Currently in Pain: Denies  Vital Signs  Patient Currently in Pain: Denies       Orientation  Orientation  Overall Orientation Status: Within Functional Limits  Cognition      Objective   Bed mobility  Supine to Sit: Minimal assistance  Scooting: Contact guard assistance  Transfers  Sit to Stand: Contact guard assistance  Stand to sit: Contact guard assistance  Ambulation  Ambulation?: Yes  Ambulation 1  Surface: level tile  Device: Rolling Walker  Assistance: Contact guard assistance  Quality of Gait: mildly unsteady initially with 1 LOB (self corrected with use of RW), progressed to steady gait, decreased step length and rayshawn, 1 standing rest break. Distance: 270 feet   Comments: Pt's HR remainining elevated in 150's during and immediately following ambulation. SpO2 reading at 96% following ambulation on supplemental O2.   Stairs/Curb  Stairs?: No     Balance  Posture: Fair  Sitting - Static: Good  Sitting - Dynamic: Good  Standing - Static: Fair;+  Standing - Dynamic: Fair         G-Code     OutComes Score                                                     AM-PAC Score  AM-PAC Inpatient Mobility Raw Score : 18 (02/07/20 1059)  AM-PAC Inpatient T-Scale Score : 43.63 (02/07/20 1059)  Mobility Inpatient CMS 0-100% Score: 46.58 (02/07/20 1059)  Mobility Inpatient CMS G-Code Modifier : CK (02/07/20 1059)          Goals  Short term goals  Time Frame for Short

## 2020-02-07 NOTE — PROGRESS NOTES
Physical Therapy  Patient declined therapy at this time. Will follow.   Hannah Poole, DPT, ATC-R [id number]

## 2020-02-07 NOTE — PROGRESS NOTES
[facility] PROGRESS NOTE    2/7/2020 3:59 PM        Name: Akin Leighirmer . Admitted: 1/28/2020  Primary Care Provider: Layo Aguiar MD (Tel: [phone])                           Hospital course:   61-year-old gentleman who was admitted for severe sepsis. Secondary to possibly unknown source patient was positive for strep B- at PCPs office. Blood culture so far has remained negative. Infectious disease is following. Patient also noted to be A. fib RVR. Was started on amiodarone drip. Patient with history of valve disease on Coumadin which has been held but is on heparin drip. Found to have lymphadenopathy underwent biopsy. Now back on coumadin    - on steroids for levaquin induced rash    S: Continues with swallowing issues. No more tachycardia. Rash is overall better. Family at bedside with a lot of questions. Continues on Lasix drip. Blood pressure is soft.        Reviewed interval ancillary notes    Current Medications  warfarin (COUMADIN) tablet 2 mg, Daily  amiodarone (CORDARONE) tablet 200 mg, Daily  midodrine (PROAMATINE) tablet 10 mg, TID WC  neomycin-bacitracin-polymyxin (NEOSPORIN) ointment, BID PRN  digoxin (LANOXIN) tablet 125 mcg, Daily  furosemide (LASIX) 100 mg in dextrose 5 % 100 mL infusion, Continuous  ondansetron (ZOFRAN) injection 4 mg, Q6H PRN  morphine (PF) injection 2 mg, Q2H PRN    Or  morphine injection 4 mg, Q2H PRN  oxyCODONE (ROXICODONE) immediate release tablet 5 mg, Q4H PRN    Or  oxyCODONE (ROXICODONE) immediate release tablet 10 mg, Q4H PRN  acetaminophen (TYLENOL) tablet 650 mg, Q4H PRN  lidocaine 4 % external patch 1 patch, Daily PRN  lip balm petroleum free (PHYTOPLEX) stick, PRN  promethazine (PHENERGAN) injection 12.5 mg, Q6H PRN  sodium chloride (OCEAN, BABY AYR) 0.65 % nasal spray 1 spray, PRN  sodium chloride

## 2020-02-07 NOTE — PROGRESS NOTES
[address]   Progress Note  Cardiology      Derek Llanos Schirmer   Admission date:  1/28/2020  CC-f/up for a.fib and hypotension  Subjective:  He remains very weak with very poor appetite  He has diuresed well with IV lasix drip  Complains of having difficulty swallowing  Lymph node biopsy still pending.   Concern for Hodgkin's lymphoma    Objective:  Medications/Labs all Reviewed     amiodarone  200 mg Oral Daily    midodrine  10 mg Oral TID WC    digoxin  125 mcg Oral Daily    warfarin  0.5 mg Oral Daily    doxycycline hyclate  100 mg Oral 2 times per day    sodium chloride flush  10 mL Intravenous 2 times per day    lactobacillus  1 capsule Oral Daily with breakfast    aspirin  81 mg Oral Daily    atorvastatin  10 mg Oral Daily       BMP:   Lab Results   Component Value Date     02/07/2020    K 3.7 02/07/2020    K 4.7 01/28/2020    CL 92 02/07/2020    CO2 38 02/07/2020    BUN 44 02/07/2020    CREATININE 1.3 02/07/2020    MG 1.70 02/06/2020     CBC:    Lab Results   Component Value Date    WBC 10.4 02/07/2020    RBC 3.31 02/07/2020    HGB 8.8 02/07/2020    HCT 26.8 02/07/2020    MCV 81.1 02/07/2020    RDW 18.2 02/07/2020     02/07/2020      PT/INR:    Lab Results   Component Value Date    INR 1.46 (H) 02/07/2020    PROTIME 17.0 (H) 02/07/2020     Cardiac Enzymes:    Lab Results   Component Value Date    CKTOTAL 168 07/28/2012     Lab Results   Component Value Date    TROPONINI 0.04 (H) 01/28/2020    TROPONINI 0.110 (H) 12/22/2013    TROPONINI 0.110 (H) 12/22/2013     BNP:    Lab Results   Component Value Date    BNP 44 01/13/2014     FASTING LIPID PANEL:    Lab Results   Component Value Date    CHOL 98 11/07/2019    HDL 38 11/07/2019    TRIG 70 11/07/2019       Physical Examination:    BP (!) 92/53   Pulse 91   Temp 97.6 °F (36.4 °C) (Temporal)   Resp 19   Ht 6' 1\" (1.854 m)   Wt 178 lb 12.7 oz (81.1 kg)   SpO2 96%   BMI 23.59 kg/m²    Chronically ill appearing    Respiratory:  · Resp Assessment: Normal respiratory effort  · Resp Auscultation: Clear to auscultation bilaterally   Cardiovascular:  · Auscultation: irregular rhythm,harsh systolic murmur  · Palpation:  Nl PMI  · JVP:  < 8 cm H2O  Extremities: trace bilateral peripheral Edema, both hands are swollen  Abdomen:  · Soft, non-tender  · Normal bowel sounds  Extremities:  ·  No Cyanosis or Clubbing  Neurological/Psychiatric:  · Oriented to time, place, and person  · Non-anxious  Skin Warm and dry    Assessment:    Active Problems:    Elevated troponin  Plan: he has had normal coronaries in past    Cardiomyopathy [address]  Plan: marked change in EF.  LVEF now 30%, was 50% 9/11/19   continue po digoxin  Not on beta blocker or ACEI due to low blood pressure  Remains on a lasix drip      S/P AVR (aortic valve replacement)  Plan: possibly worsening prosthetic valve aortic stenosis    Chronic combined systolic and diastolic CHF (congestive heart failure) ([facility])          PAF (paroxysmal atrial fibrillation) ([facility])  Plan:now persistent      Sepsis ([facility])      Tobacco abuse counseling      Lymphadenopathy      Hypervolemia  Plan:due to acute systolic heart failure    Anemia worsening   S/p 1 unit PRBC    Hypotension:   continue midodrine 10 mg po tid      Plan:  1. Stop lasix drip  2. continue midodrine 10 mg po tid  3. Not taking po, so will given intermittent doses of IV lasix  4.  awaiting lymph node biopsy results  5. S/p one unit PRBX    Improved, but still with multiple medical issues. Wife and daughter at bedside.   Case discussed with Dr. Hien Howard heart rate is controlled    Flash Masters M.D., [address] [place] [address]

## 2020-02-07 NOTE — PROGRESS NOTES
[facility],  Clinician    The speech-language pathologist was present, directed the patient's care, made skilled judgment and was responsible for assessment and treatment.     Praveen Frank M.A., [facility]  Speech-Language Pathologist

## 2020-02-07 NOTE — PROGRESS NOTES
OT, OT eval, elevating UE to decrease edema, POC, d/c  REQUIRES OT FOLLOW UP: Yes  Activity Tolerance  Activity Tolerance: Patient Tolerated treatment well  Activity Tolerance: HR increased to 150s when ambulating, decreased to 90s with rest  Safety Devices  Safety Devices in place: Yes  Type of devices: All fall risk precautions in place; Patient at risk for falls; Left in chair;Call light within reach; Chair alarm in place;Nurse notified         Patient Diagnosis(es): The primary encounter diagnosis was Septic shock ([address] Diagnoses of Atrial fibrillation with rapid ventricular response ([address] Elevated international normalized ratio (INR), Streptococcal pharyngitis, Leukopenia, unspecified type, Chronic ethmoidal sinusitis, TYRELL (acute kidney injury) ([address] and chronic elevated troponin were also pertinent to this visit. has a past medical history of Aortic insufficiency, Atrial fibrillation ([address] Breast nodule, CAD (coronary artery disease), Cardiomyopathy ([address] CHF (congestive heart failure) ([address] Hyperlipidemia, Hypertension, Microscopic hematuria, Mitral regurgitation, Nausea & vomiting, Osteoarthritis of knee, Pneumonia, Rotator cuff tear, and Ventricular ectopy. has a past surgical history that includes Inguinal hernia repair; other surgical history (8/2/12); Aortic valve replacement (8/3/2012); Cardiac surgery; Colonoscopy; Endoscopy, colon, diagnostic; Total knee arthroplasty (Left, 12/4/13); Upper gastrointestinal endoscopy (12/24/13); Total knee arthroplasty (Right, 2/3/14); joint replacement; cyst removal; Total shoulder arthroplasty (Right, 2/2/15); and lymph node biopsy (Left, 2/3/2020).     Restrictions  Restrictions/Precautions  Restrictions/Precautions: General Precautions, Fall Risk(HIGH FALL RISK)  Required Braces or Orthoses?: No  Position Activity Restriction  Other position/activity restrictions: Dipak Santo is a 70 y.o. male presents to the emergency department by emergency medical services from the office of Dr. Vicky Nichole. Is reported that the patient has not been feeling well for the last couple of days. He has been on antibiotics for sinus-like symptoms as well sore throat pain. He has had febrile illness despite this. Concerns arose today because the patient had a febrile illness was tachycardic and hypotensive with documented infection positive for streptococcal pharyngitis. Found to have severe sepsis. Concern for lymphoma with L axillary lymph node biopsy performed. Subjective   General  Chart Reviewed: Yes  Response to previous treatment: Patient with no complaints from previous session  Family / Caregiver Present: Yes(wife and daughter)  Diagnosis: Sepsis  Subjective  Subjective: Pt supine in bed upon arrival, agreeable OT tx. Pt reporting nausea in beginning of session and that recently received anti nausea medication.   Vital Signs  Patient Currently in Pain: Denies   Orientation  Orientation  Overall Orientation Status: Within Functional Limits  Objective    ADL  Toileting: Dependent/Total(thayer catheter in place, continent of small BM - total A for vaibhav care)  Additional Comments: Pt required increased time to have BM seated on BSC - declined to complete additional ADLs        Balance  Sitting Balance: Supervision  Standing Balance: Contact guard assistance  Standing Balance  Time: x1 min; x6-7 min  Activity: stand-step transfer, in stance for vaibhav care, functional mobility in room/hallway  Comment: with RW, no LOB  Functional Mobility  Functional - Mobility Device: Rolling Walker  Activity: Other  Assist Level: Contact guard assistance  Functional Mobility Comments: x~300' total between ambulation in room and in hallway, when pt returned to room pt stated \"I think I feel better today than I did yesterday\"  Toilet Transfers  Toilet - Technique: Stand step  Equipment Used: Standard bedside commode  Toilet Transfer: Contact guard assistance  Bed mobility  Supine to Sit: Stand by assistance(with HOB elevated)  Scooting: Stand by assistance  Transfers  Sit to stand: Contact guard assistance(from EOB)                       Cognition  Overall Cognitive Status: [address]  Times per week: 3-5x/week  Times per day: Daily  Current Treatment Recommendations: Strengthening, Gait Training, Safety Education & Training, Balance Training, Patient/Caregiver Education & Training, Self-Care / ADL, Functional Mobility Training, Equipment Evaluation, Education, & procurement, Endurance Training                      AM-PAC Score        AM-PAC Inpatient Daily Activity Raw Score: 14 (02/07/20 1216)  AM-PAC Inpatient ADL T-Scale Score : 33.39 (02/07/20 1216)  ADL Inpatient CMS 0-100% Score: 59.67 (02/07/20 1216)  ADL Inpatient CMS G-Code Modifier : CK (02/07/20 1216)    Goals  Short term goals  Time Frame for Short term goals: Discharge  Short term goal 1: Supervision toileting - dep 2/7  Short term goal 2: Supervision ADL transfers to/from bed, chair and toilet - CGA 2/7  Short term goal 3: Supervision functional mobility using RW or AAD for ADL's/functional activity - CGA 2/7  Short term goal 4: Supervision UB/LB ADL's - not addressed 2/7  Short term goal 5: Supervision bed mobility - SBA supine>sit 2/7  Long term goals  Time Frame for Long term goals : LTG=STG       Therapy Time   Individual Concurrent Group Co-treatment   Time In 1122         Time Out 1209         Minutes 47         Timed Code Treatment Minutes: 47 Minutes     Total Treatment Minutes:  [facility], [address] [place] [address] [id number]

## 2020-02-08 NOTE — PROGRESS NOTES
Pt reassessed. See flow sheet for details. Pt denies any pain. Wife and daughter are at the bedside. Will continue to monitor.

## 2020-02-08 NOTE — PROGRESS NOTES
Perfect served 's [facility] regarding pt's BP 70's/40's. Dr Cindy Contreras called back & order received to give NS 75/hr x 4 hrs.

## 2020-02-08 NOTE — PROGRESS NOTES
Dr Brian Lowery sent perfect serve message regarding starting Coumadin tonight. [facility] or Facility: [facility] From: Chelita Wall RE: donald Schirmer 1940 RM: [id number] See that you ordered Coumadin for pt to start tonight. Pt is having a axillary lymph node biopsy tomorrow. Do you still want to start the Coumadin tonight?  Thanks Need Callback: NO CALLBACK REQ ICU  Read 2:50 PM   0'\"   2/8/20 2:52 PM   Hold it for tonight   0'\"   2/8/20 2:56 PM   Ok will hold tonight  Unread

## 2020-02-08 NOTE — PROGRESS NOTES
[facility]    2/8/2020 10:43 AM        Name: Joann Romero Schirmer . Admitted: 1/28/2020  Primary Care Provider: Savanna Garcia MD (Tel: [phone])                           Hospital course:     79-year-old gentleman who was admitted for severe sepsis. Secondary to possibly unknown source patient was positive for strep B- at PCPs office. Blood culture so far has remained negative. Infectious disease is following. Patient also noted to be A. fib RVR. Was started on amiodarone drip. Patient with history of valve disease on Coumadin which has been held but is on heparin drip. Found to have lymphadenopathy underwent biopsy. Now back on coumadin    - on steroids for levaquin induced rash    S:   Rash is overall better. Swallowing is better some what. No new complaints. BP is still little low in 80's. Lasix drip stopped yesterday. On midodrine.        Reviewed interval ancillary notes    Current Medications  midodrine (PROAMATINE) tablet 10 mg, TID WC  warfarin (COUMADIN) tablet 2 mg, Daily  amiodarone (CORDARONE) tablet 200 mg, Daily  neomycin-bacitracin-polymyxin (NEOSPORIN) ointment, BID PRN  digoxin (LANOXIN) tablet 125 mcg, Daily  ondansetron (ZOFRAN) injection 4 mg, Q6H PRN  morphine (PF) injection 2 mg, Q2H PRN    Or  morphine injection 4 mg, Q2H PRN  oxyCODONE (ROXICODONE) immediate release tablet 5 mg, Q4H PRN    Or  oxyCODONE (ROXICODONE) immediate release tablet 10 mg, Q4H PRN  acetaminophen (TYLENOL) tablet 650 mg, Q4H PRN  lidocaine 4 % external patch 1 patch, Daily PRN  lip balm petroleum free (PHYTOPLEX) stick, PRN  promethazine (PHENERGAN) injection 12.5 mg, Q6H PRN  sodium chloride (OCEAN, BABY AYR) 0.65 % nasal spray 1 spray, PRN  sodium chloride flush 0.9 % injection 10 mL, 2 times per day  sodium chloride flush 0.9 % injection 10 mL,

## 2020-02-08 NOTE — PROGRESS NOTES
Shift assessment completed. BP 87/53 (60). HR 80. Temp 97.5. Pt denies any pain. Pt A&O x4. Cardiac irregular and Afib present. Lung sounds clear and diminished. Bowel sounds active x4. Still has generalized pitting edema. Pt's skin warm, dry, and intact with a generalized rash present. Pt bathed and repositioned. Call light at bedside. Fall precautions implemented.

## 2020-02-08 NOTE — PROGRESS NOTES
Physical Therapy  Nursing care being provided. Will reattempt as able.   Hannah Nicholas, DPT, ATC-R [id number]

## 2020-02-08 NOTE — PROGRESS NOTES
Oncology and Hematology Care   Progress Note      2/8/2020 10:24 AM        Name: Corlis Hem Schirmer . Admitted: 1/28/2020    SUBJECTIVE:  Feeling somewhat better. Generalized weakness. Denies chest pain or SOB.      Reviewed interval ancillary notes    Current Medications  midodrine (PROAMATINE) tablet 10 mg, TID WC  warfarin (COUMADIN) tablet 2 mg, Daily  amiodarone (CORDARONE) tablet 200 mg, Daily  neomycin-bacitracin-polymyxin (NEOSPORIN) ointment, BID PRN  digoxin (LANOXIN) tablet 125 mcg, Daily  ondansetron (ZOFRAN) injection 4 mg, Q6H PRN  morphine (PF) injection 2 mg, Q2H PRN    Or  morphine injection 4 mg, Q2H PRN  oxyCODONE (ROXICODONE) immediate release tablet 5 mg, Q4H PRN    Or  oxyCODONE (ROXICODONE) immediate release tablet 10 mg, Q4H PRN  acetaminophen (TYLENOL) tablet 650 mg, Q4H PRN  lidocaine 4 % external patch 1 patch, Daily PRN  lip balm petroleum free (PHYTOPLEX) stick, PRN  promethazine (PHENERGAN) injection 12.5 mg, Q6H PRN  sodium chloride (OCEAN, BABY AYR) 0.65 % nasal spray 1 spray, PRN  sodium chloride flush 0.9 % injection 10 mL, 2 times per day  sodium chloride flush 0.9 % injection 10 mL, PRN  prochlorperazine (COMPAZINE) injection 10 mg, Q6H PRN  lactobacillus (CULTURELLE) capsule 1 capsule, Daily with breakfast  aspirin chewable tablet 81 mg, Daily  magnesium hydroxide (MILK OF MAGNESIA) 400 MG/5ML suspension 30 mL, Daily PRN  atorvastatin (LIPITOR) tablet 10 mg, Daily  diphenhydrAMINE (BENADRYL) injection 25 mg, Q6H PRN        Objective:  BP (!) 87/53   Pulse 85   Temp 97.5 °F (36.4 °C) (Temporal)   Resp 20   Ht 6' 1\" (1.854 m)   Wt 178 lb 10 oz (81 kg)   SpO2 95%   BMI 23.57 kg/m²     Intake/Output Summary (Last 24 hours) at 2/8/2020 1024  Last data filed at 2/8/2020 0900  Gross per 24 hour   Intake 404.25 ml   Output 2125 ml   Net -1720.75 ml      Wt Readings from Last 3 Encounters:   02/08/20 178 lb 10 oz (81 kg)   01/28/20 178 lb (80.7 kg)   01/23/20 180 lb 6.4 oz (81.8 kg)       General appearance:  Appears comfortable. On oxygen. Eyes: Sclera clear. Pupils equal.  ENT: Moist oral mucosa. Trachea midline, no adenopathy. Cardiovascular: Regular rhythm, normal S1, S2. No murmur. Has edema in lower extremities  Respiratory: Not using accessory muscles. Good inspiratory effort. Clear to auscultation bilaterally, no wheeze or crackles. GI: Abdomen soft, no tenderness, not distended  Musculoskeletal: No cyanosis in digits, neck supple  Neurology: CN 2-12 grossly intact. No speech or motor deficits  Psych: Normal affect. Alert and oriented in time, place and person  Skin: Warm, dry, normal turgor      Labs and Tests:  CBC:   Recent Labs     02/06/20  0136 02/06/20  1101 02/07/20  0320 02/08/20  0441   WBC 6.3  --  10.4 9.9   HGB 6.9* 9.1* 8.8* 8.0*   *  --  128* 117*     BMP:    Recent Labs     02/06/20  0136 02/07/20  0320 02/08/20  0441    141 137   K 4.0 3.7 3.7   CL 99 92* 85*   CO2 32 38* 42*   BUN 43* 44* 50*   CREATININE 1.4* 1.3 1.2   GLUCOSE 121* 110* 122*     Hepatic: No results for input(s): AST, ALT, ALB, BILITOT, ALKPHOS in the last 72 hours. ASSESSMENT AND PLAN    Active Problems:    Elevated troponin    Cardiomyopathy (HCC)    Mitral regurgitation    S/P AVR (aortic valve replacement)    Chronic combined systolic and diastolic CHF (congestive heart failure) ([facility])    Encounter for medication counseling    Hyponatremia    Hypercholesteremia    PAF (paroxysmal atrial fibrillation) ([facility]    Essential hypertension    Sepsis ([facility]    Tobacco abuse counseling    Lymphadenopathy    Hypervolemia  Resolved Problems:    * No resolved hospital problems. *      Lymphoma. Exact history cannot be determined at present. CHF. EF 30%. Hypotension.      Coagulopathy: Supratherapeutic INR, no bleeding at present. Probably due to antibiotics with warfarin.  - Has received Vitamin K.   - Warfarin has been resumed.    - INR 2 today.     Atrial fibrillation with rapid ventricular response. Cardiology managing.      Renal failure with significant prerenal component receiving IV hydration. Creatinine improved.      Anemia. Myelodysplastic syndrome. Performance status poor. Long discussion with the patient, his wife and daughter this morning. Discussed with Dr. Storm Acuña and hospitalist service. Will have excisional biopsy of left axillary lymph node tomorrow. Would consider palliative care if this is T-cell lymphoma. If this is classic Hodgkin's lymphoma ABVD cannot be used because of cardiopulmonary status. Brentuximab or checkpoint inhibitor can be considered but they are indicated for relapsed or refractory HD. Definitely transplant is not indicated. The patient and his family verbalized their understanding. Fortino Verma.  Jeffry Faith MD, [facility]  Hematology and Oncology  [facility]  [phone]

## 2020-02-08 NOTE — PROGRESS NOTES
[address]   Progress Note  Cardiology      Sabra Cirri Schirmer   Admission date:  1/28/2020  CC-f/up for a.fib and hypotension  Subjective:  He remains very weak with very poor appetite  Off lasix. Still hypotensive. Nectar thick liquid diet  Lymph node biopsy still pending.   Concern for lymphoma    Objective:  Medications/Labs all Reviewed     midodrine  5 mg Oral TID WC    warfarin  2 mg Oral Daily    amiodarone  200 mg Oral Daily    digoxin  125 mcg Oral Daily    sodium chloride flush  10 mL Intravenous 2 times per day    lactobacillus  1 capsule Oral Daily with breakfast    aspirin  81 mg Oral Daily    atorvastatin  10 mg Oral Daily       BMP:   Lab Results   Component Value Date     02/08/2020    K 3.7 02/08/2020    K 4.7 01/28/2020    CL 85 02/08/2020    CO2 42 02/08/2020    BUN 50 02/08/2020    CREATININE 1.2 02/08/2020    MG 1.70 02/06/2020     CBC:    Lab Results   Component Value Date    WBC 9.9 02/08/2020    RBC 3.05 02/08/2020    HGB 8.0 02/08/2020    HCT 24.7 02/08/2020    MCV 80.8 02/08/2020    RDW 18.3 02/08/2020     02/08/2020      PT/INR:    Lab Results   Component Value Date    INR 1.41 (H) 02/08/2020    PROTIME 16.4 (H) 02/08/2020     Cardiac Enzymes:    Lab Results   Component Value Date    CKTOTAL 168 07/28/2012     Lab Results   Component Value Date    TROPONINI 0.04 (H) 01/28/2020    TROPONINI 0.110 (H) 12/22/2013    TROPONINI 0.110 (H) 12/22/2013     BNP:    Lab Results   Component Value Date    BNP 44 01/13/2014     FASTING LIPID PANEL:    Lab Results   Component Value Date    CHOL 98 11/07/2019    HDL 38 11/07/2019    TRIG 70 11/07/2019       Physical Examination:    BP (!) 87/53   Pulse 80   Temp 97.5 °F (36.4 °C) (Temporal)   Resp 20   Ht 6' 1\" (1.854 m)   Wt 178 lb 10 oz (81 kg)   SpO2 95%   BMI 23.57 kg/m²    Chronically ill appearing    Respiratory:  · Resp Assessment: Normal respiratory effort  · Resp Auscultation: Clear to auscultation

## 2020-02-09 NOTE — PROGRESS NOTES
Back from OR, A/Ox4   VSS  Monitor AFIB with pvc's & pac's. Surgical site L axillary closed with surgical glue, ABEL. Site without redness, bruising or drainage. Pt denies any c/o. Wife remains at bedside.

## 2020-02-09 NOTE — PROGRESS NOTES
tablet by mouth 2 times daily for 21 days Yes JEANIE Leahy CNP   Lactobacillus (PROBIOTIC ACIDOPHILUS) TABS Take 1 tablet by mouth 2 times daily for 21 days Yes JEANIE Leahy CNP   midodrine (PROAMATINE) 2.5 MG tablet TAKE 1 TABLET BY MOUTH DAILY Yes Bharat Packer MD   lisinopril (PRINIVIL;ZESTRIL) 2.5 MG tablet TAKE ONE TABLET BY MOUTH DAILY Yes Bharat Packer MD   hydrochlorothiazide (HYDRODIURIL) 25 MG tablet TAKE 1 TABLET BY MOUTH DAILY Yes Dalila Chapman MD   spironolactone (ALDACTONE) 25 MG tablet TAKE 0.5 TABLETS BY MOUTH THREE TIMES A WEEK Yes Bharat Packer MD   warfarin (COUMADIN) 5 MG tablet Take 1 tablet by mouth daily Yes Dalila Chapman MD   atorvastatin (LIPITOR) 10 MG tablet TAKE 1 TABLET BY MOUTH ONE TIME A DAY  Yes Bharat Packer MD   metoprolol succinate (TOPROL XL) 25 MG extended release tablet TAKE 0.5 TABLETS BY MOUTH 2 TIMES DAILY Yes Bharat Packer MD   Cholecalciferol (VITAMIN D3) 3000 UNITS TABS Take 2,000 Units by mouth daily  Yes Historical Provider, MD   ondansetron (ZOFRAN-ODT) 4 MG disintegrating tablet Take 4 mg by mouth every 8 hours as needed for Nausea or Vomiting Yes Historical Provider, MD   aspirin 81 MG tablet Take 81 mg by mouth daily  Yes Historical Provider, MD   Ascorbic Acid (VITAMIN C) 500 MG tablet Take 1,000 mg by mouth once a week  Yes Historical Provider, MD      Scheduled Meds:   midodrine  10 mg Oral TID WC    warfarin  2.5 mg Oral Daily    amiodarone  200 mg Oral Daily    digoxin  125 mcg Oral Daily    sodium chloride flush  10 mL Intravenous 2 times per day    lactobacillus  1 capsule Oral Daily with breakfast    aspirin  81 mg Oral Daily    atorvastatin  10 mg Oral Daily     Continuous Infusions:   sodium chloride       PRN Meds:fentanNYL, fentanNYL, HYDROmorphone, HYDROmorphone, ondansetron, labetalol, sodium chloride, neomycin-bacitracin-polymyxin, ondansetron, morphine **OR** morphine, oxyCODONE **OR** oxyCODONE, acetaminophen, lidocaine, lip balm petroleum free, promethazine, sodium chloride, sodium chloride flush, prochlorperazine, magnesium hydroxide, diphenhydrAMINE     Past Medical History:  Past Medical History:   Diagnosis Date    Aortic insufficiency     Atrial fibrillation (HCC)     cardioversion 8/10/12    Breast nodule 8/2012  right    excision-lumpectomy 8/2012    CAD (coronary artery disease)     Cardiomyopathy ([facility] [address] 8/2012    EF 20 %    CHF (congestive heart failure) (HCC)     Hyperlipidemia     Hypertension     Microscopic hematuria 7/29/2012    Mitral regurgitation     Nausea & vomiting 7/29/2012    Osteoarthritis of knee     bilateral knees    Pneumonia     Rotator cuff tear 7/2/2013    Ventricular ectopy 7/29/2012        Past Surgical History:    has a past surgical history that includes Inguinal hernia repair; other surgical history (8/2/12); Aortic valve replacement (8/3/2012); Cardiac surgery; Colonoscopy; Endoscopy, colon, diagnostic; Total knee arthroplasty (Left, 12/4/13); Upper gastrointestinal endoscopy (12/24/13); Total knee arthroplasty (Right, 2/3/14); joint replacement; cyst removal; Total shoulder arthroplasty (Right, 2/2/15); and lymph node biopsy (Left, 2/3/2020). Social History:  Reviewed. reports that he quit smoking about 49 years ago. He has a 40.00 pack-year smoking history. He has never used smokeless tobacco. He reports current alcohol use of about 2.0 standard drinks of alcohol per week. He reports that he does not use drugs. Family History:  Reviewed. family history includes Cancer in his brother; Diabetes in his sister; Heart Disease in his brother, sister, and sister; Marfan Syndrome in his brother; Stroke in his sister. Review of Systems:  · Constitutional: Positive for fatigue. Negative for fever, night sweats, chills, weight changes  · Skin: + Lymphadenopathy.  Negative for rash, dry skin, pruritus, bruising, bleeding, blood clots, or changes in skin weakness  · Neurologic/Psych: Awake and orientated to person, place and time. Calm affect, appropriate mood    Pertinent labs, diagnostic, device, and imaging results reviewed as a part of this visit    Labs:    BMP:   Recent Labs     20    137 138   K 3.7 3.7 3.5   CL 92* 85* 90*   CO2 38* 42* 43*   PHOS  --   --  3.7   BUN 44* 50* 47*   CREATININE 1.3 1.2 1.2   MG  --   --  1.60*     Estimated Creatinine Clearance: 58 mL/min (based on SCr of 1.2 mg/dL). CBC:   Recent Labs     20   WBC 10.4 9.9 9.4   HGB 8.8* 8.0* 7.4*   HCT 26.8* 24.7* 22.2*   MCV 81.1 80.8 82.5   * 117* 103*     Thyroid:   Lab Results   Component Value Date    TSH 1.51 2016    K4LMOLJ 0.89 2014     Lipids:   Lab Results   Component Value Date    CHOL 98 2019    HDL 38 2019    TRIG 70 2019     LFTS:   Lab Results   Component Value Date    ALT 7 2020    AST 8 2020    ALKPHOS 60 2020    PROT 5.3 2020    PROT 6.2 2012    AGRATIO 1.0 2020    BILITOT 0.4 2020     Cardiac Enzymes:   Lab Results   Component Value Date    CKTOTAL 168 2012    TROPONINI 0.04 2020    TROPONINI 0.110 2013    TROPONINI 0.110 2013     Coags:   Lab Results   Component Value Date    PROTIME 17.3 2020    PROTIME 25.7 2016    INR 1.49 2020       EC20  AF with RVR and PVCs    ECHO: 2019   Left ventricle - mildly dilated, borderline function with EF of 50%, inferolateral basal hypokinesis   Mitral valve - mild regurgitation, annular calcification   Aortic valve - bio valve well seated, moderate stenosis with mean gradient   of 34mmHg, trivial regurgitation   Aorta - root 4.0cm, ascending 4.4cm   Tricuspid valve - mild to moderate regurgitation with RVSP of 27mmHg    Echo: 20   -Left ventricular cavity size is mildly dilated.  There is mild concentric   left ventricular hypertrophy.   -Overall left ventricular systolic function appears severely reduced with an   ejection fraction in the 30% range.   -There is severe hypokinesis of the apical wall and moderate hypokinesis of   the inferior walls.   -Indeterminate diastolic function due to irregular heart rhythm. -The mitral valve leaflets appear myxomatous. -Mild to moderate mitral regurgitation.   -A bioprosthetic artificial aortic valve appears well seated with a maximum   velocity of 3.3m/s and a mean gradient of 26mmHg. This suggests at least   moderate aortic stenosis. -Trivial perivalvular aortic regurgitation.   -Mild to moderate tricuspid regurgitation.   -Estimated pulmonary artery systolic pressure is mildly elevated at 40-45   mmHg assuming a right atrial pressure of 8mmHg.   -The left atrium is dilated. CT Chest: 1/29/20  1. Extensive lymphadenopathy as well as suspected mild splenomegaly,   suspicious for lymphoproliferative disorder.  New areas of nodular soft   tissue density in the left breast may represent associated intramammary lymph   nodes but could also represent subcutaneous involvement. 2. Trace bilateral pleural effusions with bilateral lower lobe passive   atelectasis. 3. Mild cardiomegaly status post aortic valve replacement.      Problem List:   Patient Active Problem List    Diagnosis Date Noted    Tobacco abuse counseling     Lymphadenopathy     Hypervolemia     Sepsis ([address] 01/28/2020    Septic shock ([address]     Atrial fibrillation with rapid ventricular response (HCC)     Elevated international normalized ratio (INR)     Streptococcal pharyngitis     Leukopenia     Chronic ethmoidal sinusitis     TYRELL (acute kidney injury) ([address]     Ex-heavy cigarette smoker (20-39 per day)     PAF (paroxysmal atrial fibrillation) ([address] 09/06/2019    Essential hypertension 09/06/2019    Finger amputation, traumatic, 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

## 2020-02-09 NOTE — PROGRESS NOTES
Assessment reviewed, patient sleeping arouses to verbal stimuli. Oral temperature down to 98.8, respirations easy, no distress present.  NPO for procedure in AM.

## 2020-02-09 NOTE — PROGRESS NOTES
Oncology and Hematology Care   Progress Note      2/9/2020 12:18 PM        Name: Skipper Sidles Schirmer . Admitted: 1/28/2020    SUBJECTIVE:  Just had left axillary lymph node biopsy this morning. Tolerated well. Weak.       Reviewed interval ancillary notes    Current Medications  0.9 % sodium chloride bolus, Once  furosemide (LASIX) injection 40 mg, Once  midodrine (PROAMATINE) tablet 10 mg, TID WC  warfarin (COUMADIN) tablet 2.5 mg, Daily  amiodarone (CORDARONE) tablet 200 mg, Daily  neomycin-bacitracin-polymyxin (NEOSPORIN) ointment, BID PRN  digoxin (LANOXIN) tablet 125 mcg, Daily  ondansetron (ZOFRAN) injection 4 mg, Q6H PRN  morphine (PF) injection 2 mg, Q2H PRN    Or  morphine injection 4 mg, Q2H PRN  oxyCODONE (ROXICODONE) immediate release tablet 5 mg, Q4H PRN    Or  oxyCODONE (ROXICODONE) immediate release tablet 10 mg, Q4H PRN  acetaminophen (TYLENOL) tablet 650 mg, Q4H PRN  lidocaine 4 % external patch 1 patch, Daily PRN  lip balm petroleum free (PHYTOPLEX) stick, PRN  promethazine (PHENERGAN) injection 12.5 mg, Q6H PRN  sodium chloride (OCEAN, BABY AYR) 0.65 % nasal spray 1 spray, PRN  sodium chloride flush 0.9 % injection 10 mL, 2 times per day  sodium chloride flush 0.9 % injection 10 mL, PRN  prochlorperazine (COMPAZINE) injection 10 mg, Q6H PRN  lactobacillus (CULTURELLE) capsule 1 capsule, Daily with breakfast  aspirin chewable tablet 81 mg, Daily  magnesium hydroxide (MILK OF MAGNESIA) 400 MG/5ML suspension 30 mL, Daily PRN  atorvastatin (LIPITOR) tablet 10 mg, Daily  diphenhydrAMINE (BENADRYL) injection 25 mg, Q6H PRN        Objective:  BP 91/66   Pulse 88   Temp 98.6 °F (37 °C) (Temporal)   Resp 14   Ht 6' 1\" (1.854 m)   Wt 181 lb 14.1 oz (82.5 kg)   SpO2 100%   BMI 24.00 kg/m²     Intake/Output Summary (Last 24 hours) at 2/9/2020 1218  Last data filed at 2/9/2020 0926  Gross per 24 hour   Intake 550 ml   Output 1725 ml   Net -1175 ml      Wt Readings from Last 3 Encounters: 02/09/20 181 lb 14.1 oz (82.5 kg)   01/28/20 178 lb (80.7 kg)   01/23/20 180 lb 6.4 oz (81.8 kg)       General appearance:  Appears comfortable. On oxygen. Eyes: Sclera clear. Pupils equal.  ENT: Moist oral mucosa. Trachea midline, no adenopathy. Cardiovascular: Regular rhythm, normal S1, S2. No murmur. Has edema in lower extremities  Respiratory: Not using accessory muscles. Good inspiratory effort. Clear to auscultation bilaterally, no wheeze or crackles. GI: Abdomen soft, no tenderness, not distended  Musculoskeletal: No cyanosis in digits, neck supple  Neurology: CN 2-12 grossly intact. No speech or motor deficits  Psych: Normal affect. Alert and oriented in time, place and person  Skin: Warm, dry, normal turgor      Labs and Tests:  CBC:   Recent Labs     02/07/20  0320 02/08/20  0441 02/09/20  0425   WBC 10.4 9.9 9.4   HGB 8.8* 8.0* 7.4*   * 117* 103*     BMP:    Recent Labs     02/07/20  0320 02/08/20  0441 02/09/20  0425    137 138   K 3.7 3.7 3.5   CL 92* 85* 90*   CO2 38* 42* 43*   BUN 44* 50* 47*   CREATININE 1.3 1.2 1.2   GLUCOSE 110* 122* 124*     Hepatic: No results for input(s): AST, ALT, ALB, BILITOT, ALKPHOS in the last 72 hours. ASSESSMENT AND PLAN    Active Problems:    Elevated troponin    Cardiomyopathy (HCC)    Mitral regurgitation    S/P AVR (aortic valve replacement)    Chronic combined systolic and diastolic CHF (congestive heart failure) ([facility])    Encounter for medication counseling    Hyponatremia    Hypercholesteremia    PAF (paroxysmal atrial fibrillation) ([facility] [address]    Essential hypertension    Sepsis ([facility] [address]    Tobacco abuse counseling    Lymphadenopathy    Hypervolemia  Resolved Problems:    * No resolved hospital problems. *      Lymphoma. Exact history cannot be determined at present. CHF. EF 30%.       Hypotension.      Coagulopathy: Supratherapeutic INR, no bleeding at present. Probably due to antibiotics with warfarin.  - Has received Vitamin K.   - Warfarin

## 2020-02-09 NOTE — PROGRESS NOTES
Message sent via [facility] to Dr Jalil Palacios regarding possible blood in stool.   Awaiting response back

## 2020-02-09 NOTE — PROGRESS NOTES
Pt reassessed, no acute changes noted. Remains A/Ox4, following commands. VSS  Monitor AFIB with pac's, pvc's. Lungs continue diminished BLL's & crackles R base. O2 continues 2L/NC  Abrasion/redness noticed below R nare when canula tubing removed. Pt denies any c/o. Wife remains at bedside. See flow sheets for complete assessment. Unit of PRBC's started as ordered. Pt instructed to notify RN of any itching, hives, pain, SOB, chills.  Pt v/u

## 2020-02-09 NOTE — PROGRESS NOTES
SBP 70's, rechecked in left arm lower arm got a reading of 91/47 with a MAP of 57. Monitor remains Afib with a controled rate of 80's to 90's. Patient felt hot to touch, temporal temp was 101.0 and oral temp was 100.1 will continue to monitor closely.

## 2020-02-09 NOTE — PROGRESS NOTES
Hepatic:   No results for input(s): AST, ALT, ALB, BILITOT, ALKPHOS in the last 72 hours. Problem List  Active Problems:    Elevated troponin    Cardiomyopathy (HCC)    Mitral regurgitation    S/P AVR (aortic valve replacement)    Chronic combined systolic and diastolic CHF (congestive heart failure) ([facility] [address]    Encounter for medication counseling    Hyponatremia    Hypercholesteremia    PAF (paroxysmal atrial fibrillation) ([facility] [address]    Essential hypertension    [facility]    Tobacco abuse counseling    Lymphadenopathy    Hypervolemia  Resolved Problems:    * No resolved hospital problems. *       Assessment & Plan:     1. Severe sepsis  -strep thoat swab positive. Sinusitis noted.   -had allergic reaction to levaquin.   -  Finished doxy course. 2.  Supratherapeutic INR-now resolved. -cont coumadin       3. A. fib with RVR  -Likely secondary to sepsis   -Now on amio and digoxin. Resolved. 4.  Hodgkin's lymphoma vs T cell lymphoma. Left axillary node bx done and results pending. Oncology on board. 5. Rash-? Secondary Levaquin. improving overall. Continue  steroids. 6. Chronic combined CHF-volume overload from all IV fluids he received. - diuresed with iv lasix. Now off lasix due to hypotension. 7. Dysphagia: Speech eval noted. On modified diet. MBS showed no mally aspiration. Continue dysphagia diet. Continue to monitor. 8. Anemia:  Had 1 unit so far. Hemoccult neg. Hb down to 7.4 again. Given MDS, CHF , transfuse 1 unit today. 9. Hypotension: improved. Cont midodrine 10 mg tid. Diet: Dietary Nutrition Supplements: Standard High Calorie Oral Supplement, Clear Liquid Oral Supplement, Frozen Oral Supplement  DIET DYSPHAGIA SOFT AND BITE-SIZED;  No Drinking Straw  Code:Full Code  DVT PPX- coumadin  ptot  - snf    Disp: cont ICU care       Edie Huerta MD   2/9/2020 9:50 AM

## 2020-02-09 NOTE — PROGRESS NOTES
See complex assessment, vitals, I&O for complete assessment. A/Ox4, following commands   VSS  Monitor AFIB with pac's & pvc's. Resp easy  Lungs diminished BLL's with crackles noted in RLL. Pt denies SOB at rest.  Abd soft +BSx4  Hall with qs urine. Skin warm, dry, swollen with poor turgor. Pulses palp. Rash noted to bilateral upper lower extremities & trunk. Surgical incision L groin ABEL, C/D/I. Pt denies any pain or other c/o. Wife at bedside. Discussed plan of care- comfort, safety, surgical procedure, activity, assist with needs prn. Pt & wife v/u. To OR per bed per transporter with tele pack in place.

## 2020-02-09 NOTE — PROGRESS NOTES
Assessment completed, SBP remains in the 80's with a MAP 60. Monitor Afib, with more frequent PVC's, AM blood drawn and sent to lab, awaiting results. Assisted with reposition for comfort.

## 2020-02-09 NOTE — PLAN OF CARE
physiological function of skin and  mucous membranes.   2/8/2020 2217 by Danny Roca RN  Outcome: Ongoing    Problem: Musculor/Skeletal Functional Status  Goal: Highest potential functional level  2/8/2020 2217 by Danny Roca RN    Problem: Musculor/Skeletal Functional Status  Goal: Absence of falls  2/8/2020 2217 by Danny Roca RN  Outcome: Ongoing    Problem: Nutrition  Goal: Optimal nutrition therapy  2/8/2020 2217 by Danny Roca RN  Outcome: Ongoing  2/8/2020 2215 by Danny Roca RN

## 2020-02-09 NOTE — ANESTHESIA PRE PROCEDURE
Department of Anesthesiology  Preprocedure Note       Name:  Leticia Erickson   Age:  70 y.o.  :  1940                                          MRN:  [id number]         Date:  2020      Surgeon: Kristin Ewing):  Noah Driscoll MD    Procedure: AXILLARY LYMPH NODE BIOPSY DISSECTION (Left Axilla)    Medications prior to admission:   Prior to Admission medications    Medication Sig Start Date End Date Taking?  Authorizing Provider   amoxicillin-clavulanate (AUGMENTIN) 875-125 MG per tablet Take 1 tablet by mouth 2 times daily for 21 days 20  JEANIE Bahena CNP   Lactobacillus (PROBIOTIC ACIDOPHILUS) TABS Take 1 tablet by mouth 2 times daily for 21 days 20  JEANIE Bahena CNP   midodrine (PROAMATINE) 2.5 MG tablet TAKE 1 TABLET BY MOUTH DAILY 10/29/19   Elvia Vasquez MD   lisinopril (PRINIVIL;ZESTRIL) 2.5 MG tablet TAKE ONE TABLET BY MOUTH DAILY 10/14/19   Elvia Vasuqez MD   hydrochlorothiazide (HYDRODIURIL) 25 MG tablet TAKE 1 TABLET BY MOUTH DAILY 10/11/19   Blas Vázquez MD   spironolactone (ALDACTONE) 25 MG tablet TAKE 0.5 TABLETS BY MOUTH THREE TIMES A WEEK 19   Elvia Vasquez MD   warfarin (COUMADIN) 5 MG tablet Take 1 tablet by mouth daily 19   Blas Vázquez MD   atorvastatin (LIPITOR) 10 MG tablet TAKE 1 TABLET BY MOUTH ONE TIME A DAY  19   Elvia Vasquez MD   metoprolol succinate (TOPROL XL) 25 MG extended release tablet TAKE 0.5 TABLETS BY MOUTH 2 TIMES DAILY 2/15/19   Elvia Vasquez MD   Cholecalciferol (VITAMIN D3) 3000 UNITS TABS Take 2,000 Units by mouth daily     Historical Provider, MD   ondansetron (ZOFRAN-ODT) 4 MG disintegrating tablet Take 4 mg by mouth every 8 hours as needed for Nausea or Vomiting    Historical Provider, MD   aspirin 81 MG tablet Take 81 mg by mouth daily     Historical Provider, MD   Ascorbic Acid (VITAMIN C) 500 MG tablet Take 1,000 mg by mouth once a week     Historical Provider, MD       Current mL at 02/08/20 2022    sodium chloride flush 0.9 % injection 10 mL  10 mL Intravenous PRN Jazmin Garcia MD        prochlorperazine (COMPAZINE) injection 10 mg  10 mg Intravenous Q6H PRN Jazmin Garcia MD   10 mg at 02/01/20 1618    lactobacillus (CULTURELLE) capsule 1 capsule  1 capsule Oral Daily with breakfast Jazmin Garcia MD   1 capsule at 02/08/20 0900    aspirin chewable tablet 81 mg  81 mg Oral Daily Jazmin Garcia MD   81 mg at 02/08/20 0957    magnesium hydroxide (MILK OF MAGNESIA) 400 MG/5ML suspension 30 mL  30 mL Oral Daily PRN Jazmin Garcia MD        atorvastatin (LIPITOR) tablet 10 mg  10 mg Oral Daily Jazmin Garcia MD   10 mg at 02/08/20 0958    diphenhydrAMINE (BENADRYL) injection 25 mg  25 mg Intravenous Q6H PRN Jazmin Garcia MD   25 mg at 02/06/20 2100       Allergies:     Allergies   Allergen Reactions    Clindamycin/Lincomycin Rash    Levaquin [Levofloxacin]      Rash, swollen neck    Cefdinir Rash       Problem List:    Patient Active Problem List   Diagnosis Code    Elevated troponin R79.89    Cardiomyopathy ([facility] I42.9    Nonrheumatic aortic valve insufficiency I35.1    Mitral regurgitation I34.0    Microscopic hematuria R31.29    S/P AVR (aortic valve replacement) Z95.2    Chronic combined systolic and diastolic CHF (congestive heart failure) ([facility]) I50.42    Arthritis of knee M17.10    Encounter for medication counseling Z71.89    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    Hyponatremia E87.1    Primary localized osteoarthrosis, 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    H/O total shoulder replacement Z96.619    Hypercholesteremia E78.00    Finger amputation, traumatic, 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    PAF (paroxysmal atrial fibrillation) ([facility]) I48.0    Essential hypertension I10    Sepsis ([facility] A41.9    Septic shock ([facility]) A41.9, R65.21    Atrial fibrillation with rapid ventricular response ([facility] I48.91    Elevated international normalized ratio (INR) R79.1    Streptococcal pharyngitis J02.0    Leukopenia D72.819    Chronic ethmoidal sinusitis J32.2    TYRELL (acute kidney injury) ([facility] N17.9    Ex-heavy cigarette smoker (20-39 per day) Z87.891    Tobacco abuse counseling Z71.6    Lymphadenopathy R59.1    Hypervolemia E87.70       Past Medical History:        Diagnosis Date    Aortic insufficiency     Atrial fibrillation (HCC)     cardioversion 8/10/12    Breast nodule 2012  right    excision-lumpectomy 2012    CAD (coronary artery disease)     Cardiomyopathy ([facility] [facility] 2012    EF 20 %    CHF [facility]congestive heart failure) ([facility])     Hyperlipidemia     Hypertension     Microscopic hematuria 2012    Mitral regurgitation     Nausea & vomiting 2012    Osteoarthritis of knee     bilateral knees    Pneumonia     Rotator cuff tear 2013    Ventricular ectopy 2012       Past Surgical History:        Procedure Laterality Date    AORTIC VALVE REPLACEMENT  8/3/2012    moderate mitral regurg    CARDIAC SURGERY      COLONOSCOPY      CYST REMOVAL      chest    ENDOSCOPY, COLON, DIAGNOSTIC      INGUINAL HERNIA REPAIR      bilateral in [facility] [address]      both    LYMPH NODE BIOPSY Left 2/3/2020    INGUINAL LYMPH NODE BIOPSY EXCISION performed by Ramesh Swain MD at [address] [facility]  12    excision right breast mass (lumpectomy)    SHOULDER ARTHROPLASTY Right 2/2/15    right reverse total shoulder arthroplasty    TOTAL KNEE ARTHROPLASTY Left 13    TOTAL KNEE ARTHROPLASTY Right 2/3/14    RIGHT TOTAL KNEE REPLACEMENT-BAR       UPPER GASTROINTESTINAL ENDOSCOPY  13       Social History:    Social History     Tobacco Use    Smoking status: Former Smoker     Packs/day: 2.00     Years: 20.00     Pack years: 40.00     Last attempt to quit: 1970     Years since quittin.5    Smokeless tobacco: Never Used   Substance Use Topics    Alcohol use:  Yes     Alcohol/week: 2.0 Date    LABABO O 08/01/2012    [address] Positive 08/01/2012       Anesthesia Evaluation  Patient summary reviewed and Nursing notes reviewed no history of anesthetic complications:   Airway: Mallampati: II  TM distance: >3 FB   Neck ROM: full  Mouth opening: > = 3 FB Dental:    (+) upper dentures and partials  Comment: Lower partials    Pulmonary:   (+) pneumonia:  COPD: moderate and severe,  shortness of breath:  decreased breath sounds,                            ROS comment: 40 pack year history   Cardiovascular:  Exercise tolerance: poor (<4 METS),   (+) hypertension (patient with ongoing hypotension on floor):, valvular problems/murmurs (h/o AVR): MR and AS, CAD:, dysrhythmias: atrial fibrillation, CHF: diastolic and systolic, pulmonary hypertension: mild and moderate,       ECG reviewed  Rhythm: irregular  Rate: abnormal  Echocardiogram reviewed  Stress test reviewed  Cleared by cardiology           ROS comment: Patient in Afib with RVR. Starting an amiodarone drip this afternoon (1/30). Levophed gtt at 8mcg/min with Maps in the 50-60's. Echo: 1/29/20   -Left ventricular cavity size is mildly dilated. There is mild concentric left ventricular hypertrophy.   -Overall left ventricular systolic function appears severely reduced with an ejection fraction in the 30% range.   -There is severe hypokinesis of the apical wall and moderate hypokinesis of the inferior walls.   -Indeterminate diastolic function due to irregular heart rhythm.   -The mitral valve leaflets appear myxomatous.   -Mild to moderate mitral regurgitation.   -A bioprosthetic artificial aortic valve appears well seated with a maximum velocity of 3.3m/s and a mean gradient of 26mmHg.  This suggests at least moderate aortic stenosis.   -Trivial perivalvular aortic regurgitation.   -Mild to moderate tricuspid regurgitation.   -Estimated pulmonary artery systolic pressure is mildly elevated at 40-45mmHg assuming a right atrial pressure of 8mmHg.   -The left atrium is dilated.     Stress Test:      CT Chest: 20  1. Extensive lymphadenopathy as well as suspected mild splenomegaly, suspicious for lymphoproliferative disorder.  New areas of nodular soft tissue density in the left breast may represent associated intramammary lymph nodes but could also represent subcutaneous involvement. 2. Trace bilateral pleural effusions with bilateral lower lobe passive atelectasis. 3. Mild cardiomegaly status post aortic valve replacement.     EC20  AF with RVR and PVCs     Neuro/Psych:               GI/Hepatic/Renal:   (+) renal disease: ARF,           Endo/Other:    (+) blood dyscrasia (on heparain gtt): anticoagulation therapy and anemia:., electrolyte abnormalities, . Abdominal:           Vascular:                                      Anesthesia Plan      MAC     ASA 4       Induction: intravenous. MIPS: Postoperative opioids intended, Prophylactic antiemetics administered and Postoperative trial extubation. Anesthetic plan and risks discussed with patient. Use of blood products discussed with patient whom consented to blood products.            MEDICATIONS:  Current Facility-Administered Medications   Medication Dose Route Frequency Provider Last Rate Last Dose    midodrine (PROAMATINE) tablet 10 mg  10 mg Oral TID  Rhonda Stoll MD   10 mg at 20 1805    warfarin (COUMADIN) tablet 2.5 mg  2.5 mg Oral Daily Yudith Schaffer MD   Stopped at 20 1910    amiodarone (CORDARONE) tablet 200 mg  200 mg Oral Daily JEANIE Esqueda - CNP   200 mg at 20 0957    neomycin-bacitracin-polymyxin (NEOSPORIN) ointment   Topical BID PRN Gricelda Toledo MD        digoxin (LANOXIN) tablet 125 mcg  125 mcg Oral Daily Leticia Ortiz MD   125 mcg at 20 0958    ondansetron (ZOFRAN) injection 4 mg  4 mg Intravenous Q6H PRN Milton Damon MD   4 mg at 20 1017    morphine (PF) injection 2 mg  2 mg MCV 82.5 02/09/2020     (L) 02/09/2020    GLUCOSE 124 (H) 02/09/2020    PROTIME 17.3 (H) 02/09/2020    INR 1.49 (H) 02/09/2020    APTT 82.2 (H) 02/04/2020       Lab Results   Component Value Date     02/09/2020    K 3.5 02/09/2020    CL 90 (L) 02/09/2020    CO2 43 (H) 02/09/2020    PHOS 3.0 02/06/2020    BUN 47 (H) 02/09/2020    CREATININE 1.2 02/09/2020       Problem List:  Patient Active Problem List   Diagnosis    Elevated troponin    Cardiomyopathy ([address]    Nonrheumatic aortic valve insufficiency    Mitral regurgitation    Microscopic hematuria    S/P AVR (aortic valve replacement)    Chronic combined systolic and diastolic CHF (congestive heart failure) (HCC)    Arthritis of knee    Encounter for medication counseling    Primary localized osteoarthrosis of shoulder region    Hyponatremia    Primary localized osteoarthrosis, pelvic region and thigh    H/O total shoulder replacement    Hypercholesteremia    Finger amputation, traumatic, initial encounter    PAF (paroxysmal atrial fibrillation) ([address]    Essential hypertension    Sepsis ([address]    Septic shock ([address]    Atrial fibrillation with rapid ventricular response ([address]    Elevated international normalized ratio (INR)    Streptococcal pharyngitis    Leukopenia    Chronic ethmoidal sinusitis    TYRELL (acute kidney injury) ([address]    Ex-heavy cigarette smoker (20-39 per day)    Tobacco abuse counseling    Lymphadenopathy    Hypervolemia           Rigo García MD   2/9/2020

## 2020-02-09 NOTE — PLAN OF CARE
free from infection  Description  Will remain free from infection  2/8/2020 2215 by Damián De Paz RN  Outcome: Ongoing  2/8/2020 1720 by Lukas Ledesma  Outcome: Ongoing  2/8/2020 1718 by Lukas Ledesma  Outcome: Ongoing  2/8/2020 1716 by Lukas Ledesma  Outcome: Ongoing  2/8/2020 1716 by Lukas Ledesma  Outcome: Ongoing  2/8/2020 1714 by Lukas Ledesma  Outcome: Ongoing  2/8/2020 1707 by Lukas Ledesma  Outcome: Ongoing     Problem: Safety:  Goal: Free from accidental physical injury  Description  Free from accidental physical injury  2/8/2020 2215 by Damián De Paz RN  Outcome: Ongoing  2/8/2020 1720 by Lukas Ledesma  Outcome: Ongoing  2/8/2020 1718 by Lukas Ledesma  Outcome: Ongoing  2/8/2020 1716 by Lukas Ledesma  Outcome: Ongoing  2/8/2020 1716 by Lukas Ledesma  Outcome: Ongoing  2/8/2020 1714 by Lukas Ledesma  Outcome: Ongoing  2/8/2020 1707 by Lukas Ledesma  Outcome: Ongoing  Goal: Free from intentional harm  Description  Free from intentional harm  2/8/2020 2215 by Damián De Paz RN  Outcome: Ongoing  2/8/2020 1720 by Lukas Ledesma  Outcome: Ongoing  2/8/2020 1718 by Lukas Ledesma  Outcome: Ongoing  2/8/2020 1716 by Lukas Ledesma  Outcome: Ongoing  2/8/2020 1716 by Lukas Ledesma  Outcome: Ongoing  2/8/2020 1714 by Lukas Ledesma  Outcome: Ongoing  2/8/2020 1707 by Lukaspapito Ledesma  Outcome: Ongoing     Problem: Daily Care:  Goal: Daily care needs are met  Description  Daily care needs are met  2/8/2020 2215 by Damián De Paz RN  Outcome: Ongoing  2/8/2020 1720 by Lukas Ledesma  Outcome: Ongoing  2/8/2020 1718 by Lukaspapito Ledesma  Outcome: Ongoing  2/8/2020 1716 by Lukaspapito Ledesma  Outcome: Ongoing  2/8/2020 1716 by Lukas Ledesma  Outcome: Ongoing  2/8/2020 1714 by Lukas Ledesma  Outcome: Ongoing  2/8/2020 1707 by Lukas Ledesma  Outcome: Ongoing     Problem: Pain:  Goal: Patient's pain/discomfort is manageable  Description  Patient's pain/discomfort is manageable  2/8/2020 2215 by Damián De Paz RN  Outcome: Ongoing  2/8/2020 1720 by Alvarado Henry Problem: Musculor/Skeletal Functional Status  Goal: Highest potential functional level  2/8/2020 2215 by Chandana Mirza RN  Outcome: Ongoing  2/8/2020 1720 by Drybranch Creed  Outcome: Ongoing  2/8/2020 1718 by Drybranch Creed  Outcome: Ongoing  2/8/2020 1716 by Inga Creed  Outcome: Ongoing  2/8/2020 1716 by Inga Creed  Outcome: Ongoing  2/8/2020 1714 by Drybranch Creed  Outcome: Ongoing  2/8/2020 1707 by Inga Creed  Outcome: Ongoing  Goal: Absence of falls  2/8/2020 2215 by Chandana Mirza RN  Outcome: Ongoing  2/8/2020 1720 by Drybranch Creed  Outcome: Ongoing  2/8/2020 1718 by Inga Creed  Outcome: Ongoing  2/8/2020 1716 by Inga Creed  Outcome: Ongoing  2/8/2020 1716 by Inga Creed  Outcome: Ongoing  2/8/2020 1714 by Inga Creed  Outcome: Ongoing  2/8/2020 1707 by Inga Creed  Outcome: Ongoing     Problem: Nutrition  Goal: Optimal nutrition therapy  2/8/2020 2215 by Chandana Mirza RN  Outcome: Ongoing  2/8/2020 1720 by Drybranch Creed  Outcome: Ongoing  2/8/2020 1718 by Inga Creed  Outcome: Ongoing  2/8/2020 1716 by Drybranch Creed  Outcome: Ongoing  2/8/2020 1716 by Drybranch Creed  Outcome: Ongoing  2/8/2020 1714 by Inga Creed  Outcome: Ongoing  2/8/2020 1707 by Inga Creed  Outcome: Ongoing

## 2020-02-10 NOTE — PROGRESS NOTES
Occupational/Physcial Therapy  Roylene Pulley Schirmer    Attempted OT tx. RN requesting to hold therapy this date as pt recently returned from EGD. Will follow up for OT tx as time and schedule allows. Thank you,    Kori Del Cid, LOVELY OTR/L [id number]    PT treatment attempted. During chart review, noted OT note above indicating RN request to HOLD therapy this date. PT will therefore attempt later as schedule allows.     Thank you,  Gerardo Royal, PT, DPT, [id number]

## 2020-02-10 NOTE — PROGRESS NOTES
Speech Language Pathology  Hold    Quinten Alcantara Schirmer  1940    Attempted to see pt for dysphagia tx; pt is currently held NPO until seen by GI. Will re-attempt as therapy schedule and pt availability allow.     Thanks,  GILMER Lemon  Speech-Language Pathologist

## 2020-02-10 NOTE — OP NOTE
[address]                     [facility], [address] [place] [address]                                OPERATIVE REPORT    PATIENT NAME: Rosas Rendon                  :        1940  MED REC NO:   [id number]                          ROOM:       [id number]  ACCOUNT NO:   [de-identified]                           ADMIT DATE: 2020  PROVIDER:     Celestino Mistry MD    DATE OF PROCEDURE:  2020    PREOPERATIVE DIAGNOSIS:  Left inguinal adenopathy. POSTOPERATIVE DIAGNOSIS:  Left inguinal adenopathy. OPERATION PERFORMED:  Excision of left inguinal lymph node. SURGEON:  Celestino Mistry MD    ANESTHESIA:  MAC with local anesthetic. INDICATIONS:  This is a 72-year-old male, who presents with multiple  medical comorbidities, who also has diffuse adenopathy concerning for  lymphoma or myelodysplastic syndrome and lymph node biopsy requested. The left inguinal lymph node was identified to be enlarged on CT and  pathologic and that would be safest for excisional biopsy. Risks,  benefits, and alternative treatments of an excisional biopsy were  discussed. Details of the procedures were discussed. All questions  were answered. The patient understood, agreed and wished to proceed. OPERATIVE PROCEDURE:  The patient was brought to the operative suite and  laid in supine position. IV sedation was given and well tolerated. The  patient's left groin was prepped and draped in the usual sterile  fashion. After a proper time-out was performed, verifying operative  site, procedure and patient, local anesthetic was injected and incision  was made over the palpable lymph node. This was deepened through the  subcutaneous tissue to the node with cautery. The lymph node was  delivered into the wound and dissected circumferentially with a LigaSure  Small Jaw Impact device. The node was removed in its entirety and  passed off as specimen for further analysis.   The wound was
lymph  nodes, there were multiple slightly enlarged lymph nodes that were in a  clump and these were delivered into the wound, dissected  circumferentially with a Small Jaw LigaSure device and passed off as a  single block of tissue that did contain at least one and likely multiple  palpable lymph nodes. These were passed off fresh to Pathology for  further analysis. The wound was inspected for hemostasis, which had  been obtained with pressure and cautery. The deep tissue was then  closed with 3-0 Vicryl suture. The skin was then closed with running  4-0 sutures. The wounds were then cleaned and dressed with Dermabond. The patient tolerated the procedure well and was transferred to PACU in  stable condition. SPECIMEN:  Left axillary lymph node. DRAINS:  None. COMPLICATIONS:  None. ESTIMATED BLOOD LOSS:  Less than 50 mL.         Dorothea Gill MD    D: 02/09/2020 10:14:43       T: 02/09/2020 20:26:12     [email or web address]  Job#: [id number]     Doc#: [id number]    CC:

## 2020-02-10 NOTE — PROGRESS NOTES
Assessment reviewed, no acute changes noted from prior assessment. Patient sleeping arouses to verbal stimuli, denies any needs. Will continue to monitor.

## 2020-02-10 NOTE — PROGRESS NOTES
[facility]    2/10/2020 4:21 PM        Name: Xavier Graver Schirmer . Admitted: 1/28/2020  Primary Care Provider: Nickolas Jarquin MD (Tel: [phone])                           Hospital course:     26-year-old gentleman who was admitted for severe sepsis. Secondary to possibly unknown source patient was positive for strep B- at PCPs office. Blood culture so far has remained negative. Infectious disease is following. Patient also noted to be A. fib RVR. Was started on amiodarone drip. Patient with history of valve disease on Coumadin which has been held but is on heparin drip. Found to have lymphadenopathy underwent biopsy. Now back on coumadin    - on steroids for levaquin induced rash    S:    Had GI bleed lastnight. On PPI. Anticoagulation on hold. GI consulted.     Reviewed interval ancillary notes    Current Medications  pantoprazole (PROTONIX) injection 40 mg, BID  midodrine (PROAMATINE) tablet 10 mg, TID WC  warfarin (COUMADIN) tablet 2.5 mg, Daily  amiodarone (CORDARONE) tablet 200 mg, Daily  neomycin-bacitracin-polymyxin (NEOSPORIN) ointment, BID PRN  digoxin (LANOXIN) tablet 125 mcg, Daily  ondansetron (ZOFRAN) injection 4 mg, Q6H PRN  morphine (PF) injection 2 mg, Q2H PRN    Or  morphine injection 4 mg, Q2H PRN  oxyCODONE (ROXICODONE) immediate release tablet 5 mg, Q4H PRN    Or  oxyCODONE (ROXICODONE) immediate release tablet 10 mg, Q4H PRN  acetaminophen (TYLENOL) tablet 650 mg, Q4H PRN  lidocaine 4 % external patch 1 patch, Daily PRN  lip balm petroleum free (PHYTOPLEX) stick, PRN  promethazine (PHENERGAN) injection 12.5 mg, Q6H PRN  sodium chloride (OCEAN, BABY AYR) 0.65 % nasal spray 1 spray, PRN  sodium chloride flush 0.9 % injection 10 mL, 2 times per day  sodium chloride flush 0.9 % injection 10 mL, PRN  prochlorperazine (COMPAZINE) injection 10 mg, Q6H PRN  lactobacillus (CULTURELLE) capsule 1 capsule, Daily with breakfast  aspirin chewable tablet 81 mg, Daily  magnesium hydroxide (MILK OF MAGNESIA) 400 MG/5ML suspension 30 mL, Daily PRN  atorvastatin (LIPITOR) tablet 10 mg, Daily  diphenhydrAMINE (BENADRYL) injection 25 mg, Q6H PRN        Objective:  BP (!) 71/52   Pulse 85   Temp 99 °F (37.2 °C) (Temporal)   Resp 19   Ht 6' 1\" (1.854 m)   Wt 173 lb 1 oz (78.5 kg)   SpO2 100%   BMI 22.83 kg/m²     Intake/Output Summary (Last 24 hours) at 2/10/2020 1621  Last data filed at 2/10/2020 1424  Gross per 24 hour   Intake 851.67 ml   Output 2575 ml   Net -1723.33 ml      Wt Readings from Last 3 Encounters:   02/10/20 173 lb 1 oz (78.5 kg)   01/28/20 178 lb (80.7 kg)   01/23/20 180 lb 6.4 oz (81.8 kg)       General appearance:  Appears comfortable  Eyes: Sclera clear. Pupils equal.  ENT: Moist oral mucosa. Trachea midline, no adenopathy. Cardiovascular: Regular rhythm, normal S1, S2. No murmur. No edema in lower extremities  Respiratory: Not using accessory muscles. Good inspiratory effort. Clear to auscultation bilaterally, no wheeze or crackles. GI: Abdomen soft, no tenderness, not distended, normal bowel sounds  Musculoskeletal: No cyanosis in digits, neck supple  Neurology: CN 2-12 grossly intact. No speech or motor deficits  Psych: Normal affect. Alert and oriented in time, place and person  Skin: Rash resolved. Labs and Tests:  CBC:   Recent Labs     02/08/20 0441 02/09/20  0425  02/10/20  0200 02/10/20  0510 02/10/20  1143   WBC 9.9 9.4  --   --  8.2  --    HGB 8.0* 7.4*   < > 7.9* 8.0* 8.0*   * 103*  --   --  107*  --     < > = values in this interval not displayed.      BMP:    Recent Labs     02/08/20 0441 02/09/20  0425 02/10/20  0510    138 141   K 3.7 3.5 3.7   CL 85* 90* 92*   CO2 42* 43* 42*   BUN 50* 47* 41*   CREATININE 1.2 1.2 1.1   GLUCOSE 122* 124* 129*     Hepatic:   No results for input(s): AST, ALT, ALB, ICU care       Bernice Harper MD   2/10/2020 4:21 PM

## 2020-02-10 NOTE — PROGRESS NOTES
Max:72      Intake/Output:  I/O last 3 completed shifts: In: 1843.7 [P.O.:900; I.V.:424.7; Blood:419; IV Piggyback:100]  Out: 2800 [Urine:2800]  No intake/output data recorded. Drain/tube Output:       Physical Exam:  General: awake, alert, oriented to  person, place, time  Lungs: unlabored respirations  Abdomen: soft, non-distended, nontender  Skin/Wound: healing well, no drainage, no erythema, well approximated    Labs:  CBC:    Recent Labs     02/08/20 0441 02/09/20 0425 02/09/20  2100 02/10/20  0200 02/10/20  0510   WBC 9.9 9.4  --   --   --  8.2   HGB 8.0* 7.4*   < > 7.9* 7.9* 8.0*   HCT 24.7* 22.2*   < > 24.1* 24.1* 24.5*   * 103*  --   --   --  107*    < > = values in this interval not displayed. BMP:    Recent Labs     02/08/20  0441 02/09/20  0425 02/10/20  0510    138 141   K 3.7 3.5 3.7   CL 85* 90* 92*   CO2 42* 43* 42*   BUN 50* 47* 41*   CREATININE 1.2 1.2 1.1   GLUCOSE 122* 124* 129*     Hepatic: No results for input(s): AST, ALT, ALB, BILITOT, ALKPHOS in the last 72 hours. Amylase: No results for input(s): AMYLASE in the last 72 hours. Lipase: No results for input(s): LIPASE in the last 72 hours. Mag:      Recent Labs     02/09/20 0425   MG 1.60*      Phos:     Recent Labs     02/09/20 0425   PHOS 3.7      Coags:   Recent Labs     02/08/20 0441 02/09/20  0425 02/10/20  0510   INR 1.41* 1.49* 1.25*       Cultures:  Anaerobic culture  No results for input(s): LABANAE in the last 72 hours. Blood culture  No results for input(s): BC in the last 72 hours. Blood culture 2  No results for input(s): BLOODCULT2 in the last 72 hours. Body fluid culture  No results for input(s): BLOODCULT2 in the last 72 hours. Surgical culture  No results for input(s): CXSURG in the last 72 hours. Fecal occult  No results for input(s): OCCULTBLDFEC in the last 72 hours. Gram stain  No results for input(s): LABGRAM in the last 72 hours.     Stool culture 1  No results for input(s): narcotics as tolerated  5. Okay to anticoagulate from a surgical perspective  6. Management of medical comorbid etiologies per primary team and consulting services    EDUCATION:  Educated patient on plan of care and disease process--all questions answered. Leartis Clover Schirmer is stable from surgical standpoint. Will follow as needed. Please call with questions or concerns. Available for a port-a-cath if warranted. Thank you for allowing us to participate in [address] [place] [address]. Plans discussed with patient and nursing. Reviewed and discussed with Dr. Rigo Goff. Signed:  Kinga Berger, JEANIE - CNP  2/10/2020 9:18 AM    I have personally performed a face to face diagnostic evaluation on this patient. I have interviewed and examined the patient and I agree with the assessment above. In summary, my findings and plan are the following:   Mr. Rodrigo Mooney is a 70 y.o. male who presents with   Status-post excision of left deep axillary lymph node on 2/9/2020 for left axillary adenopathy  Status-post excision of left inguinal lymph node on 2/3/2020 for left inguinal adenopathy  Atrial fibrillation with RVR  History of mechanical heart valve requiring anticoagulation  Supratherapeutic INR  CHF  Hypotension  Acute renal failure  Ileus    Plan:  1. Stable surgical site, pain well controlled, pathology pending   2. Diet, activity as tolerated from surgical standpoint  3. May anticoagulate from lymph node biopsy standpoint, defer anemia/anticoagulation to primary/consulting teams  4. Management of medical comorbid etiologies per primary team and consulting services  5. Will be available for port if needed, contact information provided, will follow peripherally    Rafael Goff MD, FACS  2/10/2020  9:18 PM

## 2020-02-10 NOTE — PROGRESS NOTES
[facility]cology [facility] H[facility]ology [facility]   Progress Note      2/10/2020 8:55 AM        Name: Rubbie Duhamel Schirmer . Admitted: 1/28/2020    SUBJECTIVE:  He remains weak and fatigued. Had rectal bleeding overnight.      Reviewed interval ancillary notes    Current Medications  pantoprazole (PROTONIX) 80 mg in sodium chloride 0.9 % 100 mL infusion, Continuous  midodrine (PROAMATINE) tablet 10 mg, TID WC  warfarin (COUMADIN) tablet 2.5 mg, Daily  amiodarone (CORDARONE) tablet 200 mg, Daily  neomycin-bacitracin-polymyxin (NEOSPORIN) ointment, BID PRN  digoxin (LANOXIN) tablet 125 mcg, Daily  ondansetron (ZOFRAN) injection 4 mg, Q6H PRN  morphine (PF) injection 2 mg, Q2H PRN    Or  morphine injection 4 mg, Q2H PRN  oxyCODONE (ROXICODONE) immediate release tablet 5 mg, Q4H PRN    Or  oxyCODONE (ROXICODONE) immediate release tablet 10 mg, Q4H PRN  acetaminophen (TYLENOL) tablet 650 mg, Q4H PRN  lidocaine 4 % external patch 1 patch, Daily PRN  lip balm petroleum free (PHYTOPLEX) stick, PRN  promethazine (PHENERGAN) injection 12.5 mg, Q6H PRN  sodium chloride (OCEAN, BABY AYR) 0.65 % nasal spray 1 spray, PRN  sodium chloride flush 0.9 % injection 10 mL, 2 times per day  sodium chloride flush 0.9 % injection 10 mL, PRN  prochlorperazine (COMPAZINE) injection 10 mg, Q6H PRN  lactobacillus (CULTURELLE) capsule 1 capsule, Daily with breakfast  aspirin chewable tablet 81 mg, Daily  magnesium hydroxide (MILK OF MAGNESIA) 400 MG/5ML suspension 30 mL, Daily PRN  atorvastatin (LIPITOR) tablet 10 mg, Daily  diphenhydrAMINE (BENADRYL) injection 25 mg, Q6H PRN        Objective:  BP (!) 80/50   Pulse 77   Temp 97.4 °F (36.3 °C) (Temporal)   Resp 16   Ht 6' 1\" (1.854 m)   Wt 173 lb 1 oz (78.5 kg)   SpO2 95%   BMI 22.83 kg/m²     Intake/Output Summary (Last 24 hours) at 2/10/2020 0855  Last data filed at 2/10/2020 0612  Gross per 24 hour   Intake 1743.67 ml   Output 2800 ml   Net -1056.33 ml      Wt Readings from Last 3 Encounters: 02/10/20 173 lb 1 oz (78.5 kg)   01/28/20 178 lb (80.7 kg)   01/23/20 180 lb 6.4 oz (81.8 kg)       General appearance:  Appears comfortable. On oxygen. Eyes: Sclera clear. Pupils equal.  ENT: Moist oral mucosa. Trachea midline, no adenopathy. Cardiovascular: Regular rhythm, normal S1, S2. No murmur. Has edema in lower extremities  Respiratory: Not using accessory muscles. Good inspiratory effort. Clear to auscultation bilaterally, no wheeze or crackles. GI: Abdomen soft, no tenderness, not distended  Musculoskeletal: No cyanosis in digits, neck supple  Neurology: CN 2-12 grossly intact. No speech or motor deficits  Psych: Normal affect. Alert and oriented in time, place and person  Skin: Warm, dry, normal turgor      Labs and Tests:  CBC:   Recent Labs     02/08/20 0441 02/09/20 0425  02/09/20  2100 02/10/20  0200 02/10/20  0510   WBC 9.9 9.4  --   --   --  8.2   HGB 8.0* 7.4*   < > 7.9* 7.9* 8.0*   * 103*  --   --   --  107*    < > = values in this interval not displayed. BMP:    Recent Labs     02/08/20 0441 02/09/20  0425 02/10/20  0510    138 141   K 3.7 3.5 3.7   CL 85* 90* 92*   CO2 42* 43* 42*   BUN 50* 47* 41*   CREATININE 1.2 1.2 1.1   GLUCOSE 122* 124* 129*     Hepatic: No results for input(s): AST, ALT, ALB, BILITOT, ALKPHOS in the last 72 hours. ASSESSMENT AND PLAN    Active Problems:    Elevated troponin    Cardiomyopathy (HCC)    Mitral regurgitation    S/P AVR (aortic valve replacement)    Chronic combined systolic and diastolic CHF (congestive heart failure) (HCC)    Encounter for medication counseling    Hyponatremia    Hypercholesteremia    PAF (paroxysmal atrial fibrillation) ([facility]    Essential hypertension    Sepsis ([facility]    Tobacco abuse counseling    Lymphadenopathy    Hypervolemia  Resolved Problems:    * No resolved hospital problems. *      Lymphoma. Exact history cannot be determined at present. Had axillary lymph node biopsy 2/9/2020. Pathology pending.

## 2020-02-10 NOTE — PROGRESS NOTES
appearing    Respiratory:  · Resp Assessment: Normal respiratory effort  · Resp Auscultation: Clear to auscultation bilaterally   Cardiovascular:  · Auscultation: irregular rhythm,harsh systolic murmur  · Palpation:  Nl PMI  · JVP:  < 8 cm H2O  Extremities: trace bilateral peripheral Edema, both hands are swollen  Abdomen:  · Soft, non-tender  · Normal bowel sounds  Extremities:  ·  No Cyanosis or Clubbing  Neurological/Psychiatric:  · Oriented to time, place, and person  · Non-anxious  Skin Warm and dry    Assessment:    Active Problems:    Elevated troponin  Plan: he has had normal coronaries in past    Cardiomyopathy ([facility]  Plan: marked change in EF.  LVEF now 30%, was 50% 9/11/19   continue po digoxin  Add spironolactone 12.5 mg po qd  Not on beta blocker or ACEI due to low blood pressure  Off lasix at this point      S/P AVR (aortic valve replacement)  Plan: possibly worsening prosthetic valve aortic stenosis    Chronic combined systolic and diastolic CHF (congestive heart failure) ([facility])          PAF (paroxysmal atrial fibrillation) ([facility])  Plan:now persistent, rate controlled      Sepsis ([facility]      Tobacco abuse counseling      Lymphadenopathy      Hypervolemia  Resolved. Holding lasix due to euvolemia and low blood pressure    Anemia worsening   Monitor for blood transfusion    Hypotension:   continue midodrine 10 mg po tid      Plan:  1. Start spironolactone 12.5 mg po qd  2. continue midodrine 10 mg po tid  3. Not taking po, so will given intermittent doses of IV lasix  4.  awaiting lymph node biopsy results      Improved, but still with multiple medical issues. Wife  at bedside.   afib heart rate is controlled    Enmanuel Lang M.D., [address] [place] [address]

## 2020-02-10 NOTE — CONSULTS
INGUINAL LYMPH NODE BIOPSY EXCISION performed by Maddy Vieyra MD at [facility]  8/2/12    excision right breast mass (lumpectomy)    SHOULDER ARTHROPLASTY Right 2/2/15    right reverse total shoulder arthroplasty    TOTAL KNEE ARTHROPLASTY Left 12/4/13    TOTAL KNEE ARTHROPLASTY Right 2/3/14    RIGHT TOTAL KNEE REPLACEMENT-BAR       UPPER GASTROINTESTINAL ENDOSCOPY  12/24/13      Past Endoscopic History:  EGD with Dr Vaibhav Guadarrama 2013 for N/V  IMPRESSIONS:    1. Hiatal hernia. 2.  Otherwise unremarkable esophagogastroduodenoscopy. Colonoscopy 8/2012 with Dr Jesus Arechiga for constipation and colonic ileus:     Impression:                1) Moderate colonic diverticulosis                                      2) Internal hemorrhoids                                      3) Normal terminal ileum    Admission Meds  No current facility-administered medications on file prior to encounter.       Current Outpatient Medications on File Prior to Encounter   Medication Sig Dispense Refill    amoxicillin-clavulanate (AUGMENTIN) 875-125 MG per tablet Take 1 tablet by mouth 2 times daily for 21 days 42 tablet 0    Lactobacillus (PROBIOTIC ACIDOPHILUS) TABS Take 1 tablet by mouth 2 times daily for 21 days 42 tablet 0    midodrine (PROAMATINE) 2.5 MG tablet TAKE 1 TABLET BY MOUTH DAILY 30 tablet 11    lisinopril (PRINIVIL;ZESTRIL) 2.5 MG tablet TAKE ONE TABLET BY MOUTH DAILY 90 tablet 3    hydrochlorothiazide (HYDRODIURIL) 25 MG tablet TAKE 1 TABLET BY MOUTH DAILY 90 tablet 3    spironolactone (ALDACTONE) 25 MG tablet TAKE 0.5 TABLETS BY MOUTH THREE TIMES A WEEK 45 tablet 1    warfarin (COUMADIN) 5 MG tablet Take 1 tablet by mouth daily 90 tablet 3    atorvastatin (LIPITOR) 10 MG tablet TAKE 1 TABLET BY MOUTH ONE TIME A DAY  90 tablet 2    metoprolol succinate (TOPROL XL) 25 MG extended release tablet TAKE 0.5 TABLETS BY MOUTH 2 TIMES DAILY 90 tablet 3    Cholecalciferol (VITAMIN D3) 3000 UNITS

## 2020-02-10 NOTE — ANESTHESIA PRE PROCEDURE
MD Tara       Current medications:    No current facility-administered medications for this visit. No current outpatient medications on file.      Facility-Administered Medications Ordered in Other Visits   Medication Dose Route Frequency Provider Last Rate Last Dose    pantoprazole (PROTONIX) injection 40 mg  40 mg Intravenous BID Dori Lau MD   40 mg at 02/10/20 1017    midodrine (PROAMATINE) tablet 10 mg  10 mg Oral TID WC Leala Schilder, MD   10 mg at 02/10/20 8685    warfarin (COUMADIN) tablet 2.5 mg  2.5 mg Oral Daily Dori Lau MD   Stopped at 02/08/20 1910    amiodarone (CORDARONE) tablet 200 mg  200 mg Oral Daily Kar Cuff, APRN - CNP   200 mg at 02/10/20 0902    neomycin-bacitracin-polymyxin (NEOSPORIN) ointment   Topical BID PRN Gricelda Toledo MD        digoxin (LANOXIN) tablet 125 mcg  125 mcg Oral Daily Gigi Cochran MD   125 mcg at 02/10/20 0902    ondansetron (ZOFRAN) injection 4 mg  4 mg Intravenous Q6H PRN Lelia Laurent MD   4 mg at 02/07/20 1017    morphine (PF) injection 2 mg  2 mg Intravenous Q2H PRRONEN Laurent MD   2 mg at 02/04/20 9324    Or    morphine injection 4 mg  4 mg Intravenous Q2H PRRONEN Laurent MD        oxyCODONE (ROXICODONE) immediate release tablet 5 mg  5 mg Oral Q4H PRRONEN Laurent MD        Or    oxyCODONE (ROXICODONE) immediate release tablet 10 mg  10 mg Oral Q4H PRRONEN Laurent MD   10 mg at 02/03/20 2233    acetaminophen (TYLENOL) tablet 650 mg  650 mg Oral Q4H VIVEK Laurent MD   650 mg at 02/08/20 2324    lidocaine 4 % external patch 1 patch  1 patch Transdermal Daily PRN Lelia Laurent MD   1 patch at 02/02/20 0840    lip balm petroleum free (PHYTOPLEX) stick   Topical PRRONEN Laurent MD        promethazine [facility]) injection 12.5 mg  12.5 mg Intravenous Q6H PRRONEN Laurent MD   12.5 mg at 02/02/20 0257    sodium chloride (OCEAN, BABY AYR) 0.65 % nasal spray 1 spray  1 spray Each Nostril PRN Samanta Sheehan MD        sodium chloride flush 0.9 % injection 10 mL  10 mL Intravenous 2 times per day Samanta Sheehan MD   10 mL at 02/10/20 1017    sodium chloride flush 0.9 % injection 10 mL  10 mL Intravenous PRN Samanta Sheehan MD   10 mL at 02/09/20 1025    prochlorperazine (COMPAZINE) injection 10 mg  10 mg Intravenous Q6H PRN Samanta Sheehan MD   10 mg at 02/01/20 5221    lactobacillus (CULTURELLE) capsule 1 capsule  1 capsule Oral Daily with breakfast Samanta Sheehan MD   1 capsule at 02/09/20 1008    aspirin chewable tablet 81 mg  81 mg Oral Daily Samanta Sheehan MD   81 mg at 02/09/20 1008    magnesium hydroxide (MILK OF MAGNESIA) 400 MG/5ML suspension 30 mL  30 mL Oral Daily PRN Samanta Sheehan MD        atorvastatin (LIPITOR) tablet 10 mg  10 mg Oral Daily Samanta Sheehan MD   10 mg at 02/10/20 0902    diphenhydrAMINE (BENADRYL) injection 25 mg  25 mg Intravenous Q6H PRN Samanta Sheehan MD   25 mg at 02/06/20 2100       Allergies:     Allergies   Allergen Reactions    Clindamycin/Lincomycin Rash    Levaquin [Levofloxacin]      Rash, swollen neck    Cefdinir Rash       Problem List:    Patient Active Problem List   Diagnosis Code    Elevated troponin R79.89    Cardiomyopathy ([facility] I42.9    Nonrheumatic aortic valve insufficiency I35.1    Mitral regurgitation I34.0    Microscopic hematuria R31.29    S/P AVR (aortic valve replacement) Z95.2    Chronic combined systolic and diastolic CHF (congestive heart failure) ([facility]) I50.42    Arthritis of knee M17.10    Encounter for medication counseling Z71.89    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    Hyponatremia E87.1    Primary localized osteoarthrosis, 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    H/O total shoulder replacement Z96.619    Hypercholesteremia E78.00    Finger amputation, traumatic, 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    PAF (paroxysmal atrial fibrillation) ([facility]) I48.0    Essential hypertension I10    Sepsis ([facility] A41.9    Septic shock ([facility] A41.9, R65.21    Atrial fibrillation with rapid ventricular response (HCC) I48.91    Elevated international normalized ratio (INR) R79.1    Streptococcal pharyngitis J02.0    Leukopenia D72.819    Chronic ethmoidal sinusitis J32.2    TYRELL (acute kidney injury) ([facility] N17.9    Ex-heavy cigarette smoker (20-39 per day) Z87.891    Tobacco abuse counseling Z71.6    Lymphadenopathy R59.1    Hypervolemia E87.70       Past Medical History:        Diagnosis Date    Aortic insufficiency     Atrial fibrillation (HCC)     cardioversion 8/10/12    Breast nodule 8/2012  right    excision-lumpectomy 8/2012    CAD (coronary artery disease)     Cardiomyopathy ([facility] 8/2012    EF 20 %    CH[facility]congestive [facility] failure) [facility])     Hyperlipidemia     Hypertension     Microscopic hematuria 7/29/2012    Mitral regurgitation     Nausea & vomiting 7/29/2012    Osteoarthritis of knee     bilateral knees    Pneumonia     Rotator cuff tear 7/2/2013    Ventricular ectopy 7/29/2012       Past Surgical History:        Procedure Laterality Date    AORTIC VALVE REPLACEMENT  8/3/2012    moderate mitral regurg    AXILLARY SURGERY Left 2/9/2020    AXILLARY LYMPH NODE BIOPSY DISSECTION performed by Gómez Hinton MD at [address]      chest    ENDOSCOPY, COLON, DIAGNOSTIC      INGUINAL HERNIA REPAIR      bilateral in [facility]      both    LYMPH NODE BIOPSY Left 2/3/2020    INGUINAL LYMPH NODE BIOPSY EXCISION performed by Gómez Hinton MD at [facility]  8/2/12    excision right breast mass (lumpectomy)    SHOULDER ARTHROPLASTY Right 2/2/15    right reverse total shoulder arthroplasty    TOTAL KNEE ARTHROPLASTY Left 12/4/13    TOTAL KNEE ARTHROPLASTY Right 2/3/14    RIGHT TOTAL KNEE REPLACEMENT-BAR       UPPER GASTROINTESTINAL ENDOSCOPY  12/24/13       Social History:    Social History     Tobacco Use    Smoking status: Former Smoker     Packs/day: 2.00     Years: 20.00     Pack years: 40.00     Last attempt to quit: 1970     Years since quittin.5    Smokeless tobacco: Never Used   Substance Use Topics    Alcohol use: Yes     Alcohol/week: 2.0 standard drinks     Types: 2 Cans of beer per week     Comment: rare                                Counseling given: Not Answered      Vital Signs (Current): There were no vitals filed for this visit. BP Readings from Last 3 Encounters:   02/10/20 84/66   20 (!) 88/58   20 109/78       NPO Status:                                                                                 BMI:   Wt Readings from Last 3 Encounters:   02/10/20 173 lb 1 oz (78.5 kg)   20 178 lb (80.7 kg)   20 180 lb 6.4 oz (81.8 kg)     There is no height or weight on file to calculate BMI.    CBC:   Lab Results   Component Value Date    WBC 8.2 02/10/2020    RBC 2.96 02/10/2020    HGB 8.0 02/10/2020    HCT 24.5 02/10/2020    MCV 82.9 02/10/2020    RDW 18.4 02/10/2020     02/10/2020       CMP:   Lab Results   Component Value Date     02/10/2020    K 3.7 02/10/2020    K 4.7 2020    CL 92 02/10/2020    CO2 42 02/10/2020    BUN 41 02/10/2020    CREATININE 1.1 02/10/2020    GFRAA >60 02/10/2020    GFRAA >60 2013    AGRATIO 1.0 2020    LABGLOM >60 02/10/2020    GLUCOSE 129 02/10/2020    PROT 5.3 2020    PROT 6.2 2012    CALCIUM 8.6 02/10/2020    BILITOT 0.4 2020    ALKPHOS 60 2020    AST 8 2020    ALT 7 2020       POC Tests: No results for input(s): POCGLU, POCNA, POCK, POCCL, POCBUN, POCHEMO, POCHCT in the last 72 hours.     Coags:   Lab Results   Component Value Date    PROTIME 14.5 02/10/2020    PROTIME 25.7 2016    INR 1.25 02/10/2020    APTT 82.2 2020       HCG (If Applicable): No results found for: Brandi Glover

## 2020-02-10 NOTE — PLAN OF CARE
Problem: OXYGENATION/RESPIRATORY FUNCTION  Goal: Patient will achieve/maintain normal respiratory rate/effort  Description  Respiratory rate and effort will be within normal limits for the patient  2/9/2020 2224 by Grace Ugalde RN  Outcome: Ongoing  2/9/2020 0918 by Rubio Malave RN  Outcome: Ongoing     Problem: HEMODYNAMIC STATUS  Goal: Patient has stable vital signs and fluid balance  2/9/2020 2224 by Grace Ugalde RN  Outcome: Ongoing  2/9/2020 0918 by Rubio Malave RN  Outcome: Ongoing     Problem: FLUID AND ELECTROLYTE IMBALANCE  Goal: Fluid and electrolyte balance are achieved/maintained  2/9/2020 2224 by Grace Ugalde RN  Outcome: Ongoing  2/9/2020 0918 by Rubio Malave RN  Outcome: Ongoing     Problem: ACTIVITY INTOLERANCE/IMPAIRED MOBILITY  Goal: Mobility/activity is maintained at optimum level for patient  2/9/2020 2224 by Grace Ugalde RN  Outcome: Ongoing  2/9/2020 0918 by Rubio Malave RN  Outcome: Ongoing     Problem: Infection:  Goal: Will remain free from infection  Description  Will remain free from infection  2/9/2020 2224 by Grace Ugalde RN  Outcome: Ongoing  2/9/2020 0918 by Rubio Malave RN  Outcome: Ongoing     Problem: Safety:  Goal: Free from accidental physical injury  Description  Free from accidental physical injury  2/9/2020 2224 by Grace Ugalde RN  Outcome: Ongoing  2/9/2020 0918 by Rubio Malave RN  Outcome: Ongoing  Goal: Free from intentional harm  Description  Free from intentional harm  2/9/2020 2224 by Grace Ugalde RN  Outcome: Ongoing  2/9/2020 0918 by Rubio Malave RN  Outcome: Ongoing     Problem: Daily Care:  Goal: Daily care needs are met  Description  Daily care needs are met  2/9/2020 2224 by Grace Ugalde RN  Outcome: Ongoing  2/9/2020 0918 by Rubio Malave RN  Outcome: Ongoing     Problem: Pain:  Goal: Patient's pain/discomfort is manageable  Description  Patient's pain/discomfort is manageable  2/9/2020 2224 by Grace Ugalde RN  Outcome:

## 2020-02-10 NOTE — PROGRESS NOTES
Initial shift assessment completed, see complex assessment in doc flowsheet. Patient sleeping, arouses to verbal stimuli, oriented X4. SBP 70's to 80's with a MAP low 60's. Monitor Afib with controlled rate in the 80's. Respirations easy, rales noted in right base. Incision site to left axillary with Dermabond, CD&I. Hall draining QS amounts of urine. Discussed POC, questions answered. Call light within reach, encouraged to call for nurse as needed throughout the shift.

## 2020-02-11 NOTE — PROGRESS NOTES
[facility]  Gastroenterology Progress Note    Zak Bolivar is a 70 y.o. male patient. 1. Septic shock ([address]    2. Atrial fibrillation with rapid ventricular response (HCC)    3. Elevated international normalized ratio (INR)    4. Streptococcal pharyngitis    5. Leukopenia, unspecified type    6. Chronic ethmoidal sinusitis    7. TYRELL (acute kidney injury) ([address]    8. chronic elevated troponin        SUBJECTIVE:  Had mucous brown BM last night. None since. No abdominal pain. ROS:  Cardiovascular ROS: no chest pain or dyspnea on exertion  Gastrointestinal ROS: see hpi  Respiratory ROS: no cough, shortness of breath, or wheezing    Physical    VITALS:  BP 89/63   Pulse 97   Temp 98.8 °F (37.1 °C) (Temporal)   Resp 21   Ht 6' 1\" (1.854 m)   Wt 174 lb 13.2 oz (79.3 kg)   SpO2 96%   BMI 23.07 kg/m²   TEMPERATURE:  Current - Temp: 98.8 °F (37.1 °C); Max - Temp  Av.6 °F (37.6 °C)  Min: 98.8 °F (37.1 °C)  Max: 100.7 °F (38.2 °C)    NAD  RRR, Nl s1s2  Lungs CTA Bilaterally, normal effort  Abdomen soft, ND, NT, no HSM, Bowel sounds normal  AAOx3, No asterixis     Data    Data Review:    Recent Labs     02/09/20  0425  02/10/20  0510 02/10/20  1143 02/10/20  1755 20  0450   WBC 9.4  --  8.2  --   --  6.5   HGB 7.4*   < > 8.0* 8.0* 9.1* 8.7*   HCT 22.2*   < > 24.5* 24.4* 27.8* 26.3*   MCV 82.5  --  82.9  --   --  84.1   *  --  107*  --   --  116*    < > = values in this interval not displayed. Recent Labs     20  0425 02/10/20  0510 20  0450    141 139   K 3.5 3.7 4.0   CL 90* 92* 90*   CO2 43* 42* 42*   PHOS 3.7  --   --    BUN 47* 41* 39*   CREATININE 1.2 1.1 1.1     No results for input(s): AST, ALT, ALB, BILIDIR, BILITOT, ALKPHOS in the last 72 hours. No results for input(s): LIPASE, AMYLASE in the last 72 hours.   Recent Labs     20  0425 02/10/20  0510 20  0450   PROTIME 17.3* 14.5* 14.6*   INR 1.49* 1.25* 1.26*     No results for input(s): PTT in the last 72 hours.      EGD   No active upper GI bleed. Normal esophagus, biopsied. Patchy gastritis (GEJ and stomach), biopsied.      FLEXIBLE SIGMOIDOSCOPY   Shallow rectal ulcerations, stercoral vs viral (CMV/HSV) ulcers, biopsied  Mild enlarged internal hemorrhoids  Sigmoid diverticulosis    ASSESSMENT     Hematochezia  - pt had brown stool with streaks of red blood Sunday. No  bleeding since. Flex sig with shallow rectal ulcers, path pending. Anemia - hgb was 11 at admission and trended down since. He received 3 U PRBC here. Only scant hematochezia Sunday from rectal ulcers. EGD negative. hgb 8.7 today. Lymphoma - new dx  Sepsis   Dysphagia - does have oropharyngeal dysphagia and is on dysphagia II diet. EGD ok. Afib - coumadin held for hematochezia. PLAN    - Await biopsies  - PPI daily   - Miralax 17 grams daily to BID    - would be ok to resume anticoagulation from GI standpoint tomorrow if no further hematochezia. Discussed with Dr. Scott Grajeda, [facility]    I have personally performed a face to face diagnostic evaluation on this patient. I have interviewed and examined the patient and I agree with the findings and recommended plan of care. In summary, my findings and plan are the following:     Patient started eating better. No blood in stool. Mucous brown stool.    Hb stable high 8+ to 9  Await biopsies  Oral PPI daily and Miralax 17 grams BID  Patient can restart anticoagulation tomorrow    Devan Simon MD, MSc  [facility]

## 2020-02-11 NOTE — PROGRESS NOTES
Initial shift assessment completed, see complex assessment in doc flowsheet. Patient A&O, visiting with family. BP 87/55 with a MAP of 62, monitor NSR with occasional PVC. Respirations easy, breath sounds clear anteriorly, with rales noted to mid and lower right lung fields posteriorly. Abdomen soft, BS present, patient incontinent of small brown watery stool, vaibhav care provided, protective barrier applied. Repositioned up in bed for comfort. Spoke with daughter over telephone, discussed POC, questions answered. Call light within reach, rncouraged to call for nurse as needed throughout the shift.

## 2020-02-11 NOTE — PLAN OF CARE
Ongoing     Problem: Risk for Impaired Skin Integrity  Goal: Tissue integrity - skin and mucous membranes  Description  Structural intactness and normal physiological function of skin and  mucous membranes.   Outcome: Ongoing     Problem: Musculor/Skeletal Functional Status  Goal: Highest potential functional level  Outcome: Ongoing  Goal: Absence of falls  Outcome: Ongoing     Problem: Nutrition  Goal: Optimal nutrition therapy  2/10/2020 2218 by Lou Thompson RN  Outcome: Ongoing  2/10/2020 0825 by Marissa Wei RD, LD  Outcome: Ongoing

## 2020-02-11 NOTE — ADT AUTH CERT
Utilization Reviews         Systemic or Infectious Condition GRG - Care Day 14 (2/10/2020) by Eliot Ramirez RN         Review Status Review Entered   Completed 2/10/2020 15:28       Criteria Review      Care Day: 14 Care Date: 2/10/2020 Level of Care:    Guideline Day 3    Level Of Care    ( ) * Activity level acceptable    Clinical Status    ( ) * Hemodynamic stability    2/10/2020 3:28 PM EST by TRACY Haro      VS: 99.0, 85, 19, 71/52, 100% on 2L/NC    (X) * Respiratory status acceptable    2/10/2020 3:28 PM EST by TRACY Haro      wnl    (X) * Neurologic status acceptable    2/10/2020 3:28 PM EST by TRACY Haro      A&O    (X) * Temperature status acceptable    2/10/2020 3:28 PM EST by Claudine Haro      99.0    (X) * No infection, or status acceptable    2/10/2020 3:28 PM EST by TRACY Haro      none noted    (X) * No neutropenia, or status acceptable    2/10/2020 3:28 PM EST by TRACY Haro      none noted    (X) * Special infection or injury situations absent    (X) * Electrolyte status acceptable    2/10/2020 3:28 PM EST by Back, BODØ      na, k, and cl are wnl    ( ) * General Discharge Criteria met    Interventions    (X) * Intake acceptable    2/10/2020 3:28 PM EST by TRACY Haro      tolerating po intake, having some trouble swallowing pills    ( ) * No inpatient interventions needed    * Milestone   Additional Notes   02/10/2020      Labs: rbc-2.69, h/h-7.4/22.2, plt-103, cl-92, co2-42, gluc-129, bun-41      Oncology:   SUBJECTIVE:  He remains weak and fatigued. Had rectal bleeding overnight.        ASSESSMENT AND PLAN       Active Problems:     Elevated troponin     Cardiomyopathy (HCC)     Mitral regurgitation     S/P AVR (aortic valve replacement)     Chronic combined systolic and diastolic CHF (congestive heart failure) ([facility])     Encounter for medication counseling     Hyponatremia     Hypercholesteremia     PAF (paroxysmal atrial fibrillation) ([facility]     Essential

## 2020-02-11 NOTE — PROGRESS NOTES
hour   Intake 1140.58 ml   Output 2325 ml   Net -1184.42 ml      Wt Readings from Last 3 Encounters:   02/11/20 174 lb 13.2 oz (79.3 kg)   01/28/20 178 lb (80.7 kg)   01/23/20 180 lb 6.4 oz (81.8 kg)       General appearance:  Appears comfortable. On oxygen. Eyes: Sclera clear. Pupils equal.  ENT: Moist oral mucosa. Trachea midline, no adenopathy. Cardiovascular: Regular rhythm, normal S1, S2. No murmur. Has edema in lower extremities  Respiratory: Not using accessory muscles. Good inspiratory effort. Clear to auscultation bilaterally, no wheeze or crackles. GI: Abdomen soft, no tenderness, not distended  Musculoskeletal: No cyanosis in digits, neck supple  Neurology: CN 2-12 grossly intact. No speech or motor deficits  Psych: Normal affect. Alert and oriented in time, place and person  Skin: Warm, dry, normal turgor    Labs and Tests:  CBC:   Recent Labs     02/09/20  0425  02/10/20  0510 02/10/20  1143 02/10/20  1755 02/11/20  0450   WBC 9.4  --  8.2  --   --  6.5   HGB 7.4*   < > 8.0* 8.0* 9.1* 8.7*   *  --  107*  --   --  116*    < > = values in this interval not displayed. BMP:    Recent Labs     02/09/20  0425 02/10/20  0510 02/11/20  0450    141 139   K 3.5 3.7 4.0   CL 90* 92* 90*   CO2 43* 42* 42*   BUN 47* 41* 39*   CREATININE 1.2 1.1 1.1   GLUCOSE 124* 129* 162*     Hepatic: No results for input(s): AST, ALT, ALB, BILITOT, ALKPHOS in the last 72 hours. ASSESSMENT AND PLAN    Active Problems:    Elevated troponin    Cardiomyopathy (HCC)    Mitral regurgitation    S/P AVR (aortic valve replacement)    Chronic combined systolic and diastolic CHF (congestive heart failure) (HCC)    Encounter for medication counseling    Hyponatremia    Hypercholesteremia    PAF (paroxysmal atrial fibrillation) ([facility]    Essential hypertension    Sepsis ([facility]    Tobacco abuse counseling    Lymphadenopathy    Hypervolemia  Resolved Problems:    * No resolved hospital problems.  *      Lymphoma.  Exact

## 2020-02-11 NOTE — PROGRESS NOTES
Assessment reviewed, patient awake, VSS, monitor NSR. Respirations easy, no change in breath sounds diminished,O2 saturations 93% on 2 liters. PRBC's continue to infuse with no difficulty.

## 2020-02-11 NOTE — PROGRESS NOTES
Speech Language Pathology  Dysphagia Treatment Note    Name:  Dipak Santo  :   1940  Medical Diagnosis:  Sepsis ([facility] [A41.9]  Sepsis ([address] [A41.9]  Sepsis ([address] [A41.9]  Sepsis ([facility] [A41.9]  Treatment Diagnosis: Oropharyngeal Dysphagia  Pain level:  Pt denies pain at this time    Current Diet Level: Dysphagia III diet with thin liquids/No straws/meds with applesauce    Tolerance of Current Diet Level: Pt and nurse report improving po intake on current diet level    Assessment of Texture Tolerance:  -Impressions: Results and recommendations regarding MBS explained to the Pt and his wife. Pt reports improved swallowing with only intermittent sensation of something \"stuck\" in his throat. Results of MBS and impact of self clearing laryngeal penetration on irritation to the upper airway explained to the Pt who verbalized understanding. With po trials. Pt continues to demonstrate mild delayed and intermittently \"effortful\" pharyngeal transfer with variable textures. Audible swallows but no overt signs of aspiration noted with thin liquids. Diet and Treatment Recommendations:  Continue current diet and treatment recommendations per established plan of care    Plan:  Continued daily Dysphagia treatment with goals per plan of care. Patient/Family Education:Education given to the Pt and nurse, who verbalized understanding    Discharge Recommendations:  Pt will benefit from continued skilled Speech Therapy for Dysphagia services, prior to returning home.     Timed Code Treatment: 0 min    Total Treatment Time: 15 min    Christoph CURRY#[id number]

## 2020-02-11 NOTE — PROGRESS NOTES
12.5 mg, Q6H PRN  sodium chloride (OCEAN, BABY AYR) 0.65 % nasal spray 1 spray, PRN  sodium chloride flush 0.9 % injection 10 mL, 2 times per day  sodium chloride flush 0.9 % injection 10 mL, PRN  prochlorperazine (COMPAZINE) injection 10 mg, Q6H PRN  lactobacillus (CULTURELLE) capsule 1 capsule, Daily with breakfast  aspirin chewable tablet 81 mg, Daily  magnesium hydroxide (MILK OF MAGNESIA) 400 MG/5ML suspension 30 mL, Daily PRN  atorvastatin (LIPITOR) tablet 10 mg, Daily  diphenhydrAMINE (BENADRYL) injection 25 mg, Q6H PRN        Objective:  BP 89/63   Pulse 97   Temp 98.8 °F (37.1 °C) (Temporal)   Resp 21   Ht 6' 1\" (1.854 m)   Wt 174 lb 13.2 oz (79.3 kg)   SpO2 96%   BMI 23.07 kg/m²     Intake/Output Summary (Last 24 hours) at 2/11/2020 1547  Last data filed at 2/11/2020 4001  Gross per 24 hour   Intake 649.58 ml   Output 1750 ml   Net -1100.42 ml      Wt Readings from Last 3 Encounters:   02/11/20 174 lb 13.2 oz (79.3 kg)   01/28/20 178 lb (80.7 kg)   01/23/20 180 lb 6.4 oz (81.8 kg)       General appearance:  Appears comfortable  Eyes: Sclera clear. Pupils equal.  ENT: Moist oral mucosa. Trachea midline, no adenopathy. Cardiovascular: Regular rhythm, normal S1, S2. No murmur. No edema in lower extremities  Respiratory: Not using accessory muscles. Good inspiratory effort. Clear to auscultation bilaterally, no wheeze or crackles. GI: Abdomen soft, no tenderness, not distended, normal bowel sounds  Musculoskeletal: No cyanosis in digits, neck supple  Neurology: CN 2-12 grossly intact. No speech or motor deficits  Psych: Normal affect. Alert and oriented in time, place and person  Skin: Rash resolved. Labs and Tests:  CBC:   Recent Labs     02/09/20  0425  02/10/20  0510 02/10/20  1143 02/10/20  1755 02/11/20  0450   WBC 9.4  --  8.2  --   --  6.5   HGB 7.4*   < > 8.0* 8.0* 9.1* 8.7*   *  --  107*  --   --  116*    < > = values in this interval not displayed.      BMP:    Recent Labs 02/09/20  0425 02/10/20  0510 02/11/20  0450    141 139   K 3.5 3.7 4.0   CL 90* 92* 90*   CO2 43* 42* 42*   BUN 47* 41* 39*   CREATININE 1.2 1.1 1.1   GLUCOSE 124* 129* 162*     Hepatic:   No results for input(s): AST, ALT, ALB, BILITOT, ALKPHOS in the last 72 hours. Problem List  Active Problems:    Elevated troponin    Cardiomyopathy (HCC)    Mitral regurgitation    S/P AVR (aortic valve replacement)    Chronic combined systolic and diastolic CHF (congestive heart failure) ([facility]    Encounter for medication counseling    Hyponatremia    Hypercholesteremia    PAF (paroxysmal atrial fibrillation) ([facility] [address]    Essential hypertension    Sepsis ([facility]    Tobacco abuse counseling    Lymphadenopathy    Hypervolemia  Resolved Problems:    * No resolved hospital problems. *       Assessment & Plan:     1. Severe sepsis:  -strep thoat swab positive. Sinusitis noted.   -had allergic reaction to levaquin.   -  Finished doxy course. 2. 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 Results are Equivocal.  Await oncology recommendations. .        3. A. fib with RVR  -Likely secondary to sepsis   -Now on amio and digoxin. Resolved. Supratherapeutic INR  resolved. -Holding Coumadin at this time given GI bleed and need for transfusions. .  Will discuss with cardiology about bleeding risk and benefits of anticoagulation tomorrow morning. Monitor H&H closely for now. 5. Rash-Secondary Levaquin. Resolved. Off steroids now. 6. Chronic combined CHF-volume overload from all IV fluids he received. - diuresed with iv lasix. Now off lasix due to hypotension. Medications adjusted per cardiology. 7. Dysphagia: Speech eval noted. On modified diet. MBS showed no mally aspiration. Continue dysphagia diet. Continue to monitor. 8.  Acute blood loss anemia and anemia of chronic disease  From MDS:   had total of 3 units now.   Had GI

## 2020-02-12 NOTE — PROGRESS NOTES
Speech Language Pathology  Dysphagia Treatment Note    Name:  Glenna Ontiveros  :   1940  Medical Diagnosis:  Sepsis ([address] [A41.9]  Sepsis ([address] [A41.9]  Sepsis ([address] [A41.9]  Sepsis ([address] [A41.9]  Treatment Diagnosis: Oropharyngeal Dysphagia  Pain level:  Pt denies pain at this time    Current Diet Level: Dysphagia III diet with thin liquids/No straws/meds with applesauce    Tolerance of Current Diet Level: Per RN and pt, no difficulty with current diet    Assessment of Texture Tolerance:  -Impressions: Pt seen sitting upright in recliner chair and willing to eat some of lunch meal during session (pasta noodles dipped in sauce) and thin liquid water and Ensure Clear. Pt's spouse present at beginning of session but left shortly after session began. Pt reported he no longer has sensation of food feeling stuck in throat. With pasta, pt demonstrated mildly prolonged mastication and AP transit time. Good oral clearance with mildly increased time; no overt s/s aspiration and no report of anything feeling \"stuck. \" Pt drank thin liquids via cup (water) and smaller container with spout (Ensure Clear). Pt noted to take successive, large drinks. Audible swallow was noted. Suspected premature spillage to pharyngeal with delayed swallow initiation and apparent reduced laryngeal elevation. No s/s aspiration noted, however, and vocal quality remained clear. Educated pt re: use of swallow strategies and precautions. Pt verbalized understanding. Diet and Treatment Recommendations:  Continue current diet and treatment recommendations per established plan of care    Plan:  Continued daily Dysphagia treatment with goals per plan of care. Patient/Family Education:Education given to the Pt and nurse, who verbalized understanding    Discharge Recommendations:  Pt will benefit from continued skilled Speech Therapy for Dysphagia services, prior to returning home.     Timed Code Treatment: 0 min    Total Treatment Time:

## 2020-02-12 NOTE — PROGRESS NOTES
Patient sleeping arouses to verbal stimuli, VSS, respirations easy, no changes noted in assessment. Assisted with reposition for comfort, AM blood drawn and sent to lab.

## 2020-02-12 NOTE — PROGRESS NOTES
[address]   Progress Note  Cardiology      Oliver Khan Schirmer   Admission date:  1/28/2020  CC-f/up for a.fib and hypotension  Subjective:  He remains very weak with very poor appetite. Off lasix at the moment. Lymph node biopsy not revealing for carcinoma. Getting special stains on it. Concern for Hodgkin's lymphoma  Had endoscopy yesterday. Wife at bedside. Off pressors and drips. Discussed with Dr. Joyce Landry that patient should no longer be on warfarin given his GI bleeding and MDS. 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 Has been treated for 4 months with warfarin which should be enough to make a difference. Also at this point, the risk of being of warfarin with the GI bleeding outweighs the benefit.     Objective:  Medications/Labs all Reviewed     warfarin (COUMADIN) daily dosing (placeholder)   Other RX Placeholder    sodium chloride  20 mL Intravenous Once    pantoprazole  40 mg Oral QAM AC    spironolactone  12.5 mg Oral Daily    midodrine  10 mg Oral TID WC    amiodarone  200 mg Oral Daily    digoxin  125 mcg Oral Daily    sodium chloride flush  10 mL Intravenous 2 times per day    lactobacillus  1 capsule Oral Daily with breakfast    aspirin  81 mg Oral Daily    atorvastatin  10 mg Oral Daily       BMP:   Lab Results   Component Value Date     02/12/2020    K 3.6 02/12/2020    K 4.7 01/28/2020    CL 88 02/12/2020    CO2 40 02/12/2020    BUN 40 02/12/2020    CREATININE 1.0 02/12/2020    MG 1.60 02/09/2020     CBC:    Lab Results   Component Value Date    WBC 6.0 02/12/2020    RBC 2.98 02/12/2020    HGB 8.1 02/12/2020    HCT 24.8 02/12/2020    MCV 83.3 02/12/2020    RDW 18.7 02/12/2020     02/12/2020      PT/INR:    Lab Results   Component Value Date    INR 1.19 (H) 02/12/2020    PROTIME 13.8 (H) 02/12/2020     Cardiac Enzymes:    Lab Results   Component Value Date    CKTOTAL 168 07/28/2012     Lab Results   Component Value Date TROPONINI 0.04 (H) 01/28/2020    TROPONINI 0.110 (H) 12/22/2013    TROPONINI 0.110 (H) 12/22/2013     BNP:    Lab Results   Component Value Date    BNP 44 01/13/2014     FASTING LIPID PANEL:    Lab Results   Component Value Date    CHOL 98 11/07/2019    HDL 38 11/07/2019    TRIG 70 11/07/2019       Physical Examination:    BP 89/61   Pulse 87   Temp 97.2 °F (36.2 °C) (Temporal)   Resp 17   Ht 6' 1\" (1.854 m)   Wt 179 lb 3.7 oz (81.3 kg)   SpO2 98%   BMI 23.65 kg/m²    Chronically ill appearing    Respiratory:  · Resp Assessment: Normal respiratory effort  · Resp Auscultation: Clear to auscultation bilaterally   Cardiovascular:  · Auscultation: irregular rhythm,harsh systolic murmur  · Palpation:  Nl PMI  · JVP:  < 8 cm H2O  Extremities: trace bilateral peripheral Edema, both hands are swollen  Abdomen:  · Soft, non-tender  · Normal bowel sounds  Extremities:  ·  No Cyanosis or Clubbing  Neurological/Psychiatric:  · Oriented to time, place, and person  · Non-anxious  Skin Warm and dry    Assessment:    Active Problems:    Elevated troponin  Plan: he has had normal coronaries in past    Cardiomyopathy ([facility]  Plan: marked change in EF.  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   Holding lasix due to euvolemia and low blood pressure    Anemia worsening   Monitor for blood transfusion    Hypotension:   continue midodrine 10 mg po tid      Plan:  1. continue spironolactone 12.5 mg po qd  2. continue midodrine 10 mg po

## 2020-02-12 NOTE — PROGRESS NOTES
Pt reassessed, no acute changes noted. Remains up in chair, A/O following commands. VSS  Monitor AFIB with occasional pvc's, CVR  Lungs continue diminished with crackles noted to RLL. Pt denies any c/o. See flow sheets for complete assessment.

## 2020-02-12 NOTE — PROGRESS NOTES
[facility] GI  Gastroenterology Progress Note    Martinez Wild is a 70 y.o. male patient. 1. Septic shock ([facility] [address]    2. Atrial fibrillation with rapid ventricular response (HCC)    3. Elevated international normalized ratio (INR)    4. Streptococcal pharyngitis    5. Leukopenia, unspecified type    6. Chronic ethmoidal sinusitis    7. TYRELL (acute kidney injury) ([facility] [address]    8. chronic elevated troponin        SUBJECTIVE:  Had mucous brown BM with streaks of blood last night. None since. No abdominal pain. ROS:  Cardiovascular ROS: no chest pain or dyspnea on exertion  Gastrointestinal ROS: see hpi  Respiratory ROS: no cough, shortness of breath, or wheezing    Physical    VITALS:  BP 89/61   Pulse 87   Temp 97.2 °F (36.2 °C) (Temporal)   Resp 17   Ht 6' 1\" (1.854 m)   Wt 179 lb 3.7 oz (81.3 kg)   SpO2 98%   BMI 23.65 kg/m²   TEMPERATURE:  Current - Temp: 97.2 °F (36.2 °C); Max - Temp  Av.4 °F (36.9 °C)  Min: 97.2 °F (36.2 °C)  Max: 99.6 °F (37.6 °C)    NAD  RRR, Nl s1s2  Lungs CTA Bilaterally, normal effort  Abdomen soft, ND, NT, no HSM, Bowel sounds normal  AAOx3, No asterixis     Data    Data Review:    Recent Labs     02/10/20  0510  20  0450 20  1845 20  0020 20  0440   WBC 8.2  --  6.5  --   --  6.0   HGB 8.0*   < > 8.7* 8.9* 8.4* 8.1*   HCT 24.5*   < > 26.3* 27.2* 25.6* 24.8*   MCV 82.9  --  84.1  --   --  83.3   *  --  116*  --   --  116*    < > = values in this interval not displayed. Recent Labs     02/10/20  0510 20  0450 20  0440    139 133*   K 3.7 4.0 3.6   CL 92* 90* 88*   CO2 42* 42* 40*   BUN 41* 39* 40*   CREATININE 1.1 1.1 1.0     No results for input(s): AST, ALT, ALB, BILIDIR, BILITOT, ALKPHOS in the last 72 hours. No results for input(s): LIPASE, AMYLASE in the last 72 hours.   Recent Labs     02/10/20  0510 20  0450 20  0440   PROTIME 14.5* 14.6* 13.8*   INR 1.25* 1.26* 1.19*     No results for input(s): PTT in the last 72 hours.      EGD   No active upper GI bleed. Normal esophagus, biopsied. Patchy gastritis (GEJ and stomach), biopsied.      FLEXIBLE SIGMOIDOSCOPY   Shallow rectal ulcerations, stercoral vs viral (CMV/HSV) ulcers, biopsied  Mild enlarged internal hemorrhoids  Sigmoid diverticulosis    ASSESSMENT     Hematochezia  - pt had brown stool with streaks of red blood Sunday. No  bleeding since. Flex sig with shallow rectal ulcers, path pending. Anemia - hgb was 11 at admission and trended down since. He received 3 U PRBC here. Only scant hematochezia Sunday from rectal ulcers. EGD negative. hgb 8.7 today. Lymphoma - new dx  Sepsis   Dysphagia - does have oropharyngeal dysphagia and is on dysphagia II diet. EGD ok. Afib - coumadin held for hematochezia. PLAN    - Await biopsies  - PPI daily   - Miralax 17 grams daily to BID    - given bleeding last night, would hold anticoagulation from GI standpoint at this time until path resulted. Discussed with Dr. Rosa Maria Fernando, [facility]    I have personally performed a face to face diagnostic evaluation on this patient. I have interviewed and examined the patient and I agree with the findings and recommended plan of care. In summary, my findings and plan are the following:     He had streaks of blood with stool yesterday. He denies abdominal pain or severe constipation   VSS abdomen soft and nontender   Labs: Hb in the 8+ range but trending down   A/P> rectal ulcers on flex sig.  This could be stercoral, infectious (CMV/HSV) or malignant (lymphoma)   Hold anticoagulation for now, monitor H/H, await biopsies    Rene Mujica MD, MSc  [address] and [address] [id number]

## 2020-02-12 NOTE — CARE COORDINATION
Met with patient and his wife and discussed discharge planning. Patient is willing to go to SNF if he needs to. Does not want to overload his wife. We reviewed [facility] SNF list with Medicare quality scores. Daughter, Aviva Mariee, called and spoke with me about [facility] services. She is actually considering moving into her mother & fathers home to be there to provide care. Evidently they have high deductible with their insurance plan and are concerned about being able to afford it. Case management will continue to be available for consultation and to coordinate discharge plans when family has made their final decision.     Dasha Streeter RN BSN  Case Management

## 2020-02-12 NOTE — PLAN OF CARE
Problem: OXYGENATION/RESPIRATORY FUNCTION  Goal: Patient will achieve/maintain normal respiratory rate/effort  Description  Respiratory rate and effort will be within normal limits for the patient  Outcome: Ongoing     Problem: HEMODYNAMIC STATUS  Goal: Patient has stable vital signs and fluid balance  Outcome: Ongoing     Problem: FLUID AND ELECTROLYTE IMBALANCE  Goal: Fluid and electrolyte balance are achieved/maintained  Outcome: Ongoing     Problem: ACTIVITY INTOLERANCE/IMPAIRED MOBILITY  Goal: Mobility/activity is maintained at optimum level for patient  Outcome: Ongoing     Problem: Infection:  Goal: Will remain free from infection  Description  Will remain free from infection  Outcome: Ongoing     Problem: Safety:  Goal: Free from accidental physical injury  Description  Free from accidental physical injury  Outcome: Ongoing  Goal: Free from intentional harm  Description  Free from intentional harm  Outcome: Ongoing     Problem: Daily Care:  Goal: Daily care needs are met  Description  Daily care needs are met  Outcome: Ongoing     Problem: Pain:  Goal: Patient's pain/discomfort is manageable  Description  Patient's pain/discomfort is manageable  Outcome: Ongoing  Goal: Pain level will decrease  Description  Pain level will decrease  Outcome: Ongoing  Goal: Control of acute pain  Description  Control of acute pain  Outcome: Ongoing  Goal: Control of chronic pain  Description  Control of chronic pain  Outcome: Ongoing     Problem: Skin Integrity:  Goal: Skin integrity will stabilize  Description  Skin integrity will stabilize  Outcome: Ongoing     Problem: Discharge Planning:  Goal: Patients continuum of care needs are met  Description  Patients continuum of care needs are met  Outcome: Ongoing     Problem: Falls - Risk of:  Goal: Will remain free from falls  Description  Will remain free from falls  Outcome: Ongoing  Goal: Absence of physical injury  Description  Absence of physical injury  Outcome: Ongoing     Problem: Risk for Impaired Skin Integrity  Goal: Tissue integrity - skin and mucous membranes  Description  Structural intactness and normal physiological function of skin and  mucous membranes.   Outcome: Ongoing     Problem: Musculor/Skeletal Functional Status  Goal: Highest potential functional level  Outcome: Ongoing  Goal: Absence of falls  Outcome: Ongoing     Problem: Nutrition  Goal: Optimal nutrition therapy  Outcome: Ongoing

## 2020-02-12 NOTE — PROGRESS NOTES
Oncology and Hematology Care   Progress Note      2/12/2020 8:35 AM        Name: Forbes Hawthorne Schirmer . Admitted: 1/28/2020    SUBJECTIVE:  Feeling slightly better. Denies pain. No SOB. Weak.      Reviewed interval ancillary notes    Current Medications  warfarin (COUMADIN) daily dosing (placeholder), RX Placeholder  0.9 % sodium chloride bolus, Once  pantoprazole (PROTONIX) tablet 40 mg, QAM AC  midodrine (PROAMATINE) tablet 5 mg, TID PRN  spironolactone (ALDACTONE) tablet 12.5 mg, Daily  midodrine (PROAMATINE) tablet 10 mg, TID WC  amiodarone (CORDARONE) tablet 200 mg, Daily  neomycin-bacitracin-polymyxin (NEOSPORIN) ointment, BID PRN  digoxin (LANOXIN) tablet 125 mcg, Daily  ondansetron (ZOFRAN) injection 4 mg, Q6H PRN  morphine (PF) injection 2 mg, Q2H PRN    Or  morphine injection 4 mg, Q2H PRN  oxyCODONE (ROXICODONE) immediate release tablet 5 mg, Q4H PRN    Or  oxyCODONE (ROXICODONE) immediate release tablet 10 mg, Q4H PRN  acetaminophen (TYLENOL) tablet 650 mg, Q4H PRN  lidocaine 4 % external patch 1 patch, Daily PRN  lip balm petroleum free (PHYTOPLEX) stick, PRN  promethazine (PHENERGAN) injection 12.5 mg, Q6H PRN  sodium chloride (OCEAN, BABY AYR) 0.65 % nasal spray 1 spray, PRN  sodium chloride flush 0.9 % injection 10 mL, 2 times per day  sodium chloride flush 0.9 % injection 10 mL, PRN  prochlorperazine (COMPAZINE) injection 10 mg, Q6H PRN  lactobacillus (CULTURELLE) capsule 1 capsule, Daily with breakfast  aspirin chewable tablet 81 mg, Daily  magnesium hydroxide (MILK OF MAGNESIA) 400 MG/5ML suspension 30 mL, Daily PRN  atorvastatin (LIPITOR) tablet 10 mg, Daily  diphenhydrAMINE (BENADRYL) injection 25 mg, Q6H PRN        Objective:  BP (!) 82/53   Pulse 84   Temp 98.2 °F (36.8 °C) (Temporal)   Resp 17   Ht 6' 1\" (1.854 m)   Wt 179 lb 3.7 oz (81.3 kg)   SpO2 96%   BMI 23.65 kg/m²     Intake/Output Summary (Last 24 hours) at 2/12/2020 0864  Last data filed at 2/12/2020 2172  Gross per 24 hour   Intake 1160 ml   Output 1750 ml   Net -590 ml      Wt Readings from Last 3 Encounters:   02/12/20 179 lb 3.7 oz (81.3 kg)   01/28/20 178 lb (80.7 kg)   01/23/20 180 lb 6.4 oz (81.8 kg)       General appearance:  Appears comfortable. On oxygen. Frail. Eyes: Sclera clear. Pupils equal.  ENT: Moist oral mucosa. Trachea midline, no adenopathy. Cardiovascular: Regular rhythm, normal S1, S2. No murmur. Has edema in lower extremities  Respiratory: Not using accessory muscles. Good inspiratory effort. Clear to auscultation bilaterally, no wheeze or crackles. GI: Abdomen soft, no tenderness, not distended  Musculoskeletal: No cyanosis in digits, neck supple  Neurology: CN 2-12 grossly intact. No speech or motor deficits  Psych: Normal affect. Alert and oriented in time, place and person  Skin: Warm, dry, normal turgor  UE edematous and less edema of LE without pitting. Labs and Tests:  CBC:   Recent Labs     02/10/20  0510  02/11/20  0450 02/11/20  1845 02/12/20  0020 02/12/20  0440   WBC 8.2  --  6.5  --   --  6.0   HGB 8.0*   < > 8.7* 8.9* 8.4* 8.1*   *  --  116*  --   --  116*    < > = values in this interval not displayed. BMP:    Recent Labs     02/10/20  0510 02/11/20  0450 02/12/20  0440    139 133*   K 3.7 4.0 3.6   CL 92* 90* 88*   CO2 42* 42* 40*   BUN 41* 39* 40*   CREATININE 1.1 1.1 1.0   GLUCOSE 129* 162* 121*     Hepatic: No results for input(s): AST, ALT, ALB, BILITOT, ALKPHOS in the last 72 hours.      Lab Results   Component Value Date    INR 1.19 (H) 02/12/2020    INR 1.26 (H) 02/11/2020    INR 1.25 (H) 02/10/2020    PROTIME 13.8 (H) 02/12/2020    PROTIME 14.6 (H) 02/11/2020    PROTIME 14.5 (H) 02/10/2020       ASSESSMENT AND PLAN    Active Problems:    Elevated troponin    Cardiomyopathy (HCC)    Mitral regurgitation    S/P AVR (aortic valve replacement)    Chronic combined systolic and diastolic CH[facility] (congestive [facility] failure) ([facility]    Encounter for medication counseling    Hyponatremia    Hypercholesteremia    PAF (paroxysmal atrial fibrillation) (HCC)    Essential hypertension    Sepsis ([facility] [address]    Tobacco abuse counseling    Lymphadenopathy    Hypervolemia  Resolved Problems:    * No resolved hospital problems. *      Lymphoma. Exact history cannot be determined at present. CHF. EF 30%. On Aspirin. Hypotension. On Midodrine.      Coagulopathy: Supratherapeutic INR initially. Probably due to antibiotics with warfarin. INR 1.2 today. Coumadin resumed. Atrial fibrillation with rapid ventricular response. Cardiology managing.      Renal failure with significant prerenal component receiving IV hydration. Creatinine improved.      Anemia. Partly due to myelodysplastic syndrome. Had RBC transfusions. Rectal ulcerations. Await pathology. Performance status poor. Reviewed labs. Overall stable. Still hypotensive. Spoke to the pathologist yesterday. Second biopsy showed no clear evidence of Hodgkins lymphoma. The specimen was also sent out for 2nd opinion. Explained to the patient and his wife this morning. Cherie Disla.  Dayron Vazquez MD, [place] [id number]  Hematology and Oncology  [facility]  [phone]

## 2020-02-12 NOTE — PLAN OF CARE
Problem: HEMODYNAMIC STATUS  Goal: Patient has stable vital signs and fluid balance  2/12/2020 0616 by Lou Thompson RN  Outcome: Ongoing  Monitor AFIB with occasional pvc's  VSS  Pulses palp, brisk cap refill  Skin color normal, warm & dry  2+ pitting edema BUE's, 3+ BLE's, pitting to perineal/male genitalia  Up in chair  HOB at least 30 degrees when in bed  Meds & treatments as ordered  Daily weight      Problem: Falls - Risk of:  Goal: Will remain free from falls  Description  Will remain free from falls  2/12/2020 0616 by Lou Thompson RN  Outcome: Ongoing  Fall risk protocol in place  [facility] [place] fall risk assessed q shift

## 2020-02-12 NOTE — PROGRESS NOTES
Tolerance: Patient Tolerated treatment well;Patient limited by fatigue     Patient Diagnosis(es): The primary encounter diagnosis was Septic shock ([address] Diagnoses of Atrial fibrillation with rapid ventricular response ([address] Elevated international normalized ratio (INR), Streptococcal pharyngitis, Leukopenia, unspecified type, Chronic ethmoidal sinusitis, TYRELL (acute kidney injury) ([address] and chronic elevated troponin were also pertinent to this visit. has a past medical history of Aortic insufficiency, Atrial fibrillation ([address] Breast nodule, CAD (coronary artery disease), Cardiomyopathy ([address] CHF (congestive heart failure) ([address] Hyperlipidemia, Hypertension, Microscopic hematuria, Mitral regurgitation, Nausea & vomiting, Osteoarthritis of knee, Pneumonia, Rotator cuff tear, and Ventricular ectopy. has a past surgical history that includes Inguinal hernia repair; other surgical history (8/2/12); Aortic valve replacement (8/3/2012); Cardiac surgery; Colonoscopy; Endoscopy, colon, diagnostic; Total knee arthroplasty (Left, 12/4/13); Upper gastrointestinal endoscopy (12/24/13); Total knee arthroplasty (Right, 2/3/14); joint replacement; cyst removal; Total shoulder arthroplasty (Right, 2/2/15); lymph node biopsy (Left, 2/3/2020); Axillary Surgery (Left, 2/9/2020); Upper gastrointestinal endoscopy (N/A, 2/10/2020); and sigmoidoscopy (N/A, 2/10/2020). Restrictions  Restrictions/Precautions  Restrictions/Precautions: General Precautions, Fall Risk(HIGH FALL RISK)  Required Braces or Orthoses?: No  Position Activity Restriction  Other position/activity restrictions: Leticia Erickson is a 70 y.o. male presents to the emergency department by emergency medical services from the office of Dr. Marielle Lopez. Is reported that the patient has not been feeling well for the last couple of days. He has been on antibiotics for sinus-like symptoms as well sore throat pain. He has had febrile illness despite this.   Concerns arose today because the patient had a febrile illness was tachycardic and hypotensive with documented infection positive for streptococcal pharyngitis. Found to have severe sepsis. Concern for lymphoma with L axillary lymph node biopsy performed. Subjective   General  Chart Reviewed: Yes  Response To Previous Treatment: Patient with no complaints from previous session. Family / Caregiver Present: Yes  Subjective  Subjective: Pt denied pain at rest, agreeable to attempting therapy. General Comment  Comments: Pt sitting in recliner upon arrival.  Pain Screening  Patient Currently in Pain: Denies  Vital Signs  Patient Currently in Pain: Denies       Orientation  Orientation  Overall Orientation Status: Within Functional Limits  Cognition      Objective   Bed mobility  Sit to Supine: Minimal assistance(LE due to swelling)  Scooting: Stand by assistance  Transfers  Sit to Stand: Contact guard assistance  Stand to sit: Contact guard assistance  Ambulation  Ambulation?: Yes  Ambulation 1  Surface: level tile  Device: Rolling Walker  Assistance: Contact guard assistance  Quality of Gait: mildly unsteady initially with 1 LOB (self corrected with use of RW), progressed to steady gait, decreased step length and rayshawn, 1 standing rest break. Distance: ~270 ft  Stairs/Curb  Stairs?: No     Balance  Posture: Fair  Sitting - Static: Good  Sitting - Dynamic: Good  Standing - Static: Fair;+  Standing - Dynamic: Fair                  Manual therapy  Other: Therapist completing gentle MLD massage to BLE and BUE in effort to reduce swelling that remains present in all extremities.          G-Code     OutComes Score                                                     AM-PAC Score  AM-PAC Inpatient Mobility Raw Score : 18 (02/12/20 1614)  AM-PAC Inpatient T-Scale Score : 43.63 (02/12/20 1614)  Mobility Inpatient CMS 0-100% Score: 46.58 (02/12/20 1614)  Mobility Inpatient CMS G-Code Modifier : CK (02/12/20 1614) Goals  Short term goals  Time Frame for Short term goals: To be met prior to discharge  Short term goal 1: Bed mobility with supervision  Short term goal 2: Sit to/from stand with supervision  Short term goal 3: Ambulate 200 feet with RW and supervision  Short term goal 4: Navigate up/down 1 flight of steps with rail and supervision  Short term goal 5: Navigate up/down 2 steps without rail and AAD and SBA  Patient Goals   Patient goals : To go home   **no goals met this date    Plan    Plan  Times per week: 3-5  Times per day: Daily  Current Treatment Recommendations: Strengthening, Balance Training, Transfer Training, Gait Training, Stair training, Home Exercise Program, Safety Education & Training  Safety Devices  Type of devices:  All fall risk precautions in place, Call light within reach, Gait belt, Nurse notified, Bed alarm in place, Left in bed, Patient at risk for falls  Restraints  Initially in place: No     Therapy Time   Individual Concurrent Group Co-treatment   Time In 1500         Time Out 1540         Minutes 40         Timed Code Treatment Minutes: [address] [facility] [place], PT   Candie De La Torre, [place], DPT, [id number]

## 2020-02-12 NOTE — PROGRESS NOTES
[facility]    2/12/2020 3:49 PM        Name: Veronica De La Cruz Schirmer . Admitted: 1/28/2020  Primary Care Provider: Colleen Andujar MD (Tel: [phone])                           Hospital course:     82-year-old gentleman who was admitted for severe sepsis. Secondary to possibly unknown source patient was positive for strep B- at PCPs office. Blood culture so far has remained negative. Infectious disease is following. Patient also noted to be A. fib RVR. Was on amiodarone drip and heparin drips. Found to have lymphadenopathy  - bx done. Treated with doxy. Treated with  steroids for levaquin induced rash. No GI bleed. GI consulted. Scopes done. Treated with PPI. Started on bowel regimen. S: Doing okay. Had 2 bm's since yesterday with blood smears.      Reviewed interval ancillary notes    Current Medications  polyethylene glycol (GLYCOLAX) packet 17 g, BID  albumin human 25 % IV solution 25 g, Q12H  warfarin (COUMADIN) daily dosing (placeholder), RX Placeholder  0.9 % sodium chloride bolus, Once  pantoprazole (PROTONIX) tablet 40 mg, QAM AC  midodrine (PROAMATINE) tablet 5 mg, TID PRN  spironolactone (ALDACTONE) tablet 12.5 mg, Daily  midodrine (PROAMATINE) tablet 10 mg, TID WC  amiodarone (CORDARONE) tablet 200 mg, Daily  neomycin-bacitracin-polymyxin (NEOSPORIN) ointment, BID PRN  digoxin (LANOXIN) tablet 125 mcg, Daily  ondansetron (ZOFRAN) injection 4 mg, Q6H PRN  morphine (PF) injection 2 mg, Q2H PRN    Or  morphine injection 4 mg, Q2H PRN  oxyCODONE (ROXICODONE) immediate release tablet 5 mg, Q4H PRN    Or  oxyCODONE (ROXICODONE) immediate release tablet 10 mg, Q4H PRN  acetaminophen (TYLENOL) tablet 650 mg, Q4H PRN  lidocaine 4 % external patch 1 patch, Daily PRN  lip balm petroleum free (PHYTOPLEX) stick, PRN  promethazine (PHENERGAN) injection 12.5 mg, Q6H PRN  sodium chloride (OCEAN, BABY AYR) 0.65 % nasal spray 1 spray, PRN  sodium chloride flush 0.9 % injection 10 mL, 2 times per day  sodium chloride flush 0.9 % injection 10 mL, PRN  prochlorperazine (COMPAZINE) injection 10 mg, Q6H PRN  lactobacillus (CULTURELLE) capsule 1 capsule, Daily with breakfast  aspirin chewable tablet 81 mg, Daily  magnesium hydroxide (MILK OF MAGNESIA) 400 MG/5ML suspension 30 mL, Daily PRN  atorvastatin (LIPITOR) tablet 10 mg, Daily  diphenhydrAMINE (BENADRYL) injection 25 mg, Q6H PRN        Objective:  BP 85/66   Pulse 90   Temp 98.4 °F (36.9 °C) (Temporal)   Resp 16   Ht 6' 1\" (1.854 m)   Wt 179 lb 3.7 oz (81.3 kg)   SpO2 98%   BMI 23.65 kg/m²     Intake/Output Summary (Last 24 hours) at 2/12/2020 1549  Last data filed at 2/12/2020 1453  Gross per 24 hour   Intake 1240 ml   Output 2250 ml   Net -1010 ml      Wt Readings from Last 3 Encounters:   02/12/20 179 lb 3.7 oz (81.3 kg)   01/28/20 178 lb (80.7 kg)   01/23/20 180 lb 6.4 oz (81.8 kg)       General appearance:  Appears comfortable  Eyes: Sclera clear. Pupils equal.  ENT: Moist oral mucosa. Trachea midline, no adenopathy. Cardiovascular: Regular rhythm, normal S1, S2. No murmur. No edema in lower extremities  Respiratory: Not using accessory muscles. Good inspiratory effort. Clear to auscultation bilaterally, no wheeze or crackles. GI: Abdomen soft, no tenderness, not distended, normal bowel sounds  Musculoskeletal: No cyanosis in digits, neck supple  Neurology: CN 2-12 grossly intact. No speech or motor deficits  Psych: Normal affect. Alert and oriented in time, place and person  Skin: Rash resolved. Labs and Tests:  CBC:   Recent Labs     02/10/20  0510  02/11/20  0450  02/12/20  0020 02/12/20  0440 02/12/20  1150   WBC 8.2  --  6.5  --   --  6.0  --    HGB 8.0*   < > 8.7*   < > 8.4* 8.1* 8.9*   *  --  116*  --   --  116*  --     < > = values in this interval not displayed.      BMP: Recent Labs     02/10/20  0510 02/11/20  0450 02/12/20  0440    139 133*   K 3.7 4.0 3.6   CL 92* 90* 88*   CO2 42* 42* 40*   BUN 41* 39* 40*   CREATININE 1.1 1.1 1.0   GLUCOSE 129* 162* 121*     Hepatic:   No results for input(s): AST, ALT, ALB, BILITOT, ALKPHOS in the last 72 hours. Problem List  Active Problems:    Elevated troponin    Cardiomyopathy (HCC)    Mitral regurgitation    S/P AVR (aortic valve replacement)    Chronic combined systolic and diastolic CHF (congestive heart failure) ([facility]    Encounter for medication counseling    Hyponatremia    Hypercholesteremia    PAF (paroxysmal atrial fibrillation) ([facility]    Essential hypertension    [facility]epsis [facility]    Tobacco abuse counseling    Lymphadenopathy    Hypervolemia  Resolved Problems:    * No resolved hospital problems. *       Assessment & Plan:     1. Severe sepsis:  -strep thoat swab positive. Sinusitis noted.   -had allergic reaction to levaquin.   -  Finished doxy course. 2. 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 Results are Equivocal.  Awaiting second opinion. 3. A. fib with RVR  -Likely secondary to sepsis   -Now on amio and digoxin. Resolved. Supratherapeutic INR  resolved. -Holding Coumadin at this time given GI bleed and need for transfusions. . Continue to hold Coumadin at this time. Discussed with cardiology. Risks outweigh benefits. 5. Rash-Secondary Levaquin. Resolved. Off steroids      6. Chronic combined CHF-volume overload from all IV fluids he received. - diuresed with iv lasix. Now off lasix due to hypotension. Medications adjusted per cardiology. Will given some iv albumin as his albumin is on low side and has third spacing. 7. Dysphagia: Speech eval noted. On modified diet. MBS showed no mally aspiration. Continue dysphagia diet. Continue to monitor.     8.  Acute blood loss anemia and anemia of chronic disease  From

## 2020-02-12 NOTE — PROGRESS NOTES
Initial shift assessment completed, see complex assessment in doc flowsheet. Patient A&O, VSS, respirations easy, lungs clear, no distress present, patient using Incentive spirometer with good effort. Reposition in bed for comfort, call light in reach, encouraged to call for nurse as needed throughout the shift.

## 2020-02-13 NOTE — FLOWSHEET NOTE
02/13/20 1402   Sacraments   Sacrament of Sick-Anointing Anointed  (by Conor Chan on 2/13)   Electronically signed by Afia Henderson on 2/13/2020 at 2:02 PM

## 2020-02-13 NOTE — PROGRESS NOTES
Initial shift assessment completed, see complex assessment in doc flowsheet. Patient sleeping , arouses to verbal stimuli. SBP 88/54 with a MAP >60. Monitor Afib with a controlled rate in the 80's. Respirations easy, breath sounds diminished, O2 saturations 88-90% on RA, 2 liters NC placed back on patient.  Will continue to monitor

## 2020-02-13 NOTE — FLOWSHEET NOTE
02/13/20 1504   Encounter Summary   Services provided to: Family  (pt's spouse)   Referral/Consult From: Nurse   Support System Spouse; Children   Continue Visiting   (pt sleeping, AD doc discussion w/pt's spouse 2/13 CL)   Complexity of Encounter Low   Length of Encounter 15 minutes   Routine   Type Initial   Assessment Calm; Approachable;Sleeping   Intervention Active listening;Explored feelings, thoughts, concerns   Outcome Engaged in conversation   Advance Directives (For Healthcare)   Healthcare Directive No, patient does not have an advance directive for healthcare treatment   Advance Directives Documents given;Documents explained  (pt dleeping, explained & gave docs to pt's spouse)   Electronically signed by Georgia Alberto on 2/13/2020 at 3:06 PM

## 2020-02-13 NOTE — PROGRESS NOTES
Pt reassessed, no acute changes noted. Wife & family members at bedside. Remains up in chair, A/Ox4. VSS  Monitor AFIB with pvc's. Lungs continue diminished with fine crackles noted to BLL's. O2 remains off. RA sat currently 93%. Pt denies any c/o. See flow sheets for complete assessment.

## 2020-02-13 NOTE — PROGRESS NOTES
Assessment reviewed, no acute changes noted. VSS, respirations easy, no distress present. Will continue to monitor.

## 2020-02-13 NOTE — PROGRESS NOTES
[facility]    2/13/2020 2:26 PM        Name: Scott Leal Schirmer . Admitted: 1/28/2020  Primary Care Provider: Henrik Saucedo MD (Tel: [phone])                           Hospital course:     81-year-old gentleman who was admitted for severe sepsis. Secondary to possibly unknown source patient was positive for strep B- at PCPs office. Blood culture so far has remained negative. Infectious disease is following. Patient also noted to be A. fib RVR. Was on amiodarone drip and heparin drips. Found to have lymphadenopathy  - bx done. Treated with doxy. Treated with  steroids for levaquin induced rash. No GI bleed. GI consulted. Scopes done. Treated with PPI. Started on bowel regimen. S: Doing okay. Wife at bed side- want to be inpt until bx of LN comesa back and wants to see different oncology group if possible. Has some sores  On lips.      Reviewed interval ancillary notes    Current Medications  magic (miracle) mouthwash with nystatin, 4x Daily  docosanol (ABREVA) 10 % cream, 5x Daily  polyethylene glycol (GLYCOLAX) packet 17 g, BID  albumin human 25 % IV solution 25 g, Q12H  0.9 % sodium chloride bolus, Once  pantoprazole (PROTONIX) tablet 40 mg, QAM AC  midodrine (PROAMATINE) tablet 5 mg, TID PRN  spironolactone (ALDACTONE) tablet 12.5 mg, Daily  midodrine (PROAMATINE) tablet 10 mg, TID WC  amiodarone (CORDARONE) tablet 200 mg, Daily  neomycin-bacitracin-polymyxin (NEOSPORIN) ointment, BID PRN  digoxin (LANOXIN) tablet 125 mcg, Daily  ondansetron (ZOFRAN) injection 4 mg, Q6H PRN  oxyCODONE (ROXICODONE) immediate release tablet 5 mg, Q4H PRN  acetaminophen (TYLENOL) tablet 650 mg, Q4H PRN  lidocaine 4 % external patch 1 patch, Daily PRN  lip balm petroleum free (PHYTOPLEX) stick, PRN  promethazine (PHENERGAN) injection 12.5 mg, Q6H PRN  sodium chloride (OCEAN, BABY AYR) 0.65 % nasal spray 1 spray, PRN  sodium chloride flush 0.9 % injection 10 mL, 2 times per day  sodium chloride flush 0.9 % injection 10 mL, PRN  prochlorperazine (COMPAZINE) injection 10 mg, Q6H PRN  lactobacillus (CULTURELLE) capsule 1 capsule, Daily with breakfast  aspirin chewable tablet 81 mg, Daily  magnesium hydroxide (MILK OF MAGNESIA) 400 MG/5ML suspension 30 mL, Daily PRN  atorvastatin (LIPITOR) tablet 10 mg, Daily  diphenhydrAMINE (BENADRYL) injection 25 mg, Q6H PRN        Objective:  BP (!) 84/54   Pulse 90   Temp 98.7 °F (37.1 °C) (Oral)   Resp 20   Ht 6' 1\" (1.854 m)   Wt 181 lb 7 oz (82.3 kg)   SpO2 93%   BMI 23.94 kg/m²     Intake/Output Summary (Last 24 hours) at 2/13/2020 1426  Last data filed at 2/13/2020 1000  Gross per 24 hour   Intake 1215.9 ml   Output 1850 ml   Net -634.1 ml      Wt Readings from Last 3 Encounters:   02/13/20 181 lb 7 oz (82.3 kg)   01/28/20 178 lb (80.7 kg)   01/23/20 180 lb 6.4 oz (81.8 kg)       General appearance:  Appears comfortable  Eyes: Sclera clear. Pupils equal.  ENT: Moist oral mucosa. Trachea midline, no adenopathy. Lip sores noted as well as tongue thrush   Cardiovascular: Regular rhythm, normal S1, S2. No murmur. No edema in lower extremities  Respiratory: Not using accessory muscles. Good inspiratory effort. Clear to auscultation bilaterally, no wheeze or crackles. GI: Abdomen soft, no tenderness, not distended, normal bowel sounds  Musculoskeletal: No cyanosis in digits, neck supple  Neurology: CN 2-12 grossly intact. No speech or motor deficits  Psych: Normal affect. Alert and oriented in time, place and person  Skin: no rash     Labs and Tests:  CBC:   Recent Labs     02/11/20  0450  02/12/20  0440 02/12/20  1150 02/12/20  2147 02/13/20  0510   WBC 6.5  --  6.0  --   --  4.7   HGB 8.7*   < > 8.1* 8.9* 7.9* 7.9*   *  --  116*  --   --  116*    < > = values in this interval not displayed.      BMP: Recent Labs     02/11/20  0450 02/12/20  0440 02/13/20  0510    133* 136   K 4.0 3.6 3.8   CL 90* 88* 90*   CO2 42* 40* 38*   BUN 39* 40* 38*   CREATININE 1.1 1.0 1.0   GLUCOSE 162* 121* 107*     Hepatic:   No results for input(s): AST, ALT, ALB, BILITOT, ALKPHOS in the last 72 hours. Problem List  Active Problems:    Elevated troponin    Cardiomyopathy (HCC)    Mitral regurgitation    S/P AVR (aortic valve replacement)    Chronic combined systolic and diastolic CHF (congestive heart failure) ([facility] [address]    Encounter for medication counseling    Hyponatremia    Hypercholesteremia    PAF (paroxysmal atrial fibrillation) ([facility] [address]    Essential hypertension    Sepsis ([facility]    Tobacco abuse counseling    Lymphadenopathy    Hypervolemia  Resolved Problems:    * No resolved hospital problems. *       Assessment & Plan:     1. Severe sepsis:  -strep thoat swab positive. Sinusitis noted.   -had allergic reaction to levaquin.   -  Finished doxy course. 2. 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 Results are Equivocal.  Awaiting second opinion. 3. A. fib with RVR  -Likely secondary to sepsis   -Now on amio and digoxin. Resolved. Supratherapeutic INR  resolved. -Holding Coumadin at this time given GI bleed and need for transfusions. . Continue to hold Coumadin at this time. Discussed with cardiology. Risks outweigh benefits. 5. Rash-Secondary Levaquin. Resolved. Off steroids      6. Chronic combined CHF-volume overload from all IV fluids he received. - diuresed with iv lasix. Now off lasix due to hypotension. Medications adjusted per cardiology. On  iv albumin as his albumin is on low side and has third spacing. 7. Dysphagia: Speech eval noted. On modified diet. MBS showed no mally aspiration. Continue dysphagia diet. Continue to monitor.     8.  Acute blood loss anemia and anemia of chronic disease  From MDS:   had total of 3 units now. Had GI bleeding. GI consulted. EGD  Showed patchy gastritis and flexible sigmoidoscopy showed shallow rectal ulcerations, mild internal hemorrhoids. Sigmoid diverticulosis. Biopsies pending. Started on PPI and MiraLAX. Continue to monitor hemoglobin closely. - relatively stable at this time     9. Hypotension:  Cont midodrine 10 mg tid. Diet: DIET DYSPHAGIA SOFT AND BITE-SIZED; No Drinking Straw  Dietary Nutrition Supplements: Standard High Calorie Oral Supplement, Clear Liquid Oral Supplement, Frozen Oral Supplement  Code:Prior  DVT PPX-  scd's  PT/OT -  Home  With [facility]       Disp: stable to be discharged. Pt and family refusing to be discharged until LN bx results available and demanding to see a different oncology group which I suspect is not available as inpt . Await family members discussion about further plan of care.        Dayna Caldwell MD   2/13/2020 2:26 PM

## 2020-02-13 NOTE — PROGRESS NOTES
Assisted patient up to [facility], for large soft formed brown stool, minimal red streaking noted, vaibhav care provided, along with AM care. Assisted back to bed using walker with no difficulty. Dressing change on JANE PICC completed. Assessment reviewed no acute changes noted.

## 2020-02-13 NOTE — PROGRESS NOTES
See complex assessment, vitals, I&O for complete assessment. A/Ox4, following commands, generalized edema. VSS  Monitor AFIB with pvc's, CVR. Short episodes of NSR noted. Lungs diminished BLL's with fine crackles noted. O2 2L/NC with sats 100%. O2 removed & placed on RA. Hall with qs urine. Skin with scattered areas of bruising. Turgor poor, skin warm & dry. 2+ edema noted to BUE's, 3+ edema noted to BLE's. Pt denies any c/o. Wife at bedside. POC discused- comfort, safety, PT/OT, encouraged use of IS, assist with needs prn.  Pt & wife v/u

## 2020-02-13 NOTE — PROGRESS NOTES
[facility] GI  Gastroenterology Progress Note    Alexis Guillen is a 70 y.o. male patient. 1. Septic shock ([facility] [address]    2. Atrial fibrillation with rapid ventricular response (HCC)    3. Elevated international normalized ratio (INR)    4. Streptococcal pharyngitis    5. Leukopenia, unspecified type    6. Chronic ethmoidal sinusitis    7. TYRELL (acute kidney injury) ([facility] [facility]    8. chronic elevated troponin        SUBJECTIVE:  Had soft BM this morning with streaks of blood. ROS:  Cardiovascular ROS: no chest pain or dyspnea on exertion  Gastrointestinal ROS: no abdominal pain, N/v  Respiratory ROS: no cough, shortness of breath, or wheezing    Physical    VITALS:  BP (!) 136/93   Pulse 77   Temp 98.4 °F (36.9 °C) (Temporal)   Resp 26   Ht 6' 1\" (1.854 m)   Wt 181 lb 7 oz (82.3 kg)   SpO2 100%   BMI 23.94 kg/m²   TEMPERATURE:  Current - Temp: 98.4 °F (36.9 °C); Max - Temp  Av.8 °F (37.1 °C)  Min: 98.4 °F (36.9 °C)  Max: 100.1 °F (37.8 °C)    NAD  RRR, Nl s1s2  Lungs CTA Bilaterally, normal effort  Abdomen soft, ND, NT, no HSM, Bowel sounds normal  AAOx3, No asterixis     Data    Data Review:    Recent Labs     20  0450  20  0440 20  1150 20  2147 20  0510   WBC 6.5  --  6.0  --   --  4.7   HGB 8.7*   < > 8.1* 8.9* 7.9* 7.9*   HCT 26.3*   < > 24.8* 27.2* 23.8* 24.0*   MCV 84.1  --  83.3  --   --  83.3   *  --  116*  --   --  116*    < > = values in this interval not displayed. Recent Labs     20  0450 20  0440 20  0510    133* 136   K 4.0 3.6 3.8   CL 90* 88* 90*   CO2 42* 40* 38*   BUN 39* 40* 38*   CREATININE 1.1 1.0 1.0     No results for input(s): AST, ALT, ALB, BILIDIR, BILITOT, ALKPHOS in the last 72 hours. No results for input(s): LIPASE, AMYLASE in the last 72 hours. Recent Labs     20  0450 20  0440 20  0510   PROTIME 14.6* 13.8* 14.0*   INR 1.26* 1.19* 1.20*     No results for input(s): PTT in the last 72 hours.       EGD

## 2020-02-13 NOTE — PROGRESS NOTES
(Last 24 hours) at 2/13/2020 0920  Last data filed at 2/13/2020 0636  Gross per 24 hour   Intake 1095.9 ml   Output 1850 ml   Net -754.1 ml      Wt Readings from Last 3 Encounters:   02/13/20 181 lb 7 oz (82.3 kg)   01/28/20 178 lb (80.7 kg)   01/23/20 180 lb 6.4 oz (81.8 kg)       General appearance:  Appears comfortable. On oxygen. Frail. Eyes: Sclera clear. Pupils equal.  ENT: Moist oral mucosa. Trachea midline, no adenopathy. + coating on tongue  Cardiovascular: Regular rhythm, normal S1, S2. No murmur. Has edema in lower extremities  Respiratory: Not using accessory muscles. Good inspiratory effort. Clear to auscultation bilaterally, no wheeze or crackles. GI: Abdomen soft, no tenderness, not distended  Musculoskeletal: No cyanosis in digits, neck supple  Neurology: CN 2-12 grossly intact. No speech or motor deficits  Psych: Normal affect. Alert and oriented in time, place and person  Skin: Warm, dry, normal turgor  UE edematous and less edema of LE without pitting. Labs and Tests:  CBC:   Recent Labs     02/11/20  0450  02/12/20  0440 02/12/20  1150 02/12/20  2147 02/13/20  0510   WBC 6.5  --  6.0  --   --  4.7   HGB 8.7*   < > 8.1* 8.9* 7.9* 7.9*   *  --  116*  --   --  116*    < > = values in this interval not displayed. BMP:    Recent Labs     02/11/20  0450 02/12/20  0440 02/13/20  0510    133* 136   K 4.0 3.6 3.8   CL 90* 88* 90*   CO2 42* 40* 38*   BUN 39* 40* 38*   CREATININE 1.1 1.0 1.0   GLUCOSE 162* 121* 107*     Hepatic: No results for input(s): AST, ALT, ALB, BILITOT, ALKPHOS in the last 72 hours.      Lab Results   Component Value Date    INR 1.20 (H) 02/13/2020    INR 1.19 (H) 02/12/2020    INR 1.26 (H) 02/11/2020    PROTIME 14.0 (H) 02/13/2020    PROTIME 13.8 (H) 02/12/2020    PROTIME 14.6 (H) 02/11/2020       ASSESSMENT AND PLAN    Active Problems:    Elevated troponin    Cardiomyopathy (HCC)    Mitral regurgitation    S/P AVR (aortic valve replacement)    Chronic combined systolic and diastolic CHF (congestive heart failure) ([facility] [address]    Encounter for medication counseling    Hyponatremia    Hypercholesteremia    PAF (paroxysmal atrial fibrillation) ([facility] [address]    Essential hypertension    Sepsis ([facility]    Tobacco abuse counseling    Lymphadenopathy    Hypervolemia  Resolved Problems:    * No resolved hospital problems. *      Lymphoma. Exact etiology cannot be determined at present. Biopsy specimen sent out for second opinion, results pending. CHF. EF 30%. On Aspirin. Hypotension. On Midodrine.      Coagulopathy: Supratherapeutic INR initially. Probably due to antibiotics with warfarin. Coumadin on hold per primary team d/t possible GI bleeding    Atrial fibrillation with rapid ventricular response. Cardiology managing.      Renal failure with significant prerenal component receiving IV hydration. Creatinine improved.      Anemia. Partly due to myelodysplastic syndrome. Had RBC transfusions. Rectal ulcerations. Await pathology. Performance status poor. Thrush - start Magic mouthwash    Rosamaria Willingham CNP  [facility]    Patient was seen and examined. Agree with above. Discussed with patient and patient's wife in presence of the nurse. Final pathology is pending. Will recheck LDH.     Beltran Dobbins MD

## 2020-02-13 NOTE — PROGRESS NOTES
Pt's wife & daughter requesting pt be seen by another oncologist other than Dr Sonali Sr. Dr Sonali Sr called & informed. MD stated he would speak with Dr Vaibhav Piña regarding assuming care of pt.

## 2020-02-13 NOTE — PROGRESS NOTES
none  REQUIRES PT FOLLOW UP: Yes  Activity Tolerance  Activity Tolerance: Patient Tolerated treatment well;Patient limited by fatigue;Patient limited by endurance     Patient Diagnosis(es): The primary encounter diagnosis was Septic shock ([address] Diagnoses of Atrial fibrillation with rapid ventricular response ([address] Elevated international normalized ratio (INR), Streptococcal pharyngitis, Leukopenia, unspecified type, Chronic ethmoidal sinusitis, TYRELL (acute kidney injury) ([address] and chronic elevated troponin were also pertinent to this visit. has a past medical history of Aortic insufficiency, Atrial fibrillation ([address] Breast nodule, CAD (coronary artery disease), Cardiomyopathy ([address] CHF (congestive heart failure) ([address] Hyperlipidemia, Hypertension, Microscopic hematuria, Mitral regurgitation, Nausea & vomiting, Osteoarthritis of knee, Pneumonia, Rotator cuff tear, and Ventricular ectopy. has a past surgical history that includes Inguinal hernia repair; other surgical history (8/2/12); Aortic valve replacement (8/3/2012); Cardiac surgery; Colonoscopy; Endoscopy, colon, diagnostic; Total knee arthroplasty (Left, 12/4/13); Upper gastrointestinal endoscopy (12/24/13); Total knee arthroplasty (Right, 2/3/14); joint replacement; cyst removal; Total shoulder arthroplasty (Right, 2/2/15); lymph node biopsy (Left, 2/3/2020); Axillary Surgery (Left, 2/9/2020); Upper gastrointestinal endoscopy (N/A, 2/10/2020); and sigmoidoscopy (N/A, 2/10/2020). Restrictions  Restrictions/Precautions  Restrictions/Precautions: General Precautions, Fall Risk(HIGH FALL RISK)  Required Braces or Orthoses?: No  Position Activity Restriction  Other position/activity restrictions: Dg Lorenzana is a 70 y.o. male presents to the emergency department by emergency medical services from the office of Dr. Cala Goldberg. Is reported that the patient has not been feeling well for the last couple of days.   He has been on antibiotics for sinus-like symptoms as well sore throat pain. He has had febrile illness despite this. Concerns arose today because the patient had a febrile illness was tachycardic and hypotensive with documented infection positive for streptococcal pharyngitis. Found to have severe sepsis. Concern for lymphoma with L axillary lymph node biopsy performed. Subjective   General  Chart Reviewed: Yes  Response To Previous Treatment: Patient with no complaints from previous session. Family / Caregiver Present: Yes  Subjective  Subjective: Pt denied pain at rest, agreeable to attempting therapy. General Comment  Comments: Pt sitting in recliner upon arrival.  Pain Screening  Patient Currently in Pain: Denies  Vital Signs  Patient Currently in Pain: Denies       Orientation  Orientation  Overall Orientation Status: Within Functional Limits  Cognition      Objective   Bed mobility  Supine to Sit: Unable to assess  Comment: Pt sitting in chair upon arrival and left in chair following mobility  Transfers  Sit to Stand: Minimal Assistance;Stand by assistance(min A for initial stand from recliner; completed x5 following ambulation for strengthening, progressed from CGA to SBA)  Stand to sit: Contact guard assistance  Ambulation  Ambulation?: Yes  Ambulation 1  Surface: level tile  Device: Rolling Walker  Assistance: Contact guard assistance;Stand by assistance(CGA progressing to SBA)  Quality of Gait: Pt with increased rayshawn initially during ambulation, but slowing as ambulation progressed, continues with decreased step length, no standing rest break or LOB.   Distance: ~270 ft  Stairs/Curb  Stairs?: No     Balance  Posture: Fair  Sitting - Static: Good  Sitting - Dynamic: Good  Standing - Static: Fair;+  Standing - Dynamic: Fair;+  Exercises  Gluteal Sets: x10 with 3 second holds  Hip Flexion: x10 BLE, AAROM for LLE as pt reporting pain with hip flexion of LLE  Knee Long Arc Quad: x5 BLE          G-Code     OutComes Score AM-PAC Score  AM-PAC Inpatient Mobility Raw Score : 18 (02/12/20 1614)  AM-PAC Inpatient T-Scale Score : 43.63 (02/12/20 1614)  Mobility Inpatient CMS 0-100% Score: 46.58 (02/12/20 1614)  Mobility Inpatient CMS G-Code Modifier : CK (02/12/20 1614)          Goals  Short term goals  Time Frame for Short term goals: To be met prior to discharge  Short term goal 1: Bed mobility with supervision  Short term goal 2: Sit to/from stand with supervision  Short term goal 3: Ambulate 200 feet with RW and supervision  Short term goal 4: Navigate up/down 1 flight of steps with rail and supervision  Short term goal 5: Navigate up/down 2 steps without rail and AAD and SBA  Patient Goals   Patient goals : To go home   **no goals met this date    Plan    Plan  Times per week: 3-5  Times per day: Daily  Current Treatment Recommendations: Strengthening, Balance Training, Transfer Training, Gait Training, Stair training, Home Exercise Program, Safety Education & Training  Safety Devices  Type of devices:  All fall risk precautions in place, Call light within reach, Gait belt, Nurse notified, Patient at risk for falls, Left in chair, Chair alarm in place  Restraints  Initially in place: No     Therapy Time   Individual Concurrent Group Co-treatment   Time In 1020         Time Out 1050         Minutes 30         Timed Code Treatment Minutes: IAN Newsome, [address] [facility], DPT, [id number]

## 2020-02-14 NOTE — PROGRESS NOTES
Remains hypotensive (82/52) - conferred with Dr MCLAUGHLIN [facility] earlier this AM RE persistent hypotension, and diuretics held; conferred with OT - no OOB therapy today as R/T low BP, declining Hgb; pt c/o sore throat and mouth - instructed on Magic Mouthwash.

## 2020-02-14 NOTE — TELEPHONE ENCOUNTER
I spoke with daughter. I relayed message per DEONNA. She stated that they are still keeping pt but was transferred to 3T rm [id number]. She states that she is very dissatisfied with [facility], but it is her father's wishes that he be here. He is still having issues with his low BP. She is afraid of what will happen when pt is released. She is concerned because nurse will be there only 3 days a week. She is afraid that when he gets home that he will die. Daughter just wants DEONNA aware of pt current situation.

## 2020-02-14 NOTE — PROGRESS NOTES
I spoke to the patient's daughter Roro Figueroa over the phone this evening. Apparently his wife misunderstood that the diagnosis was already made and that no treatment was decided. Clearly this is not the case. The wife is unhappy that he won't be aggressively treated for his cancer. I told the daughter we would check with the pathologist about when the the result would be available and she wanted me to speak with her parents tomorrow.       Jose Alfredo Shrestha MD

## 2020-02-14 NOTE — TELEPHONE ENCOUNTER
Noted. We have encouraged ECF where he could be watched more closely for a few weeks, but family has refused.   DEONNA

## 2020-02-14 NOTE — PROGRESS NOTES
Speech Language Pathology  Dysphagia Treatment Note    Name:  Luis Alberto Fallon  :   1940  Medical Diagnosis:  Sepsis ([address] [A41.9]  Treatment Diagnosis: Oropharyngeal Dysphagia  Pain level:  Pt denies pain at this time    Current Diet Level: Dysphagia III diet with thin liquids/No straws/meds with applesauce    Tolerance of Current Diet Level: Per RN and pt, no noted difficulty with current diet. Pt having difficulty with tough food items due to sores on tongue. Assessment of Texture Tolerance:  -Impressions: Pt repositioned in bed and provided with trials of thin liquids and soft solids. Thin liquids revealed premature bolus loss to the pharynx with a mildly delayed swallow initiation and an audible swallow. Prolonged yet effective mastication with soft solids assessed and likely related to tongue sores. Pt showed adequate A-P propulsion and no overt clinical signs of aspiration/penetration across consistencies. Recommend continuation of current diet. Diet and Treatment Recommendations:  Continue current diet and treatment recommendations per established plan of care    Plan: Continued daily Dysphagia treatment with goals per plan of care. Patient/Family Education: Education given to the Pt, pt's wife and nurse, who verbalized understanding    Discharge Recommendations:  Pt will benefit from continued skilled Speech Therapy for Dysphagia services, prior to returning home. Timed Code Treatment: 0 min    Total Treatment Time: 10 min     Chavez FAY,  Clinician    The speech-language pathologist was present, directed the patient's care, made skilled judgment and was responsible for assessment and treatment.     Tona Finley M.A., [address] [place] [address]  Speech-Language Pathologist

## 2020-02-14 NOTE — PLAN OF CARE
Radha Wing RN  Outcome: Ongoing  Note:   Mepilex on sacrum. 2/14/2020 0236 by Austen Rivera RN  Outcome: Ongoing     Problem: Discharge Planning:  Goal: Patients continuum of care needs are met  Description  Patients continuum of care needs are met  2/14/2020 0827 by Rodo Garcia RN  Outcome: Ongoing  Note:   Based on PT notes, it is anticipated pt will discharge to home with spouse, but likely to require further monitoring  2/14/2020 0240 by Austen Rivera RN  Outcome: Ongoing  2/14/2020 0236 by Austen Rivera RN  Outcome: Ongoing     Problem: Falls - Risk of:  Goal: Will remain free from falls  Description  Will remain free from falls  2/14/2020 0240 by Austen Rivera RN  Outcome: Ongoing  2/14/2020 0236 by Austen Rivera RN  Outcome: Ongoing  Goal: Absence of physical injury  Description  Absence of physical injury  2/14/2020 0240 by Austen Rivera RN  Outcome: Ongoing  2/14/2020 0236 by Austen Rivera RN  Outcome: Ongoing     Problem: Risk for Impaired Skin Integrity  Goal: Tissue integrity - skin and mucous membranes  Description  Structural intactness and normal physiological function of skin and  mucous membranes.   2/14/2020 0240 by Austen Rivera RN  Outcome: Ongoing  2/14/2020 0236 by Austen Rivera RN  Outcome: Ongoing     Problem: Musculor/Skeletal Functional Status  Goal: Highest potential functional level  2/14/2020 0240 by Austen Rivera RN  Outcome: Ongoing  2/14/2020 0236 by Austen Rivera RN  Outcome: Ongoing  Goal: Absence of falls  2/14/2020 0240 by Austen Rivera RN  Outcome: Ongoing  2/14/2020 0236 by Austen Rivera RN  Outcome: Ongoing     Problem: Nutrition  Goal: Optimal nutrition therapy  2/14/2020 0240 by Austen Rivera RN  Outcome: Ongoing  2/14/2020 0236 by Austen Rivera RN  Outcome: Ongoing     Problem: KNOWLEDGE DEFICIT  Goal: Patient/S.O. demonstrates understanding of disease process, treatment plan, medications,

## 2020-02-15 NOTE — PROGRESS NOTES
182 lb 8 oz (82.8 kg)   01/28/20 178 lb (80.7 kg)   01/23/20 180 lb 6.4 oz (81.8 kg)       General appearance:  Appears comfortable. On oxygen. Frail. Eyes: Sclera clear. Pupils equal.  ENT: Moist oral mucosa. Trachea midline, no adenopathy. Cardiovascular: Regular rhythm, normal S1, S2. No murmur. Has edema in lower extremities  Respiratory: Not using accessory muscles. Good inspiratory effort. Clear to auscultation bilaterally, no wheeze or crackles. GI: Abdomen soft, no tenderness, not distended  Musculoskeletal: No cyanosis in digits, neck supple  Neurology: CN 2-12 grossly intact. No speech or motor deficits  Psych: Normal affect. Alert and oriented in time, place and person  Skin: Warm, dry, normal turgor  UE edematous and less edema of LE without pitting. Labs and Tests:  CBC:   Recent Labs     02/13/20  0510 02/14/20  0440 02/15/20  0500   WBC 4.7 4.5 4.6   HGB 7.9* 7.3* 7.4*   * 111* 119*     BMP:    Recent Labs     02/13/20  0510 02/14/20  0440 02/15/20  0500    134* 137   K 3.8 4.0 4.3   CL 90* 92* 96*   CO2 38* 35* 33*   BUN 38* 36* 35*   CREATININE 1.0 1.1 1.1   GLUCOSE 107* 101* 131*     Hepatic: No results for input(s): AST, ALT, ALB, BILITOT, ALKPHOS in the last 72 hours.      Lab Results   Component Value Date    INR 1.20 (H) 02/13/2020    INR 1.19 (H) 02/12/2020    INR 1.26 (H) 02/11/2020    PROTIME 14.0 (H) 02/13/2020    PROTIME 13.8 (H) 02/12/2020    PROTIME 14.6 (H) 02/11/2020       ASSESSMENT AND PLAN    Active Problems:    Elevated troponin    Cardiomyopathy (HCC)    Mitral regurgitation    S/P AVR (aortic valve replacement)    Chronic combined systolic and diastolic CHF (congestive heart failure) ([facility])    Encounter for medication counseling    Hyponatremia    Hypercholesteremia    PAF (paroxysmal atrial fibrillation) ([facility]    Essential hypertension    Sepsis ([facility]    Tobacco abuse counseling    Lymphadenopathy    Hypervolemia  Resolved Problems:    * No resolved hospital problems. *      Lymphoma. Exact history cannot be determined at present. CHF. EF 30%. On Aspirin. Hypotension. On Midodrine.      Coagulopathy: Supratherapeutic INR initially. Probably due to antibiotics with warfarin. Last INR was 1.2. Coumadin resumed. Atrial fibrillation with rapid ventricular response. Cardiology managing.      Renal failure with significant prerenal component receiving IV hydration. Creatinine improved.      Anemia. Partly due to myelodysplastic syndrome. Had RBC transfusions. Rectal ulcerations. Await pathology. Performance status poor. I spoke to the wife at length about his conditions. The specimens were sent to [facility] and I communicated with Dr. Esther Duverney our pathologist here. It will take a while for the report to be available because of molecular testing. Explained to Mrs. Schirmer and her daughter Glenna Perez that final treatment decision cannot be made at present since we don't have the final pathology yet. No decision has been made yet regarding therapy or supportive care. We did talk about potential diagnosis and options a few days ago. I even offered for the patient to seek second opinion before. As I was told the wife would like to keep the patient in the hospital until the diagnosis is made and treatment can be started here. This does not seem realistic. Mrs. Schirmer likes to have aggressive treatment for her  despite our explanation that his conditions are quite poor. She would find whoever meets her expectations. Again I offered her for second opinion once the final pathology is available. Dr. Monique Maya will be rounding over the weekend. I discussed with Dr. Monique Maya about this difficult situation. Areli Pfeiffer.  Qing Hernandez MD, [place] [address]  Hematology and Oncology  [facility]  [phone]

## 2020-02-15 NOTE — PROGRESS NOTES
[facility]    2/14/2020 7:18 PM        Name: Jade Rivas Schirmer . Admitted: 1/28/2020  Primary Care Provider: Ritu Lantigua MD (Tel: [phone])                           Hospital course:     74-year-old gentleman who was admitted for severe sepsis. Secondary to possibly unknown source patient was positive for strep B- at PCPs office. Blood culture so far has remained negative. Infectious disease is following. Patient also noted to be A. fib RVR. Was on amiodarone drip and heparin drips. Found to have lymphadenopathy  - bx done. Treated with doxy. Treated with  steroids for levaquin induced rash. No GI bleed. GI consulted. Scopes done. Treated with PPI. Started on bowel regimen. S: Doing okay. Wife at bed side- weak over all.      Reviewed interval ancillary notes    Current Medications  magic (miracle) mouthwash with nystatin, 4x Daily  docosanol (ABREVA) 10 % cream, 5x Daily  furosemide (LASIX) tablet 20 mg, Every Other Day  polyethylene glycol (GLYCOLAX) packet 17 g, BID  0.9 % sodium chloride bolus, Once  pantoprazole (PROTONIX) tablet 40 mg, QAM AC  midodrine (PROAMATINE) tablet 5 mg, TID PRN  spironolactone (ALDACTONE) tablet 12.5 mg, Daily  midodrine (PROAMATINE) tablet 10 mg, TID WC  amiodarone (CORDARONE) tablet 200 mg, Daily  neomycin-bacitracin-polymyxin (NEOSPORIN) ointment, BID PRN  digoxin (LANOXIN) tablet 125 mcg, Daily  ondansetron (ZOFRAN) injection 4 mg, Q6H PRN  oxyCODONE (ROXICODONE) immediate release tablet 5 mg, Q4H PRN  acetaminophen (TYLENOL) tablet 650 mg, Q4H PRN  lidocaine 4 % external patch 1 patch, Daily PRN  lip balm petroleum free (PHYTOPLEX) stick, PRN  promethazine (PHENERGAN) injection 12.5 mg, Q6H PRN  sodium chloride (OCEAN, BABY AYR) 0.65 % nasal spray 1 spray, PRN  sodium chloride flush 0.9 % injection 10 mL, 2 times per day  sodium chloride flush 0.9 % injection 10 mL, PRN  prochlorperazine (COMPAZINE) injection 10 mg, Q6H PRN  lactobacillus (CULTURELLE) capsule 1 capsule, Daily with breakfast  aspirin chewable tablet 81 mg, Daily  magnesium hydroxide (MILK OF MAGNESIA) 400 MG/5ML suspension 30 mL, Daily PRN  atorvastatin (LIPITOR) tablet 10 mg, Daily  diphenhydrAMINE (BENADRYL) injection 25 mg, Q6H PRN        Objective:  /64   Pulse 78   Temp 98.9 °F (37.2 °C) (Oral)   Resp 17   Ht 6' 1\" (1.854 m)   Wt 182 lb 8 oz (82.8 kg)   SpO2 99%   BMI 24.08 kg/m²     Intake/Output Summary (Last 24 hours) at 2/14/2020 1918  Last data filed at 2/14/2020 1415  Gross per 24 hour   Intake 720 ml   Output 1850 ml   Net -1130 ml      Wt Readings from Last 3 Encounters:   02/14/20 182 lb 8 oz (82.8 kg)   01/28/20 178 lb (80.7 kg)   01/23/20 180 lb 6.4 oz (81.8 kg)       General appearance:  Appears comfortable  Eyes: Sclera clear. Pupils equal.  ENT: Moist oral mucosa. Trachea midline, no adenopathy. Lip sores noted as well as tongue thrush   Cardiovascular: Regular rhythm, normal S1, S2. No murmur. No edema in lower extremities  Respiratory: Not using accessory muscles. Good inspiratory effort. Clear to auscultation bilaterally, no wheeze or crackles. GI: Abdomen soft, no tenderness, not distended, normal bowel sounds  Musculoskeletal: No cyanosis in digits, neck supple  Neurology: CN 2-12 grossly intact. No speech or motor deficits  Psych: Normal affect. Alert and oriented in time, place and person  Skin: no rash     Labs and Tests:  CBC:   Recent Labs     02/12/20  0440  02/12/20  2147 02/13/20  0510 02/14/20  0440   WBC 6.0  --   --  4.7 4.5   HGB 8.1*   < > 7.9* 7.9* 7.3*   *  --   --  116* 111*    < > = values in this interval not displayed.      BMP:    Recent Labs     02/12/20  0440 02/13/20  0510 02/14/20  0440   * 136 134*   K 3.6 3.8 4.0   CL 88* 90* 92*   CO2 40* 38* 35*   BUN 40* 38* 36*   CREATININE 1.0 1.0 1.1   GLUCOSE 121* 107* 101*     Hepatic:   No results for input(s): AST, ALT, ALB, BILITOT, ALKPHOS in the last 72 hours. Problem List  Active Problems:    Elevated troponin    Cardiomyopathy (HCC)    Mitral regurgitation    S/P AVR (aortic valve replacement)    Chronic combined systolic and diastolic CHF (congestive heart failure) ([facility]    Encounter for medication counseling    Hyponatremia    Hypercholesteremia    [facility]    Essential hypertension    Sepsis ([facility]    Tobacco abuse counseling    Lymphadenopathy    Hypervolemia  Resolved Problems:    * No resolved hospital problems. *       Assessment & Plan:     1. Severe sepsis:  -strep thoat swab positive. Sinusitis noted.   -had allergic reaction to levaquin.   -  Finished doxy course. 2. 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 Results are Equivocal.  Awaiting second opinion. 3. A. fib with RVR  -Likely secondary to sepsis   -Now on amio and digoxin. Resolved. Supratherapeutic INR  resolved. -stopped  Coumadin  given GI bleed and need for transfusions. Discussed with cardiology. Risks outweigh benefits. 5. Rash-Secondary Levaquin. Resolved. Off steroids      6. Chronic combined CHF-volume overload from all IV fluids he received. - diuresed with iv lasix. Now on po lasix every other day. Dc aldactone as bp is very low. Given IV albumin. 7. Dysphagia: Speech eval noted. On modified diet. MBS showed no mally aspiration. Continue dysphagia diet. Continue to monitor. 8.  Acute blood loss anemia and anemia of chronic disease  From MDS:   had total of 3 units now. Had GI bleeding. GI consulted. EGD  Showed patchy gastritis and flexible sigmoidoscopy showed shallow rectal ulcerations, mild internal hemorrhoids. Sigmoid diverticulosis. Biopsies pending. Started on PPI and MiraLAX.

## 2020-02-15 NOTE — PLAN OF CARE
Problem: OXYGENATION/RESPIRATORY FUNCTION  Goal: Patient will achieve/maintain normal respiratory rate/effort  Description  Respiratory rate and effort will be within normal limits for the patient  Outcome: Ongoing  Note:   Pt O2 sats >90% with supplemental O2. Problem: HEMODYNAMIC STATUS  Goal: Patient has stable vital signs and fluid balance  Outcome: Ongoing  Note:   Pt BP low during the day. Pt MAP now >65%. Other VS WNL. Problem: FLUID AND ELECTROLYTE IMBALANCE  Goal: Fluid and electrolyte balance are achieved/maintained  Outcome: Ongoing  Note:   Pt unable to take diuretics as pt was hypotensive. Pt has output in thayer bag and pt up to [facility] for BM this shift. Problem: ACTIVITY INTOLERANCE/IMPAIRED MOBILITY  Goal: Mobility/activity is maintained at optimum level for patient  Outcome: Ongoing  Note:   Pt unable to have PT abd other OOB actuvuties as hypotension was prohibitive. Problem: Safety:  Goal: Free from accidental physical injury  Description  Free from accidental physical injury  Outcome: Ongoing  Note:   Patient free from harm. ID bands on, bed in lowest position, call light in reach. Patient instructed to call for help if needed. Patient educated on ambulation and safety. Goal: Free from intentional harm  Description  Free from intentional harm  Outcome: Ongoing     Problem: Risk for Impaired Skin Integrity  Goal: Tissue integrity - skin and mucous membranes  Description  Structural intactness and normal physiological function of skin and  mucous membranes. Outcome: Ongoing  Note:   Pt skin assessed. Pt has a mepiles heart for prevention. Pt has sores to corners of mouth and inside of noes treated with abrevia.

## 2020-02-16 NOTE — PROGRESS NOTES
[facility]    2/16/2020 10:23 AM        Name: Rexford Handy Schirmer . Admitted: 1/28/2020  Primary Care Provider: Bossman Yin MD (Tel: [phone])                           Hospital course:     70-year-old gentleman who was admitted for severe sepsis. Secondary to possibly unknown source patient was positive for strep B- at PCPs office. Blood culture so far has remained negative. Infectious disease is following. Patient also noted to be A. fib RVR. Was on amiodarone drip and heparin drips. Found to have lymphadenopathy  - bx done. Treated with doxy. Treated with  steroids for levaquin induced rash. No GI bleed. GI consulted. Scopes done. Treated with PPI. Started on bowel regimen. S: Doing okay. Wife at bed side-just had showed.  No physical therapy done over the weekend    Reviewed interval ancillary notes    Current Medications  magic (miracle) mouthwash with nystatin, 4x Daily  docosanol (ABREVA) 10 % cream, 5x Daily  furosemide (LASIX) tablet 20 mg, Every Other Day  polyethylene glycol (GLYCOLAX) packet 17 g, BID  0.9 % sodium chloride bolus, Once  pantoprazole (PROTONIX) tablet 40 mg, QAM AC  midodrine (PROAMATINE) tablet 5 mg, TID PRN  midodrine (PROAMATINE) tablet 10 mg, TID WC  amiodarone (CORDARONE) tablet 200 mg, Daily  neomycin-bacitracin-polymyxin (NEOSPORIN) ointment, BID PRN  digoxin (LANOXIN) tablet 125 mcg, Daily  ondansetron (ZOFRAN) injection 4 mg, Q6H PRN  oxyCODONE (ROXICODONE) immediate release tablet 5 mg, Q4H PRN  acetaminophen (TYLENOL) tablet 650 mg, Q4H PRN  lidocaine 4 % external patch 1 patch, Daily PRN  lip balm petroleum free (PHYTOPLEX) stick, PRN  promethazine (PHENERGAN) injection 12.5 mg, Q6H PRN  sodium chloride (OCEAN, BABY AYR) 0.65 % nasal spray 1 spray, PRN  sodium chloride flush 0.9 % injection 10 mL, 2 times per day  sodium chloride flush 0.9 % injection 10 mL, PRN  prochlorperazine (COMPAZINE) injection 10 mg, Q6H PRN  lactobacillus (CULTURELLE) capsule 1 capsule, Daily with breakfast  aspirin chewable tablet 81 mg, Daily  magnesium hydroxide (MILK OF MAGNESIA) 400 MG/5ML suspension 30 mL, Daily PRN  atorvastatin (LIPITOR) tablet 10 mg, Daily  diphenhydrAMINE (BENADRYL) injection 25 mg, Q6H PRN        Objective:  /70   Pulse 115   Temp 98.3 °F (36.8 °C) (Oral)   Resp 15   Ht 6' 1\" (1.854 m)   Wt 174 lb 4.8 oz (79.1 kg)   SpO2 94%   BMI 23.00 kg/m²     Intake/Output Summary (Last 24 hours) at 2/16/2020 1023  Last data filed at 2/16/2020 0247  Gross per 24 hour   Intake --   Output 1300 ml   Net -1300 ml      Wt Readings from Last 3 Encounters:   02/16/20 174 lb 4.8 oz (79.1 kg)   01/28/20 178 lb (80.7 kg)   01/23/20 180 lb 6.4 oz (81.8 kg)       General appearance:  Appears comfortable  Eyes: Sclera clear. Pupils equal.  ENT: Moist oral mucosa. Trachea midline, no adenopathy. Lip sores noted as well as tongue thrush - slightly improving  Cardiovascular: Regular rhythm, normal S1, S2. No murmur. No edema in lower extremities  Respiratory: Not using accessory muscles. Good inspiratory effort. Clear to auscultation bilaterally, no wheeze or crackles. GI: Abdomen soft, no tenderness, not distended, normal bowel sounds  Musculoskeletal: No cyanosis in digits, neck supple  Neurology: CN 2-12 grossly intact. No speech or motor deficits  Psych: Normal affect.  Alert and oriented in time, place and person  Skin: no rash     Labs and Tests:  CBC:   Recent Labs     02/14/20  0440 02/15/20  0500 02/16/20  0445   WBC 4.5 4.6 4.0   HGB 7.3* 7.4* 7.7*   * 119* 126*     BMP:    Recent Labs     02/14/20  0440 02/15/20  0500 02/16/20  0445   * 137 134*   K 4.0 4.3 4.1   CL 92* 96* 95*   CO2 35* 33* 29   BUN 36* 35* 32*   CREATININE 1.1 1.1 1.1   GLUCOSE 101* 131* 122*     Hepatic:   No results for Hypotension:  Cont midodrine 10 mg tid. Diet: DIET DYSPHAGIA SOFT AND BITE-SIZED;  No Drinking Straw  Dietary Nutrition Supplements: Standard High Calorie Oral Supplement, Clear Liquid Oral Supplement, Frozen Oral Supplement  Code:Prior  DVT PPX-  SCD's  PT/OT -  Home  With HHC       Disp: inpt per family request until next ptot eval.       Yudith Schaffer MD   2/16/2020 10:23 AM

## 2020-02-16 NOTE — CARE COORDINATION
Discharge Planning:    Patient's RN informed this SW that patient is considering a SNF placement. Patient is requesting that referrals be faxed to :  · [facility]  · [facility]    Patient has [facility]. [facility] is not a [place] [address] provider. SW informed patient's RN and RN will inform patient/patient's family. SW faxed referral to [facility]. Electronically signed by VON Vidales on 2/16/2020 at 3:18 PM       Addendum:   [address] [place] [address]  Phone: [phone]   Fax: [phone]      Electronically signed by VON Vidales on 2/16/2020 at 3:24 PM

## 2020-02-16 NOTE — PROGRESS NOTES
Dr. Honey Swain secure messaged \"FYI pt has 101.2 fever axillary, 99.8 oral. given tyelnol. WBC wasn't elevated this AM. BP stable with midodrine today. \"

## 2020-02-16 NOTE — PROGRESS NOTES
Assessment complete, see doc flowsheet. Patient resting in bed, call light in reach, bed in lowest position, brake set. Patient denies any needs at this time. Patient encouraged to call if needs arise.   Josephine Herrera RN

## 2020-02-16 NOTE — PROGRESS NOTES
Oncology and Hematology Care   Progress Note      2/16/2020 11:36 AM        Name: Corlis Hem Schirmer . Admitted: 1/28/2020    SUBJECTIVE:  Transferred out of ICU. Feels OK.  Wife by bedside     Reviewed interval ancillary notes    Current Medications  magic (miracle) mouthwash with nystatin, 4x Daily  docosanol (ABREVA) 10 % cream, 5x Daily  furosemide (LASIX) tablet 20 mg, Every Other Day  polyethylene glycol (GLYCOLAX) packet 17 g, BID  0.9 % sodium chloride bolus, Once  pantoprazole (PROTONIX) tablet 40 mg, QAM AC  midodrine (PROAMATINE) tablet 5 mg, TID PRN  midodrine (PROAMATINE) tablet 10 mg, TID WC  amiodarone (CORDARONE) tablet 200 mg, Daily  neomycin-bacitracin-polymyxin (NEOSPORIN) ointment, BID PRN  digoxin (LANOXIN) tablet 125 mcg, Daily  ondansetron (ZOFRAN) injection 4 mg, Q6H PRN  oxyCODONE (ROXICODONE) immediate release tablet 5 mg, Q4H PRN  acetaminophen (TYLENOL) tablet 650 mg, Q4H PRN  lidocaine 4 % external patch 1 patch, Daily PRN  lip balm petroleum free (PHYTOPLEX) stick, PRN  promethazine (PHENERGAN) injection 12.5 mg, Q6H PRN  sodium chloride (OCEAN, BABY AYR) 0.65 % nasal spray 1 spray, PRN  sodium chloride flush 0.9 % injection 10 mL, 2 times per day  sodium chloride flush 0.9 % injection 10 mL, PRN  prochlorperazine (COMPAZINE) injection 10 mg, Q6H PRN  lactobacillus (CULTURELLE) capsule 1 capsule, Daily with breakfast  aspirin chewable tablet 81 mg, Daily  magnesium hydroxide (MILK OF MAGNESIA) 400 MG/5ML suspension 30 mL, Daily PRN  atorvastatin (LIPITOR) tablet 10 mg, Daily  diphenhydrAMINE (BENADRYL) injection 25 mg, Q6H PRN        Objective:  /70   Pulse 115   Temp 98.3 °F (36.8 °C) (Oral)   Resp 15   Ht 6' 1\" (1.854 m)   Wt 174 lb 4.8 oz (79.1 kg)   SpO2 94%   BMI 23.00 kg/m²     Intake/Output Summary (Last 24 hours) at 2/16/2020 1136  Last data filed at 2/16/2020 0247  Gross per 24 hour   Intake --   Output 1300 ml   Net -1300 ml      Wt Readings from Last 3

## 2020-02-17 NOTE — CARE COORDINATION
Met with Patient's family and they inquired if [facility] accepts  [facility]. They are are agreeable to [facility], pending therapy updates.

## 2020-02-17 NOTE — CARE COORDINATION
Left a message for family that [facility] is out of network. Leandra San [phone] [facility] has a bed available in the [facility]. Therapy updates appreciated.   MD, Please complete & sign the e-HÉCTOR under the discharge instructions, AVS/Prescriptions, and or medical necessity note for assistance with discharge planning, Thank you, Alyssa Morris, MSKAELYN, [phone]'

## 2020-02-17 NOTE — PROGRESS NOTES
2/10/2020). Restrictions  Restrictions/Precautions  Restrictions/Precautions: General Precautions, Fall Risk(HIGH FALL RISK)  Required Braces or Orthoses?: No  Position Activity Restriction  Other position/activity restrictions: Carlitos Rivera is a 70 y.o. male presents to the emergency department by emergency medical services from the office of Dr. Ivory Lewis. Is reported that the patient has not been feeling well for the last couple of days. He has been on antibiotics for sinus-like symptoms as well sore throat pain. He has had febrile illness despite this. Concerns arose today because the patient had a febrile illness was tachycardic and hypotensive with documented infection positive for streptococcal pharyngitis. Found to have severe sepsis. Concern for lymphoma with L axillary lymph node biopsy performed. Subjective   General  Chart Reviewed: Yes  Response to previous treatment: Patient with no complaints from previous session  Family / Caregiver Present: Yes(wife)  Diagnosis: Sepsis  Subjective  Subjective: Pt lying in bed upon arrival, agreeable to therapy. Vital Signs  Patient Currently in Pain: Denies   Orientation  Orientation  Overall Orientation Status: Within Functional Limits(Demonstrated intermittent instances of confusion throughout session)  Objective    ADL  Grooming: Stand by assistance(in stance at sink to wash face with wipe)  LE Dressing: Stand by assistance(to don/doff bilateral socks and depends sitting in recliner.)  Additional Comments: Pt sat in recliner to don/doff socks. Pt presented with increased SOB in attempt to don/doff R sock, needing two rest breaks with cues for use of good breathing techniques. 1L O2. Difficulty with monitor for reading SPO2 level. Pt reported it is often difficult to measure his SPO2. Monitor displayed fluctuating levels from 80s-90s.         Balance  Sitting Balance: Supervision  Standing Balance: Stand by assistance(SBA for static stance at

## 2020-02-17 NOTE — PROGRESS NOTES
BP 90/50 MAP 64. Afebrile. HS meds given. Plan of care discussed, pt verbalized understanding. No other needs a this time, callbell in reac, bed alarm on.   Humza Simon Rn

## 2020-02-17 NOTE — PROGRESS NOTES
Pts daughter in, concerned r/t pt fever, how \"sick he has been\" - wanting pt to be placed on \"something\". Discussed pt afebrile at this time, blood cultures ordered and drawn ; CXR ordered by MD - labs also to be drawn in the morning. Daughter is staying the night.   Raquel Jiang RN

## 2020-02-17 NOTE — DISCHARGE INSTR - COC
Continuity of Care Form    Patient Name: Jaret Butts   :  1940  MRN:  [id number]    Admit date:  2020  Discharge date:  ***    Code Status Order: Prior   Advance Directives:   [facility] Documentation     Date/Time Healthcare Directive Type of Healthcare Directive Copy in [facility] [address] Agent's Name Healthcare Agent's Phone Number    02/15/20 9231  Yes, patient has an advance directive for healthcare treatment  Durable power of  for health care  Yes, copy in chart  Spouse  Elnoria Dame Schirmer Alt.  HPOA: Joya Tello (Daughter.) [phone]; [phone]    20 1504  No, patient does not have an advance directive for healthcare treatment  --  --  --  --  --    20 0754  No, patient does not have an advance directive for healthcare treatment  --  --  --  --  --    20 1205  No, patient does not have an advance directive for healthcare treatment  --  --  --  --  --    20 1142  No, patient does not have an advance directive for healthcare treatment  --  --  --  --  --    20 1732  No, patient does not have an advance directive for healthcare treatment  --  --  --  --  --          Admitting Physician:  Luz Maria Quintero MD  PCP: Dieudonne Paula MD    Discharging Nurse: [facility] Unit/Room#: [id number]/7996-16  Discharging Unit Phone Number: ***    Emergency Contact:   Extended Emergency Contact Information  Primary Emergency Contact: SchirmerPoly  Address: [address] [place] [address]  Home Phone: [phone]  Relation: Spouse  Secondary Emergency Contact: Olivier Foley Phone: [phone]  Relation: Child    Past Surgical History:  Past Surgical History:   Procedure Laterality Date    AORTIC VALVE REPLACEMENT  8/3/2012    moderate mitral regurg    AXILLARY SURGERY Left 2020    AXILLARY LYMPH NODE BIOPSY DISSECTION performed by Yary Rodriguez MD at [address] [address] CARDIAC SURGERY      COLONOSCOPY      CYST REMOVAL      chest    ENDOSCOPY, COLON, DIAGNOSTIC      INGUINAL HERNIA REPAIR      bilateral in [address]      both    LYMPH NODE BIOPSY Left 2/3/2020    INGUINAL LYMPH NODE BIOPSY EXCISION [facility]    excision right breast mass (lumpectomy)    SHOULDER ARTHROPLASTY Right 2/2/15    right reverse total shoulder arthroplasty    SIGMOIDOSCOPY N/A 2/10/2020    SIGMOIDOSCOPY BIOPSY FLEXIBLE performed by Robby Cantu MD at [address] Left 12/4/13    TOTAL KNEE ARTHROPLASTY Right 2/3/14    RIGHT TOTAL KNEE REPLACEMENT-BAR       UPPER GASTROINTESTINAL ENDOSCOPY  12/24/13    UPPER GASTROINTESTINAL ENDOSCOPY N/A 2/10/2020    EGD BIOPSY performed by Robby Cantu MD at [address] [facility] ENDOSCOPY       Immunization History:   Immunization History   Administered Date(s) Administered    Influenza 10/10/2012    Influenza Virus Vaccine 09/19/2013, 10/23/2014    Influenza, High Dose (Fluzone 65 yrs and older) 10/15/2015, 09/29/2016, 09/20/2017, 10/10/2018    Influenza, Triv, inactivated, subunit, adjuvanted, IM (Fluad 65 yrs and older) 10/08/2019    Pneumococcal Conjugate 13-valent (Mimuvig74) 12/01/2015    Pneumococcal Conjugate 7-valent (Prevnar7) 11/19/2007    Pneumococcal Polysaccharide (Vaejxufgv01) 11/19/2007, 11/07/2019    Tdap (Boostrix, Adacel) 10/12/2014    Zoster Live (Zostavax) 09/03/2013       Active Problems:  Patient Active Problem List   Diagnosis Code    Elevated troponin R79.89    Cardiomyopathy ([facility] I42.9    Nonrheumatic aortic valve insufficiency I35.1    Mitral regurgitation I34.0    Microscopic hematuria R31.29    S/P AVR (aortic valve replacement) Z95.2    Chronic combined systolic and diastolic CHF (congestive heart failure) (HCC) I50.42    Arthritis of knee M17.10    Encounter for medication counseling Z71.89    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    Hyponatremia E87.1    Primary localized osteoarthrosis, 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    H/O total shoulder replacement Z96.619    Hypercholesteremia E78.00    Finger amputation, traumatic, 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    PAF (paroxysmal atrial fibrillation) ([facility]) I48.0    Essential hypertension I10    Sepsis ([facility]) A41.9    Septic shock ([facility]) A41.9, R65.21    Atrial fibrillation with rapid ventricular response ([facility]) I48.91    Elevated international normalized ratio (INR) R79.1    Streptococcal pharyngitis J02.0    Leukopenia D72.819    Chronic ethmoidal sinusitis J32.2    TYRELL (acute kidney injury) ([facility]) N17.9    Ex-heavy cigarette smoker (20-39 per day) Z87.891    Tobacco abuse counseling Z71.6    Lymphadenopathy R59.1    Hypervolemia E87.70       Isolation/Infection:   Isolation          No Isolation        Patient Infection Status     None to display          Nurse Assessment:  Last Vital Signs: BP 98/62   Pulse 97   Temp 98.3 °F (36.8 °C) (Oral)   Resp 18   Ht 6' 1\" (1.854 m)   Wt 172 lb 14.4 oz (78.4 kg)   SpO2 96%   BMI 22.81 kg/m²     Last documented pain score (0-10 scale): Pain Level: 0  Last Weight:   Wt Readings from Last 1 Encounters:   02/17/20 172 lb 14.4 oz (78.4 kg)     Mental Status:  {IP PT MENTAL STATUS:95834}    IV Access:  { HÉCTOR IV ACCESS:076654780}    Nursing Mobility/ADLs:  Walking   {[facility] DME NONM:018336121}  Transfer  {[facility] DME RDIP:450860968}  Bathing  {[facility] DME CJBY:565654297}  Dressing  {[facility] DME UNUW:772381016}  Toileting  {[facility] DME DZXJ:986851074}  Feeding  {[facility] DME WFAH:276871983}  Med Admin  {[facility] DME YXOH:322675639}  Med Delivery   { HÉCTOR MED Delivery:095563322}    Wound Care Documentation and Therapy:        Elimination:  Continence:   · Bowel: {YES / MR:26733}  · Bladder: {YES / IA:32457}  Urinary Catheter: {Urinary Catheter:312013689}   Colostomy/Ileostomy/Ileal Conduit: {YES / XC:13722}       Date of Last Discharge: ***    Physician Certification: I certify the above information and transfer of Purnima Stewart  is necessary for the continuing treatment of the diagnosis listed and that he requires {Admit to Appropriate Level of Care:47749} for {GREATER/LESS:609614561} 30 days.      Update Admission H&P: {CHP DME Changes in CWBWS:615677203}    PHYSICIAN SIGNATURE:  {Esignature:855367904}

## 2020-02-17 NOTE — PROGRESS NOTES
Physical Therapy  Facility/Department: [address]  Daily Treatment Note  NAME: Leticia Erickson  : 1940  MRN: [id number]    Date of Service: 2020    Discharge Recommendations:Donald D Schirmer scored a  on the AM-PAC short mobility form. Current research shows that an AM-PAC score of 18 or greater is typically associated with a discharge to the patient's home setting. Based on the patients AM-PAC score and their current functional mobility deficits, it is recommended that the patient have 2-3 sessions per week of Physical Therapy at d/c to increase the patients independence. HOME HEALTH CARE: LEVEL 3 SAFETY     - Initial home health evaluation to occur within 24-48 hours, in patient home   - Therapy evaluations in home within 24-48 hours of discharge; including DME and home safety   - Frontload therapy 5 days, then 3x a week   - Therapy to evaluate if patient has [address] needs for personal care   -  evaluation within 24-48 hours, includes evaluation of resources and insurance to determine [place], [place], LTC, and Medicaid options     PT Equipment Recommendations  Equipment Needed: No    Assessment   Body structures, Functions, Activity limitations: Decreased functional mobility ; Decreased strength;Decreased endurance;Decreased balance  Assessment: Pt demonstrates improved stability  no LOB this date during ambulation. Pt also demonstrates improved overall functional endurance, completing ambulation and LE therEx following with minimal rest breaks. Pt would benefit from skilled PT services to promote safe return to PLOF. Treatment Diagnosis: decreased functional mobility, impaired gait, decreased endurance  Prognosis: Good  Decision Making: Medium Complexity  Clinical Presentation: evolving  PT Education: Goals; Home Exercise Program;PT Role;Plan of Care;General Safety;Gait Training;Family Education; Functional Mobility Training  Patient Education: Educated pt and family on exercises to perform while seated and in bed, pt and family verbalized understanding. Barriers to Learning: none  REQUIRES PT FOLLOW UP: Yes  Activity Tolerance  Activity Tolerance: Patient Tolerated treatment well;Patient limited by endurance; Patient limited by fatigue     Patient Diagnosis(es): The primary encounter diagnosis was Septic shock ([address] Diagnoses of Atrial fibrillation with rapid ventricular response ([address] Elevated international normalized ratio (INR), Streptococcal pharyngitis, Leukopenia, unspecified type, Chronic ethmoidal sinusitis, TYRELL (acute kidney injury) ([address] chronic elevated troponin, and Arthritis of knee were also pertinent to this visit. has a past medical history of Aortic insufficiency, Atrial fibrillation ([address] Breast nodule, CAD (coronary artery disease), Cardiomyopathy ([address] CHF (congestive heart failure) ([address] Hyperlipidemia, Hypertension, Microscopic hematuria, Mitral regurgitation, Nausea & vomiting, Osteoarthritis of knee, Pneumonia, Rotator cuff tear, and Ventricular ectopy. has a past surgical history that includes Inguinal hernia repair; other surgical history (8/2/12); Aortic valve replacement (8/3/2012); Cardiac surgery; Colonoscopy; Endoscopy, colon, diagnostic; Total knee arthroplasty (Left, 12/4/13); Upper gastrointestinal endoscopy (12/24/13); Total knee arthroplasty (Right, 2/3/14); joint replacement; cyst removal; Total shoulder arthroplasty (Right, 2/2/15); lymph node biopsy (Left, 2/3/2020); Axillary Surgery (Left, 2/9/2020); Upper gastrointestinal endoscopy (N/A, 2/10/2020); and sigmoidoscopy (N/A, 2/10/2020).     Restrictions  Restrictions/Precautions  Restrictions/Precautions: General Precautions, Fall Risk(HIGH FALL RISK)  Required Braces or Orthoses?: No  Position Activity Restriction  Other position/activity restrictions: Merry Callahan is a 70 y.o. male presents to the emergency department by emergency medical services from the office

## 2020-02-17 NOTE — PROGRESS NOTES
Speech Language Pathology  Dysphagia Treatment Note    Name:  Chevy Rosas  :   1940  Medical Diagnosis:  Sepsis ([address] [A41.9]  Treatment Diagnosis: Oropharyngeal Dysphagia  Pain level:  Pt denies pain at this time    Current Diet Level: Dysphagia III diet with thin liquids/No straws/meds with applesauce    Tolerance of Current Diet Level: Per RN and pt, no noted difficulty with current diet. Assessment of Texture Tolerance:  -Impressions: Pt sitting upright in chair upon SLP arrival. Pt accepting of thin liquids and soft solids. Thin liquids revealed suspected bolus loss and a mildly delayed swallow initiation. Soft solids revealed prolonged however functional mastication with good oral clearance. Audible swallows and mildly reduced hyolaryngeal elevation assessed with all trials. Pt declined trials of firm solids at this time. Pt indicated preference for softer food and that he would continue to choose soft foods even with diet upgrade. No overt coughing, throat clearing or wet vocal quality noted across consistencies. At this time, recommend continuation of current diet. Diet and Treatment Recommendations:  Continue current diet and treatment recommendations per established plan of care    Plan: Continued daily Dysphagia treatment with goals per plan of care. Patient/Family Education: Education given to the Pt and nurse, who verbalized understanding    Discharge Recommendations:  Pt will benefit from continued skilled Speech Therapy for Dysphagia services, prior to returning home. Timed Code Treatment: 0 min    Total Treatment Time: 8 min     Jesus Manuel FAY,  Clinician    The speech-language pathologist was present, directed the patient's care, made skilled judgment and was responsible for assessment and treatment.     Kellie Adame M.S. Diane Valiente  Speech-Language Pathologist

## 2020-02-17 NOTE — PLAN OF CARE
Problem: OXYGENATION/RESPIRATORY FUNCTION  Goal: Patient will achieve/maintain normal respiratory rate/effort  Description  Respiratory rate and effort will be within normal limits for the patient  Outcome: Ongoing     Problem: HEMODYNAMIC STATUS  Goal: Patient has stable vital signs and fluid balance  Outcome: Ongoing     Problem: FLUID AND ELECTROLYTE IMBALANCE  Goal: Fluid and electrolyte balance are achieved/maintained  Outcome: Ongoing     Problem: ACTIVITY INTOLERANCE/IMPAIRED MOBILITY  Goal: Mobility/activity is maintained at optimum level for patient  Outcome: Ongoing     Problem: Infection:  Goal: Will remain free from infection  Description  Will remain free from infection  Outcome: Ongoing     Problem: Risk for Impaired Skin Integrity  Goal: Tissue integrity - skin and mucous membranes  Description  Structural intactness and normal physiological function of skin and  mucous membranes.   Outcome: Ongoing     Problem: Musculor/Skeletal Functional Status  Goal: Highest potential functional level  Outcome: Ongoing  Goal: Absence of falls  Outcome: Ongoing No

## 2020-02-18 NOTE — PROGRESS NOTES
Pt converted to A-fib/A-flutter at a rate of 130-150's. Ordered to given digoxin for sustained atrial flutter. Pt is still maintaining atrial flutter at a rate at 144. MD [facility] notified.

## 2020-02-18 NOTE — CONSULTS
P Pulmonary and Critical Care   Consult Note      Reason for Consult: Hypotension, rapid A. fib  Requesting Physician: Dr. Ricco Kraft:   Michelle Hodges / HPI:                The patient is a 70 y.o. male with significant past medical history of:      Diagnosis Date    Aortic insufficiency     Atrial fibrillation ([facility] [address]     cardioversion 8/10/12    Breast nodule 8/2012  right    excision-lumpectomy 8/2012    CAD (coronary artery disease)     Cardiomyopathy ([facility] [address] 8/2012    EF 20 %    CHF (congestive heart failure) ([facility] [address] [facility]     Hyperlipidemia     Hypertension     Malignant lymphoma, non-Hodgkin's ([facility] [address]     Microscopic hematuria 7/29/2012    Mitral regurgitation     Nausea & vomiting 7/29/2012    Osteoarthritis of knee     bilateral knees    Pneumonia     Rotator cuff tear 7/2/2013    Ventricular ectopy 7/29/2012     Patient with long, complicated hospital stay. He was about to transfer to rehab. However, last night he developed RVR with a rate up to 190. He was also hypotensive. He underwent synchronized cardioversion which was successful in returning him to sinus rhythm. He is now in ICU on amiodarone. He was initially admitted from his PCPs office around the beginning of the month with non-resolving sinus infection and strep throat. He is appeared as though he may be somewhat septic. However, what came of this was that he had diffuse enlarged lymphadenopathy. There was concern for underlying lymphoma and he underwent surgical biopsy though results of this have been inconclusive. He had a second biopsy last week with results still pending.       Past Surgical History:        Procedure Laterality Date    AORTIC VALVE REPLACEMENT  8/3/2012    moderate mitral regurg    AXILLARY SURGERY Left 2/9/2020    AXILLARY LYMPH NODE BIOPSY DISSECTION performed by Willy Devries MD at [address] CYST REMOVAL      chest    ENDOSCOPY, COLON, DIAGNOSTIC  INGUINAL HERNIA REPAIR      bilateral in [address]      both    LYMPH NODE BIOPSY Left 2/3/2020    INGUINAL LYMPH NODE BIOPSY EXCISION performed by Jazmin Garcia MD at [address]    excision right breast mass (lumpectomy)    SHOULDER ARTHROPLASTY Right 2/2/15    right reverse total shoulder arthroplasty    SIGMOIDOSCOPY N/A 2/10/2020    SIGMOIDOSCOPY BIOPSY FLEXIBLE performed by Chani Fernando MD at [address]    TOTAL KNEE ARTHROPLASTY Right 2/3/14    RIGHT TOTAL KNEE REPLACEMENT-BAR       UPPER GASTROINTESTINAL ENDOSCOPY  13    UPPER GASTROINTESTINAL ENDOSCOPY N/A 2/10/2020    EGD BIOPSY performed by Chani Fernando MD at [address] [facility]     Current Medications:    Current Facility-Administered Medications: 0.9 % sodium chloride bolus, 500 mL, Intravenous, Once  midazolam (VERSED) 2 MG/2ML injection, , ,   [] amiodarone (CORDARONE) 450 mg in sodium chloride 0.9 % 250 mL infusion, 1 mg/min, Intravenous, Continuous **FOLLOWED BY** amiodarone (CORDARONE) 450 mg in sodium chloride 0.9 % 250 mL infusion, 0.5 mg/min, Intravenous, Continuous  norepinephrine (LEVOPHED) 64 MCG/ML infusion SOLN, , ,   norepinephrine (LEVOPHED) 16 mg in dextrose 5% 250 mL infusion, 5 mcg/min, Intravenous, Continuous  aztreonam (AZACTAM) 1 g IVPB minibag, 1 g, Intravenous, Q8H  vancomycin 1000 mg IVPB in 250 mL D5W addavial, 1,000 mg, Intravenous, Q12H  acetaminophen (TYLENOL) tablet 650 mg, 650 mg, Oral, Q4H PRN  magic (miracle) mouthwash with nystatin, 5 mL, Swish & Spit, 4x Daily  docosanol (ABREVA) 10 % cream, , Topical, 5x Daily  furosemide (LASIX) tablet 20 mg, 20 mg, Oral, Every Other Day  polyethylene glycol (GLYCOLAX) packet 17 g, 17 g, Oral, BID  0.9 % sodium chloride bolus, 20 mL, Intravenous, Once  pantoprazole (PROTONIX) tablet 40 mg, 40 mg, Oral, QAM AC  midodrine (PROAMATINE) tablet 5 mg, 5 mg, Oral, TID PRN  midodrine (PROAMATINE) tablet 10 mg, 10 mg, Oral, TID WC  amiodarone (CORDARONE) tablet 200 mg, 200 mg, Oral, Daily  neomycin-bacitracin-polymyxin (NEOSPORIN) ointment, , Topical, BID PRN  digoxin (LANOXIN) tablet 125 mcg, 125 mcg, Oral, Daily  ondansetron (ZOFRAN) injection 4 mg, 4 mg, Intravenous, Q6H PRN  oxyCODONE (ROXICODONE) immediate release tablet 5 mg, 5 mg, Oral, Q4H PRN **OR** [DISCONTINUED] oxyCODONE (ROXICODONE) immediate release tablet 10 mg, 10 mg, Oral, Q4H PRN  lidocaine 4 % external patch 1 patch, 1 patch, Transdermal, Daily PRN  lip balm petroleum free (PHYTOPLEX) stick, , Topical, PRN  promethazine (PHENERGAN) injection 12.5 mg, 12.5 mg, Intravenous, Q6H PRN  sodium chloride (OCEAN, BABY AYR) 0.65 % nasal spray 1 spray, 1 spray, Each Nostril, PRN  sodium chloride flush 0.9 % injection 10 mL, 10 mL, Intravenous, 2 times per day  sodium chloride flush 0.9 % injection 10 mL, 10 mL, Intravenous, PRN  prochlorperazine (COMPAZINE) injection 10 mg, 10 mg, Intravenous, Q6H PRN  lactobacillus (CULTURELLE) capsule 1 capsule, 1 capsule, Oral, Daily with breakfast  aspirin chewable tablet 81 mg, 81 mg, Oral, Daily  magnesium hydroxide (MILK OF MAGNESIA) 400 MG/5ML suspension 30 mL, 30 mL, Oral, Daily PRN  atorvastatin (LIPITOR) tablet 10 mg, 10 mg, Oral, Daily  diphenhydrAMINE (BENADRYL) injection 25 mg, 25 mg, Intravenous, Q6H PRN    Allergies   Allergen Reactions    Clindamycin/Lincomycin Rash    Levaquin [Levofloxacin]      Rash, swollen neck    Cefdinir Rash       Social History:    TOBACCO:   reports that he quit smoking about 49 years ago. He has a 40.00 pack-year smoking history. He has never used smokeless tobacco.  ETOH:   reports current alcohol use of about 2.0 standard drinks of alcohol per week.   Patient currently lives independently  Environmental/chemical exposure: None known    Family History:       Problem Relation Age of Onset    Marfan Syndrome Brother     Heart Disease Brother  Stroke Sister     Diabetes Sister     Heart Disease Sister     Heart Disease Sister     Cancer Brother      REVIEW OF SYSTEMS:    CONSTITUTIONAL:  negative for fevers, chills, diaphoresis, activity change, appetite change, fatigue, night sweats and unexpected weight change. EYES:  negative for blurred vision, eye discharge, visual disturbance and icterus  HEENT:  negative for hearing loss, tinnitus, ear drainage, sinus pressure, nasal congestion, epistaxis and snoring  RESPIRATORY:  See HPI  CARDIOVASCULAR:  negative for chest pain, palpitations, exertional chest pressure/discomfort, edema, syncope  GASTROINTESTINAL:  negative for nausea, vomiting, diarrhea, constipation, blood in stool and abdominal pain  GENITOURINARY:  negative for frequency, dysuria, urinary incontinence, decreased urine volume, and hematuria  HEMATOLOGIC/LYMPHATIC:  negative for easy bruising, bleeding and lymphadenopathy  ALLERGIC/IMMUNOLOGIC:  negative for recurrent infections, angioedema, anaphylaxis and drug reactions  ENDOCRINE:  negative for weight changes and diabetic symptoms including polyuria, polydipsia and polyphagia  MUSCULOSKELETAL:  negative for  pain, joint swelling, decreased range of motion and muscle weakness  NEUROLOGICAL:  negative for headaches, slurred speech, unilateral weakness  PSYCHIATRIC/BEHAVIORAL: negative for hallucinations, behavioral problems, confusion and agitation.      Objective:   PHYSICAL EXAM:      VITALS:  /81   Pulse 81   Temp 98.5 °F (36.9 °C) (Core)   Resp 23   Ht 6' 1\" (1.854 m)   Wt 174 lb 2.6 oz (79 kg)   SpO2 (!) 86%   BMI 22.98 kg/m²      24HR INTAKE/OUTPUT:      Intake/Output Summary (Last 24 hours) at 2/18/2020 1506  Last data filed at 2/18/2020 1443  Gross per 24 hour   Intake 811.94 ml   Output 450 ml   Net 361.94 ml     CONSTITUTIONAL:  awake, alert, cooperative, no apparent distress, and appears stated age  NECK:  Supple, symmetrical, trachea midline, no adenopathy, thyroid symmetric, not enlarged and no tenderness, skin normal  LUNGS:  no increased work of breathing and clear to auscultation. No accessory muscle use  CARDIOVASCULAR: S1 and S2, no edema and no JVD  ABDOMEN:  normal bowel sounds, non-distended and no masses palpated, and no tenderness to palpation. No hepatospleenomegaly  LYMPHADENOPATHY:  no axillary or supraclavicular adenopathy. No cervical adnenopathy  PSYCHIATRIC: Oriented to person place and time. No obvious depression or anxiety. MUSCULOSKELETAL: No obvious misalignment or effusion of the joints. No clubbing, cyanosis of the digits. SKIN:  normal skin color, texture, turgor and no redness, warmth, or swelling. No palpable nodules    DATA:    Old records have been reviewed    CBC:  Recent Labs     02/16/20 0445 02/17/20  0505 02/18/20 0445   WBC 4.0 3.3* 4.6   RBC 2.77* 2.77* 2.84*   HGB 7.7* 7.6* 7.8*   HCT 23.2* 23.2* 23.8*   * 125* 107*   MCV 83.6 83.9 83.7   MCH 27.8 27.6 27.3   MCHC 33.2 32.9 32.6   RDW 19.4* 19.3* 19.5*      BMP:  Recent Labs     02/16/20 0445 02/17/20  0505 02/18/20 0445   * 138 134*   K 4.1 4.0 3.4*   CL 95* 96* 95*   CO2 29 29 27   BUN 32* 40* 45*   CREATININE 1.1 1.2 1.2   CALCIUM 8.6 8.3 8.0*   GLUCOSE 122* 107* 133*      ABG:  No results for input(s): PHART, OFR5OJO, PO2ART, FVC0LWC, W7LDQDUL, BEART in the last 72 hours. Lab Results   Component Value Date    BNP 44 01/13/2014     Lab Results   Component Value Date    CKTOTAL 168 07/28/2012    TROPONINI 0.04 (H) 01/28/2020       Cultures:     Abx:    Radiology Review:  All pertinent images / reports were reviewed as a part of this visit. Assessment:     1.  A. fib with RVR and hypotension  · Now on amiodarone infusion  · Pulse maintained less than 100  · Blood pressure has been acceptable  · Still requires norepinephrine in support of blood pressure. · Wean as tolerated    2. Sepsis?   · Lactate is normal  · WBC is normal but may not be a good gauge in him  · He has been having intermittent fevers throughout and was febrile yesterday to 101 degrees  · Procalcitonin is elevated at 1.11  · I reviewed chest imaging  · Chest x-ray is stable with no acute infiltrate. · He has received vancomycin  · I requested ID consult.   · Also resubmitted new culture material.    3.  Lymphadenopathy  · Heme-onc is following  · Biopsy results are pending

## 2020-02-18 NOTE — PROGRESS NOTES
RT called on patient   Was in a fib RVR this evening and flipped in to SVT rate 190  failed adenosine 6--12--12 then 150mg amiodarone with no improvement  Had Unstable BP with SBP 50-60  Decision made for synchronized CV with 50J after sedation with 5mg versed  Converted to SR rate 80 and BP improved [id number]    Transfer to ICU  Start amio drip  Cardiology re consult

## 2020-02-18 NOTE — CARE COORDINATION
Notified Min Burgess at [facility] that patient will not be discharged today for medical reasons.

## 2020-02-18 NOTE — PROGRESS NOTES
Infectious Diseases   Progress Note      Admission Date: 1/28/2020  Hospital Day: Hospital Day: 25   Attending: Lakeisha Manzanares DO  Date of service: 2/18/2020     Chief complaint/ Reason for consult: The patient was seen today for the following:    · New fever and hypotension  · Angioimmunoblastic T-cell lymphoma, biopsy-proven  · Atrial fibrillation with rapid ventricular response  · Has a bioprosthetic aortic valve-no endocarditis on last 2D echo    Microbiology:        I have reviewed allavailable micro lab data and cultures    · Blood culture (2/2) - collected on 1/28/2020: In process  · Nasal MRSA screen, urine Legionella and pneumococcal antigen- collectedon 1/28/2020: Negative  · Urine culture  - collected on 1/28/2020: In process      Antibiotics and immunizations:       Current antibiotics: All antibiotics and their doses were reviewed by me    Recent Abx Admin                   aztreonam (AZACTAM) 1 g IVPB minibag (g) 1 g New Bag 02/18/20 2202     1 g New Bag  1513    vancomycin 1000 mg IVPB in 250 mL D5W addavial (mg) 1,000 mg New Bag 02/18/20 1724                  Immunization History: All immunization history was reviewed by me today. Immunization History   Administered Date(s) Administered    Influenza 10/10/2012    Influenza Virus Vaccine 09/19/2013, 10/23/2014    Influenza, High Dose (Fluzone 65 yrs and older) 10/15/2015, 09/29/2016, 09/20/2017, 10/10/2018    Influenza, Triv, inactivated, subunit, adjuvanted, IM (Fluad 65 yrs and older) 10/08/2019    Pneumococcal Conjugate 13-valent ([id number]) 12/01/2015    Pneumococcal Conjugate 7-valent (Prevnar7) 11/19/2007    Pneumococcal Polysaccharide ([id number]) 11/19/2007, 11/07/2019    Tdap (Boostrix, Adacel) 10/12/2014    Zoster Live (Zostavax) 09/03/2013       Known drug allergies:      All allergies were reviewed and updated    Allergies   Allergen Reactions    Clindamycin/Lincomycin Rash    Levaquin [Levofloxacin]      Rash, Hence, I have been reconsulted. Also had an EGD on 2/10/2020, with no evidence of intestinal metaplasia, dysplasia, malignancy and negative CMV and HSV histochemical staining    Patient has not had any leukocytosis. Chest x-ray from today reviewed. No evidence of pneumonia. Small bilateral pleural effusions. Urinalysis from 2/17/2020- for UTI    Patient had A. fib with RVR and has been transferred to medical ICU earlier today again      Plan:     Diagnostic Workup:    · Repeat blood cultures and urine cultures noted to be sent  · Continue to follow  fever curve, WBC count and blood cultures  · Follow up on *liver and renal function  · Check serum lactate level    Antimicrobials:    · The fevers may very well likely be related to lymphoma, however, given the clinical worsening, a new infectious process cannot be effectively ruled out  · The patient has new A. fib with RVR and hypotension  · Given the clinical worsening, will start broad-spectrum antibiotics for now  · We will start IV vancomycin for empiric MRSA coverage  Check Vancomycin trough before the 5th dose. Target vancomycin trough of around 15. Keep vancomycin trough below 20 at all times. Avoid increasing the dose of vancomycin above a total of 4 grams in a 24-hour period in patients younger than 45 years and above 3 grams in a 24-hour period in a patient of age 39 years or older. Continue to monitor serum creatinine and Vanco levels closely, while the patient is on I/v Vancomycin. · Develops a rash with cefdinir and Levaquin. Will start IV aztreonam for empiric gram-negative coverage  · If the cultures remain negative, will consider stopping antibiotics in coming days  · Follow-up on fever curve and culture results. · Patient critically ill.   Continue close vitals monitoring in the medical ICU  · Discussed all above with medical ICU team  · Discussed with patient's family at bedside    Drug Monitoring:    · Continue monitoring for performed by Dani Cote MD at [address] [employer] ENDOSCOPY       Family History: All family history was reviewed today. Problem Relation Age of Onset    Marfan Syndrome Brother     Heart Disease Brother     Stroke Sister     Diabetes Sister     Heart Disease Sister     Heart Disease Sister     Cancer Brother        Objective:       PHYSICAL EXAM:      Vitals:   Vitals:    02/18/20 2030 02/18/20 2045 02/18/20 2100 02/18/20 2115   BP: (!) 87/59 (!) 92/57 (!) 97/59 92/61   Pulse: 79 83 79 79   Resp: 21 24 24 24   Temp:       TempSrc:       SpO2:       Weight:       Height:           Physical Exam    General: Encephalopathic but protecting the airway so far, hypotensive  HEENT: normocephalic, atraumatic, sclera clear, pupils equal, light reflex preserved bilaterally  Cardiovascular: RRR, no murmurs/rubs/gallops detected  Pulmonary: Bilateral patchy crackles noted  Abdomen/GI: soft, no organomegaly, bowel sounds positive  Neuro: Encephalopathic, pupils are equal and reactive to light, moves all extremities  Skin: no rash,   Musculoskeletal:  No obvious joint swelling, redness. No limitation of range of passive motion  Genitourinary: Hall's catheter in place   Psych: could not assess   Lymphatic/Immunologic: No obvious bruising, no cervical lymphadenopathy    Lines: All vascular access sites are healthy with no local erythema, discharge or tenderness    Intake and output:    I/O last 3 completed shifts: In: 811.9 [P.O.:360; I.V.:451.9]  Out: 450 [Urine:450]    Lab Data:   All available labs and old records have been reviewed by me.     CBC:  Recent Labs     02/16/20 0445 02/17/20 0505 02/18/20 0445   WBC 4.0 3.3* 4.6   RBC 2.77* 2.77* 2.84*   HGB 7.7* 7.6* 7.8*   HCT 23.2* 23.2* 23.8*   * 125* 107*   MCV 83.6 83.9 83.7   MCH 27.8 27.6 27.3   MCHC 33.2 32.9 32.6   RDW 19.4* 19.3* 19.5*        BMP:  Recent Labs     02/16/20 0445 02/17/20  0505 02/18/20 0445   * 138 134*   K 4.1 4.0 3.4*   CL 95* 96* 95*   CO2 29 29 27   BUN 32* 40* 45*   CREATININE 1.1 1.2 1.2   CALCIUM 8.6 8.3 8.0*   GLUCOSE 122* 107* 133*        Hepatic Function Panel:   Lab Results   Component Value Date    ALKPHOS 60 01/31/2020    ALT 7 01/31/2020    AST 8 01/31/2020    PROT 5.3 01/31/2020    PROT 6.2 11/28/2012    BILITOT 0.4 01/31/2020    BILIDIR <0.2 10/17/2016    IBILI see below 10/17/2016    LABALBU 2.5 02/12/2020       CPK:   Lab Results   Component Value Date    CKTOTAL 168 07/28/2012     ESR:   Lab Results   Component Value Date    SEDRATE 75 (H) 11/30/2012     CRP: No results found for: CRP        Imaging: All pertinent images and reports for the current visit were reviewed by me during this visit. XR CHEST PORTABLE   Final Result   Stable chest over the past 2 days with small bilateral pleural effusions. No   evidence of acute cardiopulmonary process otherwise. XR CHEST PORTABLE   Final Result   Findings compatible with small pleural effusions. No consolidation or   evidence for edema. Findings corresponding to mediastinal lymphadenopathy, better demonstrated on   recent CT exam.         Fluoroscopy modified barium swallow with video   Final Result   No aspiration was appreciated. Intermittent episodes of penetration were   demonstrated as described above. Please see separate speech pathology report for full discussion of findings   and recommendations. CT ABDOMEN PELVIS WO CONTRAST Additional Contrast? Oral (via NGT)   Final Result   Increased retroperitoneal adenopathy compared to 2013 raising the question of   underlying lymphoma. Mild body wall anasarca and trace pelvic fluid suggesting fluid overload. Nonobstructing right renal stone         XR ABDOMEN FOR NG/OG/NE TUBE PLACEMENT   Final Result   The tip of the nasogastric tube is in good position.          XR ABDOMEN (KUB) (SINGLE AP VIEW)   Final Result   Mild to moderate gaseous distention of small bowel in the mid abdomen, mL Intravenous 2 times per day    lactobacillus  1 capsule Oral Daily with breakfast    aspirin  81 mg Oral Daily    atorvastatin  10 mg Oral Daily        amiodarone 0.5 mg/min (02/18/20 1241)    norepinephrine 0.06 mcg/kg/min (02/18/20 2209)       acetaminophen, midodrine, neomycin-bacitracin-polymyxin, ondansetron, oxyCODONE **OR** [DISCONTINUED] oxyCODONE, lidocaine, lip balm petroleum free, promethazine, sodium chloride, sodium chloride flush, prochlorperazine, magnesium hydroxide, diphenhydrAMINE      Problem list:       Patient Active Problem List   Diagnosis Code    Elevated troponin R79.89    Cardiomyopathy ([facility] [address] I42.9    Nonrheumatic aortic valve insufficiency I35.1    Mitral regurgitation I34.0    Microscopic hematuria R31.29    S/P AVR (aortic valve replacement) Z95.2    Hypotension I95.9    Chronic combined systolic and diastolic CHF (congestive heart failure) ([facility]) I50.42    Arthritis of knee M17.10    Encounter for medication counseling Z71.89    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    Hyponatremia E87.1    Primary localized osteoarthrosis, 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    H/O total shoulder replacement Z96.619    Hypercholesteremia E78.00    Finger amputation, traumatic, 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    PAF (paroxysmal atrial fibrillation) ([facility]) I48.0    Essential hypertension I10    Sepsis ([facility] [address] A41.9    Septic shock ([facility] [address] A41.9, R65.21    Atrial fibrillation with rapid ventricular response ([address] I48.91    Elevated international normalized ratio (INR) R79.1    Streptococcal pharyngitis J02.0    Leukopenia D72.819    Chronic ethmoidal sinusitis J32.2    TYRELL (acute kidney injury) ([address] N17.9    Ex-heavy cigarette smoker (20-39 per day) Z87.891    Tobacco abuse counseling Z71.6    Lymphadenopathy R59.1    Hypervolemia E87.70    Fever R50.9    Malignant lymphoma, non-Hodgkin's (HCC) C85.90       Please note that this chart was generated using

## 2020-02-18 NOTE — PROGRESS NOTES
Oncology and Hematology Care   Progress Note      2/18/2020 6:00 PM        Name: Raliegh Ferrier Schirmer .               Admitted: 1/28/2020    SUBJECTIVE:  Vents noted patient was transferred back to ICU last night due to arrhythmia required DC shock and now on amiodarone also on empiric antibiotics    Both daughters at bedside    Reviewed interval ancillary notes    Current Medications  0.9 % sodium chloride bolus, Once  amiodarone (CORDARONE) 450 mg in sodium chloride 0.9 % 250 mL infusion, Continuous  norepinephrine (LEVOPHED) 16 mg in dextrose 5% 250 mL infusion, Continuous  aztreonam (AZACTAM) 1 g IVPB minibag, Q8H  vancomycin 1000 mg IVPB in 250 mL D5W addavial, Q12H  mexiletine (MEXITIL) capsule 200 mg, 3 times per day  acetaminophen (TYLENOL) tablet 650 mg, Q4H PRN  magic (miracle) mouthwash with nystatin, 4x Daily  docosanol (ABREVA) 10 % cream, 5x Daily  furosemide (LASIX) tablet 20 mg, Every Other Day  polyethylene glycol (GLYCOLAX) packet 17 g, BID  0.9 % sodium chloride bolus, Once  pantoprazole (PROTONIX) tablet 40 mg, QAM AC  midodrine (PROAMATINE) tablet 5 mg, TID PRN  midodrine (PROAMATINE) tablet 10 mg, TID WC  amiodarone (CORDARONE) tablet 200 mg, Daily  neomycin-bacitracin-polymyxin (NEOSPORIN) ointment, BID PRN  digoxin (LANOXIN) tablet 125 mcg, Daily  ondansetron (ZOFRAN) injection 4 mg, Q6H PRN  oxyCODONE (ROXICODONE) immediate release tablet 5 mg, Q4H PRN  lidocaine 4 % external patch 1 patch, Daily PRN  lip balm petroleum free (PHYTOPLEX) stick, PRN  promethazine (PHENERGAN) injection 12.5 mg, Q6H PRN  sodium chloride (OCEAN, BABY AYR) 0.65 % nasal spray 1 spray, PRN  sodium chloride flush 0.9 % injection 10 mL, 2 times per day  sodium chloride flush 0.9 % injection 10 mL, PRN  prochlorperazine (COMPAZINE) injection 10 mg, Q6H PRN  lactobacillus (CULTURELLE) capsule 1 capsule, Daily with breakfast  aspirin chewable tablet 81 mg, Daily  magnesium hydroxide (MILK OF MAGNESIA) 400 MG/5ML suspension 30 mL, Daily PRN  atorvastatin (LIPITOR) tablet 10 mg, Daily  diphenhydrAMINE (BENADRYL) injection 25 mg, Q6H PRN        Objective:  BP (!) 110/59   Pulse 91   Temp 101.7 °F (38.7 °C) (Core)   Resp 11   Ht 6' 1\" (1.854 m)   Wt 174 lb 2.6 oz (79 kg)   SpO2 (!) 90%   BMI 22.98 kg/m²     Intake/Output Summary (Last 24 hours) at 2/18/2020 1800  Last data filed at 2/18/2020 1622  Gross per 24 hour   Intake 828.94 ml   Output 450 ml   Net 378.94 ml      Wt Readings from Last 3 Encounters:   02/18/20 174 lb 2.6 oz (79 kg)   01/28/20 178 lb (80.7 kg)   01/23/20 180 lb 6.4 oz (81.8 kg)       Conscious alert oriented  HEENT: + Pallor   Neck: Supple. No lymphadenopathy  Lungs:  Respiratory efforts normal.  Abdomen: Not distended  Neuro: No focal deficits. Skin: No Rash Petechiae        Labs and Tests:  CBC:   Recent Labs     02/16/20  0445 02/17/20  0505 02/18/20  0445   WBC 4.0 3.3* 4.6   HGB 7.7* 7.6* 7.8*   * 125* 107*     BMP:    Recent Labs     02/16/20  0445 02/17/20  0505 02/18/20  0445   * 138 134*   K 4.1 4.0 3.4*   CL 95* 96* 95*   CO2 29 29 27   BUN 32* 40* 45*   CREATININE 1.1 1.2 1.2   GLUCOSE 122* 107* 133*     Hepatic: No results for input(s): AST, ALT, ALB, BILITOT, ALKPHOS in the last 72 hours. Lab Results   Component Value Date    INR 1.20 (H) 02/13/2020    INR 1.19 (H) 02/12/2020    INR 1.26 (H) 02/11/2020    PROTIME 14.0 (H) 02/13/2020    PROTIME 13.8 (H) 02/12/2020    PROTIME 14.6 (H) 02/11/2020     Pathology of lymph node biopsy    Addendum from [facility]    ADDENDUM:  The following consultation results were received from [facility] Ariane STAPLES MD [id number] (Case  Number: [id number]    DIAGNOSIS:     1. Lymph node, left inguinal, biopsy ([facility]-, 02/03/2020): Angioimmunoblastic T-cell lymphoma. See Note. 2. Lymph node, left axilla, excision ([facility]-, 02/09/2020): Angioimmunoblastic T-cell lymphoma. See Note.

## 2020-02-18 NOTE — PROGRESS NOTES
Spoke to Dr. Alondra Valdez  Second opinion from [place] [id number] is still pending. Discussed with the patient and wife. Okay for discharge to a rehab. Will ensure outpatient follow-up.     Melissa Alvarenga MD

## 2020-02-18 NOTE — PROGRESS NOTES
status: Alert, oriented, thought content appropriate. ASSESSMENT AND PLAN    1. Fever  - unclear etiology - empiric Azactam/Vanco 2/18; weaned off Levo; ID consult pending  -strep thoat swab positive. Sinusitis noted.   -had allergic reaction to levaquin.   -  Finished doxy course. -cxr and ua neg. Blood cx NTD     2. 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 Results are Equivocal.  Awaiting second opinion. Per heme/onc; fever could be r/t lymphoma        3. A. fib with RVR; with episode of sustained VTach s/p dc cardioversion 2/17; will need LifeVest at d/c  -Amio GTT and Dig,   - Supratherapeutic INR  resolved. -stopped  Coumadin  given GI bleed and need for transfusions. Discussed with cardiology. Risks outweigh benefits. 4. Acute blood loss anemia and anemia of chronic disease  From MDS and rectal ulcerations;  had total of 3 units now. Had BRBPR; Showed patchy gastritis and flexible sigmoidoscopy showed shallow rectal ulcerations, mild internal hemorrhoids. Sigmoid diverticulosis. Bx unremarkable. Started on PPI and MiraLAX. Continue to monitor hemoglobin closely; Coumadin held      5. Rash-Secondary Levaquin. Resolved. Off steroids       6. Chronic combined CHF-volume overload from all IV fluids he received. - diuresed with iv lasix. Now on po lasix every other day. Dced aldactone as bp is very low. Given IV albumin. Cont lasix every other day     7. Dysphagia: Speech eval noted. On modified diet. MBS showed no mally aspiration. Continue dysphagia diet. Continue to monitor.     8. Hypotension:  Cont midodrine 10 mg tid.           Diet: DIET DYSPHAGIA SOFT AND BITE-SIZED;  No Drinking Straw  Dietary Nutrition Supplements: Standard High Calorie Oral Supplement, Clear Liquid Oral Supplement, Frozen Oral Supplement  Code:Prior  DVT PPX-  SCD's  PT/OT -  Home  With [facility] vs SNF        Disp: inpt awaiting SNF placement.  Await ID clearance prior to discharge given ongoing fevers(could be sec to underlying lymphoma)            [address] [facility], DO

## 2020-02-18 NOTE — PROGRESS NOTES
(based on SCr of 1.2 mg/dL). Goal Trough Level: 15-20 mcg/mL    Assessment/Plan:  Will initiate vancomycin 1000 mg IV every 12 hours. Vancomycin trough ordered for 2/20 @1430. Thank you for the consult. Will continue to follow.     Sadia Berrios, PharmD, [address]   [id number]

## 2020-02-18 NOTE — PLAN OF CARE
Problem: OXYGENATION/RESPIRATORY FUNCTION  Goal: Patient will achieve/maintain normal respiratory rate/effort  Description  Respiratory rate and effort will be within normal limits for the patient  Outcome: Ongoing     Problem: HEMODYNAMIC STATUS  Goal: Patient has stable vital signs and fluid balance  Outcome: Ongoing     Problem: FLUID AND ELECTROLYTE IMBALANCE  Goal: Fluid and electrolyte balance are achieved/maintained  Outcome: Ongoing     Problem: ACTIVITY INTOLERANCE/IMPAIRED MOBILITY  Goal: Mobility/activity is maintained at optimum level for patient  Outcome: Ongoing     Problem: Infection:  Goal: Will remain free from infection  Description  Will remain free from infection  Outcome: Ongoing     Problem: Safety:  Goal: Free from accidental physical injury  Description  Free from accidental physical injury  Outcome: Ongoing  Goal: Free from intentional harm  Description  Free from intentional harm  Outcome: Ongoing     Problem: Daily Care:  Goal: Daily care needs are met  Description  Daily care needs are met  Outcome: Ongoing     Problem: Skin Integrity:  Goal: Skin integrity will stabilize  Description  Skin integrity will stabilize  Outcome: Ongoing     Problem: Discharge Planning:  Goal: Patients continuum of care needs are met  Description  Patients continuum of care needs are met  Outcome: Ongoing     Problem: Falls - Risk of:  Goal: Will remain free from falls  Description  Will remain free from falls  Outcome: Ongoing  Goal: Absence of physical injury  Description  Absence of physical injury  Outcome: Ongoing     Problem: Risk for Impaired Skin Integrity  Goal: Tissue integrity - skin and mucous membranes  Description  Structural intactness and normal physiological function of skin and  mucous membranes.   Outcome: Ongoing     Problem: Musculor/Skeletal Functional Status  Goal: Highest potential functional level  Outcome: Ongoing  Goal: Absence of falls  Outcome: Ongoing     Problem:

## 2020-02-18 NOTE — PROGRESS NOTES
Rapid Response Quick Summary RR [id number]    Room: ICU-3908/3908-01    Assessment of concern / patient: SVT rate 190-200    Physician involved:  Dr. Tina Carlin    Interventions:  Adenosine 6mg/12mg/12mg no response.  Cardioverted 50J converted to SR , PAC & PVC    Disposition: transferred to ICU 8

## 2020-02-18 NOTE — PROGRESS NOTES
Occupational Therapy  Israel Zamora  Spoke with RN, pt currently on levophed drip. OT to see for therapy when medically appropriate.   Thanks,  Jordyn Morris, OTR/L [id number]

## 2020-02-18 NOTE — PROGRESS NOTES
[facility]   Electrophysiology Progress Note     Date: 2/18/2020  Admit Date: 1/28/2020     Reason for follow up: Atrial fibrillation and ventricular tachycardia    Chief Complaint:   Chief Complaint   Patient presents with    Sinusitis     pt arrived via squad. sore throat and sinus infection for a couple of months. pts doctor concerned for septic, low BP and low immune system. History of Present Illness: History obtained from patient and medical record. Joseph Peguero is a 70 y.o. male with a past medical history of HTN, HLD, microthrombus, AS s/p AVR, and atrial fibrillation. He has history of afib s/p OHS, but he had no known recurrence and his AC was stopped. He was recently restarted on warfarin for concern of microthrombus. He presented with flu-like symptoms and  Is being treated for sepsis, lymphadenopathy s/p biopsy, rash and CHF. Interval Hx: Today, he is being seen for for recurrent ventricular tachycardia. He was initially seen in during acute sepsis for NSVT and atrial fibrillation and started on amiodarone. Antiarrhythmic options were limited to amiodarone due to hypotension. He did convert to SR with 1st degree AV block and did not have recurrence of VT until last night. He states he was sleeping when he had this episode and was generally unaware, but did feel some SOB during the episode of VT. He was CV urgently into SR and started on amiodarone gtt. He has not had recurrence and remains in SR w/ 1st degree AVB and frequent PVCs. Today he has had recurrence of fever and is not on pressor support. Pt and family are unsure of lymphoma diagnosis and results remain pending. Patient seen and examined. Clinical notes reviewed. Telemetry reviewed. Allergies: Allergies   Allergen Reactions    Clindamycin/Lincomycin Rash    Levaquin [Levofloxacin]      Rash, swollen neck    Cefdinir Rash       Home Meds:  Prior to Visit Medications    Medication Sig Taking? Authorizing Provider   traMADol (ULTRAM) 50 MG tablet Take 1 tablet by mouth every 8 hours as needed for Pain for up to 5 days. Intended supply: 7 days.  Take lowest dose possible to manage pain Yes Kalyan Solo MD   midodrine (PROAMATINE) 2.5 MG tablet TAKE 1 TABLET BY MOUTH DAILY Yes Anjelica Taylor MD   lisinopril (PRINIVIL;ZESTRIL) 2.5 MG tablet TAKE ONE TABLET BY MOUTH DAILY Yes Anjelica Taylor MD   hydrochlorothiazide (HYDRODIURIL) 25 MG tablet TAKE 1 TABLET BY MOUTH DAILY Yes Juan Hamm MD   spironolactone (ALDACTONE) 25 MG tablet TAKE 0.5 TABLETS BY MOUTH THREE TIMES A WEEK Yes Anjelica Taylor MD   warfarin (COUMADIN) 5 MG tablet Take 1 tablet by mouth daily Yes Juan Hamm MD   atorvastatin (LIPITOR) 10 MG tablet TAKE 1 TABLET BY MOUTH ONE TIME A DAY  Yes Anjelica Taylor MD   metoprolol succinate (TOPROL XL) 25 MG extended release tablet TAKE 0.5 TABLETS BY MOUTH 2 TIMES DAILY Yes Anjelica Taylor MD   Cholecalciferol (VITAMIN D3) 3000 UNITS TABS Take 2,000 Units by mouth daily  Yes Historical Provider, MD   ondansetron (ZOFRAN-ODT) 4 MG disintegrating tablet Take 4 mg by mouth every 8 hours as needed for Nausea or Vomiting Yes Historical Provider, MD   aspirin 81 MG tablet Take 81 mg by mouth daily  Yes Historical Provider, MD   Ascorbic Acid (VITAMIN C) 500 MG tablet Take 1,000 mg by mouth once a week  Yes Historical Provider, MD      Scheduled Meds:   sodium chloride  500 mL Intravenous Once    midazolam        norepinephrine        magic (miracle) mouthwash with nystatin  5 mL Swish & Spit 4x Daily    docosanol   Topical 5x Daily    furosemide  20 mg Oral Every Other Day    polyethylene glycol  17 g Oral BID    sodium chloride  20 mL Intravenous Once    pantoprazole  40 mg Oral QAM AC    midodrine  10 mg Oral TID WC    amiodarone  200 mg Oral Daily    digoxin  125 mcg Oral Daily    sodium chloride flush  10 mL Intravenous 2 times per day    lactobacillus  1 capsule Oral rubs, or gallops. Peripheral pulses 2+, capillary refill < 3 seconds. Negative elevation of JVP. + murmur, + pitting peripheral edema and generalized edema  · Respiratory: Respirations symmetric and unlabored. Lungs clear to auscultation bilaterally, no wheezing, crackles, or rhonchi + diminished bases  · Gastrointestinal: Abdomen soft and round. Bowel sounds normoactive in all quadrants without tenderness or masses. · Musculoskeletal: Bilateral upper and lower extremity strength 5/5 with full ROM + generalized weakness  · Neurologic/Psych: Awake and orientated to person, place and time. Calm affect, appropriate mood    Pertinent labs, diagnostic, device, and imaging results reviewed as a part of this visit    Labs:    BMP:   Recent Labs     20  0505 20   * 138 134*   K 4.1 4.0 3.4*   CL 95* 96* 95*   CO2 29 29 27   BUN 32* 40* 45*   CREATININE 1.1 1.2 1.2   MG  --   --  2.10     Estimated Creatinine Clearance: 58 mL/min (based on SCr of 1.2 mg/dL).    CBC:   Recent Labs     205 20  0505 20   WBC 4.0 3.3* 4.6   HGB 7.7* 7.6* 7.8*   HCT 23.2* 23.2* 23.8*   MCV 83.6 83.9 83.7   * 125* 107*     Thyroid:   Lab Results   Component Value Date    TSH 1.51 2016    O2EZVHN 0.89 2014     Lipids:   Lab Results   Component Value Date    CHOL 98 2019    HDL 38 2019    TRIG 70 2019     LFTS:   Lab Results   Component Value Date    ALT 7 2020    AST 8 2020    ALKPHOS 60 2020    PROT 5.3 2020    PROT 6.2 2012    AGRATIO 1.0 2020    BILITOT 0.4 2020     Cardiac Enzymes:   Lab Results   Component Value Date    CKTOTAL 168 2012    TROPONINI 0.04 2020    TROPONINI 0.110 2013    TROPONINI 0.110 2013     Coags:   Lab Results   Component Value Date    PROTIME 14.0 2020    PROTIME 25.7 2016    INR 1.20 2020     EC20 afib RVR w/ PACs    ECHO: 1/29/20   Summary   -Left ventricular cavity size is mildly dilated. There is mild concentric   left ventricular hypertrophy.   -Overall left ventricular systolic function appears severely reduced with an   ejection fraction in the 30% range.   -There is severe hypokinesis of the apical wall and moderate hypokinesis of   the inferior walls.   -Indeterminate diastolic function due to irregular heart rhythm. -The mitral valve leaflets appear myxomatous. -Mild to moderate mitral regurgitation.   -A bioprosthetic artificial aortic valve appears well seated with a maximum   velocity of 3.3m/s and a mean gradient of 26mmHg. This suggests at least   moderate aortic stenosis. -Trivial perivalvular aortic regurgitation.   -Mild to moderate tricuspid regurgitation.   -Estimated pulmonary artery systolic pressure is mildly elevated at 40-45   mmHg assuming a right atrial pressure of 8mmHg.   -The left atrium is dilated.   9/11/19  Summary   Left ventricle - mildly dilated, borderline function with EF of 50%,   inferolateral basal hypokinesis   Mitral valve - mild regurgitation, annular calcification   Aortic valve - bio valve well seated, moderate stenosis with mean gradient   of 34mmHg, trivial regurgitation   Aorta - root 4.0cm, ascending 4.4cm   Tricuspid valve - mild to moderate regurgitation with RVSP of 27mmHg    Problem List:   Patient Active Problem List    Diagnosis Date Noted    Tobacco abuse counseling     Lymphadenopathy     Hypervolemia     Sepsis ([facility] [address] 01/28/2020    Septic shock ([address]     Atrial fibrillation with rapid ventricular response (HCC)     Elevated international normalized ratio (INR)     Streptococcal pharyngitis     Leukopenia     Chronic ethmoidal sinusitis     TYRELL (acute kidney injury) ([address]     Ex-heavy cigarette smoker (20-39 per day)     PAF (paroxysmal atrial fibrillation) ([facility] [address] 09/06/2019    Essential hypertension 09/06/2019    Finger amputation, traumatic, 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 Primary localized osteoarthrosis, pelvic region and thigh 05/04/2015    H/O total shoulder replacement 05/04/2015    Hyponatremia 04/22/2015    Primary localized osteoarthrosis of shoulder region 02/02/2015    Encounter for medication counseling 01/13/2014    Arthritis of knee 12/05/2013    Chronic combined systolic and diastolic CHF (congestive heart failure) ([facility] [address] 03/17/2013    S/P AVR (aortic valve replacement) 09/26/2012    Elevated troponin 07/29/2012    Cardiomyopathy ([facility] [address] 07/29/2012    Nonrheumatic aortic valve insufficiency 07/29/2012    Mitral regurgitation 07/29/2012    Microscopic hematuria 07/29/2012      Assessment:  Paroxysmal Atrial Fibrillation  - SR 1st degree AV block, no known recurrence  - On amiodarone 200 mg bid and digoxin 125 mcg daily   - New hypotension on pressor support  - He was asymptomatic with afib  - [id number] score: 3 (age, HTN); [id number] Vasc score and anticoagulation discussed. High risk for stroke and thromboembolism. Anticoagulation is recommended.   ~ On warfarin  - Afib risk factors including age, HTN, obesity, inactivity and PITA were discussed with patient. Risk factor modification recommended   ~ TSH 3.91 (1/30/20)    - Treatment options including cardioversion, rate control strategy, antiarrhythmics, anticoagulation and possible ablation were discussed with patient. Risks, benefits and alternative of each treatment options were explained. All questions answered    ~ Continue medication management and consider ablation or CV if recurrence  Ventricular tachycardia/PVC   - Episode of sustained VT and was urgently CV to SR   - Remains in SR today w/ frequent PVCs   - On amiodarone drip   - Cannot tolerate BB due to hypotension and on pressor support   - Discussed treatment planning including antiarrythmic therapy and possible ICD implantation in the future depending on prognosis and clinical course.   Will likely need life vest at discharge  Hypotension   - Recurrent and now back on pressor support   - Etiology unknown, possible sepsis vs intravascular dehydration vs cardiomyopathy?    - ID providing recommendations  AS   - S/p AVR (2012)   - Moderate stenosis per echo (1/29/20)   - Follows with Dr. 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 All questions and concerns were addressed to the patient. Alternatives to my treatment were discussed. I have discussed the above stated plan with patient and spouse and the nurse. The patient and spouse verbalized understanding and agreed with the plan. Thank you for allowing to us to participate in the care of Edd Mccune Schirmer.     JEANIE Flores-CNP  [address]   Office: [phone]

## 2020-02-18 NOTE — PROGRESS NOTES
[facility] HOSPITALISTS PROGRESS NOTE    2/17/2020 7:55 PM        Name: Kentrell Ansarihamel Schirmer . Admitted: 1/28/2020  Primary Care Provider: Dieudonne Paula MD (Tel: [phone])                           Hospital course:     43-year-old gentleman who was admitted for severe sepsis. patient was positive for strep B- at PCPs office. Blood culture  negative. Found to have sinusitis. Treated with levaquin and had rash . Treated with  steroids for levaquin induced rash. Treated with doxy. Patient also noted to be A. fib RVR. Was on amiodarone drip and heparin drips. Found to have lymphadenopathy  - bx done times 2. Possibly T cell vs Hodgkins lymphoma. Oncology following    GI consulted given hematochezia. Had   Scopes done, found to have stercoral ulcers. Treated with PPI. Started on bowel regimen. stopped coumadin as risks outweigh benefits.     - Poor prognosis over all. Family finally interested in SNF placement. S:  Again had fever this evening. Weak over all. Daughters at bed side.      Reviewed interval ancillary notes    Current Medications  acetaminophen (TYLENOL) tablet 650 mg, Q4H PRN  magic (miracle) mouthwash with nystatin, 4x Daily  docosanol (ABREVA) 10 % cream, 5x Daily  furosemide (LASIX) tablet 20 mg, Every Other Day  polyethylene glycol (GLYCOLAX) packet 17 g, BID  0.9 % sodium chloride bolus, Once  pantoprazole (PROTONIX) tablet 40 mg, QAM AC  midodrine (PROAMATINE) tablet 5 mg, TID PRN  midodrine (PROAMATINE) tablet 10 mg, TID WC  amiodarone (CORDARONE) tablet 200 mg, Daily  neomycin-bacitracin-polymyxin (NEOSPORIN) ointment, BID PRN  digoxin (LANOXIN) tablet 125 mcg, Daily  ondansetron (ZOFRAN) injection 4 mg, Q6H PRN  oxyCODONE (ROXICODONE) immediate release tablet 5 mg, Q4H PRN  lidocaine 4 % external patch 1 patch, Daily PRN  lip balm 02/15/20  0500 02/16/20  0445 02/17/20  0505    134* 138   K 4.3 4.1 4.0   CL 96* 95* 96*   CO2 33* 29 29   BUN 35* 32* 40*   CREATININE 1.1 1.1 1.2   GLUCOSE 131* 122* 107*     Hepatic:   No results for input(s): AST, ALT, ALB, BILITOT, ALKPHOS in the last 72 hours. Problem List  Active Problems:    Elevated troponin    Cardiomyopathy (HCC)    Mitral regurgitation    S/P AVR (aortic valve replacement)    Chronic combined systolic and diastolic CHF (congestive heart failure) ([facility]    Encounter for medication counseling    Hyponatremia    Hypercholesteremia    PAF (paroxysmal atrial fibrillation) ([facility]    Essential hypertension    Sepsis ([facility]    Tobacco abuse counseling    Lymphadenopathy    Hypervolemia  Resolved Problems:    * No resolved hospital problems. *       Assessment & Plan:     1. Severe sepsis:  -strep thoat swab positive. Sinusitis noted.   -had allergic reaction to levaquin.   -  Finished doxy course. 2. 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 Results are Equivocal.  Awaiting second opinion. 3. A. fib with RVR  -Likely secondary to sepsis   -Now on amio and digoxin. Resolved. Supratherapeutic INR  resolved. -stopped  Coumadin  given GI bleed and need for transfusions. Discussed with cardiology. Risks outweigh benefits. 5. Rash-Secondary Levaquin. Resolved. Off steroids      6. Chronic combined CHF-volume overload from all IV fluids he received. - diuresed with iv lasix. Now on po lasix every other day. Dced aldactone as bp is very low. Given IV albumin. Cont lasix every other day    7. Dysphagia: Speech eval noted. On modified diet. MBS showed no mally aspiration. Continue dysphagia diet. Continue to monitor. 8.  Acute blood loss anemia and anemia of chronic disease  From MDS:   had total of 3 units now. Had GI bleeding. GI consulted. EGD  Showed patchy gastritis and flexible

## 2020-02-19 NOTE — CONSULTS
Palliative Care:      70year old male with history of CHF,  myelodysplastic syndrome and chronic leukopenia , cardiomyopathy initially admitted from his PCP office on 1/28/20 with possible sepsis. He had a sinus infection and strep throat. He was noted to by hypotensive. Patient has had a long and complicated hospital stay. He was getting ready to transfer to rehab when he developed RVR with rate up to 190. He was also hypotensive. He underwent synchronized cardioversion which was successful in returning him to sinus rhythm. He is now in ICU on amiodarone. He was found to have diffuse enlarged lymphadenopathy. There was concern for underlying lymphoma and he underwent surgical biopsy though results of this have been inconclusive. He had a second biopsy last week with results still pending. Patient has been having intermittent fevers. He is on amiodarone infusion. He is requiring  norepinephrine for support of blood pressure. Oncology is following. Patient has been transfused for anemia . Warfarin stopped due to risks with MDS and possibility of ongoing bleeding. Cardiology is following patient has been on Midodrine tid for hypotension. Patient has Myelodysplastic syndrome has adequate granulocyte count has not required treatment.   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 Patient has sustained V Tach this AM with hypotension. He was cardioverted last night. Patient was cardioverted and heart rate went form 180s to 70s and 80s. Patient remains on amniodarone and levophed. Cardiology is following--[id number] score: 3 (age, HTN); [id number] Vasc score and anticoagulation discussed. High risk for stroke and thromboembolism. Anticoagulation is recommended. On warfarin. Treatment options including cardioversion, rate control strategy, antiarrhythmics, anticoagulation and possible ablation were discussed with patient.  Will regurgitation.   -Estimated pulmonary artery systolic pressure is mildly elevated at 40-45  mmHg assuming a right atrial pressure of 8mmHg.   -The left atrium is dilated. MBS 2/7/20:  FINDINGS:   On the lateral view, severe degenerative changes are noted about the thoracic   spine with reversal of the normal cervical lordosis.       Oral phase of swallowing was grossly within normal limits.       Intermittent episodes of deep penetration were demonstrated with thin and   nectar thick consistency.  Swallows with applesauce, peaches and cracker   demonstrate no penetration or aspiration.           Impression   No aspiration was appreciated.  Intermittent episodes of penetration were   demonstrated as described above. Past Medical History:   has a past medical history of Aortic insufficiency, Atrial fibrillation ([address] Breast nodule, CAD (coronary artery disease), Cardiomyopathy ([address] CHF (congestive heart failure) ([address] Hyperlipidemia, Hypertension, Malignant lymphoma, non-Hodgkin's ([address] Microscopic hematuria, Mitral regurgitation, Nausea & vomiting, Osteoarthritis of knee, Pneumonia, Rotator cuff tear, and Ventricular ectopy. Past Surgical History:   has a past surgical history that includes Inguinal hernia repair; other surgical history (8/2/12); Aortic valve replacement (8/3/2012); Cardiac surgery; Colonoscopy; Endoscopy, colon, diagnostic; Total knee arthroplasty (Left, 12/4/13); Upper gastrointestinal endoscopy (12/24/13); Total knee arthroplasty (Right, 2/3/14); joint replacement; cyst removal; Total shoulder arthroplasty (Right, 2/2/15); lymph node biopsy (Left, 2/3/2020); Axillary Surgery (Left, 2/9/2020); Upper gastrointestinal endoscopy (N/A, 2/10/2020); and sigmoidoscopy (N/A, 2/10/2020).     Advance Directives:      Code status is full, wife is DPOA    Problem Severity: Pain/Other Symptoms:      Patient denies pain    Bed Mobility/Toileting/Transfer:      Wife assists with homemaking    Performance Status:      Patient is weak and deconditioned. Patient is using a walker with PT. He is on oxygen at 2 liters. Symptom Assessment: Appetite/Nausea/Bowels/Fatigue:      Has fatigue. Dietician is following. Patient was started on ONS. Social History:   reports that he quit smoking about 49 years ago. He has a 40.00 pack-year smoking history. He has never used smokeless tobacco. He reports current alcohol use of about 2.0 standard drinks of alcohol per week. He reports that he does not use drugs. Family History:  family history includes Cancer in his brother; Diabetes in his sister; Heart Disease in his brother, sister, and sister; Marfan Syndrome in his brother; Stroke in his sister. Psychological/Spiritual:       Patient is  and lives with his wife. He is Qu[employer]. He has two daughters living locally. Family Discussion:      Met with patient, wife and daughter., Reviewed complicated clinical status. Wife states oncologist discussed life limiting prognosis for T Cell Lymphoma with family, 3 months without treatment, 6 months with treatment. Wife states she discussed this with patient because he asked her. He does acknowledge that he is very weak. He has been discouraged with multiple medical issues and progressive weakness. States he would like to return home. He does live in a two story home. Could potentially get a hospital bed on first floor. Patient has received [place] [employer] from Father Noreen Weiss. He states his Quaker rogelio is very important to him. He states he and his family are trying to take it one day a time. Wife states heart issues are paramount and taking priority at this time. Patient states his goal is to get strong enough to return home. He has completed DPOA and has made his wishes known. Not quite ready for DNR  At this time. Will follow and support. Wife states she is unsure if patient will consider treatment for T Cell lymphoma.

## 2020-02-19 NOTE — PROGRESS NOTES
Bedside report received from off going shift to ensure safe transfer of care. Patient appears to be resting quietly with no needs at this time. Will return for complete assessment.

## 2020-02-19 NOTE — PROGRESS NOTES
Dr Nicole Ness and group here for interdisciplinary rounds. Reviewed chat, examined and discussed patient. Questions answered. Family mentioning that they think he may have sleep apnea. Will monitor for signs of such. Orders to attempt BiPap for sleep in view of fact that the arrhythmias have occurred at night.

## 2020-02-19 NOTE — PROGRESS NOTES
Dr Adriana Ivey here to see patient. Spoke at length with patient and family and updated on seriousness of condition. Including that there is very little that can be done due to the severity of his multiple issues.

## 2020-02-19 NOTE — PROGRESS NOTES
Infusions:   phenylephrine (VICTOR MANUEL-SYNEPHRINE) 50mg/250mL infusion      norepinephrine 0.12 mcg/kg/min (02/19/20 1530)    amiodarone 0.5 mg/min (02/19/20 0538)     PRN Meds:acetaminophen, midodrine, neomycin-bacitracin-polymyxin, ondansetron, oxyCODONE **OR** [DISCONTINUED] oxyCODONE, lidocaine, lip balm petroleum free, promethazine, sodium chloride, sodium chloride flush, prochlorperazine, magnesium hydroxide, diphenhydrAMINE     Patient Active Problem List    Diagnosis Date Noted    Hypotension 11/28/2012     Priority: High    Fever     Malignant lymphoma, non-Hodgkin's (HCC)     Metabolic encephalopathy     Tobacco abuse counseling     Lymphadenopathy     Hypervolemia     Sepsis ([facility] 01/28/2020    Septic shock ([facility]     Atrial fibrillation with RVR ([facility]     Elevated international normalized ratio (INR)     Streptococcal pharyngitis     Leukopenia     Chronic ethmoidal sinusitis     TYRELL (acute kidney injury) ([facility]     Ex-heavy cigarette smoker (20-39 per day)     PAF (paroxysmal atrial fibrillation) ([facility] 09/06/2019    Essential hypertension 09/06/2019    Finger amputation, traumatic, 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 Primary localized osteoarthrosis, pelvic region and thigh 05/04/2015    H/O total shoulder replacement 05/04/2015    Hyponatremia 04/22/2015    Primary localized osteoarthrosis of shoulder region 02/02/2015    Encounter for medication counseling 01/13/2014    Arthritis of knee 12/05/2013    Chronic combined systolic and diastolic CHF (congestive heart failure) ([facility] 03/17/2013    S/P AVR (aortic valve replacement) 09/26/2012    Elevated troponin 07/29/2012    Cardiomyopathy ([address] 07/29/2012    Nonrheumatic aortic valve insufficiency 07/29/2012    Mitral regurgitation 07/29/2012    Microscopic hematuria 07/29/2012      Active Hospital Problems    Diagnosis Date Noted    Hypotension [I95.9] 11/28/2012     Priority: High    Fever [R50.9]     Malignant lymphoma, non-Hodgkin's ([facility] [O76.10]     Metabolic encephalopathy [R01.08]     Tobacco abuse counseling [Z71.6]     Lymphadenopathy [R59.1]     Hypervolemia [E87.70]     Sepsis ([facility] [A41.9] 01/28/2020    Atrial fibrillation with RVR (HCC) [I48.91]     PAF (paroxysmal atrial fibrillation) ([facility] [I48.0] 09/06/2019    Essential hypertension [I10] 09/06/2019    Hypercholesteremia [E78.00] 12/01/2015    Hyponatremia [E87.1] 04/22/2015    Encounter for medication counseling [Z71.89] 01/13/2014    Chronic combined systolic and diastolic CHF (congestive heart failure) ([facility] [I50.42] 03/17/2013    S/P AVR (aortic valve replacement) [Z95.2] 09/26/2012    Cardiomyopathy ([place] [id number] [I42.9] 07/29/2012    Mitral regurgitation [I34.0] 07/29/2012    Elevated troponin [R79.89] 07/29/2012     Echo:      -Left ventricular cavity size is mildly dilated. There is mild concentric   left ventricular hypertrophy.   -Overall left ventricular systolic function appears severely reduced with an   ejection fraction in the 30% range.   -There is severe hypokinesis of the apical wall and moderate hypokinesis of   the inferior walls.   -Indeterminate diastolic function due to irregular heart rhythm. -The mitral valve leaflets appear myxomatous. -Mild to moderate mitral regurgitation.   -A bioprosthetic artificial aortic valve appears well seated with a maximum   velocity of 3.3m/s and a mean gradient of 26mmHg. This suggests at least   moderate aortic stenosis. -Trivial perivalvular aortic regurgitation.   -Mild to moderate tricuspid regurgitation.   -Estimated pulmonary artery systolic pressure is mildly elevated at 40-45   mmHg assuming a right atrial pressure of 8mmHg.   -The left atrium is dilated. Assessment:     -Wide-complex tachycardia   Likely ventricular tachycardia in the setting of severe LV dysfunction. He also has multiple comorbidities including recent diagnosis of lymphoma, ?   Sepsis and heart failure. He has been treated with amiodarone p.o. Amiodarone level   Digoxin level. He also has been treated with mexiletine. Discussed LV dysfunction and VT with patient and his daughter. Hospitalist team has discussed LifeVest with patient. He is not a candidate for permanent AICD implantation at this time since he has not been treated adequately and heart failure and LV dysfunction is new. Keep electrolytes within normal range. Keep potassium more than 4 and magnesium more than 2     - Atrial fibrillation / flutter with RVR:    Now in sinus rhythm. Continue with amiodarone. Coumadin is on hold due to acute blood loss anemia and GI bleeding requiring transfusion. Recommend resuming of Coumadin when is okay with primary team and GI, if possible. Not a candidate for left atrial appendage closure due to recent diagnosis of lymphoma.     - Aortic valve stenosis:    Patient has history of ? From [place] of aortic valve and worsening aortic stenosis. Repeat echocardiography   Gradient across the valve could be falsely low due to LV dysfunction. Will have interventional cardiology to assess the valve stenosis. Coumadin is on hold since he has had acute blood loss anemia and rectal bleeding. He has received a blood transfusion.      -Acute on chronic combined systolic and diastolic heart failure   Initially treated with diuretics with heart failure team   Continue with furosemide. Strict intake and output   Weight daily    -Lymphoma   Recently diagnosed with lymphoma. Follow-up with oncology. I have spent 35 minutes of face to face time with the patient with more than 50% spent counseling and coordinating care for this patient    All questions and concerns were addressed to the patient/family. Alternatives to my treatment were discussed. I have discussed the above stated plan and the patient verbalized understanding and agreed with the plan.     NOTE: This report was transcribed using voice recognition software. Every effort was made to ensure accuracy, however, inadvertent computerized transcription errors may be present. Jagdeep Morales MD, MPH  [place] [id number]   Office: [phone]     Total critical care time was 35 minutes, for caring of this patient with life-threatening condition and organ failure, excluding separately reportable procedures. Services, included in critical care time were chart data review, documentation time, obtaining info from patient, review of nursing notes, and vital sign assessment and management of the patient.

## 2020-02-19 NOTE — PROGRESS NOTES
Speech Language Pathology  Dysphagia Treatment Note    Name:  Latosha Armando  :   1940  Medical Diagnosis:  Sepsis ([address] [A41.9]  Treatment Diagnosis: Oropharyngeal Dysphagia  Pain level:  Pt denies pain at this time    Current Diet Level: Dysphagia III diet with thin liquids/No straws/meds with applesauce    Tolerance of Current Diet Level: Pt underwent 2 cardioversions within the past 2 days. Nurse reports Pt is much weaker this date    Assessment of Texture Tolerance:  -Impressions: Pt sleeping upon my arrival but was easily awakened with verbal cues and with repositioning. Pt's family present for treatment session. Pt accepted limited po trials to assess for po texture tolerance. Clinical symptoms of pharyngeal pooling with delayed swallow initiation and intermittent delayed pharyngeal clearing noted with all textures. Prolonged mastication with delayed oral and pharyngeal clearing noted with solids. No overt signs of aspiration noted with limited po trials. However, delayed coughing noted with Pt's reclined in bed, consistent with penetration/aspiration of pharyngeal residue. Pt/family advised to select softer solid textures until strength improves,    Diet and Treatment Recommendations:  Downgrade to Dysphagia II Minced and Moist with thin liquids/no straws/meds with puree    Plan: Continued daily Dysphagia treatment with goals per plan of care. Patient/Family Education: Education given to the Pt and nurse, who verbalized understanding    Discharge Recommendations:  Pt will benefit from continued skilled Speech Therapy for Dysphagia services, prior to returning home.     Timed Code Treatment: 0 min    Total Treatment Time: 10 min     Diaz Gleason [id number] DAVIDV#[id number]   Speech-Language Pathologist

## 2020-02-19 NOTE — PROGRESS NOTES
Noon assessment done and recorded. No changes to report. Just appears tired and weaker today. Family remains with patient.

## 2020-02-19 NOTE — PLAN OF CARE
nutrition therapy  2/18/2020 2114 by Augie Owens RN  Outcome: Ongoing  2/18/2020 1837 by Damián De Paz RN  Outcome: Ongoing   Patient tolerating diet.

## 2020-02-19 NOTE — PROGRESS NOTES
lymphoma.  -Prognosis was discussed.  -He is not a candidate for traditional chemotherapy.  -Patient's family would like to try some treatments.  -I spoke to Dr. Cesar Yeh and asked him to run CD30 stain on the slides that he is going to get back from [place] [id number].  -If CD30 is positive brentuximab might be a treatment option (Although it would not be an ideal first-line treatment )  -I told patient's family that outcome with brentuximab may not be that great. - Discussed also about best supportive care. -We will start naproxen to 50 mg p.o. twice daily for presumed tumor fever. Spoke to Dr. Ray Hathaway who does not think that he has active infection.    -Anemia appears to be anemia of chronic disease and iron deficiency. Transfuse PRBCs if hemoglobin is less than 7.  - Spent over 30 minutes in patient's care.   Half of which was face-to-face discussion.  -All the questions were answered to family Laquita Renae MD

## 2020-02-19 NOTE — PROGRESS NOTES
Patient assessment completed, no acute changed. Patient remains alert and on 1L of oxygen. Core temp 102.2 at 0115 despite Tylenol. Ice packs applied axiliary and behind head. Core temp now 99.8. Levophed and Amio continues. Kathryn and oral care completed. Bed pad changed and patient repositioned in bed. No further needs at this time. Will continue to monitor.

## 2020-02-19 NOTE — PROGRESS NOTES
Diagnoses of Atrial fibrillation with rapid ventricular response ([facility] [address] Elevated international normalized ratio (INR), Streptococcal pharyngitis, Leukopenia, unspecified type, Chronic ethmoidal sinusitis, TYRELL (acute kidney injury) ([address] chronic elevated troponin, and Arthritis of knee were also pertinent to this visit. has a past medical history of Aortic insufficiency, Atrial fibrillation ([address] Breast nodule, CAD (coronary artery disease), Cardiomyopathy ([address] CHF (congestive heart failure) ([address] Hyperlipidemia, Hypertension, Malignant lymphoma, non-Hodgkin's ([address] Microscopic hematuria, Mitral regurgitation, Nausea & vomiting, Osteoarthritis of knee, Pneumonia, Rotator cuff tear, and Ventricular ectopy. has a past surgical history that includes Inguinal hernia repair; other surgical history (8/2/12); Aortic valve replacement (8/3/2012); Cardiac surgery; Colonoscopy; Endoscopy, colon, diagnostic; Total knee arthroplasty (Left, 12/4/13); Upper gastrointestinal endoscopy (12/24/13); Total knee arthroplasty (Right, 2/3/14); joint replacement; cyst removal; Total shoulder arthroplasty (Right, 2/2/15); lymph node biopsy (Left, 2/3/2020); Axillary Surgery (Left, 2/9/2020); Upper gastrointestinal endoscopy (N/A, 2/10/2020); and sigmoidoscopy (N/A, 2/10/2020). Restrictions  Restrictions/Precautions  Restrictions/Precautions: General Precautions, Fall Risk(HIGH FALL RISK)  Required Braces or Orthoses?: No  Position Activity Restriction  Other position/activity restrictions: Rose Glass is a 70 y.o. male presents to the emergency department by emergency medical services from the office of Dr. Megan Hamilton. Is reported that the patient has not been feeling well for the last couple of days. He has been on antibiotics for sinus-like symptoms as well sore throat pain. He has had febrile illness despite this.   Concerns arose today because the patient had a febrile illness was tachycardic and hypotensive with documented infection positive for streptococcal pharyngitis. Found to have severe sepsis. Concern for lymphoma with L axillary lymph node biopsy performed. Subjective   General  Chart Reviewed: Yes  Response To Previous Treatment: Patient with no complaints from previous session. Family / Caregiver Present: Yes(no family present at beginning of session; daughter and wife arriving at end of session)  Subjective  Subjective: Pt reporting no pain at this time, reports being tired. General Comment  Comments: Pt seated in recliner upon arrival. Agreeable to PT session. Pain Screening  Patient Currently in Pain: Denies  Vital Signs  Patient Currently in Pain: Denies       Orientation  Orientation  Overall Orientation Status: Within Functional Limits     Objective   Bed mobility  Comment: Not addressed, pt seated in chair at beginning and end of session. Transfers  Sit to Stand: Moderate Assistance;Maximum Assistance(Mod A for first stand; Max A for second stand)  Stand to sit: Minimal Assistance; Moderate Assistance(Mod A for first sitting into chair; Min A for second)  Comment: Pt able to position body well for transfers however required increased time to perform transition from sit>stand. Ambulation  Ambulation?: Yes  Ambulation 1  Surface: level tile  Device: Rolling Walker  Other Apparatus: O2(2L)  Assistance: Minimal assistance  Quality of Gait: decreased rayshawn, decreased B step lengths, narrow Cecilio, forward flexed trunk  Distance: 15'+9'  Comments: Pt required SIGNIFICANTLY increased time to perform and mod VC for walker management. Pt with posterior LOB when turning to sit into chair required Mod A to correct and safely sit into recliner. Pt educated on proper walker management and safe placement of walker when preparing to sit down from ambulatory tasks.    Stairs/Curb  Stairs?: No     Balance  Posture: Fair  Sitting - Static: Good  Sitting - Dynamic: Good  Standing - Static: Fair(with RW)  Standing -

## 2020-02-19 NOTE — CARE COORDINATION
Chart reviewed for discharge planning. Barrier(s) to discharge-  Cardioverted this morning at bedside. Temp 102.2 overnight    Tentative discharge plan-  Home vs SNF    Tentative discharge date-  TBD    Remains in ICU receiving Amio gtt, Levophed gtt, IV Aztreonam & IV Vanco.      Case management will continue to follow progress and update discharge plan as needed.     Trang Shepard RN BSN  Case Management

## 2020-02-19 NOTE — PROGRESS NOTES
Shift assessment completed. See complex assessment in doc flowsheet. Patient has Amio and Levophed infusing. Pt is alert x4, able to follow commands, and assist in care. NSR on the monitor. Lungs clear but diminshed and pt on 2L of oxygen changed to 1.5L . Abd is soft with +bs x4 quadrants. Hall is patent and draining yellow urine. Pt denies pain. Pt declines be repositioned for comfort. Patient educated on importance of repositioning for skin integrity. Bed in lowest position, wheels locked, and alarm active. Updated pt and wife on POC and all questions answered. No needs expressed. Call light within reach.

## 2020-02-19 NOTE — PROGRESS NOTES
Wife and daughters asking for palliative care. Ulisses Segura notified and will be here to see patient.

## 2020-02-19 NOTE — PROGRESS NOTES
ARTHROPLASTY Left 13    TOTAL KNEE ARTHROPLASTY Right 2/3/14    RIGHT TOTAL KNEE REPLACEMENT-BAR       UPPER GASTROINTESTINAL ENDOSCOPY  13    UPPER GASTROINTESTINAL ENDOSCOPY N/A 2/10/2020    EGD BIOPSY performed by Kenya Guevara MD at [address] [place] [address]     Current Medications:    Current Facility-Administered Medications: 0.9 % sodium chloride bolus, 500 mL, Intravenous, Once  [] amiodarone (CORDARONE) 450 mg in sodium chloride 0.9 % 250 mL infusion, 1 mg/min, Intravenous, Continuous **FOLLOWED BY** amiodarone (CORDARONE) 450 mg in sodium chloride 0.9 % 250 mL infusion, 0.5 mg/min, Intravenous, Continuous  norepinephrine (LEVOPHED) 16 mg in dextrose 5% 250 mL infusion, 5 mcg/min, Intravenous, Continuous  aztreonam (AZACTAM) 1 g IVPB minibag, 1 g, Intravenous, Q8H  vancomycin 1000 mg IVPB in 250 mL D5W addavial, 1,000 mg, Intravenous, Q12H  mexiletine (MEXITIL) capsule 200 mg, 200 mg, Oral, 3 times per day  acetaminophen (TYLENOL) tablet 650 mg, 650 mg, Oral, Q4H PRN  magic (miracle) mouthwash with nystatin, 5 mL, Swish & Spit, 4x Daily  docosanol (ABREVA) 10 % cream, , Topical, 5x Daily  furosemide (LASIX) tablet 20 mg, 20 mg, Oral, Every Other Day  polyethylene glycol (GLYCOLAX) packet 17 g, 17 g, Oral, BID  0.9 % sodium chloride bolus, 20 mL, Intravenous, Once  pantoprazole (PROTONIX) tablet 40 mg, 40 mg, Oral, QAM AC  midodrine (PROAMATINE) tablet 5 mg, 5 mg, Oral, TID PRN  midodrine (PROAMATINE) tablet 10 mg, 10 mg, Oral, TID WC  amiodarone (CORDARONE) tablet 200 mg, 200 mg, Oral, Daily  neomycin-bacitracin-polymyxin (NEOSPORIN) ointment, , Topical, BID PRN  digoxin (LANOXIN) tablet 125 mcg, 125 mcg, Oral, Daily  ondansetron (ZOFRAN) injection 4 mg, 4 mg, Intravenous, Q6H PRN  oxyCODONE (ROXICODONE) immediate release tablet 5 mg, 5 mg, Oral, Q4H PRN **OR** [DISCONTINUED] oxyCODONE (ROXICODONE) immediate release tablet 10 mg, 10 mg, Oral, Q4H PRN  lidocaine 4 % external patch 1 patch, 1 patch, Transdermal, Daily PRN  lip balm petroleum free (PHYTOPLEX) stick, , Topical, PRN  promethazine (PHENERGAN) injection 12.5 mg, 12.5 mg, Intravenous, Q6H PRN  sodium chloride (OCEAN, BABY AYR) 0.65 % nasal spray 1 spray, 1 spray, Each Nostril, PRN  sodium chloride flush 0.9 % injection 10 mL, 10 mL, Intravenous, 2 times per day  sodium chloride flush 0.9 % injection 10 mL, 10 mL, Intravenous, PRN  prochlorperazine (COMPAZINE) injection 10 mg, 10 mg, Intravenous, Q6H PRN  lactobacillus (CULTURELLE) capsule 1 capsule, 1 capsule, Oral, Daily with breakfast  aspirin chewable tablet 81 mg, 81 mg, Oral, Daily  magnesium hydroxide (MILK OF MAGNESIA) 400 MG/5ML suspension 30 mL, 30 mL, Oral, Daily PRN  atorvastatin (LIPITOR) tablet 10 mg, 10 mg, Oral, Daily  diphenhydrAMINE (BENADRYL) injection 25 mg, 25 mg, Intravenous, Q6H PRN    Allergies   Allergen Reactions    Clindamycin/Lincomycin Rash    Levaquin [Levofloxacin]      Rash, swollen neck    Cefdinir Rash       Social History:    TOBACCO:   reports that he quit smoking about 49 years ago. He has a 40.00 pack-year smoking history. He has never used smokeless tobacco.  ETOH:   reports current alcohol use of about 2.0 standard drinks of alcohol per week. Patient currently lives independently  Environmental/chemical exposure: None known    Family History:       Problem Relation Age of Onset    Marfan Syndrome Brother     Heart Disease Brother     Stroke Sister     Diabetes Sister     Heart Disease Sister     Heart Disease Sister     Cancer Brother      REVIEW OF SYSTEMS:    CONSTITUTIONAL:  negative for fevers, chills, diaphoresis, activity change, appetite change, fatigue, night sweats and unexpected weight change.    EYES:  negative for blurred vision, eye discharge, visual disturbance and icterus  HEENT:  negative for hearing loss, tinnitus, ear drainage, sinus pressure, nasal congestion, epistaxis and snoring  RESPIRATORY:  See HPI  CARDIOVASCULAR: obvious misalignment or effusion of the joints. No clubbing, cyanosis of the digits. SKIN:  normal skin color, texture, turgor and no redness, warmth, or swelling. No palpable nodules    DATA:    Old records have been reviewed    CBC:  Recent Labs     02/17/20  0505 02/18/20  0445 02/19/20  0515   WBC 3.3* 4.6 4.6   RBC 2.77* 2.84* 2.73*   HGB 7.6* 7.8* 7.6*   HCT 23.2* 23.8* 23.0*   * 107* 158   MCV 83.9 83.7 84.3   MCH 27.6 27.3 27.9   MCHC 32.9 32.6 33.1   RDW 19.3* 19.5* 19.3*      BMP:  Recent Labs     02/17/20  0505 02/18/20  0445 02/19/20  0515    134* 134*   K 4.0 3.4* 4.3   CL 96* 95* 95*   CO2 29 27 28   BUN 40* 45* 47*   CREATININE 1.2 1.2 1.2   CALCIUM 8.3 8.0* 7.7*   GLUCOSE 107* 133* 177*      ABG:  No results for input(s): PHART, LEV3AOL, PO2ART, SCY3AMC, Y2YNSTLO, BEART in the last 72 hours. Lab Results   Component Value Date    BNP 44 01/13/2014     Lab Results   Component Value Date    CKTOTAL 168 07/28/2012    TROPONINI 0.04 (H) 01/28/2020       Cultures:     Abx:    Radiology Review:  All pertinent images / reports were reviewed as a part of this visit. Assessment:     1. SVT versus VT  · Remains on amiodarone infusion  · Had another episode required cardioversion  · Presently pulse and blood pressure are acceptable  · Does still require norepinephrine  · Cardiology is following    2. Sepsis? · Lactate is normal  · WBC is normal but may not be a good gauge in him  · Procalcitonin is elevated at 1.11  · I reviewed chest imaging  · Chest x-ray is stable with no acute infiltrate. · No new culture data  · ID is following and has adjusted antibiotics  · Remains febrile    3. 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  PITA  · Family relates witnessed apneas  · Potentially, could be a triggering event for his VT which is happened tonight both times  · Start him on empiric BiPAP therapy  · Continue to monitor saturations as we have been doing

## 2020-02-19 NOTE — PROGRESS NOTES
At approx. 0589 patient rhythm changed to V-Tach and pressure dropped to SBP 70s. Patient advised to bear down with no relief. Page sent to hospitalist to come to bedside. MD Nurys Chavez to bedside. 4mg Versed given at [facility]. Synchronized cardioversion at . Nelly Serrano [address] at [address]. Patient HR Vtach 180s to NSR 70-80s. Pressure now stable with increase in levophed (Map >65), will wean as tolerated.

## 2020-02-19 NOTE — PROGRESS NOTES
Patient assessment completed, see doc flow sheets. Weaning levophed as tolerated. Tylenol given for core temp 101.8. Oxygen changed to 1L. Patient repositioned in bed. Patient denies any pain or further needs. Patients wife remains at bedside. Will continue to monitor.

## 2020-02-19 NOTE — PROGRESS NOTES
Clindamycin/Lincomycin Rash    Levaquin [Levofloxacin]      Rash, swollen neck    Cefdinir Rash       Social history:     Social History:  All social andepidemiologic history was reviewed and updated by me today as needed. · Tobacco use:   reports that he quit smoking about 49 years ago. He has a 40.00 pack-year smoking history. He has never used smokeless tobacco.  · Alcohol use:   reports current alcohol use of about 2.0 standard drinks of alcohol per week. · Currently lives in: [address] [place] [address] Road  ·  reports no history of drug use. Assessment:     The patient is a 70 y.o. old male who  has a past medical history of Aortic insufficiency, Atrial fibrillation ([facility] [address] Breast nodule (8/2012  right), CAD (coronary artery disease), Cardiomyopathy ([facility] [address] (8/2012), CHF (congestive heart failure) ([facility] [address] Hyperlipidemia, Hypertension, Malignant lymphoma, non-Hodgkin's ([facility] [address] Microscopic hematuria (7/29/2012), Mitral regurgitation, Nausea & vomiting (7/29/2012), Osteoarthritis of knee, Pneumonia, Rotator cuff tear (7/2/2013), and Ventricular ectopy (7/29/2012). with following problems:    · New fever and hypotension, septic shock possible-tumor fever seems more likely than infectious cause  · New metabolic encephalopathy-improving  · Angioimmunoblastic T-cell lymphoma, biopsy-proven  · Atrial fibrillation with rapid ventricular response  · Has a bioprosthetic aortic valve-no endocarditis on last 2D echo  · Essential hypertension, now hypotensive-getting IV fluids and vasopressors  · Mixed hyperlipidemia  · Multiple antibiotic allergies  · Ex-heavy smoker      Discussion:      The patient was moved to the medical ICU yesterday due to high fevers and hypotension. He had also become more encephalopathic. T-max was 102.2 at around 1:15 AM today. Repeat blood cultures have been sent yesterday. I have started the patient on empiric IV vancomycin.       IV aztreonam was started for empiric gram-negative coverage osteoarthrosis, 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    H/O total shoulder replacement Z96.619    Hypercholesteremia E78.00    Finger amputation, traumatic, 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    PAF (paroxysmal atrial fibrillation) ([facility]) I48.0    Essential hypertension I10    Sepsis ([facility] [address] A41.9    Septic shock ([facility]) A41.9, R65.21    Atrial fibrillation with rapid ventricular response ([facility]) I48.91    Elevated international normalized ratio (INR) R79.1    Streptococcal pharyngitis J02.0    Leukopenia D72.819    Chronic ethmoidal sinusitis J32.2    TYRELL (acute kidney injury) ([facility] [address] N17.9    Ex-heavy cigarette smoker (20-39 per day) Z87.891    Tobacco abuse counseling Z71.6    Lymphadenopathy R59.1    Hypervolemia E87.70    Fever R50.9    Malignant lymphoma, non-Hodgkin's ([facility]) S73.97    Metabolic encephalopathy J14.29       Please note that this chart was generated using Dragon dictation software. Although every effort was made to ensure the accuracy of this automated transcription, some errors in transcription may have occurred inadvertently. If you may need any clarification, please do not hesitate to contact me through EPIC or at the phone number provided below with my electronic signature. Any pictures or media included in this note were obtained after taking informed verbal consent from the patient and with their approval to include those in the patient's medical record.     Gabby Fernando MD, MPH  2/19/2020 , 1:46 PM   [facility] Infectious Disease   Office: [phone]  Fax: [phone]  Tuesday AM clinic:   [address] [place] [address] 120  Thursday AM [id number]

## 2020-02-20 NOTE — PLAN OF CARE
Problem: OXYGENATION/RESPIRATORY FUNCTION  Goal: Patient will achieve/maintain normal respiratory rate/effort  Description  Respiratory rate and effort will be within normal limits for the patient  Outcome: Ongoing     Problem: HEMODYNAMIC STATUS  Goal: Patient has stable vital signs and fluid balance  Outcome: Ongoing     Problem: FLUID AND ELECTROLYTE IMBALANCE  Goal: Fluid and electrolyte balance are achieved/maintained  Outcome: Ongoing     Problem: ACTIVITY INTOLERANCE/IMPAIRED MOBILITY  Goal: Mobility/activity is maintained at optimum level for patient  Outcome: Ongoing     Problem: Infection:  Goal: Will remain free from infection  Description  Will remain free from infection  Outcome: Ongoing     Problem: Safety:  Goal: Free from accidental physical injury  Description  Free from accidental physical injury  Outcome: Ongoing  Goal: Free from intentional harm  Description  Free from intentional harm  Outcome: Ongoing     Problem: Daily Care:  Goal: Daily care needs are met  Description  Daily care needs are met  Outcome: Ongoing     Problem: Skin Integrity:  Goal: Skin integrity will stabilize  Description  Skin integrity will stabilize  Outcome: Ongoing     Problem: Discharge Planning:  Goal: Patients continuum of care needs are met  Description  Patients continuum of care needs are met  Outcome: Ongoing     Problem: Falls - Risk of:  Goal: Will remain free from falls  Description  Will remain free from falls  Outcome: Ongoing  Goal: Absence of physical injury  Description  Absence of physical injury  Outcome: Ongoing     Problem: Risk for Impaired Skin Integrity  Goal: Tissue integrity - skin and mucous membranes  Description  Structural intactness and normal physiological function of skin and  mucous membranes.   Outcome: Ongoing     Problem: Musculor/Skeletal Functional Status  Goal: Highest potential functional level  Outcome: Ongoing  Goal: Absence of falls  Outcome: Ongoing     Problem: Nutrition  Goal: Optimal nutrition therapy  Outcome: Ongoing     Problem: KNOWLEDGE DEFICIT  Goal: Patient/S.O. demonstrates understanding of disease process, treatment plan, medications, and discharge instructions.   Outcome: Ongoing     Problem: DISCHARGE BARRIERS  Goal: Patient's continuum of care needs are met  Outcome: Ongoing

## 2020-02-20 NOTE — PROGRESS NOTES
Speech Language Pathology    Attempted to see patient for dysphagia therapy. Pt is currently having testing done in room. Will attempt to follow-up as schedule allows. 1420 - Second attempt to see patient, Pt currently working with PT. Will attempt to follow-up as schedule allows.     Rene Floyd M.A., [address] [place] [address]  Speech-Language Pathologist

## 2020-02-20 NOTE — PROGRESS NOTES
mg, Daily  diphenhydrAMINE (BENADRYL) injection 25 mg, Q6H PRN        Objective:  BP 85/63   Pulse 80   Temp 99.3 °F (37.4 °C) (Temporal)   Resp 20   Ht 6' 1\" (1.854 m)   Wt 177 lb 11.1 oz (80.6 kg)   SpO2 100%   BMI 23.44 kg/m²     Intake/Output Summary (Last 24 hours) at 2/20/2020 1746  Last data filed at 2/20/2020 1452  Gross per 24 hour   Intake 2209 ml   Output 970 ml   Net 1239 ml      Wt Readings from Last 3 Encounters:   02/20/20 177 lb 11.1 oz (80.6 kg)   01/28/20 178 lb (80.7 kg)   01/23/20 180 lb 6.4 oz (81.8 kg)       Conscious alert oriented  HEENT: + Pallor   Neck: Supple. No lymphadenopathy  Lungs:  Respiratory efforts normal.  Abdomen: Not distended  Neuro: No focal deficits. Skin: No Rash Petechiae        Labs and Tests:  CBC:   Recent Labs     02/18/20  0445 02/19/20  0515 02/20/20  0505   WBC 4.6 4.6 3.1*   HGB 7.8* 7.6* 7.3*   * 158 125*     BMP:    Recent Labs     02/18/20  0445 02/19/20  0515 02/20/20  0505   * 134* 134*   K 3.4* 4.3 4.0   CL 95* 95* 96*   CO2 27 28 28   BUN 45* 47* 46*   CREATININE 1.2 1.2 1.2   GLUCOSE 133* 177* 148*     Hepatic: No results for input(s): AST, ALT, ALB, BILITOT, ALKPHOS in the last 72 hours. Lab Results   Component Value Date    INR 1.39 (H) 02/19/2020    INR 1.20 (H) 02/13/2020    INR 1.19 (H) 02/12/2020    PROTIME 16.2 (H) 02/19/2020    PROTIME 14.0 (H) 02/13/2020    PROTIME 13.8 (H) 02/12/2020       ASSESSMENT AND PLAN    1. Angioimmunoblastic T-cell lymphoma:    -This is a new diagnosis. -At least stage III  -    -Poor performance status 3-4.  -Ejection fraction is 30%.     - We had lengthy discussion again.  -Patient's family wants him to try some treatment.  -I again emphasized that he cannot get standard anthracycline based treatment for T-cell lymphoma due to cardiomyopathy.  -We again discussed different treatment options  -Offered transfer to the [facility] and blood cancer center.  -Offered treatment with gemcitabine based regimen (told him that this is not a standard approach )  -Patient's family wanted to consider clinical trials at other institutions like [facility]. I told them that due to poor performance status he would not be eligible for clinical trial and I doubt if there would be any clinical trials for T-cell lymphoma.  -Patient's family is not going to consider best supportive care at this time and they want to pursue treatments.  -They will let me know about their decision about getting transferred to the [facility] versus getting treatment at [address] [place] [address] stains will be reviewed only next week after slides come back from [place] [address]. It is extremely unlikely that he has CD30 positive T-cell lymphoma. I have discussed this with Dr. Aranza Nguyen. started naproxen to 50 mg p.o. twice daily for presumed tumor fever.      -Anemia appears to be anemia of chronic disease and iron deficiency. Transfuse PRBCs if hemoglobin is less than 7.       Marylen Bryant, MD

## 2020-02-20 NOTE — PROGRESS NOTES
appears intact, No Gross deficit   · Skin: Skin is warm and dry. No rash noted. · Psychiatric: Has a normal behavior     Labs, diagnostic and imaging results reviewed. Reviewed. Recent Labs     02/18/20 0445 02/19/20  0515 02/20/20  0505   * 134* 134*   K 3.4* 4.3 4.0   CL 95* 95* 96*   CO2 27 28 28   BUN 45* 47* 46*   CREATININE 1.2 1.2 1.2     Recent Labs     02/18/20 0445 02/19/20  0515 02/20/20  0505   WBC 4.6 4.6 3.1*   HGB 7.8* 7.6* 7.3*   HCT 23.8* 23.0* 22.4*   MCV 83.7 84.3 83.9   * 158 125*     Lab Results   Component Value Date    CKTOTAL 168 07/28/2012    TROPONINI 0.04 01/28/2020     Estimated Creatinine Clearance: 58 mL/min (based on SCr of 1.2 mg/dL).    Lab Results   Component Value Date    BNP 44 01/13/2014    BNP 59 06/12/2013     11/28/2012     Lab Results   Component Value Date    PROTIME 16.2 02/19/2020    PROTIME 14.0 02/13/2020    PROTIME 13.8 02/12/2020    PROTIME 25.7 01/08/2016    PROTIME 45.1 10/16/2015    PROTIME 32.8 08/07/2015    INR 1.39 02/19/2020    INR 1.20 02/13/2020    INR 1.19 02/12/2020     Lab Results   Component Value Date    CHOL 98 11/07/2019    HDL 38 11/07/2019    TRIG 70 11/07/2019       Scheduled Meds:   naproxen  250 mg Oral BID WC    sodium chloride  500 mL Intravenous Once    aztreonam  1 g Intravenous Q8H    vancomycin  1,000 mg Intravenous Q12H    mexiletine  200 mg Oral 3 times per day    magic (miracle) mouthwash with nystatin  5 mL Swish & Spit 4x Daily    docosanol   Topical 5x Daily    furosemide  20 mg Oral Every Other Day    polyethylene glycol  17 g Oral BID    sodium chloride  20 mL Intravenous Once    pantoprazole  40 mg Oral QAM AC    midodrine  10 mg Oral TID WC    amiodarone  200 mg Oral Daily    digoxin  125 mcg Oral Daily    sodium chloride flush  10 mL Intravenous 2 times per day    lactobacillus  1 capsule Oral Daily with breakfast    aspirin  81 mg Oral Daily    atorvastatin  10 mg Oral Daily Continuous Infusions:   norepinephrine 0.11 mcg/kg/min (02/20/20 1142)    amiodarone 0.5 mg/min (02/20/20 0552)     PRN Meds:sodium chloride flush, acetaminophen, midodrine, neomycin-bacitracin-polymyxin, ondansetron, oxyCODONE **OR** [DISCONTINUED] oxyCODONE, lidocaine, lip balm petroleum free, promethazine, sodium chloride, sodium chloride flush, prochlorperazine, magnesium hydroxide, diphenhydrAMINE     Patient Active Problem List    Diagnosis Date Noted    Hypotension 11/28/2012     Priority: High    Fever     Malignant lymphoma, non-Hodgkin's (HCC)     Metabolic encephalopathy     Tobacco abuse counseling     Lymphadenopathy     Hypervolemia     Sepsis ([facility] 01/28/2020    Septic shock (HCC)     Atrial fibrillation with RVR ([facility]     Elevated international normalized ratio (INR)     Streptococcal pharyngitis     Leukopenia     Chronic ethmoidal sinusitis     TYRELL (acute kidney injury) ([facility]     Ex-heavy cigarette smoker (20-39 per day)     PAF (paroxysmal atrial fibrillation) ([facility] 09/06/2019    Essential hypertension 09/06/2019    Finger amputation, traumatic, 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 Primary localized osteoarthrosis, pelvic region and thigh 05/04/2015    H/O total shoulder replacement 05/04/2015    Hyponatremia 04/22/2015    Primary localized osteoarthrosis of shoulder region 02/02/2015    Encounter for medication counseling 01/13/2014    Arthritis of knee 12/05/2013    Chronic combined systolic and diastolic CHF (congestive heart failure) ([facility] 03/17/2013    S/P AVR (aortic valve replacement) 09/26/2012    Elevated troponin 07/29/2012    Cardiomyopathy ([address] 07/29/2012    Nonrheumatic aortic valve insufficiency 07/29/2012    Mitral regurgitation 07/29/2012    Microscopic hematuria 07/29/2012      Active Hospital Problems    Diagnosis Date Noted    Hypotension [I95.9] 11/28/2012     Priority: High    Fever [R50.9]     Malignant

## 2020-02-20 NOTE — PROGRESS NOTES
Reassessment completed and unchanged. Pt tolerating up in chair. Pt continues on low dose Levophed. Shari Lin RN, BSN, CCRN.

## 2020-02-20 NOTE — PROGRESS NOTES
Physical Therapy  Facility/Department: [facility] ICU  Daily Treatment Note  NAME: Jenaro Vila  : 1940  MRN: [id number]    Date of Service: 2020    Discharge Recommendations: Jenaro Vila scored a 17/24 on the AM-PAC short mobility form. Current research shows that an AM-PAC score of 17 or less is typically not associated with a discharge to the patient's home setting. Based on the patients AM-PAC score and their current functional mobility deficits, it is recommended that the patient have 3-5 sessions per week of Physical Therapy at d/c to increase the patients independence. PT Equipment Recommendations  Equipment Needed: No    Assessment   Body structures, Functions, Activity limitations: Decreased functional mobility ; Decreased strength;Decreased endurance;Decreased balance  Assessment: Pt able to perform transfers and ambulation with decreased assist this date. Pt also able to increase ambulation distance this session however required frequent rest breaks and with 2 episodes of LOB. Pt would benefit from continued skilled PT services to address deficits. Treatment Diagnosis: decreased functional mobility, impaired gait, decreased endurance  Prognosis: Good  Decision Making: Medium Complexity  Clinical Presentation: evolving  PT Education: Goals; Home Exercise Program;PT Role;Plan of Care;General Safety;Gait Training;Family Education; Functional Mobility Training;Transfer Training  Patient Education: Educated pt and family on exercises to perform while seated and in bed, pt and family verbalized understanding. Barriers to Learning: none  REQUIRES PT FOLLOW UP: Yes  Activity Tolerance  Activity Tolerance: Patient limited by fatigue;Patient limited by endurance  Activity Tolerance: pt required frequent rest breaks     Patient Diagnosis(es): The primary encounter diagnosis was Septic shock ([facility]  Diagnoses of Atrial fibrillation with rapid ventricular response ([address] Elevated international normalized ratio (INR), Streptococcal pharyngitis, Leukopenia, unspecified type, Chronic ethmoidal sinusitis, TYRELL (acute kidney injury) ([address] chronic elevated troponin, and Arthritis of knee were also pertinent to this visit. has a past medical history of Aortic insufficiency, Atrial fibrillation ([address] Breast nodule, CAD (coronary artery disease), Cardiomyopathy ([address] CHF (congestive heart failure) ([address] Hyperlipidemia, Hypertension, Malignant lymphoma, non-Hodgkin's ([address] Microscopic hematuria, Mitral regurgitation, Nausea & vomiting, Osteoarthritis of knee, Pneumonia, Rotator cuff tear, and Ventricular ectopy. has a past surgical history that includes Inguinal hernia repair; other surgical history (8/2/12); Aortic valve replacement (8/3/2012); Cardiac surgery; Colonoscopy; Endoscopy, colon, diagnostic; Total knee arthroplasty (Left, 12/4/13); Upper gastrointestinal endoscopy (12/24/13); Total knee arthroplasty (Right, 2/3/14); joint replacement; cyst removal; Total shoulder arthroplasty (Right, 2/2/15); lymph node biopsy (Left, 2/3/2020); Axillary Surgery (Left, 2/9/2020); Upper gastrointestinal endoscopy (N/A, 2/10/2020); and sigmoidoscopy (N/A, 2/10/2020). Restrictions  Restrictions/Precautions  Restrictions/Precautions: General Precautions, Fall Risk(HIGH FALL RISK)  Required Braces or Orthoses?: No  Position Activity Restriction  Other position/activity restrictions: Latosha Armando is a 70 y.o. male presents to the emergency department by emergency medical services from the office of Dr. Alejandra Milian. Is reported that the patient has not been feeling well for the last couple of days. He has been on antibiotics for sinus-like symptoms as well sore throat pain. He has had febrile illness despite this. Concerns arose today because the patient had a febrile illness was tachycardic and hypotensive with documented infection positive for streptococcal pharyngitis. Found to have severe sepsis.  Concern for lymphoma with L axillary lymph node biopsy performed. Subjective   General  Chart Reviewed: Yes  Response To Previous Treatment: Patient with no complaints from previous session. Family / Caregiver Present: Yes(two daughters present throughout session)  Subjective  Subjective: Pt reporting no pain at this time. Agreeable to PT session. General Comment  Comments: Pt supine in bed upon arrival.   Pain Screening  Patient Currently in Pain: Denies  Vital Signs  Patient Currently in Pain: Denies       Orientation  Orientation  Overall Orientation Status: Within Functional Limits      Objective   Bed mobility  Supine to Sit: Contact guard assistance(VC for hand placement on bedrail to assist with mobility)  Scooting: Minimal assistance(required increased time to perform )  Transfers  Sit to Stand: Minimal Assistance(x1 EOB; x2 recliner)  Comment: VC for body positioning and hand placement when sitting down into recliner from RW. Ambulation  Ambulation?: Yes  Ambulation 1  Surface: level tile  Device: Rolling Walker  Assistance: Contact guard assistance;Minimal assistance(Min A>>>CGA)  Quality of Gait: decreased rayshawn, decreased B step lengths, narrow Cecilio, varying deviation from path intermittently  Distance: 100'+90'+80'  Comments: Pt required increased time to perform and VC to stay within boundaries of walker. 2 episodes of slight LOB towards L requiring Min A to correct. Pt required increased time and VC when performing 180 degree turn with RW. 2 seated rest breaks required.    Stairs/Curb  Stairs?: No     Balance  Posture: Fair  Sitting - Static: Good  Sitting - Dynamic: Good  Standing - Static: Fair(with RW)  Standing - Dynamic: Fair(with RW)         G-Code     OutComes Score       AM-PAC Score  AM-PAC Inpatient Mobility Raw Score : 17 (02/20/20 1518)  AM-PAC Inpatient T-Scale Score : 42.13 (02/20/20 1518)  Mobility Inpatient CMS 0-100% Score: 50.57 (02/20/20 1518)  Mobility Inpatient CMS G-Code Modifier : CK (02/20/20 8165)          Goals  Short term goals  Time Frame for Short term goals: To be met prior to discharge  Short term goal 1: Bed mobility with supervision  Short term goal 2: Sit to/from stand with supervision  Short term goal 3: Ambulate 200 feet with RW and supervision  Short term goal 4: Navigate up/down 1 flight of steps with rail and supervision  Short term goal 5: Navigate up/down 2 steps without rail and AAD and SBA  Patient Goals   Patient goals : To go home  NO GOALS MET THIS DATE    Plan    Plan  Times per week: 3-5  Times per day: Daily  Current Treatment Recommendations: Strengthening, Balance Training, Transfer Training, Gait Training, Stair training, Home Exercise Program, Safety Education & Training, Endurance Training  Safety Devices  Type of devices:  All fall risk precautions in place, Gait belt, Nurse notified(Pt left seated at [facility] with OT and RN present)  Restraints  Initially in place: No     Therapy Time   Individual Concurrent Group Co-treatment   Time In 1420         Time Out 1445         Minutes 25              Timed Code Treatment Minutes:  25  Total Treatment Minutes:  [address], [address], [address] [facility]

## 2020-02-20 NOTE — PROGRESS NOTES
Report received from [facility]. Shift assessment completed- see doc flow sheet for details. Pt remains on Amiodarone @ 0.5 mg/min, Levophed @ 0.13 mcg/kg/min. Remains in NSR. Lungs clear and diminished. A/o X 4. No complaints of pain or SOB. Generalized +3 pitting edema noted. Family at bedside and updated. Medications passed- see MAR. Repositioned in bed for comfort. Will continue to monitor. Jennifer Mariee RN, BSN, CCRN.

## 2020-02-20 NOTE — PROGRESS NOTES
GASTROINTESTINAL ENDOSCOPY  13    UPPER GASTROINTESTINAL ENDOSCOPY N/A 2/10/2020    EGD BIOPSY performed by Karl Childs MD at [address] [place] [address]     Current Medications:    Current Facility-Administered Medications: sodium chloride flush 0.9 % injection 10 mL, 10 mL, Intravenous, PRN  phenylephrine (VICTOR MANUEL-SYNEPHRINE) 50 mg in dextrose 5 % 250 mL infusion, 100 mcg/min, Intravenous, Continuous  norepinephrine (LEVOPHED) 16 mg in dextrose 5% 250 mL infusion, 0.01 mcg/kg/min, Intravenous, Continuous  naproxen (NAPROSYN) tablet 250 mg, 250 mg, Oral, BID WC  0.9 % sodium chloride bolus, 500 mL, Intravenous, Once  [] amiodarone (CORDARONE) 450 mg in sodium chloride 0.9 % 250 mL infusion, 1 mg/min, Intravenous, Continuous **FOLLOWED BY** amiodarone (CORDARONE) 450 mg in sodium chloride 0.9 % 250 mL infusion, 0.5 mg/min, Intravenous, Continuous  aztreonam (AZACTAM) 1 g IVPB minibag, 1 g, Intravenous, Q8H  vancomycin 1000 mg IVPB in 250 mL D5W addavial, 1,000 mg, Intravenous, Q12H  mexiletine (MEXITIL) capsule 200 mg, 200 mg, Oral, 3 times per day  acetaminophen (TYLENOL) tablet 650 mg, 650 mg, Oral, Q4H PRN  magic (miracle) mouthwash with nystatin, 5 mL, Swish & Spit, 4x Daily  docosanol (ABREVA) 10 % cream, , Topical, 5x Daily  furosemide (LASIX) tablet 20 mg, 20 mg, Oral, Every Other Day  polyethylene glycol (GLYCOLAX) packet 17 g, 17 g, Oral, BID  0.9 % sodium chloride bolus, 20 mL, Intravenous, Once  pantoprazole (PROTONIX) tablet 40 mg, 40 mg, Oral, QAM AC  midodrine (PROAMATINE) tablet 5 mg, 5 mg, Oral, TID PRN  midodrine (PROAMATINE) tablet 10 mg, 10 mg, Oral, TID WC  amiodarone (CORDARONE) tablet 200 mg, 200 mg, Oral, Daily  neomycin-bacitracin-polymyxin (NEOSPORIN) ointment, , Topical, BID PRN  digoxin (LANOXIN) tablet 125 mcg, 125 mcg, Oral, Daily  ondansetron (ZOFRAN) injection 4 mg, 4 mg, Intravenous, Q6H PRN  oxyCODONE (ROXICODONE) immediate release tablet 5 mg, 5 mg, Oral, Q4H PRN **OR** [DISCONTINUED] oxyCODONE (ROXICODONE) immediate release tablet 10 mg, 10 mg, Oral, Q4H PRN  lidocaine 4 % external patch 1 patch, 1 patch, Transdermal, Daily PRN  lip balm petroleum free (PHYTOPLEX) stick, , Topical, PRN  promethazine (PHENERGAN) injection 12.5 mg, 12.5 mg, Intravenous, Q6H PRN  sodium chloride (OCEAN, BABY AYR) 0.65 % nasal spray 1 spray, 1 spray, Each Nostril, PRN  sodium chloride flush 0.9 % injection 10 mL, 10 mL, Intravenous, 2 times per day  sodium chloride flush 0.9 % injection 10 mL, 10 mL, Intravenous, PRN  prochlorperazine (COMPAZINE) injection 10 mg, 10 mg, Intravenous, Q6H PRN  lactobacillus (CULTURELLE) capsule 1 capsule, 1 capsule, Oral, Daily with breakfast  aspirin chewable tablet 81 mg, 81 mg, Oral, Daily  magnesium hydroxide (MILK OF MAGNESIA) 400 MG/5ML suspension 30 mL, 30 mL, Oral, Daily PRN  atorvastatin (LIPITOR) tablet 10 mg, 10 mg, Oral, Daily  diphenhydrAMINE (BENADRYL) injection 25 mg, 25 mg, Intravenous, Q6H PRN    Allergies   Allergen Reactions    Clindamycin/Lincomycin Rash    Levaquin [Levofloxacin]      Rash, swollen neck    Cefdinir Rash       Social History:    TOBACCO:   reports that he quit smoking about 49 years ago. He has a 40.00 pack-year smoking history. He has never used smokeless tobacco.  ETOH:   reports current alcohol use of about 2.0 standard drinks of alcohol per week. Patient currently lives independently  Environmental/chemical exposure: None known    Family History:       Problem Relation Age of Onset    Marfan Syndrome Brother     Heart Disease Brother     Stroke Sister     Diabetes Sister     Heart Disease Sister     Heart Disease Sister     Cancer Brother      REVIEW OF SYSTEMS:    CONSTITUTIONAL:  negative for fevers, chills, diaphoresis, activity change, appetite change, fatigue, night sweats and unexpected weight change.    EYES:  negative for blurred vision, eye discharge, visual disturbance and icterus  HEENT:  negative for hearing

## 2020-02-20 NOTE — PROGRESS NOTES
Physical/Occupational Therapy  Lucretia Muller Schirmer  Attempted PT/OT treatment this date. Pt currently receiving procedure in room. Will re-attempt treatment session as schedule allows.    [id number] [facility] PT, DPT [address] [place] [address], OTR/L [id number]

## 2020-02-20 NOTE — PROGRESS NOTES
Occupational Therapy  Facility/Department: [facility] ICU  Daily Treatment Note  NAME: Franci Pham  : 1940  MRN: [id number]    Date of Service: 2020    Discharge Recommendations:  Franci Pham scored a 13/24 on the AM-PAC ADL Inpatient form. Current research shows that an AM-PAC score of 17 or less is typically not associated with a discharge to the patient's home setting. Based on the patients AM-PAC score and their current ADL deficits, it is recommended that the patient have 3-5 sessions per week of Occupational Therapy at d/c to increase the patients independence. OT Equipment Recommendations  Equipment Needed: No  Other: pt and family report that pt can stay on main level if d/c'd home, has half bath on main level, has BSC, and raised toilet seat, continue to assess needs at next level of care    Assessment   Performance deficits / Impairments: Decreased high-level IADLs;Decreased ADL status; Decreased endurance;Decreased balance;Decreased strength;Decreased fine motor control  Assessment: pt making progress with skilled OT services, CGA for sit>stand and stand step transfers this date. pt continues to require assist for ADL toileting.  pt would benefit from ongoing skilled OT services in order to return to PLOF   Treatment Diagnosis: Decreased mobility, ADL status, ADL transfers, high level IADL's, endurance, FM control, strength and balance associated with Sepsis  Prognosis: Good  History: independent at baseline, lives with spouse   Assistance / Modification: CGA this date  OT Education: OT Role;Plan of Care;Orientation;Precautions;Transfer Training;Equipment  Patient Education: OT role, plan of care, transfer training, ADL adaptive strategies, UE joint ROM for edema and stiffness, pt demo'd independent understanding this date  REQUIRES OT FOLLOW UP: Yes  Activity Tolerance  Activity Tolerance: Patient Tolerated treatment well;Patient limited by fatigue  Safety Devices  Safety Devices Contact guard assistance(recliner>BSC>recliner )  Sit to stand: Contact guard assistance  Stand to sit: Contact guard assistance  Transfer Comments: increased time for transfers this date, cued to scoot to edge of recliner prior to sit to stand                        Cognition  Overall Cognitive Status: Exceptions  Arousal/Alertness: Delayed responses to stimuli  Following Commands:  Follows one step commands with increased time  Cognition Comment: flat affect during session, pt fatigued from PT session prior to OT arrival      Perception  Overall Perceptual Status: [address] [facility] [address]  Times per week: 3-5x/week  Times per day: Daily  Current Treatment Recommendations: Strengthening, Gait Training, Safety Education & Training, Balance Training, Patient/Caregiver Education & Training, Self-Care / ADL, Functional Mobility Training, Equipment Evaluation, Education, & procurement, Endurance Training  AM-PAC Score        AM-PAC Inpatient Daily Activity Raw Score: 13 (02/20/20 1536)  AM-PAC Inpatient ADL T-Scale Score : 32.03 (02/20/20 1536)  ADL Inpatient CMS 0-100% Score: 63.03 (02/20/20 1536)  ADL Inpatient CMS G-Code Modifier : CL (02/20/20 1536)    Goals  Short term goals  Time Frame for Short term goals: Discharge  Short term goal 1: Supervision toileting - total assist 2/20  Short term goal 2: Supervision ADL transfers to/from bed, chair and toilet - CGA 2/20  Short term goal 3: Supervision functional mobility using RW or AAD for ADL's/functional activity - did not perform functional mobility this date, fatigued from PT session 2/20  Short term goal 4: Supervision UB/LB ADL's - ongoing 2/20  Short term goal 5: Supervision bed mobility - did not assess this date 2/20  Long term goals  Time Frame for Long term goals : LTG=STG       Therapy Time   Individual Concurrent Group Co-treatment   Time In 1513         Time Out 1530         Minutes 17           Timed Code Treatment Minutes:  17 Minutes    Total Treatment

## 2020-02-20 NOTE — PROGRESS NOTES
Allergen Reactions    Clindamycin/Lincomycin Rash    Levaquin [Levofloxacin]      Rash, swollen neck    Cefdinir Rash       Social history:     Social History:  All social andepidemiologic history was reviewed and updated by me today as needed. · Tobacco use:   reports that he quit smoking about 49 years ago. He has a 40.00 pack-year smoking history. He has never used smokeless tobacco.  · Alcohol use:   reports current alcohol use of about 2.0 standard drinks of alcohol per week. · Currently lives in: [address] [place] [address] Road  ·  reports no history of drug use. Assessment:     The patient is a 70 y.o. old male who  has a past medical history of Aortic insufficiency, Atrial fibrillation ([facility] [address] Breast nodule (8/2012  right), CAD (coronary artery disease), Cardiomyopathy ([facility] [address] (8/2012), CHF (congestive heart failure) ([facility] [address] Hyperlipidemia, Hypertension, Malignant lymphoma, non-Hodgkin's ([facility] [address] Microscopic hematuria (7/29/2012), Mitral regurgitation, Nausea & vomiting (7/29/2012), Osteoarthritis of knee, Pneumonia, Rotator cuff tear (7/2/2013), and Ventricular ectopy (7/29/2012). with following problems:    · New fever and hypotension-likely tumor fever, fever responding to naproxen  · New metabolic encephalopathy-improving  · Angioimmunoblastic T-cell lymphoma, biopsy-proven  · Atrial fibrillation with rapid ventricular response-rate being controlled  · Has a bioprosthetic aortic valve-no endocarditis on last 2D echo  · Essential hypertension, now hypotensive--slowly improving  · Mixed hyperlipidemia  · Multiple antibiotic allergies  · Ex-heavy smoker      Discussion:      The patient is on empiric IV vancomycin and IV aztreonam.  Seems to be tolerating antibiotics okay. The fever curve has trended downwards and is 99.8 today.     1 set of blood culture from 2/16/2020 was positive for coagulase-negative spinal coccus, which is likely contaminant from the skin    Serum creatinine is 1.2    Plan: Pulse: 77 77 79 81   Resp: 20 20 23 20   Temp:       TempSrc:       SpO2:    100%   Weight:       Height:         Physical Exam  Vitals signs and nursing note reviewed. Constitutional:       Appearance: Normal appearance. He is well-developed. Comments: Appears tired. HENT:      Head: Normocephalic and atraumatic. Right Ear: External ear normal.      Left Ear: External ear normal.      Nose: Nose normal. No congestion or rhinorrhea. Mouth/Throat:      Mouth: Mucous membranes are moist.      Pharynx: No oropharyngeal exudate or posterior oropharyngeal erythema. Eyes:      General: No scleral icterus. Right eye: No discharge. Left eye: No discharge. Conjunctiva/sclera: Conjunctivae normal.      Pupils: Pupils are equal, round, and reactive to light. Neck:      Musculoskeletal: Normal range of motion and neck supple. No neck rigidity or muscular tenderness. Cardiovascular:      Rate and Rhythm: Normal rate and regular rhythm. Pulses: Normal pulses. Heart sounds: No murmur. No friction rub. Pulmonary:      Effort: Pulmonary effort is normal. No respiratory distress. Breath sounds: Normal breath sounds. No stridor. No wheezing, rhonchi or rales. Abdominal:      General: Bowel sounds are normal.      Palpations: Abdomen is soft. Tenderness: There is no abdominal tenderness. There is no right CVA tenderness, left CVA tenderness, guarding or rebound. Musculoskeletal: Normal range of motion. General: No swelling or tenderness. Lymphadenopathy:      Cervical: No cervical adenopathy. Skin:     General: Skin is warm and dry. Coloration: Skin is not jaundiced. Findings: No erythema or rash. Neurological:      General: No focal deficit present. Mental Status: He is alert and oriented to person, place, and time. Mental status is at baseline. Motor: No abnormal muscle tone.    Psychiatric:         Mood and Affect: Mood normal. Behavior: Behavior normal.         Thought Content: Thought content normal.           Lines: All vascular access sites are healthy with no local erythema, discharge or tenderness    Intake and output:    I/O last 3 completed shifts: In: 1809 [P.O.:400; I.V.:1359; IV Piggyback:50]  Out: 1 [Urine:970]    Lab Data:   All available labs and old records have been reviewed by me. CBC:  Recent Labs     02/18/20 0445 02/19/20  0515 02/20/20  0505   WBC 4.6 4.6 3.1*   RBC 2.84* 2.73* 2.67*   HGB 7.8* 7.6* 7.3*   HCT 23.8* 23.0* 22.4*   * 158 125*   MCV 83.7 84.3 83.9   MCH 27.3 27.9 27.4   MCHC 32.6 33.1 32.7   RDW 19.5* 19.3* 19.2*        BMP:  Recent Labs     02/18/20 0445 02/19/20  0515 02/20/20  0505   * 134* 134*   K 3.4* 4.3 4.0   CL 95* 95* 96*   CO2 27 28 28   BUN 45* 47* 46*   CREATININE 1.2 1.2 1.2   CALCIUM 8.0* 7.7* 7.5*   GLUCOSE 133* 177* 148*        Hepatic Function Panel:   Lab Results   Component Value Date    ALKPHOS 60 01/31/2020    ALT 7 01/31/2020    AST 8 01/31/2020    PROT 5.3 01/31/2020    PROT 6.2 11/28/2012    BILITOT 0.4 01/31/2020    BILIDIR <0.2 10/17/2016    IBILI see below 10/17/2016    LABALBU 2.5 02/12/2020       CPK:   Lab Results   Component Value Date    CKTOTAL 168 07/28/2012     ESR:   Lab Results   Component Value Date    SEDRATE 75 (H) 11/30/2012     CRP: No results found for: CRP        Imaging: All pertinent images and reports for the current visit were reviewed by me during this visit. XR CHEST PORTABLE   Final Result   Stable chest over the past 2 days with small bilateral pleural effusions. No   evidence of acute cardiopulmonary process otherwise. XR CHEST PORTABLE   Final Result   Findings compatible with small pleural effusions. No consolidation or   evidence for edema.       Findings corresponding to mediastinal lymphadenopathy, better demonstrated on   recent CT exam.         Fluoroscopy modified barium swallow with video   Final Result   No aspiration was appreciated. Intermittent episodes of penetration were   demonstrated as described above. Please see separate speech pathology report for full discussion of findings   and recommendations. CT ABDOMEN PELVIS WO CONTRAST Additional Contrast? Oral (via NGT)   Final Result   Increased retroperitoneal adenopathy compared to 2013 raising the question of   underlying lymphoma. Mild body wall anasarca and trace pelvic fluid suggesting fluid overload. Nonobstructing right renal stone         XR ABDOMEN FOR NG/OG/NE TUBE PLACEMENT   Final Result   The tip of the nasogastric tube is in good position. XR ABDOMEN (KUB) (SINGLE AP VIEW)   Final Result   Mild to moderate gaseous distention of small bowel in the mid abdomen,   compatible with ileus versus less likely obstruction. Consider further   evaluation with small bowel follow-through to better evaluate bowel motility. Calcifications within the right mid abdomen, likely representing renal   calculi as described on previous examinations. CT Chest WO Contrast   Final Result   1. Extensive lymphadenopathy as well as suspected mild splenomegaly,   suspicious for lymphoproliferative disorder. New areas of nodular soft   tissue density in the left breast may represent associated intramammary lymph   nodes but could also represent subcutaneous involvement. 2. Trace bilateral pleural effusions with bilateral lower lobe passive   atelectasis. 3. Mild cardiomegaly status post aortic valve replacement. XR CHEST PORTABLE   Final Result   Stable cardiomegaly. No acute cardiac or pulmonary disease evident. XR CHEST PORTABLE   Final Result   1. Streaky bibasilar opacities likely reflect subsegmental atelectasis versus   scarring rather than an infectious/inflammatory process. 2. The visible lungs are without pneumothorax, pleural effusion or new focal   airspace opacity.    3. 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 [address] A41.9    Septic shock (HCC) A41.9, R65.21    Atrial fibrillation with RVR ([facility] [address] I48.91    Elevated international normalized ratio (INR) R79.1    Streptococcal pharyngitis J02.0    Leukopenia D72.819    Chronic ethmoidal sinusitis J32.2    TYRELL (acute kidney injury) ([facility] [address] N17.9    Ex-heavy cigarette smoker (20-39 per day) Z87.891    Tobacco abuse counseling Z71.6    Lymphadenopathy R59.1    Hypervolemia E87.70    Fever R50.9    Malignant lymphoma, non-Hodgkin's (HCC) V87.31    Metabolic encephalopathy D10.95       Please note that this chart was generated using Dragon dictation software. Although every effort was made to ensure the accuracy of this automated transcription, some errors in transcription may have occurred inadvertently. If you may need any clarification, please do not hesitate to contact me through EPIC or at the phone number provided below with my electronic signature. Any pictures or media included in this note were obtained after taking informed verbal consent from the patient and with their approval to include those in the patient's medical record.     Luis Alberto Carrillo MD, MPH  2/20/2020 , 3:20 PM   [facility] Infectious Disease   Office: [phone]  Fax: [phone]  Tuesday AM clinic:   [address] [address], [facility] [address]  Thursday AM [id number] [facility]

## 2020-02-20 NOTE — PROGRESS NOTES
Dr. Salo Avery at bedside and updated- see new orders. To wean Levophed gtt. Pt repositioned to chair with walker assistance. Will continue to monitor. Stella Cabrera RN, BSN, CCRN.

## 2020-02-20 NOTE — PROGRESS NOTES
etiology - empiric Azactam/Vanco 2/18; weaned off Levo; ID consult pending  -strep thoat swab positive. Sinusitis noted.   -had allergic reaction to levaquin. -  Finished doxy course. -cxr and ua neg. Blood cx NTD       2. Hodgkin's lymphoma vs T cell lymphoma.:  Status post groin lymph node biopsy as well as left axillary node biopsy.  Results are Equivocal.  Awaiting second opinion. Per heme/onc; fever could be r/t lymphoma        3. A. fib with RVR; with episode of sustained VTach s/p dc cardioversion 2/17 and 2/19; possible LifeVest at d/c  -Amio GTT and Dig,   -consider changing Levophed to phenylephrine  - Supratherapeutic INR  resolved. -stopped  Coumadin  given GI bleed and need for transfusions; resume when ok with GI     4. Acute blood loss anemia and anemia of chronic disease  From MDS and rectal ulcerations;  had total of 3 units now.  Had BRBPR; Showed patchy gastritis and flexible sigmoidoscopy showed shallow rectal ulcerations, mild internal hemorrhoids.  Sigmoid diverticulosis.  Bx unremarkable.  Started on PPI and MiraLAX.  Continue to monitor hemoglobin closely; Coumadin held      5. Rash-Secondary Levaquin.  Resolved.  Off steroids       6. Chronic combined CHF-volume overload from all IV fluids he received. - diuresed with iv lasix. Now on po lasix every other day. Dced aldactone as bp is very low.   Given IV albumin. Cont lasix every other day     7. Dysphagia: Speech eval noted.  On modified diet.  MBS showed no mally aspiration.  Continue dysphagia diet.     Continue to monitor.     8. Hypotension:  Cont midodrine 10 mg tid.            Diet: DIET DYSPHAGIA SOFT AND BITE-SIZED;  No Drinking Straw  Dietary Nutrition Supplements: Standard High Calorie Oral Supplement, Clear Liquid Oral Supplement, Frozen Oral Supplement  Code:Prior  DVT PPX-  SCD's  PT/OT - [facility]  With [facility] vs SNF            Braxton Barone DO

## 2020-02-20 NOTE — PROGRESS NOTES
Dr. Manju Ramirez at bedside and updated. To give AM dose of PO Amiodarone and stop gtt. Pt ambulated 1 lap in hallway with RN and PT. Repositioned up to chair. Weaning Levophed as tolerates. Dianna White RN, BSN, CCRN.

## 2020-02-20 NOTE — PROGRESS NOTES
Pt assessment completed and documented- see doc flowsheet. Pt alert and oriented on NC. Levo and amio gtt infusing. VSS, medications given per MAR. Updated on POC. White board updated. Family at bedside, pt denies further needs at this time. Will continue to monitor.

## 2020-02-20 NOTE — PROGRESS NOTES
Nutrition Assessment (Low Risk)    Type and Reason for Visit: Reassess    Nutrition Recommendations:   No new nutrition related recommendations at this time    Nutrition Assessment:   Pt's nutrition status is unchanged. Pt continues to consume at least 50% of meals and 100% of supplements. Deemed to be low risk at this time. Will continue to monitor for changes in status.   Malnutrition Assessment:  · Malnutrition Status: No malnutrition    Nutrition Risk Level   Risk Level: Low    Nutrition Diagnosis:   · Problem: No nutrition diagnosis at this time    Nutrition Intervention:  Food and/or Delivery: Continue current diet, Continue current ONS  Nutrition Education/Counseling/Coordination of Care:  Continued Inpatient Monitoring, Education Not Indicated      Electronically signed by Rosa Mullen RD, LD on 2/20/20 at 8:39 AM    Contact Number: [phone]

## 2020-02-21 PROBLEM — C85.90 LYMPHOMA (HCC): Status: ACTIVE | Noted: 2020-01-01

## 2020-02-21 NOTE — PROGRESS NOTES
[facility]  Progress note      Reason for Consult: Hypotension, rapid A. fib  Requesting Physician: Dr. Chelle Collins:   Navjot Vasquez / HPI:                The patient is a 70 y.o. male with significant past medical history of:      Diagnosis Date    Aortic insufficiency     Atrial fibrillation ([facility] [address]     cardioversion 8/10/12    Breast nodule 8/2012  right    excision-lumpectomy 8/2012    CAD (coronary artery disease)     Cardiomyopathy ([facility] [address] 8/2012    EF 20 %    CHF (congestive heart failure) ([facility] [address]     Hyperlipidemia     Hypertension     Malignant lymphoma, non-Hodgkin's ([facility] [address]     Microscopic hematuria 7/29/2012    Mitral regurgitation     Nausea & vomiting 7/29/2012    Osteoarthritis of knee     bilateral knees    Pneumonia     Rotator cuff tear 7/2/2013    Ventricular ectopy 7/29/2012     Interval history: He is alert today. Tolerating room air. No acute distress.      Past Surgical History:        Procedure Laterality Date    AORTIC VALVE REPLACEMENT  8/3/2012    moderate mitral regurg    AXILLARY SURGERY Left 2/9/2020    AXILLARY LYMPH NODE BIOPSY DISSECTION performed by Lelia Laurent MD at [address] CYST REMOVAL      chest    ENDOSCOPY, COLON, DIAGNOSTIC      [facility]PA[facility]      [facility] [address]      both    LYMPH NODE BIOPSY Left 2/3/2020    INGUINAL LYMPH NODE BIOPSY EXCISION performed by Lelia Laurent MD at [address] [facility]  8/2/12    excision right breast mass (lumpectomy)    SHOULDER ARTHROPLASTY Right 2/2/15    right reverse total shoulder arthroplasty    SIGMOIDOSCOPY N/A 2/10/2020    SIGMOIDOSCOPY BIOPSY FLEXIBLE performed by Chandler Sue MD at [address] Left 12/4/13    TOTAL KNEE ARTHROPLASTY Right 2/3/14    RIGHT TOTAL KNEE REPLACEMENT-BAR       UPPER GASTROINTESTINAL ENDOSCOPY  12/24/13    UPPER GASTROINTESTINAL ENDOSCOPY N/A 2/10/2020    EGD BIOPSY performed by Dani Cote MD at [address] [place] [address]     Current Medications:    Current Facility-Administered Medications: [START ON 2/22/2020] vancomycin (VANCOCIN) 1,500 mg in dextrose 5 % 250 mL IVPB, 1,500 mg, Intravenous, Q24H  amiodarone (CORDARONE) tablet 400 mg, 400 mg, Oral, Daily  sodium chloride flush 0.9 % injection 10 mL, 10 mL, Intravenous, PRN  norepinephrine (LEVOPHED) 16 mg in dextrose 5% 250 mL infusion, 0.01 mcg/kg/min, Intravenous, Continuous  naproxen (NAPROSYN) tablet 250 mg, 250 mg, Oral, BID WC  0.9 % sodium chloride bolus, 500 mL, Intravenous, Once  aztreonam (AZACTAM) 1 g IVPB minibag, 1 g, Intravenous, Q8H  mexiletine (MEXITIL) capsule 200 mg, 200 mg, Oral, 3 times per day  acetaminophen (TYLENOL) tablet 650 mg, 650 mg, Oral, Q4H PRN  magic (miracle) mouthwash with nystatin, 5 mL, Swish & Spit, 4x Daily  docosanol (ABREVA) 10 % cream, , Topical, 5x Daily  furosemide (LASIX) tablet 20 mg, 20 mg, Oral, Every Other Day  polyethylene glycol (GLYCOLAX) packet 17 g, 17 g, Oral, BID  0.9 % sodium chloride bolus, 20 mL, Intravenous, Once  pantoprazole (PROTONIX) tablet 40 mg, 40 mg, Oral, QAM AC  midodrine (PROAMATINE) tablet 5 mg, 5 mg, Oral, TID PRN  midodrine (PROAMATINE) tablet 10 mg, 10 mg, Oral, TID WC  neomycin-bacitracin-polymyxin (NEOSPORIN) ointment, , Topical, BID PRN  digoxin (LANOXIN) tablet 125 mcg, 125 mcg, Oral, Daily  ondansetron (ZOFRAN) injection 4 mg, 4 mg, Intravenous, Q6H PRN  oxyCODONE (ROXICODONE) immediate release tablet 5 mg, 5 mg, Oral, Q4H PRN **OR** [DISCONTINUED] oxyCODONE (ROXICODONE) immediate release tablet 10 mg, 10 mg, Oral, Q4H PRN  lidocaine 4 % external patch 1 patch, 1 patch, Transdermal, Daily PRN  lip balm petroleum free (PHYTOPLEX) stick, , Topical, PRN  promethazine (PHENERGAN) injection 12.5 mg, 12.5 mg, Intravenous, Q6H PRN  sodium chloride (OCEAN, BABY AYR) 0.65 % nasal spray 1 spray, 1 spray, Each Nostril, PRN  sodium incontinence, decreased urine volume, and hematuria  HEMATOLOGIC/LYMPHATIC:  negative for easy bruising, bleeding and lymphadenopathy  ALLERGIC/IMMUNOLOGIC:  negative for recurrent infections, angioedema, anaphylaxis and drug reactions  ENDOCRINE:  negative for weight changes and diabetic symptoms including polyuria, polydipsia and polyphagia  MUSCULOSKELETAL:  negative for  pain, joint swelling, decreased range of motion and muscle weakness  NEUROLOGICAL:  negative for headaches, slurred speech, unilateral weakness  PSYCHIATRIC/BEHAVIORAL: negative for hallucinations, behavioral problems, confusion and agitation. Objective:   PHYSICAL EXAM:      VITALS:  /62   Pulse 77   Temp 97.7 °F (36.5 °C) (Temporal)   Resp 21   Ht 6' 1\" (1.854 m)   Wt 184 lb 15.5 oz (83.9 kg)   SpO2 99%   BMI 24.40 kg/m²      24HR INTAKE/OUTPUT:      Intake/Output Summary (Last 24 hours) at 2/21/2020 1145  Last data filed at 2/21/2020 2130  Gross per 24 hour   Intake 2086 ml   Output 900 ml   Net 1186 ml     CONSTITUTIONAL:  awake, alert, cooperative, no apparent distress, and appears stated age  NECK:  Supple, symmetrical, trachea midline, no adenopathy, thyroid symmetric, not enlarged and no tenderness, skin normal  LUNGS:  no increased work of breathing and clear to auscultation. No accessory muscle use  CARDIOVASCULAR: S1 and S2, no edema and no JVD  ABDOMEN:  normal bowel sounds, non-distended and no masses palpated, and no tenderness to palpation. No hepatospleenomegaly  LYMPHADENOPATHY:  no axillary or supraclavicular adenopathy. No cervical adnenopathy  PSYCHIATRIC: Oriented to person place and time. No obvious depression or anxiety. MUSCULOSKELETAL: No obvious misalignment or effusion of the joints. No clubbing, cyanosis of the digits. SKIN:  normal skin color, texture, turgor and no redness, warmth, or swelling.  No palpable nodules    DATA:    Old records have been reviewed    CBC:  Recent Labs     02/19/20  0515 02/20/20  0505 02/21/20  0410 02/21/20  0510   WBC 4.6 3.1* 3.2*  --    RBC 2.73* 2.67* 2.40*  --    HGB 7.6* 7.3* 6.5* 6.7*   HCT 23.0* 22.4* 20.1* 20.2*    125* 122*  --    MCV 84.3 83.9 83.7  --    MCH 27.9 27.4 27.3  --    MCHC 33.1 32.7 32.6  --    RDW 19.3* 19.2* 18.9*  --       BMP:  Recent Labs     02/19/20  0515 02/20/20  0505 02/21/20  0410   * 134* 133*   K 4.3 4.0 4.6   CL 95* 96* 95*   CO2 28 28 28   BUN 47* 46* 54*   CREATININE 1.2 1.2 1.4*   CALCIUM 7.7* 7.5* 7.2*   GLUCOSE 177* 148* 133*      ABG:  No results for input(s): PHART, KFU9JIE, PO2ART, NND1VSW, E0TXGUCA, BEART in the last 72 hours. Lab Results   Component Value Date    BNP 44 01/13/2014     Lab Results   Component Value Date    CKTOTAL 168 07/28/2012    TROPONINI 0.04 (H) 01/28/2020       Cultures:     Abx:    Radiology Review:  All pertinent images / reports were reviewed as a part of this visit. Assessment:     1. SVT versus VT  · No dysrhythmia over the last 2 nights  · Continue BiPAP at night    2. Sepsis? · Temps are down  · Went back on norepinephrine last night but does not look like he needs it  · Try to wean off using systolic blood pressure of 85 to titrate toward    3. Lymphadenopathy  · Looks like T-cell lymphoma  · Discussed with oncology  · Not a good candidate for aggressive chemotherapy  · Family is weighing their options    4. PITA  · Did tolerate BiPAP last night  · Set at 12/6  · Continue BiPAP with sleep.

## 2020-02-21 NOTE — DISCHARGE SUMMARY
[facility]    Patient Demographics    Patient. Meeta Urias  Date of Birth. 1940  MRN. [id number]     Primary care provider. Layo Aguiar MD  (Tel: [phone])    Admit date: 1/28/2020    Discharge date (blank if same as Note Date): Note Date: 2/21/2020     Reason for Hospitalization. Chief Complaint   Patient presents with    Sinusitis     pt arrived via squad. sore throat and sinus infection for a couple of months. pts doctor concerned for septic, low BP and low immune system. Significant Findings. Active Problems:    Hypotension    Elevated troponin    Cardiomyopathy ([facility])    Mitral regurgitation    S/P AVR (aortic valve replacement)    Chronic combined systolic and diastolic CHF (congestive heart failure) ([facility])    Encounter for medication counseling    Hyponatremia    Hypercholesteremia    PAF (paroxysmal atrial fibrillation) ([facility])    Essential hypertension    Sepsis ([address]    Atrial fibrillation with RVR ([facility])    Tobacco abuse counseling    Lymphadenopathy    Hypervolemia    Fever    Malignant lymphoma, non-Hodgkin's ([facility])    Metabolic encephalopathy  Resolved Problems:    * No resolved hospital problems. *       Problems and results from this hospitalization that need follow up. 1. None     Significant test results and incidental findings. XR CHEST PORTABLE   Final Result   Stable chest over the past 2 days with small bilateral pleural effusions. No   evidence of acute cardiopulmonary process otherwise. XR CHEST PORTABLE   Final Result   Findings compatible with small pleural effusions. No consolidation or   evidence for edema. Findings corresponding to mediastinal lymphadenopathy, better demonstrated on   recent CT exam.         Fluoroscopy modified barium swallow with video   Final Result   No aspiration was appreciated. Intermittent episodes of penetration were   demonstrated as described above. Please see separate speech pathology report for full discussion of findings   and recommendations. CT ABDOMEN PELVIS WO CONTRAST Additional Contrast? Oral (via NGT)   Final Result   Increased retroperitoneal adenopathy compared to 2013 raising the question of   underlying lymphoma. Mild body wall anasarca and trace pelvic fluid suggesting fluid overload. Nonobstructing right renal stone         XR ABDOMEN FOR NG/OG/NE TUBE PLACEMENT   Final Result   The tip of the nasogastric tube is in good position. XR ABDOMEN (KUB) (SINGLE AP VIEW)   Final Result   Mild to moderate gaseous distention of small bowel in the mid abdomen,   compatible with ileus versus less likely obstruction. Consider further   evaluation with small bowel follow-through to better evaluate bowel motility. Calcifications within the right mid abdomen, likely representing renal   calculi as described on previous examinations. CT Chest WO Contrast   Final Result   1. Extensive lymphadenopathy as well as suspected mild splenomegaly,   suspicious for lymphoproliferative disorder. New areas of nodular soft   tissue density in the left breast may represent associated intramammary lymph   nodes but could also represent subcutaneous involvement. 2. Trace bilateral pleural effusions with bilateral lower lobe passive   atelectasis. 3. Mild cardiomegaly status post aortic valve replacement. XR CHEST PORTABLE   Final Result   Stable cardiomegaly. No acute cardiac or pulmonary disease evident. XR CHEST PORTABLE   Final Result   1. Streaky bibasilar opacities likely reflect subsegmental atelectasis versus   scarring rather than an infectious/inflammatory process. 2. The visible lungs are without pneumothorax, pleural effusion or new focal   airspace opacity. 3. Stable enlargement of the cardiopericardial silhouette. CT Head WO Contrast   Final Result   No acute intracranial abnormality. DISEASES  IP CONSULT TO HOSPITALIST  IP CONSULT TO CARDIOLOGY  IP CONSULT TO CRITICAL CARE  IP CONSULT TO ONCOLOGY  IP CONSULT TO GENERAL SURGERY  IP CONSULT TO GI  IP CONSULT TO INFECTIOUS DISEASES  IP CONSULT TO CARDIOLOGY  IP CONSULT TO PALLIATIVE CARE    Physical examination on discharge day. /62   Pulse 77   Temp 97.7 °F (36.5 °C) (Temporal)   Resp 21   Ht 6' 1\" (1.854 m)   Wt 184 lb 15.5 oz (83.9 kg)   SpO2 99%   BMI 24.40 kg/m²   General appearance. Alert. Looks comfortable. HEENT. Sclera clear. Moist mucus membranes. Cardiovascular. Regular rate and rhythm, normal S1, S2. No murmur. Respiratory. Not using accessory muscles. Clear to auscultation bilaterally, no wheeze. Gastrointestinal. Abdomen soft, non-tender, not distended, normal bowel sounds  Neurology. Facial symmetry. No speech deficits. Moving all extremities equally. Extremities. No edema in lower extremities. Skin. Warm, dry, normal turgor        Condition at time of discharge stable    Medication instructions provided to patient at discharge.      Medication List      START taking these medications    amiodarone 400 MG tablet  Commonly known as:  PACERONE  Take 1 tablet by mouth daily  Start taking on:  February 22, 2020     digoxin 125 MCG tablet  Commonly known as:  LANOXIN  Take 1 tablet by mouth daily  Start taking on:  February 22, 2020     diphenhydrAMINE 50 MG/ML injection  Commonly known as:  BENADRYL  Infuse 0.5 mLs intravenously every 6 hours as needed for Itching     furosemide 20 MG tablet  Commonly known as:  LASIX  Take 1 tablet by mouth every other day  Start taking on:  February 22, 2020     lactobacillus capsule  Take 1 capsule by mouth daily (with breakfast)  Start taking on:  February 22, 2020     lidocaine 4 % external patch  Place 1 patch onto the skin daily as needed (as needed for pain)     mexiletine 200 MG capsule  Commonly known as:  MEXITIL  Take 1 capsule by mouth every 8 hours     naproxen 250 MG tablet  Commonly known as:  NAPROSYN  Take 1 tablet by mouth 2 times daily (with meals)     neomycin-bacitracin-polymyxin 400-5-5000 ointment  Commonly known as:  NEOSPORIN  Apply topically 2 times daily. oxyCODONE 5 MG immediate release tablet  Commonly known as:  ROXICODONE  Take 1 tablet by mouth every 4 hours as needed for Pain for up to 3 days. pantoprazole 40 MG tablet  Commonly known as:  PROTONIX  Take 1 tablet by mouth every morning (before breakfast)  Start taking on:  February 22, 2020     polyethylene glycol packet  Commonly known as:  GLYCOLAX  Take 17 g by mouth 2 times daily     prochlorperazine 10 MG/2ML Soln injection  Commonly known as:  COMPAZINE  Infuse 2 mLs intravenously every 6 hours as needed (Vomiting)     promethazine 25 MG/ML injection  Commonly known as:  PHENERGAN  Infuse 0.5 mLs intravenously every 6 hours as needed (Vomiting)     sodium chloride 0.65 % nasal spray  Commonly known as:  OCEAN, BABY AYR  1 spray by Nasal route as needed for Congestion        CHANGE how you take these medications    midodrine 10 MG tablet  Commonly known as:  PROAMATINE  Take 1 tablet by mouth 3 times daily (with meals)  What changed:    · medication strength  · how much to take  · when to take this        CONTINUE taking these medications    aspirin 81 MG tablet     atorvastatin 10 MG tablet  Commonly known as:  LIPITOR  TAKE 1 TABLET BY MOUTH ONE TIME A DAY        STOP taking these medications    amoxicillin-clavulanate 875-125 MG per tablet  Commonly known as:   Augmentin     hydroCHLOROthiazide 25 MG tablet  Commonly known as:  HYDRODIURIL     lisinopril 2.5 MG tablet  Commonly known as:  PRINIVIL;ZESTRIL     metoprolol succinate 25 MG extended release tablet  Commonly known as:  TOPROL XL     ondansetron 4 MG disintegrating tablet  Commonly known as:  ZOFRAN-ODT     Probiotic Acidophilus Tabs     spironolactone 25 MG tablet  Commonly known as:  ALDACTONE     vitamin C 500 MG tablet  Commonly

## 2020-02-21 NOTE — PROGRESS NOTES
1 unit of PRBC now infusing. No s/s of reactions noted. Will stay with patient for first 15mins. Patient requesting bipap to be removed. Now on room air. Will continue to monitor.

## 2020-02-21 NOTE — PLAN OF CARE
Problem: FLUID AND ELECTROLYTE IMBALANCE  Goal: Fluid and electrolyte balance are maintained. Outcome: Ongoing     Problem: ACTIVITY INTOLERANCE/IMPAIRED MOBILITY  Goal: Mobility/activity is maintained at optimum level for patient. Bed is maintained in lower position. Sides rail (2) up. Patient will use call light for help. Outcome: Ongoing     Problem: Infection:  Goal: Will remain free from infection. Description  Will remain free from infection  Outcome: Ongoing     Problem: Daily Care:  Goal: Daily care needs are met. Arm bands will remain on. Hourly visual check and call light will be within reach. Description  Daily care needs are met  Outcome: Ongoing     Problem: Skin Integrity:  Goal: Skin integrity will stabilize. Patient will be clean, dry, change, and reposition [id number]. Description  Skin integrity will stabilize  Outcome: Ongoing     Problem: Falls - Risk of:  Goal: Will remain free from falls. Bed will remain in lower position, locked and call light within reach.    Description  Will remain free from falls  Outcome: Ongoing

## 2020-02-21 NOTE — PROGRESS NOTES
Page to hospitalist as follows. New order to redraw H/H received. Received panic from lab for hemoglobin 6.5 and HCT 20.1. No signs of active bleeding. Labs have been trending down. Please advise of any new orders. Thanks!

## 2020-02-21 NOTE — PROGRESS NOTES
Infectious Diseases   Progress Note      Admission Date: 1/28/2020  Hospital Day: Hospital Day: 25   Attending: Светлана Rojo MD  Date of service: 2/21/2020     Chief complaint/ Reason for consult: The patient was seen today for the following:    · New fever and hypotension  · Angioimmunoblastic T-cell lymphoma, biopsy-proven  · Atrial fibrillation with rapid ventricular response  · Has a bioprosthetic aortic valve-no endocarditis on last 2D echo    Microbiology:        I have reviewed allavailable micro lab data and cultures    · Blood culture (2/2) - collected on 1/28/2020: In process  · Nasal MRSA screen, urine Legionella and pneumococcal antigen- collectedon 1/28/2020: Negative  · Urine culture  - collected on 1/28/2020: In process      Antibiotics and immunizations:       Current antibiotics: All antibiotics and their doses were reviewed by me    Recent Abx Admin                   aztreonam (AZACTAM) 1 g IVPB minibag (g) 1 g New Bag 02/21/20 0558     1 g New Bag 02/20/20 2204     1 g New Bag  1450    vancomycin 1000 mg IVPB in 250 mL D5W addavial (mg) 1,000 mg New Bag 02/21/20 0236     1,000 mg New Bag 02/20/20 1624                  Immunization History: All immunization history was reviewed by me today. Immunization History   Administered Date(s) Administered    Influenza 10/10/2012    Influenza Virus Vaccine 09/19/2013, 10/23/2014    Influenza, High Dose (Fluzone 65 yrs and older) 10/15/2015, 09/29/2016, 09/20/2017, 10/10/2018    Influenza, Triv, inactivated, subunit, adjuvanted, IM (Fluad 65 yrs and older) 10/08/2019    Pneumococcal Conjugate 13-valent ([id number]) 12/01/2015    Pneumococcal Conjugate 7-valent (Prevnar7) 11/19/2007    Pneumococcal Polysaccharide ([id number]) 11/19/2007, 11/07/2019    Tdap (Boostrix, Adacel) 10/12/2014    Zoster Live (Zostavax) 09/03/2013       Known drug allergies:      All allergies were reviewed and updated    Allergies   Allergen Reactions    Constitutional: Positive for fatigue. Negative for chills, diaphoresis and fever. HENT: Negative for ear discharge, ear pain, rhinorrhea, sore throat and trouble swallowing. Eyes: Negative for discharge and redness. Respiratory: Negative for cough, shortness of breath and wheezing. Cardiovascular: Negative for chest pain and leg swelling. Gastrointestinal: Negative for abdominal pain, constipation, diarrhea and nausea. Endocrine: Negative for polyuria. Genitourinary: Negative for dysuria, flank pain, frequency, hematuria and urgency. Musculoskeletal: Negative for back pain and myalgias. Skin: Negative for rash. Neurological: Negative for dizziness, seizures and headaches. Hematological: Does not bruise/bleed easily. Psychiatric/Behavioral: Negative for hallucinations and suicidal ideas. All other systems reviewed and are negative. Past Medical History: All past medical history reviewed today. Past Medical History:   Diagnosis Date    Aortic insufficiency     Atrial fibrillation [facility])     cardioversion 8/10/12    Breast nodule 8/2012  right    excision-lumpectomy 8/2012    CAD (coronary artery disease)     Cardiomyopathy ([facility] [address] 8/2012    EF 20 %    CHF (congestive heart failure) (HCC)     Hyperlipidemia     Hypertension     Malignant lymphoma, non-Hodgkin's (HCC)     Microscopic hematuria 7/29/2012    Mitral regurgitation     Nausea & vomiting 7/29/2012    Osteoarthritis of knee     bilateral knees    Pneumonia     Rotator cuff tear 7/2/2013    Ventricular ectopy 7/29/2012       Past Surgical History: All past surgical history was reviewed today.     Past Surgical History:   Procedure Laterality Date    AORTIC VALVE REPLACEMENT  8/3/2012    moderate mitral regurg    AXILLARY SURGERY Left 2/9/2020    AXILLARY LYMPH NODE BIOPSY DISSECTION performed by Christian Dandy, MD at [facility] COLONOSCOPY      CYST REMOVAL      chest    ENDOSCOPY, 19.2* 18.9*  --         BMP:  Recent Labs     02/19/20  0515 02/20/20  0505 02/21/20  0410   * 134* 133*   K 4.3 4.0 4.6   CL 95* 96* 95*   CO2 28 28 28   BUN 47* 46* 54*   CREATININE 1.2 1.2 1.4*   CALCIUM 7.7* 7.5* 7.2*   GLUCOSE 177* 148* 133*        Hepatic Function Panel:   Lab Results   Component Value Date    ALKPHOS 60 01/31/2020    ALT 7 01/31/2020    AST 8 01/31/2020    PROT 5.3 01/31/2020    PROT 6.2 11/28/2012    BILITOT 0.4 01/31/2020    BILIDIR <0.2 10/17/2016    IBILI see below 10/17/2016    LABALBU 2.5 02/12/2020       CPK:   Lab Results   Component Value Date    CKTOTAL 168 07/28/2012     ESR:   Lab Results   Component Value Date    SEDRATE 75 (H) 11/30/2012     CRP: No results found for: CRP        Imaging: All pertinent images and reports for the current visit were reviewed by me during this visit. XR CHEST PORTABLE   Final Result   Stable chest over the past 2 days with small bilateral pleural effusions. No   evidence of acute cardiopulmonary process otherwise. XR CHEST PORTABLE   Final Result   Findings compatible with small pleural effusions. No consolidation or   evidence for edema. Findings corresponding to mediastinal lymphadenopathy, better demonstrated on   recent CT exam.         Fluoroscopy modified barium swallow with video   Final Result   No aspiration was appreciated. Intermittent episodes of penetration were   demonstrated as described above. Please see separate speech pathology report for full discussion of findings   and recommendations. CT ABDOMEN PELVIS WO CONTRAST Additional Contrast? Oral (via NGT)   Final Result   Increased retroperitoneal adenopathy compared to 2013 raising the question of   underlying lymphoma. Mild body wall anasarca and trace pelvic fluid suggesting fluid overload.       Nonobstructing right renal stone         XR ABDOMEN FOR NG/OG/NE TUBE PLACEMENT   Final Result   The tip of the nasogastric tube is in good position. XR ABDOMEN (KUB) (SINGLE AP VIEW)   Final Result   Mild to moderate gaseous distention of small bowel in the mid abdomen,   compatible with ileus versus less likely obstruction. Consider further   evaluation with small bowel follow-through to better evaluate bowel motility. Calcifications within the right mid abdomen, likely representing renal   calculi as described on previous examinations. CT Chest WO Contrast   Final Result   1. Extensive lymphadenopathy as well as suspected mild splenomegaly,   suspicious for lymphoproliferative disorder. New areas of nodular soft   tissue density in the left breast may represent associated intramammary lymph   nodes but could also represent subcutaneous involvement. 2. Trace bilateral pleural effusions with bilateral lower lobe passive   atelectasis. 3. Mild cardiomegaly status post aortic valve replacement. XR CHEST PORTABLE   Final Result   Stable cardiomegaly. No acute cardiac or pulmonary disease evident. XR CHEST PORTABLE   Final Result   1. Streaky bibasilar opacities likely reflect subsegmental atelectasis versus   scarring rather than an infectious/inflammatory process. 2. The visible lungs are without pneumothorax, pleural effusion or new focal   airspace opacity. 3. Stable enlargement of the cardiopericardial silhouette. CT Head WO Contrast   Final Result   No acute intracranial abnormality. Ethmoid sinusitis. Medications: All current and past medications were reviewed.      [START ON 2/22/2020] vancomycin  1,500 mg Intravenous Q24H    amiodarone  400 mg Oral Daily    naproxen  250 mg Oral BID WC    sodium chloride  500 mL Intravenous Once    aztreonam  1 g Intravenous Q8H    mexiletine  200 mg Oral 3 times per day    magic (miracle) mouthwash with nystatin  5 mL Swish & Spit 4x Daily    docosanol   Topical 5x Daily    furosemide  20 mg Oral Every Other Day    polyethylene Z71.6    Lymphadenopathy R59.1    Hypervolemia E87.70    Fever R50.9    Malignant lymphoma, non-Hodgkin's (HCC) X46.86    Metabolic encephalopathy H86.79       Please note that this chart was generated using Dragon dictation software. Although every effort was made to ensure the accuracy of this automated transcription, some errors in transcription may have occurred inadvertently. If you may need any clarification, please do not hesitate to contact me through EPIC or at the phone number provided below with my electronic signature. Any pictures or media included in this note were obtained after taking informed verbal consent from the patient and with their approval to include those in the patient's medical record.     Jimmie Ashraf MD, MPH  2/21/2020 , 11:43 AM   [facility] Infectious Disease   Office: [phone]  Fax: [phone]  Tuesday AM clinic:   [address] [place] [address] 120  Thursday AM [id number] [facility]

## 2020-02-21 NOTE — PROGRESS NOTES
for intervention with   · Myelodysplasia  · Anemia with hgb in 6s  · Thrombocytopenia  · Renal dysfunction with creatinine of 1.4  · Recent sepsis  · Debilitation  · SVT/VT  ~CHF/AFIB  Per CHF and EP teams

## 2020-02-21 NOTE — PROGRESS NOTES
Lab Results   Component Value Date    INR 1.39 (H) 02/19/2020    PROTIME 16.2 (H) 02/19/2020     Cardiac Enzymes:    Lab Results   Component Value Date    CKTOTAL 168 07/28/2012     Lab Results   Component Value Date    TROPONINI 0.04 (H) 01/28/2020    TROPONINI 0.110 (H) 12/22/2013    TROPONINI 0.110 (H) 12/22/2013     BNP:    Lab Results   Component Value Date    BNP 44 01/13/2014     FASTING LIPID PANEL:    Lab Results   Component Value Date    CHOL 98 11/07/2019    HDL 38 11/07/2019    TRIG 70 11/07/2019       Physical Examination:    /62   Pulse 77   Temp 97.7 °F (36.5 °C) (Temporal)   Resp 21   Ht 6' 1\" (1.854 m)   Wt 184 lb 15.5 oz (83.9 kg)   SpO2 99%   BMI 24.40 kg/m²    Chronically ill appearing    Respiratory:  · Resp Assessment: Normal respiratory effort  · Resp Auscultation: Clear to auscultation bilaterally   Cardiovascular:  · Auscultation: irregular rhythm,harsh systolic murmur  · Palpation:  Nl PMI  · JVP:  < 8 cm H2O  Extremities: trace bilateral peripheral Edema, both hands are swollen  Abdomen:  · Soft, non-tender  · Normal bowel sounds  Extremities:  ·  No Cyanosis or Clubbing  Neurological/Psychiatric:  · Oriented to time, place, and person  · Non-anxious  Skin Warm and dry    Labs were reviewed including labs from other hospital systems through [facility]. Cardiac testing was reviewed including echos, nuclear scans, cardiac catheterization, including from other hospital systems through [facility].     Assessment:          Cardiomyopathy ([address]  Plan: marked change in EF.  LVEF now 30%, was 50% 9/11/19   continue po digoxin  continue spironolactone 12.5 mg po qd  Not on beta blocker or ACEI due to low blood pressure  Continue lasix 20 mg po qod      S/P AVR (aortic valve replacement)  Plan: possibly worsening prosthetic valve aortic stenosis    Chronic combined systolic and diastolic CHF (congestive heart failure) (HCC)          PAF (paroxysmal atrial fibrillation) ([facility] [address]  Plan:now persistent, rate controlled      Sepsis ([facility] [address]      Tobacco abuse counseling      Lymphadenopathy      Hypervolemia  Resolved. Anemia worsening   Monitor for blood transfusion    Hypotension:   continue midodrine       Plan:  1. continue spironolactone 12.5 mg po qd  2. continue midodrine 10 mg po tid  3. continue lasix 20 mg po qod. 4. EP does not indicate any further work up of his wide complex tachycardia  5. Would not recommend restarting warfarin given the recent GI bleeding and MDS. The risk outweighs the benefit. 5.  afib rate is controlled  6. Dr. Jazzy Rasmussen has seen patient about his heart valve. He does not think that TAVR or any intervention on his aortic valve is indicated at this time. He is too high risk and doesn't think that the gradient is significant enough. Plan is for patient to transfer to [facility] for ongoing management of his hematology issues. Our cardiology team can follow him there as well.       Brenda Mendoza M.D., [address] [place] [address]

## 2020-02-21 NOTE — PROGRESS NOTES
Pharmacy to dose vancomycin per ID:     Vancomycin trough was 18.7 yesterday and timed appropriately. Serum creatinine bumped to 1.4 today. Pharmacy will decrease dose to 1500 mg q 24 hours. Next vancomycin trough scheduled for Monday 2/24 @ 0230.     Maureen Faustin, PharmD, [address]   [id number]

## 2020-02-21 NOTE — PROGRESS NOTES
Speech Language Pathology  Attempt Note - Pt refused    Name: Carlos Randall  : 1940    Attempted to see pt for dysphagia f/u treatment. Pt continues to report nausea and stated he didn't think he could participate or trial anything at this time. Pt to be transferred to [facility].. Recommend speech therapy f/u with pt there. Thanks,  Lisa PEÑA  CCC-SLP #[id number] 2020 3:17 PM  Speech-Language Pathologist

## 2020-02-22 PROBLEM — I35.0 NONRHEUMATIC AORTIC VALVE STENOSIS: Status: ACTIVE | Noted: 2020-01-01

## 2020-02-22 PROBLEM — I47.20 VT (VENTRICULAR TACHYCARDIA): Status: ACTIVE | Noted: 2020-01-01

## 2020-02-22 NOTE — CONSULTS
Clinical Pharmacy Consult Note    Admit date: 2/21/2020    Subjective/Objective:  71 yo admitted with fever of unknown origin / new cancer diagnosis  Pharmacy is consulted to dose Vancomycin per Dr. Buddy Fabry    Pertinent Medications:  Vancomycin  -- day # 3    PERTINENT LABS:  Recent Labs     02/20/20  0505 02/21/20  0410   * 133*   K 4.0 4.6   CL 96* 95*   CO2 28 28   BUN 46* 54*   CREATININE 1.2 1.4*       Estimated Creatinine Clearance: 49 mL/min (A) (based on SCr of 1.4 mg/dL (H)). Recent Labs     02/20/20  0505 02/21/20  0410 02/21/20  0510   WBC 3.1* 3.2*  --    HGB 7.3* 6.5* 6.7*   HCT 22.4* 20.1* 20.2*   MCV 83.9 83.7  --    * 122*  --        Height:  6' (182.9 cm)  Weight: 177 lb 14.6 oz (80.7 kg)    Micro:  Date Site Micro Susceptibility                         Assessment/Plan:  1. Fever of unknown origin  :  vancomycin day #3  Vancomycin  · Will start Vancomycin 1500 mg q24h. · Patient is transfer from [facility] where he has been on Vancomycin 1000 mg q12h since 2/18. Trough drawn 2/20 resulted at 18.7 mg/dL. SCr has increased from 1.2 to 1.4 today. · Clinical pharmacist will follow-up in AM.  · Renal function will be monitored closely and dosing will be adjusted as appropriate. Please call with any questions. Thank you for consulting pharmacy!   Pete Archuleta, PharmD, [facility] 2/21/2020 7:32 PM

## 2020-02-22 NOTE — PROGRESS NOTES
2/18 Respiratory panel Negative    2/18 Strep pneumo antigen Negative    2/18 Legionella antigen Negative    2/18 Urine culture No growth    2/17 Blood culture x 2 No growth x 4 days    2/19 Throat culture Normal oral cordell    2/20 Rapid influenza Negative    2/21 C. Diff toxin/antigen Ordered        Assessment/Plan:  1. Shock / fever of unknown origin: Vancomycin + aztreonam day #5  Vancomycin  · Patient transferred from [facility] where he was on vancomycin 1 g IV q12h with a trough of 18.7 mcg/mL. Dose was adjusted to 1.5 g IV q24h after SCr increased from 1.2 to 1.4 g/dL. · SCr has increased slightly to 1.5 g/dL today. UOP slightly below normal (0.4 mL/kg/hr). Will continue current dose of 1.5 g IV q24h at this time. · Will plan to obtain a trough once vancomycin has reached steady state, likely prior to the 4th dose of this regimen. · Renal function will be monitored closely and dosing will be adjusted as appropriate. Aztreonam  · Current dose appropriate for renal function. May consider dose increase to 2 g IV q8h if more severe source of infection suspected (i.e., pneumonia). · May consider change to cefepime if resistance is suspected as patient has tolerated cephalosporins in the past.     Please call with any questions. Thank you for consulting pharmacy!   Daniel Delatorre, PharmD, [facility]  Wireless: [phone]  2/22/2020 8:46 AM

## 2020-02-22 NOTE — PROGRESS NOTES
Hospitalist Progress Note      PCP: Sarah Beth Saldaña MD    Date of Admission: 2/21/2020    Subjective:   Pt. Denies any specific issues today. Wife is at bedside. He has been afebrile. Medications:  Reviewed    Infusion Medications    DOBUTamine 2.5 mcg/kg/min (02/22/20 1240)    norepinephrine 5 mcg/min (02/22/20 0952)    dextrose       Scheduled Medications    predniSONE  50 mg Oral Daily    allopurinol  200 mg Oral Daily    amiodarone  400 mg Oral Daily    aspirin  81 mg Oral Daily    atorvastatin  10 mg Oral Daily    pantoprazole  40 mg Oral QAM AC    [Held by provider] digoxin  125 mcg Oral Daily    lactobacillus  1 capsule Oral Daily with breakfast    mexiletine  200 mg Oral 3 times per day    midodrine  10 mg Oral TID WC    sodium chloride flush  10 mL Intravenous 2 times per day    sodium chloride flush  10 mL Intravenous 2 times per day     PRN Meds: promethazine, lidocaine, sodium chloride flush, acetaminophen, acetaminophen, polyethylene glycol, magic (miracle) mouthwash with nystatin, prochlorperazine, glucose, dextrose, glucagon (rDNA), dextrose      Intake/Output Summary (Last 24 hours) at 2/22/2020 1414  Last data filed at 2/22/2020 1030  Gross per 24 hour   Intake 440 ml   Output 700 ml   Net -260 ml       Physical Exam Performed:    BP 98/68   Pulse 68   Temp 97.7 °F (36.5 °C) (Oral)   Resp 20   Ht 6' 1\" (1.854 m)   Wt 183 lb 13.8 oz (83.4 kg)   SpO2 99%   BMI 24.26 kg/m²     General appearance: No apparent distress, appears stated age and cooperative. HEENT: PERRL  Neck: Supple, with full range of motion. No jugular venous distention. Trachea midline. Respiratory:  Normal respiratory effort. Clear to auscultation, bilaterally without Rales/Wheezes/Rhonchi. Cardiovascular: Regular rate and rhythm with normal S1/S2 without murmurs, rubs or gallops. Abdomen: Soft, non-tender, non-distended with normal bowel sounds. Musculoskeletal: no edema.        Labs: currently on IV pressors: levophed, dobutamine.; antibiotics discontinued, per ID, as no evidence of infection. - Acute on chronic systolic CHF, no diuresis given shock  - Moderate to severe aortic stenosis, history of AVR bioprosthetic  - Ventricular tachycardia cardioversion at the previous hospital.  Currently on amiodarone and mexiletine. EP cardiology consulted  - MDS with resistant anemia: requiring blood transfusion; oncology consulted. - T-cell lymphoma stage III, oncology consult appreciated. - A. fib with RVR, not a candidate for anticoagulation given MDS with resistant anemia and recent GI bleed  - TYRELL, likely secondary to shock, continue to monitor    DVT Prophylaxis: SCD's  Diet: DIET DYSPHAGIA MINCED AND MOIST; No Drinking Straw  Dietary Nutrition Supplements: Standard High Calorie Oral Supplement, Clear Liquid Oral Supplement, Frozen Oral Supplement  Code Status: Full Code    PT/OT Eval Status: not at this time. Dispo - ICU mgmt.     Hollie Sandhoff, MD

## 2020-02-22 NOTE — CONSULTS
both    LYMPH NODE BIOPSY Left 2/3/2020    INGUINAL LYMPH NODE BIOPSY EXCISION performed by Sona Ramos MD at [address]  8/2/12    excision right breast mass (lumpectomy)    SHOULDER ARTHROPLASTY Right 2/2/15    right reverse total shoulder arthroplasty    SIGMOIDOSCOPY N/A 2/10/2020    SIGMOIDOSCOPY BIOPSY FLEXIBLE performed by Lamine Salas MD at [address] Left 12/4/13    TOTAL KNEE ARTHROPLASTY Right 2/3/14    RIGHT TOTAL KNEE REPLACEMENT-BAR       UPPER GASTROINTESTINAL ENDOSCOPY  12/24/13    UPPER GASTROINTESTINAL ENDOSCOPY N/A 2/10/2020    EGD BIOPSY performed by Lamine Salas MD at [address] [facility] ENDOSCOPY       Current Medications:     predniSONE  50 mg Oral Daily    allopurinol  200 mg Oral Daily    amiodarone  400 mg Oral Daily    aspirin  81 mg Oral Daily    atorvastatin  10 mg Oral Daily    pantoprazole  40 mg Oral QAM AC    [Held by provider] digoxin  125 mcg Oral Daily    lactobacillus  1 capsule Oral Daily with breakfast    mexiletine  200 mg Oral 3 times per day    midodrine  10 mg Oral TID WC    aztreonam  1 g Intravenous Q8H    sodium chloride flush  10 mL Intravenous 2 times per day    sodium chloride flush  10 mL Intravenous 2 times per day    vancomycin  1,500 mg Intravenous Q24H       Allergies:  Clindamycin/lincomycin; Levaquin [levofloxacin]; and Cefdinir    Social History:    TOBACCO:    None - remote cig use  ETOH:    Occasional   DRUGS:   None   MARITAL STATUS:        Family History:   No immunodeficiency    REVIEW OF SYSTEMS:    No fever / chills / sweats. No weight loss. No visual change, eye pain, eye discharge. No oral lesion, sore throat, dysphagia. Denies cough / sputum. Denies chest pain, palpitations. Denies n / v / abd pain. No diarrhea. Denies dysuria or change in urinary function. Denies joint swelling or pain. No myalgia, arthralgia.   Denies skin changes, itching  Denies focal weakness, sensory change or other neurologic symptom    Denies new / worse depression, psychiatric symptoms  Denies any Endocrine or Hematologic symptoms    PHYSICAL EXAM:      Vitals:    /70   Pulse 72   Temp 97.5 °F (36.4 °C) (Oral)   Resp 19   Ht 6' 1\" (1.854 m)   Wt 183 lb 13.8 oz (83.4 kg)   SpO2 98%   BMI 24.26 kg/m²     GENERAL: No apparent distress.   2L  HEENT: Membranes moist, no oral lesion, PERRL  NECK:  Supple, no lymphadenopaty  LUNGS: Clear b/l, no rales, no dullness  CARDIAC: RRR, no murmur appreciated  ABD:  + BS, soft / NT  EXT:  Diffuse edema, no lesions  NEURO: No focal neurologic findings  PSYCH: Orientation, sensorium, mood normal  LINES:  L PICC in place (placed )    DATA:    Lab Results   Component Value Date    WBC 3.4 (L) 2020    HGB 7.7 (L) 2020    HCT 22.7 (L) 2020    MCV 83.7 2020     2020     Lab Results   Component Value Date    CREATININE 1.5 (H) 2020    BUN 57 (H) 2020     (L) 2020    K 4.8 2020    CL 96 (L) 2020    CO2 27 2020       Hepatic Function Panel:   Lab Results   Component Value Date    ALKPHOS 81 2020    ALT 19 2020    AST 16 2020    PROT 5.3 2020    PROT 6.2 2012    BILITOT 0.7 2020    BILIDIR 0.4 2020    IBILI 0.3 2020    LABALBU 2.2 2020       Micro:   C diff sent     Influenza ag A/B neg   Throat cult - normal oral cordell   BC no growth to date   Urine cult neg   Legionella urinary ag neg   Pneumococcal urinary ag neg   Resp PCR panel neg   BC no growth     BC no growth   Urine cult no growth   Nasal MRSA probe - negative   Legionella / Pneumococcal ag - negative   Resp panel neg    Imagin/21 CXR: Radiologist impression --  Unchanged small pleural effusions.  No findings for congestive failure on the current exam.      Abd x-ray:  Radiologist impression --  1.  Nonspecific bowel gas pattern without findings for obstruction. 2.  11 mm calcification overlying the right renal shadow consistent with renal calculus.  This has been described on recent CT of the abdomen exam.  Second small radiodensity in the right lower abdomen is nonspecific. 2/1/20 Abd / Pelvic CT:  Impression   Increased retroperitoneal adenopathy compared to 2013 raising the question of   underlying lymphoma.       Mild body wall anasarca and trace pelvic fluid suggesting fluid overload.       Nonobstructing right renal stone     Echo:  Bioprosthetic aortic valve noted again. No obvious vegetations.   Has severe aortic stenosis      IMPRESSION:      Patient Active Problem List   Diagnosis    Elevated troponin    Cardiomyopathy ([address]    Nonrheumatic aortic valve insufficiency    Mitral regurgitation    Microscopic hematuria    S/P AVR (aortic valve replacement)    Hypotension    Acute on chronic combined systolic and diastolic congestive heart failure (HCC)    Arthritis of knee    Encounter for medication counseling    Primary localized osteoarthrosis of shoulder region    Hyponatremia    Primary localized osteoarthrosis, pelvic region and thigh    H/O total shoulder replacement    Hypercholesteremia    Finger amputation, traumatic, initial encounter    PAF (paroxysmal atrial fibrillation) ([address]    Essential hypertension    Sepsis ([address]    Septic shock ([address]    Atrial fibrillation with RVR ([address]    Elevated international normalized ratio (INR)    Streptococcal pharyngitis    Leukopenia    Chronic ethmoidal sinusitis    TYRELL (acute kidney injury) ([address]    Ex-heavy cigarette smoker (20-39 per day)    Tobacco abuse counseling    Lymphadenopathy    Hypervolemia    Fever    Malignant lymphoma, non-Hodgkin's (HCC)    Metabolic encephalopathy    Lymphoma ([address]    [facility] ([facility]    Nonrheumatic aortic valve stenosis       Nx MDS, AF, CM, AS - s/p bioprosthetic AoVR, CAD, renal stone  Allergy listed to clindamycin, Levofloxacin, cefdinir (rash)    Admit to [facility] with fever and hypotension  Hosp course with intermittent fever, AF/RVR, GI bleed w anemia  Dx - angioimmunoblastic T cell lymphoma    Afebrile   - had low grade temperatures at [facility]    - intermittent, no infection identified, suspect secondary to lymphoma  Hypotension - seems to cardiogenic related to CM, AF/RVR.   Has been on levofed at [facility] (no new)    RECOMMENDATIONS:    Can d/c antibiotics from ID standpoint (same recommendation from     Discussed with pt, his wife  Terrie Vargas MD

## 2020-02-22 NOTE — PROGRESS NOTES
Speech Language Pathology  Facility/Department: [facility] ICU   CLINICAL BEDSIDE SWALLOW EVALUATION / Treatment    NAME: Leticia Erickson  : 1940  MRN: [id number]    ADMISSION DATE: 2020  ADMITTING DIAGNOSIS: has Elevated troponin; Cardiomyopathy ([address] Nonrheumatic aortic valve insufficiency; Mitral regurgitation; Microscopic hematuria; S/P AVR (aortic valve replacement); Hypotension; Acute on chronic combined systolic and diastolic congestive heart failure ([address] Arthritis of knee; Encounter for medication counseling; Primary localized osteoarthrosis of shoulder region; Hyponatremia; Primary localized osteoarthrosis, pelvic region and thigh; H/O total shoulder replacement; Hypercholesteremia; Finger amputation, traumatic, initial encounter; PAF (paroxysmal atrial fibrillation) ([address] Essential hypertension; Sepsis ([address] Septic shock ([address] Atrial fibrillation with RVR ([address] Elevated international normalized ratio (INR); Streptococcal pharyngitis; Leukopenia; Chronic ethmoidal sinusitis; TYRELL (acute kidney injury) ([address] Ex-heavy cigarette smoker (20-39 per day); Tobacco abuse counseling; Lymphadenopathy; Hypervolemia; Fever; Malignant lymphoma, non-Hodgkin's ([address] Metabolic encephalopathy; Lymphoma ([address] VT (ventricular tachycardia) ([address] and Nonrheumatic aortic valve stenosis on their problem list.  ONSET DATE: 20    Recent Chest Xray/CT of Chest: (20)     1.  Unchanged small pleural effusions.  No findings for congestive failure on the current exam.  Correlate clinically. .     Recent MBS Procedure ( 20)  Modified Barium Swallow evaluation completed on 2020. Results indicate oropharyngeal dysphagia. Successive drinks of thin and Mildly Thick (Nectar) Liquids revealed laryngeal penetration during the swallow. Material entered the airway, remained above the vocal folds and was not ejected from the airway. No aspiration was definitively viewed during this study.  Oral phase was chips adequate; no significant oral residue noted with any trials. Recommend continue dysphagia soft and bite sized (dysphagia III advanced) / thin liquid diet per MBS recs. Dysphagia Outcome Severity Scale: Level 5: Mild dysphagia- Distant supervision. May need one diet consistency restricted     Treatment Plan  Requires SLP Intervention: Yes  Duration/Frequency of Treatment: 2-3x/wk for LOS  D/C Recommendations: (ongoing ST)       Recommended Diet and Intervention  Diet Solids Recommendation: Dysphagia Soft and Bite-Sized (Dysphagia III)(per MBS recs)  Liquid Consistency Recommendation: Thin  Recommended Form of Meds: Whole with puree  Recommendations: Dysphagia treatment  Therapeutic Interventions: Diet tolerance monitoring;Pharyngeal exercises; Therapeutic PO trials with SLP;Patient/Family education;Effortful swallow    Compensatory Swallowing Strategies  Compensatory Swallowing Strategies: Alternate solids and liquids;Small bites/sips;Eat/Feed slowly;Upright as possible for all oral intake;Effortful swallow(intermittent cough / re-swallow during meals)    Treatment/Goals  Short-term Goals  Goal 1: Patient will consume recommended diet consistency w/o overt signs associated with aspiration or respiratory decline. 2/22: Targeted via therapeutic exercises - effortful swallows completed with puree consistency x 20 reps and ice chips x 20 reps. Chin tuck against resistance x 10 reps. Continue goal.    Goal 2: Patient will verbalize comprehension of dysphagia recommendations / compensatory swallow strategies. 2/22: Pt was educated on role of SLP, purpose of visit, A&P of swallow, s/s and associated risks of aspiration, MBS results, use of effortful swallows, rationale for tx tasks, importance of oral care, recommended method / frequency of completing oral care, compensatory swallow strategies, and POC. Pt verbalized understanding but will require reinforcement.  Continue goal.    Pt goal: to get better and go home    General  Chart Reviewed: Yes  Comments: Pt t/f'd to [facility] from [facility] s/p fevers and family desire for 2nd opinion on CA tx. PMHx significant for lymphoma, HTN, CHF, CAD, and Afib. Subjective  Subjective: Pt sleeping upon entry but roused readily to verbal stimuli. Agreeable to session with SLP. Behavior/Cognition: Cooperative; Alert;Pleasant mood  Temperature Spikes Noted: No(afebrile per flowsheets)  Respiratory Status: O2 via nasual cannula  Breath Sounds: Diminished(crackles bilateral bases per flowsheets)  O2 Device: Nasal cannula  Liters of Oxygen: 2 L  Communication Observation: Functional  Follows Directions: Simple  Dentition: Dentures top  Patient Positioning: Upright in bed  Baseline Vocal Quality: Normal  Prior Dysphagia History: Pt seen by SLP at [facility] s/p \"choking\" episode with medication. BSE completed 2/6/20 recommended MBS procedure, which was completed 2/7/20. Recommendation made for dysphagia III advanced / thin liquids / no straws / meds in puree with dysphagia tx. Consistencies Administered: Dysphagia Pureed (Dysphagia I); Ice Chips; Thin - cup           Vision/Hearing  Vision  Vision: Impaired  Vision Exceptions: Wears glasses for reading  Hearing  Hearing: Exceptions to [facility]  Hearing Exceptions: Hard of hearing/hearing concerns    Oral Motor Deficits  Oral/Motor  Oral Motor:  Within functional limits(thick lingual coating noted with discolorations and cracking)    Oral Phase Dysfunction  Oral Phase  Oral Phase: WFL  Oral Phase  Oral Phase - Comment: Limited assessment - WFL for textures administered / accepted     Indicators of Pharyngeal Phase Dysfunction   Pharyngeal Phase  Pharyngeal Phase: Exceptions  Indicators of Pharyngeal Phase Dysfunction  Delayed Swallow: (per MBS)  Decreased Laryngeal Elevation: (per MBS)    Prognosis  Prognosis  Prognosis for safe diet advancement: good  Barriers to reach goals: fatigue  Individuals consulted  Consulted and agree with results and recommendations:

## 2020-02-22 NOTE — PLAN OF CARE
SLP evaluation completed. Please refer to EMR.     Lori Riddle MA CCC-SLP; [id number]  Speech-Language Pathologist

## 2020-02-22 NOTE — H&P
ICU HISTORY AND PHYSICAL       Hospital Day: 1  ICU Day:  1                                                        Code:Full Code  Admit Date: 2/21/2020  PCP: Nanette Orlando MD                                  CC: fever, hypotension    HISTORY OF PRESENT ILLNESS:   Mr. Joya Norton is a 70 y.o. M with PMH of afib (was on coumadin), Aortic stenosis (s/p porcine AVR), CHF (EF 35%), myelodysplastic syndrome, chronic leukopenia, and CAD presented to OSH with fever. Pt found to be febrile and hypotensive. Pt treated with antibiotics, and IVF. Pt had a long and complicated stay at OSH (20 days). He about to be discharged to rehab following that when he developed afib-RVR and he was hypotensive for which he was cardioverted which returned him to sinus rhythm. Cardiology and EP cardiology consulted. He was started on amiodarone, mexilitine, and digoxin. Due to concern of MDS and antibiotic interaction, warfarin was stopped. Pt was also found to have lymphadenopathy which was also confirmed on CT abdomen/pelvis in comparison with 2013 CT. He underwent inguinal and axillary biopsy which was positive for T- lymphoma. Hem/Onc consulted who determined pt is not a good candidate for aggressive chemotherapy. Family wanted second opinion and requested for patient to be tx to [facility] Patient had an episode of bright red rectal bleeding. GI consulted and patient underwent EGD and flexible sigmoidosopy which revealed gastrititis and rectal ulcers. He was started on PPI daily. His hemoglobin dropped today reaching 6.5 and he received 1u PRBC. Patient was transferred to [facility] ICU. He is currently on levophed 5. Patient seen and examined at bedside. He was alert and oriented x3. He endorses weakness, and fatigue. He has nausea but improved after receiving meds. Also endorses diarrhea. Denies fever, night sweats, chills, chest pain, cough, dyspnea, palpitations, nausea, vomiting, dysuria.       PAST HISTORY:     Past Medical History:   Diagnosis Date    Aortic insufficiency     Atrial fibrillation [facility])     cardioversion 8/10/12    Breast nodule 8/2012  right    excision-lumpectomy 8/2012    CAD (coronary artery disease)     Cardiomyopathy ([address] 8/2012    EF 20 %    CHF (congestive heart failure) (HCC)     Hyperlipidemia     Hypertension     Malignant lymphoma, non-Hodgkin's (HCC)     Microscopic hematuria 7/29/2012    Mitral regurgitation     Nausea & vomiting 7/29/2012    Osteoarthritis of knee     bilateral knees    Pneumonia     Rotator cuff tear 7/2/2013    Ventricular ectopy 7/29/2012       Past Surgical History:   Procedure Laterality Date    AORTIC VALVE REPLACEMENT  8/3/2012    moderate mitral regurg    AXILLARY SURGERY Left 2/9/2020    AXILLARY LYMPH NODE BIOPSY DISSECTION performed by Christen Mi MD at [id number] [facility]      chest    ENDOSCOPY, COLON, DIAGNOSTIC      INGUINAL HERNIA REPAIR      bilateral in [address]      both    LYMPH NODE BIOPSY Left 2/3/2020    INGUINAL LYMPH NODE BIOPSY EXCISION performed by Christen Mi MD at [address],[facility] [id number]  8/2/12    excision right breast mass (lumpectomy)    SHOULDER ARTHROPLASTY Right 2/2/15    right reverse total shoulder arthroplasty    SIGMOIDOSCOPY N/A 2/10/2020    SIGMOIDOSCOPY BIOPSY FLEXIBLE performed by Washington Orourke MD at [address] Left 12/4/13    TOTAL KNEE ARTHROPLASTY Right 2/3/14    RIGHT TOTAL KNEE REPLACEMENT-BAR       UPPER GASTROINTESTINAL ENDOSCOPY  12/24/13    UPPER GASTROINTESTINAL ENDOSCOPY N/A 2/10/2020    EGD BIOPSY performed by Washington Orourke MD at [address]:   The patient lives at    Alcohol: yes  Illicit drugs: no use  Tobacco:  Former smoker    Family History:  Family History   Problem Relation Age of Onset    Marfan Syndrome Brother     Heart Disease HENT:      Head: Normocephalic and atraumatic. Nose:      Comments: R nostril abrasion/ L upper lip abrasion     Mouth/Throat:      Mouth: Mucous membranes are dry. Eyes:      Pupils: Pupils are equal, round, and reactive to light. Neck:      Musculoskeletal: Normal range of motion and neck supple. Cardiovascular:      Rate and Rhythm: Normal rate. Rhythm irregular. Pulses: Normal pulses. Heart sounds: Normal heart sounds. Pulmonary:      Effort: Pulmonary effort is normal. No respiratory distress. Breath sounds: Rales (bibasilar) present. Chest:      Chest wall: No tenderness. Abdominal:      General: Bowel sounds are normal. There is distension. Palpations: Abdomen is soft. Musculoskeletal: Normal range of motion. General: Swelling present. Right lower leg: Edema (3+) present. Left lower leg: Edema (3+) present. Skin:     General: Skin is warm and dry. Capillary Refill: Capillary refill takes less than 2 seconds. Neurological:      General: No focal deficit present. Mental Status: He is alert and oriented to person, place, and time. Psychiatric:         Mood and Affect: Mood normal.         Behavior: Behavior normal.         Thought Content: Thought content normal.         Judgment: Judgment normal.           Access:   -Peripheral Access Day#:23 (midline)    No results for input(s): PHART, NTQ5BTJ, PO2ART in the last 72 hours.         DATA:       Labs:  CBC:   Recent Labs     02/21/20  0410 02/21/20  0510 02/21/20 1914 02/21/20 2014   WBC 3.2*  --  3.0* 3.8*   HGB 6.5* 6.7* 7.5* 8.1*   HCT 20.1* 20.2* 22.7* 24.0*   *  --  134* 155       BMP:   Recent Labs     02/20/20  0505 02/21/20  0410 02/21/20 2014   * 133* 133*   K 4.0 4.6 4.6   CL 96* 95* 94*   CO2 28 28 27   BUN 46* 54* 54*   CREATININE 1.2 1.4* 1.4*   GLUCOSE 148* 133* 77   PHOS  --   --  2.8     LFT's:   Recent Labs     02/21/20 2014   AST 16   ALT 19   BILITOT 0.7 ALKPHOS 81     Troponin: No results for input(s): TROPONINI in the last 72 hours. BNP:No results for input(s): BNP in the last 72 hours. ABGs: No results for input(s): PHART, GDB9XRM, PO2ART in the last 72 hours. INR:   Recent Labs     02/19/20  1700 02/21/20 2014   INR 1.39* 1.24*       U/A:No results for input(s): NITRITE, COLORU, PHUR, LABCAST, WBCUA, RBCUA, MUCUS, TRICHOMONAS, YEAST, BACTERIA, CLARITYU, SPECGRAV, LEUKOCYTESUR, UROBILINOGEN, BILIRUBINUR, BLOODU, GLUCOSEU, AMORPHOUS in the last 72 hours. Invalid input(s): KETONESU    XR CHEST PORTABLE   Final Result   1. Unchanged small pleural effusions. No findings for congestive failure on the current exam.  Correlate clinically. .      XR ABDOMEN (KUB) (SINGLE AP VIEW)   Final Result      1. Nonspecific bowel gas pattern without findings for obstruction. 2.  11 mm calcification overlying the right renal shadow consistent with renal calculus. This has been described on recent CT of the abdomen exam.  Second small radiodensity in the right lower abdomen is nonspecific. EKG:   Echo:  Micro:     ASSESSMENT AND PLAN:     Shock  Likely cardiogenic shock but possibly septic. Was Febrile, tachycardic and hypotensive on admission. Had +strep, treated with doxy. Had Elevated procalc. CXR negative for PNA. Still on pressors. Volume overloaded on exam. ProBNP 8000. No new evidence of infection. Last fever 2/18. Currently on vanc, aztreonam.   -- consult ID  -- continue levophed; goal SBP>90  -- continue midodrine  -- continue abx aztreonam, vancomycin  -- cardiology consulted  -- order cortisol    Angioimmunoblastic T-cell lymphoma Stage III  Presented with lymphadenoapthy. Seen by Hem/Onc at OSH. Confirmed by inguinal and axillary biopsy. Beta-2 Microglobulin 7. 3(H). Anthracycline is the standard treatment but not inicated given his cardiomyopathy.  Family seeking second opinion d/t poor prognosis and not being good candidate for chemotherapy  -- aortic stenosis (S/p Aortic valve placement)  Echo 2/20 reveals severe aortic stenosis.    -- consult cardiology  -- continue ASA    PITA  - Continue BiPAP night time    Code Status:Full Code  FEN: Dysphagia diet  PPX:  SCD  DISPO: ICU    This patient has been staffed and discussed with Dr. Eddie Maldonado  -----------------------------  Kirstin Mendiola MD, PGY-1  2/21/2020  10:12 PM

## 2020-02-22 NOTE — PROGRESS NOTES
Surgical Wound      Past Medical History:   Diagnosis Date    Aortic insufficiency     Atrial fibrillation [facility])     cardioversion 8/10/12    Breast nodule 8/2012  right    excision-lumpectomy 8/2012    CAD (coronary artery disease)     Cardiomyopathy ([facility] [address] 8/2012    EF 20 %    CHF (congestive heart failure) (HCC)     Hyperlipidemia     Hypertension     Malignant lymphoma, non-Hodgkin's (HCC)     Microscopic hematuria 7/29/2012    Mitral regurgitation     Nausea & vomiting 7/29/2012    Osteoarthritis of knee     bilateral knees    Pneumonia     Rotator cuff tear 7/2/2013    Ventricular ectopy 7/29/2012        ANTHROPOMETRICS  Current Height: 6' 1\" (185.4 cm)  Current Weight: 183 lb 13.8 oz (83.4 kg)    Admission weight: 177 lb 14.6 oz (80.7 kg)  No source  Ideal Bodyweight 184 lb  Usual Bodyweight 180-190 lb       BMI BMI (Calculated): 24.3    Wt Readings from Last 50 Encounters:   02/21/20 183 lb 13.8 oz (83.4 kg)   02/21/20 184 lb 15.5 oz (83.9 kg)   01/28/20 178 lb (80.7 kg)   01/23/20 180 lb 6.4 oz (81.8 kg)   01/13/20 174 lb (78.9 kg)   01/02/20 181 lb (82.1 kg)   12/16/19 185 lb (83.9 kg)   12/10/19 189 lb (85.7 kg)   11/27/19 196 lb (88.9 kg)   11/07/19 193 lb (87.5 kg)   10/22/19 193 lb (87.5 kg)   10/08/19 192 lb (87.1 kg)   09/11/19 186 lb (84.4 kg)   08/07/19 188 lb 0.8 oz (85.3 kg)   08/02/19 188 lb (85.3 kg)   07/31/19 186 lb (84.4 kg)   07/18/19 189 lb (85.7 kg)   06/05/19 190 lb (86.2 kg)   03/25/19 188 lb (85.3 kg)       COMPARATIVE STANDARDS  Estimated Total Kcals/Day : 25-30  Current Bodyweight (83 kg) 5727-4199 kcal    Estimated Total Protein (g/day) : 1.2-1.4 Current Bodyweight (83 kg) 100-116 g/day  Estimated Daily Total Fluid (ml/day): 2000-80545 mL per day     Food / Nutrition-Related History  Pre-Admission / Home Diet:  Pre-Admission/Home Diet: General   Home Supplements / Herbals:    none noted  Food Restrictions / Cultural Requests:    none noted    Diet Orders / Intake / Nutrition Support  Current diet/supplement order: DIET DYSPHAGIA MINCED AND MOIST; No Drinking Straw  Dietary Nutrition Supplements: Standard High Calorie Oral Supplement, Clear Liquid Oral Supplement, Frozen Oral Supplement     NSG Recorded PO:   PO Fluids    PO Meals     PO Intake: 26-50% and 51-75%  PO Supplement: Standard High Calorie   Frozen Oral Supplement   Clear Liquid Supplement    PO Supplement Intake: %      NUTRITION RISK LEVEL: Risk Level:  Moderate    Crystal Cavanaugh LD  [place]:  [phone]  Office:  [phone]

## 2020-02-22 NOTE — PLAN OF CARE
Problem: Risk for Impaired Skin Integrity  Goal: Tissue integrity - skin and mucous membranes  Description  Structural intactness and normal physiological function of skin and  mucous membranes. Outcome: Ongoing  Pt turned and repositioned frequently, pillows for positioning. Skin care given as needed. Sacral heart to coccyx for prevention. Pt noted to edema to all extremities. Abrasion to R nare and L lip. Blanchable redness noted to coccyx and heels. Sacral heart in place. Heels elevated on pillow. Will cont to monitor skin for needs.     Problem: Falls - Risk of:  Goal: Will remain free from falls  Description  Will remain free from falls  Outcome: Ongoing

## 2020-02-22 NOTE — PLAN OF CARE
Problem: Nutrition  Goal: Optimal nutrition therapy  Outcome: Ongoing   Nutrition Problem: Predicted Suboptimal Energy Intake    Intervention: Continue Current diet  or Continue Current ONS    Nutrition Goals: Pt will consume >75% of meals and ONS offered

## 2020-02-22 NOTE — PROGRESS NOTES
[facility] Daily Progress Note      Admit Date:  2/21/2020    Subjective:  Mr. Blanca Grullon was seen and examined. F/U lymphoma/CHF/AVR/AS/VT/afib. Transferred from [place]. Awakens easily. No specific complaints this am. Denies sob/chest pain.      Objective:   /70   Pulse 72   Temp 97.5 °F (36.4 °C) (Oral)   Resp 19   Ht 6' 1\" (1.854 m)   Wt 183 lb 13.8 oz (83.4 kg)   SpO2 98%   BMI 24.26 kg/m²       Intake/Output Summary (Last 24 hours) at 2/22/2020 1124  Last data filed at 2/22/2020 0630  Gross per 24 hour   Intake 440 ml   Output 500 ml   Net -60 ml       TELEMETRY: Sinus     Physical Exam:  General:  Awake, alert, NAD  Skin:  Warm and dry  Neck:  JVP difficult  Chest:  Decreased BS,  respiration normal  Cardiovascular:  RRR S1S2  Abdomen:  Soft nontender, quiet  Extremities:  2+ edema    Medications:    predniSONE  50 mg Oral Daily    allopurinol  200 mg Oral Daily    amiodarone  400 mg Oral Daily    aspirin  81 mg Oral Daily    atorvastatin  10 mg Oral Daily    pantoprazole  40 mg Oral QAM AC    [Held by provider] digoxin  125 mcg Oral Daily    lactobacillus  1 capsule Oral Daily with breakfast    mexiletine  200 mg Oral 3 times per day    midodrine  10 mg Oral TID WC    aztreonam  1 g Intravenous Q8H    sodium chloride flush  10 mL Intravenous 2 times per day    sodium chloride flush  10 mL Intravenous 2 times per day    vancomycin  1,500 mg Intravenous Q24H      DOBUTamine      norepinephrine 5 mcg/min (02/22/20 0949)    dextrose       promethazine, lidocaine, sodium chloride flush, acetaminophen, acetaminophen, polyethylene glycol, magic (miracle) mouthwash with nystatin, prochlorperazine, glucose, dextrose, glucagon (rDNA), dextrose    Lab Data:  CBC:   Recent Labs     02/21/20 1914 02/21/20 2014 02/22/20  0436 02/22/20  0522   WBC 3.0* 3.8* 3.4*  --    HGB 7.5* 8.1* 7.8* 7.7*   HCT 22.7* 24.0* 23.0* 22.7*   MCV 84.6 84.0 83.7  --    * 155 157  -- BMP:   Recent Labs     02/21/20  0410 02/21/20 2014 02/22/20  0436   * 133* 134*   K 4.6 4.6 4.8   CL 95* 94* 96*   CO2 28 27 27   PHOS  --  2.8 2.9   BUN 54* 54* 57*   CREATININE 1.4* 1.4* 1.5*     LIVER PROFILE:   Recent Labs     02/21/20 2014   AST 16   ALT 19   BILIDIR 0.4*   BILITOT 0.7   ALKPHOS 81     PT/INR:   Recent Labs     02/19/20  1700 02/21/20 2014   PROTIME 16.2* 14.4*   INR 1.39* 1.24*     APTT:   Recent Labs     02/21/20 2014   APTT 25.5     BNP:  No results for input(s): BNP in the last 72 hours.   IMAGING:     Assessment:  Patient Active Problem List    Diagnosis Date Noted    Hypotension 11/28/2012     Priority: High    VT (ventricular tachy[address]) ([facility] [address] 02/22/2020    Nonrheumatic aortic valve stenosis 02/22/2020    Lymphoma ([facility] 02/21/2020    Fever     Malignant lymphoma, non-Hodgkin's (HCC)     Metabolic encephalopathy     Tobacco abuse counseling     Lymphadenopathy     Hypervolemia     Sepsis ([address] 01/28/2020    Septic shock (HCC)     Atrial fibrillation with RVR (HCC)     Elevated international normalized ratio (INR)     Streptococcal pharyngitis     Leukopenia     Chronic ethmoidal sinusitis     TYRELL (acute kidney injury) ([address]     Ex-heavy cigarette smoker (20-39 per day)     PAF (paroxysmal atrial fibrillation) ([facility] 09/06/2019    Essential hypertension 09/06/2019    Finger amputation, traumatic, 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 Primary localized osteoarthrosis, pelvic region and thigh 05/04/2015    H/O total shoulder replacement 05/04/2015    Hyponatremia 04/22/2015    Primary localized osteoarthrosis of shoulder region 02/02/2015    Encounter for medication counseling 01/13/2014    Arthritis of knee 12/05/2013    Acute on chronic combined systolic and diastolic congestive heart failure ([address] 03/17/2013    S/P AVR (aortic valve replacement) 09/26/2012    Elevated troponin 07/29/2012    Cardiomyopathy ([address]

## 2020-02-22 NOTE — CONSULTS
 Heart Disease Sister     Heart Disease Sister     Cancer Brother        MEDICATIONS:     No current facility-administered medications on file prior to encounter. Current Outpatient Medications on File Prior to Encounter   Medication Sig Dispense Refill    oxyCODONE (ROXICODONE) 5 MG immediate release tablet Take 1 tablet by mouth every 4 hours as needed for Pain for up to 3 days. 0    naproxen (NAPROSYN) 250 MG tablet Take 1 tablet by mouth 2 times daily (with meals) 60 tablet 3    amiodarone (PACERONE) 400 MG tablet Take 1 tablet by mouth daily 30 tablet 3    mexiletine (MEXITIL) 200 MG capsule Take 1 capsule by mouth every 8 hours 90 capsule 3    lactobacillus (CULTURELLE) capsule Take 1 capsule by mouth daily (with breakfast)      diphenhydrAMINE (BENADRYL) 50 MG/ML injection Infuse 0.5 mLs intravenously every 6 hours as needed for Itching      promethazine (PHENERGAN) 25 MG/ML injection Infuse 0.5 mLs intravenously every 6 hours as needed (Vomiting)      prochlorperazine (COMPAZINE) 10 MG/2ML SOLN injection Infuse 2 mLs intravenously every 6 hours as needed (Vomiting) 240 mL     digoxin (LANOXIN) 125 MCG tablet Take 1 tablet by mouth daily 30 tablet 3    sodium chloride (OCEAN, BABY AYR) 0.65 % nasal spray 1 spray by Nasal route as needed for Congestion  0    lidocaine 4 % external patch Place 1 patch onto the skin daily as needed (as needed for pain)      neomycin-bacitracin-polymyxin (NEOSPORIN) 400-5-5000 ointment Apply topically 2 times daily.       furosemide (LASIX) 20 MG tablet Take 1 tablet by mouth every other day 60 tablet 3    polyethylene glycol (GLYCOLAX) packet Take 17 g by mouth 2 times daily 527 g 1    midodrine (PROAMATINE) 10 MG tablet Take 1 tablet by mouth 3 times daily (with meals) 90 tablet 3    pantoprazole (PROTONIX) 40 MG tablet Take 1 tablet by mouth every morning (before breakfast) 30 tablet 3    atorvastatin (LIPITOR) 10 MG tablet TAKE 1 TABLET BY MOUTH ONE fever episodes, last one 2/18. No evidence of infection. 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 -- Tylenol, prn  -- HOLD naproxen, for bleeding risk  -- Antibiotics, as above     ## Ventricular tachycardia  Had episodes of VTach requiring cardioversion as pt became hypotensive. Epi cardiology was following pt at OSH.  -- telemetry  -- Amiodarone  -- Mexiletine  -- Digoxin (monitor level)  -- EP consulted     ## Acute kidney injury  Cr 1.4. Oliguria. Likely d/t prerenal 2/2 CHF. -- strict input/out  -- monitor     ## Diarrhea, bloating, nausea  Reported sxs of diarrhea started 2 days ago. Also feeling bloated. New onset of nausea. -- order KUB  -- C. Diff lab     ## A. fib with RVR   On digoxin and amiodarone. S/p cardioversion as pt became hypotensive. -- consult EP cardiology  -- no coumadin per OSH cardiology given GI bleed and hx of MDS     ## Anemia s/p transfusion  s/p EGD and flex sigm revealed patchy gastritis and flexible sigmoidoscopy showed shallow rectal ulcerations, mild internal hemorrhoids, sigmoid diverticulosis. Anemia is likely d/t anemia of chronic disease, iron defiancy complicated by hx of MDS. -- PPI  -- H&H q6h  -- transfuse if <7  -- HOLD coumadin     ## Coagulopathy  Had supra therapeutic INR 10 but now 1.39. Off coumadin. Likely the combination of warfarin and antibiotics superimposed on his acute illness. S/p vitamin K   -- PT/INR:  1.24 / 14.4     ## Acute on Chronic Systolic CHF   Echo 3/65/7440 EF around 35-40%, severe aortic stenosis. CXR 2/18 revealing stable cardiomegaly without obvious pulmonary edema. Volume overloaded on exam. ProBNP 8000.   -- Cardiology consulted; awaiting recs  -- HOLD diuretics, d/t low BP     ## Severe aortic stenosis (S/p Aortic valve placement)  Echo 2/20 reveals severe aortic stenosis.    -- Cardiology consulted  -- ASA     ## PITA  -- BiPAP, qhs     Code Status:Full Code  FEN: Dysphagia diet  PPX:  SCD  DISPO:

## 2020-02-22 NOTE — CONSULTS
vomiting 7/29/2012    Osteoarthritis of knee     bilateral knees    Pneumonia     Rotator cuff tear 7/2/2013    Ventricular ectopy 7/29/2012     Past Surgical History:        Procedure Laterality Date    AORTIC VALVE REPLACEMENT  8/3/2012    moderate mitral regurg    AXILLARY SURGERY Left 2/9/2020    AXILLARY LYMPH NODE BIOPSY DISSECTION performed by Christen Mi MD at [address]      chest    ENDOSCOPY, COLON, DIAGNOSTIC      INGUINAL HERNIA REPAIR      bilateral in [address]      both    LYMPH NODE BIOPSY Left 2/3/2020    INGUINAL LYMPH NODE BIOPSY EXCISION performed by Christen Mi MD at [place] [address]  8/2/12    excision right breast mass (lumpectomy)    SHOULDER ARTHROPLASTY Right 2/2/15    right reverse total shoulder arthroplasty    SIGMOIDOSCOPY N/A 2/10/2020    SIGMOIDOSCOPY BIOPSY FLEXIBLE performed by Washington Orourke MD at [address] [facility] Left 12/4/13    TOTAL KNEE ARTHROPLASTY Right 2/3/14    RIGHT TOTAL KNEE REPLACEMENT-BAR       UPPER GASTROINTESTINAL ENDOSCOPY  12/24/13    UPPER GASTROINTESTINAL ENDOSCOPY N/A 2/10/2020    EGD BIOPSY performed by Washington Orourke MD at [address] [facility] ENDOSCOPY       Current Medications:    Current Facility-Administered Medications: predniSONE (DELTASONE) tablet 50 mg, 50 mg, Oral, Daily  allopurinol (ZYLOPRIM) tablet 200 mg, 200 mg, Oral, Daily  DOBUTamine (DOBUTREX) 500 mg in dextrose 5 % 250 mL infusion, 2.5 mcg/kg/min, Intravenous, Continuous  amiodarone (CORDARONE) tablet 400 mg, 400 mg, Oral, Daily  aspirin EC tablet 81 mg, 81 mg, Oral, Daily  atorvastatin (LIPITOR) tablet 10 mg, 10 mg, Oral, Daily  promethazine (PHENERGAN) injection 12.5 mg, 12.5 mg, Intravenous, Q6H PRN  pantoprazole (PROTONIX) tablet 40 mg, 40 mg, Oral, QAM AC  [Held by provider] digoxin (LANOXIN) tablet 125 mcg, 125 mcg, Oral, Daily  lactobacillus (CULTURELLE) capsule 1 capsule, 1 capsule, Oral, Daily with breakfast  lidocaine 4 % external patch 1 patch, 1 patch, Transdermal, Daily PRN  mexiletine (MEXITIL) capsule 200 mg, 200 mg, Oral, 3 times per day  midodrine (PROAMATINE) tablet 10 mg, 10 mg, Oral, TID WC  sodium chloride flush 0.9 % injection 10 mL, 10 mL, Intravenous, 2 times per day  sodium chloride flush 0.9 % injection 10 mL, 10 mL, Intravenous, PRN  acetaminophen (TYLENOL) tablet 650 mg, 650 mg, Oral, Q6H PRN  acetaminophen (TYLENOL) suppository 650 mg, 650 mg, Rectal, Q6H PRN  polyethylene glycol (GLYCOLAX) packet 17 g, 17 g, Oral, Daily PRN  magic (miracle) mouthwash with nystatin, 5 mL, Swish & Spit, 4x Daily PRN  norepinephrine (LEVOPHED) 16 mg in dextrose 5 % 250 mL infusion, 2 mcg/min, Intravenous, Continuous  prochlorperazine (COMPAZINE) injection 10 mg, 10 mg, Intravenous, Q6H PRN  sodium chloride flush 0.9 % injection 10 mL, 10 mL, Intravenous, 2 times per day  glucose (GLUTOSE) 40 % oral gel 15 g, 15 g, Oral, PRN  dextrose 50 % IV solution, 12.5 g, Intravenous, PRN  glucagon (rDNA) injection 1 mg, 1 mg, Intramuscular, PRN  dextrose 5 % solution, 100 mL/hr, Intravenous, PRN  Allergies:  Clindamycin/lincomycin;  Levaquin [levofloxacin]; and Cefdinir    Social History:      Social History     Socioeconomic History    Marital status:      Spouse name: Not on file    Number of children: Not on file    Years of education: Not on file    Highest education level: Not on file   Occupational History    Not on file   Social Needs    Financial resource strain: Not on file    Food insecurity:     Worry: Not on file     Inability: Not on file    Transportation needs:     Medical: Not on file     Non-medical: Not on file   Tobacco Use    Smoking status: Former Smoker     Packs/day: 2.00     Years: 20.00     Pack years: 40.00     Last attempt to quit: 1970     Years since quittin.6    Smokeless tobacco: Never Used   Substance and Sexual Activity    memory, mentation, behavior. · Psychiatric: No anxiety, or depression. · Endocrine: No temperature intolerance. No excessive thirst, fluid intake, or urination. No tremor. · Hematologic/Lymphatic: No abnormal bruising or bleeding, blood clots or swollen lymph nodes. · Allergic/Immunologic: No nasal congestion or hives. PHYSICAL EXAM:      Vitals:  BP 98/68   Pulse 68   Temp 97.7 °F (36.5 °C) (Oral)   Resp 20   Ht 6' 1\" (1.854 m)   Wt 183 lb 13.8 oz (83.4 kg)   SpO2 99%   BMI 24.26 kg/m²   Wt Readings from Last 3 Encounters:   02/21/20 183 lb 13.8 oz (83.4 kg)   02/21/20 184 lb 15.5 oz (83.9 kg)   01/28/20 178 lb (80.7 kg)       CONSTITUTIONAL:  awake, alert, but drifts quickly off to sleep. EYES:  Lids and lashes normal, pupils equal, round and reactive to light, extra ocular muscles intact, sclera clear, conjunctiva normal  ENT:  Normocephalic, without obvious abnormality, atramatic, sinuses nontender on palpation, external ears without lesions, oral pharynx with moist mucus membranes, tonsils without erythema or exudates, gums normal and good dentition.   NECK:  Supple, symmetrical, trachea midline, no adenopathy, thyroid symmetric, not enlarged and no tenderness, skin normal  HEMATOLOGIC/LYMPHATICS:  no cervical lymphadenopathy, no supraclavicular lymphadenopathy, no axillary lymphadenopathy and no inguinal lymphadenopathy  BACK:  Symmetric, no curvature, spinous processes are non-tender on palpation, paraspinous muscles are non-tender on palpation, no costal vertebral tenderness  LUNGS:  No increased work of breathing, good air exchange, clear to auscultation bilaterally, no crackles or wheezing  CARDIOVASCULAR:  , regular rate and rhythm, normal S1 and S2, no S3 or S4, and no murmur noted  ABDOMEN:  No scars, normal bowel sounds, soft, non-distended, non-tender, no masses palpated, no hepatosplenomegally  CHEST/BREASTS:  Breasts symmetrical, skin without lesion(s), no nipple retraction or nausea/vomiting TECHNOLOGIST PROVIDED HISTORY: Reason for exam:->Lymphadenopathy, nausea/vomiting Additional Contrast?->Oral Reason for Exam: Lymphadenopathy, nausea/vomiting Acuity: Acute Type of Exam: Initial FINDINGS: Lower Chest: Small bilateral pleural effusions are seen. There is adjacent consolidation in the lung bases. Aortic valve calcification is seen. Heart is enlarged. NG tube is seen in the stomach Organs: There is mild splenomegaly. There is subtle lobular contour to the liver. Hypodense nodule is seen in the right hepatic lobe, similar to prior, measuring 1.6 cm, likely cyst. Increased density seen within the gallbladder, either stones or sludge. No pancreatic calcification. No hydronephrosis on right or left. Stone seen in the right kidney measuring 1.6 cm. GI/Bowel: Scattered colonic diverticula are seen. No significant small or large bowel distention noted. Pelvis: Mildly prominent inguinal and iliac chain nodes are seen. Atherosclerotic change seen in the iliacs. Iliac arteries are mildly dilated and tortuous. Edema and fluid is seen in the presacral space. Postsurgical changes seen in the inguinal regions Peritoneum/Retroperitoneum: Scattered mildly enlarged retroperitoneal nodes are seen. .  Index left periaortic node measures 2.8 cm x 2.3 cm, previously 2.2 cm x 1.3 cm A 2nd node in the periaortic region at the level of the renal hilum measures 1.8 cm x 2.1 cm, previously subcentimeter in size Bones/Soft Tissues: There is body wall anasarca. Degenerative changes are seen in the spine. Degenerative changes are seen in the hips. Increased retroperitoneal adenopathy compared to 2013 raising the question of underlying lymphoma. Mild body wall anasarca and trace pelvic fluid suggesting fluid overload.  Nonobstructing right renal stone     Xr Abdomen (kub) (single Ap View)    Result Date: 2/21/2020  SINGLE VIEW ABDOMEN HISTORY: Bloating and diarrhea FINDINGS: There is gas within large and small bowel loops. No air-fluid levels or findings for obstruction. No free intraperitoneal air. Visualized bony structures are intact. Postsurgical changes in the lower abdomen likely from previous hernia repair. 11 mm rounded calcification in the right mid abdomen consistent with patient's known renal calculus. A second smaller radiodensity in the right lower abdomen nonspecific. 1.  Nonspecific bowel gas pattern without findings for obstruction. 2.  11 mm calcification overlying the right renal shadow consistent with renal calculus. This has been described on recent CT of the abdomen exam.  Second small radiodensity in the right lower abdomen is nonspecific. Xr Abdomen (kub) (single Ap View)    Result Date: 1/30/2020  EXAMINATION: ONE SUPINE XRAY VIEW(S) OF THE ABDOMEN 1/30/2020 2:10 pm COMPARISON: 12/22/2013 HISTORY: ORDERING SYSTEM PROVIDED HISTORY: portable XR, nausea vomiting TECHNOLOGIST PROVIDED HISTORY: Reason for exam:->portable XR, nausea vomiting Reason for Exam:  nausea vomiting Acuity: Unknown Type of Exam: Unknown FINDINGS: There is mild to moderate gaseous dilation of small bowel within the mid abdomen. The large bowel is normal in caliber. No obvious free air, but upright imaging was not performed. There is elevation of the right hemidiaphragm, similar when compared to the previous exam.  Visualized lungs are otherwise clear. No acute bony abnormality detected. Calcification within the right mid abdomen is seen, similar when compared to the previous exam, measuring approximately 1.2 cm. Additional calculus inferior to that is noted measuring 5 mm, not definitely visualized previously. Mild to moderate gaseous distention of small bowel in the mid abdomen, compatible with ileus versus less likely obstruction. Consider further evaluation with small bowel follow-through to better evaluate bowel motility.  Calcifications within the right mid abdomen, likely representing renal calculi as described on previous examinations. Ct Head Wo Contrast    Result Date: 1/28/2020  EXAMINATION: CT OF THE HEAD WITHOUT CONTRAST  1/28/2020 9:05 am TECHNIQUE: CT of the head was performed without the administration of intravenous contrast. Dose modulation, iterative reconstruction, and/or weight based adjustment of the mA/kV was utilized to reduce the radiation dose to as low as reasonably achievable. COMPARISON: None. HISTORY: ORDERING SYSTEM PROVIDED HISTORY: fever, chronic sinus pain TECHNOLOGIST PROVIDED HISTORY: Has a \"code stroke\" or \"stroke alert\" been called? ->No Reason for exam:->fever, chronic sinus pain Reason for Exam: fever, chronic sinus pain; mental status change Acuity: Unknown Type of Exam: Unknown FINDINGS: BRAIN/VENTRICLES: There is no acute intracranial hemorrhage, mass effect or midline shift. No abnormal extra-axial fluid collection. The gray-white differentiation is maintained without evidence of an acute infarct. Physiologic calcification is present within the basal ganglia. There is prominence of the ventricles and sulci due to global parenchymal volume loss. There are nonspecific areas of hypoattenuation within the periventricular and subcortical white matter, which likely represent chronic microvascular ischemic change. ORBITS: The visualized portion of the orbits demonstrate no acute abnormality. SINUSES: There is dense opacification of the bilateral ethmoid sinuses. SOFT TISSUES/SKULL: No acute abnormality of the visualized skull or soft tissues. No acute intracranial abnormality. Ethmoid sinusitis. Ct Chest Wo Contrast    Result Date: 1/29/2020  EXAMINATION: CT OF THE CHEST WITHOUT CONTRAST 1/29/2020 6:59 pm TECHNIQUE: CT of the chest was performed without the administration of intravenous contrast. Multiplanar reformatted images are provided for review.  Dose modulation, iterative reconstruction, and/or weight based adjustment of the mA/kV was utilized to reduce UPPER ABDOMEN:  1.7 cm cyst near the margin of hepatic segments 5 and 8. Suspected splenomegaly. Nonenlarged to mildly enlarged gastrohepatic and portohepatic lymph nodes. SOFT TISSUES/BONES:   Increased size of numerous nonenlarged bilateral cervical, supraclavicular, and axillary lymph nodes; for reference, 2.6 cm x 1.9 cm left level 1 axillary node. Unchanged 1.5 cm x 1.1 cm nodular soft tissue density in the right breast, suggesting a benign etiology. Areas of new nodular soft tissue density in the left breast; for reference, 0.0 cm x 0.7 cm lateral to the left nipple. Mild subcutaneous edema. Partial included changes of right shoulder arthroplasty. Healed median sternotomy. Diffuse osseous demineralization. No acute fractures nor suspicious osseous lesions. 1. Extensive lymphadenopathy as well as suspected mild splenomegaly, suspicious for lymphoproliferative disorder. New areas of nodular soft tissue density in the left breast may represent associated intramammary lymph nodes but could also represent subcutaneous involvement. 2. Trace bilateral pleural effusions with bilateral lower lobe passive atelectasis. 3. Mild cardiomegaly status post aortic valve replacement. Xr Chest Portable    Result Date: 2/21/2020  PORTABLE CHEST. HISTORY: Bibasilar crackles COMPARISON STUDY: 2/18/2020 FINDINGS: Heart size and mediastinal structures appear within normal limits. There is prior midline sternotomy. No localized consolidation with question tiny pleural effusions. No change in left PICC catheter. Bony structures are intact. 1.  Unchanged small pleural effusions. No findings for congestive failure on the current exam.  Correlate clinically. [facility] Chest Portable    Result Date: 2/18/2020  EXAMINATION: ONE XRAY VIEW OF THE CHEST 2/18/2020 9:43 am COMPARISON: 02/16/2020 HISTORY: ORDERING SYSTEM PROVIDED HISTORY: dyspnea TECHNOLOGIST PROVIDED HISTORY: Reason for exam:->dyspnea Reason for Exam: 2/7/2020  EXAMINATION: MODIFIED BARIUM SWALLOW WAS PERFORMED IN CONJUNCTION WITH SPEECH PATHOLOGY SERVICES TECHNIQUE: Fluoroscopic evaluation of the swallowing mechanism was performed with multiple consistency of barium product. FLUOROSCOPY DOSE AND TYPE OR TIME AND EXPOSURES: 2 minutes of fluoroscopy time were utilized. 17 cine loops were saved. COMPARISON: None HISTORY: ORDERING SYSTEM PROVIDED HISTORY: aspiration TECHNOLOGIST PROVIDED HISTORY: Reason for exam:->aspiration Reason for Exam: aspiration Acuity: Unknown Type of Exam: Unknown FINDINGS: On the lateral view, severe degenerative changes are noted about the thoracic spine with reversal of the normal cervical lordosis. Oral phase of swallowing was grossly within normal limits. Intermittent episodes of deep penetration were demonstrated with thin and nectar thick consistency. Swallows with applesauce, peaches and cracker demonstrate no penetration or aspiration. No aspiration was appreciated. Intermittent episodes of penetration were demonstrated as described above. Please see separate speech pathology report for full discussion of findings and recommendations. IMPRESSION/PLAN:      1. MDS - This does not require treatment with a hypo-methylating agent at this point. However his baseline leukopenia and anemia would make giving chemotherapy extremely difficult as this would exacerbate the counts increases risk for infection. 2. T-cell angiommunoblastic lymphoma - Standard upfront therapy for this would be CHOEP versus CHP with brentuximab. His myelodysplastic syndrome makes giving any chemotherapy problematic. His baseline cardiomyopathy rules out using an anthracycline. I will review his path. If it is CD30 positive then brentuximab could be used to try and increase his performance status. Family understands it would not be curative. The goal will be to try and lengthen his life and allow him to return home.   Currentlyhe  is

## 2020-02-23 NOTE — PROGRESS NOTES
ICU Progress Note    Admit Date: 2/21/2020  Vent Day: None  IV Access:Peripheral  IV Fluids:None  Vasopressors:None                Antibiotics: None  Diet: DIET DYSPHAGIA MINCED AND MOIST; No Drinking Straw  Dietary Nutrition Supplements: Standard High Calorie Oral Supplement, Clear Liquid Oral Supplement, Frozen Oral Supplement    CC: fever, hypotension    Interval History:   No acute events overnight. Some nausea this AM, no vomiting. No chest pain, SOB, fever, or chills. HPI:   Mr. Luiz Martinez is a 70 y.o. M with PMH of afib (was on coumadin), Aortic stenosis (s/p porcine AVR), CHF (EF 35%), myelodysplastic syndrome, chronic leukopenia, and CAD presented to OSH with fever. Pt found to be febrile and hypotensive. Pt treated with antibiotics, and IVF. Pt had a long and complicated stay at OSH (20 days). He about to be discharged to rehab following that when he developed afib-RVR and he was hypotensive for which he was cardioverted which returned him to sinus rhythm. Cardiology and EP cardiology consulted. He was started on amiodarone, mexilitine, and digoxin. Due to concern of MDS and antibiotic interaction, warfarin was stopped. Pt was also found to have lymphadenopathy which was also confirmed on CT abdomen/pelvis in comparison with 2013 CT. He underwent inguinal and axillary biopsy which was positive for T- lymphoma. Hem/Onc consulted who determined pt is not a good candidate for aggressive chemotherapy. Family wanted second opinion and requested for patient to be tx to [facility] had an episode of bright red rectal bleeding. GI consulted and patient underwent EGD and flexible sigmoidosopy which revealed gastrititis and rectal ulcers. He was started on PPI daily. His hemoglobin dropped today reaching 6.5 and he received 1u PRBC. Patient was transferred to [facility] ICU. He is currently on levophed 5.      Patient seen and examined at bedside. He was alert and oriented x3.  He endorses weakness, and fatigue. He has nausea but improved after receiving meds. Also endorses diarrhea. Denies fever, night sweats, chills, chest pain, cough, dyspnea, palpitations, nausea, vomiting, dysuria.     Medications:     Scheduled Meds:   tbo-filgrastim  300 mcg Subcutaneous QPM    predniSONE  50 mg Oral Daily    allopurinol  200 mg Oral Daily    insulin lispro  0-6 Units Subcutaneous TID WC    insulin lispro  0-3 Units Subcutaneous Nightly    amiodarone  400 mg Oral Daily    aspirin  81 mg Oral Daily    atorvastatin  10 mg Oral Daily    pantoprazole  40 mg Oral QAM AC    [Held by provider] digoxin  125 mcg Oral Daily    lactobacillus  1 capsule Oral Daily with breakfast    mexiletine  200 mg Oral 3 times per day    midodrine  10 mg Oral TID WC    sodium chloride flush  10 mL Intravenous 2 times per day    sodium chloride flush  10 mL Intravenous 2 times per day     Continuous Infusions:   DOBUTamine 1.5 mcg/kg/min (02/23/20 1116)    dextrose      norepinephrine Stopped (02/23/20 0424)     PRN Meds:glucose, dextrose, glucagon (rDNA), dextrose, promethazine, lidocaine, sodium chloride flush, acetaminophen, acetaminophen, polyethylene glycol, magic (miracle) mouthwash with nystatin, prochlorperazine    Objective:   Vitals:   T-max:  Patient Vitals for the past 8 hrs:   BP Temp Temp src Pulse Resp SpO2   02/23/20 1200 114/78 -- -- 69 17 100 %   02/23/20 1145 115/76 -- -- 72 18 100 %   02/23/20 1130 123/79 -- -- 81 20 100 %   02/23/20 1115 115/69 -- -- 72 18 100 %   02/23/20 1100 116/74 -- -- 73 16 100 %   02/23/20 1045 111/73 -- -- 73 17 100 %   02/23/20 1030 119/74 -- -- 69 18 100 %   02/23/20 1015 114/79 -- -- 72 17 100 %   02/23/20 1000 108/67 -- -- 73 17 100 %   02/23/20 0945 107/76 -- -- 80 20 98 %   02/23/20 0930 108/81 -- -- 71 17 --   02/23/20 0915 105/77 -- -- 69 17 --   02/23/20 0900 113/79 -- -- 70 18 --   02/23/20 0845 113/84 -- -- 69 17 --   02/23/20 0830 119/78 -- -- 64 17 100 %    Capillary Refill: Capillary refill takes less than 2 seconds. Neurological:      General: No focal deficit present.      Mental Status: He is alert and oriented to person, place, and time. Psychiatric:         Mood and Affect: Mood normal.         BehaviorLucilla Spaniel         Thought Content: Thought content normal.         Judgment: Judgment normal.     LABS:    CBC:   Recent Labs     02/21/20 2014 02/22/20 0436 02/22/20  1700 02/23/20  0410 02/23/20  1025   WBC 3.8* 3.4*  --   --  1.3*  --    HGB 8.1* 7.8*   < > 7.6* 7.4* 7.9*   HCT 24.0* 23.0*   < > 22.9* 22.5* 24.0*    157  --   --  150  --    MCV 84.0 83.7  --   --  84.4  --     < > = values in this interval not displayed. Renal:    Recent Labs     02/21/20 2014 02/22/20 0436 02/22/20  1120 02/23/20  0410   * 134*  --  132*   K 4.6 4.8  --  4.9   CL 94* 96*  --  95*   CO2 27 27  --  27   BUN 54* 57*  --  57*   CREATININE 1.4* 1.5*  --  1.3   GLUCOSE 77 101*  --  209*   CALCIUM 7.4* 7.4*  --  7.8*   MG 2.00 2.10  --  2.20   PHOS 2.8 2.9 3.2 2.8   ANIONGAP 12 11  --  10     Hepatic:   Recent Labs     02/21/20 2014 02/22/20 0436 02/23/20  0410   AST 16  --   --    ALT 19  --   --    BILITOT 0.7  --   --    BILIDIR 0.4*  --   --    PROT 5.3*  --   --    LABALBU 2.3* 2.2* 2.2*   ALKPHOS 81  --   --      Troponin: No results for input(s): TROPONINI in the last 72 hours. BNP: No results for input(s): BNP in the last 72 hours. Lipids: No results for input(s): CHOL, HDL in the last 72 hours. Invalid input(s): LDLCALCU, TRIGLYCERIDE  ABGs:  No results for input(s): PHART, QON8PTL, PO2ART, FXQ8UKT, BEART, THGBART, A7GEGUSJ, FSE8AMM in the last 72 hours. INR:   Recent Labs     02/21/20 2014   INR 1.24*     Lactate: No results for input(s): LACTATE in the last 72 hours. Cultures:  -----------------------------------------------------------------  RAD:   XR CHEST PORTABLE   Final Result   1. Unchanged small pleural effusions. No findings for congestive failure on the current exam.  Correlate clinically. .      XR ABDOMEN (KUB) (SINGLE AP VIEW)   Final Result      1. Nonspecific bowel gas pattern without findings for obstruction. 2.  11 mm calcification overlying the right renal shadow consistent with renal calculus. This has been described on recent CT of the abdomen exam.  Second small radiodensity in the right lower abdomen is nonspecific. Assessment/Plan:     ## Cardiogenic Shock, Small BL Pleural effusions  Likely cardiogenic shock but possibly septic. Was Febrile, tachycardic and hypotensive on admission. Had +strep, treated with doxy. Had Elevated procalc.  CXR negative for PNA. Still on pressors. Volume overloaded on exam. ProBNP 8000. No new evidence of infection. Last fever 2/18. Currently on vanc, aztreonam.   -- Cardiology consulted  -- ID consulted  -- Cortisol level:  17.0  -- s/p Levophed  -- Midodrine  -- Dobutamine gtt  -- Place central line & start Hydralazine     ##  T-cell Angioimmunoblastic NHL, Myelodysplastic Syndrome  Presented with lymphadenoapthy. Seen by Hem/Onc at [place] [id number] by inguinal and axillary biopsy. Beta-2 Microglobulin 7. 3(H). Anthracycline is the standard treatment but not inicated given his cardiomyopathy. Family seeking second opinion d/t poor prognosis and not being good candidate for chemotherapy  -- HOLD naproxen, for bleeding risk  -- Heme/Onc consulted (appreciate recs)  -- s/p Aztreonam + Vanc  -- supportive treatment  -- Prednisone PO 50 mg, daily   -- MDS & Cardiomyopathy inhibit option to use Chemo  -- If cancer is CD30 positive, then palliative brentuximab could be used to try and increase his performance status so that he may return home.     ## A. fib with RVR   On digoxin and amiodarone. S/p cardioversion as pt became hypotensive.    -- consult EP cardiology  -- no coumadin per OSH cardiology given GI bleed and hx of MDS  -- Amiodarone  -- Digoxin (monitor physical exam confirms the findings listed below  Chart was reviewed including labs and medical records confirm the findings noted    Cardiogenic shock - better on dobutamine. He has reduced EF and severe AS. ScvO2 is 70% indicating good cardiac output while on dopamine 2.5mg/hr  Angioimmunoblastic T cell lymphoma. Fever likely cancer fever. TYRELL thought to be cardiorenal - improving. UOP is much better with net negative > 1L  V. Tach s/p cardioversion. Dig level is down to 1.8. Leukopenia      Continue dobutamine  Start hydralazine 10mg QID with holding parameters, hopefully this will improve cardiac output. Weaned off levophed   Keep holding digoxin      (currently he is nauseous preventing him from getting PO meds)       Pt has a high probability of imminent or life-threatening deterioration requiring close monitoring, and highly complex decision-making and/or interventions of high intensity to assess, manipulate, and support his critical organ systems to prevent a likely inevitable decline which could occur if left untreated.      A total critical care time 35 minutes was used. This includes but not limited to examining patient, collaborating with other physicians, monitoring vital signs, telemetry, continuous pulse oximetry, and clinical response to IV medications, documentation time, review and interpretation of laboratory and radiological data, review of nursing notes and old record review.  This time excludes any time that may have been spent performing procedures for life threatening organ failure.

## 2020-02-23 NOTE — PROGRESS NOTES
[facility] Daily Progress Note      Admit Date:  2/21/2020    Subjective:  Mr. Neal Hare was seen and examined. F/U Lymphoma/CHF/cardiomyopathy/AVR/AS/VT/afib. Feeling ok this am.  No cp/sob.      Objective:   /69   Pulse 75   Temp 97.8 °F (36.6 °C) (Axillary)   Resp 16   Ht 6' 1\" (1.854 m)   Wt 183 lb 13.8 oz (83.4 kg)   SpO2 98%   BMI 24.26 kg/m²       Intake/Output Summary (Last 24 hours) at 2/23/2020 8861  Last data filed at 2/23/2020 0630  Gross per 24 hour   Intake 418.78 ml   Output 1475 ml   Net -1056.22 ml       TELEMETRY: Sinus     Physical Exam:  General:  Awake, alert, NAD  Skin:  Warm and dry  Neck:  JVP difficult  Chest: decreased BS, respiration normal  Cardiovascular:  RRR S1S2  Abdomen:  Soft nontender  Extremities:  ++ edema    Medications:    predniSONE  50 mg Oral Daily    allopurinol  200 mg Oral Daily    insulin lispro  0-6 Units Subcutaneous TID WC    insulin lispro  0-3 Units Subcutaneous Nightly    amiodarone  400 mg Oral Daily    aspirin  81 mg Oral Daily    atorvastatin  10 mg Oral Daily    pantoprazole  40 mg Oral QAM AC    [Held by provider] digoxin  125 mcg Oral Daily    lactobacillus  1 capsule Oral Daily with breakfast    mexiletine  200 mg Oral 3 times per day    midodrine  10 mg Oral TID WC    sodium chloride flush  10 mL Intravenous 2 times per day    sodium chloride flush  10 mL Intravenous 2 times per day      DOBUTamine 2.5 mcg/kg/min (02/22/20 1240)    dextrose      norepinephrine Stopped (02/23/20 0424)     glucose, dextrose, glucagon (rDNA), dextrose, promethazine, lidocaine, sodium chloride flush, acetaminophen, acetaminophen, polyethylene glycol, magic (miracle) mouthwash with nystatin, prochlorperazine    Lab Data:  CBC:   Recent Labs     02/21/20 2014 02/22/20  0436  02/22/20  1120 02/22/20  1700 02/23/20  0410   WBC 3.8* 3.4*  --   --   --  1.3*   HGB 8.1* 7.8*   < > 8.1* 7.6* 7.4*   HCT 24.0* 23.0*   < > 24.7* 22.9* 22.5*   MCV 84.0 83.7  --   --   --  84.4    157  --   --   --  150    < > = values in this interval not displayed. BMP:   Recent Labs     02/21/20 2014 02/22/20  0436 02/22/20  1120 02/23/20  0410   * 134*  --  132*   K 4.6 4.8  --  4.9   CL 94* 96*  --  95*   CO2 27 27  --  27   PHOS 2.8 2.9 3.2 2.8   BUN 54* 57*  --  57*   CREATININE 1.4* 1.5*  --  1.3     LIVER PROFILE:   Recent Labs     02/21/20 2014   AST 16   ALT 19   BILIDIR 0.4*   BILITOT 0.7   ALKPHOS 81     PT/INR:   Recent Labs     02/21/20 2014   PROTIME 14.4*   INR 1.24*     APTT:   Recent Labs     02/21/20 2014   APTT 25.5     BNP:  No results for input(s): BNP in the last 72 hours.   IMAGING:     Assessment:  Patient Active Problem List    Diagnosis Date Noted    Hypotension 11/28/2012     Priority: High    VT (ventricular tachycardia) ([facility] [address] 02/22/2020    Nonrheumatic aortic valve stenosis 02/22/2020    Lymphoma ([facility] [address] 02/21/2020    Fever     Malignant lymphoma, non-Hodgkin's (HCC)     Metabolic encephalopathy     Tobacco abuse counseling     Lymphadenopathy     Hypervolemia     Sepsis ([address] 01/28/2020    Septic shock (HCC)     Atrial fibrillation with RVR (HCC)     Elevated international normalized ratio (INR)     Streptococcal pharyngitis     Leukopenia     Chronic ethmoidal sinusitis     TYRELL (acute kidney injury) ([facility] [address] [facility]     Ex-heavy cigarette smoker (20-39 per day)     PAF (paroxysmal atrial fibrillation) ([facility] [address] 09/06/2019    Essential hypertension 09/06/2019    Finger amputation, traumatic, 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 Primary localized osteoarthrosis, pelvic region and thigh 05/04/2015    H/O total shoulder replacement 05/04/2015    Hyponatremia 04/22/2015    Primary localized osteoarthrosis of shoulder region 02/02/2015    Encounter for medication counseling 01/13/2014    Arthritis of knee 12/05/2013    Acute on chronic combined systolic and diastolic congestive heart failure ([facility] 03/17/2013    S/P AVR (aortic valve replacement) 09/26/2012    Elevated troponin 07/29/2012    Cardiomyopathy ([facility] 07/29/2012    Nonrheumatic aortic valve insufficiency 07/29/2012    Mitral regurgitation 07/29/2012    Microscopic hematuria 07/29/2012     Imp: Lymphoma/CHF/cardiomyop/AVR/AS/VT/Afib    Plan:  1. No specific complaints this am.  Denies chest pain/sob. Remains sinus. On Dobuatmine. Off levo this am.  Heme eval in progress.        Core Measures:  · Discharge instructions:   · LVEF documented:   · ACEI for LV dysfunction:   · Smoking Cessation:    Nicolas Cantu MD 2/23/2020 7:29 AM

## 2020-02-23 NOTE — PROGRESS NOTES
Hospitalist Progress Note      PCP: Klarissa Osuna MD    Date of Admission: 2/21/2020  Hospital coures:  Pt. Is a 69 yo M with PmHx MDS and T cell lymphoma who was admitted for cardiogenic vs. Septic shock. Initially was on antibiotics, which were later discontinued, as no infectious source was identified. Cardiology, ID, Oncology and Critical care have been consulted. Subjective:   Pt. Reports feeling nauseated today. He has been taking Compazine and Phenergan without significant improvement. Daughter is also interested in an appetite stimulant.     Medications:  Reviewed    Infusion Medications    DOBUTamine 2.5 mcg/kg/min (02/23/20 1225)    dextrose      norepinephrine Stopped (02/23/20 2794)     Scheduled Medications    tbo-filgrastim  300 mcg Subcutaneous QPM    megestrol  200 mg Oral Daily    predniSONE  50 mg Oral Daily    allopurinol  200 mg Oral Daily    insulin lispro  0-6 Units Subcutaneous TID WC    insulin lispro  0-3 Units Subcutaneous Nightly    amiodarone  400 mg Oral Daily    aspirin  81 mg Oral Daily    atorvastatin  10 mg Oral Daily    pantoprazole  40 mg Oral QAM AC    [Held by provider] digoxin  125 mcg Oral Daily    lactobacillus  1 capsule Oral Daily with breakfast    mexiletine  200 mg Oral 3 times per day    midodrine  10 mg Oral TID WC    sodium chloride flush  10 mL Intravenous 2 times per day    sodium chloride flush  10 mL Intravenous 2 times per day     PRN Meds: ondansetron, glucose, dextrose, glucagon (rDNA), dextrose, promethazine, lidocaine, sodium chloride flush, acetaminophen, acetaminophen, polyethylene glycol, magic (miracle) mouthwash with nystatin, prochlorperazine      Intake/Output Summary (Last 24 hours) at 2/23/2020 1457  Last data filed at 2/23/2020 1215  Gross per 24 hour   Intake 438.78 ml   Output 1325 ml   Net -886.22 ml       Physical Exam Performed:    /83   Pulse 75   Temp 98.2 °F (36.8 °C) (Oral)   Resp 20   Ht 6' 1\"

## 2020-02-24 NOTE — PROGRESS NOTES
Pt transferred to 3507. Report given to MYESHA Boyer. All meds and belongings sent with the pt. Pt's daughter is with him.

## 2020-02-24 NOTE — CONSULTS
NUTRITION ASSESSMENT  Admission Date: 2/21/2020     Type and Reason for Visit: Consult    NUTRITION RECOMMENDATIONS:   1. PO Diet: Continue with diet texture; dysphagia III (soft and bite-sized) and thin liquids per SLP. 2. ONS: Continue with Ensure Enlive, Ensure Clear, Magic Cup TID. NUTRITION ASSESSMENT:  Nutritional status remains compromised AEB inadequate PO intake 2/2 N/V as per family member's statement. Pt was sleeping in chair at time of visit; however, family reported that he attempts to have ONS throughout the day to aid with meeting needs, some unconsumed ONS noted by bedside. SLP recommends diet upgrade to soft and bite-sized which may lead to increased PO intake. RD to continue to monitor diet tolerance and meal intake. MALNUTRITION ASSESSMENT  Context: Acute illness or injury   Malnutrition Status:  At risk for malnutrition  Findings of the 6 clinical characteristics of malnutrition (Minimum of 2 out of 6 clinical characteristics is required to make the diagnosis of moderate or severe Protein Calorie Malnutrition based on AND/ASPEN Guidelines):  Energy Intake %: Less than or equal to 75% of estimated energy requirement  Energy Intake Time: Greater than or equal to 7 days  Interpretation of Weight Loss %: No significant weight loss  Body Fat Status: (Not appropriate time)  Muscle Mass Status: (Not appropriate time)  Fluid Accumulation Status: No significant fluid accumulation  Reduced  Strength: Not measured    NUTRITION DIAGNOSIS   Problem: Inadequate Oral Intake  Etiology: Insufficient energy/nutrient consumption Nausea  Vomiting  Signs & Symptoms: Intake 0-25%    NUTRITION INTERVENTION  Food and/or Nutrient Delivery:Continue Current diet  or Continue Current ONS   Nutrition education/counseling/coordination of care: Continue Inpatient Monitoring     NUTRITION MONITORING & EVALUATION:  Evaluation:No progress towards goal   Goals: Pt to meet >50% of nutritional requirements from diet and ONS  Monitoring: Meal Intake  or Supplement Intake      OBJECTIVE DATA:  · Nutrition-Focused Physical Findings: LBM 2/23; BUE +3 BLE +4 pitting edema   · Wounds Surgical Wound      Past Medical History:   Diagnosis Date    Aortic insufficiency     Atrial fibrillation (HCC)     cardioversion 8/10/12    Breast nodule 8/2012  right    excision-lumpectomy 8/2012    CAD (coronary artery disease)     Cardiomyopathy ([facility] 8/2012    EF 20 %    CHF (congestive heart failure) (HCC)     Hyperlipidemia     Hypertension     Malignant lymphoma, non-Hodgkin's (HCC)     Microscopic hematuria 7/29/2012    Mitral regurgitation     Nausea & vomiting 7/29/2012    Osteoarthritis of knee     bilateral knees    Pneumonia     Rotator cuff tear 7/2/2013    Ventricular ectopy 7/29/2012        ANTHROPOMETRICS  Current Height: 6' 1\" (185.4 cm)  Current Weight: 183 lb 13.8 oz (83.4 kg)    Admission weight: 177 lb 14.6 oz (80.7 kg)  No source  Ideal Bodyweight 184 lb  Usual Bodyweight 180-190 lb       BMI BMI (Calculated): 24.3    Wt Readings from Last 50 Encounters:   02/21/20 183 lb 13.8 oz (83.4 kg)   02/21/20 184 lb 15.5 oz (83.9 kg)   01/28/20 178 lb (80.7 kg)   01/23/20 180 lb 6.4 oz (81.8 kg)   01/13/20 174 lb (78.9 kg)   01/02/20 181 lb (82.1 kg)   12/16/19 185 lb (83.9 kg)   12/10/19 189 lb (85.7 kg)   11/27/19 196 lb (88.9 kg)   11/07/19 193 lb (87.5 kg)   10/22/19 193 lb (87.5 kg)   10/08/19 192 lb (87.1 kg)   09/11/19 186 lb (84.4 kg)   08/07/19 188 lb 0.8 oz (85.3 kg)   08/02/19 188 lb (85.3 kg)   07/31/19 186 lb (84.4 kg)   07/18/19 189 lb (85.7 kg)   06/05/19 190 lb (86.2 kg)   03/25/19 188 lb (85.3 kg)       COMPARATIVE STANDARDS  Estimated Total Kcals/Day : 25-30  Current Bodyweight (83 kg) 0253-1463 kcal    Estimated Total Protein (g/day) : 1.2-1.4 Current Bodyweight (83 kg) 100-116 g/day  Estimated Daily Total Fluid (ml/day): 2000-92751 mL per day     Food / Nutrition-Related History  Pre-Admission / Home

## 2020-02-24 NOTE — PROGRESS NOTES
[address] [employer] Progress [facility]    2020     Bimal Vargas    MRN: [id number]    : 1940    Referring MD: Holly Rodriguez MD  [address], [facility] [address]      SUBJECTIVE:  Patient is very fatigued, but awake and able to engage in conversation    ECOG PS:  (3) Capable of limited self-care, confined to bed or chair > 50% of waking hours    KPS: 40% Disabled; requires special care and assistance    Isolation: None    Medications    Scheduled Meds:   tbo-filgrastim  300 mcg Subcutaneous QPM    megestrol  200 mg Oral Daily    hydrALAZINE  10 mg Oral 4x Daily    predniSONE  50 mg Oral Daily    allopurinol  200 mg Oral Daily    insulin lispro  0-6 Units Subcutaneous TID WC    insulin lispro  0-3 Units Subcutaneous Nightly    amiodarone  400 mg Oral Daily    aspirin  81 mg Oral Daily    atorvastatin  10 mg Oral Daily    pantoprazole  40 mg Oral QAM AC    [Held by provider] digoxin  125 mcg Oral Daily    lactobacillus  1 capsule Oral Daily with breakfast    mexiletine  200 mg Oral 3 times per day    midodrine  10 mg Oral TID WC    sodium chloride flush  10 mL Intravenous 2 times per day    sodium chloride flush  10 mL Intravenous 2 times per day     Continuous Infusions:   DOBUTamine 2.5 mcg/kg/min (20 1225)    dextrose      norepinephrine Stopped (20 0424)     PRN Meds:.ondansetron, glucose, dextrose, glucagon (rDNA), dextrose, promethazine, lidocaine, sodium chloride flush, acetaminophen, acetaminophen, polyethylene glycol, magic (miracle) mouthwash with nystatin, prochlorperazine    ROS:  As noted above, otherwise remainder of 10-point ROS negative    Physical Exam:     I&O:      Intake/Output Summary (Last 24 hours) at 2020  Last data filed at 2020 2200  Gross per 24 hour   Intake 654.65 ml   Output 1300 ml   Net -645.35 ml       Vital Signs:  /70   Pulse 74   Temp 97.7 °F (36.5 °C) (Oral)   Resp 18   Ht 6' 1\" (1.854 m)   Wt 183 lb 13.8 oz

## 2020-02-24 NOTE — PROGRESS NOTES
assist    Subjective   General  Chart Reviewed: Yes  Patient assessed for rehabilitation services?: Yes  Additional Pertinent Hx: Pt presented to the [facility] ED 1/28/2020 by emergency medical services from the office of Dr. Greg Thomas due to hypotension, decreased white blood cell count and sore throat. He was treated with antibiotics and was almost ready to discharge to rehab after a 20 day admission, when he developed Afib with RVR and hypotension, for which he was cardioverted and return to sinus rhythm. 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 Hem/onc did not find pt to be a good candidate for aggressive chemotherapy, but pt was transferred to [facility] for a second opinion, per family's request. He had an episode of BRBPR and flex sigmoidoscopy found gastritis and rectal ulcers. He received a unit of blood and was transferred to [facility] ICU, and started on levophed with concern for cardiogenic shock vs sepsis. PMHx includes Aortic insufficiency, Atrial fibrillation ([address] Breast nodule, CAD (coronary artery disease), Cardiomyopathy ([address] CHF (congestive heart failure) ([facility] [address] [facility] Hyperlipidemia, Hypertension, Microscopic hematuria, Mitral regurgitation, Nausea & vomiting, Osteoarthritis of knee, Pneumonia, Rotator cuff tear, and Ventricular ectopy. Family / Caregiver Present: Yes(Daughter)  Referring Practitioner: Khadijah Gomez MD  Diagnosis: Lymphoma   Subjective  Subjective: Pt lying in bed upon arrival and agreeable to therapy.    Patient Currently in Pain: Denies  Pain Assessment  Pain Assessment: 0-10  Pain Level: 0  Vital Signs  Temp: 98.3 °F (36.8 °C)  Temp Source: Oral  Pulse: 87  Heart Rate Source: Monitor  Resp: 21  BP: 93/66  BP Location: Right upper arm  BP Upper/Lower: Upper  MAP (mmHg): 75  Level of Consciousness: Alert  MEWS Score: 3  Patient Currently in Pain: Denies  Oxygen Therapy  SpO2: 100 %  Pulse Oximeter Device Mode: Intermittent  Pulse Oximeter Device Location: Finger  O2 Device: Nasal cannula  O2 Flow Rate (L/min): 2 L/min  Social/Functional History  Social/Functional History  Lives With: Spouse  Type of Home: House  Home Layout: Two level, Bed/Bath upstairs(13 steps between levels with rail)  Home Access: Stairs to enter without rails  Entrance Stairs - Number of Steps: 2 OLLIE  Bathroom Shower/Tub: Walk-in shower  Bathroom Toilet: Standard  Bathroom Equipment: Shower chair, 3-in-1 commode, Toilet raiser  Home Equipment: Rolling walker, Cane  Receives Help From: Family  ADL Assistance: Independent  Homemaking Assistance: Independent  Homemaking Responsibilities: Yes  Ambulation Assistance: Independent  Transfer Assistance: Independent  Active : Yes  Mode of Transportation: Car  Additional Comments: Reports 1 \"fall\" involving slipping off side of bed when sitting. Objective   Vision: Impaired  Vision Exceptions: Wears glasses for reading  Hearing: Exceptions to [facility]  Hearing Exceptions: Hard of hearing/hearing concerns    Orientation  Overall Orientation Status: Within Functional Limits  Observation/Palpation  Posture: Good  Observation: BUE edema noted  Balance  Sitting Balance: Stand by assistance  Standing Balance: Dependent/Total(Mod + Min )  Standing Balance  Time: 1 min  Activity: bed to chair transfer  Functional Mobility  Functional - Mobility Device: Rolling Walker  Activity: (Bed to chair transfer )  Assist Level: (Mod + Min)  ADL  LE Dressing: Contact guard assistance(To doff/don R sock seated in chair )  Toileting: (Denied need)  Tone RUE  RUE Tone: Normotonic  Tone LUE  LUE Tone: Normotonic  Coordination  Movements Are Fluid And Coordinated: Yes  Quality of Movement Other  Comment: Edema noted in BUE     Bed mobility  Supine to Sit: Contact guard assistance(HOB elevated and bed rail used;  Increased time and cueing )  Scooting: Contact guard assistance(Increased time and cueing )  Comment: Pt seated in chair upon departure   Transfers  Stand Step Transfers: 2 Person assistance(Mod + Min)  Sit to stand: 2 Person assistance(Mod + Min)  Stand to sit: 2 Person assistance(Mod + Min )  Transfer Comments: Increased time to complete and cues for RW management      Cognition  Overall Cognitive Status: WFL                 LUE AROM (degrees)  LUE AROM : WFL  Left Hand AROM (degrees)  Left Hand AROM: WFL  RUE AROM (degrees)  RUE AROM : WFL  Right Hand AROM (degrees)  Right Hand AROM: WFL  LUE Strength  Gross LUE Strength: WFL  RUE Strength  Gross RUE Strength: WFL     Hand Dominance  Hand Dominance: Right     Treatment included ADL and transfer training. Plan   Plan  Times per week: 2-5  Times per day: Daily  Current Treatment Recommendations: Functional Mobility Training, Endurance Training, Patient/Caregiver Education & Training, Self-Care / ADL    AM-PAC Score        AM-[facility] Inpatient Daily Activity Raw Score: 16 (02/24/20 1544)  AM-PAC Inpatient ADL T-Scale Score : 35.96 (02/24/20 1544)  ADL Inpatient CMS 0-100% Score: 53.32 (02/24/20 1544)  ADL Inpatient CMS G-Code Modifier : CK (02/24/20 1544)    Goals  Short term goals                  No goals met. Time Frame for Short term goals: By discharge  Short term goal 1: Pt will transfer to the [facility] with min A   Short term goal 2: Pt will stand for 3 minutes with Min A during functional mobility/ADLs  Short term goal 3: Pt will complete LB dressing with SBA  Patient Goals   Patient goals : To go home       Therapy Time   Individual Concurrent Group Co-treatment   Time In 1413         Time Out 1444         Minutes 31         Timed Code Treatment Minutes: 21 Minutes    Total Tx Min: 31 Minutes     Therapist was present, directed the patient's care, made skilled judgment, and was responsible for assessment and treatment of the patient.     If patient is discharged prior to next treatment, this will serve as the discharge summary.     [address] [facility], [address], [place]

## 2020-02-24 NOTE — PROGRESS NOTES
Physical Therapy    Facility/Department: [address] [facility]  Initial Assessment / treatment    NAME: Zak Bolivar  : 1940  MRN: [id number]    Date of Service: 2020    Discharge Recommendations: Vincenza Leo Schirmer scored a 10/24 on the AM-PAC short mobility form. Current research shows that an AM-PAC score of 17 or less is typically not associated with a discharge to the patient's home setting. Based on the patients AM-PAC score and their current functional mobility deficits, it is recommended that the patient have 3-5 sessions per week of Physical Therapy at d/c to increase the patients independence. PT Equipment Recommendations  Equipment Needed: No  Other: defer to next level of care    Assessment   Body structures, Functions, Activity limitations: Decreased functional mobility ; Decreased strength;Decreased endurance;Decreased balance  Assessment: Pt is 70 y.o. male with new diagnoses of lymphoma, a-fib, and GI bleed. Pt is below his functional baseline, requiring 2 person assist for transfers and ambulation. Amb distance limited to just a few feet due to generalized weakness. Good effort and participation. Not safe to return home at this time. Rec continued IP PT. Treatment Diagnosis: decreased functional mobility, impaired gait, decreased endurance  Prognosis: Good  PT evaluation: medium complexity  PT Education: Goals; Home Exercise Program;PT Role;Plan of Care;General Safety;Gait Training;Family Education; Functional Mobility Training;Transfer Training  Patient Education: pt demonstrated understanding  REQUIRES PT FOLLOW UP: Yes       Patient Diagnosis(es): There were no encounter diagnoses.      has a past medical history of Aortic insufficiency, Atrial fibrillation ([address] Breast nodule, CAD (coronary artery disease), Cardiomyopathy ([address] CHF (congestive heart failure) ([address] Hyperlipidemia, Hypertension, Malignant lymphoma, non-Hodgkin's ([address] Microscopic hematuria, rail)  Home Access: Stairs to enter without rails  Entrance Stairs - Number of Steps: 2 OLLIE  Bathroom Shower/Tub: Walk-in shower  Bathroom Toilet: Standard  Bathroom Equipment: Shower chair, 3-in-1 commode, Toilet raiser  Home Equipment: Rolling walker, Cane  Receives Help From: Family  ADL Assistance: Independent  Homemaking Assistance: Independent  Homemaking Responsibilities: Yes  Ambulation Assistance: Independent  Transfer Assistance: Independent  Active : Yes  Mode of Transportation: Car  Additional Comments: Reports 1 \"fall\" involving slipping off side of bed when sitting. Cognition        Objective          AROM RLE (degrees)  RLE AROM: WFL  AROM LLE (degrees)  LLE AROM : WFL  Strength RLE  Comment: grossly 3+/5 throughout  Strength LLE  Comment: grossly 3+/5 throughout        Bed mobility  Supine to Sit: Contact guard assistance(HOB elevated, v/c and extra time)  Scooting: Contact guard assistance(extra time and cues)  Transfers  Sit to Stand: Dependent/Total(min + mod from EOB, chair)  Stand to sit: Dependent/Total(min + mod A)  Ambulation  Ambulation?: Yes  Ambulation 1  Device: Rolling Walker  Other Apparatus: O2(2L)  Assistance: Dependent/Total(mod + min A)  Quality of Gait: flexed trunk, slow and unsteady but no overt LOB, decreased step height and length, effortful  Distance: 2 ft, 4 ft fwd and back     Balance  Sitting - Static: Good  Sitting - Dynamic: Good  Standing - Static: Fair  Standing - Dynamic: Fair  Exercises  Comments: educated pt/family on APs, LAQ, seated marches - both verbalized understanding   Treatment included LE ex, gait and transfer training, pt education. Plan   Plan  Times per week: 2-5  Current Treatment Recommendations: Strengthening, Balance Training, Transfer Training, Gait Training, Stair training, Home Exercise Program, Safety Education & Training, Endurance Training  Safety Devices  Type of devices:  All fall risk precautions in place, Gait belt, Nurse notified,

## 2020-02-24 NOTE — PROGRESS NOTES
[facility] Daily Progress Note      Admit Date:  2/21/2020    Subjective:  Mr. Blanca Grullon was seen and examined. F/U Lymphoma/CHF/cardiomyopathy/AVR/AS/VT/afib. Nausea again this am. No cp/sob.      Objective:   /81   Pulse 70   Temp 97.5 °F (36.4 °C) (Oral)   Resp 12   Ht 6' 1\" (1.854 m)   Wt 183 lb 13.8 oz (83.4 kg)   SpO2 100%   BMI 24.26 kg/m²       Intake/Output Summary (Last 24 hours) at 2/24/2020 1234  Last data filed at 2/24/2020 0630  Gross per 24 hour   Intake 522.65 ml   Output 1000 ml   Net -477.35 ml       TELEMETRY: Sinus     Physical Exam:  General:  Awake, alert, NAD  Skin:  Warm and dry  Neck:  JVP difficult  Chest: decreased BS, respiration normal  Cardiovascular:  RRR S1S2  Abdomen:  Soft nontender  Extremities:  ++ edema    Medications:    ondansetron  4 mg Intravenous Once    tbo-filgrastim  300 mcg Subcutaneous QPM    megestrol  200 mg Oral Daily    hydrALAZINE  10 mg Oral 4x Daily    predniSONE  50 mg Oral Daily    allopurinol  200 mg Oral Daily    insulin lispro  0-6 Units Subcutaneous TID WC    insulin lispro  0-3 Units Subcutaneous Nightly    amiodarone  400 mg Oral Daily    aspirin  81 mg Oral Daily    atorvastatin  10 mg Oral Daily    pantoprazole  40 mg Oral QAM AC    [Held by provider] digoxin  125 mcg Oral Daily    lactobacillus  1 capsule Oral Daily with breakfast    mexiletine  200 mg Oral 3 times per day    midodrine  10 mg Oral TID WC    sodium chloride flush  10 mL Intravenous 2 times per day      dextrose       ondansetron, glucose, dextrose, glucagon (rDNA), dextrose, promethazine, lidocaine, sodium chloride flush, acetaminophen, acetaminophen, polyethylene glycol, magic (miracle) mouthwash with nystatin, prochlorperazine    Lab Data:  CBC:   Recent Labs     02/22/20  0436  02/23/20  0410 02/23/20  1025 02/23/20  1600 02/24/20  0539   WBC 3.4*  --  1.3*  --   --  2.0*   HGB 7.8*   < > 7.4* 7.9* 8.1* 8.3*   HCT 23.0*   < > 22.5* 24.0* 24.9*

## 2020-02-24 NOTE — PROGRESS NOTES
[employer] Progress Note    2020     Marbin Cazares    MRN: [id number]    : 1940    Referring MD: Asha Sandra MD  [address], [facility] [address]      SUBJECTIVE:  He is up in the chair, but still requires stimuli to engage in conversation.     ECOG PS:  (3) Capable of limited self-care, confined to bed or chair > 50% of waking hours    KPS: 40% Disabled; requires special care and assistance    Isolation: None    Medications    Scheduled Meds:   tbo-filgrastim  300 mcg Subcutaneous QPM    megestrol  200 mg Oral Daily    hydrALAZINE  10 mg Oral 4x Daily    predniSONE  50 mg Oral Daily    allopurinol  200 mg Oral Daily    insulin lispro  0-6 Units Subcutaneous TID WC    insulin lispro  0-3 Units Subcutaneous Nightly    amiodarone  400 mg Oral Daily    aspirin  81 mg Oral Daily    atorvastatin  10 mg Oral Daily    pantoprazole  40 mg Oral QAM AC    [Held by provider] digoxin  125 mcg Oral Daily    lactobacillus  1 capsule Oral Daily with breakfast    mexiletine  200 mg Oral 3 times per day    midodrine  10 mg Oral TID WC    sodium chloride flush  10 mL Intravenous 2 times per day     Continuous Infusions:   DOBUTamine 2.5 mcg/kg/min (20)    dextrose       PRN Meds:.ondansetron, glucose, dextrose, glucagon (rDNA), dextrose, promethazine, lidocaine, sodium chloride flush, acetaminophen, acetaminophen, polyethylene glycol, magic (miracle) mouthwash with nystatin, prochlorperazine    ROS:  As noted above, otherwise remainder of 10-point ROS negative    Physical Exam:     I&O:      Intake/Output Summary (Last 24 hours) at 2020 0958  Last data filed at 2020 0630  Gross per 24 hour   Intake 522.65 ml   Output 1250 ml   Net -727.35 ml       Vital Signs:  /79   Pulse 92   Temp 97.5 °F (36.4 °C) (Oral)   Resp 16   Ht 6' 1\" (1.854 m)   Wt 183 lb 13.8 oz (83.4 kg)   SpO2 100%   BMI 24.26 kg/m²     Weight:    Wt Readings from Last 3 Encounters: medication counseling    Primary localized osteoarthrosis of shoulder region    Hyponatremia    Primary localized osteoarthrosis, pelvic region and thigh    H/O total shoulder replacement    Hypercholesteremia    Finger amputation, traumatic, initial encounter    PAF (paroxysmal atrial fibrillation) (HCC)    Essential hypertension    Sepsis ([facility]    Septic shock (HCC)    Atrial fibrillation with RVR (HCC)    Elevated international normalized ratio (INR)    Streptococcal pharyngitis    Leukopenia    Chronic ethmoidal sinusitis    TYERLL (acute kidney injury) ([facility]    Ex-heavy cigarette smoker (20-39 per day)    Tobacco abuse counseling    Lymphadenopathy    Hypervolemia    Fever    Malignant lymphoma, non-Hodgkin's (HCC)    Metabolic encephalopathy    Lymphoma (HCC)    VT (ventricular tachycardia) (HCC)    Nonrheumatic aortic valve stenosis         TREATMENT:            Pending Brentuximab     ASSESSMENT AND PLAN:             1. MDS - This does not require treatment with a hypo-methylating agent at this point. However his baseline leukopenia and anemia would make giving chemotherapy extremely difficult as this would exacerbate the counts increases risk for infection. D + 2 G-CSF     2. T-cell angiommunoblastic lymphoma - . D3 prednisone 50 mg daily. Will review path with Dr Azael Carvalho. If his NHL is CD 30 + and his PS improves qwill consider brentuximab if it can be obtained as an inpatient. Working with administration to determine how the drug can be administered.       Jona Valdovinos MD

## 2020-02-24 NOTE — PROGRESS NOTES
Pt remains nauseous. Zofran, compazine, and phenergan have been given (see MAR). Holding all AM meds at this time. Will reevaluate nausea in another 30 min. VSS on 2.5 mcg/kg/min dobutamine. Pt was able to get up to the chair with lele steady and 1x assist. Will cont to monitor.

## 2020-02-24 NOTE — PROGRESS NOTES
prochlorperazine (COMPAZINE) 10 MG/2ML SOLN injection Infuse 2 mLs intravenously every 6 hours as needed (Vomiting) 240 mL     digoxin (LANOXIN) 125 MCG tablet Take 1 tablet by mouth daily 30 tablet 3    sodium chloride (OCEAN, BABY AYR) 0.65 % nasal spray 1 spray by Nasal route as needed for Congestion  0    lidocaine 4 % external patch Place 1 patch onto the skin daily as needed (as needed for pain)      neomycin-bacitracin-polymyxin (NEOSPORIN) 400-5-5000 ointment Apply topically 2 times daily.       furosemide (LASIX) 20 MG tablet Take 1 tablet by mouth every other day 60 tablet 3    polyethylene glycol (GLYCOLAX) packet Take 17 g by mouth 2 times daily 527 g 1    midodrine (PROAMATINE) 10 MG tablet Take 1 tablet by mouth 3 times daily (with meals) 90 tablet 3    pantoprazole (PROTONIX) 40 MG tablet Take 1 tablet by mouth every morning (before breakfast) 30 tablet 3    atorvastatin (LIPITOR) 10 MG tablet TAKE 1 TABLET BY MOUTH ONE TIME A DAY  90 tablet 2    aspirin 81 MG tablet Take 81 mg by mouth daily          Scheduled Meds:   tbo-filgrastim  300 mcg Subcutaneous QPM    megestrol  200 mg Oral Daily    hydrALAZINE  10 mg Oral 4x Daily    predniSONE  50 mg Oral Daily    allopurinol  200 mg Oral Daily    insulin lispro  0-6 Units Subcutaneous TID WC    insulin lispro  0-3 Units Subcutaneous Nightly    amiodarone  400 mg Oral Daily    aspirin  81 mg Oral Daily    atorvastatin  10 mg Oral Daily    pantoprazole  40 mg Oral QAM AC    [Held by provider] digoxin  125 mcg Oral Daily    lactobacillus  1 capsule Oral Daily with breakfast    mexiletine  200 mg Oral 3 times per day    midodrine  10 mg Oral TID     sodium chloride flush  10 mL Intravenous 2 times per day     Continuous Infusions:   DOBUTamine 2.5 mcg/kg/min (02/23/20 1618)    dextrose       PRN Meds:ondansetron, glucose, dextrose, glucagon (rDNA), dextrose, promethazine, lidocaine, sodium chloride flush, acetaminophen, Social History:  Reviewed. reports that he quit smoking about 49 years ago. He has a 40.00 pack-year smoking history. He has never used smokeless tobacco. He reports current alcohol use of about 2.0 standard drinks of alcohol per week. He reports that he does not use drugs. Family History:  Reviewed. family history includes Cancer in his brother; Diabetes in his sister; Heart Disease in his brother, sister, and sister; Marfan Syndrome in his brother; Stroke in his sister. Review of System:    · Constitutional: No fevers, chills. · Eyes: No visual changes or diplopia. No scleral icterus. · ENT: No Headaches. No mouth sores or sore throat. · Cardiovascular: No for chest pain, No for dyspnea on exertion, No for palpitations or No for loss of consciousness. No cough, hemoptysis, No for pleuritic pain, or phlebitis. · Respiratory: No for cough or wheezing. No hematemesis. · Gastrointestinal: No abdominal pain, blood in stools. · Musculoskeletal: Yes gait disturbance,  Generalized weakness  · Integumentary: No rash or pruritis. · Neurological: No headache, change in muscle strength, numbness or tingling. · Psychiatric: No anxiety, or depression. Physical Examination:  Vitals:    20 0800   BP: 102/79   Pulse: 92   Resp: 16   Temp: 97.5 °F (36.4 °C)   SpO2: 100%      In: 343.5 [P.O.:240; I.V.:103.5]  Out: 1000    Wt Readings from Last 3 Encounters:   20 183 lb 13.8 oz (83.4 kg)   20 184 lb 15.5 oz (83.9 kg)   20 178 lb (80.7 kg)     Temp  Av.8 °F (36.6 °C)  Min: 97.5 °F (36.4 °C)  Max: 98.2 °F (36.8 °C)  Pulse  Av.4  Min: 60  Max: 101  BP  Min: 85/64  Max: 124/80  SpO2  Av.4 %  Min: 88 %  Max: 100 %    Intake/Output Summary (Last 24 hours) at 2020 0965  Last data filed at 2020 0630  Gross per 24 hour   Intake 522.65 ml   Output 1250 ml   Net -727.35 ml       · Constitutional: Oriented. No distress. · Head: Normocephalic and atraumatic.

## 2020-02-24 NOTE — PLAN OF CARE
Problem: Nutrition  Goal: Optimal nutrition therapy  2/24/2020 1310 by Adrian Ramríez RD, ADIA  Outcome: Ongoing   Nutrition Problem: Inadequate oral intake  Intervention: Food and/or Nutrient Delivery: Continue current diet, Continue current ONS  Nutritional Goals: Pt to meet >50% of nutritional requirements from diet and ONS

## 2020-02-24 NOTE — CONSULTS
[facility]  Palliative Medicine Consultation Note      Date Of Admission:2/21/2020  Date of consult: 02/24/20  Seen by [facility] in the past:  No    Recommendations:        Pt awakens easily and is oriented, but he slept through most of the visit. Spoke with kuldeep Ann at the bedside and introduced palliative care. She asked about discharge planning for home vs ARU or SNF, and we discussed that clarification of goals of care will help determine best discharge options. Answered Katherine's questions about home care services including home health care and home PT/OT vs home hospice. She thinks her dad would do anything he's offered to try and get better, but that he also has made comments about having a poor prognosis and he's said he doesn't \"want to die in this hospital.\" Offered a family meeting and Reilly Ann will talk with her mother and sister about setting up a family meeting for tomorrow. Reviewed advance directives on the chart- wife Lily Segura is pt's health care power of  and daughter Nell Mohs is alternate agent. 1. Goals of Care/Advanced Care planning/Code status: Full code. Discussed goals and code status with daughter Reilly Ann today and she'd like to wait until her mother is present to discuss with pt.  2. Pain: pt denies pain and does not appear to be in distress. 3. SOB: denies sob and is breathing comfortably on 2 L oxygen. 4. Nausea: pt c/o nausea. He has been taking zofran, compazine, and phenergan and he's not sure which is effective if any. Family does not think zofran has been effective. RN to give phenergan now. 5. Generalized weakness: pt c/o severe weakness and would like to try working with therapy. Daughter asking about ARU vs SNF.   6. Disposition: to be determined    Reason for Consult:         __x___ Goals of Care  _____ Code Status Discussion/Advanced Care Planning   _____ Psychosocial/Family Support  _____ Symptom Management  _____ Other (Specify)    Requesting Physician:  Laterality Date    AORTIC VALVE REPLACEMENT  8/3/2012    moderate mitral regurg    AXILLARY SURGERY Left 2/9/2020    AXILLARY LYMPH NODE BIOPSY DISSECTION performed by Queenie Huang MD at [address] [facility]      chest    ENDOSCOPY, COLON, DIAGNOSTIC      INGUINAL HERNIA REPAIR      bilateral in [address]      both    LYMPH NODE BIOPSY Left 2/3/2020    INGUINAL LYMPH NODE BIOPSY EXCISION performed by Queenie Huang MD at [address]  8/2/12    excision right breast mass (lumpectomy)    SHOULDER ARTHROPLASTY Right 2/2/15    right reverse total shoulder arthroplasty    SIGMOIDOSCOPY N/A 2/10/2020    SIGMOIDOSCOPY BIOPSY FLEXIBLE performed by Robert England MD at [address] Left 12/4/13    TOTAL KNEE ARTHROPLASTY Right 2/3/14    RIGHT TOTAL KNEE REPLACEMENT-BAR       UPPER GASTROINTESTINAL ENDOSCOPY  12/24/13    UPPER GASTROINTESTINAL ENDOSCOPY N/A 2/10/2020    EGD BIOPSY performed by Robert England MD at [address] [facility] ENDOSCOPY       Current Medications:    Current Facility-Administered Medications: Tbo-Filgrastim (GRANIX) injection 300 mcg, 300 mcg, Subcutaneous, QPM  ondansetron (ZOFRAN) injection 4 mg, 4 mg, Intravenous, Q6H PRN  megestrol (MEGACE) 40 MG/ML suspension 200 mg, 200 mg, Oral, Daily  hydrALAZINE (APRESOLINE) tablet 10 mg, 10 mg, Oral, 4x Daily  predniSONE (DELTASONE) tablet 50 mg, 50 mg, Oral, Daily  allopurinol (ZYLOPRIM) tablet 200 mg, 200 mg, Oral, Daily  DOBUTamine (DOBUTREX) 500 mg in dextrose 5 % 250 mL infusion, 2.5 mcg/kg/min, Intravenous, Continuous  insulin lispro (1 Unit Dial) 0-6 Units, 0-6 Units, Subcutaneous, TID WC  insulin lispro (1 Unit Dial) 0-3 Units, 0-3 Units, Subcutaneous, Nightly  glucose (GLUTOSE) 40 % oral gel 15 g, 15 g, Oral, PRN  dextrose 50 % IV solution, 12.5 g, Intravenous, PRN  glucagon (rDNA) injection 1 mg, 1 mg, Intramuscular, PRN  dextrose 5 % solution, 100 mL/hr, Intravenous, PRN  amiodarone (CORDARONE) tablet 400 mg, 400 mg, Oral, Daily  aspirin EC tablet 81 mg, 81 mg, Oral, Daily  atorvastatin (LIPITOR) tablet 10 mg, 10 mg, Oral, Daily  promethazine (PHENERGAN) injection 12.5 mg, 12.5 mg, Intravenous, Q6H PRN  pantoprazole (PROTONIX) tablet 40 mg, 40 mg, Oral, QAM AC  [Held by provider] digoxin (LANOXIN) tablet 125 mcg, 125 mcg, Oral, Daily  lactobacillus (CULTURELLE) capsule 1 capsule, 1 capsule, Oral, Daily with breakfast  lidocaine 4 % external patch 1 patch, 1 patch, Transdermal, Daily PRN  mexiletine (MEXITIL) capsule 200 mg, 200 mg, Oral, 3 times per day  midodrine (PROAMATINE) tablet 10 mg, 10 mg, Oral, TID WC  sodium chloride flush 0.9 % injection 10 mL, 10 mL, Intravenous, 2 times per day  sodium chloride flush 0.9 % injection 10 mL, 10 mL, Intravenous, PRN  acetaminophen (TYLENOL) tablet 650 mg, 650 mg, Oral, Q6H PRN  acetaminophen (TYLENOL) suppository 650 mg, 650 mg, Rectal, Q6H PRN  polyethylene glycol (GLYCOLAX) packet 17 g, 17 g, Oral, Daily PRN  magic (miracle) mouthwash with nystatin, 5 mL, Swish & Spit, 4x Daily PRN  prochlorperazine (COMPAZINE) injection 10 mg, 10 mg, Intravenous, Q6H PRN    Allergies:  Clindamycin/lincomycin;  Levaquin [levofloxacin]; and Cefdinir    Social History:    MARITAL STATUS:    Patient currently lives with family - wife    Review of Systems -   General ROS: positive for  - fatigue  Psychological ROS: negative  ENT ROS: negative  Hematological and Lymphatic ROS: positive for - blood transfusions and fatigue  Respiratory ROS: no cough, shortness of breath, or wheezing  Cardiovascular ROS: no chest pain or dyspnea on exertion  Gastrointestinal ROS: positive for - appetite loss and nausea/vomiting  Genito-Urinary ROS: no dysuria, trouble voiding, or hematuria  Musculoskeletal ROS: positive for - muscular weakness  Neurological ROS: no TIA or stroke symptoms    Objective:        PHYSICAL EXAM:    General

## 2020-02-24 NOTE — FLOWSHEET NOTE
02/24/20 1354   Encounter Summary   Services provided to: Patient; Family   Referral/Consult From: Family   Continue Visiting   (es 2/24)   Complexity of Encounter Low   Length of Encounter 15 minutes   Routine   Type Initial   Assessment Approachable   Intervention Active listening;Placed on communion list;Contacted support as requested per patient/family request   Who? fr lynn   Why? Congregation pt requesting  and communion   At Request Of patient   Outcome Receptive;Engaged in conversation

## 2020-02-24 NOTE — CARE COORDINATION
Case Management Assessment           Daily Note                 Date/ Time of Note: 2/24/2020 6:18 PM         Note completed by: Dianne Molina    Patient Name: iDpak Santo  YOB: 1940    Diagnosis:Lymphoma, unspecified body region, unspecified lymphoma type ([address] [C85.90]  Patient Admission Status: Inpatient    Date of Admission:2/21/2020  6:23 PM Length of Stay: 3 GLOS:        Current Plan of Care: Transfer from [facility]. Hem/Onc second opinion. EDG completed due JOSEF joseph. Off pressors. Plan for continued treatment [facility] [address].   ________________________________________________________________________________________  PT AM-PAC: 10 / 24 per last evaluation on: 2/24    OT AM-PAC: 16 / 24 per last evaluation on: 2/24    DME Needs for discharge: Defer   ________________________________________________________________________________________  Discharge Plan: To Be Determined DUE TO: Facility (SNF vs ARU)  vs. Comfort    Tentative discharge date: TBD    Current barriers to discharge: Medical Clearance, goals of care     Referrals completed: Not Applicable    Resources/ information provided: Not indicated at this time  ________________________________________________________________________________________  Case Management Notes:SW rounded on this date. Patient moved off floor prior to SW being able to assess directly. Chart review completed. Patient is a transfer from [facility]. Family was looking at SNF placement options prior to transfer. Patient transferred to [address] [place] [address] for further treatment and care. Therapy recs for SNF. Elvis Valdez and his family were provided with choice of provider; he and his family are in agreement with the discharge plan.     Care Transition Patient: VON Leggett  The [address] [place] [address] ICU  Case Management Department  Ph: [phone]

## 2020-02-24 NOTE — PROGRESS NOTES
ICU Progress Note    Admit Date: 2/21/2020  Vent Day: None  IV Access:Peripheral  IV Fluids:None  Vasopressors: Dobutamine              Antibiotics: None  Diet: DIET DYSPHAGIA MINCED AND MOIST; No Drinking Straw  Dietary Nutrition Supplements: Standard High Calorie Oral Supplement, Clear Liquid Oral Supplement, Frozen Oral Supplement    CC: Fever and hypotension, presented from [facility]    Interval history: Reports nausea and clear, non bloody non bilious vomiting. Patient denies CP, SOB, palpitations. Denies lightheadedness, fevers, chills. Denies abdominal pain.       Medications:     Scheduled Meds:   tbo-filgrastim  300 mcg Subcutaneous QPM    megestrol  200 mg Oral Daily    hydrALAZINE  10 mg Oral 4x Daily    predniSONE  50 mg Oral Daily    allopurinol  200 mg Oral Daily    insulin lispro  0-6 Units Subcutaneous TID WC    insulin lispro  0-3 Units Subcutaneous Nightly    amiodarone  400 mg Oral Daily    aspirin  81 mg Oral Daily    atorvastatin  10 mg Oral Daily    pantoprazole  40 mg Oral QAM AC    [Held by provider] digoxin  125 mcg Oral Daily    lactobacillus  1 capsule Oral Daily with breakfast    mexiletine  200 mg Oral 3 times per day    midodrine  10 mg Oral TID WC    sodium chloride flush  10 mL Intravenous 2 times per day     Continuous Infusions:   DOBUTamine 2.5 mcg/kg/min (02/23/20 5461)    dextrose       PRN Meds:ondansetron, glucose, dextrose, glucagon (rDNA), dextrose, promethazine, lidocaine, sodium chloride flush, acetaminophen, acetaminophen, polyethylene glycol, magic (miracle) mouthwash with nystatin, prochlorperazine    Objective:   Vitals:   T-max:  Patient Vitals for the past 8 hrs:   BP Temp Temp src Pulse Resp SpO2   02/24/20 0800 102/79 97.5 °F (36.4 °C) Oral 92 16 100 %   02/24/20 0700 105/81 -- -- 81 18 100 %   02/24/20 0630 114/79 -- -- 76 18 100 %   02/24/20 0600 102/64 -- -- 72 20 90 %   02/24/20 0544 -- -- -- -- 18 100 %   02/24/20 0530 120/72 -- -- 67 17 100 % be in the setting of cardiogenic shock, now on dobutamine and BP stable in the 100s. Shock  Infectious workup negative, off abx   Initially thought to be cardiogenic - Has history of VTach, low EF (35-40% on 2/20), severe aortic stenosis. Cath report unavailable from 8/19. Troponin on admission 0.04  BP improved since starting steroids, thus could be in the setting of distributive shock 2/2 systemic inflammatory response from lymphoma  On dobutamine, now at 2.5 mcg/kg/hr, SBP 100s  - Wean dobutamine as tolerated, off levophed since 2/23 am  - Continue amiodarone, mexiletene  - Cardiology following, appreciate recs    Angioimmunoblastic T cell lymphoma  Heme onc following, has hx of MDS and thus not a candidate for chemotherapy. Also has cardiomyopathy which precludes use of anthracycline. Started on G-CSF for improvement of cell counts   Started on prednisone as presented with hypotension that could be in the setting of infection vs NHL causing systemic and AI side effects  Uric acid 4.7 --> 6.7, potassium and phos not elevated   - Continue G-CSF, prednisone (day 3)  - Discussed with Dr. Rush Arnold - will see if CD30+ to see if brentuximab can be started; likely plan to administer medication outpatient per Dr. Rush Arnold    Fevers  Presented with fevers and hypotension, ID workup at [facility] did not identify infectious source  Was on vanc and aztreonam that were dc 2/21  No fevers overnight. Fevers thought to be in the setting of lymphoma causing systemic immune response  - Continue off abx per ID    GI bleed with anemia  Had blood streaked stool at [facility]. No further episodes overnight, H&H stable  EGD with no active upper GI bleed, patchy gastritis, flex sig with shallow rectal ulcerations, stercoral vs viral ulcers, mildly enlarged internal hemorrhoids  - Change H&H to daily if next one stable  - Discuss with GI on resuming coumadin  - PPI daily    TYRELL on CKD 3A - TYRELL resolved  Cr stable at 1.3 today.  Baseline Cr 0.9-1.1, good UO 1.25 L yesterday  Thought to be cardiorenal in the setting of shock  - Daily BMP    Systolic HF  - Continue aspirin, lipitor  - Weaning dobutamine, holding digoxin  - Continue hydralazine 10 mg QID, midodrine     Hx Vtach  Stable, HR well controlled  - Continue amiodarone, mexiletene    A fib   Now rate controlled  - Continue amiodarone, digoxin  - Hold off AC in setting of recent GI bleed, will discuss with GI on timeline to resume    Code Status: Full  FEN: Dysphagia diet  PPX: Protonix  DISPO:     Katherin Travis MD, PGY-1  02/24/20  9:10 AM    This patient has been staffed and discussed with Sanna Cazares MD.     [address] [facility]    Patient seen and examined. I agree with Dr. Ina Gerardo history, physical, lab findings, assessment and plan. Assessment  1. Cardiogenic shock - better. He has reduced EF 35% and severe AS. He remains on dobutamine 2.5 mcg/kg/min with /70  2. Angioimmunoblastic T cell lymphoma.    3. Fever -resolved. Off antibiotics. Thought secondary to malignancy  4. TYRELL thought to be cardiorenal - improving. UOP 1.2 L  5 V. Tach s/p cardioversion. 6. Leukopenia   7. Anasarca  8. Nausea with anorexia    Plan  1. Stop dobutamine  2. Increase Zofran to 8 mg IV as needed nausea vomiting  3. Check LFTs with lipase  4. Lasix 40 mg IV x1  5. Continue prednisone 50 mg daily  6.   Transfer to [facility]    Discussed with Dr. Nam Flores

## 2020-02-25 NOTE — CONSULTS
Pharmacy is consulted to dose Warfarin per Dr. Jana Joseph with [facility] team per cardiology rec's. [facility] pharmacist spoke with Mita Holloway NP - she states [facility] team does not want patient started on Warfarin. Stated to continue with Enoxaparin for now. Will sign off Warfarin dosing consult.       Please call with questions--  Thanks--  Maria L Early, PharmD, [place], [facility] or [phone] (wireless)  2/25/2020 1:55 PM

## 2020-02-25 NOTE — PLAN OF CARE
Transfusion not indicated at this time. Patient verbalizes understanding of all instructions. Will continue to assess and implement POC. Call light within reach and hourly rounding in place.

## 2020-02-25 NOTE — PROGRESS NOTES
assist x 1 from bed (twice). Cues for hand placement. Stand to sit: Min assist x 1 with cues for hand placement and for controlled descent    Ambulation  Assistance Level: Min assist  Assistive device: Wheeled walker  Distance: 3 ft, 10 ft, 12 ft. Seated rest breaks between walks. Quality of gait: Weak; decreased pace; moderate reliance on walker for support; mildly unsteady at times    Balance  Static stance with walker CGA  Ambulation with wheeled walker Min assist  Sat EOB with SBA    Patient Education  Hand placement with transfers when using walker. Needs cues/reinforcement. Calling for assist with needs. Expressed understanding. Safety Devices  Pt left with needs in reach. In chair (reclined) with chair alarm on. RN updated. AM-PAC score  AM-PAC Inpatient Mobility Raw Score : 16  AM-PAC Inpatient T-Scale Score : 40.78  Mobility Inpatient CMS 0-100% Score: 54.16  Mobility Inpatient CMS G-Code Modifier : CK    Goals: (as determined and assessed by primary PT)  Time Frame for Short term goals: To be met prior to discharge  Short term goal 1: Bed mobility with supervision  ongoing   Short term goal 2: Sit to/from stand with CGA   Short term goal 3: Ambulate 100 feet with RW and CGA    Plan:  Times per week: 2-5;    Current Treatment Recommendations: Strengthening, Balance Training, Transfer Training, Gait Training, Stair training, Home Exercise Program, Safety Education & Training, Endurance Training    Therapy Time    Individual  Concurrent  Group  Co-treatment    Time In  1320            Time Out  1359            Minutes  39              Timed Code Treatment Minutes: 39  Total Treatment Minutes: 39    Will continue per plan of care. If patient is discharged prior to next treatment, this note will serve as the discharge summary.     [place], [place] #[id number]

## 2020-02-25 NOTE — PROGRESS NOTES
and Statin for CAD  Anticoagulation for AF and heart failure  No tobacco use.      All questions and concerns were addressed to the patient/family. Alternatives to my treatment were discussed. The note was completed using EMR.  Every effort was made to ensure accuracy; however, inadvertent computerized transcription errors may be present.      Karo Pacheco

## 2020-02-25 NOTE — PROGRESS NOTES
4 Eyes Skin Assessment     The patient is being assess for  Shift Handoff    I agree that 2 RN's have performed a thorough Head to Toe Skin Assessment on the patient. ALL assessment sites listed below have been assessed. Areas assessed by both nurses:   [x]   Head, Face, and Ears ; abrasion noted to nose  [x]   Shoulders, Back, and Chest; petechiae to back   [x]   Arms, Elbows, and Hands   [x]   Coccyx, Sacrum, and IschIum  [x]   Legs, Feet, and Heels        Does the Patient have Skin Breakdown?   Yes LDA WOUND CARE was Initiated documentation include the Kathryn-wound, Wound Assessment, Measurements, Dressing Treatment, Drainage, and Color\",         Rickey Prevention initiated:  Yes   Wound Care Orders initiated:  Yes      [id number] nurse consulted for Pressure Injury (Stage 3,4, Unstageable, DTI, NWPT, and Complex wounds), New and Established Ostomies:  NA      Electronically signed by Ryan Yang RN on 2/25/2020 at 6:00 PM  Electronically signed by Edwige Alicea RN on 2/26/2020 at 6:29 AM    **SHARE this note so that the co-signing nurse is able to place an eSignature**

## 2020-02-25 NOTE — PROGRESS NOTES
[facility] Daily Progress Note      Admit Date:  2/21/2020    Subjective:  Mr. Don Prince was seen and examined. F/U Lymphoma/CHF/cardiomyopathy/AVR/AS/VT/afib. No nausea this am.  No cp/sob. Objective:   /67   Pulse 83   Temp 97.6 °F (36.4 °C) (Oral)   Resp 18   Ht 6' 1\" (1.854 m)   Wt 183 lb 13.8 oz (83.4 kg)   SpO2 99%   BMI 24.26 kg/m²       Intake/Output Summary (Last 24 hours) at 2/25/2020 1343  Last data filed at 2/25/2020 1300  Gross per 24 hour   Intake 360 ml   Output 1050 ml   Net -690 ml       TELEMETRY: Sinus     Physical Exam:  General:  Awake, alert, NAD  Skin:  Warm and dry  Neck:  JVP difficult  Chest: decreased BS, respiration normal  Cardiovascular:  RRR S1S2  Abdomen:  Soft nontender  Extremities:  ++ edema    Medications:    Venelex   Topical BID    enoxaparin  80 mg Subcutaneous BID    predniSONE  50 mg Oral Daily    allopurinol  200 mg Oral Daily    insulin lispro  0-6 Units Subcutaneous TID WC    insulin lispro  0-3 Units Subcutaneous Nightly    amiodarone  400 mg Oral Daily    aspirin  81 mg Oral Daily    atorvastatin  10 mg Oral Daily    pantoprazole  40 mg Oral QAM AC    [Held by provider] digoxin  125 mcg Oral Daily    mexiletine  200 mg Oral 3 times per day    midodrine  10 mg Oral TID WC    sodium chloride flush  10 mL Intravenous 2 times per day      dextrose       prochlorperazine **OR** prochlorperazine, ondansetron **OR** ondansetron, glucose, dextrose, glucagon (rDNA), dextrose, lidocaine, sodium chloride flush, acetaminophen, acetaminophen, polyethylene glycol, magic (miracle) mouthwash with nystatin    Lab Data:  CBC:   Recent Labs     02/23/20  0410  02/23/20  1600 02/24/20  0539 02/25/20  0348   WBC 1.3*  --   --  2.0* 2.9*   HGB 7.4*   < > 8.1* 8.3* 7.8*   HCT 22.5*   < > 24.9* 24.8* 23.7*   MCV 84.4  --   --  83.9 84.8     --   --  173 162    < > = values in this interval not displayed.      BMP:   Recent Labs     02/23/20  0412 02/24/20  0538 02/25/20  0348   * 135* 139   K 4.9 4.5 4.4   CL 95* 96* 99   CO2 27 26 30   PHOS 2.8 2.2* 3.4   BUN 57* 59* 59*   CREATININE 1.3 1.3 1.4*     LIVER PROFILE:   Recent Labs     02/24/20  0538   AST 16   ALT 15   LIPASE 24.0   BILIDIR 0.3   BILITOT 0.7   ALKPHOS 78     PT/INR:   No results for input(s): PROTIME, INR in the last 72 hours. APTT:   No results for input(s): APTT in the last 72 hours. BNP:  No results for input(s): BNP in the last 72 hours.   IMAGING:     Assessment:  Patient Active Problem List    Diagnosis Date Noted    Hypotension 11/28/2012     Priority: High    Generalized weakness     Encounter for palliative care     Goals of care, counseling/discussion     VT (ventricular tachycardia) ([facility] 02/22/2020    Nonrheumatic aortic valve stenosis 02/22/2020    Lymphoma ([facility] 02/21/2020    Fever     Malignant lymphoma, non-Hodgkin's (HCC)     Metabolic encephalopathy     Tobacco abuse counseling     Lymphadenopathy     Hypervolemia     Sepsis ([address] 01/28/2020    Septic shock (HCC)     Atrial fibrillation with RVR ([address]     Elevated international normalized ratio (INR)     Streptococcal pharyngitis     Leukopenia     Chronic ethmoidal sinusitis     TYRELL (acute kidney injury) ([address]     Ex-heavy cigarette smoker (20-39 per day)     PAF (paroxysmal atrial fibrillation) ([facility] 09/06/2019    Essential hypertension 09/06/2019    Finger amputation, traumatic, 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 Primary localized osteoarthrosis, pelvic region and thigh 05/04/2015    H/O total shoulder replacement 05/04/2015    Hyponatremia 04/22/2015    Primary localized osteoarthrosis of shoulder region 02/02/2015    Encounter for medication counseling 01/13/2014    Arthritis of knee 12/05/2013    Acute on chronic combined systolic and diastolic congestive heart failure ([address] 03/17/2013    S/P AVR (aortic valve replacement) 09/26/2012    Nausea 2012    Elevated troponin 2012    Cardiomyopathy ([facility] 2012    Nonrheumatic aortic valve insufficiency 2012    Mitral regurgitation 2012    Microscopic hematuria 2012     Imp: Lymphoma/CHF/cardiomyop/AVR/AS/VT/Afib    Plan:  No nausea this am.   Denies chest pain/sob. Remains sinus. Bp marginal early AM. Better when saw him. Off . Rhythm appears stable. EP following.     Core Measures:  · Discharge instructions:   · LVEF documented:   · ACEI for LV dysfunction:   · Smoking Cessation:    Jerry Chin MD 2020 1:43 PM

## 2020-02-25 NOTE — PROGRESS NOTES
4 Eyes Skin Assessment     The patient is being assess for  Shift Handoff    I agree that 2 RN's have performed a thorough Head to Toe Skin Assessment on the patient. ALL assessment sites listed below have been assessed. Areas assessed by both nurses:   [x]   Head, Face, and Ears ; abrasion noted to nose  [x]   Shoulders, Back, and Chest; petechiae to back   [x]   Arms, Elbows, and Hands   [x]   Coccyx, Sacrum, and IschIum  [x]   Legs, Feet, and Heels        Does the Patient have Skin Breakdown?   Yes LDA WOUND CARE was Initiated documentation include the Kathryn-wound, Wound Assessment, Measurements, Dressing Treatment, Drainage, and Color\",         Rickey Prevention initiated:  Yes   Wound Care Orders initiated:  Yes      [id number] nurse consulted for Pressure Injury (Stage 3,4, Unstageable, DTI, NWPT, and Complex wounds), New and Established Ostomies:  NA      Nurse 1 eSignature: Electronically signed by Fermin Alanis RN on 2/25/20 at 4:09 AM    **SHARE this note so that the co-signing nurse is able to place an eSignature**    Nurse 2 eSignature: Electronically signed by Urvashi Marino RN on 2/25/20 at 7:55 AM

## 2020-02-25 NOTE — PROGRESS NOTES
[address] Progress Note    2020     Heladio Johnson    MRN: [id number]    : 1940    Referring MD: Claudeen Nap, MD  [address] [place] [facility]    SUBJECTIVE: he is weak and tired but overall feels \"ok\".      ECOG PS:  (3) Capable of limited self-care, confined to bed or chair > 50% of waking hours    KPS: 40% Disabled; requires special care and assistance    Isolation: None    Medications    Scheduled Meds:   ondansetron  4 mg Intravenous Once    tbo-filgrastim  300 mcg Subcutaneous QPM    megestrol  200 mg Oral Daily    hydrALAZINE  10 mg Oral 4x Daily    predniSONE  50 mg Oral Daily    allopurinol  200 mg Oral Daily    insulin lispro  0-6 Units Subcutaneous TID WC    insulin lispro  0-3 Units Subcutaneous Nightly    amiodarone  400 mg Oral Daily    aspirin  81 mg Oral Daily    atorvastatin  10 mg Oral Daily    pantoprazole  40 mg Oral QAM AC    [Held by provider] digoxin  125 mcg Oral Daily    lactobacillus  1 capsule Oral Daily with breakfast    mexiletine  200 mg Oral 3 times per day    midodrine  10 mg Oral TID WC    sodium chloride flush  10 mL Intravenous 2 times per day     Continuous Infusions:   dextrose       PRN Meds:.ondansetron, glucose, dextrose, glucagon (rDNA), dextrose, promethazine, lidocaine, sodium chloride flush, acetaminophen, acetaminophen, polyethylene glycol, magic (miracle) mouthwash with nystatin, prochlorperazine    ROS:  As noted above, otherwise remainder of 10-point ROS negative    Physical Exam:     I&O:      Intake/Output Summary (Last 24 hours) at 2020 0720  Last data filed at 2020 0630  Gross per 24 hour   Intake --   Output 500 ml   Net -500 ml       Vital Signs:  BP 89/63   Pulse 80   Temp 98.2 °F (36.8 °C) (Oral)   Resp 20   Ht 6' 1\" (1.854 m)   Wt 183 lb 13.8 oz (83.4 kg)   SpO2 99%   BMI 24.26 kg/m²     Weight:    Wt Readings from Last 3 Encounters:   20 183 lb 13.8 oz (83.4 kg)   20 184 lb 15.5 node  (SFS-) show mode architecture effaced by polymorphous infiltrate  composed of lymphocytes, histiocytes, plasma cells, eosinophils, and an  increase in the number of high-endothelial venules. Sinuses are patent. The submitted immunostains (CD20, PAX5, CD3, BCL-6, CD10, CD21, CD45,  CD30, CD15, CD4, CD5, CD7, CD8, CD57) and JYOTHI for EBV (RICHARD) are  reviewed. Additional stains (CD21, CD3, CD79a, PD-1, ICOS, CCD23) and JYOTHI  for EBV (RICHARD) are performed at the [facility]. CD20 and PAX5 show markedly  fragmented B-cell areas and regressed follicles. CD79a shows similar  distribution and additionally marks cells with plasmacytoid morphology. CD3, CD4, CD5, and CD7 show abundant T-cells and highlights a mild  cytologic atypia. CD8 marks numerous cells (CD4 ~ CD8). PD-1 reveals  abundant Follicular Dent T-cells, and a somewhat similar pattern is  noted for ICOS. CD23 shows a disorganized and expanded dendritic cell  meshwork in the paracortex, not necessarily associated with B-cell areas. CD30 marks large scattered cells, positive for CD20, PAX-5 (dim), and  negative for CD15, CD10, and BCL-6. CD57 is noncontributory. RICHARD is  positive in cells ranging in size. Per reports, flow cytometry in the axillary node (F-, 02/10/2020)  shows small-size T-cells (6% of total lymphocytes) positive for CD2, CD4,  CD5, and CD7, and negative for sCD3, TCR alpha/beta, and TCR gamma/delta. In summary, the morphologic and immunohistochemistry findings are  consistent with the above diagnosis. 2. GI Biopsies 2/10/20:    A. Gastroesophageal junction, biopsy:       - Columnar mucosa with mild chronic inactive inflammation       - No evidence of intestinal metaplasia, dysplasia, or malignancy       B. Stomach, biopsy:       - Gastric mucosa with no pathologic change       - No evidence of intestinal metaplasia, dysplasia, or malignancy       - No morphologic evidence of helicobacter pylori infection       C. Esophagus, biopsy:       - Squamous mucosa with focal mild chronic inflammation       - No evidence of increased eosinophils, dysplasia, or malignancy       D. Ulcer, rectum, biopsy:       - Colonic mucosa with extensive ulceration and associated acute         inflammation       - No evidence of granulomas, dysplasia or malignancy       - No evidence of specific viral infection, as supported by negative         CMV and HSV immunohistochemical staining with appropriate controls    DIAGNOSTIC IMAGIN. CT Chest 20:  1. Extensive lymphadenopathy as well as suspected mild splenomegaly,   suspicious for lymphoproliferative disorder.  New areas of nodular soft   tissue density in the left breast may represent associated intramammary lymph   nodes but could also represent subcutaneous involvement. 2. Trace bilateral pleural effusions with bilateral lower lobe passive   atelectasis. 3. Mild cardiomegaly status post aortic valve replacement. 2. CT A/P 20: Increased retroperitoneal adenopathy compared to 2013 raising the question of   underlying lymphoma.       Mild body wall anasarca and trace pelvic fluid suggesting fluid overload. PROBLEM LIST:             1. T-Cell Angioblastic NHL  2. MDS  3. NICM  4. Acute on Chronic HFrEF - LVEF 35-40% 20  5. MR  6. AS - S/p Aortic Valve replacement  7. PAF w/RVR  8. HTN  9. CKD III  10. Chronic Sinusitis  11. OA - h/o shoulder replacement  12. HLD    TREATMENT:            Pending Brentuximab     ASSESSMENT AND PLAN:           1. T-cell angiommunoblastic lymphoma: Newly diagnosed  - Prednisone 50mg daily - Day +3 (20)    His NHL is CD 30 + and given his PS improves will tx with brentuximab. Working with administration to determine how the drug can be obtained. This was addressed with pt and family. Will plan on tx as soon as drug is available per hosp pharmacy.      - Family understands that any treatment we would offer is palliative in nature only  - Palliative Care following - full code    2. MDS: originally diagnosed May, 2017  - No current tx  - This does not require treatment with a hypo-methylating agent at this point. However his baseline leukopenia and anemia would make giving chemotherapy extremely difficult as this would exacerbate the counts increases risk for infection. 3. ID: Presented with NF & septic shock on previous admission, resolved now. Last fever 2/18  - Start antimicrobial ppx if/when ANC <1.5  - No need for IV abx at this time. Previously evaluated by ID 2/22    4. Heme: Anemia & Neutropenia 2/2 MDS +/- NHL  - Transfuse for Hgb < 7 and Platelets < 10 K.   - No transfusion today   - Stop granix    5. Metabolic / CKD III: Baseline SCr 1.3-1.4  - Replace Potassium and Magnesium per PRN orders  - Risk for Tumor Lysis Syndrome: No evidence at this time  - Cont Allopurinol 200 mg daily    6. Cardiac: Significant H/o NICM, valvular disease, PAF w/RVR, acute on chronic HFrEF. Recently had runs of wide-complex tachycardia  - Cardiology consulted & following - appreciate assistance  - Cont ASA 81mg daily    - can start full AC preferably with lovenox (tx dose) - do not anticipate significant drop in plt count     - VTach 2/18/20 - s/p dc cardioversion - If his anticipated life expectancy is  less than 12 months, ICD implantation is contraindicated  - Cont amiodarone 400mg daily & mexiletine 200mg q8h. Digoxin 125mcg daily currently on hold    - PAF w/RVR - previously on coumadin  - Cont amiodarone  - Ok to resume [facility] from oncology standpoint, previously on warfarin    - Aortic Stenosis - s/p porcine AVR  - Consider for aortic valve intervention or device therapy for VT if his prognosis improves appreciably    - Acute on Chronic HFrEF - LVEF 35%  - Diuresis: S/p lasix 40mg IV x1 2/24    - Cardiogenic Shock: Resolved, POA. Off pressors now but previously required levophed & dopamine  - Cont midodrine 10mg TID    7.  Pulmonary: No acute issues  - Encourage IS & ambulation    8. GI: +Rectal ulcers & LGI bleed  - S/p EGD & flex sig 2/10  - Cont PPI daily    9. Nutrition: Severe malnutrition POA  - Cont low microbial diet  - Dietary to follow closely   - Encourage supplements as tolerated  - Cont megace 200mg daily, however cautious use as this increases risk of thrombus    10. DM2: chronic, blood glucose well-controlled at this time  - Cont accuchecks & SSI - Low    11.  Acute Debilitation: 2/2 new NHL diagnosis  - Cont PT/OT  - Consult SW - will likely need placement at d/c, favor IP ARU   - Palliative care also following, not unreasonable to d/c home with palliative care if he has good support at home given his limited prognosis    - DVT Prophylaxis: Platelets >69,120 cells/dL, - daily lovenox prophylaxis ordered  Contraindications to pharmacologic prophylaxis: None  Contraindications to mechanical prophylaxis: None    - Disposition: Will need SNF vs ARU at d/c. SW consulted    JEANIE Medina - CNP     Abhinav Adame MD

## 2020-02-25 NOTE — PROGRESS NOTES
Progress Note    Admit Date: 2/21/2020  Day: 5  Diet: Dietary Nutrition Supplements: Standard High Calorie Oral Supplement, Clear Liquid Oral Supplement, Frozen Oral Supplement  DIET DYSPHAGIA SOFT AND BITE-SIZED; No Drinking Straw    CC: Fever, hypotension    Interval history: Stable o/n, BP on the low side but stable. No complaints this AM.    Medications:     Scheduled Meds:   Venelex   Topical BID    enoxaparin  80 mg Subcutaneous BID    predniSONE  50 mg Oral Daily    allopurinol  200 mg Oral Daily    insulin lispro  0-6 Units Subcutaneous TID WC    insulin lispro  0-3 Units Subcutaneous Nightly    amiodarone  400 mg Oral Daily    aspirin  81 mg Oral Daily    atorvastatin  10 mg Oral Daily    pantoprazole  40 mg Oral QAM AC    mexiletine  200 mg Oral 3 times per day    midodrine  10 mg Oral TID WC    sodium chloride flush  10 mL Intravenous 2 times per day     Continuous Infusions:   dextrose       PRN Meds:prochlorperazine **OR** prochlorperazine, ondansetron **OR** ondansetron, glucose, dextrose, glucagon (rDNA), dextrose, lidocaine, sodium chloride flush, acetaminophen, acetaminophen, polyethylene glycol, magic (miracle) mouthwash with nystatin    Objective:   Vitals:   T-max:  Patient Vitals for the past 8 hrs:   BP Temp Temp src Pulse Resp SpO2   02/25/20 1129 102/67 97.6 °F (36.4 °C) Oral 83 18 99 %   02/25/20 0900 97/72 -- -- 79 19 --   02/25/20 0800 (!) 109/98 97.7 °F (36.5 °C) Oral 82 17 96 %       Intake/Output Summary (Last 24 hours) at 2/25/2020 1410  Last data filed at 2/25/2020 1300  Gross per 24 hour   Intake 360 ml   Output 1050 ml   Net -690 ml       Review of Systems   All other systems reviewed and are negative. Physical Exam  Constitutional:       General: He is not in acute distress. Appearance: He is well-developed. HENT:      Head: Normocephalic and atraumatic.       Right Ear: External ear normal.      Left Ear: External ear normal.      Nose: Nose normal. No 16  --    ALT  --  15  --    BILITOT  --  0.7  --    BILIDIR  --  0.3  --    PROT  --  6.0*  --    LABALBU 2.2* 2.8*  2.6* 2.4*   ALKPHOS  --  78  --      Troponin: No results for input(s): TROPONINI in the last 72 hours. BNP: No results for input(s): BNP in the last 72 hours. Lipids: No results for input(s): CHOL, HDL in the last 72 hours. Invalid input(s): LDLCALCU, TRIGLYCERIDE  ABGs:  No results for input(s): PHART, WSK9HYN, PO2ART, GPB9JBU, BEART, THGBART, L7JIESCJ, XHJ4SVX in the last 72 hours. INR: No results for input(s): INR in the last 72 hours. Lactate: No results for input(s): LACTATE in the last 72 hours. Cultures:  -----------------------------------------------------------------  RAD:   XR CHEST PORTABLE   Final Result   1. Unchanged small pleural effusions. No findings for congestive failure on the current exam.  Correlate clinically. .      XR ABDOMEN (KUB) (SINGLE AP VIEW)   Final Result      1. Nonspecific bowel gas pattern without findings for obstruction. 2.  11 mm calcification overlying the right renal shadow consistent with renal calculus. This has been described on recent CT of the abdomen exam.  Second small radiodensity in the right lower abdomen is nonspecific. Assessment/Plan:     #Cardiogenic Shock: better, still with BP on the low side but stable. Infectious workup negative, not septic.  Off abx  - Telemetry  - Midodrine  - C/w management of underlying medical conditions as below  - EP, interventional cardiology, oncology following    #Chronic Systolic CHF 2/2 [facility], s/p AVR: compensated, some dependent edema this AM but lungs clear, overall euvolemic. EF 35%  - c/w amiodarone and mexiletine for control of afib  - BP still low - if BP improves & can tolerate, will plan to initiate BB, ARB  - Consider intervention for AV stenosis and ICD if prognosis were to improve, s/p AVR but w/ severe AS now  - EP, interventional cardiology, oncology following    #pAFIB: stable. Sinus on telemetry. S/p DCCV x2, CHADSVASC 5  - c/w amiodarone, mexiletine - well controlled  - lovenox for [facility]  - EP, interventional cardiology, oncology following    #H/O VTach: stable, sinus on telemetry, s/p DCCV  - c/w amiodarone, mexiletine    #T-Cell Lymphoma:  - monitor electrolytes and uric acid  - c/w G-CSF, prednisone  - Immunotherapy planned with heme-onc, to initiate inpatient  - On lovenox for [facility]  - Bowel regimen for constipation  - Oncology following  - PM&R consulted- recommending ARU admission once stable    #Fevers: better now, afebrile. infectious w/u negative. ID signed off  - Reactive 2/2 T-cell lymphoma - on prednisone & other management as above. Off antibiotics  - ID signed off    #Acute blood loss anemia 2/2 GI bleed: better. H/H stable. No further episodes of bleeding. Evaluated by GI at OSH prior to transfer here, fle sig w/ shallow rectal ulcers, negative for CMV, HSV or malignancy - felt likely stercoral from constipation. No lesions on EGD  - AC resumed, low risk for further bleeding, on PPI, bowel regimen  - Monitor H/H via daily CBC    #TYRELL on CKD3:  TYRELL resolved, stable - TYRELL 2/2 cardiorenal  - monitor via daily labs  - on ASA, statin    Code Status: Full  FEN: Dysphagia softs, no straws, supplements  PPX: Therapeutic Lovenox  DISPO: Oumou Pichardo MD, PGY-1  02/25/20  2:10 PM    This patient has been staffed and discussed with Kris Gonzales MD.

## 2020-02-25 NOTE — PLAN OF CARE
Problem: Risk for Impaired Skin Integrity  Goal: Tissue integrity - skin and mucous membranes  Description  Structural intactness and normal physiological function of skin and  mucous membranes. Outcome: Ongoing  Pt with petechiae to his back, redness to his gluteal. Very edematous. Specialty mattress ordered, sacral heart in place. Will continue to monitor. Problem: Falls - Risk of:  Goal: Will remain free from falls  Description  Will remain free from falls  Outcome: Ongoing  Pt is a High fall risk. See Rubén Schooner Fall Score and ABCDS Injury Risk assessments. Explained fall risk precautions to pt and family and rationale behind their use to keep the patient safe. Pt bed is in low position, side rails up, call light and belongings are in reach. Fall wristband applied and present on pts wrist.  Chair Alarm on. Pt encouraged to call for assistance. Will continue with hourly rounds for PO intake, pain needs, toileting and repositioning as needed. Problem: Nutrition  Goal: Optimal nutrition therapy  Outcome: Ongoing   Pt eating well at each meal. Drinking ensures. Tolerating PO fluids well. Will continue to monitor and encourage adequate nutrition. Problem: Venous Thromboembolism:  Goal: Will show no signs or symptoms of venous thromboembolism  Description  Will show no signs or symptoms of venous thromboembolism  Outcome: Ongoing  Pt is at risk for DVT d/t decreased mobility and cancer treatment. Pt educated on importance of activity. Pt has orders for Subcut prophylactic lovenox. Pt verbalizes understanding of need for prophylaxis while inpatient.      Problem: Bleeding:  Goal: Will show no signs and symptoms of excessive bleeding  Description  Will show no signs and symptoms of excessive bleeding  Outcome: Ongoing   Patient's hemoglobin this AM:   Recent Labs     02/25/20  0348   HGB 7.8*     Patient's platelet count this AM:   Recent Labs     02/25/20  0348       Thrombocytopenia Precautions

## 2020-02-25 NOTE — CARE COORDINATION
Type of Admission   Newly diagnosed NHL T-Cell Angioblastic  MDS        Central venous catheter  Left Basilic Double Lumen PICC ( 1/29/20)        Plan          Update  2/25/20: Newly diagnosed T cell NHL. Seen on rounds with Dr. Deandre Ward consulted. Plan for Rehab. Consult. Per family & patient, he had been very active until this most recent illness. Discussed Brentuximab, pending approval        Education  2/25/20: Introduced myself in RN D/C planner shukri to patient & family . Confirmed Pharmacy per [facility].         Discharge          Pending

## 2020-02-25 NOTE — PROGRESS NOTES
Speech Language Pathology  Facility/Department: [address] [facility]  Dysphagia Daily Treatment Note    NAME: Purnima Stewart  : 1940  MRN: [id number]    Patient Diagnosis(es):   Patient Active Problem List    Diagnosis Date Noted    Hypotension 2012     Priority: High    Generalized weakness     Encounter for palliative care     Goals of care, counseling/discussion     VT (ventricular tachycardia) ([facility] 2020    Nonrheumatic aortic valve stenosis 2020    Lymphoma ([address] 2020    Fever     Malignant lymphoma, non-Hodgkin's (HCC)     Metabolic encephalopathy     Tobacco abuse counseling     Lymphadenopathy     Hypervolemia     Sepsis ([address] 2020    Septic shock ([address]     Atrial fibrillation with RVR ([address]     Elevated international normalized ratio (INR)     Streptococcal pharyngitis     Leukopenia     Chronic ethmoidal sinusitis     TYRELL (acute kidney injury) ([address]     Ex-heavy cigarette smoker (20-39 per day)     PAF (paroxysmal atrial fibrillation) ([address] 2019    Essential hypertension 2019    Finger amputation, traumatic, initial encounter 2019    Hypercholesteremia 2015    Primary localized osteoarthrosis, pelvic region and thigh 2015    H/O total shoulder replacement 2015    Hyponatremia 2015    Primary localized osteoarthrosis of shoulder region 2015    Encounter for medication counseling 2014    Arthritis of knee 2013    Acute on chronic combined systolic and diastolic congestive heart failure ([address] 2013    S/P AVR (aortic valve replacement) 2012    Nausea 2012    Elevated troponin 2012    Cardiomyopathy ([address] 2012    Nonrheumatic aortic valve insufficiency 2012    Mitral regurgitation 2012    Microscopic hematuria 2012     Allergies:    Allergies   Allergen Reactions    Clindamycin/Lincomycin Rash    Levaquin [Levofloxacin] all oral intake;  Effortful swallow  intermittent cough / re-swallow during meals     Plan:  Continued dysphagia treatment (2-3x/wk) with goals per plan of care. Diet recommendations:  Solids:  Dysphagia Soft and Bite-Sized  Liquids: Thin  DC recommendation: TBD  Treatment: 15 minutes  D/W nursing, Ally  Needs met prior to leaving room, call button in reach. Electronically Signed by:  Suad Kendrick., [id number] [facility]  Speech-Language Pathologist  [phone]  Pager #[phone]    If patient is discharged prior to next treatment, this note will serve as the discharge summary.

## 2020-02-25 NOTE — CONSULTS
[facility] [address]                                  CONSULTATION    PATIENT NAME: Kg Hutton                  :        1940  MED REC NO:   [id number]                          ROOM:       3507  ACCOUNT NO:   [de-identified]                           ADMIT DATE: 2020  PROVIDER:     Anna Patterson MD    CONSULT DATE:  2020    CARDIAC ELECTROPHYSIOLOGY CONSULTATION    REASON FOR CONSULTATION:  Wide complex tachycardia. HISTORY OF PRESENT ILLNESS:  The patient is a 74-year-old man with a  history of hypertension, hyperlipidemia, diabetes, aortic valve disease,  who is transferred to from [facility] to [facility] for  evaluation of recently identified lymphoma. The patient presented with  flu-like symptoms and intermittently had neutropenia and fever. During  his admission, he was also noted to have atrial fibrillation which was  treated with amiodarone. On 2020, he had a prolonged episode of  wide complex tachycardia. This was treated with direct current  cardioversion. The tachycardia did change morphology but appears to be  ventricular in origin. He is noted to have abnormal left ventricular  function with ejection fraction of 30% by echocardiography. He was  thought to have severe aortic stenosis of his bioprosthetic aortic  valve. He is currently receiving p.o. prednisone for his non-Hodgkin's  lymphoma. PAST MEDICAL HISTORY:  1. Hypertension. 2.  Hyperlipidemia. 3.  Diabetes. 4.  Status post bioprosthetic aortic valve replacement. 5.  Cardiomyopathy. 6.  Paroxysmal atrial fibrillation. 7.  Sustained ventricular tachycardia. 8.  Non-Hodgkin's lymphoma. MEDICATIONS:  At the time of admission include amiodarone 400 mg p.o.  daily, aspirin 81 mg p.o. daily, Lipitor 10 mg p.o. daily, digoxin 125  mcg p.o. daily, Lasix 20 mg p.o. q.o.d., mexiletine 200 mg p.o. q.8  hours, midodrine 10 mg p.o. t.i.d., oxycodone 5 mg q.4 hours as needed. ALLERGIES:  Include CLINDAMYCIN, LEVAQUIN, and SECOND GENERATION  CEPHALOSPORIN. SOCIAL HISTORY:  The patient is a former smoker and stopped smoking 50  years ago. He does consume about 2 cans of beer per week. FAMILY HISTORY:  Remarkable for diabetes and stroke in one female  sibling. There is also history of heart disease in one male sibling who  has Marfan's syndrome. REVIEW OF SYSTEMS:  Demonstrates recent anorexia and weight loss. He  has had recent fevers. He does not describe palpitations. He has not  had near-syncope or syncope. His functional status is poor at this  time. All other components of the 14-system review are negative. PHYSICAL EXAMINATION:  VITAL SIGNS:  Blood pressure is 93/66, heart rate is 87 beats per minute  and regular. The patient is currently afebrile. GENERAL:  He is seated in a chair and awake and responsive. He is  oriented to place and date. HEENT:  Demonstrates normocephalic, atraumatic head. There is no  scleral icterus. Pupils are round, reactive. NECK:  Supple without thyromegaly. LUNGS:  Exam demonstrates few basilar crackles. CARDIOVASCULAR:  Reveals a regular rate and rhythm. The apical impulse  is discrete. S1 and S2 are normal.  There is a soft ejection-type  systolic murmur at the left upper sternal border. The jugular venous  pressure is low. ABDOMEN:  Mildly obese, soft, nontender. EXTREMITIES:  Demonstrate no pitting pretibial dependent edema. There  is no cyanosis. There is no evidence for chronic venous stasis. SKIN:  Otherwise warm and dry without skin rash. A 12-lead ECG from 02/21/2020 demonstrates sinus rhythm at 70 beats per  minute. There is a long AK interval with left anterior fascicular  block. There is a nonspecific intraventricular conduction delay. IMPRESSION:  1. Sustained ventricular tachycardia.   2.  Paroxysmal atrial fibrillation. 3.  Nonischemic cardiomyopathy. 4.  Non-Hodgkin's lymphoma. 5.  Severe prosthetic aortic valve stenosis. The patient is transferred to [facility] for evaluation of his  non-Hodgkin's lymphoma. Though he had sustained VT which required  cardioversion, pharmacologic therapy was elected over ICD implantation  based on his limited prognosis. If his anticipated life expectancy is  less than 12 months, ICD implantation is contraindicated. I would  anticipate continuing therapy with amiodarone and oral mexiletine for  now. If his clinical status changes and his prognosis improves,  consideration for intervention on his stenotic aortic valve and for  device therapy versus pharmacologic therapy for the ventricular  tachycardia might then be reconsidered. RECOMMENDATIONS:  1. Continue oral amiodarone and mexiletine. 2.  Cautious fluid administration as he is currently hypotensive with  low jugular venous pressure. 3.  Could reconsider for aortic valve intervention or device therapy for  VT if his prognosis improves appreciably. Thank you for the opportunity to assist in the care of the patient. Please contact me if you have any questions regarding his evaluation.         Francesco Padgett MD    D: 02/24/2020 16:18:49       T: 02/24/2020 16:23:15     [email or web address]  Job#: [id number]     Doc#: [id number]    CC:

## 2020-02-26 NOTE — PROGRESS NOTES
Physical Therapy  Facility/Department: [address] [facility]CER CENTER  Daily Treatment Note  NAME: Renée Weinstein  : 1940  MRN: [id number]    Date of Service: 2020    Discharge Recommendations:  Renée Weinstein scored a 17/24 on the AM-PAC short mobility form. Current research shows that an AM-PAC score of 17 or less is typically not associated with a discharge to the patient's home setting. Based on the patients AM-PAC score and their current functional mobility deficits, it is recommended that the patient have 5-7 sessions per week of Physical Therapy at d/c to increase the patients independence. Patient would benefit from continued therapy after discharge   PT Equipment Recommendations  Equipment Needed: No    Assessment   Body structures, Functions, Activity limitations: Decreased functional mobility ; Decreased strength;Decreased endurance;Decreased balance  Assessment: Pt showing progress this date with gait distance and endurance for activity. Pt without need for oxygen this date. Pt c/o fatigue following therapy. Rec continued inpt PT to assist in preapring pt for home d/c. Will follow. Treatment Diagnosis: decreased functional mobility, impaired gait, decreased endurance  Prognosis: Good  Decision Making: Medium Complexity  PT Education: Goals; Home Exercise Program;PT Role;Plan of Care;General Safety;Gait Training;Family Education; Functional Mobility Training;Transfer Training  Patient Education: pt demonstrated understanding  Activity Tolerance  Activity Tolerance: Patient limited by fatigue;Patient Tolerated treatment well     Patient Diagnosis(es): There were no encounter diagnoses.      has a past medical history of Aortic insufficiency, Atrial fibrillation ([address] Breast nodule, CAD (coronary artery disease), Cardiomyopathy ([address] CHF (congestive heart failure) ([address] Hyperlipidemia, Hypertension, Malignant lymphoma, non-Hodgkin's ([address] Microscopic hematuria, Mitral regurgitation, Nausea & vomiting, Osteoarthritis of knee, Pneumonia, Rotator cuff tear, and Ventricular ectopy. has a past surgical history that includes Inguinal hernia repair; other surgical history (8/2/12); Aortic valve replacement (8/3/2012); Cardiac surgery; Colonoscopy; Endoscopy, colon, diagnostic; Total knee arthroplasty (Left, 12/4/13); Upper gastrointestinal endoscopy (12/24/13); Total knee arthroplasty (Right, 2/3/14); joint replacement; cyst removal; Total shoulder arthroplasty (Right, 2/2/15); lymph node biopsy (Left, 2/3/2020); Axillary Surgery (Left, 2/9/2020); Upper gastrointestinal endoscopy (N/A, 2/10/2020); and sigmoidoscopy (N/A, 2/10/2020). Restrictions  Restrictions/Precautions  Restrictions/Precautions: Fall Risk  Position Activity Restriction  Other position/activity restrictions: up with assist     Subjective   General  Chart Reviewed: Yes  Additional Pertinent Hx: Admit 2/21 from [facility] where he was originally admitted with fever and hypotension; new diagnosis of lymphoma, a-fib with RVR, GI bleed with anemia; transfer from ICU to [facility] 2/24; PMHx: rotator cuff tear, PNA, OA, non-hodgkin's lymphoma, HTN, CHF, cardiomyopathy, CAD, a-fib, aortic insufficiency, breast nodule - lumpectomy, B TKA, R total shoulder arthroplasty, aortic valve replacement  Family / Caregiver Present: Yes(Wife)  Referring Practitioner: Sana Epstein MD  Subjective  Subjective: Pt supine in bed and agreeable to PT. Pt reports he is having to urinate frequently due to Lasix.   Pain Screening  Patient Currently in Pain: Denies       Orientation  Orientation  Overall Orientation Status: Within Functional Limits    Objective   Bed mobility  Supine to Sit: Minimal assistance(For trunk, HOB elevated using rail)  Scooting: Contact guard assistance     Transfers  Sit to Stand: Minimal Assistance  Stand to sit: Minimal Assistance     Ambulation 1  Device: Rolling Walker  Assistance: Minimal assistance  Quality of Gait: Flexed trunk, slow rayshawn, 3 standing rest breaks  Gait Deviations: Decreased step length;Decreased step height  Distance: 380 feet     Balance  Sitting - Static: Good  Standing - Dynamic: (Min assist with wheeled walker)     Exercises  Hip Flexion: B x 10 in sitting  Knee Long Arc Quad: B x 10 in sitting  Ankle Pumps: B x 10 in sitting     Goals  Short term goals  Time Frame for Short term goals: To be met prior to discharge  Short term goal 1: Bed mobility with supervision  ONGOING  Short term goal 2: Sit to/from stand with CGA  ONGOING  Short term goal 3: Ambulate 100 feet with RW and CGA  ONGOING  Short term goal 4: -  Short term goal 5: -  Patient Goals   Patient goals : To go home    Plan    Plan  Times per week: 2-5  Current Treatment Recommendations: Strengthening, Balance Training, Transfer Training, Gait Training, Stair training, Home Exercise Program, Safety Education & Training, Endurance Training  Safety Devices  Type of devices: All fall risk precautions in place, Nurse notified, Chair alarm in place, Left in chair, Call light within reach     Therapy Time   Individual Concurrent Group Co-treatment   Time In 1314         Time Out 1345         Minutes 31           Timed Code Treatment Minutes:  31 Minutes    Total Treatment Minutes:  31     If pt d/c'd prior to next treatment, this note serves as a discharge note.   [place], [address]

## 2020-02-26 NOTE — PLAN OF CARE
Problem: Risk for Impaired Skin Integrity  Goal: Tissue integrity - skin and mucous membranes  Description  Structural intactness and normal physiological function of skin and  mucous membranes. 2/25/2020 2322 by Bruno Turner RN  Outcome: Ongoing  2/25/2020 1340 by Irwin San RN  Outcome: Ongoing   Skin assessment completed every shift. Pt assessed for incontinence, appropriate barrier cream applied prn. Pt encouraged to turn/rotate every 2 hours. Assistance provided if pt unable to do so themselves. Problem: Falls - Risk of:  Goal: Will remain free from falls  Description  Will remain free from falls  2/25/2020 2322 by Bruno Turner RN  Outcome: Ongoing  2/25/2020 1340 by Irwin San RN  Outcome: Ongoing  Bed alarm kept on. Call light in reach. Side rails up x2. Pt reminded to use call light for assistance getting out of bed. Hourly rounding done to anticipate pt needs.     Goal: Absence of physical injury  Description  Absence of physical injury  Outcome: Ongoing     Problem: Nutrition  Goal: Optimal nutrition therapy  2/25/2020 2322 by Bruno Turner RN  Outcome: Ongoing  2/25/2020 1340 by Irwin San RN  Outcome: Ongoing     Problem: Nutrition Deficit:  Goal: Ability to achieve adequate nutritional intake will improve  Description  Ability to achieve adequate nutritional intake will improve  2/25/2020 2322 by Bruno Turner RN  Outcome: Ongoing  2/25/2020 1340 by Irwin San RN  Outcome: Ongoing     Problem: Venous Thromboembolism:  Goal: Will show no signs or symptoms of venous thromboembolism  Description  Will show no signs or symptoms of venous thromboembolism  2/25/2020 2322 by Bruno Turner RN  Outcome: Ongoing  2/25/2020 1340 by Irwin San RN  Outcome: Ongoing  Goal: Absence of signs or symptoms of impaired coagulation  Description  Absence of signs or symptoms of impaired coagulation  Outcome: Ongoing     Problem: Bleeding:  Goal: Will show no signs and symptoms of

## 2020-02-26 NOTE — PROGRESS NOTES
BUE exercises while seated in chair (encouraged to continue performing ther ex throughout day - wife stating she/dtr have been encouraging this)  Exercises  Shoulder Flexion: x 10 alternating   Elbow Flexion: x 10  Elbow Extension: x 10  Supination: x 10  Pronation: x 10  Wrist Flexion: x 10  Wrist Extension: x 10  Finger Flexion: x 10  Finger Extension: x 10                    Plan  This note will serve as a discharge summary in the event the patient is discharged prior to next treatment session. Plan  Times per week: 2-5  Times per day: Daily  Current Treatment Recommendations: Functional Mobility Training, Endurance Training, Patient/Caregiver Education & Training, Self-Care / ADL                                                    AM-PAC Score        AM-PAC Inpatient Daily Activity Raw Score: 16 (02/26/20 1430)  AM-PAC Inpatient ADL T-Scale Score : 35.96 (02/26/20 1430)  ADL Inpatient CMS 0-100% Score: 53.32 (02/26/20 1430)  ADL Inpatient CMS G-Code Modifier : CK (02/26/20 1430)    Goals  Short term goals  Time Frame for Short term goals: By discharge  Short term goal 1: Pt will transfer to the [facility] with min A (ongoing)  Short term goal 2: Pt will stand for 3 minutes with Min A during functional mobility/ADLs (MET 2/26); revised goal; increase standing tolerance to 7 minutes for ADL engagement, CGA  Short term goal 3: Pt will complete LB dressing with SBA (not met)  Short term goal 4:    Short term goal 5:    Patient Goals   Patient goals :  To go home       Therapy Time   Individual Concurrent Group Co-treatment   Time In 1313         Time Out 1343         Minutes 30                 Christina ERICKSON/LUKE #[id number]

## 2020-02-26 NOTE — DISCHARGE SUMMARY
INTERNAL MEDICINE DEPARTMENT AT [address] [place] [address]  DISCHARGE SUMMARY    Patient ID: Noemí Gomez                                             Discharge Date: 2/26/2020   Patient's PCP: Nanette Orlando MD                                          Discharge Physician: Heidi Zepeda MD  Admit Date: 2/21/2020   Admitting Physician: Ainsley Velarde MD    PROBLEMS DURING HOSPITALIZATION:  · Cardiogenic Shock  · Small Pleural effusions, bilaterally  · T-cell Angioimmunoblastic Non-Hodgkin Lymphoma  · Myelodysplastic Syndrome  · Atrial fibrillation with Rapid Ventricular Rate   · Ventricular tachycardia  · Acute on Chronic Systolic Heart Failure Exacerbation  · Severe aortic stenosis, status post Aortic valve replacement  · Fever   · Acute kidney injury  · Diarrhea  · Bloating  · Nausea  · Anemia secondary to Myelodysplastic Sydnrome, status post transfusion  · Coagulopathy  · Obstructive Sleep Apnea  · ***    DISCHARGE DIAGNOSES:  · Cardiogenic Shock  · Small Pleural effusions, bilaterally  · T-cell Angioimmunoblastic Non-Hodgkin Lymphoma  · Myelodysplastic Syndrome  · Atrial fibrillation with Rapid Ventricular Rate   · Ventricular tachycardia  · Acute on Chronic Systolic Heart Failure Exacerbation  · Severe aortic stenosis, status post Aortic valve replacement  · Fever   · Acute kidney injury  · Diarrhea  · Bloating  · Nausea  · Anemia secondary to Myelodysplastic Sydnrome, status post transfusion  · Coagulopathy  · Obstructive Sleep Apnea  · [facility]:    Patient presented with Acute on Chronic HF exacerbation, Cardiogenic shock, and advanced T-cell Lymphoma. Patient was treated at OSH for 20 days and diagnosed with T-cell NHL; was transferred to [facility] because pt and family wanted a second Oncology opinion. On arrival to [facility], initial concern was for Septic shock given fevers, so pt was treated with empiric antibiotics and Levophed gtt. EP, Cardiology, Heme/Onc, and ID were consulted.  It every 6 hours as needed for Itching     furosemide 20 MG tablet  Commonly known as:  LASIX  Take 1 tablet by mouth every other day     lactobacillus capsule  Take 1 capsule by mouth daily (with breakfast)     lidocaine 4 % external patch  Place 1 patch onto the skin daily as needed (as needed for pain)     mexiletine 200 MG capsule  Commonly known as:  MEXITIL  Take 1 capsule by mouth every 8 hours     midodrine 10 MG tablet  Commonly known as:  PROAMATINE  Take 1 tablet by mouth 3 times daily (with meals)     naproxen 250 MG tablet  Commonly known as:  NAPROSYN  Take 1 tablet by mouth 2 times daily (with meals)     neomycin-bacitracin-polymyxin 400-5-5000 ointment  Commonly known as:  NEOSPORIN  Apply topically 2 times daily. oxyCODONE 5 MG immediate release tablet  Commonly known as:  ROXICODONE  Take 1 tablet by mouth every 4 hours as needed for Pain for up to 3 days. Ask about: Should I take this medication? pantoprazole 40 MG tablet  Commonly known as:  PROTONIX  Take 1 tablet by mouth every morning (before breakfast)     polyethylene glycol packet  Commonly known as:  GLYCOLAX  Take 17 g by mouth 2 times daily     prochlorperazine 10 MG/2ML Soln injection  Commonly known as:  COMPAZINE  Infuse 2 mLs intravenously every 6 hours as needed (Vomiting)     promethazine 25 MG/ML injection  Commonly known as:  PHENERGAN  Infuse 0.5 mLs intravenously every 6 hours as needed (Vomiting)     sodium chloride 0.65 % nasal spray  Commonly known as:  OCEAN, BABY AYR  1 spray by Nasal route as needed for Congestion          Activity: {discharge activity:33288}  Diet: {diet:96869}  Wound Care: {wound care:59869}    Time Spent on discharge is more than {Time; 15 min - 8 hours:37768}.     Signed:  Charlotte Max MD   2/26/2020

## 2020-02-26 NOTE — FLOWSHEET NOTE
02/26/20 1547   Encounter Summary   Services provided to: Patient and family together   Volunteer Visit   (2/25 fr lynn)   Sacraments   Communion Patient received communion;Family received communion

## 2020-02-26 NOTE — FLOWSHEET NOTE
02/26/20 1052   Encounter Summary   Services provided to: Patient and family together   Referral/Consult From: Palliative Care   Continue Visiting   (es 2/26)   Complexity of Encounter Low   Length of Encounter 15 minutes   Routine   Type Follow up   Assessment Approachable   Intervention Active listening   Outcome Receptive;Engaged in conversation

## 2020-02-26 NOTE — PROGRESS NOTES
OT  - Oncology, palliative care following  - PM&R consulted- recommending ARU admission, working on precert now    #Cardiogenic Shock: better though BP still on low side  - Midodrine, c/w management of comorbidities as below  - Telemetry     #Chronic Systolic CHF 2/2 [address] s/p AVR: compensated. EF 35%. Has severe AS despite prior AVR  - c/w amiodarone and mexiletine for control of afib  - midodrine as above  - BP still on low side, defer BB, ARB for now  - Consider intervention for AV stenosis and ICD if prognosis were to improve  - Has some increased edema in his hands and legs today - will give lasix IV 40 to maintain euvolemia  - EP, interventional cardiology, oncology following     #pAFIB, H/O VTach: stable. SR on telemetry. S/p DCCV x2, CHADSVASC 5  - c/w amiodarone, mexiletine  - lovenox for [facility]     #Fevers - Reactive 2/2 T Cell Lymphoma: afebrile now. infectious w/u negative. ID signed off  - Prednisone, other management as elsewhere     #Acute blood loss anemia 2/2 GI bleed: better. H/H stable. 2/2 rectal ulcers, stercoral from constipation  - AC w/ therapeutic lovenox, low risk for further bleeding, on PPI, bowel regimen  - Monitor H/H via daily CBC     #TYRELL on CKD3:  TYRELL resolved, stable - TYRELL 2/2 cardiorenal  - monitor via daily labs  - on ASA, statin     Code Status: Full  FEN: Dysphagia softs, no straws, supplements  PPX: Therapeutic Lovenox  DISPO: Imtiaz Salazar MD, PGY-1  02/26/20  10:24 AM    This patient has been staffed and discussed with Bessy Ponce MD.

## 2020-02-26 NOTE — PROGRESS NOTES
CD3, BCL-6, CD10, CD21, CD45,  CD30, CD15, CD4, CD5, CD7, CD8, CD57) and JYOTHI for EBV (RICHARD) are  reviewed. Additional stains (CD21, CD3, CD79a, PD-1, ICOS, CCD23) and JYOTHI  for EBV (RICHARD) are performed at the [facility]. CD20 and PAX5 show markedly  fragmented B-cell areas and regressed follicles. CD79a shows similar  distribution and additionally marks cells with plasmacytoid morphology. CD3, CD4, CD5, and CD7 show abundant T-cells and highlights a mild  cytologic atypia. CD8 marks numerous cells (CD4 ~ CD8). PD-1 reveals  abundant Follicular [place] T-cells, and a somewhat similar pattern is  noted for ICOS. CD23 shows a disorganized and expanded dendritic cell  meshwork in the paracortex, not necessarily associated with B-cell areas. CD30 marks large scattered cells, positive for CD20, PAX-5 (dim), and  negative for CD15, CD10, and BCL-6. CD57 is noncontributory. RICHARD is  positive in cells ranging in size. Per reports, flow cytometry in the axillary node (SW-, 02/10/2020)  shows small-size T-cells (6% of total lymphocytes) positive for CD2, CD4,  CD5, and CD7, and negative for sCD3, TCR alpha/beta, and TCR gamma/delta. In summary, the morphologic and immunohistochemistry findings are  consistent with the above diagnosis. 2. GI Biopsies 2/10/20:    A. Gastroesophageal junction, biopsy:       - Columnar mucosa with mild chronic inactive inflammation       - No evidence of intestinal metaplasia, dysplasia, or malignancy       B. Stomach, biopsy:       - Gastric mucosa with no pathologic change       - No evidence of intestinal metaplasia, dysplasia, or malignancy       - No morphologic evidence of helicobacter pylori infection       C. Esophagus, biopsy:       - Squamous mucosa with focal mild chronic inflammation       - No evidence of increased eosinophils, dysplasia, or malignancy       D.  Ulcer, rectum, biopsy:       - Colonic mucosa with extensive ulceration and associated acute         increases risk of thrombus    10. DM2: chronic, blood glucose well-controlled at this time  - Cont accuchecks & SSI - Low    11.  Acute Debilitation: 2/2 new NHL diagnosis +/- age     - Cont PT/OT  - Consult SW - will likely need placement at d/c, favor IP ARU   - PM&R consulted 2/25  - Palliative care also following, not unreasonable to d/c home with palliative care if he has good support at home given  his limited prognosis    - DVT Prophylaxis: Cont full-dose AC w/lovenox  Contraindications to pharmacologic prophylaxis: None  Contraindications to mechanical prophylaxis: None    - Disposition: Will need SNF vs ARU at d/c. SW consulted    JEANIE Tong - CNP     Alice Shaw MD

## 2020-02-26 NOTE — PROGRESS NOTES
Electrophysiology - PROGRESS NOTE    Admit Date: 2/21/2020     Chief Complaint: AF, sustained VT     Interval History:   Patient seen and examined and notes reviewed. Patient is being followed for AF, sustained VT. No arrhythmias seen overnight. No specific complaints this morning. No shortness of breath, cough, or chest pain. He thinks his left arm is more swollen today.      In: 400 [P.O.:400]  Out: 500    Wt Readings from Last 2 Encounters:   02/26/20 178 lb 12.8 oz (81.1 kg)   02/21/20 184 lb 15.5 oz (83.9 kg)         Data:   Scheduled Meds:   Scheduled Meds:   Venelex   Topical BID    enoxaparin  80 mg Subcutaneous BID    predniSONE  50 mg Oral Daily    allopurinol  200 mg Oral Daily    insulin lispro  0-6 Units Subcutaneous TID WC    insulin lispro  0-3 Units Subcutaneous Nightly    amiodarone  400 mg Oral Daily    aspirin  81 mg Oral Daily    atorvastatin  10 mg Oral Daily    pantoprazole  40 mg Oral QAM AC    mexiletine  200 mg Oral 3 times per day    midodrine  10 mg Oral TID WC    sodium chloride flush  10 mL Intravenous 2 times per day     Continuous Infusions:   dextrose       PRN Meds:.prochlorperazine **OR** prochlorperazine, ondansetron **OR** ondansetron, glucose, dextrose, glucagon (rDNA), dextrose, lidocaine, sodium chloride flush, acetaminophen, acetaminophen, polyethylene glycol, magic (miracle) mouthwash with nystatin  Continuous Infusions:   dextrose         Intake/Output Summary (Last 24 hours) at 2/26/2020 0858  Last data filed at 2/26/2020 0430  Gross per 24 hour   Intake 640 ml   Output 650 ml   Net -10 ml       CBC:   Lab Results   Component Value Date    WBC 2.6 02/26/2020    HGB 8.0 02/26/2020     02/26/2020     BMP:  Lab Results   Component Value Date     02/26/2020    K 4.5 02/26/2020    K 4.7 01/28/2020    CL 98 02/26/2020    CO2 28 02/26/2020    BUN 49 02/26/2020    CREATININE 1.2 02/26/2020    GLUCOSE 128 02/26/2020     INR:   Lab Results   Component Value Date    INR 1.24 02/21/2020    INR 1.39 02/19/2020    INR 1.20 02/13/2020        CARDIAC LABS  ENZYMES:No results for input(s): CKMB, CKMBINDEX, TROPONINI in the last 72 hours. Invalid input(s): CKTOTAL;3  FASTING LIPID PANEL:  Lab Results   Component Value Date    HDL 38 11/07/2019    LDLCALC 46 11/07/2019    TRIG 70 11/07/2019    TSH 1.51 06/02/2016     LIVER PROFILE:  Lab Results   Component Value Date    AST 16 02/24/2020    AST 16 02/21/2020    ALT 15 02/24/2020    ALT 19 02/21/2020       -----------------------------------------------------------------  Telemetry: Personally reviewed  NSR. Long MI, PVCs    Objective:   Vitals: /88   Pulse 66   Temp 97.7 °F (36.5 °C) (Oral)   Resp 18   Ht 6' 1\" (1.854 m)   Wt 178 lb 12.8 oz (81.1 kg)   SpO2 97%   BMI 23.59 kg/m²   General appearance: alert, appears stated age and cooperative, No acute distress   Eyes: Conjunctiva and pupils normal and reactive  Skin: Skin color, texture, turgor normal. No rashes or ecchymosis.   Neck: no JVD, supple, symmetrical, trachea midline   Lungs: , no accessory muscle use, no respiratory distress  Heart: RRR  Abdomen: soft, non-tender; bowel sounds normal  Extremities: 2+ ankle edema bilat edema, DP +, hands both also swollen, but worse on the left  Psychiatric: normal insight and affect    Patient Active Problem List:     Nausea     Elevated troponin     Cardiomyopathy (HCC)     Nonrheumatic aortic valve insufficiency     Mitral regurgitation     Microscopic hematuria     S/P AVR (aortic valve replacement)     Hypotension     Acute on chronic combined systolic and diastolic congestive heart failure (HCC)     Arthritis of knee     Encounter for medication counseling     Primary localized osteoarthrosis of shoulder region     Hyponatremia     Primary localized osteoarthrosis, pelvic region and thigh     H/O total shoulder replacement     Hypercholesteremia     Finger amputation, traumatic, initial encounter     PAF (paroxysmal atrial fibrillation) (HCC)     Essential hypertension     Sepsis ([facility] [address]     Septic shock ([facility] [address]     Atrial fibrillation with RVR (HCC)     Elevated international normalized ratio (INR)     Streptococcal pharyngitis     Leukopenia     Chronic ethmoidal sinusitis     TYRELL (acute kidney injury) ([facility] [address]     Ex-heavy cigarette smoker (20-39 per day)     Tobacco abuse counseling     Lymphadenopathy     Hypervolemia     Fever     Malignant lymphoma, non-Hodgkin's (HCC)     Metabolic encephalopathy     Lymphoma (HCC)     VT (ventricular tachycardia) (HCC)     Nonrheumatic aortic valve stenosis     Generalized weakness     Encounter for palliative care     Goals of care, counseling/discussion        Assessment & Plan:    1. pAF w/RVR  2. Sustained VT  3. NICMP - EF 30%  4. T-cell angiommunoblastic lymphoma, newly diagnosed  5. Severe prosthetic AV stenosis    Sustained VT, Required DCCV x2  -continue amiodarone and mexiletine as below     pAF  - In NSR  - [id number] 5. TSH 3.91 (1/30/20). - Continue on Amiodarone 400 mg QD, mexilitine 200 mg Q 8 hours  - Started on therapeutic Lovenox, ok to resume AC from oncology standpoint (previosuly on Warfarin)     CMP, recovering from cardiogenic shock  - EF 30% - new onset - had been 50% 9/11/2019  - NYHA class III  - Consider adding low dose BB/ACE/ARB if BP tolerates  - Possibly worsening AV stenosis per interventional note at [facility] - consider intervention if prognosis improves  - Could consider an ICD if prognosis were to improve  - Consider restarting PO lasix     Will discuss patient with attending physician Dr. Tyler Sellers,     Codie Khan DO, PGY-3  Internal Medicine Resident      Patient seen,  above reviewed. 1. pAF  2. Sustained VT  3. NICMP - EF 30%  4. Non Hodgkin's lymphoma  5. Sev prosthetic AV stenosis     Cardiac Hx: Donald D Schirmer is a 70 y. o. man with a h/o HLD, HTN, DM, CMP, AS, s/p

## 2020-02-26 NOTE — CARE COORDINATION
Case Management Assessment           Daily Note                 Date/ Time of Note: 2/26/2020 3:35 PM         Note completed by: Sylvia Galvez    Patient Name: Alexis Guillen  YOB: 1940    Diagnosis:Lymphoma, unspecified body region, unspecified lymphoma type [facility]) [C85.90]  Patient Admission Status: Inpatient    Date of Admission:2/21/2020  6:23 PM Length of Stay: 5 GLOS:        Current Plan of Care: Acute Rehab  ________________________________________________________________________________________  PT AM-PAC: 16 / 24 per last evaluation on: 2/26/2020    OT AM-PAC: 16 / 24 per last evaluation on: 2/26/2020     DME Needs for discharge: defer to acute rehab    ________________________________________________________________________________________  Discharge Plan: Inpatient Rehab: ARU    Tentative discharge date: 1-2 days? Current barriers to discharge: insurance precertification, determining outpatient meds     Referrals completed: Not Applicable    Resources/ information provided: Inpatient Rehab Information  ________________________________________________________________________________________  Case Management Notes: Patient's precert remains pending with ARU at this time. Met with patient and spouse at bedside. I was informed this morning by psychology that the patient's wife had several questions about finances and bills. Discussed spouse's concerns. Patient's spouse contacted her bank and found out that she has access to all of their accounts. The patient gave her information about where the deed to their home is and where the titles for the cars are. After discussing, she felt better, but did express that she is overwhelmed because she has been dependent on her  for all of their needs. Provided support to patient and his spouse regarding dealing with this diagnosis.  Patient's spouse had lots of questions about what their options were and what care down the line would

## 2020-02-26 NOTE — PROGRESS NOTES
96* 99 98*   CO2 26 30 28   PHOS 2.2* 3.4 2.7   BUN 59* 59* 49*   CREATININE 1.3 1.4* 1.2     LIVER PROFILE:   Recent Labs     02/24/20  0538   AST 16   ALT 15   LIPASE 24.0   BILIDIR 0.3   BILITOT 0.7   ALKPHOS 78     PT/INR:   No results for input(s): PROTIME, INR in the last 72 hours. APTT:   No results for input(s): APTT in the last 72 hours. BNP:  No results for input(s): BNP in the last 72 hours.   IMAGING:     Assessment:  Patient Active Problem List    Diagnosis Date Noted    Hypotension 11/28/2012     Priority: High    Generalized weakness     Encounter for palliative care     Goals of care, counseling/discussion     VT (ventricular tachycardia) ([facility] [address] 02/22/2020    Nonrheumatic aortic valve stenosis 02/22/2020    Lymphoma ([facility] [address] 02/21/2020    Fever     Malignant lymphoma, non-Hodgkin's (HCC)     Metabolic encephalopathy     Tobacco abuse counseling     Lymphadenopathy     Hypervolemia     Sepsis ([address] 01/28/2020    Septic shock (HCC)     Atrial fibrillation with RVR ([address]     Elevated international normalized ratio (INR)     Streptococcal pharyngitis     Leukopenia     Chronic ethmoidal sinusitis     TYRELL (acute kidney injury) ([address]     Ex-heavy cigarette smoker (20-39 per day)     PAF (paroxysmal atrial fibrillation) ([facility] [address] 09/06/2019    Essential hypertension 09/06/2019    Finger amputation, traumatic, 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 Primary localized osteoarthrosis, pelvic region and thigh 05/04/2015    H/O total shoulder replacement 05/04/2015    Hyponatremia 04/22/2015    Primary localized osteoarthrosis of shoulder region 02/02/2015    Encounter for medication counseling 01/13/2014    Arthritis of knee 12/05/2013    Acute on chronic combined systolic and diastolic congestive heart failure ([address] 03/17/2013    S/P AVR (aortic valve replacement) 09/26/2012    Nausea 07/29/2012    Elevated troponin 07/29/2012    Cardiomyopathy ([address] 07/29/2012    Nonrheumatic aortic valve insufficiency 07/29/2012    Mitral regurgitation 07/29/2012    Microscopic hematuria 07/29/2012     Imp: Lymphoma/CHF/cardiomyop/AVR/AS/VT/Afib    Plan:  Feeling reasonable this am.   Denies chest pain/sob. Remains sinus. On RA. Bp reasonable but still marginal  for B blocker/ACE. Rhythm appears stable. EP following. Cr stable.      Core Measures:  · Discharge instructions:   · LVEF documented:   · ACEI for LV dysfunction:   · Smoking Cessation:    Marvin Wise MD 2/26/2020 11:25 AM

## 2020-02-26 NOTE — PROGRESS NOTES
Palliative Medicine Progress Note    Admit Date: 2/21/2020  Hospital Day:  Hospital Day: 6     CC: Weakness  HPI: HPI PMH of Afib (on coumadin), aortic stenosis (s/p porcine AVR), CHF (EF 35%), MDS, chronic leukopenia, and CAD, [facility] after a 20 day admission at an outside hospital for sepsis and Afib and new diagnosis of T-cell lymphoma. He is awaiting palliative treatment with brentuximab. Recommendations:     Had a long discussion with pt and wife Sara at the bedside. They talked about a lot of practical issues they are worried about, about their house and cars, so that Roz Bonilla will know how to handle household affairs when pt is unable to. Also discussed goals of care and code status. Pt hopes he can get stronger and return home to an acceptable quality of life. We briefly discussed home hospice, but that the main issue would be whether he wants aggressive care or comfort care, and whether he wants to come back and forth to the hospital. When discussing code status, pt says he wants resuscitation. He understands he has a disease that can't be cured and he understands the burdens/risks of CPR. He said his daughters would definitely want him to be resuscitated and that's what he wants, too. Wife is supportive of pt's wishes. 1. Goals of Care/Advanced Care planning/Code status: Full code, discussed with pt and wife today. Pt hopes to receive Brentuximab and get stronger at ARU so that he can return home to an acceptable quality of life. He'll consider hospice in the coming weeks if he's not able to get better. 2. Pain: pt denies pain and does not appear to be in distress. 3. SOB: denies sob and is breathing comfortably on room air. 4. Nausea: resolved. Pt denies nausea and attributes feeling better to not using CPAP the past couple days. He had been taking zofran, compazine, and phenergan. 5. Generalized weakness: pt c/o severe weakness and would like to try working with therapy.  He has worked with PT/OT and scored 10 and 16 on AMPAC scale. Good candidate for ARU per Dr. Indira Jacob.   6. Disposition: likely will d/c to ARU when medically ready, pending precert    Subjective:     Scheduled Meds:   Venelex   Topical BID    enoxaparin  80 mg Subcutaneous BID    predniSONE  50 mg Oral Daily    allopurinol  200 mg Oral Daily    insulin lispro  0-6 Units Subcutaneous TID WC    insulin lispro  0-3 Units Subcutaneous Nightly    amiodarone  400 mg Oral Daily    aspirin  81 mg Oral Daily    atorvastatin  10 mg Oral Daily    pantoprazole  40 mg Oral QAM AC    mexiletine  200 mg Oral 3 times per day    midodrine  10 mg Oral TID WC    sodium chloride flush  10 mL Intravenous 2 times per day       Continuous Infusions:   dextrose         PRN Meds:prochlorperazine **OR** prochlorperazine, ondansetron **OR** ondansetron, glucose, dextrose, glucagon (rDNA), dextrose, lidocaine, sodium chloride flush, acetaminophen, acetaminophen, polyethylene glycol, magic (miracle) mouthwash with nystatin    Review of Systems -   General ROS: positive for  - fatigue  Psychological ROS: negative  ENT ROS: negative  Hematological and Lymphatic ROS: positive for - blood transfusions and fatigue  Respiratory ROS: no cough, shortness of breath, or wheezing  Cardiovascular ROS: no chest pain or dyspnea on exertion  Gastrointestinal ROS: positive for - appetite loss  Genito-Urinary ROS: no dysuria, trouble voiding, or hematuria  Musculoskeletal ROS: positive for - muscular weakness  Neurological ROS: no TIA or stroke symptoms    Performance status 30%    Objective:     Patient Vitals for the past 8 hrs:   BP Temp Temp src Pulse Resp SpO2 Weight   02/26/20 1122 120/84 97.7 °F (36.5 °C) Oral 69 18 95 % --   02/26/20 0910 91/73 -- -- -- -- -- --   02/26/20 0815 -- 97.7 °F (36.5 °C) Oral -- -- -- --   02/26/20 0755 -- -- -- -- -- 97 % --   02/26/20 0731 -- -- -- -- -- -- 178 lb 12.8 oz (81.1 kg)     I/O last 3 completed

## 2020-02-26 NOTE — PROGRESS NOTES
4 Eyes Skin Assessment     The patient is being assess for  Shift Handoff    I agree that 2 RN's have performed a thorough Head to Toe Skin Assessment on the patient. ALL assessment sites listed below have been assessed. Areas assessed by both nurses:   [x]   Head, Face, and Ears   [x]   Shoulders, Back, and Chest  [x]   Arms, Elbows, and Hands   [x]   Coccyx, Sacrum, and IschIum  [x]   Legs, Feet, and Heels        Does the Patient have Skin Breakdown?   Yes LDA WOUND CARE was Initiated documentation include the Kathryn-wound, Wound Assessment, Measurements, Dressing Treatment, Drainage, and Color\",         Rickey Prevention initiated:  Yes   Wound Care Orders initiated:  Yes      [id number]  nurse consulted for Pressure Injury (Stage 3,4, Unstageable, DTI, NWPT, and Complex wounds), New and Established Ostomies:  NA      Nurse 1 eSignature: Electronically signed by Blanca Taylor RN on 2/26/20 at 6:28 AM    **SHARE this note so that the co-signing nurse is able to place an eSignature**    Nurse 2 eSignature: {Esignature:[id number]}

## 2020-02-27 NOTE — CARE COORDINATION
Case Management Assessment           Daily Note                 Date/ Time of Note: 2/27/2020 10:13 AM         Note completed by: Sylvia Galvez    Patient Name: Alexis Guillen  YOB: 1940    Diagnosis:Lymphoma, unspecified body region, unspecified lymphoma type [facility]) [C85.90]  Patient Admission Status: Inpatient    Date of Admission:2/21/2020  6:23 PM Length of Stay: 6 GLOS:        Current Plan of Care: ARU once precert is approved and patient's medications are finished  ________________________________________________________________________________________  PT AM-PAC: 16 / 24 per last evaluation on: 2/26/2020    OT AM-PAC: 16 / 24 per last evaluation on: 2/26/2020    DME Needs for discharge: defer to ARU    ________________________________________________________________________________________  Discharge Plan: Inpatient Rehab: [facility]    Tentative discharge date: 2/27/2020 afternoon    Current barriers to discharge: precert, awaiting hospital approval for infusion    Referrals completed: Inpatient Rehab: ARU selected at request of family    Resources/ information provided: Inpatient Rehab Information  ________________________________________________________________________________________  Case Management Notes: Patient's precert remains pending at this time. The hospital is currently working to get patient approved to receive Brentuximab while inpatient. Tentative plan is for Brentuximab to arrive tomorrow (2/28/2020) and the patient can receive this medication in the morning/early afternoon with plans to discharge to ARU in the late afternoon. Patient's precert will need approved as well. Lito De Jesus and his family were provided with choice of provider; he and his family are in agreement with the discharge plan.     Care Transition Patient: Thai Ramirez  [facility]  Case Management Department  Ph: [phone]  Fax:

## 2020-02-27 NOTE — PROGRESS NOTES
All questions and concerns were addressed to the patient/family. Alternatives to my treatment were discussed. The note was completed using EMR.  Every effort was made to ensure accuracy; however, inadvertent computerized transcription errors may be present.      Rocio Haro CNP  [facility]

## 2020-02-27 NOTE — PROGRESS NOTES
[facility] Progress Note    2020     Carlitos Rivera    MRN: [id number]    : 1940    Referring MD: Rosa Champion MD  [address] [address]    SUBJECTIVE: he cont to report gen weakness and fatigue, but overall he feels well.      ECOG PS:  (3) Capable of limited self-care, confined to bed or chair > 50% of waking hours    KPS: 40% Disabled; requires special care and assistance    Isolation: None    Medications    Scheduled Meds:   Venelex   Topical BID    enoxaparin  80 mg Subcutaneous BID    predniSONE  50 mg Oral Daily    allopurinol  200 mg Oral Daily    insulin lispro  0-6 Units Subcutaneous TID WC    insulin lispro  0-3 Units Subcutaneous Nightly    amiodarone  400 mg Oral Daily    aspirin  81 mg Oral Daily    atorvastatin  10 mg Oral Daily    pantoprazole  40 mg Oral QAM AC    mexiletine  200 mg Oral 3 times per day    midodrine  10 mg Oral TID WC    sodium chloride flush  10 mL Intravenous 2 times per day     Continuous Infusions:   dextrose       PRN Meds:.prochlorperazine **OR** prochlorperazine, ondansetron **OR** ondansetron, glucose, dextrose, glucagon (rDNA), dextrose, lidocaine, sodium chloride flush, acetaminophen, acetaminophen, polyethylene glycol, magic (miracle) mouthwash with nystatin    ROS:  As noted above, otherwise remainder of 10-point ROS negative    Physical Exam:     I&O:      Intake/Output Summary (Last 24 hours) at 2020 0713  Last data filed at 2020 0430  Gross per 24 hour   Intake 720 ml   Output 1200 ml   Net -480 ml       Vital Signs:  /75   Pulse 62   Temp 97.5 °F (36.4 °C) (Oral)   Resp 19   Ht 6' 1\" (1.854 m)   Wt 178 lb 12.8 oz (81.1 kg)   SpO2 100%   BMI 23.59 kg/m²     Weight:    Wt Readings from Last 3 Encounters:   20 178 lb 12.8 oz (81.1 kg)   20 184 lb 15.5 oz (83.9 kg)   20 178 lb (80.7 kg)       General: Awake, alert and oriented, elderly and frail   HEENT: normocephalic, PERRL, no scleral erythema or icterus, Oral mucosa moist and intact, throat clear  NECK: supple without palpable adenopathy  BACK: Straight negative CVAT  SKIN: warm dry and intact without lesions rashes or masses  CHEST: CTA bilaterally without use of accessory muscles  CV: Normal S1 S2, RRR, no MRG  ABD: NT ND normoactive BS, no palpable masses or hepatosplenomegaly  EXTREMITIES: without edema, denies calf tenderness  NEURO: CN II - XII grossly intact  CATHETER: PIV    Data    CBC:   Recent Labs     02/25/20 0348 02/26/20 0415 02/27/20  0338   WBC 2.9* 2.6* 2.9*   HGB 7.8* 8.0* 7.8*   HCT 23.7* 24.0* 23.2*   MCV 84.8 83.6 83.4    160 164     BMP/Mag:  Recent Labs     02/25/20 0348 02/26/20 0415 02/27/20  0338    137 137   K 4.4 4.5 4.5   CL 99 98* 98*   CO2 30 28 30   PHOS 3.4 2.7 2.8   BUN 59* 49* 48*   CREATININE 1.4* 1.2 1.2   MG 2.10 1.90 1.90     LIVP:   No results for input(s): AST, ALT, LIPASE, BILIDIR, BILITOT, ALKPHOS in the last 72 hours. Invalid input(s): AMYLASE,  ALB  Coags:   No results for input(s): PROTIME, INR, APTT in the last 72 hours. Uric Acid   Recent Labs     02/25/20 0348 02/26/20 0415 02/27/20  0338   LABURIC 6.9 5.3 5.3     PATHOLOGY:  1. Left Inguinal & Left Acillary LN Bx 2/3/20: The following consultation [place] [id number] (Case  Number: RF-)  1. Lymph node, left inguinal, biopsy ([facility] 02/03/2020):       Angioimmunoblastic T-cell lymphoma. See Note. 2. Lymph node, left axilla, excision ([facility] 02/09/2020):       Angioimmunoblastic T-cell lymphoma. See Note. NOTE: Sections of an inguinal node ([facility] and axillary node  ([facility]-) show mode architecture effaced by polymorphous infiltrate  composed of lymphocytes, histiocytes, plasma cells, eosinophils, and an  increase in the number of high-endothelial venules. Sinuses are patent.     The submitted immunostains (CD20, PAX5, CD3, BCL-6, CD10, CD21, CD45,  CD30, CD15, CD4, CD5, CD7, CD8, CD57) and JYOTHI for EBV (RICHARD) are  reviewed. Additional stains (CD21, CD3, CD79a, PD-1, ICOS, CCD23) and JYOTHI  for EBV (RICHARD) are performed at the [facility]. CD20 and PAX5 show markedly  fragmented B-cell areas and regressed follicles. CD79a shows similar  distribution and additionally marks cells with plasmacytoid morphology. CD3, CD4, CD5, and CD7 show abundant T-cells and highlights a mild  cytologic atypia. CD8 marks numerous cells (CD4 ~ CD8). PD-1 reveals  abundant Follicular Phoenix T-cells, and a somewhat similar pattern is  noted for ICOS. CD23 shows a disorganized and expanded dendritic cell  meshwork in the paracortex, not necessarily associated with B-cell areas. CD30 marks large scattered cells, positive for CD20, PAX-5 (dim), and  negative for CD15, CD10, and BCL-6. CD57 is noncontributory. RICHARD is  positive in cells ranging in size. Per reports, flow cytometry in the axillary node ([facility]-, 02/10/2020)  shows small-size T-cells (6% of total lymphocytes) positive for CD2, CD4,  CD5, and CD7, and negative for sCD3, TCR alpha/beta, and TCR gamma/delta. In summary, the morphologic and immunohistochemistry findings are  consistent with the above diagnosis. 2. GI Biopsies 2/10/20:    A. Gastroesophageal junction, biopsy:       - Columnar mucosa with mild chronic inactive inflammation       - No evidence of intestinal metaplasia, dysplasia, or malignancy       B. Stomach, biopsy:       - Gastric mucosa with no pathologic change       - No evidence of intestinal metaplasia, dysplasia, or malignancy       - No morphologic evidence of helicobacter pylori infection       C. Esophagus, biopsy:       - Squamous mucosa with focal mild chronic inflammation       - No evidence of increased eosinophils, dysplasia, or malignancy       D.  Ulcer, rectum, biopsy:       - Colonic mucosa with extensive ulceration and associated acute         inflammation       - No evidence of granulomas, dysplasia or malignancy       - No evidence of specific viral infection, as supported by negative         CMV and HSV immunohistochemical staining with appropriate controls    DIAGNOSTIC IMAGIN. CT Chest 20:  1. Extensive lymphadenopathy as well as suspected mild splenomegaly,   suspicious for lymphoproliferative disorder.  New areas of nodular soft   tissue density in the left breast may represent associated intramammary lymph   nodes but could also represent subcutaneous involvement. 2. Trace bilateral pleural effusions with bilateral lower lobe passive   atelectasis. 3. Mild cardiomegaly status post aortic valve replacement. 2. CT A/P 20: Increased retroperitoneal adenopathy compared to 2013 raising the question of   underlying lymphoma.       Mild body wall anasarca and trace pelvic fluid suggesting fluid overload. PROBLEM LIST:             1. T-Cell Angioblastic NHL  2. MDS  3. NICM  4. Acute on Chronic HFrEF - LVEF 35-40% 20  5. MR  6. AS - S/p Aortic Valve replacement  7. PAF w/RVR  8. HTN  9. CKD III  10. Chronic Sinusitis  11. OA - h/o shoulder replacement  12. HLD    TREATMENT:            Pending hosp approval to tx with Brentuximab      ASSESSMENT AND PLAN:           1. T-cell angiommunoblastic lymphoma: Newly diagnosed and pending approval from hospital admon to give Brentuximab 1.8mg/m2 iv x1 q 3-4 wks. - Prednisone 50mg daily - Day + 6 (20)    - Family understands that any treatment we would offer is palliative in nature only  - Palliative Care following - full code     Plan:    2. MDS: originally diagnosed May, 2017  - No current tx  - This does not require treatment with a hypo-methylating agent at this point. However his baseline leukopenia and  anemia would make giving chemotherapy extremely difficult as this would exacerbate the counts increases risk  for infection.      3. ID: Presented with awaiting precert  - Palliative care also following, not unreasonable to d/c home with palliative care if he has good support at home given his limited prognosis    - DVT Prophylaxis: Cont full-dose AC w/lovenox  Contraindications to pharmacologic prophylaxis: None  Contraindications to mechanical prophylaxis: None    - Disposition: Will need SNF vs ARU at d/c. SW consulted    Carrington Jaffe MD

## 2020-02-27 NOTE — PLAN OF CARE
2349 by Marty Jay RN  Outcome: Ongoing  2/26/2020 1733 by Lazaro Whitley  Outcome: Ongoing     Problem: Bleeding:  Goal: Will show no signs and symptoms of excessive bleeding  Description  Will show no signs and symptoms of excessive bleeding  2/26/2020 2349 by Marty Jay RN  Outcome: Ongoing  2/26/2020 1733 by Wing Claudy Conway  Outcome: Ongoing   Patient's hemoglobin this AM:   Recent Labs     02/26/20  0415   HGB 8.0*     Patient's platelet count this AM:   Recent Labs     02/26/20  0415       Thrombocytopenia not present at this time. Patient showing no signs or symptoms of active bleeding. Transfusion not indicated at this time. Patient verbalizes understanding of all instructions. Will continue to assess and implement POC. Call light within reach and hourly rounding in place.

## 2020-02-27 NOTE — PROGRESS NOTES
Physical Therapy  Facility/Department: [address] [facility]  Daily Treatment Note  NAME: Alexis Guillen  : 1940  MRN: [id number]    Date of Service: 2020    Discharge Recommendations: Alexis Guillen scored a 17/24 on the AM-PAC short mobility form. Current research shows that an AM-PAC score of 17 or less is typically not associated with a discharge to the patient's home setting. Based on the patients AM-PAC score and their current functional mobility deficits, it is recommended that the patient have 5-7 sessions per week of Physical Therapy at d/c to increase the patients independence. Patient would benefit from continued therapy after discharge   PT Equipment Recommendations  Equipment Needed: (defer)    Assessment   Body structures, Functions, Activity limitations: Decreased functional mobility ; Decreased strength;Decreased endurance;Decreased balance  Assessment: Good tolerance for ambulation in girard but does fatigue & takes occasional standing rest breaks. Balance is slightly impaired & relies heavily on walker for support. [facility] for going [facility]. Treatment Diagnosis: decreased functional mobility, impaired gait, decreased endurance  Prognosis: Good  PT Education: PT Role;Transfer Training;Goals;Gait Training;Plan of Care; Functional Mobility Training  Patient Education: pt demonstrated understanding  REQUIRES PT FOLLOW UP: Yes  Activity Tolerance  Activity Tolerance: Patient limited by fatigue;Patient Tolerated treatment well     Patient Diagnosis(es): There were no encounter diagnoses.      has a past medical history of Aortic insufficiency, Atrial fibrillation ([address] Breast nodule, CAD (coronary artery disease), Cardiomyopathy ([address] CHF (congestive heart failure) ([address] Hyperlipidemia, Hypertension, Malignant lymphoma, non-Hodgkin's ([address] Microscopic hematuria, Mitral regurgitation, Nausea & vomiting, Osteoarthritis of

## 2020-02-27 NOTE — PROGRESS NOTES
128* 129*   CALCIUM 8.4 8.7 8.5   MG 2.10 1.90 1.90   PHOS 3.4 2.7 2.8   ANIONGAP 10 11 9     Hepatic:   Recent Labs     02/25/20  0348 02/26/20  0415 02/27/20  0338   LABALBU 2.4* 2.8* 2.5*     Troponin: No results for input(s): TROPONINI in the last 72 hours. BNP: No results for input(s): BNP in the last 72 hours. Lipids: No results for input(s): CHOL, HDL in the last 72 hours. Invalid input(s): LDLCALCU, TRIGLYCERIDE  ABGs:  No results for input(s): PHART, VFV0VFC, PO2ART, LJB2ZNW, BEART, THGBART, F7IVWNQN, IDO8SIQ in the last 72 hours. INR: No results for input(s): INR in the last 72 hours. Lactate: No results for input(s): LACTATE in the last 72 hours. Cultures:  -----------------------------------------------------------------  RAD:   XR CHEST PORTABLE   Final Result   1. Unchanged small pleural effusions. No findings for congestive failure on the current exam.  Correlate clinically. .      XR ABDOMEN (KUB) (SINGLE AP VIEW)   Final Result      1. Nonspecific bowel gas pattern without findings for obstruction. 2.  11 mm calcification overlying the right renal shadow consistent with renal calculus. This has been described on recent CT of the abdomen exam.  Second small radiodensity in the right lower abdomen is nonspecific. Assessment/Plan:     #T-Cell Lymphoma c/b severe malnutrition, debility  - monitor electrolytes and uric acid - stable, on allopurinol  - c/w G-CSF, prednisone  - Immunotherapy planned with heme-onc, to initiate inpatient, awaiting approval  - Megace for appetite & malnutrition, bowel regimen for constipation  - On therapeutic lovenox for [facility]  - PT, OT  - Oncology, palliative care following  - PM&R consulted- recommending ARU admission, CM working on precert     #Cardiogenic Shock: better though BP still on low side  - Midodrine, c/w management of comorbidities as below  - Telemetry     #Chronic Systolic CHF 2/2 NICMO, s/p AVR: compensated. EF 35%.  Has severe AS

## 2020-02-27 NOTE — PROGRESS NOTES
[address] Daily Progress Note      Admit Date:  2/21/2020    Subjective:  Mr. Mame Srivastava was seen and examined. F/U Lymphoma/CHF/cardiomyopathy/AVR/AS/VT/afib. No nausea this am.  No cp/sob. Feeling better    Objective:   BP (!) 115/96   Pulse 65   Temp 97.3 °F (36.3 °C) (Oral)   Resp 18   Ht 6' 1\" (1.854 m)   Wt 177 lb 3.2 oz (80.4 kg)   SpO2 99%   BMI 23.38 kg/m²       Intake/Output Summary (Last 24 hours) at 2/27/2020 1535  Last data filed at 2/27/2020 0430  Gross per 24 hour   Intake 240 ml   Output 1200 ml   Net -960 ml       TELEMETRY: Sinus     Physical Exam:  General:  Awake, alert, NAD  Skin:  Warm and dry  Neck:  JVP difficult  Chest: decreased BS, respiration normal  Cardiovascular:  RRR S1S2, 2/6 syst m  Abdomen:  Soft nontender  Extremities:  + edema upper extremities, No edema lower.      Medications:    [START ON 2/28/2020] predniSONE  20 mg Oral Daily    insulin lispro  0-12 Units Subcutaneous TID WC    insulin lispro  0-6 Units Subcutaneous Nightly    insulin lispro  5 Units Subcutaneous TID WC    Venelex   Topical BID    enoxaparin  80 mg Subcutaneous BID    allopurinol  200 mg Oral Daily    amiodarone  400 mg Oral Daily    aspirin  81 mg Oral Daily    atorvastatin  10 mg Oral Daily    pantoprazole  40 mg Oral QAM AC    mexiletine  200 mg Oral 3 times per day    midodrine  10 mg Oral TID WC    sodium chloride flush  10 mL Intravenous 2 times per day      dextrose       prochlorperazine **OR** prochlorperazine, ondansetron **OR** ondansetron, glucose, dextrose, glucagon (rDNA), dextrose, lidocaine, sodium chloride flush, acetaminophen, acetaminophen, polyethylene glycol, magic (miracle) mouthwash with nystatin    Lab Data:  CBC:   Recent Labs     02/25/20  0348 02/26/20  0415 02/27/20  0338   WBC 2.9* 2.6* 2.9*   HGB 7.8* 8.0* 7.8*   HCT 23.7* 24.0* 23.2*   MCV 84.8 83.6 83.4    160 164     BMP:   Recent Labs     02/25/20  0348 02/26/20  0415 02/27/20  1445  137 137   K 4.4 4.5 4.5   CL 99 98* 98*   CO2 30 28 30   PHOS 3.4 2.7 2.8   BUN 59* 49* 48*   CREATININE 1.4* 1.2 1.2     LIVER PROFILE:   No results for input(s): AST, ALT, LIPASE, BILIDIR, BILITOT, ALKPHOS in the last 72 hours. Invalid input(s): AMYLASE,  ALB  PT/INR:   No results for input(s): PROTIME, INR in the last 72 hours. APTT:   No results for input(s): APTT in the last 72 hours. BNP:  No results for input(s): BNP in the last 72 hours.   IMAGING:     Assessment:  Patient Active Problem List    Diagnosis Date Noted    Hypotension 11/28/2012     Priority: High    DNR (do not resuscitate) discussion     Generalized weakness     Encounter for palliative care     Goals of care, counseling/discussion     VT (ventricular tachycardia) ([facility] 02/22/2020    Nonrheumatic aortic valve stenosis 02/22/2020    Lymphoma ([facility] 02/21/2020    Fever     Malignant lymphoma, non-Hodgkin's ([address]     Metabolic encephalopathy     Tobacco abuse counseling     Lymphadenopathy     Hypervolemia     Sepsis ([address] 01/28/2020    Septic shock ([address]     Atrial fibrillation with RVR ([address]     Elevated international normalized ratio (INR)     Streptococcal pharyngitis     Leukopenia     Chronic ethmoidal sinusitis     TYRELL (acute kidney injury) ([address]     Ex-heavy cigarette smoker (20-39 per day)     PAF (paroxysmal atrial fibrillation) ([facility] 09/06/2019    Essential hypertension 09/06/2019    Finger amputation, traumatic, 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 Primary localized osteoarthrosis, pelvic region and thigh 05/04/2015    H/O total shoulder replacement 05/04/2015    Hyponatremia 04/22/2015    Primary localized osteoarthrosis of shoulder region 02/02/2015    Encounter for medication counseling 01/13/2014    Arthritis of knee 12/05/2013    Acute on chronic combined systolic and diastolic congestive heart failure ([address] 03/17/2013    S/P AVR (aortic valve replacement) 09/26/2012    Nausea 07/29/2012    Elevated troponin 07/29/2012    Cardiomyopathy ([facility] 07/29/2012    Nonrheumatic aortic valve insufficiency 07/29/2012    Mitral regurgitation 07/29/2012    Microscopic hematuria 07/29/2012     Imp: Lymphoma/CHF/cardiomyop/AVR/AS/VT/Afib    Plan:  Feeling reasonable again this am.   Denies chest pain/sob. Remains sinus. On RA. Bp reasonable but still marginal  for B blocker/ACE. Rhythm appears stable. EP following. Cr stable. Responded well to lasix dose yesterday. UE edema better. Will need maintenance diuretic when leaves.      Core Measures:  · Discharge instructions:   · LVEF documented:   · ACEI for LV dysfunction:   · Smoking Cessation:    Marvin Wise MD 2/27/2020 3:35 PM

## 2020-02-28 NOTE — PLAN OF CARE
Problem: Nutrition  Goal: Optimal nutrition therapy  Outcome: Ongoing  Note:   Nutrition Problem: Inadequate oral intake  Intervention: Food and/or Nutrient Delivery: Continue current diet, Modify current ONS  Nutritional Goals: Pt to meet >50% of nutritional requirements from diet and ONS

## 2020-02-28 NOTE — PROGRESS NOTES
had an episode of BRBPR and flex sigmoidoscopy found gastritis and rectal ulcers. He received a unit of blood and was transferred to [facility] ICU, and started on levophed with concern for cardiogenic shock vs sepsis. PMHx includes Aortic insufficiency, Atrial fibrillation ([address] Breast nodule, CAD (coronary artery disease), Cardiomyopathy ([address] CHF (congestive heart failure) ([facility] Hyperlipidemia, Hypertension, Microscopic hematuria, Mitral regurgitation, Nausea & vomiting, Osteoarthritis of knee, Pneumonia, Rotator cuff tear, and Ventricular ectopy. Family / Caregiver Present: Leni Murphy)  Referring Practitioner: Isha Taylor MD  Diagnosis: Lymphoma   Subjective  Subjective: Sitting up in chair on entry. Agreeable to OT. \"I wasn't able to go quite as far (regarding ambulation yesterday). \" \"You better let me sit here a few minutes (on toilet). \"  General Comment  Comments:    Vital Signs  Patient Currently in Pain: Denies   Orientation  Orientation  Overall Orientation Status: Within Functional Limits(oriented w/ conversation; not formally questioned)  Objective    ADL  Grooming: (seated for hand hygiene; pt fatigued after ambulation and toileting - requesting hand hygiene seated using )  LE Dressing: Minimal assistance(Min A to don/doff pants; requiring increased time and rest breaks)  Toileting: Minimal assistance(observed BM smear on sheets; assisted to toilet for BM; assist for thoroughness)        Balance  Standing Balance: (CGA - static; Min A - clothing management in standing to pull pants up/down - attempted educated about safe hand placement on walker - would benefit from reinforcement )  Standing Balance  Time: 2 minutes x 2 times; 1 minute  Activity: mobility in room/hallway (chair > bench in hallway; required seated rest break on bench in hallway; bench> toilet; toilet > bedside chair)  Comment: using wh walker; fatigued on exertion   Functional Mobility  Functional - Mobility Device: Rolling Walker  Activity: Other  Assist Level: Minimal assistance  Functional Mobility Comments: Note: slow and LEs appearing weak     Transfers  Sit to stand: (Min A (from chair, from 3in1 over toilet); Mod A (from bench in hallway - no armrests))  Stand to sit: Minimal assistance  Transfer Comments: Increased time to complete and cues for RW management                        Cognition  Overall Cognitive Status: [facility]  Cognition Comment: pleasant, cooperative, flat affect                                         Plan  This note will serve as a discharge summary in the event the patient is discharged prior to next treatment session. Plan  Times per week: 2-5  Times per day: Daily  Current Treatment Recommendations: Functional Mobility Training, Endurance Training, Patient/Caregiver Education & Training, Self-Care / ADL                                                    AM-PAC Score        AM-PAC Inpatient Daily Activity Raw Score: 16 (02/28/20 1041)  AM-PAC Inpatient ADL T-Scale Score : 35.96 (02/28/20 1041)  ADL Inpatient CMS 0-100% Score: 53.32 (02/28/20 1041)  ADL Inpatient CMS G-Code Modifier : CK (02/28/20 1041)    Goals  Short term goals  Time Frame for Short term goals: By discharge  Short term goal 1: Pt will transfer to the [facility] with min A (d/c goal 2/28 - ambulating to toilet); revised: 3in1 transfer w/ CGA  Short term goal 2: Pt will stand for 3 minutes with Min A during functional mobility/ADLs (MET 2/26); revised goal; increase standing tolerance to 7 minutes for ADL engagement, CGA (not met)  Short term goal 3: Pt will complete LB dressing with SBA (not met)  Short term goal 4: chair pushups x 5, CGA for increased functional strength (not met)  Short term goal 5:    Patient Goals   Patient goals :  To go home       Therapy Time   Individual Concurrent Group Co-treatment   Time In 0950         Time Out 1028         Minutes [address] OTR/L #[id number]

## 2020-02-28 NOTE — PROGRESS NOTES
Renal:    Recent Labs     02/26/20 0415 02/27/20 0338 02/28/20 0335    137 137   K 4.5 4.5 4.2   CL 98* 98* 98*   CO2 28 30 29   BUN 49* 48* 44*   CREATININE 1.2 1.2 1.1   GLUCOSE 128* 129* 117*   CALCIUM 8.7 8.5 8.4   MG 1.90 1.90 1.80   PHOS 2.7 2.8 2.4*   ANIONGAP 11 9 10     Hepatic:   Recent Labs     02/26/20 0415 02/27/20 0338 02/28/20 0335   LABALBU 2.8* 2.5* 2.7*     Troponin: No results for input(s): TROPONINI in the last 72 hours. BNP: No results for input(s): BNP in the last 72 hours. Lipids: No results for input(s): CHOL, HDL in the last 72 hours. Invalid input(s): LDLCALCU, TRIGLYCERIDE  ABGs:  No results for input(s): PHART, JAC0EZF, PO2ART, LTK0QLA, BEART, THGBART, K1SKEMAK, YXA9ENO in the last 72 hours. INR: No results for input(s): INR in the last 72 hours. Lactate: No results for input(s): LACTATE in the last 72 hours. Cultures:  -----------------------------------------------------------------  RAD:   XR CHEST PORTABLE   Final Result   1. Unchanged small pleural effusions. No findings for congestive failure on the current exam.  Correlate clinically. .      XR ABDOMEN (KUB) (SINGLE AP VIEW)   Final Result      1. Nonspecific bowel gas pattern without findings for obstruction. 2.  11 mm calcification overlying the right renal shadow consistent with renal calculus. This has been described on recent CT of the abdomen exam.  Second small radiodensity in the right lower abdomen is nonspecific.           Assessment/Plan:     T-Cell Lymphoma c/b severe malnutrition, debility  - monitor electrolytes and uric acid - stable, on allopurinol  - c/w G-CSF, prednisone  - Dr. Sara Lions planning to conduct immunotherapy with Brentuximab, Rx supply pending  - Megace for appetite & malnutrition, bowel regimen for constipation  - On therapeutic lovenox for [facility] [place] operated by [facility]  - PT, OT  - Oncology, palliative care following  - PM&R consulted- recommending ARU admission, CM working on precert     Cardiogenic Shock (IMPROVING)  - Midodrine, c/w management of comorbidities as below  - Telemetry     Chronic Systolic CHF 2/2 [address] s/p AVR: compensated. EF 35%. Has severe AS despite prior AVR  - c/w amiodarone and mexiletine for control of afib  - midodrine as above  - BP still on low side, defer BB, ARB for now  - Consider intervention for AV stenosis and ICD if prognosis were to improve  - Trace edema in hands and feet today, received lasix yesterday. Hold further lasix for now, may need to initiate scheduled low dose lasix to maintain euvolemia but BP still on low side  - EP, interventional cardiology, oncology following     pAFIB, H/O VTach: stable. SR on telemetry. S/p DCCV x2, CHADSVASC 5  - c/w amiodarone, mexiletine  - lovenox for AC, therapeutic dose for Afib     Fevers - Reactive 2/2 T Cell Lymphoma: afebrile now. infectious w/u negative. ID signed off  - Prednisone, other management as elsewhere     Acute blood loss anemia 2/2 GI bleed: better.  H/H stable. 2/2 rectal ulcers, stercoral from constipation  - AC w/ therapeutic lovenox, low risk for further bleeding, on PPI, bowel regimen  - Monitor H/H via daily CBC     TYRELL on CKD3: TYRELL resolved, stable - TYRELL 2/2 cardiorenal  - monitor via daily labs  - on ASA, statin     Code Status: Full  FEN: Dysphagia softs, no straws, supplements  PPX: Lovenox  DISPO: [facility]    Pawan Gauthier DO, PGY-1  02/28/20  10:09 AM    This patient will be staffed and discussed with Livia Maynard MD.

## 2020-02-28 NOTE — DISCHARGE INSTR - COC
REPLACEMENT-BAR       UPPER GASTROINTESTINAL ENDOSCOPY  12/24/13    UPPER GASTROINTESTINAL ENDOSCOPY N/A 2/10/2020    EGD BIOPSY performed by Chandler Sue MD at [facility]       Immunization History:   Immunization History   Administered Date(s) Administered    Influenza 10/10/2012    Influenza Virus Vaccine 09/19/2013, 10/23/2014    Influenza, High Dose (Fluzone 65 yrs and older) 10/15/2015, 09/29/2016, 09/20/2017, 10/10/2018    Influenza, Triv, inactivated, subunit, adjuvanted, IM (Fluad 65 yrs and older) 10/08/2019    Pneumococcal Conjugate 13-valent ([id number]) 12/01/2015    Pneumococcal Conjugate 7-valent (Prevnar7) 11/19/2007    Pneumococcal Polysaccharide ([id number]) 11/19/2007, 11/07/2019    Tdap (Boostrix, Adacel) 10/12/2014    Zoster Live (Zostavax) 09/03/2013       Active Problems:  Patient Active Problem List   Diagnosis Code    Nausea R11.0    Cardiomyopathy ([address] I42.9    Nonrheumatic aortic valve insufficiency I35.1    Mitral regurgitation I34.0    Microscopic hematuria R31.29    S/P AVR (aortic valve replacement) Z95.2    Hypotension I95.9    Acute on chronic combined systolic and diastolic congestive heart failure (HCC) I50.43    Arthritis of knee M17.10    Encounter for medication counseling Z71.89    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    Hyponatremia E87.1    Primary localized osteoarthrosis, 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    H/O total shoulder replacement Z96.619    Hypercholesteremia E78.00    Finger amputation, traumatic, 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    PAF (paroxysmal atrial fibrillation) ([facility]) I48.0    Essential hypertension I10    Sepsis ([address] A41.9    Septic shock ([address] A41.9, R65.21    Atrial fibrillation with RVR ([address] I48.91    Elevated international normalized ratio (INR) R79.1    Streptococcal pharyngitis J02.0    Leukopenia D72.819    Chronic ethmoidal sinusitis J32.2    TYRELL (acute kidney injury) ([address] N17.9    filed at 2020 1333  Gross per 24 hour   Intake 650 ml   Output 1150 ml   Net -500 ml     I/O last 3 completed shifts:   In: 0 [P.O.:650]  Out: 1150 [Urine:1150]    Safety Concerns:     [address] [employer] Safety Concerns:277998524}    Impairments/Disabilities:      [address] Impairments/Disabilities:612501471}    Nutrition Therapy:  Current Nutrition Therapy:   [address] Diet List:403298110}    Routes of Feeding: {CHP DME Other Feedings:685993262}  Liquids: {Slp liquid thickness:62912}  Daily Fluid Restriction: {CHP DME Yes amt example:857395722}  Last Modified Barium Swallow with Video (Video Swallowing Test): {Done Not Done FCPF:906436343}    Treatments at the Time of Hospital Discharge:   Respiratory Treatments: ***  Oxygen Therapy:  {Therapy; copd oxygen:34323}  Ventilator:    {MH CC Vent WOJM:165707705}    Rehab Therapies: {THERAPEUTIC INTERVENTION:6354683899}  Weight Bearing Status/Restrictions: { CC Weight Bearin}  Other Medical Equipment (for information only, NOT a DME order):  {EQUIPMENT:746287122}  Other Treatments: ***    Patient's personal belongings (please select all that are sent with patient):  {CHP DME Belongings:620857203}    RN SIGNATURE:  {Esignature:824223521}    CASE MANAGEMENT/SOCIAL WORK SECTION    Inpatient Status Date: 2020    Readmission Risk Assessment Score:  Readmission Risk              Risk of Unplanned Readmission:        26           Discharging to Facility/ Agency   · [facility]: [facility]  · Address: [address] [place] [address] [facility]  · Phone: [phone]  · Fax:    / signature: Electronically signed by Jacinta Litten, LSW on 3/10/20 at 9:58 AM EDT    PHYSICIAN SECTION    Prognosis: Poor    Condition at Discharge: Terminal      Recommended Labs or Other Treatments After Discharge: Hospice    Physician Certification: I certify the above information and transfer of Israel Zamora  is necessary for the continuing treatment of the diagnosis listed and that he requires Hospice for less 30 days.      Update Admission H&P: No change in H&P    PHYSICIAN SIGNATURE:  Gage Reis MD

## 2020-02-28 NOTE — PROGRESS NOTES
Speech Language Pathology  Facility/Department: [address] [facility]  Dysphagia Daily Treatment Note    NAME: Noemí Gomez  : 1940  MRN: [id number]    Patient Diagnosis(es):   Patient Active Problem List    Diagnosis Date Noted    Hypotension 2012     Priority: High    DNR (do not resuscitate) discussion     Generalized weakness     Encounter for palliative care     Goals of care, counseling/discussion     VT (ventricular tachycardia) ([address] 2020    Nonrheumatic aortic valve stenosis 2020    Lymphoma ([address] 2020    Fever     Malignant lymphoma, non-Hodgkin's ([address]     Metabolic encephalopathy     Tobacco abuse counseling     Lymphadenopathy     Hypervolemia     Sepsis ([address] 2020    Septic shock ([address]     Atrial fibrillation with RVR ([address]     Elevated international normalized ratio (INR)     Streptococcal pharyngitis     Leukopenia     Chronic ethmoidal sinusitis     TYRELL (acute kidney injury) ([address]     Ex-heavy cigarette smoker (20-39 per day)     PAF (paroxysmal atrial fibrillation) ([address] 2019    Essential hypertension 2019    Finger amputation, traumatic, initial encounter 2019    Hypercholesteremia 2015    Primary localized osteoarthrosis, pelvic region and thigh 2015    H/O total shoulder replacement 2015    Hyponatremia 2015    Primary localized osteoarthrosis of shoulder region 2015    Encounter for medication counseling 2014    Arthritis of knee 2013    Acute on chronic combined systolic and diastolic congestive heart failure ([address] 2013    S/P AVR (aortic valve replacement) 2012    Nausea 2012    Cardiomyopathy ([address] 2012    Nonrheumatic aortic valve insufficiency 2012    Mitral regurgitation 2012    Microscopic hematuria 2012     Allergies:    Allergies   Allergen Reactions    Clindamycin/Lincomycin Rash    Levaquin [Levofloxacin]      Rash, swollen neck    Cefdinir Rash     Recent CXR: (2/21/20)  Impression   1.  Unchanged small pleural effusions.  No findings for congestive failure on the current exam.  Correlate clinically. .     Chart reviewed. Medical Diagnosis:  Lymphoma  Treatment Diagnosis:  Oral-pharyngeal Dysphagia (R13.12)    BSE Impression (2/22/20): Pt with known oropharyngeal dysphagia per MBS completed 2/7/20. Pt declining most PO offerings this date, accepting only ice chips, thin water, and puree. No overt signs associated with aspiration exhibited with any trials. Mastication and bolus control of ice chips adequate; no significant oral residue noted with any trials. Recommend continue dysphagia soft and bite sized (dysphagia III advanced) / thin liquid diet per MBS recs. Previous MBS results (at [facility]) (2/7/20):  Results indicate oropharyngeal dysphagia. Successive drinks of thin and Mildly Thick (Nectar) Liquids revealed laryngeal penetration during the swallow. Material entered the airway, remained above the vocal folds and was not ejected from the airway. No aspiration was definitively viewed during this study. Oral phase was characterized prolonged mastication with prolonged time noted for bolus transport. Minimal oral residue was noted post swallow. Pharyngeal phase was characterized by pharyngeal pooling, delayed swallow initiation, minimally delayed pharyngeal clearing and minimal collection of pharyngeal stasis post swallow. Overall pharyngeal clearing was grossly functional. Decreased upper esophageal opening was noted during the swallow. Pt was able to clear barium tablet from the pharynx although he reported sensation of pill being \"stuck\" in his throat. Belching was noted after the study. Laryngeal penetration may be contributing factor to pt's sensation of something \"stuck\" in his throat. Pt appeared to benefit from use of small bites/sips, slow rate of intake and single sips of liquids. Recommend continuation of Dysphagia III Soft and Bite-Sized diet level with thin liquids with strict aspiration precautions. Pain: denied    Current Diet:  Dysphagia Soft and Bite-Sized diet, Thin liquids    Treatment:  Pt seen bedside to address the following goals:  Goal 1: Patient will consume recommended diet consistency w/o overt signs associated with aspiration or respiratory decline. 2/22: Targeted via therapeutic exercises - effortful swallows completed with puree consistency x 20 reps and ice chips x 20 reps. Chin tuck against resistance x 10 reps. Continue goal.  2/25/20:  RN reports that pt ate breakfast without clinical concerns. Pt and family report that he had a good appetite for breakfast, but he is full now and is only agreeable to consuming water at this time. Pt is able to tolerate thin liquids (water) via single bolus from cup with no immediate clinicals/s aspiration; however he did have delayed throat clearing x1/15. Continue goal.  2/28/20:  Pt agreeable to therapy, but his wife continues to voice concerns that if pt eats now, he may not have appetite for lunch. Pt attempted applesauce x1, but this was uncomfortable given the sores in his mouth. He was better able to tolerate water and sugar-free jello. Pt was able to implement effortful swallow with thin liquid (unmetered via cup) x5/5 with no clinical s/s aspiration. Pt was able to implement effortful swallow with half-tsp jello 15/15x with no clinical s/s aspiration. Continue goal.       Goal 2: Patient will verbalize comprehension of dysphagia recommendations / compensatory swallow strategies. 2/22: Pt was educated on role of SLP, purpose of visit, A&P of swallow, s/s and associated risks of aspiration, MBS results, use of effortful swallows, rationale for tx tasks, importance of oral care, recommended method / frequency of completing oral care, compensatory swallow strategies, and POC.  Pt verbalized understanding but will require reinforcement. Continue goal.  2/25/20:  SLP educated pt and family re: role of SLP, s/s aspiration to report, risks of aspiration, results of previous MBS procedure, recommendations for diet, safe swallow strategies, role of independent practice (effortful swallow); pt and family verbalized comprehension but will need reinforcement. Continue goal.  2/28/20:  SLP educated pt and wife re: role of SLP, s/s aspiration to report, recommendations for diet, safe swallow strategies, goal of daily independent practice (effortful swallow); pt and family need reinforcement. Continue goal.      Patient/Family/Caregiver Education:  See above    Compensatory Strategies: Alternate solids and liquids;  Small bites/sips;  Eat/Feed slowly;  Upright as possible for all oral intake;  Effortful swallow  intermittent cough / re-swallow during meals     Plan:  Continued dysphagia treatment (2-3x/wk) with goals per plan of care. Diet recommendations:  Solids:  Dysphagia Soft and Bite-Sized  Liquids: Thin  DC recommendation: TBD  Treatment: 15 minutes  D/W nursing, Ally  Needs met prior to leaving room, call button in reach. Electronically Signed by:  Dayton Camacho [facility]  Speech-Language Pathologist  [phone]  Pager #[phone]    If patient is discharged prior to next treatment, this note will serve as the discharge summary.

## 2020-02-28 NOTE — PROGRESS NOTES
NUTRITION ASSESSMENT  Admission Date: 2/21/2020     Type and Reason for Visit: Reassess    NUTRITION RECOMMENDATIONS:   1. PO Diet: Continue with diet texture; dysphagia III (soft and bite-sized) and thin liquids per SLP. If BS remain > 200 mg/dL, consider addition of CCC diet modifier if decrease in steroids/change to insulin do not improve BS control. 2. ONS: Modify ONS to Ensure High Protein/Low Calorie that has lower carbs to aid w/improving BS control. NUTRITION ASSESSMENT:  Pt noted with improved PO intake, taking % of most meals. Also consuming ONS- RN requested to adjust this to low carb ONS; RD noted insulin adjusted d/t BS > 200. RD discussed need for modifying diet if BS can not decrease. RD to monitor over weekend and adjust PO diet as needed. RD changed ONS.       MALNUTRITION ASSESSMENT  Context: Acute illness or injury   Malnutrition Status: No malnutrition  Findings of the 6 clinical characteristics of malnutrition (Minimum of 2 out of 6 clinical characteristics is required to make the diagnosis of moderate or severe Protein Calorie Malnutrition based on AND/ASPEN Guidelines):  Energy Intake %: Greater than 75% of estimated energy requirement  Energy Intake Time: (x last 4 days)  Interpretation of Weight Loss %: No significant weight loss  Body Fat Status: No significant subcutaneous fat loss  Muscle Mass Status: No significant muscle mass loss  Fluid Accumulation Status: No significant fluid accumulation  Reduced  Strength: Not measured    NUTRITION DIAGNOSIS   Problem: Inadequate Oral Intake  Etiology: Insufficient energy/nutrient consumption  Signs & Symptoms: need for ONS and Intake 51-75%    NUTRITION INTERVENTION  Food and/or Nutrient Delivery:Continue Current diet  or Modify Current ONS   Nutrition education/counseling/coordination of care: Continue Inpatient Monitoring     NUTRITION MONITORING & EVALUATION:  Evaluation:Progressing towards goal   Goals: Pt to meet >50% of History  Pre-Admission / Home Diet:  Pre-Admission/Home Diet: General   Home Supplements / Herbals:    none noted  Food Restrictions / Cultural Requests:    none noted    Diet Orders / Intake / Nutrition Support  Current diet/supplement order: DIET DYSPHAGIA SOFT AND BITE-SIZED; No Drinking Straw  Dietary Nutrition Supplements: Low Calorie High Protein Supplement     NSG Recorded PO:   PO Fluids P.O.: 50 mL  PO Meals PO Meals Eaten (%): 76 - 100%(100% 2 [place] toast, fruit, )   PO Intake: 51-75% and %  PO Supplement: Standard High Calorie   Frozen Oral Supplement   Clear Liquid Supplement    PO Supplement Intake: 26-50%      NUTRITION RISK LEVEL: Risk Level:  Moderate    Nida Dee RD, ADIA  [place]:  [phone]  Office:  [phone]

## 2020-02-28 NOTE — CARE COORDINATION
Case Management Assessment           Daily Note                 Date/ Time of Note: 2/28/2020 11:39 AM         Note completed by: Patrick Sanderson    Patient Name: Purnima Stewart  YOB: 1940    Diagnosis:Lymphoma, unspecified body region, unspecified lymphoma type [facility]) [C85.90]  Patient Admission Status: Inpatient    Date of Admission:2/21/2020  6:23 PM Length of Stay: 7 GLOS:        Current Plan of Care: denied by insurance for ARU. Patient and wife notified. They would like [facility] for SNF. Referral made. Await response. Will need precert. UPDATE:  Family wants to appeal denial.  [facility] has faxed information requested for appeal to [facility]. [facility] still reviewing. Patient to have Brentuximab IV at the outpatient infusion center every 3 weeks. ________________________________________________________________________________________  PT AM-PAC: 17 / 24 per last evaluation on:     OT AM-PAC: 16 / 24 per last evaluation on:     DME Needs for discharge: none    ________________________________________________________________________________________  Discharge Plan: SNF: [facility]    Tentative discharge date: 3/2/2020     Current barriers to discharge: precert, denied for ARU    Referrals completed: SNF: [facility]    Resources/ information provided: SNF List  ________________________________________________________________________________________  Case Management Notes:see above note      Criss Gibson and his family were provided with choice of provider; he and his family are in agreement with the discharge plan. Care Transition Patient:  Yes    V[place], PREMA-S  [facility]  Case Management Department  Ph: [phone]

## 2020-02-28 NOTE — PROGRESS NOTES
elderly and frail   HEENT: normocephalic, PERRL, no scleral erythema or icterus, Oral mucosa moist and intact, throat clear  NECK: supple without palpable adenopathy  BACK: Straight negative CVAT  SKIN: warm dry and intact without lesions rashes or masses  CHEST: CTA bilaterally without use of accessory muscles  CV: Normal S1 S2, RRR, no MRG  ABD: NT ND normoactive BS, no palpable masses or hepatosplenomegaly  EXTREMITIES: without edema, denies calf tenderness  NEURO: CN II - XII grossly intact  CATHETER: PIV    Data    CBC:   Recent Labs     02/26/20 0415 02/27/20 0338 02/28/20  0335   WBC 2.6* 2.9* 3.7*   HGB 8.0* 7.8* 7.9*   HCT 24.0* 23.2* 23.7*   MCV 83.6 83.4 83.7    164 172     BMP/Mag:  Recent Labs     02/26/20 0415 02/27/20 0338 02/28/20  0335    137 137   K 4.5 4.5 4.2   CL 98* 98* 98*   CO2 28 30 29   PHOS 2.7 2.8 2.4*   BUN 49* 48* 44*   CREATININE 1.2 1.2 1.1   MG 1.90 1.90 1.80     LIVP:   No results for input(s): AST, ALT, LIPASE, BILIDIR, BILITOT, ALKPHOS in the last 72 hours. Invalid input(s): AMYLASE,  ALB  Coags:   No results for input(s): PROTIME, INR, APTT in the last 72 hours. Uric Acid   Recent Labs     02/26/20 0415 02/27/20 0338 02/28/20  0335   LABURIC 5.3 5.3 4.4     PATHOLOGY:  1. Left Inguinal & Left Acillary LN Bx 2/3/20: The following consultation results were received from [facility] [place] [address] (Case  Number: [id number]-)  1. Lymph node, left inguinal, biopsy ([facility]-, 02/03/2020):       Angioimmunoblastic T-cell lymphoma. See Note. 2. Lymph node, left axilla, excision ([facility]-, 02/09/2020):       Angioimmunoblastic T-cell lymphoma. See Note.     NOTE: Sections of an inguinal node (SFS-) and axillary node  (SFS-) show mode architecture effaced by polymorphous infiltrate  composed of lymphocytes, histiocytes, plasma cells, eosinophils, and an  increase in the number of high-endothelial venules. Sinuses are patent. The submitted immunostains (CD20, PAX5, CD3, BCL-6, CD10, CD21, CD45,  CD30, CD15, CD4, CD5, CD7, CD8, CD57) and JYOTHI for EBV (RICHARD) are  reviewed. Additional stains (CD21, CD3, CD79a, PD-1, ICOS, CCD23) and JYOTHI  for EBV (RICHARD) are performed at the [facility]. CD20 and PAX5 show markedly  fragmented B-cell areas and regressed follicles. CD79a shows similar  distribution and additionally marks cells with plasmacytoid morphology. CD3, CD4, CD5, and CD7 show abundant T-cells and highlights a mild  cytologic atypia. CD8 marks numerous cells (CD4 ~ CD8). PD-1 reveals  abundant Follicular [place] T-cells, and a somewhat similar pattern is  noted for ICOS. CD23 shows a disorganized and expanded dendritic cell  meshwork in the paracortex, not necessarily associated with B-cell areas. CD30 marks large scattered cells, positive for CD20, PAX-5 (dim), and  negative for CD15, CD10, and BCL-6. CD57 is noncontributory. RICHARD is  positive in cells ranging in size. Per reports, flow cytometry in the axillary node ([facility]-, 02/10/2020)  shows small-size T-cells (6% of total lymphocytes) positive for CD2, CD4,  CD5, and CD7, and negative for sCD3, TCR alpha/beta, and TCR gamma/delta. In summary, the morphologic and immunohistochemistry findings are  consistent with the above diagnosis. 2. GI Biopsies 2/10/20:    A. Gastroesophageal junction, biopsy:       - Columnar mucosa with mild chronic inactive inflammation       - No evidence of intestinal metaplasia, dysplasia, or malignancy       B. Stomach, biopsy:       - Gastric mucosa with no pathologic change       - No evidence of intestinal metaplasia, dysplasia, or malignancy       - No morphologic evidence of helicobacter pylori infection       C. Esophagus, biopsy:       - Squamous mucosa with focal mild chronic inflammation       - No evidence of increased eosinophils, dysplasia, or malignancy       D.  Ulcer, rectum, biopsy:       - Colonic mucosa with extensive ulceration and associated acute         inflammation       - No evidence of granulomas, dysplasia or malignancy       - No evidence of specific viral infection, as supported by negative         CMV and HSV immunohistochemical staining with appropriate controls    DIAGNOSTIC IMAGIN. CT Chest 20:  1. Extensive lymphadenopathy as well as suspected mild splenomegaly,   suspicious for lymphoproliferative disorder.  New areas of nodular soft   tissue density in the left breast may represent associated intramammary lymph   nodes but could also represent subcutaneous involvement. 2. Trace bilateral pleural effusions with bilateral lower lobe passive   atelectasis. 3. Mild cardiomegaly status post aortic valve replacement. 2. CT A/P 20: Increased retroperitoneal adenopathy compared to  raising the question of   underlying lymphoma.       Mild body wall anasarca and trace pelvic fluid suggesting fluid overload. PROBLEM LIST:             1. T-Cell Angioblastic NHL  2. MDS  3. NICM  4. Acute on Chronic HFrEF - LVEF 35-40% 20  5. MR  6. AS - S/p Aortic Valve replacement  7. PAF w/RVR  8. HTN  9. CKD III  10. Chronic Sinusitis  11. OA - h/o shoulder replacement  12. HLD    TREATMENT:            Pending hosp approval to tx with Brentuximab      ASSESSMENT AND PLAN:           1. T-cell angiommunoblastic lymphoma: Newly diagnosed and pending approval from hospital admon to give Brentuximab 1.8mg/m2 iv x1 q 3-4 wks. Potentially Cycle 1, Day +1 Brentuximab ()  - Prednisone 50mg daily - Day + 7 (20)  - Family understands that any treatment we would offer is palliative in nature only  - Palliative Care following - full code     2. MDS: originally diagnosed May, 2017  - No current tx  - This does not require treatment with a hypo-methylating agent at this point.   However his baseline leukopenia and  anemia would make giving chemotherapy extremely difficult as this

## 2020-02-28 NOTE — PROGRESS NOTES
Administration: Chemotherapy drug Brentuximab independently verified with Lorena Hogue RN prior to administration. Acknowledgement of informed consent for chemotherapy administration verified. Original order, appropriateness of regimen, drug supplied, height, weight, BSA, dose calculations, expiration dates/times, drug appearance, and two patient identifiers were verified by both RNs. Drug checked for vesicant/irritant status and for risk of hypersensitivity. Most recent laboratory values and allergies, were reviewed. Positive, brisk blood return via CVC was confirmed prior to administration. Chest x-ray for correct line placement reviewed. Maxwell Pérez and Lorena Hogue RN verified correct rate of chemotherapy and maintenance IV fluids. Patient was educated on chemotherapy regimen prior to administration including indication for treatment related to disease & side effects of chemotherapy drug. Patient verbalizes understanding of all instructions. Completion of Chemotherapy: Monitoring during infusion done per policy, see Flowsheets. Blood return verified before, during, and after infusion per policy; no signs of extravasation. Pt tolerated chemotherapy well and without incident. Chemotherapy infusion end time on the [facility] Will continue to monitor.

## 2020-02-29 NOTE — PROGRESS NOTES
oriented, elderly and frail   HEENT: normocephalic, PERRL, no scleral erythema or icterus, Oral mucosa moist and intact, throat clear  NECK: supple without palpable adenopathy  BACK: Straight negative CVAT  SKIN: warm dry and intact without lesions rashes or masses  CHEST: CTA bilaterally without use of accessory muscles  CV: Normal S1 S2, RRR, no MRG  ABD: NT ND normoactive BS, no palpable masses or hepatosplenomegaly  EXTREMITIES: without edema, denies calf tenderness  NEURO: CN II - XII grossly intact  CATHETER: PIV    Data    CBC:   Recent Labs     02/27/20 0338 02/28/20 0335 02/29/20  0400   WBC 2.9* 3.7* 5.6   HGB 7.8* 7.9* 8.8*   HCT 23.2* 23.7* 26.1*   MCV 83.4 83.7 84.0    172 186     BMP/Mag:  Recent Labs     02/27/20 0338 02/28/20 0335 02/29/20  0400    137 138   K 4.5 4.2 4.0   CL 98* 98* 96*   CO2 30 29 27   PHOS 2.8 2.4* 2.5   BUN 48* 44* 44*   CREATININE 1.2 1.1 1.2   MG 1.90 1.80 1.70*     LIVP:   No results for input(s): AST, ALT, LIPASE, BILIDIR, BILITOT, ALKPHOS in the last 72 hours. Invalid input(s): AMYLASE,  ALB  Coags:   No results for input(s): PROTIME, INR, APTT in the last 72 hours. Uric Acid   Recent Labs     02/27/20 0338 02/28/20 0335 02/29/20  0400   LABURIC 5.3 4.4 4.3     PATHOLOGY:  1. Left Inguinal & Left Acillary LN Bx 2/3/20: The following consultation results were received from [address] [place] [address] (Case  Number: JL-)  1. Lymph node, left inguinal, biopsy ([facility] 02/03/2020):       Angioimmunoblastic T-cell lymphoma. See Note. 2. Lymph node, left axilla, excision ([facility] 02/09/2020):       Angioimmunoblastic T-cell lymphoma. See Note.     NOTE: Sections of an inguinal node (SFS-) and axillary node  (SFS-) show mode architecture effaced by polymorphous infiltrate  composed of lymphocytes, histiocytes, plasma cells, eosinophils, and an  increase in the number of high-endothelial venules. Sinuses are patent. The submitted immunostains (CD20, PAX5, CD3, BCL-6, CD10, CD21, CD45,  CD30, CD15, CD4, CD5, CD7, CD8, CD57) and JYOTHI for EBV (RICHARD) are  reviewed. Additional stains (CD21, CD3, CD79a, PD-1, ICOS, CCD23) and JYOTHI  for EBV (RICHARD) are performed at the [facility]. CD20 and PAX5 show markedly  fragmented B-cell areas and regressed follicles. CD79a shows similar  distribution and additionally marks cells with plasmacytoid morphology. CD3, CD4, CD5, and CD7 show abundant T-cells and highlights a mild  cytologic atypia. CD8 marks numerous cells (CD4 ~ CD8). PD-1 reveals  abundant Follicular [place] T-cells, and a somewhat similar pattern is  noted for ICOS. CD23 shows a disorganized and expanded dendritic cell  meshwork in the paracortex, not necessarily associated with B-cell areas. CD30 marks large scattered cells, positive for CD20, PAX-5 (dim), and  negative for CD15, CD10, and BCL-6. CD57 is noncontributory. RICHARD is  positive in cells ranging in size. Per reports, flow cytometry in the axillary node ([facility]-, 02/10/2020)  shows small-size T-cells (6% of total lymphocytes) positive for CD2, CD4,  CD5, and CD7, and negative for sCD3, TCR alpha/beta, and TCR gamma/delta. In summary, the morphologic and immunohistochemistry findings are  consistent with the above diagnosis. 2. GI Biopsies 2/10/20:    A. Gastroesophageal junction, biopsy:       - Columnar mucosa with mild chronic inactive inflammation       - No evidence of intestinal metaplasia, dysplasia, or malignancy       B. Stomach, biopsy:       - Gastric mucosa with no pathologic change       - No evidence of intestinal metaplasia, dysplasia, or malignancy       - No morphologic evidence of helicobacter pylori infection       C. Esophagus, biopsy:       - Squamous mucosa with focal mild chronic inflammation       - No evidence of increased eosinophils, dysplasia, or malignancy       D.  Ulcer, rectum, biopsy:     keep dry    4. Heme: Anemia & Neutropenia 2/2 MDS +/- NHL  - Transfuse for Hgb < 7 and Platelets < 10 K.   - No transfusion today     5. Metabolic / CKD III: Baseline SCr 1.3-1.4  - Replace Potassium and Magnesium per PRN orders  - Risk for Tumor Lysis Syndrome: No evidence at this time  - Cont Allopurinol 200 mg daily    6. Cardiac: Significant H/o NICM, valvular disease, PAF w/RVR, acute on chronic HFrEF. Recently had runs of wide-complex tachycardia  - Cardiology consulted & following - appreciate assistance  - Cont ASA 81mg daily    - VTach 2/18/20 - s/p dc cardioversion - If his anticipated life expectancy is  less than 12 months, ICD implantation is contraindicated  - Cont amiodarone 400mg daily & mexiletine 200mg q8h. Digoxin 125mcg daily currently on hold    - PAF w/RVR - previously on coumadin  - Cont amiodarone  - Resumed AC with lovenox 80mg BID (2/25/20)    - Aortic Stenosis - s/p porcine AVR  - Consider for aortic valve intervention or device therapy for VT if his prognosis improves appreciably    - Acute on Chronic HFrEF - LVEF 35%  - Diuresis: S/p lasix 40mg IV x1 2/24    - Cardiogenic Shock: Resolved, POA. Off pressors now but previously required levophed & dopamine  - Cont midodrine 10mg TID    7. Pulmonary: No acute issues  - Encourage IS & ambulation    8. GI: +Rectal ulcers & LGI bleed  - S/p EGD & flex sig 2/10  - Cont PPI daily    9. Nutrition: Severe malnutrition POA  - Cont low microbial diet  - Dietary to follow closely   - Encourage supplements as tolerated  - Hold megace 200mg daily as this increases risk of thrombus    10. DM2: chronic, steroid induced hyperglycemia  - Cont accuchecks & SSI - Low    11. Acute Debilitation: 2/2 new NHL diagnosis +/- age   - Cont PT/OT  - Consult SW - ARU denied.  Will need to start precert for SNF on Monday, 3/2  - Palliative care also following, not unreasonable to d/c home with palliative care if he has good support at home given his limited prognosis        - DVT Prophylaxis: Cont full-dose AC w/lovenox  Contraindications to pharmacologic prophylaxis: None  Contraindications to mechanical prophylaxis: None    - Disposition: Will need SNF at d/c. SW consulted        Fabrice Mon DO, MS  [facility]    Please contact via:  1. [employer]  2.   Cell Phone:  [phone]    2/29/2020   11:26 AM

## 2020-02-29 NOTE — PLAN OF CARE
Problem: Risk for Impaired Skin Integrity  Goal: Tissue integrity - skin and mucous membranes  Description  Structural intactness and normal physiological function of skin and  mucous membranes. 2/29/2020 1729 by Lc Joe RN  Outcome: Ongoing  Note:   Pt to remain free of skin breakdown during stay, pt encouraged to turn and reposition frequently. No evidence of skin breakdown noted. Problem: Falls - Risk of:  Goal: Will remain free from falls  Description  Will remain free from falls  2/29/2020 1729 by Lc Joe RN  Outcome: Ongoing  Note:   Pt meets criteria for orthostasis. Pt is a High fall risk. See [place] Fall Score and ABCDS Injury Risk assessments. Explained fall risk precautions to pt and family and rationale behind their use to keep the patient safe. Pt bed is in low position, side rails up, call light and belongings are in reach. Fall wristband applied and present on pts wrist.  Bed alarm on. Pt encouraged to call for assistance. Will continue with hourly rounds for PO intake, pain needs, toileting and repositioning as needed. Problem: Nutrition  Goal: Optimal nutrition therapy  2/29/2020 1729 by Lc Joe RN  Outcome: Ongoing  Note:   Pt educated on importance of staying hydrated and having as much intake as possible. Verbalized understanding. Will continue to monitor. Problem: Bleeding:  Goal: Will show no signs and symptoms of excessive bleeding  Description  Will show no signs and symptoms of excessive bleeding  2/29/2020 1729 by Lc Joe RN  Outcome: Ongoing  Note:   Patient's hemoglobin this AM:   Recent Labs     02/29/20  0400   HGB 8.8*     Patient's platelet count this AM:   Recent Labs     02/29/20  0400       Thrombocytopenia not present at this time. Patient showing no signs or symptoms of active bleeding. Transfusion not indicated at this time. Patient verbalizes understanding of all instructions.  Will continue to assess and implement POC. Call light within reach and hourly rounding in place.

## 2020-02-29 NOTE — PROGRESS NOTES
VSS. Reassessment completed. Scheduled meds given whole with applesauce. Assisted pt to the bathroom at this time. Pt was able to have a bowel movement. Pt resting comfortably in chair. Call light within reach. Chair alarm activated. Denies further needs at this time. Will continue to monitor.  Electronically signed by Paulino De Jesus RN on 2/29/2020 at 12:51 PM

## 2020-02-29 NOTE — PROGRESS NOTES
VSS. Reassessment completed. Scheduled meds given. Pt resting comfortably in bed. Call light within reach. Bed alarm activated. Denies further needs at this time. Will continue to monitor.  Electronically signed by Ambreen Conn RN on 2/29/2020 at 5:15 PM

## 2020-02-29 NOTE — PLAN OF CARE
Problem: Risk for Impaired Skin Integrity  Goal: Tissue integrity - skin and mucous membranes  Description  Structural intactness and normal physiological function of skin and  mucous membranes. Outcome: Met This Shift  Note:   Patient at risk for impaired skin integrity. Patient is on a specialty bed, sacral heart in place, and patient turns and repositions self to maintain skin integrity. Problem: Falls - Risk of:  Goal: Will remain free from falls  Description  Will remain free from falls  Outcome: Met This Shift  Note:   Patient is a high risk for falls per fall assessment. Patient is up to bathroom once this shift and tolerated poorly. Patient is very weak and has dyspnea on exertion. While patient is in bed, bed is in low position with brake and bed alarm on, call light in reach, and patient remains free from falls this shift. Problem: Falls - Risk of:  Goal: Will remain free from falls  Description  Will remain free from falls  Outcome: Met This Shift  Note:   Patient is a high risk for falls per fall assessment. Patient is up to bathroom once this shift and tolerated poorly. Patient is very weak and has dyspnea on exertion. While patient is in bed, bed is in low position with brake and bed alarm on, call light in reach, and patient remains free from falls this shift. Problem: Venous Thromboembolism:  Goal: Will show no signs or symptoms of venous thromboembolism  Description  Will show no signs or symptoms of venous thromboembolism  Outcome: Met This Shift  Note:   No signs or symptoms of DVT. Patient is on lovenox for DVT prophylaxis. Problem: Bleeding:  Goal: Will show no signs and symptoms of excessive bleeding  Description  Will show no signs and symptoms of excessive bleeding  Outcome: Met This Shift  Note:   No active signs or symptoms of bleeding. Patients hemoglobin and platelet count stable at 8.8 ,000.      Problem: Nutrition  Goal: Optimal nutrition

## 2020-02-29 NOTE — PROGRESS NOTES
VSS. Assessment completed. Scheduled meds given whole with applesauce. Assisted pt to bathroom with a one person assist. Pt was able to urinate and have a bowel movement. Pt resting comfortably in chair. Call light within reach. Chair alarm activated. Denies further needs at this time. Will continue to monitor.  Electronically signed by Sadia Parker RN on 2/29/2020 at 9:33 AM

## 2020-03-01 NOTE — PROGRESS NOTES
Electrophysiology - PROGRESS NOTE    Admit Date: 2/21/2020     Chief Complaint: AF, sustained VT     Interval History:   Patient seen and examined and notes reviewed. Patient is being followed for AF, sustained VT. Some AF noted over night. Continues with LE edema. No CP or SOB. C/o being weak and fell going to the bathroom - no injury. Eating some.        In: 1200 [P.O.:1200]  Out: 650    Wt Readings from Last 2 Encounters:   03/01/20 177 lb 12.8 oz (80.6 kg)   02/21/20 184 lb 15.5 oz (83.9 kg)       Data:   Scheduled Meds:   Scheduled Meds:   valACYclovir  500 mg Oral BID    furosemide  40 mg Oral Daily    predniSONE  20 mg Oral Daily    insulin lispro  0-12 Units Subcutaneous TID WC    insulin lispro  0-6 Units Subcutaneous Nightly    insulin lispro  5 Units Subcutaneous TID WC    Venelex   Topical BID    enoxaparin  80 mg Subcutaneous BID    allopurinol  200 mg Oral Daily    amiodarone  400 mg Oral Daily    aspirin  81 mg Oral Daily    atorvastatin  10 mg Oral Daily    pantoprazole  40 mg Oral QAM AC    mexiletine  200 mg Oral 3 times per day    midodrine  10 mg Oral TID WC    sodium chloride flush  10 mL Intravenous 2 times per day     Continuous Infusions:   dextrose       PRN Meds:.prochlorperazine **OR** prochlorperazine, ondansetron **OR** ondansetron, glucose, dextrose, glucagon (rDNA), dextrose, lidocaine, sodium chloride flush, acetaminophen, acetaminophen, polyethylene glycol, magic (miracle) mouthwash with nystatin  Continuous Infusions:   dextrose         Intake/Output Summary (Last 24 hours) at 3/1/2020 1318  Last data filed at 3/1/2020 1209  Gross per 24 hour   Intake 1440 ml   Output 750 ml   Net 690 ml       CBC:   Lab Results   Component Value Date    WBC 4.1 03/01/2020    HGB 7.9 03/01/2020     03/01/2020     BMP:  Lab Results   Component Value Date     03/01/2020    K 3.7 03/01/2020    K 4.7 01/28/2020    CL ICD if prognosis were to improve    EF of 37%  ACEi for systolic HF  ASA and Statin for CAD  Anticoagulation for AF and heart failure  No tobacco use.      All questions and concerns were addressed to the patient/family. Alternatives to my treatment were discussed. The note was completed using EMR.  Every effort was made to ensure accuracy; however, inadvertent computerized transcription errors may be present.      Nidhi Williamson CNP  [facility]

## 2020-03-01 NOTE — PROGRESS NOTES
RN called into room by pts wife. RN entered room and found pt sitting on the floor in the bathroom with PCA. Pt states he got lightheaded and weak and the PCA lowered him to the ground from where he was standing. Pt denies any injuries or pain at this time. PCA verified pt didn't hit his head while being lowered to the floor. VSS once pt got back in bed. MD notified. No new orders at this time. Wife at bedside.  Electronically signed by Salbador Miller RN on 3/1/2020 at 10:56 AM

## 2020-03-01 NOTE — PROGRESS NOTES
Dr. Edgar Sanchez notified of patient rolling out of bed while sleeping. No new orders at this time.

## 2020-03-01 NOTE — PLAN OF CARE
Problem: Risk for Impaired Skin Integrity  Goal: Tissue integrity - skin and mucous membranes  Description  Structural intactness and normal physiological function of skin and  mucous membranes. Outcome: Ongoing  Note:   Pt with abrasion/blister to nares and lip. No further sign of skin breakdown. Problem: Falls - Risk of:  Goal: Will remain free from falls  Description  Will remain free from falls  Outcome: Ongoing  Note:   + Screening for Orthostasis and/or + High Fall Risk per GONCALVES/ABCDS: Pt remained in bed this shift per MD rec r/t recent increased weakness and near falls. Explained fall risk precautions to pt and family and rationale behind their use to keep the patient safe. Pt bed is in low position, side rails up, call light and belongings are in reach. Fall wristband applied and present on pts wrist.  Bed alarm on. Pt encouraged to call for assistance. Will continue with hourly rounds for PO intake, pain needs, toileting and repositioning as needed. Problem: Nutrition  Goal: Optimal nutrition therapy  Outcome: Ongoing  Note:   Pt eating small amts at mealtimes. Will cont to encourage. Problem: Bleeding:  Goal: Will show no signs and symptoms of excessive bleeding  Description  Will show no signs and symptoms of excessive bleeding  Outcome: Ongoing  Note:   Patient's hemoglobin this AM:   Recent Labs     03/01/20  0423   HGB 7.9*     Patient's platelet count this AM:   Recent Labs     03/01/20  0423   *    Thrombocytopenia not present at this time. Patient showing no signs or symptoms of active bleeding. Transfusion not indicated at this time. Patient verbalizes understanding of all instructions. Will continue to assess and implement POC. Call light within reach and hourly rounding in place.

## 2020-03-01 NOTE — PROGRESS NOTES
Attempted to notify wife of incident. No answer at home. Voicemail message left with instructions to call.

## 2020-03-01 NOTE — PROGRESS NOTES
[facility] Progress Note    3/1/2020     Luis Alberto Fallon    MRN: [id number]    : 1940    Referring MD: Jearl Gitelman, MD  [address], [facility] [address]    SUBJECTIVE:      Saturday Interval History:  Received brentuximab yesterday - no new symptoms       Interval History:  Rolled out of bed last night, while attempting to use bedisde urinal.  Did not hit his head or sustain further trauma. Sitting in chair; alert. Edema in LUE improving but not resolved; LE edema about the same;  BP's soft at times. Valtex added for lesion on upper lip. Ate Oatmeal this AM.  Wife at bedside, care discussed.          ECOG PS:  (3) Capable of limited self-care, confined to bed or chair > 50% of waking hours    KPS: 40% Disabled; requires special care and assistance    Isolation: None    Medications    Scheduled Meds:   valACYclovir  500 mg Oral BID    furosemide  40 mg Oral Daily    predniSONE  20 mg Oral Daily    insulin lispro  0-12 Units Subcutaneous TID WC    insulin lispro  0-6 Units Subcutaneous Nightly    insulin lispro  5 Units Subcutaneous TID WC    Venelex   Topical BID    enoxaparin  80 mg Subcutaneous BID    allopurinol  200 mg Oral Daily    amiodarone  400 mg Oral Daily    aspirin  81 mg Oral Daily    atorvastatin  10 mg Oral Daily    pantoprazole  40 mg Oral QAM AC    mexiletine  200 mg Oral 3 times per day    midodrine  10 mg Oral TID WC    sodium chloride flush  10 mL Intravenous 2 times per day     Continuous Infusions:   dextrose       PRN Meds:.prochlorperazine **OR** prochlorperazine, ondansetron **OR** ondansetron, glucose, dextrose, glucagon (rDNA), dextrose, lidocaine, sodium chloride flush, acetaminophen, acetaminophen, polyethylene glycol, magic (miracle) mouthwash with nystatin    ROS:  As noted above, otherwise remainder of 10-point ROS negative    Physical Exam:     I&O:      Intake/Output Summary (Last 24 hours) at 3/1/2020 0901  Last data filed at 3/1/2020 0806  Gross per 24 hour   Intake 1920 ml   Output 550 ml   Net 1370 ml       Vital Signs:  BP 81/68   Pulse 100   Temp 97.2 °F (36.2 °C) (Oral)   Resp 18   Ht 6' 1\" (1.854 m)   Wt 177 lb 12.8 oz (80.6 kg)   SpO2 98%   BMI 23.46 kg/m²     Weight:    Wt Readings from Last 3 Encounters:   03/01/20 177 lb 12.8 oz (80.6 kg)   02/21/20 184 lb 15.5 oz (83.9 kg)   01/28/20 178 lb (80.7 kg)       General: Awake, alert and oriented, elderly and frail   HEENT: normocephalic, PERRL, no scleral erythema or icterus, Oral mucosa moist and intact, throat clear  NECK: supple without palpable adenopathy  BACK: Straight negative CVAT  SKIN: warm dry and intact without lesions rashes or masses  CHEST: CTA bilaterally without use of accessory muscles  CV: Normal S1 S2, RRR, no MRG  ABD: NT ND normoactive BS, no palpable masses or hepatosplenomegaly  EXTREMITIES: without edema, denies calf tenderness  NEURO: CN II - XII grossly intact  CATHETER: PIV    Data    CBC:   Recent Labs     02/28/20 0335 02/29/20  0400 03/01/20  0423   WBC 3.7* 5.6 4.1   HGB 7.9* 8.8* 7.9*   HCT 23.7* 26.1* 23.7*   MCV 83.7 84.0 83.7    186 128*     BMP/Mag:  Recent Labs     02/28/20  0335 02/29/20  0400 03/01/20  0423    138 138   K 4.2 4.0 3.7   CL 98* 96* 98*   CO2 29 27 29   PHOS 2.4* 2.5 2.6   BUN 44* 44* 47*   CREATININE 1.1 1.2 1.3   MG 1.80 1.70* 1.70*     LIVP:   No results for input(s): AST, ALT, LIPASE, BILIDIR, BILITOT, ALKPHOS in the last 72 hours. Invalid input(s): AMYLASE,  ALB  Coags:   No results for input(s): PROTIME, INR, APTT in the last 72 hours. Uric Acid   Recent Labs     02/28/20  0335 02/29/20  0400 03/01/20  0423   LABURIC 4.4 4.3 4.3     PATHOLOGY:  1. Left Inguinal & Left Acillary LN Bx 2/3/20: The following consultation results were received from [facility] - [address] [place] [address] (Case  Number: MD-)  1.  Lymph node, left inguinal, biopsy (hospitals-, 02/03/2020):       Angioimmunoblastic T-cell lymphoma. See Note. 2. Lymph node, left axilla, excision (SFS-, 02/09/2020):       Angioimmunoblastic T-cell lymphoma. See Note. NOTE: Sections of an inguinal node (SFS-) and axillary node  (SFS-) show mode architecture effaced by polymorphous infiltrate  composed of lymphocytes, histiocytes, plasma cells, eosinophils, and an  increase in the number of high-endothelial venules. Sinuses are patent. The submitted immunostains (CD20, PAX5, CD3, BCL-6, CD10, CD21, CD45,  CD30, CD15, CD4, CD5, CD7, CD8, CD57) and JYOTHI for EBV (RICHARD) are  reviewed. Additional stains (CD21, CD3, CD79a, PD-1, ICOS, CCD23) and JYOTHI  for EBV (RICHARD) are [facility]. CD20 and PAX5 show markedly  fragmented B-cell areas and regressed follicles. CD79a shows similar  distribution and additionally marks cells with plasmacytoid morphology. CD3, CD4, CD5, and CD7 show abundant T-cells and highlights a mild  cytologic atypia. CD8 marks numerous cells (CD4 ~ CD8). PD-1 reveals  abundant Follicular York T-cells, and a somewhat similar pattern is  noted for ICOS. CD23 shows a disorganized and expanded dendritic cell  meshwork in the paracortex, not necessarily associated with B-cell areas. CD30 marks large scattered cells, positive for CD20, PAX-5 (dim), and  negative for CD15, CD10, and BCL-6. CD57 is noncontributory. RICHARD is  positive in cells ranging in size. Per reports, flow cytometry in the axillary node ([facility]-, 02/10/2020)  shows small-size T-cells (6% of total lymphocytes) positive for CD2, CD4,  CD5, and CD7, and negative for sCD3, TCR alpha/beta, and TCR gamma/delta. In summary, the morphologic and immunohistochemistry findings are  consistent with the above diagnosis. 2. GI Biopsies 2/10/20:    A.  Gastroesophageal junction, biopsy:       - Columnar mucosa with mild chronic inactive inflammation       - No evidence of intestinal metaplasia, 1, Day +3 Brentuximab (2/28)  - Prednisone 20 mg daily - Day +9 (2/22/20)  - Family understands that any treatment we would offer is palliative in nature only  - Palliative Care following - full code     2. 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 ID: Presented with NF & septic shock on previous admission, resolved now. Last fever 2/18  - New area, upper labia of mouth - added valtrex, keep dry - improving today    4. Heme: Anemia & Neutropenia 2/2 MDS +/- NHL  - Transfuse for Hgb < 7 and Platelets < 10 K.   - No transfusion today     5. Metabolic / CKD III: Baseline SCr 1.3-1.4  - Replace Potassium and Magnesium per PRN orders  - Risk for Tumor Lysis Syndrome: No evidence at this time  - Cont Allopurinol 200 mg daily    6. Cardiac: Significant H/o NICM, valvular disease, PAF w/RVR, acute on chronic HFrEF. Recently had runs of wide-complex tachycardia  - Cardiology consulted & following - appreciate assistance  - Cont ASA 81mg daily    - VTach 2/18/20 - s/p dc cardioversion - If his anticipated life expectancy is  less than 12 months, ICD implantation is contraindicated  - Cont amiodarone 400mg daily & mexiletine 200mg q8h. Digoxin 125mcg daily currently on hold    - PAF w/RVR - previously on coumadin  - Cont amiodarone  - Resumed AC with lovenox 80mg BID (2/25/20)    - Aortic Stenosis - s/p porcine AVR  - Consider for aortic valve intervention or device therapy for VT if his prognosis improves appreciably    - Acute on Chronic HFrEF - LVEF 35%  - Diuresis: S/p lasix 40mg IV x1 2/24    - Cardiogenic Shock: Resolved, POA. Off pressors now but previously required levophed & dopamine  - Cont midodrine 10mg TID    7. Pulmonary: No acute issues  - Encourage IS & ambulation    8. GI: +Rectal ulcers & LGI bleed  - S/p EGD & flex sig 2/10  - Cont PPI daily    9.  Nutrition: Severe malnutrition POA  - Cont low microbial diet  - Dietary to follow closely   - Encourage supplements as tolerated  - Hold megace 200mg daily as this increases risk of thrombus    10. DM2: chronic, steroid induced hyperglycemia  - Cont accuchecks & SSI - Low    11. Acute Debilitation: 2/2 new NHL diagnosis +/- age   - Cont PT/OT  - Consult SW - ARU denied. Will need to start precert for SNF on Monday, 3/2  - Palliative care also following, not unreasonable to d/c home with palliative care if he has good support at home given his limited prognosis        - DVT Prophylaxis: Cont full-dose AC w/lovenox  Contraindications to pharmacologic prophylaxis: None  Contraindications to mechanical prophylaxis: None    - Disposition: Will need SNF at d/c. SW consulted        Fabrice Larson DO, MS  Oncology/Hematology Care    Please contact via:  1. [employer]  2.   Cell Phone:  [phone]    3/1/2020   9:01 AM

## 2020-03-02 NOTE — PLAN OF CARE
or symptoms of active bleeding. Transfusion not indicated at this time. Patient verbalizes understanding of all instructions. Will continue to assess and implement POC. Call light within reach and hourly rounding in place. Problem: Infection - Central Venous Catheter-Associated Bloodstream Infection:  Goal: Will show no infection signs and symptoms  Description  Will show no infection signs and symptoms  Outcome: Ongoing  Note:   CVC site remains free of signs/symptoms of infection. No drainage, edema, erythema, pain, or warmth noted at site. Dressing changes continue per protocol and on an as needed basis - see flowsheet. Compliant with [facility] Bath Protocol:  Performed CHG bath today per [facility] protocol utilizing Bed bath with CHG wipes. CVC site cleansed with CHG wipe over dressing, skin surrounding dressing, and first 6\" of IV tubing. Pt tolerated well. Continued to encourage daily CHG bathing per [employer] protocol.

## 2020-03-02 NOTE — PROGRESS NOTES
Enzymes:  CK/MbTroponin  Lab Results   Component Value Date    CKTOTAL 168 07/28/2012    TROPONINI 0.09 03/02/2020     PT/ INR   Lab Results   Component Value Date    INR 1.24 02/21/2020    INR 1.39 02/19/2020    INR 1.20 02/13/2020    PROTIME 14.4 02/21/2020    PROTIME 16.2 02/19/2020    PROTIME 14.0 02/13/2020    PROTIME 25.7 01/08/2016    PROTIME 45.1 10/16/2015    PROTIME 32.8 08/07/2015     PTT No results found for: PTT   Lab Results   Component Value Date    MG 1.70 03/02/2020      Lab Results   Component Value Date    TSH 1.51 06/02/2016       Assessment:     1. NHL  2. NICM  3. Sustained VT  4. PAF- this seems to occur mostly in the early morning hours. Its not clear whether he has PITA, but has been using CPAP intermittently. 5. Prosthetic AS- severe    Plan:     1. IV amiodarone for 24 hrs  2. Might consider low dose po amiodarone  3.  Try to use CPAP more consistently      Saul Justin MD

## 2020-03-02 NOTE — PROGRESS NOTES
Occupational Therapy  Hold    Chart Reviewed. RN consulted. Therapy on hold per RN recommendation secondary to declining medical status. Will attempt to see later this date as medically appropriate and schedule allows.      [address], [address], [place]

## 2020-03-02 NOTE — PROGRESS NOTES
Palliative Medicine Progress Note    Admit Date: 2/21/2020  Hospital Day:  Hospital Day: 11     CC: Weakness  HPI: HPI PMH of Afib (on coumadin), aortic stenosis (s/p porcine AVR), CHF (EF 35%), MDS, chronic leukopenia, and CAD, transferred to [facility]. after a 20 day admission at an outside hospital for sepsis and Afib and new diagnosis of T-cell lymphoma. He has received palliative treatment with brentuximab. Had two falls yesterday and transferred to ICU with Afib with RVR and hypotension, concern for cardiogenic shock. Recommendations:     Spoke with pt, wife, and two daughters Taya Lentz and Drew Harris at the bedside. We discussed goals of care and code status wishes in depth including potential burdens/risks of CPR, in the context of pt having incurable lymphoma. Pt says he wants to be full code and wife is in agreement. Daughter Taya Lentz believes CPR would just prolong suffering and talked to pt about what lengths he would want to go through to prolong his life. Pt says he'll think about it but for right now he wants to be full code. He is focused on working with therapy to get stronger and go home. 1. Goals of Care/Advanced Care planning/Code status: Full code, discussed with pt and family at length today. Pt hopes to get stronger at ARU or SNF so that he can return home to an acceptable quality of life. 2. Pain: pt denies pain and does not appear to be in distress. 3. SOB: denies sob and is breathing comfortably on 2 L oxygen. 4. Nausea: resolved. Pt denies nausea and attributes feeling better to not using CPAP the past couple days. He had been taking zofran, compazine, and phenergan. 5. Generalized weakness: pt c/o severe weakness and would like to try working with therapy. He wants to sit on the side of the bed but understands he can't get OOB today due to HR and BP. He has worked with PT/OT and scored 17 and 16 on AMPAC scale. Good candidate for ARU per Dr. Delores Stringer.   6. Disposition: likely will d/c to ARU vs 78 24 96 %   03/02/20 1415 (!) 77/60 -- -- 79 23 99 %   03/02/20 1400 90/68 -- -- 73 14 100 %   03/02/20 1345 81/64 -- -- 76 14 97 %   03/02/20 1330 (!) 84/59 -- -- 81 18 100 %   03/02/20 1315 (!) 89/55 -- -- 74 15 100 %   03/02/20 1300 (!) 84/58 -- -- 77 15 100 %   03/02/20 1245 88/60 -- -- 79 16 100 %   03/02/20 1230 97/65 -- -- 83 19 99 %   03/02/20 1215 90/60 -- -- 82 19 100 %   03/02/20 1200 (!) 94/59 -- -- 80 15 100 %   03/02/20 1145 95/74 -- -- 80 17 100 %   03/02/20 1130 87/60 -- -- 88 20 94 %   03/02/20 1115 (!) 90/55 -- -- 85 16 98 %   03/02/20 1100 (!) 94/59 99.4 °F (37.4 °C) Oral 82 15 100 %   03/02/20 1055 -- -- -- 85 17 96 %   03/02/20 1050 -- -- -- 85 17 100 %   03/02/20 1045 (!) 79/50 -- -- 86 17 99 %   03/02/20 1040 -- -- -- 88 16 98 %   03/02/20 1035 -- -- -- 89 18 100 %   03/02/20 1030 90/61 -- -- 88 17 98 %   03/02/20 1025 -- -- -- 88 17 100 %   03/02/20 1020 -- -- -- 90 17 97 %   03/02/20 1015 (!) 73/60 -- -- 94 20 98 %   03/02/20 1010 -- -- -- 93 17 100 %   03/02/20 1005 -- -- -- 91 13 100 %   03/02/20 1000 88/62 -- -- 91 20 100 %   03/02/20 0955 -- -- -- 132 20 100 %   03/02/20 0950 -- -- -- 122 18 97 %   03/02/20 0945 -- -- -- 124 17 98 %   03/02/20 0940 -- -- -- 128 21 98 %   03/02/20 0935 -- -- -- 124 19 99 %   03/02/20 0930 (!) 66/50 -- -- 130 19 95 %   03/02/20 0925 (!) 78/60 -- -- 120 18 100 %   03/02/20 0920 (!) 81/59 -- -- 123 16 99 %   03/02/20 0915 88/60 -- -- 121 17 100 %   03/02/20 0910 93/68 -- -- 127 18 100 %   03/02/20 0905 81/67 -- -- 131 20 100 %   03/02/20 0900 87/65 -- -- 126 15 100 %   03/02/20 0855 (!) 86/57 -- -- 125 15 100 %   03/02/20 0850 85/74 -- -- 130 11 --   03/02/20 0845 88/70 -- -- 130 15 --   03/02/20 0840 97/74 -- -- 124 15 100 %   03/02/20 0835 (!) 79/42 -- -- 123 17 100 %   03/02/20 0830 102/79 -- -- 121 15 100 %   03/02/20 0825 97/63 -- -- 125 17 --   03/02/20 0820 89/68 98 °F (36.7 °C) Oral 125 16 100 %   03/02/20 0815 81/64 -- -- 118 18 100 %   03/02/20

## 2020-03-02 NOTE — CARE COORDINATION
Type of Admission   Newly diagnosed NHL T-Cell Angioblastic  MDS  C1 Day #3 Brentuximab        Central venous catheter  Left Basilic Double Lumen PICC ( 1/29/20)        Plan          Update  2/25/20: Newly diagnosed T cell NHL. Seen on rounds with Dr. Salena Larry consulted. Plan for Rehab. Consult. Per family & patient, he had been very active until this most recent illness. Discussed Brentuximab, pending approval  2/28/20:  Brentuximab today. Active with PT/OT,. Possible peer to peer  For ARU.  3/2/20:  Palliative Care active with patient & family. Seen by Cardiology for episode of atrial fib. Education  2/25/20: Introduced myself in RN D/C planner shukri to patient & family . Confirmed Pharmacy per Aidin.         Discharge  ARU Referral Awaiting decision        Pending

## 2020-03-02 NOTE — PROGRESS NOTES
02/29/20  0400 03/01/20  0423 03/02/20  0352   WBC 5.6 4.1 4.1   HGB 8.8* 7.9* 8.5*   HCT 26.1* 23.7* 25.3*   MCV 84.0 83.7 82.9    128* 125*     BMP:   Recent Labs     02/29/20  0400 03/01/20  0423 03/02/20  0352    138 138   K 4.0 3.7 3.1*   CL 96* 98* 101   CO2 27 29 25   PHOS 2.5 2.6 2.4*   BUN 44* 47* 44*   CREATININE 1.2 1.3 1.2     LIVER PROFILE:   No results for input(s): AST, ALT, LIPASE, BILIDIR, BILITOT, ALKPHOS in the last 72 hours. Invalid input(s): AMYLASE,  ALB  PT/INR:   No results for input(s): PROTIME, INR in the last 72 hours. APTT:   No results for input(s): APTT in the last 72 hours. BNP:  No results for input(s): BNP in the last 72 hours.   IMAGING:     Assessment:  Patient Active Problem List    Diagnosis Date Noted    Hypotension 11/28/2012     Priority: High    DNR (do not resuscitate) discussion     Generalized weakness     Encounter for palliative care     Goals of care, counseling/discussion     VT (ventricular tachycardia) ([facility] 02/22/2020    Nonrheumatic aortic valve stenosis 02/22/2020    Lymphoma ([facility] 02/21/2020    Fever     Malignant lymphoma, non-Hodgkin's (HCC)     Metabolic encephalopathy     Tobacco abuse counseling     Lymphadenopathy     Hypervolemia     Sepsis ([address] 01/28/2020    Septic shock (HCC)     Atrial fibrillation with RVR (HCC)     Elevated international normalized ratio (INR)     Streptococcal pharyngitis     Leukopenia     Chronic ethmoidal sinusitis     TYRELL (acute kidney injury) ([address]     Ex-heavy cigarette smoker (20-39 per day)     PAF (paroxysmal atrial fibrillation) ([facility] 09/06/2019    Essential hypertension 09/06/2019    Finger amputation, traumatic, 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 Primary localized osteoarthrosis, pelvic region and thigh 05/04/2015    H/O total shoulder replacement 05/04/2015    Hyponatremia 04/22/2015    Primary localized osteoarthrosis of shoulder region 02/02/2015    Encounter for medication counseling 01/13/2014    Arthritis of knee 12/05/2013    Acute on chronic combined systolic and diastolic congestive heart failure ([facility] 03/17/2013    S/P AVR (aortic valve replacement) 09/26/2012    Nausea 07/29/2012    Cardiomyopathy ([facility] 07/29/2012    Nonrheumatic aortic valve insufficiency 07/29/2012    Mitral regurgitation 07/29/2012    Microscopic hematuria 07/29/2012     Imp: Lymphoma/CHF/cardiomyop/AVR/AS/VT/Afib    Plan:  Recurrent afib with RVR overnight. Now NSR on amio. He was asx but bp was somewhat low. Denies chest pain/sob. On RA. EP following. Cr stable. Significant edema LE. Lasix held due to bp. Likely need higher dose when restarting.        Core Measures:  · Discharge instructions:   · LVEF documented:   · ACEI for LV dysfunction:   · Smoking Cessation:    Brandin Plata MD 3/2/2020 11:52 AM

## 2020-03-02 NOTE — PROGRESS NOTES
Critical care progress Note    Admit Date: 2/21/2020  Vent Day: None  IV Access:Peripheral  IV Fluids:None  Vasopressors: Levophed            Antibiotics: None  Diet: DIET DYSPHAGIA SOFT AND BITE-SIZED; No Drinking Straw  Dietary Nutrition Supplements: Low Calorie High Protein Supplement    CC: Fever and hypotension, presented from [facility]    Interval history: Patient familiar to me when he was in ICU February 24. He was transferred from an outside hospital February 22 for a second opinion on lymphoma. At the outside hospital he was treated for shock. He was febrile and was in atrial fibrillation with RVR. It was unclear if it was septic versus cardiogenic. He has a history of atrial fibrillation with severe aortic stenosis in the setting of a porcine aortic valve replacement. He did require cardioversion and had been treated with an amiodarone drip and Mexitil. Over the first 3 days here he was weaned off of dobutamine and transferred to the [facility]. Last night he developed lightheadedness with a blood pressure as low as 63/53 with a heart rate of 127. He was given 500 mL saline bolus, started on amiodarone drip and given digoxin 250 mcg IV. Patient is followed by EP and cardiology was called for further recommendations. Currently he denies any lightheadedness, fevers, cough, chest pain, dyspnea, dysuria, urinary frequency, abdominal pain and is fully alert. He does mention some loose stool but does not have profuse diarrhea. He was given Lasix yesterday with a charted net negative diuresis of 845. He does appear anasarca. He is on 3 L oxygen with a Barreto oxygen saturation. He is on Levophed 10 mcg/min with current blood pressure of 89/68. His wife notes that his blood pressure normally runs about that and is on Midrin at home.        Medications:     Scheduled Meds:   digoxin  250 mcg Intravenous Once    digoxin        [COMPLETED] potassium chloride  20 mEq Intravenous Q2H    potassium phosphate IVPB  20 mmol Intravenous Once    sodium chloride  500 mL Intravenous Once    vancomycin (VANCOCIN) central line IVPB  1,000 mg Intravenous Q12H    meropenem  1 g Intravenous Q8H    valACYclovir  500 mg Oral BID    predniSONE  20 mg Oral Daily    insulin lispro  0-12 Units Subcutaneous TID WC    insulin lispro  0-6 Units Subcutaneous Nightly    insulin lispro  5 Units Subcutaneous TID WC    Venelex   Topical BID    enoxaparin  80 mg Subcutaneous BID    allopurinol  200 mg Oral Daily    aspirin  81 mg Oral Daily    atorvastatin  10 mg Oral Daily    pantoprazole  40 mg Oral QAM AC    mexiletine  200 mg Oral 3 times per day    sodium chloride flush  10 mL Intravenous 2 times per day     Continuous Infusions:   amiodarone 450mg/250ml D5W infusion 1 mg/min (03/02/20 0916)    norepinephrine 10 mcg/min (03/02/20 0838)    dextrose       PRN Meds:prochlorperazine **OR** prochlorperazine, ondansetron **OR** ondansetron, glucose, dextrose, glucagon (rDNA), dextrose, lidocaine, sodium chloride flush, acetaminophen, acetaminophen, polyethylene glycol, magic (miracle) mouthwash with nystatin     ROS: No wheezing or chest tightness    Objective:   Vitals:   T-max:  Patient Vitals for the past 8 hrs:   BP Temp Temp src Pulse Resp SpO2   03/02/20 0820 89/68 98 °F (36.7 °C) Oral 125 16 100 %   03/02/20 0815 81/64 -- -- 118 18 100 %   03/02/20 0810 88/73 -- -- 124 15 95 %   03/02/20 0805 (!) 70/57 -- -- 125 18 100 %   03/02/20 0800 (!) 68/55 -- -- 127 20 100 %   03/02/20 0755 (!) 63/53 -- -- 127 19 100 %   03/02/20 0750 (!) 83/49 -- -- 127 22 99 %   03/02/20 0745 (!) 74/53 -- -- 123 19 97 %   03/02/20 0740 (!) 70/49 -- -- 120 18 94 %   03/02/20 0735 -- -- -- 124 18 100 %   03/02/20 0730 (!) 73/59 -- -- 125 18 100 %   03/02/20 0725 (!) 78/55 -- -- 126 20 100 %   03/02/20 0720 (!) 180/169 -- -- 117 15 100 %   03/02/20 0715 (!) 179/155 -- -- 122 18 100 %   03/02/20 0710 -- -- -- 124 20 99 %   03/02/20 0705 (!) 162/116 -- -- 123 21 97 %   03/02/20 0700 -- -- -- 120 20 --   03/02/20 0655 -- -- -- 122 19 --   03/02/20 0650 -- -- -- 117 18 --   03/02/20 0510 -- 99.8 °F (37.7 °C) Oral -- -- --   03/02/20 0331 -- 99.9 °F (37.7 °C) Oral 133 -- --       Intake/Output Summary (Last 24 hours) at 3/2/2020 0941  Last data filed at 3/2/2020 0214  Gross per 24 hour   Intake --   Output 1325 ml   Net -1325 ml       Review of Systems - No fever, headache or cough    Physical Exam  Constitutional:       General: He is not in acute distress. Appearance: He is not ill-appearing. Cardiovascular:      Rate and Rhythm: Normal rate. Rhythm irregular. Heart sounds: Murmur (crescendo decrescendo murmur in aortic area) present. No friction rub. No gallop. Pulmonary:      Effort: Pulmonary effort is normal.      Breath sounds: Normal breath sounds. Comments: On 2L O2 via NC  Abdominal:      General: Bowel sounds are normal. There is no distension. Palpations: Abdomen is soft. Tenderness: There is no abdominal tenderness. There is no guarding or rebound. Musculoskeletal:      Right lower leg: Edema present. Left lower leg: Edema present.          LABS:    CBC:   Recent Labs     02/29/20  0400 03/01/20  0423 03/02/20  0352   WBC 5.6 4.1 4.1   HGB 8.8* 7.9* 8.5*   HCT 26.1* 23.7* 25.3*    128* 125*   MCV 84.0 83.7 82.9     Renal:    Recent Labs     02/29/20  0400 03/01/20  0423 03/02/20  0352    138 138   K 4.0 3.7 3.1*   CL 96* 98* 101   CO2 27 29 25   BUN 44* 47* 44*   CREATININE 1.2 1.3 1.2   GLUCOSE 118* 131* 115*   CALCIUM 8.8 8.4 8.0*   MG 1.70* 1.70* 1.70*   PHOS 2.5 2.6 2.4*   ANIONGAP 15 11 12     Hepatic:   Recent Labs     02/29/20  0400 03/01/20  0423 03/02/20  0352   LABALBU 2.8* 2.5* 2.2*     Troponin:   Recent Labs     03/02/20  0830   TROPONINI 0.09*     Lactate:   Recent Labs     03/02/20  0418   LACTATE 0.68 Cultures:  -----------------------------------------------------------------  RAD:   XR CHEST PORTABLE   Final Result   1. Unchanged small pleural effusions. No findings for congestive failure on the current exam.  Correlate clinically. .      XR ABDOMEN (KUB) (SINGLE AP VIEW)   Final Result      1. Nonspecific bowel gas pattern without findings for obstruction. 2.  11 mm calcification overlying the right renal shadow consistent with renal calculus. This has been described on recent CT of the abdomen exam.  Second small radiodensity in the right lower abdomen is nonspecific. Assessment/Plan  1. Recurrent Shock - He has reduced EF 35%, AFib with RVR and severe AS but is also immunosuppressed. He has been diuresed recently and preload may be on the lower side. Cardiogenic versus septic remains in the differential.  I will obtain chest x-ray, procalcitonin, trend troponins, UA with culture, and blood cultures and start empirically on meropenem and vancomycin. This however may be cardiogenic and I will give a 500 mL normal saline bolus to give a little preload given his severe AS. Continue amiodarone drip. Metoprolol has been discontinued. Cardiology/EP is following. If it appears that he is going to need continue vasopressors I would switch his Levophed to Enoc-Synephrine to have less tachycardic effect. On admission he needed low-dose dobutamine for inotropic support. This may need to be restarted. 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 Fever -resolved. Off antibiotics. Thought secondary to malignancy  4. TYRELL -creatinine stable at 1.2  5 V. Tach s/p cardioversion. 6. Leukopenia   7. Anasarca  8.   Full code    Pt has a high probability of imminent or life-threatening deterioration requiring close monitoring, and highly complex decision-making and/or interventions of high intensity to assess, manipulate, and support his critical organ systems to prevent a likely inevitable decline which could occur if left untreated. A total critical care time 36 minutes was used. This includes but not limited to examining patient, collaborating with other physicians, monitoring vital signs, telemetry, continuous pulse oximetry, and clinical response to IV medications, documentation time, review and interpretation of laboratory and radiological data, review of nursing notes and old record review. This time excludes any time that may have been spent performing procedures for life threatening organ failure.       Discussed with Dr. Anastasia Reveles MD

## 2020-03-02 NOTE — PROGRESS NOTES
Speech Language Pathology  Attempt Note    Attempted to see pt for therapy; pt currently being seen by other provider. Will re-attempt later as able/appropriate.     Lizeth Quiroga M.A., [address]  Speech-Language Pathologist

## 2020-03-02 NOTE — PROGRESS NOTES
[facility] [employer] Progress Note    3/2/2020     Leticia Erickson    MRN: [id number]    : 1940    Referring MD: Beverley Banks MD  [address], [facility] [address]    SUBJECTIVE:  No new complaints. Now on Levophed and Amio. Overnight Events: Pt developed a.fib w/RVR and hypotension concerning for cardiogenic shock.  Transferred to ICU and started on levophed gtt    ECOG PS:  (3) Capable of limited self-care, confined to bed or chair > 50% of waking hours    KPS: 40% Disabled; requires special care and assistance    Isolation: None    Medications    Scheduled Meds:   digoxin  250 mcg Intravenous Once    digoxin        [COMPLETED] potassium chloride  20 mEq Intravenous Q2H    valACYclovir  500 mg Oral BID    furosemide  40 mg Oral Daily    predniSONE  20 mg Oral Daily    insulin lispro  0-12 Units Subcutaneous TID WC    insulin lispro  0-6 Units Subcutaneous Nightly    insulin lispro  5 Units Subcutaneous TID WC    Venelex   Topical BID    enoxaparin  80 mg Subcutaneous BID    allopurinol  200 mg Oral Daily    amiodarone  400 mg Oral Daily    aspirin  81 mg Oral Daily    atorvastatin  10 mg Oral Daily    pantoprazole  40 mg Oral QAM AC    mexiletine  200 mg Oral 3 times per day    midodrine  10 mg Oral TID WC    sodium chloride flush  10 mL Intravenous 2 times per day     Continuous Infusions:   amiodarone 450mg/250ml D5W infusion 0.5 mg/min (20 0448)    norepinephrine      dextrose       PRN Meds:.prochlorperazine **OR** prochlorperazine, ondansetron **OR** ondansetron, glucose, dextrose, glucagon (rDNA), dextrose, lidocaine, sodium chloride flush, acetaminophen, acetaminophen, polyethylene glycol, magic (miracle) mouthwash with nystatin    ROS:  As noted above, otherwise remainder of 10-point ROS negative    Physical Exam:     I&O:      Intake/Output Summary (Last 24 hours) at 3/2/2020 0726  Last data filed at 3/2/2020 0214  Gross per 24 hour   Intake 480 ml   Output Angioimmunoblastic T-cell lymphoma. See Note. 2. Lymph node, left axilla, excision (S-, 02/09/2020):       Angioimmunoblastic T-cell lymphoma. See Note. NOTE: Sections of an inguinal node (S-) and axillary node  (S-) show mode architecture effaced by polymorphous infiltrate  composed of lymphocytes, histiocytes, plasma cells, eosinophils, and an  increase in the number of high-endothelial venules. Sinuses are patent. The submitted immunostains (CD20, PAX5, CD3, BCL-6, CD10, CD21, CD45,  CD30, CD15, CD4, CD5, CD7, CD8, CD57) and JYOTHI for EBV (RICHARD) are  reviewed. Additional stains (CD21, CD3, CD79a, PD-1, ICOS, CCD23) and JYOTHI  for EBV (RICHARD) are performed at the [facility]. CD20 and PAX5 show markedly  fragmented B-cell areas and regressed follicles. CD79a shows similar  distribution and additionally marks cells with plasmacytoid morphology. CD3, CD4, CD5, and CD7 show abundant T-cells and highlights a mild  cytologic atypia. CD8 marks numerous cells (CD4 ~ CD8). PD-1 reveals  abundant Follicular Vici T-cells, and a somewhat similar pattern is  noted for ICOS. CD23 shows a disorganized and expanded dendritic cell  meshwork in the paracortex, not necessarily associated with B-cell areas. CD30 marks large scattered cells, positive for CD20, PAX-5 (dim), and  negative for CD15, CD10, and BCL-6. CD57 is noncontributory. RICHARD is  positive in cells ranging in size. Per reports, flow cytometry in the axillary node ([email or web address]]-, 02/10/2020)  shows small-size T-cells (6% of total lymphocytes) positive for CD2, CD4,  CD5, and CD7, and negative for sCD3, TCR alpha/beta, and TCR gamma/delta. In summary, the morphologic and immunohistochemistry findings are  consistent with the above diagnosis. 2. GI Biopsies 2/10/20:    A.  Gastroesophageal junction, biopsy:       - Columnar mucosa with mild chronic inactive inflammation       - No evidence of intestinal metaplasia, dysplasia, or malignancy       B. Stomach, biopsy:       - Gastric mucosa with no pathologic change       - No evidence of intestinal metaplasia, dysplasia, or malignancy       - No morphologic evidence of helicobacter pylori infection       C. Esophagus, biopsy:       - Squamous mucosa with focal mild chronic inflammation       - No evidence of increased eosinophils, dysplasia, or malignancy       D. Ulcer, rectum, biopsy:       - Colonic mucosa with extensive ulceration and associated acute         inflammation       - No evidence of granulomas, dysplasia or malignancy       - No evidence of specific viral infection, as supported by negative         CMV and HSV immunohistochemical staining with appropriate controls    DIAGNOSTIC IMAGIN. CT Chest 20:  1. Extensive lymphadenopathy as well as suspected mild splenomegaly,   suspicious for lymphoproliferative disorder.  New areas of nodular soft   tissue density in the left breast may represent associated intramammary lymph   nodes but could also represent subcutaneous involvement. 2. Trace bilateral pleural effusions with bilateral lower lobe passive   atelectasis. 3. Mild cardiomegaly status post aortic valve replacement. 2. CT A/P 20: Increased retroperitoneal adenopathy compared to  raising the question of   underlying lymphoma.       Mild body wall anasarca and trace pelvic fluid suggesting fluid overload. PROBLEM LIST:             1. T-Cell Angioblastic NHL  2. MDS  3. NICM  4. Acute on Chronic HFrEF - LVEF 35-40% 20  5. MR  6. AS - S/p Aortic Valve replacement  7. PAF w/RVR  8. HTN  9. CKD III  10. Chronic Sinusitis  11. OA - h/o shoulder replacement  12. HLD    TREATMENT:            Pending hosp approval to tx with Brentuximab      ASSESSMENT AND PLAN:           1. T-cell angiommunoblastic lymphoma: Newly diagnosed and pending approval from hospital admon to give Brentuximab 1.8mg/m2 iv x1 q 3-4 wks.      Potentially Cycle 1, Day +3 Brentuximab (2/28)  - Cont Prednisone Taper (50mg daily started 2/22): Now down to 20 mg daily (2/28)   - Family understands that any treatment we would offer is palliative in nature only  - Palliative Care following - full code     2. 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 ID: Presented with NF & septic shock on previous admission, resolved now. Last fever 2/18  - New area, upper labia of mouth - added valtrex, keep dry  -Concerned about possibility of sepsis. Will add Merrem and Vanco, check procalcitonin, get cultures. 4. Heme: Anemia & Thrombocytopenia 2/2 MDS +/- NHL  - Transfuse for Hgb < 7 and Platelets < 10 K  - No transfusion today     5. Metabolic / CKD III: Baseline SCr 1.3-1.4. +HypoK +HypoMg +HypoPhos  - Replace Potassium and Magnesium per PRN orders  - Risk for Tumor Lysis Syndrome: No evidence at this time  - Cont Allopurinol 200 mg daily    6. Cardiac: Significant H/o NICM, valvular disease, PAF w/RVR, acute on chronic HFrEF. Recently had runs of wide-complex tachycardia  - Cardiology consulted & following - appreciate assistance  - Cont ASA 81mg daily    - VTach 2/18/20 - s/p dc cardioversion - If his anticipated life expectancy is  less than 12 months, ICD implantation is contraindicated  - Cont amiodarone 400mg daily & mexiletine 200mg q8h. Digoxin 125mcg daily currently on hold    - PAF w/RVR - Recurrent 3/2 middle of the night w/resultant cardiogenic shock  - S/p lopressor 2.5mg IVP, Digoxin 250mcg IV x1 3/2  - Cont amiodarone gtt (3/2/20)  - Cont AC with lovenox 80mg BID (2/25/20)    - Aortic Stenosis - s/p porcine AVR    - Acute on Chronic HFrEF - LVEF 35%  - Diuresis: S/p lasix 40mg IV x1 2/24    - Cardiogenic Shock: Recurrent 2/2 a.fib w/RVR  - Restart levophed to maintain MAP >65  - Consult critical care    7. Pulmonary: No acute issues  - Encourage IS & ambulation    8.  GI: +Rectal ulcers & LGI bleed  - S/p EGD & flex sig 2/10  - Cont PPI daily    9. Nutrition: Severe malnutrition POA  - Cont low microbial diet  - Dietary to follow closely   - Encourage supplements as tolerated    10. DM2: chronic, steroid induced hyperglycemia  - Cont accuchecks & SSI - Low    11. Acute Debilitation: 2/2 new NHL diagnosis +/- age   - Cont PT/OT - on hold now with hypotension  - Consult SW - ARU denied. Will need to start precert for SNF on Monday, 3/2  - Palliative care also following, not unreasonable to d/c home with palliative care if he has good support at home given his limited prognosis        - DVT Prophylaxis: Cont full-dose AC w/lovenox  Contraindications to pharmacologic prophylaxis: None  Contraindications to mechanical prophylaxis: None    - Disposition: Uncertain at this time. Moving back to ICU      JEANIE Beasley - STEPH Morris.  Elliott Liz, [address] [place] [address]

## 2020-03-03 NOTE — PROGRESS NOTES
Physical/Occupational Therapy  HOLD    Chart reviewed. Spoke with nursing. Pt still with BP/HR issues and not appropriate for therapy today. Will hold at this time and continue when/if appropriate.      [place], [place] [id number]  Fela Ashley, OTR/L, [id number]

## 2020-03-03 NOTE — PLAN OF CARE
Problem: Risk for Impaired Skin Integrity  Goal: Tissue integrity - skin and mucous membranes  Description  Structural intactness and normal physiological function of skin and  mucous membranes. Outcome: Ongoing   Patient with bruising and redness on buttocks/coccyx area r/t frequent stools. Creams applied, frequent turning and repositioning completed. Will continue to monitor. Problem: Falls - Risk of:  Goal: Will remain free from falls  Description  Will remain free from falls  Outcome: Ongoing   Pt is a HIGH fall risk. See Ashley Chavarria Fall Score. Pt bed is in low position, side rails up, call light and belongings are in reach. Due to patient's condition at this time it is not advised to get patient out of bed. bed alarm in use. Pt encouraged to call for assistance, pt using call light appropriately. Will continue with hourly rounds for po intake, pain needs, toileting and repositioning as needed. Problem: Nutrition Deficit:  Goal: Ability to achieve adequate nutritional intake will improve  Description  Ability to achieve adequate nutritional intake will improve  Outcome: Ongoing   Encouraged small frequent meals on the dysphagia diet. Problem: Venous Thromboembolism:  Goal: Will show no signs or symptoms of venous thromboembolism  Description  Will show no signs or symptoms of venous thromboembolism  Outcome: Ongoing   Adherent with DVT Prevention: Pt is at risk for DVT d/t decreased mobility and cancer treatment. Pt educated on importance of activity. Pt has orders for Subcut prophylactic lovenox. Pt verbalizes understanding of need for prophylaxis while inpatient.      Problem: Bleeding:  Goal: Will show no signs and symptoms of excessive bleeding  Description  Will show no signs and symptoms of excessive bleeding  Outcome: Ongoing   Patient's hemoglobin this AM:   Recent Labs     03/03/20  0330   HGB 7.6*     Patient's platelet count this AM:   Recent Labs     03/03/20  0330   * Thrombocytopenia Precautions in place. Patient showing no signs or symptoms of active bleeding. Transfusion not indicated at this time. Patient verbalizes understanding of all instructions. Will continue to assess and implement POC. Call light within reach and hourly rounding in place. Problem: Infection - Central Venous Catheter-Associated Bloodstream Infection:  Goal: Will show no infection signs and symptoms  Description  Will show no infection signs and symptoms  Outcome: Ongoing   CVC site remains free of signs/symptoms of infection. No drainage, edema, erythema, pain, or warmth noted at site. Dressing changes continue per protocol and on an as needed basis - see flowsheet. Compliant with [facility] Bath Protocol:  Performed CHG bath today per [facility] protocol utilizing Bed bath with CHG wipes. CVC site cleansed with CHG wipe over dressing, skin surrounding dressing, and first 6\" of IV tubing. Pt tolerated well. Continued to encourage daily CHG bathing per [facility] protocol. Problem: Bowel Function - Altered:  Goal: Bowel elimination is within specified parameters  Description  Bowel elimination is within specified parameters  Outcome: Ongoing   Patient with frequent loose stools r./t to new positive test for c-diff. Education on isolation and hand washing provided. Problem: Cardiac Output - Decreased:  Goal: Hemodynamic stability will improve  Description  Hemodynamic stability will improve  Outcome: Ongoing   Patient remains on amiodarone drip r.t chronic and actue afib w/ rvr. CHF education provided. Problem: Fluid Volume - Imbalance:  Goal: Absence of imbalanced fluid volume signs and symptoms  Description  Absence of imbalanced fluid volume signs and symptoms  Outcome: Ongoing   Patient showing signs of edema present generalized. Providers aware. BLE elevated, and patient is turned and repositioned frequently.      Problem: Pain:  Goal: Pain level will decrease  Description  Pain level will decrease  Outcome: Ongoing   Patient has no complaints of pain this shift. Will continue to monitor. Problem: Serum Glucose Level - Abnormal:  Goal: Ability to maintain appropriate glucose levels will improve to within specified parameters  Description  Ability to maintain appropriate glucose levels will improve to within specified parameters  Outcome: Ongoing   Patient remains in [address] [place] [address]. SSI given per eMar. Will continue to monitor. done

## 2020-03-03 NOTE — PROGRESS NOTES
4 Eyes Admission Assessment     I agree as the admission nurse that 2 RN's have performed a thorough Head to Toe Skin Assessment on the patient. ALL assessment sites listed below have been assessed on admission. Areas assessed by both nurses: Dayana/Valeria  [x]   Head, Face, and Ears   [x]   Shoulders, Back, and Chest  [x]   Arms, Elbows, and Hands   [x]   Coccyx, Sacrum, and Ischum  [x]   Legs, Feet, and Heels        Does the Patient have Skin Breakdown?   No Redness on buttocks/coccyx; blanchable      Rickey Prevention initiated:  Yes   Wound Care Orders initiated:  No      [facility] nurse consulted for Pressure Injury (Stage 3,4, Unstageable, DTI, NWPT, and Complex wounds):  No      Nurse 1 eSignature: Electronically signed by Lien Galarza RN on 3/3/20 at 7:10 AM    **SHARE this note so that the co-signing nurse is able to place an eSignature**    Nurse 2 eSignature: Electronically signed by Ileana Ruiz RN on 3/3/20 at 12:08 PM

## 2020-03-03 NOTE — PROGRESS NOTES
Levaquin [Levofloxacin]      Rash, swollen neck    Cefdinir Rash     Recent CXR: (2/21/20)  Impression   1.  Unchanged small pleural effusions.  No findings for congestive failure on the current exam.  Correlate clinically. .     Chart reviewed. Medical Diagnosis:  Lymphoma  Treatment Diagnosis:  Oral-pharyngeal Dysphagia (R13.12)    BSE Impression (2/22/20): Pt with known oropharyngeal dysphagia per MBS completed 2/7/20. Pt declining most PO offerings this date, accepting only ice chips, thin water, and puree. No overt signs associated with aspiration exhibited with any trials. Mastication and bolus control of ice chips adequate; no significant oral residue noted with any trials. Recommend continue dysphagia soft and bite sized (dysphagia III advanced) / thin liquid diet per MBS recs. Previous MBS results (at [facility]) (2/7/20):  Results indicate oropharyngeal dysphagia. Successive drinks of thin and Mildly Thick (Nectar) Liquids revealed laryngeal penetration during the swallow. Material entered the airway, remained above the vocal folds and was not ejected from the airway. No aspiration was definitively viewed during this study. Oral phase was characterized prolonged mastication with prolonged time noted for bolus transport. Minimal oral residue was noted post swallow. Pharyngeal phase was characterized by pharyngeal pooling, delayed swallow initiation, minimally delayed pharyngeal clearing and minimal collection of pharyngeal stasis post swallow. Overall pharyngeal clearing was grossly functional. Decreased upper esophageal opening was noted during the swallow. Pt was able to clear barium tablet from the pharynx although he reported sensation of pill being \"stuck\" in his throat. Belching was noted after the study. Laryngeal penetration may be contributing factor to pt's sensation of something \"stuck\" in his throat.  Pt appeared to benefit from use of small bites/sips, slow rate of intake and single sips of liquids. Recommend continuation of Dysphagia III Soft and Bite-Sized diet level with thin liquids with strict aspiration precautions. Pain: denied    Current Diet:  Dysphagia Soft and Bite-Sized diet, Thin liquids    Treatment:  Pt seen bedside to address the following goals:  Goal 1: Patient will consume recommended diet consistency w/o overt signs associated with aspiration or respiratory decline. 2/22: Targeted via therapeutic exercises - effortful swallows completed with puree consistency x 20 reps and ice chips x 20 reps. Chin tuck against resistance x 10 reps. Continue goal.  2/25/20:  RN reports that pt ate breakfast without clinical concerns. Pt and family report that he had a good appetite for breakfast, but he is full now and is only agreeable to consuming water at this time. Pt is able to tolerate thin liquids (water) via single bolus from cup with no immediate clinicals/s aspiration; however he did have delayed throat clearing x1/15. Continue goal.  2/28/20:  Pt agreeable to therapy, but his wife continues to voice concerns that if pt eats now, he may not have appetite for lunch. Pt attempted applesauce x1, but this was uncomfortable given the sores in his mouth. He was better able to tolerate water and sugar-free jello. Pt was able to implement effortful swallow with thin liquid (unmetered via cup) x5/5 with no clinical s/s aspiration. Pt was able to implement effortful swallow with half-tsp jello 15/15x with no clinical s/s aspiration. Continue goal.    3/3/20:  Pt continues to have poor appetite, but is agreeable to eating some soft food brought from home today. He is able to implement effortful swallow with minimal verbal cues; wife indicates that she can cue pt for this. Pt demonstrates no clinical s/s aspiration. He denies any additional concerns or sensation that there is something \"stuck\" in his throat. RN denies concerns for respiratory decline. GOAL MET.

## 2020-03-03 NOTE — PROGRESS NOTES
94%   BMI 23.46 kg/m²     Weight:    Wt Readings from Last 3 Encounters:   03/01/20 177 lb 12.8 oz (80.6 kg)   02/21/20 184 lb 15.5 oz (83.9 kg)   01/28/20 178 lb (80.7 kg)       General: Awake, alert and oriented, elderly and frail   HEENT: normocephalic, PERRL, no scleral erythema or icterus, Oral mucosa moist and intact, throat clear  NECK: supple without palpable adenopathy  BACK: Straight negative CVAT  SKIN: warm dry and intact without lesions rashes or masses  CHEST: B/L rales at the bases  CV: tachycardic and irregluar  ABD: NT ND normoactive BS, no palpable masses or hepatosplenomegaly  EXTREMITIES: without edema, denies calf tenderness  NEURO: CN II - XII grossly intact  CATHETER: DL LUE PICC (1/29/20) - CDI    Data    CBC:   Recent Labs     03/01/20 0423 03/02/20  0352 03/03/20  0330   WBC 4.1 4.1 3.3*   HGB 7.9* 8.5* 7.6*   HCT 23.7* 25.3* 23.2*   MCV 83.7 82.9 84.6   * 125* 120*     BMP/Mag:  Recent Labs     03/01/20 0423 03/02/20  0352 03/03/20  0330    138 140   K 3.7 3.1* 3.8   CL 98* 101 103   CO2 29 25 25   PHOS 2.6 2.4* 2.4*   BUN 47* 44* 36*   CREATININE 1.3 1.2 1.3   MG 1.70* 1.70* 1.90     LIVP:   No results for input(s): AST, ALT, LIPASE, BILIDIR, BILITOT, ALKPHOS in the last 72 hours. Invalid input(s): AMYLASE,  ALB  Coags:   No results for input(s): PROTIME, INR, APTT in the last 72 hours. Uric Acid   Recent Labs     03/01/20 0423 03/02/20  0352 03/03/20  0330   LABURIC 4.3 4.4 4.1     PATHOLOGY:  1. Left Inguinal & Left Acillary LN Bx 2/3/20: The following consultation results were received from [address] [place] [id number] (Case  Number: [id number]  1. Lymph node, left inguinal, biopsy ([facility]-, 02/03/2020):       Angioimmunoblastic T-cell lymphoma. See Note. 2. Lymph node, left axilla, excision ([facility]-, 02/09/2020):       Angioimmunoblastic T-cell lymphoma. See Note.     NOTE: Sections of an inguinal node (S-) and axillary node  (S-) show mode architecture effaced by polymorphous infiltrate  composed of lymphocytes, histiocytes, plasma cells, eosinophils, and an  increase in the number of high-endothelial venules. Sinuses are patent. The submitted immunostains (CD20, PAX5, CD3, BCL-6, CD10, CD21, CD45,  CD30, CD15, CD4, CD5, CD7, CD8, CD57) and JYOTHI for EBV (RICHARD) are  reviewed. Additional stains (CD21, CD3, CD79a, PD-1, ICOS, CCD23) and JYOTHI  for EBV (RICHARD) are performed at the [facility]. CD20 and PAX5 show markedly  fragmented B-cell areas and regressed follicles. CD79a shows similar  distribution and additionally marks cells with plasmacytoid morphology. CD3, CD4, CD5, and CD7 show abundant T-cells and highlights a mild  cytologic atypia. CD8 marks numerous cells (CD4 ~ CD8). PD-1 reveals  abundant Follicular Marshall T-cells, and a somewhat similar pattern is  noted for ICOS. CD23 shows a disorganized and expanded dendritic cell  meshwork in the paracortex, not necessarily associated with B-cell areas. CD30 marks large scattered cells, positive for CD20, PAX-5 (dim), and  negative for CD15, CD10, and BCL-6. CD57 is noncontributory. RICHARD is  positive in cells ranging in size. Per reports, flow cytometry in the axillary node ([facility] 02/10/2020)  shows small-size T-cells (6% of total lymphocytes) positive for CD2, CD4,  CD5, and CD7, and negative for sCD3, TCR alpha/beta, and TCR gamma/delta. In summary, the morphologic and immunohistochemistry findings are  consistent with the above diagnosis. 2. GI Biopsies 2/10/20:    A. Gastroesophageal junction, biopsy:       - Columnar mucosa with mild chronic inactive inflammation       - No evidence of intestinal metaplasia, dysplasia, or malignancy       B.  Stomach, biopsy:       - Gastric mucosa with no pathologic change       - No evidence of intestinal metaplasia, dysplasia, or malignancy       - No morphologic evidence of helicobacter pylori infection       C. Esophagus, biopsy:       - Squamous mucosa with focal mild chronic inflammation       - No evidence of increased eosinophils, dysplasia, or malignancy       D. Ulcer, rectum, biopsy:       - Colonic mucosa with extensive ulceration and associated acute         inflammation       - No evidence of granulomas, dysplasia or malignancy       - No evidence of specific viral infection, as supported by negative         CMV and HSV immunohistochemical staining with appropriate controls    DIAGNOSTIC IMAGIN. CT Chest 20:  1. Extensive lymphadenopathy as well as suspected mild splenomegaly,   suspicious for lymphoproliferative disorder.  New areas of nodular soft   tissue density in the left breast may represent associated intramammary lymph   nodes but could also represent subcutaneous involvement. 2. Trace bilateral pleural effusions with bilateral lower lobe passive   atelectasis. 3. Mild cardiomegaly status post aortic valve replacement. 2. CT A/P 20: Increased retroperitoneal adenopathy compared to 2013 raising the question of   underlying lymphoma.       Mild body wall anasarca and trace pelvic fluid suggesting fluid overload. PROBLEM LIST:             1. T-Cell Angioblastic NHL  2. MDS  3. NICM  4. Acute on Chronic HFrEF - LVEF 35-40% 20  5. MR  6. AS - S/p Aortic Valve replacement  7. PAF w/RVR  8. HTN  9. CKD III  10. Chronic Sinusitis  11. OA - h/o shoulder replacement  12. HLD    TREATMENT:            1. Brentuximab (Cycle 1 - 20)     ASSESSMENT AND PLAN:           1. T-cell angiommunoblastic lymphoma: Newly diagnosed and pending approval from hospital admon to give Brentuximab 1.8mg/m2 iv x1 q 3-4 wks. Brentuximab Cycle 1, Day +5  - Cont Prednisone Taper (50mg daily started ):  Now down to 20 mg daily ()   - Family understands that any treatment we would offer is palliative in nature only, however  and wife are adamant that he remain a full code  - Palliative Care following    2. 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 ID: Presented with NF & septic shock on previous admission, resolved now. Last fever 2/18. Sepsis orders initiated 3/2 given new onset hypotension  - Blood Cxs 3/3 - NGTD  - Cont valtrex ppx  - Sepsis:  - Cont Merrem Day +2 (3/2/20)  - Cont Vancomycin IV Day +2 (3/2/20)  - Cont pressors - switch from levo to neosynephrine (3/3)  - C. Diff Colitis:  - Cont PO Vancomycin Day +1 (3/3/20)    4. Heme: Anemia & Thrombocytopenia 2/2 MDS +/- NHL  - Transfuse for Hgb < 7 and Platelets < 10 K  - No transfusion today     5. Metabolic / CKD III: Baseline SCr 1.3-1.4. +HypoPhos  - Replace Potassium and Magnesium per cardiac parameters  - Risk for Tumor Lysis Syndrome: No evidence at this time  - Cont Allopurinol 200 mg daily    6. Cardiac: Significant H/o NICM, valvular disease, PAF w/RVR, acute on chronic HFrEF. Recently had runs of wide-complex tachycardia requiring DC CV  - Cardiology consulted & following - appreciate assistance  - Cont ASA 81mg daily    - VTach 2/18/20 - s/p dc cardioversion - If his anticipated life expectancy is  less than 12 months, ICD implantation is contraindicated  - Cont amiodarone + mexiletine    - PAF w/RVR - Recurrent 3/2 middle of the night w/resultant cardiogenic shock  - S/p lopressor 2.5mg IVP, Digoxin 250mcg IV x1 3/2  - Cont amiodarone gtt (3/2/20)  - Cont AC with lovenox 80mg BID (2/25/20)    - Aortic Stenosis - s/p porcine AVR  - Avoid diuretics if at all possible    - Acute on Chronic HFrEF - LVEF 35%    - Cardiogenic Shock: Recurrent 2/2 a.fib w/RVR  - Restart levophed to maintain MAP >65. Consider transitioning to neosynephrine to avoid tachyarrhythmias  - Consult critical care    7. Pulmonary: No acute issues  - Encourage IS & ambulation    8. GI: +Rectal ulcers & LGI bleed. +C.diff 3/2  - S/p EGD & flex sig 2/10  - Cont PPI daily    9.  Nutrition: Severe malnutrition POA  - Cont low microbial diet  - Dietary to follow closely   - Encourage supplements as tolerated    10. DM2: chronic, steroid induced hyperglycemia  - Cont accuchecks & SSI - Low    11. Acute Debilitation: 2/2 new NHL diagnosis +/- age   - Cont PT/OT - on hold now with hypotension  - Consult SW - ARU denied. Will need to start precert for SNF on Monday, 3/2  - Palliative care also following, not unreasonable to d/c home with palliative care if he has good support at home given his limited prognosis        - DVT Prophylaxis: Cont full-dose AC w/lovenox  Contraindications to pharmacologic prophylaxis: None  Contraindications to mechanical prophylaxis: None      - Disposition: Uncertain at this time.  Overall poor prognosis in the setting of cardiogenic shock & incurable aggressive OneJEANIE Prado - CNP   Jesus Mooney MD  [facility]  Please contact me through [facility]

## 2020-03-03 NOTE — PROGRESS NOTES
3/3/2020 11:59 AM   Drainage Amount None 3/3/2020 11:59 AM   Odor None 3/3/2020 11:59 AM   Margins Undefined edges 3/3/2020 11:59 AM   Non-staged Wound Description Not applicable 3/9/8187 47:25 AM   Number of days: 0       Response to treatment: no c/o's  Plan:   Plan of Care: Wound 03/03/20 Nose Right inner nare- cold sore present X 1 month per spouse-Dressing/Treatment: Betadine swabs  Wound 03/03/20 Left upper lip - cold sore present x 1 month per spouse -Dressing/Treatment: Betadine swabs  Incision 02/03/20 Groin Left-Dressing/Treatment: Open to air  Incision 02/09/20 Axilla Left-Dressing/Treatment: Open to air    Patient/Caregiver Teaching: etiology, treatment  Level of patient/caregiver understanding able to:   [x] Indicates understanding       [] Needs reinforcement  [] Unsuccessful      [x] Verbal Understanding  [] Demonstrated understanding       [] No evidence of learning  [] Refused teaching         [] N/A       Electronically signed by Christopher Tucker RN, Archie Carlton on 3/3/2020 at 12:03 PM

## 2020-03-03 NOTE — PROGRESS NOTES
Patient was unable to tolerate CPAP mask throughout the night. Patient was able to wear for a total of 1.5 hours. Patient woke up 2x trying to get the mask off and stating \" I feel like I can't breath\" and complained of feeling very claustrophobic. This RN re-assured patient and provided emotional support and encouraged patient to continue to wear CPAP. Patient refused to keep on despite multiple attempts and re-education on need to wear throughout the night. Provider aware. Will continue to monitor.

## 2020-03-03 NOTE — ONCOLOGY
4 Eyes Admission Assessment     I agree as the admission nurse that 2 RN's have performed a thorough Head to Toe Skin Assessment on the patient. ALL assessment sites listed below have been assessed on admission. Areas assessed by both nurses: Yonatan Pae  [x]   Head, Face, and Ears   [x]   Shoulders, Back, and Chest  [x]   Arms, Elbows, and Hands   [x]   Coccyx, Sacrum, and Ischum  [x]   Legs, Feet, and Heels        Does the Patient have Skin Breakdown?   Yes a wound was noted on the Admission Assessment and an LDA was Initiated documentation include the Kathryn-wound, Wound Assessment, Measurements, Dressing Treatment, Drainage, and Color\",         Rickey Prevention initiated:  Yes   Wound Care Orders initiated:  Yes-abrasion to upper lip      [facility] nurse consulted for Pressure Injury (Stage 3,4, Unstageable, DTI, NWPT, and Complex wounds):  No      Nurse 1 eSignature: Electronically signed by Thien Subramanian RN on 3/3/20 at 6:08 PM    **SHARE this note so that the co-signing nurse is able to place an eSignature**    Nurse 2 eSignature: Electronically signed by Monique Cortes RN on 3/3/20 at 10:08 PM

## 2020-03-03 NOTE — PROGRESS NOTES
4 Eyes Admission Assessment     I agree as the admission nurse that 2 RN's have performed a thorough Head to Toe Skin Assessment on the patient. ALL assessment sites listed below have been assessed on admission. Areas assessed by both nurses: Azalee Denise  [x]   Head, Face, and Ears   [x]   Shoulders, Back, and Chest  [x]   Arms, Elbows, and Hands   [x]   Coccyx, Sacrum, and Ischum  [x]   Legs, Feet, and Heels        Does the Patient have Skin Breakdown?   No         Rickey Prevention initiated:  Yes   Wound Care Orders initiated:  No      [facility] nurse consulted for Pressure Injury (Stage 3,4, Unstageable, DTI, NWPT, and Complex wounds):  No      Nurse 1 eSignature: Electronically signed by Stanley Medeiros RN on 3/2/20 at 7:18 PM    **SHARE this note so that the co-signing nurse is able to place an eSignature**    Nurse 2 eSignature: Electronically signed by Earl Melvin RN on 3/2/20 at 10:36 PM

## 2020-03-03 NOTE — PROGRESS NOTES
Pulmonary Medicine  Progress Note  PGY-2    Hospital Day: 12                                                      Admit Date: 2/21/2020                                     PCP: Melisa Castillo MD      CC:  Fever and Hypotension                      Interval Hx: + C diff toxin. Remains on Levophed at 4, Victor Manuel-synephrine at 100. Patient given digoxin 125 mcg overnight night for Afib RVR. Daily Plan:  3/3/2020    Subjective: Patient seen and examined at bedside. Reports he is doing okay. Occasionally reports twinges in his left chest but states that is overall improving. Still having diarrhea. Denies SOB, abdominal pain, nausea, vomiting. Medications:    Scheduled Meds:   vancomycin  125 mg Oral Q6H    amiodarone bolus  150 mg Intravenous Once    valACYclovir  500 mg Oral BID    predniSONE  20 mg Oral Daily    insulin lispro  0-12 Units Subcutaneous TID WC    insulin lispro  0-6 Units Subcutaneous Nightly    insulin lispro  5 Units Subcutaneous TID WC    Venelex   Topical BID    enoxaparin  80 mg Subcutaneous BID    allopurinol  200 mg Oral Daily    aspirin  81 mg Oral Daily    atorvastatin  10 mg Oral Daily    pantoprazole  40 mg Oral QAM AC    mexiletine  200 mg Oral 3 times per day    sodium chloride flush  10 mL Intravenous 2 times per day      Continuous Infusions:   sodium chloride      phenylephrine (VICTOR MANUEL-SYNEPHRINE) 50mg/250mL infusion 150 mcg/min (03/03/20 1105)    sodium chloride 75 mL/hr at 03/03/20 1143    amiodarone 450mg/250ml D5W infusion 1 mg/min (03/03/20 0621)    norepinephrine Stopped (03/03/20 1106)    dextrose       PRN Meds:potassium chloride, magnesium sulfate, alteplase, prochlorperazine **OR** prochlorperazine, ondansetron **OR** ondansetron, glucose, dextrose, glucagon (rDNA), dextrose, lidocaine, sodium chloride flush, acetaminophen, acetaminophen, polyethylene glycol, magic (miracle) mouthwash with nystatin    Allergies:    Allergies   Allergen Reactions    Clindamycin/Lincomycin Rash    Levaquin [Levofloxacin]      Rash, swollen neck    Cefdinir Rash     ROS: No fever or HA    Physical Exam:     Vitals: BP 93/73   Pulse 116   Temp 98.2 °F (36.8 °C) (Oral)   Resp 15   Ht 6' 1\" (1.854 m)   Wt 200 lb (90.7 kg)   SpO2 92%   BMI 26.39 kg/m²     I/O:      Intake/Output Summary (Last 24 hours) at 3/3/2020 1252  Last data filed at 3/3/2020 5243  Gross per 24 hour   Intake 2880 ml   Output 1000 ml   Net 1880 ml       Physical Exam  Constitutional:       General: He is not in acute distress. Appearance: Normal appearance. He is not toxic-appearing. HENT:      Head: Normocephalic and atraumatic. Eyes:      General:         Right eye: No discharge. Left eye: No discharge. Extraocular Movements: Extraocular movements intact. Pupils: Pupils are equal, round, and reactive to light. Cardiovascular:      Rate and Rhythm: Regular rhythm. Tachycardia present. Heart sounds: No murmur. No friction rub. No gallop. Pulmonary:      Comments: + Bibasilar crackles, no rhonchi or wheezing   Abdominal:      General: There is distension (mild distension). Palpations: Abdomen is soft. Tenderness: There is no abdominal tenderness. There is no guarding or rebound. Musculoskeletal:      Comments: 3+ pitting edema bilateral lower extremities    Skin:     General: Skin is warm and dry. Neurological:      General: No focal deficit present. Mental Status: He is alert. Mental status is at baseline.              DATA:       Labs  CBC:   Recent Labs     03/01/20  0423 03/02/20  0352 03/03/20  0330   WBC 4.1 4.1 3.3*   HGB 7.9* 8.5* 7.6*   HCT 23.7* 25.3* 23.2*   * 125* 120*       BMP:   Recent Labs     03/01/20  0423 03/02/20  0352 03/03/20  0330    138 140   K 3.7 3.1* 3.8   CL 98* 101 103   CO2 29 25 25   BUN 47* 44* 36*   CREATININE 1.3 1.2 1.3   GLUCOSE 131* 115* 147*     LFT's: No results for input(s): AST, ALT, ALB, BILITOT, ALKPHOS in the last 72 hours. Troponin:   Recent Labs     03/02/20  0830 03/02/20  1402 03/02/20 2035   TROPONINI 0.09* 0.09* 0.09*     BNP: No results for input(s): BNP in the last 72 hours. INR: No results for input(s): INR in the last 72 hours. Lipids: No results for input(s): CHOL, HDL in the last 72 hours. Invalid input(s): LDLCALCU  CK: No results for input(s): CKTOTAL in the last 72 hours. Urinalysis:  Lab Results   Component Value Date    NITRU Negative 02/17/2020    WBCUA 4 02/17/2020    BACTERIA Rare 01/23/2015    RBCUA 0-2 02/17/2020    BLOODU Negative 02/17/2020    SPECGRAV 1.018 02/17/2020    GLUCOSEU Negative 02/17/2020       Radiology:  XR CHEST PORTABLE   Final Result   Impression: Airspace opacity of the left lung base, new in the interval.      Small left effusion. XR CHEST PORTABLE   Final Result   1. Unchanged small pleural effusions. No findings for congestive failure on the current exam.  Correlate clinically. .      XR ABDOMEN (KUB) (SINGLE AP VIEW)   Final Result      1. Nonspecific bowel gas pattern without findings for obstruction. 2.  11 mm calcification overlying the right renal shadow consistent with renal calculus. This has been described on recent CT of the abdomen exam.  Second small radiodensity in the right lower abdomen is nonspecific. ASSESSMENT AND PLAN:     1. Recurrent shock - Precipitating factor may be C. Difficile colitis. Would stop IV Vanc and Merrem and continue PO Vanc. Start NS 75 ml/hr. Enoc-synephrine started this morning. Stop Levophed and titrate Enoc-synephrine to SBP 90. Continue to follow cultures. 2. Angioimmunoblastic T Cell Lymphoma - per oncology  3. Afib RVR - per EP. Continue Amiodarone. Received 125 mcg digoxin overnight. Can give amio bolus 150 mg if recurrent afib RVR. Currently in sinus tachycardia. 4. Fever - resolved. Continue PO Vanc as above. 5. TYRELL - creatinine stable at 1.3  6. VTach s/p cardioversion  7. examining patient, collaborating with other physicians, monitoring vital signs, telemetry, continuous pulse oximetry, and clinical response to IV medications, documentation time, review and interpretation of laboratory and radiological data, review of nursing notes and old record review. This time excludes any time that may have been spent performing procedures for life threatening organ failure.     Christofer Goncalves MD

## 2020-03-04 NOTE — PROGRESS NOTES
Resp 16   Ht 6' 1\" (1.854 m)   Wt 203 lb 3.2 oz (92.2 kg)   SpO2 93%   BMI 26.81 kg/m²     Weight:    Wt Readings from Last 3 Encounters:   03/04/20 203 lb 3.2 oz (92.2 kg)   02/21/20 184 lb 15.5 oz (83.9 kg)   01/28/20 178 lb (80.7 kg)       General: Awake, alert and oriented, elderly and frail   HEENT: normocephalic, PERRL, no scleral erythema or icterus, Oral mucosa moist and intact, throat clear  NECK: supple without palpable adenopathy  BACK: Straight negative CVAT  SKIN: warm dry and intact without lesions rashes or masses  CHEST: B/L rales at the bases  CV: tachycardic and irregluar  ABD: NT ND normoactive BS, no palpable masses or hepatosplenomegaly  EXTREMITIES: without edema, denies calf tenderness  NEURO: CN II - XII grossly intact  CATHETER: DL LUE PICC (1/29/20) - CDI    Data    CBC:   Recent Labs     03/02/20 0352 03/03/20 0330 03/04/20  0245   WBC 4.1 3.3* 4.3   HGB 8.5* 7.6* 8.1*   HCT 25.3* 23.2* 24.9*   MCV 82.9 84.6 84.9   * 120* 121*     BMP/Mag:  Recent Labs     03/02/20 0352 03/03/20  0330 03/04/20  0245    140 137   K 3.1* 3.8 4.2    103 103   CO2 25 25 23   PHOS 2.4* 2.4* 2.4*   BUN 44* 36* 33*   CREATININE 1.2 1.3 1.1   MG 1.70* 1.90 2.40     LIVP:   No results for input(s): AST, ALT, LIPASE, BILIDIR, BILITOT, ALKPHOS in the last 72 hours. Invalid input(s): AMYLASE,  ALB  Coags:   No results for input(s): PROTIME, INR, APTT in the last 72 hours. Uric Acid   Recent Labs     03/02/20  0352 03/03/20  0330 03/04/20  0245   LABURIC 4.4 4.1 3.9     PATHOLOGY:  1. Left Inguinal & Left Acillary LN Bx 2/3/20: The following consultation results were received from [facility] - [address] [place] [address] (Case  Number: [id number]  1. Lymph node, left inguinal, biopsy ([facility]-, 02/03/2020):       Angioimmunoblastic T-cell lymphoma. See Note.   2. Lymph node, left axilla, excision ([facility]-, 02/09/2020):       Angioimmunoblastic nature only, however  and wife are adamant that he remain a full code  - Palliative Care following    2. 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 ID: C.diff colitis w/septic & cardiogenic shock. Afebrile, but with hypotension and +c.diff  - Blood Cxs 3/3 - NGTD  - Cont valtrex ppx  - Sepsis:  - IV Abx discontinued 3/3 with source identification of c.diff and concern IV abx could exacerbate this  - Cont pressors - currently on neosynephrine 80mcg/min (3/3)  - C. Diff Colitis:  - Cont PO Vancomycin Day +2 (3/3/20)    Abx Hx:  Merrem 3/2-3/3  Vancomycin 3/2-3/3    4. Heme: Anemia & Thrombocytopenia 2/2 MDS +/- NHL  - Transfuse for Hgb < 7 and Platelets < 10 K  - No transfusion today     5. Metabolic / CKD III: Baseline SCr 1.3-1.4. +HypoPhos  - Replace Potassium and Magnesium per cardiac parameters  - Risk for Tumor Lysis Syndrome: No evidence at this time  - Cont Allopurinol 200 mg daily    6. Cardiac: Significant H/o NICM, valvular disease, PAF w/RVR, acute on chronic HFrEF. Recently had runs of wide-complex tachycardia requiring DC CV  - Cardiology consulted & following - appreciate assistance  - Cont ASA 81mg daily    - VTach - s/p dc cardioversion 2/18/20 - If his anticipated life expectancy is  less than 12 months, ICD implantation is contraindicated  - Cont amiodarone + mexiletine  - Cont to have intermittent runs of V. Tach 3/3 - amiodarone gtt increased.  Levophed switched to neosynephrine    - PAF w/RVR - Recurrent w/resultant cardiogenic shock  - S/p lopressor 2.5mg IVP, Digoxin 250mcg IV x1 3/2  - Cont amiodarone gtt (3/2/20)  - Cont AC with lovenox 80mg BID (2/25/20)    - Aortic Stenosis - s/p porcine AVR  - Avoid diuretics if at all possible    - Acute on Chronic HFrEF - LVEF 35%  - Previously on diuretics, however avoiding now with severe AS & hypotension    - Cardiogenic Shock: Recurrent 2/2 a.fib w/RVR  - Cont neosynephrine titrated to MAP of 65  - Critical care following    7. GI: +Rectal ulcers & LGI bleed. +C.diff 3/2  - S/p EGD & flex sig 2/10  - Cont PPI daily  - Cont ID tx for c.diff    8. Nutrition: Severe malnutrition POA  - Cont low microbial diet  - Dietary to follow closely   - Encourage supplements as tolerated  - Consider Enteral nutrition. 9. DM2: chronic, steroid induced hyperglycemia  - Cont accuchecks & SSI - Low    10. Acute Debilitation: 2/2 new NHL diagnosis +/- age   - Cont PT/OT - on hold now with hypotension & shock  - Consult SW - ARU denied. Once stable will restart precert for SNF  - Palliative care also following, not unreasonable to d/c home with palliative care if he has good support at home given his limited prognosis    11. Code Status: Full code at this time  - Palliative care following  - Multiple discussions have been had informing the patient and his family that this therapy is palliative at best and that his lymphoma is a terminal disease. In addition to that he has severe cardiac disease which is not helping overall prognosis. Despite repeated discussions, pt and wife insist on full code and wanting \"everything done\" including intubation, CPR, & defibrillation.         - DVT Prophylaxis: Cont full-dose AC w/lovenox  Contraindications to pharmacologic prophylaxis: None  Contraindications to mechanical prophylaxis: None      - Disposition: Uncertain at this time.  Overall poor prognosis in the setting of cardiogenic shock & incurable aggressive JEANIE Zaidi - CNP     Mickey Jeter MD  [facility]  Please contact me through Lisa Mcnamara

## 2020-03-04 NOTE — PROGRESS NOTES
Palliative Care Chart Review  and Check in Note:     NAME:  Hassan Bakes Schirmer  Admit Date: 2/21/2020  Hospital Day:  Hospital Day: 13   Current Code status: Full Code    Palliative care is continuing to following Mr. Schirmer for symptom management,  and goals of care discussion as needed. Patient's chart reviewed today 3/4/20. Pt c/o diarrhea and feeling \"suffocated\" when attempting to wear cpap. He says he is doing better overall and that his doctors tell him he's improving. Offered emotional support to wife at the bedside. The following are the currently established goals/code status, and Symptom management. Goals of care: Pt wants to continue aggressive care.     Code status: Full Code    Discharge plan: ARU vs SNF when medically ready    Tobias Ferrara NP  [address]

## 2020-03-04 NOTE — CARE COORDINATION
Case Management Assessment           Daily Note                 Date/ Time of Note: 3/4/2020 9:45 AM         Note completed by: Donold Bamberger    Patient Name: Meeta Urias  YOB: 1940    Diagnosis:Lymphoma, unspecified body region, unspecified lymphoma type [facility]) [C85.90]  Patient Admission Status: Inpatient    Date of Admission:2/21/2020  6:23 PM Length of Stay: 12 GLOS:        Current Plan of Care: ARU vs. SNF? Pending insurane  ________________________________________________________________________________________  PT AM-PAC: 17 / 24 per last evaluation on: 2/27/2020    OT AM-PAC: 16 / 24 per last evaluation on: 3/3/2020    DME Needs for discharge: defer to next level of care    ________________________________________________________________________________________  Discharge Plan: To Be Determined DUE TO: medical status and insurance approval    Tentative discharge date: end of week? Current barriers to discharge: insurance approval    Referrals completed: Not Applicable    Resources/ information provided: Not indicated at this time  ________________________________________________________________________________________  Case Management Notes: Met with patient at bedside. Wife was not present. Patient still wants to try for ARU. He reports that he believes that his daughter has contacted the insurance company informing them that they would like to appeal his ARU denial. Will follow-up once the patient's spouse arrives this afternoon. Ankit Hopson and his family were provided with choice of provider; he and his family are in agreement with the discharge plan.     Care Transition Patient: Yes    Donold Bamberger, [facility]  Case Management Department  Ph: [phone]  Fax: [phone]

## 2020-03-04 NOTE — PROGRESS NOTES
Occupational Therapy/Physical therapy    Pt on hold for therapy this morning due to HR and BP. Will f/u with POC.      Padmini Restrepo, s/OT   Chioma Hobson, PT, DPT  [id number]

## 2020-03-04 NOTE — PLAN OF CARE
Problem: Risk for Impaired Skin Integrity  Goal: Tissue integrity - skin and mucous membranes  Description  Structural intactness and normal physiological function of skin and  mucous membranes. Outcome: Ongoing  Note:   Pt continues with bruising, skin weeping, an abrasion to his upper lip, and blanchable redness to his coccyx. Venelex ointment applied to upper lip. Pt is being turned and repositioned q2h by nursing. Will continue to monitor. Problem: Falls - Risk of:  Goal: Will remain free from falls  Description  Will remain free from falls  Outcome: Ongoing  Note:   Pt is a high fall risk (see Rodriguez Fall Risk assessment). Oriented pt to room and call light. Instructed pt to call for help when needed. Call light and personal items within reach. Bed in lowest position, brakes on, and 2/4 side rails up. Non-skid footwear on. Falls risk stop sign on door; hourly visual checks implemented. Falls risk arm band on pt. Bed alarm is on. Pt using call light appropriately. Will continue to monitor. Problem: Venous Thromboembolism:  Goal: Will show no signs or symptoms of venous thromboembolism  Description  Will show no signs or symptoms of venous thromboembolism  Outcome: Ongoing  Note:   Adherent with DVT Prevention: Pt is at risk for DVT d/t decreased mobility and cancer treatment. Pt educated on importance of activity. Pt has orders for Subcut prophylactic lovenox. Pt verbalizes understanding of need for prophylaxis while inpatient. Problem: Bleeding:  Goal: Will show no signs and symptoms of excessive bleeding  Description  Will show no signs and symptoms of excessive bleeding  Outcome: Ongoing  Note:   Patient's hemoglobin this AM:   Recent Labs     03/03/20  0330   HGB 7.6*     Patient's platelet count this AM:   Recent Labs     03/03/20  0330   *    Thrombocytopenia not present at this time. Patient showing no signs or symptoms of active bleeding.   Transfusion not indicated at this time. Patient verbalizes understanding of all instructions. Will continue to assess and implement POC. Call light within reach and hourly rounding in place. Problem: Infection - Central Venous Catheter-Associated Bloodstream Infection:  Goal: Will show no infection signs and symptoms  Description  Will show no infection signs and symptoms  Outcome: Ongoing  Note:   CVC site remains free of signs/symptoms of infection. No drainage, edema, erythema, pain, or warmth noted at site. Dressing changes continue per protocol and on an as needed basis - see flowsheet. Compliant with [facility] Bath Protocol:  Performed CHG bath today per [facility] protocol utilizing Bed bath with CHG wipes. CVC site cleansed with CHG wipe over dressing, skin surrounding dressing, and first 6\" of IV tubing. Pt tolerated well. Continued to encourage daily CHG bathing per [facility] protocol. Problem: Fluid Volume - Imbalance:  Goal: Absence of imbalanced fluid volume signs and symptoms  Description  Absence of imbalanced fluid volume signs and symptoms  Outcome: Ongoing  Note:   Pt with CHF. Pt continues with edema and skin weeping. Lungs are clear to diminished. Will continue to monitor. Problem: Pain:  Goal: Pain level will decrease  Description  Pain level will decrease  Outcome: Ongoing  Note:   No complaints of pain noted this shift. Will continue to monitor. Problem: Serum Glucose Level - Abnormal:  Goal: Ability to maintain appropriate glucose levels will improve to within specified parameters  Description  Ability to maintain appropriate glucose levels will improve to within specified parameters  Outcome: Ongoing  Note:   Pt currently on Acchchecks ACHS. Pt received insulin this evening per SSI. No s/s of hyper/hypoglycemia noted at this time. Will continue to monitor.

## 2020-03-04 NOTE — PROGRESS NOTES
Pulmonary/Critical Care Medicine  Progress Note      Hospital Day: 13                                                      Admit Date: 2/21/2020                                     PCP: Sanjuanita Mendoza MD      CC:  Shock                    Daily Plan:  3/4/2020    Subjective: Patient seen and examined at bedside. Reports he is doing okay. His rhythm remains sinus tachycardia with heart rate in the 110's. He is on room air with an oxygen saturation of 97%. He denies any abdominal pain, nausea, vomiting, chest pain, palpitations, or dyspnea. He continues to have some diarrhea but hard to quantify. His Victor Manuel-Synephrine requirement is down to 80 mcg/min from 150 yesterday. He is eating some food. . Urine output was 1 L yesterday.   He remains on amiodarone drip at 1 mg/min    Medications:    Scheduled Meds:   sodium phosphate IVPB  20 mmol Intravenous Once    vancomycin  125 mg Oral Q6H    amiodarone bolus  150 mg Intravenous Once    valACYclovir  500 mg Oral BID    predniSONE  20 mg Oral Daily    insulin lispro  0-12 Units Subcutaneous TID WC    insulin lispro  0-6 Units Subcutaneous Nightly    insulin lispro  5 Units Subcutaneous TID WC    Venelex   Topical BID    enoxaparin  80 mg Subcutaneous BID    allopurinol  200 mg Oral Daily    aspirin  81 mg Oral Daily    atorvastatin  10 mg Oral Daily    pantoprazole  40 mg Oral QAM AC    mexiletine  200 mg Oral 3 times per day    sodium chloride flush  10 mL Intravenous 2 times per day      Continuous Infusions:   phenylephrine (VICTOR MANUEL-SYNEPHRINE) 50mg/250mL infusion 80 mcg/min (03/04/20 0723)    sodium chloride 100 mL/hr at 03/04/20 0941    amiodarone 450mg/250ml D5W infusion 1 mg/min (03/04/20 0541)    norepinephrine Stopped (03/03/20 1106)    dextrose       PRN Meds:potassium chloride, magnesium sulfate, alteplase, prochlorperazine **OR** prochlorperazine, ondansetron **OR** ondansetron, glucose, dextrose, glucagon (rDNA), dextrose, lidocaine, 3. 1* 3.8 4.2    103 103   CO2 25 25 23   BUN 44* 36* 33*   CREATININE 1.2 1.3 1.1   GLUCOSE 115* 147* 157*     LFT's: No results for input(s): AST, ALT, ALB, BILITOT, ALKPHOS in the last 72 hours. Troponin:   Recent Labs     03/02/20  0830 03/02/20  1402 03/02/20  2035   TROPONINI 0.09* 0.09* 0.09*     BNP: No results for input(s): BNP in the last 72 hours. INR: No results for input(s): INR in the last 72 hours. Lipids: No results for input(s): CHOL, HDL in the last 72 hours. Invalid input(s): LDLCALCU  CK: No results for input(s): CKTOTAL in the last 72 hours. Urinalysis:  Lab Results   Component Value Date    NITRU Negative 02/17/2020    WBCUA 4 02/17/2020    BACTERIA Rare 01/23/2015    RBCUA 0-2 02/17/2020    BLOODU Negative 02/17/2020    SPECGRAV 1.018 02/17/2020    GLUCOSEU Negative 02/17/2020       Radiology:  XR CHEST PORTABLE   Final Result   Impression: Airspace opacity of the left lung base, new in the interval.      Small left effusion. XR CHEST PORTABLE   Final Result   1. Unchanged small pleural effusions. No findings for congestive failure on the current exam.  Correlate clinically. .      XR ABDOMEN (KUB) (SINGLE AP VIEW)   Final Result      1. Nonspecific bowel gas pattern without findings for obstruction. 2.  11 mm calcification overlying the right renal shadow consistent with renal calculus. This has been described on recent CT of the abdomen exam.  Second small radiodensity in the right lower abdomen is nonspecific. ASSESSMENT AND PLAN:     1. Septic shock - Precipitating factor is C. Difficile colitis. Broad-spectrum antibiotics discontinued yesterday. Continue PO Vanc. Increase NS to 100 ml/hr. Ttitrate Enoc-synephrine for MAP > 65. Blood cultures no growth to date  2. Angioimmunoblastic T Cell Lymphoma - per oncology  3. Afib RVR - per EP. Currently in sinus tachycardia. Remain on amiodarone drip  4. Fever - resolved. Continue PO Vanc as above.    5. TYRELL - creatinine  improved at 1.1  6. VTach s/p cardioversion  7. Leukopenia  8. Anasarca    Case discussed with oncology. Pt has a high probability of imminent or life-threatening deterioration requiring close monitoring, and highly complex decision-making and/or interventions of high intensity to assess, manipulate, and support his critical organ systems to prevent a likely inevitable decline which could occur if left untreated. A total critical care time 40 minutes was used. This includes but not limited to examining patient, collaborating with other physicians, monitoring vital signs, telemetry, continuous pulse oximetry, and clinical response to IV medications, documentation time, review and interpretation of laboratory and radiological data, review of nursing notes and old record review. This time excludes any time that may have been spent performing procedures for life threatening organ failure.     Brina Carey MD

## 2020-03-04 NOTE — PROGRESS NOTES
[facility]          Cardiology                                                                Progress Note    Admission date:  2020    Subjective:   CC:  VT, AF  HPI pt in bed, feels fatigued. The  AF has transitioned to AT with primarily 2:1 conduction (had higher degrees of AV block from 2313 to 0155). He remains on IV amiodarone with the dose increased to 1 mg/min. No recurrence of VT. He has required pressor support with phenylephrine. Vitals:  Blood pressure (!) 79/49, pulse 117, temperature 98.4 °F (36.9 °C), temperature source Oral, resp. rate (!) 36, height 6' 1\" (1.854 m), weight 203 lb 3.2 oz (92.2 kg), SpO2 99 %.   Temp  Av.4 °F (36.9 °C)  Min: 97.6 °F (36.4 °C)  Max: 99.3 °F (37.4 °C)  Pulse  Av.5  Min: 72  Max: 129  BP  Min: 73/58  Max: 146/119  SpO2  Av.1 %  Min: 88 %  Max: 100 %    24 hour I/O    Intake/Output Summary (Last 24 hours) at 3/4/2020 1049  Last data filed at 3/4/2020 0912  Gross per 24 hour   Intake 3742 ml   Output 1200 ml   Net 2542 ml       Objective:     Telemetry monitor: AF--> AT    Physical Exam:  General Appearance:  fatigued  HEENT: pupils equal and reactive, no scleral  icterus  Skin:  Warm and dry  Heart:  regular, tachy, apex brisk, S1 and S2 distant, soft ЮЛИЯ at LSB  Lungs:  Clear, no wheezing  Abd: soft, non tender  Extremities:  1+ edema, no cyanosis  Psych: normal affect, oriented X 3      Lab Review     Renal Profile:   Lab Results   Component Value Date    CREATININE 1.1 2020    BUN 33 2020     2020    K 4.2 2020    K 4.7 2020     2020    CO2 23 2020     CBC:    Lab Results   Component Value Date    WBC 4.3 2020    RBC 2.93 2020    HGB 8.1 2020    HCT 24.9 2020    MCV 84.9 2020    RDW 20.2 2020     2020     BNP:    Lab Results   Component Value Date    BNP 44 2014     Fasting Lipid Panel:    Lab Results Component Value Date    CHOL 98 11/07/2019    HDL 38 11/07/2019    TRIG 70 11/07/2019     Cardiac Enzymes:  CK/MbTroponin  Lab Results   Component Value Date    CKTOTAL 168 07/28/2012    TROPONINI 0.09 03/02/2020     PT/ INR   Lab Results   Component Value Date    INR 1.24 02/21/2020    INR 1.39 02/19/2020    INR 1.20 02/13/2020    PROTIME 14.4 02/21/2020    PROTIME 16.2 02/19/2020    PROTIME 14.0 02/13/2020    PROTIME 25.7 01/08/2016    PROTIME 45.1 10/16/2015    PROTIME 32.8 08/07/2015     PTT No results found for: PTT   Lab Results   Component Value Date    MG 2.40 03/04/2020      Lab Results   Component Value Date    TSH 1.51 06/02/2016       Assessment:     1. NHL  2. NICM  3. Sustained VT- no recurrence today  4. AT- this has evolved from AF and is primarily 2:1 with  bpm.  5. Prosthetic AS- severe    Plan:     1. IV amiodarone at 1 mg/min  2. Continue po mexiletine  3. 12 lead ECG- ?  Tricuspid annular flutter      Margie Fink MD

## 2020-03-04 NOTE — PROGRESS NOTES
4 Eyes Admission Assessment     I agree as the admission nurse that 2 RN's have performed a thorough Head to Toe Skin Assessment on the patient. ALL assessment sites listed below have been assessed on admission. Areas assessed by both nurses: Luisa Marin/ Robyn Chandler  [x]   Head, Face, and Ears   [x]   Shoulders, Back, and Chest  [x]   Arms, Elbows, and Hands   [x]   Coccyx, Sacrum, and Ischum  [x]   Legs, Feet, and Heels        Does the Patient have Skin Breakdown?   No- blanchable redness on coccyx, abrasion to upper lip          Rickey Prevention initiated:  YES  Wound Care Orders initiated:  NO      [facility] nurse consulted for Pressure Injury (Stage 3,4, Unstageable, DTI, NWPT, and Complex wounds):  NO     Nurse 1 eSignature: Electronically signed by Stuart Villeda RN on 3/4/20 at 5:57 AM        Nurse 2 eSignature: Electronically signed by Robyn Chandler RN on 3/4/20 at 11:06 AM

## 2020-03-04 NOTE — PROGRESS NOTES
4 Eyes Admission Assessment     I agree as the admission nurse that 2 RN's have performed a thorough Head to Toe Skin Assessment on the patient. ALL assessment sites listed below have been assessed on admission. Areas assessed by both nurses: Yordy Comer and Tiffanie Conley  [x]   Head, Face, and Ears   [x]   Shoulders, Back, and Chest  [x]   Arms, Elbows, and Hands   [x]   Coccyx, Sacrum, and Ischum  [x]   Legs, Feet, and Heels        Does the Patient have Skin Breakdown?   No         Rickey Prevention initiated:  Yes   Wound Care Orders initiated:  No      [facility] nurse consulted for Pressure Injury (Stage 3,4, Unstageable, DTI, NWPT, and Complex wounds):  No      Nurse 1 eSignature: Electronically signed by Jossy Ibrahim RN on 3/4/20 at 3:50 PM        Nurse 2 eSignature: Electronically signed by Ivan Romberg, RN on 3/4/20 at 7:28 PM

## 2020-03-04 NOTE — PROGRESS NOTES
Offered to help pt. Put on his bipap. Pt. Emphatically refused saying that it makes him feel \"claustrophobic\". RN aware and present.

## 2020-03-04 NOTE — PROGRESS NOTES
[facility] Daily Progress Note      Admit Date:  2/21/2020    Subjective:  Mr. Katia Balbuena was seen and examined. F/U Lymphoma/CHF/cardiomyopathy/AVR/AS/VT/afib. No complaints this am.   Denies  cp/sob. Had reasonable night. Still diarrhea. Objective:   BP (!) 79/49   Pulse 117   Temp 98.4 °F (36.9 °C) (Oral)   Resp (!) 36   Ht 6' 1\" (1.854 m)   Wt 203 lb 3.2 oz (92.2 kg)   SpO2 99%   BMI 26.81 kg/m²       Intake/Output Summary (Last 24 hours) at 3/4/2020 1048  Last data filed at 3/4/2020 0912  Gross per 24 hour   Intake 3742 ml   Output 1200 ml   Net 2542 ml       TELEMETRY: Sinus tach vs flutter    Physical Exam:  General:  Awake, alert, NAD  Skin:  Warm and dry  Neck:  JVP difficult  Chest: decreased BS, respiration normal  Cardiovascular:  RRR S1S2, 2/6 syst m  Abdomen:  Soft nontender  Extremities:  + edema upper extremities, + edema lower.      Medications:    sodium phosphate IVPB  20 mmol Intravenous Once    vancomycin  125 mg Oral Q6H    amiodarone bolus  150 mg Intravenous Once    valACYclovir  500 mg Oral BID    predniSONE  20 mg Oral Daily    insulin lispro  0-12 Units Subcutaneous TID WC    insulin lispro  0-6 Units Subcutaneous Nightly    insulin lispro  5 Units Subcutaneous TID WC    Venelex   Topical BID    enoxaparin  80 mg Subcutaneous BID    allopurinol  200 mg Oral Daily    aspirin  81 mg Oral Daily    atorvastatin  10 mg Oral Daily    pantoprazole  40 mg Oral QAM AC    mexiletine  200 mg Oral 3 times per day    sodium chloride flush  10 mL Intravenous 2 times per day      phenylephrine (VICTOR MANUEL-SYNEPHRINE) 50mg/250mL infusion 90 mcg/min (03/04/20 1004)    sodium chloride 100 mL/hr at 03/04/20 0941    amiodarone 450mg/250ml D5W infusion 1 mg/min (03/04/20 0541)    norepinephrine Stopped (03/03/20 1106)    dextrose       potassium chloride, magnesium sulfate, alteplase, prochlorperazine **OR** prochlorperazine, ondansetron **OR** ondansetron, glucose, dextrose, glucagon (rDNA), dextrose, lidocaine, sodium chloride flush, acetaminophen, acetaminophen, polyethylene glycol, magic (miracle) mouthwash with nystatin    Lab Data:  CBC:   Recent Labs     03/02/20 0352 03/03/20 0330 03/04/20  0245   WBC 4.1 3.3* 4.3   HGB 8.5* 7.6* 8.1*   HCT 25.3* 23.2* 24.9*   MCV 82.9 84.6 84.9   * 120* 121*     BMP:   Recent Labs     03/02/20  0352 03/03/20  0330 03/04/20  0245    140 137   K 3.1* 3.8 4.2    103 103   CO2 25 25 23   PHOS 2.4* 2.4* 2.4*   BUN 44* 36* 33*   CREATININE 1.2 1.3 1.1     LIVER PROFILE:   No results for input(s): AST, ALT, LIPASE, BILIDIR, BILITOT, ALKPHOS in the last 72 hours. Invalid input(s): AMYLASE,  ALB  PT/INR:   No results for input(s): PROTIME, INR in the last 72 hours. APTT:   No results for input(s): APTT in the last 72 hours. BNP:  No results for input(s): BNP in the last 72 hours.   IMAGING:     Assessment:  Patient Active Problem List    Diagnosis Date Noted    Hypotension 11/28/2012     Priority: High    DNR (do not resuscitate) discussion     Generalized weakness     Encounter for palliative care     Goals of care, counseling/discussion     VT (ventricular tachycardia) ([facility] 02/22/2020    Nonrheumatic aortic valve stenosis 02/22/2020    Lymphoma ([facility] 02/21/2020    Fever     Malignant lymphoma, non-Hodgkin's (HCC)     Metabolic encephalopathy     Tobacco abuse counseling     Lymphadenopathy     Hypervolemia     Sepsis ([address] 01/28/2020    Septic shock (HCC)     Atrial fibrillation with RVR ([address]     Elevated international normalized ratio (INR)     Streptococcal pharyngitis     Leukopenia     Chronic ethmoidal sinusitis     TYRELL (acute kidney injury) ([address]     Ex-heavy cigarette smoker (20-39 per day)     PAF (paroxysmal atrial fibrillation) ([facility] 09/06/2019    Essential hypertension 09/06/2019    Finger amputation, traumatic, 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 Primary

## 2020-03-04 NOTE — PROGRESS NOTES
Pt continues to refuse bipap at night, stating, \"it makes me claustrophobic. \" This RN re-educated pt on importance of wearing bipap and pt continues to adamantly refuse. Will continue to monitor.

## 2020-03-05 NOTE — PROGRESS NOTES
[facility] [place]                                                                Progress Note    Admission date:  2020    Subjective:   CC:  VT, AF  HPI pt in bed, feels fatigued. The  AF has transitioned to atrial flutter with primarily 2:1 conduction   He remains on IV amiodarone at 0.5 mg/min. No recurrence of VT. He has required pressor support with phenylephrine, but BPs have been better. Vitals:  Blood pressure 98/67, pulse 123, temperature 98.1 °F (36.7 °C), temperature source Oral, resp. rate 21, height 6' 1\" (1.854 m), weight 203 lb (92.1 kg), SpO2 96 %.   Temp  Av.8 °F (36.6 °C)  Min: 97.3 °F (36.3 °C)  Max: 98.2 °F (36.8 °C)  Pulse  Av  Min: 113  Max: 126  BP  Min: 78/62  Max: 118/74  SpO2  Av.2 %  Min: 73 %  Max: 100 %    24 hour I/O    Intake/Output Summary (Last 24 hours) at 3/5/2020 1519  Last data filed at 3/5/2020 1300  Gross per 24 hour   Intake 2376 ml   Output 1150 ml   Net 1226 ml       Objective:     Telemetry monitor: atrial flutter    Physical Exam:  General Appearance:  fatigued  HEENT: pupils equal and reactive, no scleral  icterus  Skin:  Warm and dry  Heart:  regular, tachy, apex brisk, S1 and S2 distant, soft ЮЛИЯ at LSB  Lungs:  Clear, no wheezing  Abd: soft, non tender  Extremities:  1+ edema, no cyanosis  Psych: normal affect, oriented X 3      Lab Review     Renal Profile:   Lab Results   Component Value Date    CREATININE 1.1 2020    BUN 34 2020     2020    K 3.9 2020    K 4.7 2020     2020    CO2 23 2020     CBC:    Lab Results   Component Value Date    WBC 2.9 2020    RBC 2.77 2020    HGB 7.7 2020    HCT 23.9 2020    MCV 86.3 2020    RDW 19.8 2020     2020     BNP:    Lab Results   Component Value Date    BNP 44 2014     Fasting Lipid Panel:    Lab Results   Component Value Date    CHOL 98 2019    HDL

## 2020-03-05 NOTE — PROGRESS NOTES
[address] [facility] Progress Note    3/5/2020     Joseph Cue    MRN: [id number]    : 1940    Referring MD: Suellen Coyne MD  [place] [address]    SUBJECTIVE:  Weak, frequent diarrhea.     ECOG PS:  (3) Capable of limited self-care, confined to bed or chair > 50% of waking hours    KPS: 40% Disabled; requires special care and assistance    Isolation: None    Medications    Scheduled Meds:   vancomycin  125 mg Oral Q6H    amiodarone bolus  150 mg Intravenous Once    valACYclovir  500 mg Oral BID    predniSONE  20 mg Oral Daily    insulin lispro  0-12 Units Subcutaneous TID WC    insulin lispro  0-6 Units Subcutaneous Nightly    insulin lispro  5 Units Subcutaneous TID WC    Venelex   Topical BID    enoxaparin  80 mg Subcutaneous BID    allopurinol  200 mg Oral Daily    aspirin  81 mg Oral Daily    atorvastatin  10 mg Oral Daily    pantoprazole  40 mg Oral QAM AC    mexiletine  200 mg Oral 3 times per day    sodium chloride flush  10 mL Intravenous 2 times per day     Continuous Infusions:   phenylephrine (VICTOR MANUEL-SYNEPHRINE) 50mg/250mL infusion 40 mcg/min (20 0607)    sodium chloride 100 mL/hr at 20 0322    amiodarone 450mg/250ml D5W infusion 1 mg/min (20 0322)    norepinephrine Stopped (20 1106)    dextrose       PRN Meds:.potassium chloride, magnesium sulfate, alteplase, prochlorperazine **OR** prochlorperazine, ondansetron **OR** ondansetron, glucose, dextrose, glucagon (rDNA), dextrose, lidocaine, sodium chloride flush, acetaminophen, acetaminophen, polyethylene glycol, magic (miracle) mouthwash with nystatin    ROS:  As noted above, otherwise remainder of 10-point ROS negative    Physical Exam:     I&O:      Intake/Output Summary (Last 24 hours) at 3/5/2020 0631  Last data filed at 3/5/2020 0603  Gross per 24 hour   Intake 2676 ml   Output 1350 ml   Net 1326 ml       Vital Signs:  /78   Pulse 122   Temp 98 °F (36.7 °C) (Oral)   Resp 20   Ht 6' 1\" (1.854 m)   Wt 203 lb (92.1 kg)   SpO2 94%   BMI 26.78 kg/m²     Weight:    Wt Readings from Last 3 Encounters:   03/05/20 203 lb (92.1 kg)   02/21/20 184 lb 15.5 oz (83.9 kg)   01/28/20 178 lb (80.7 kg)       General: Awake, alert and oriented, elderly and frail   HEENT: normocephalic, PERRL, no scleral erythema or icterus, Oral mucosa moist and intact, throat clear. Increasing lesion right nares  NECK: supple without palpable adenopathy  BACK: Straight negative CVAT  SKIN: warm dry and intact without lesions rashes or masses  CHEST: B/L rales at the bases  CV: tachycardic and irregluar  ABD: NT ND normoactive BS, no palpable masses or hepatosplenomegaly  EXTREMITIES: with 1 + edema in all 4 extremities, denies calf tenderness  NEURO: CN II - XII grossly intact  CATHETER: DL LUE PICC (1/29/20) - CDI    Data    CBC:   Recent Labs     03/03/20  0330 03/04/20 0245 03/05/20  0331   WBC 3.3* 4.3 2.9*   HGB 7.6* 8.1* 7.7*   HCT 23.2* 24.9* 23.9*   MCV 84.6 84.9 86.3   * 121* 100*     BMP/Mag:  Recent Labs     03/03/20  0330 03/04/20  0245 03/05/20  0331    137 135*   K 3.8 4.2 3.9    103 103   CO2 25 23 23   PHOS 2.4* 2.4* 2.9   BUN 36* 33* 34*   CREATININE 1.3 1.1 1.1   MG 1.90 2.40 2.20     LIVP:   No results for input(s): AST, ALT, LIPASE, BILIDIR, BILITOT, ALKPHOS in the last 72 hours. Invalid input(s): AMYLASE,  ALB  Coags:   No results for input(s): PROTIME, INR, APTT in the last 72 hours. Uric Acid   Recent Labs     03/03/20  0330 03/04/20  0245 03/05/20  0331   LABURIC 4.1 3.9 4.3     PATHOLOGY:  1. Left Inguinal & Left Acillary LN Bx 2/3/20: The following consultation results were received from [employer] of [address] [place] [address] MD Khalida [address] (Case  Number: [id number]  1. Lymph node, left inguinal, biopsy ([facility] 02/03/2020):       Angioimmunoblastic T-cell lymphoma. See Note.   2. Lymph node, left axilla, excision ([facility] intestinal metaplasia, dysplasia, or malignancy       - No morphologic evidence of helicobacter pylori infection       C. Esophagus, biopsy:       - Squamous mucosa with focal mild chronic inflammation       - No evidence of increased eosinophils, dysplasia, or malignancy       D. Ulcer, rectum, biopsy:       - Colonic mucosa with extensive ulceration and associated acute         inflammation       - No evidence of granulomas, dysplasia or malignancy       - No evidence of specific viral infection, as supported by negative         CMV and HSV immunohistochemical staining with appropriate controls    DIAGNOSTIC IMAGIN. CT Chest 20:  1. Extensive lymphadenopathy as well as suspected mild splenomegaly,   suspicious for lymphoproliferative disorder.  New areas of nodular soft   tissue density in the left breast may represent associated intramammary lymph   nodes but could also represent subcutaneous involvement. 2. Trace bilateral pleural effusions with bilateral lower lobe passive   atelectasis. 3. Mild cardiomegaly status post aortic valve replacement. 2. CT A/P 20: Increased retroperitoneal adenopathy compared to  raising the question of   underlying lymphoma.       Mild body wall anasarca and trace pelvic fluid suggesting fluid overload. PROBLEM LIST:             1. T-Cell Angioblastic NHL  2. MDS  3. NICM  4. Acute on Chronic HFrEF - LVEF 35-40% 20  5. MR  6. AS - S/p Aortic Valve replacement  7. PAF w/RVR  8. HTN  9. CKD III  10. Chronic Sinusitis  11. OA - h/o shoulder replacement  12. HLD    TREATMENT:            1. Brentuximab (Cycle 1 - 20)     ASSESSMENT AND PLAN:           1. T-cell angiommunoblastic lymphoma:    -Brentuximab 1.8mg/m2 iv x1 q 3-4 wks. Brentuximab Cycle 1, Day +7  - Cont Prednisone Taper (50mg daily started ):  Now down to 20 mg daily () taper by 5 mg every 3 days  - Family understands that any treatment we would offer is palliative in

## 2020-03-05 NOTE — PROGRESS NOTES
4 Eyes Admission Assessment     I agree as the admission nurse that 2 RN's have performed a thorough Head to Toe Skin Assessment on the patient. ALL assessment sites listed below have been assessed on admission. Areas assessed by both nurses: Caroline Bennett   [x]   Head, Face, and Ears   [x]   Shoulders, Back, and Chest  [x]   Arms, Elbows, and Hands   [x]   Coccyx, Sacrum, and Ischum  [x]   Legs, Feet, and Heels        Does the Patient have Skin Breakdown? Yes abrasion noted under nose. Blanchable excoriation to buttocks. Wound car following.         Rickey Prevention initiated:  Yes   Wound Care Orders initiated:  No      C nurse consulted for Pressure Injury (Stage 3,4, Unstageable, DTI, NWPT, and Complex wounds):  Yes      Nurse 1 eSignature: Electronically signed by Romulo Mukherjee RN on 3/5/20 at 6:42 PM    **SHARE this note so that the co-signing nurse is able to place an eSignature**    Nurse 2 eSignature: Electronically signed by Ld Rogel RN on 3/6/20 at 12:42 AM

## 2020-03-05 NOTE — PROGRESS NOTES
response to IV medications, documentation time, review and interpretation of laboratory and radiological data, review of nursing notes and old record review. This time excludes any time that may have been spent performing procedures for life threatening organ failure.     Elissa Razo MD

## 2020-03-06 PROBLEM — E44.0 MODERATE MALNUTRITION (HCC): Status: ACTIVE | Noted: 2020-01-01

## 2020-03-06 NOTE — PROGRESS NOTES
Physical Therapy    Facility/Department: [address] CANCER [place]  PT RE-EVALUATION/treatment note    NAME: Bora Padron  : 1940  MRN: [id number]    Date of Service: 3/6/2020    Discharge Recommendations:  Dian Hsu Schirmer scored a 10/24 on the AM-PAC short mobility form. Current research shows that an AM-PAC score of 17 or less is typically not associated with a discharge to the patient's home setting. Based on the patients AM-PAC score and their current functional mobility deficits, it is recommended that the patient have 3-5 sessions per week of Physical Therapy at d/c to increase the patients independence. Patient would benefit from continued therapy after discharge   PT Equipment Recommendations  Other: defer to next level of care    Assessment   Body structures, Functions, Activity limitations: Decreased functional mobility ; Decreased strength;Decreased endurance;Decreased balance  Assessment: pt demonstrated generalized weakness in bilat UE/LE with a decline in functional mobility in the the last week. Pt required 2 person assist for safe transfer and ambulation. Pt is below functional baseline and would benefit from continued skilled PT to maximize functional independence. Treatment Diagnosis: decreased functional mobility, impaired gait, decreased endurance  Prognosis: Good  PT Education: PT Role;Transfer Training;Goals;Plan of Care; Functional Mobility Training;General Safety  Barriers to Learning: none  REQUIRES PT FOLLOW UP: Yes  Activity Tolerance  Activity Tolerance: Patient limited by fatigue;Patient Tolerated treatment well       Patient Diagnosis(es): There were no encounter diagnoses.      has a past medical history of Aortic insufficiency, Atrial fibrillation ([address] Breast nodule, CAD (coronary artery disease), Cardiomyopathy ([address] CHF (congestive heart failure) ([address] Hyperlipidemia, Hypertension, Malignant lymphoma, non-Hodgkin's ([address] Microscopic hematuria, Mitral regurgitation, Nausea & vomiting, Osteoarthritis of knee, Pneumonia, Rotator cuff tear, and Ventricular ectopy. has a past surgical history that includes Inguinal hernia repair; other surgical history (8/2/12); Aortic valve replacement (8/3/2012); Cardiac surgery; Colonoscopy; Endoscopy, colon, diagnostic; Total knee arthroplasty (Left, 12/4/13); Upper gastrointestinal endoscopy (12/24/13); Total knee arthroplasty (Right, 2/3/14); joint replacement; cyst removal; Total shoulder arthroplasty (Right, 2/2/15); lymph node biopsy (Left, 2/3/2020); Axillary Surgery (Left, 2/9/2020); Upper gastrointestinal endoscopy (N/A, 2/10/2020); and sigmoidoscopy (N/A, 2/10/2020). Restrictions  Restrictions/Precautions  Restrictions/Precautions: Fall Risk  Required Braces or Orthoses?: No  Position Activity Restriction  Other position/activity restrictions: up with assist  Vision/Hearing  Vision Exceptions: Wears glasses for reading  Hearing Exceptions: Hard of hearing/hearing concerns     Subjective  General  Chart Reviewed: Yes  Additional Pertinent Hx: Admit 2/21 from [facility] where he was originally admitted with fever and hypotension; new diagnosis of lymphoma, a-fib with RVR, GI bleed with anemia; transfer [facility] ICU to [facility] 2/24; PMHx: rotator cuff tear, PNA, OA, non-hodgkin's lymphoma, HTN, CHF, cardiomyopathy, CAD, a-fib, aortic insufficiency, breast nodule - lumpectomy, B TKA, R total shoulder arthroplasty, aortic valve replacement  Family / Caregiver Present: Yes(daughter)  Diagnosis: lymphoma  Follows Commands: Within Functional Limits  General Comment  Comments: pt supine in bed on arrival  Subjective  Subjective: pt agreeable to PT, no c/o pain. Reports feeling tired. Nursing states that pt is ok to be seen by therapy today.   Pain Screening  Patient Currently in Pain: Denies          Orientation  Orientation  Overall Orientation Status: Within Functional Limits  Social/Functional History  Social/Functional

## 2020-03-06 NOTE — CARE COORDINATION
Case Management Assessment           Daily Note                 Date/ Time of Note: 3/6/2020 11:21 AM         Note completed by: Donold Bamberger    Patient Name: Meeta Urias  YOB: 1940    Diagnosis:Lymphoma, unspecified body region, unspecified lymphoma type [facility]) [C85.90]  Patient Admission Status: Inpatient    Date of Admission:2/21/2020  6:23 PM Length of Stay: 15 GLOS:        Current Plan of Care: TBD  ________________________________________________________________________________________  PT AM-PAC: 17 / 24 per last evaluation on: end of February    OT AM-PAC: 16 / 24 per last evaluation on: end of February    DME Needs for discharge: defer to next level of care    ________________________________________________________________________________________  Discharge Plan: To Be Determined DUE TO: family meeting Monday    Tentative discharge date: tbd, next week? Current barriers to discharge: medical improvement    Referrals completed: Not Applicable    Resources/ information provided: SNF List  ________________________________________________________________________________________  Case Management Notes: Family is in agreement that patient is likely too weak for ARU. Their SNF preference is [facility]. Ana Alva from Palliative Care spoke to patient and daughter at bedside. She informed that Monday at [address] [place] [address] would be best for a family meeting to discuss the patient's prognosis/treatment goals. Ankit Hopson and his family were provided with choice of provider; he and his family are in agreement with the discharge plan.     Care Transition Patient: Yes    Donold Bamberger, [facility]  Case Management Department  Ph: [phone]  Fax: [phone]

## 2020-03-06 NOTE — PROGRESS NOTES
[facility] Daily Progress Note      Admit Date:  2/21/2020    Subjective:  Mr. Katie Beavers was seen and examined. F/U Lymphoma/CHF/cardiomyopathy/AVR/AS/VT/afib. No complaints this am.   Denies  cp/sob. Restless night. Objective:   BP 84/70   Pulse 121   Temp 98.1 °F (36.7 °C) (Oral)   Resp 20   Ht 6' 1\" (1.854 m)   Wt 194 lb (88 kg)   SpO2 94%   BMI 25.60 kg/m²       Intake/Output Summary (Last 24 hours) at 3/6/2020 1456  Last data filed at 3/6/2020 1300  Gross per 24 hour   Intake 3882.51 ml   Output 850 ml   Net 3032.51 ml       TELEMETRY: A flutter    Physical Exam:  General:  Awake, alert, NAD  Skin:  Warm and dry  Neck:  JVP difficult  Chest: decreased BS, respiration normal  Cardiovascular:  RRR S1S2, 2/6 syst m  Abdomen:  Soft nontender  Extremities:  + edema upper extremities, + edema lower.      Medications:    melatonin  3 mg Oral Nightly    digoxin  250 mcg Intravenous Q8H    [START ON 3/7/2020] digoxin  125 mcg Oral Daily    digoxin        mupirocin   Topical BID    tbo-filgrastim  300 mcg Subcutaneous QPM    predniSONE  15 mg Oral Daily    [START ON 3/9/2020] predniSONE  10 mg Oral Daily    [START ON 3/12/2020] predniSONE  5 mg Oral Daily    fluconazole  200 mg Oral Daily    vancomycin  125 mg Oral Q6H    amiodarone bolus  150 mg Intravenous Once    valACYclovir  500 mg Oral BID    insulin lispro  0-12 Units Subcutaneous TID WC    insulin lispro  0-6 Units Subcutaneous Nightly    insulin lispro  5 Units Subcutaneous TID WC    Venelex   Topical BID    enoxaparin  80 mg Subcutaneous BID    aspirin  81 mg Oral Daily    atorvastatin  10 mg Oral Daily    pantoprazole  40 mg Oral QAM AC    mexiletine  200 mg Oral 3 times per day    sodium chloride flush  10 mL Intravenous 2 times per day      sodium chloride 50 mL/hr at 03/06/20 1143    amiodarone 450mg/250ml D5W infusion 1 mg/min (03/06/20 1050)    dextrose       potassium chloride, magnesium sulfate, alteplase, hypertension 09/06/2019    Finger amputation, traumatic, 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 Primary localized osteoarthrosis, pelvic region and thigh 05/04/2015    H/O total shoulder replacement 05/04/2015    Hyponatremia 04/22/2015    Primary localized osteoarthrosis of shoulder region 02/02/2015    Encounter for medication counseling 01/13/2014    Arthritis of knee 12/05/2013    Acute on chronic combined systolic and diastolic congestive heart failure ([facility] 03/17/2013    S/P AVR (aortic valve replacement) 09/26/2012    Nausea 07/29/2012    Cardiomyopathy ([facility] 07/29/2012    Nonrheumatic aortic valve insufficiency 07/29/2012    Mitral regurgitation 07/29/2012    Microscopic hematuria 07/29/2012     Imp: Lymphoma/CHF/cardiomyop/AVR/AS/VT/Afib    Plan:  Rhythm tachy. Continues  flutter. EP to address. Denies chest pain/sob. On RA. Cr stable. Edema looks better.      Core Measures:  · Discharge instructions:   · LVEF documented:   · ACEI for LV dysfunction:   · Smoking Cessation:    Srikanth Chi MD 3/6/2020 2:56 PM

## 2020-03-06 NOTE — PROGRESS NOTES
4 Eyes Admission Assessment     I agree as the admission nurse that 2 RN's have performed a thorough Head to Toe Skin Assessment on the patient. ALL assessment sites listed below have been assessed on admission. Areas assessed by both nurses: Juany Marin/Denisse Amaya  [x]   Head, Face, and Ears   [x]   Shoulders, Back, and Chest  [x]   Arms, Elbows, and Hands   [x]   Coccyx, Sacrum, and Ischum  [x]   Legs, Feet, and Heels        Does the Patient have Skin Breakdown?  Abrasion under nose (WOCN following), redness to buttocks (blanchable)         Rickey Prevention initiated:  YES  Wound Care Orders initiated:  NO      WOC nurse consulted for Pressure Injury (Stage 3,4, Unstageable, DTI, NWPT, and Complex wounds):  No    Nurse 1 eSignature: Electronically signed by Sascha Malik RN on 3/6/20 at 4:41 AM    **SHARE this note so that the co-signing nurse is able to place an eSignature**    Nurse 2 eSignature: Electronically signed by Roger Davis RN on 3/6/20 at 7:44 AM

## 2020-03-06 NOTE — PLAN OF CARE
Problem: Nutrition  Goal: Optimal nutrition therapy  Outcome: Ongoing  Note:   Nutrition Problem: Inadequate oral intake  Intervention: Food and/or Nutrient Delivery: Start Calorie Count, Continue current diet, Modify current ONS  Nutritional Goals: Pt to meet >50% of nutritional requirements from diet and ONS

## 2020-03-06 NOTE — PROGRESS NOTES
NUTRITION ASSESSMENT  Admission Date: 2/21/2020     Type and Reason for Visit: Consult, Reassess    NUTRITION RECOMMENDATIONS:   1. Calorie count over weekend to determine pt ability to improve PO intake. 2. Adjusting ONS - Adding Glucerna BID; pt instructed to consume between meals to enhance pt ability to take additional PO at meal times. 3. PO Diet: Continue with diet texture; dysphagia III (soft and bite-sized) and thin liquids per SLP. If BS remain > 200 mg/dL, consider addition of CCC diet modifier if decrease in steroids/change to insulin do not improve BS control. 2. ONS: Modify ONS to Ensure High Protein/Low Calorie that has lower carbs to aid w/improving BS control. NUTRITION ASSESSMENT:  RD consult for poor po intake;appetite. BS remains elevated; pt w/added ONS. However pt now w/cdiff and infection not aiding in pt improving PO nutrition. RD notes palliative care following to determine best plan of care- pt to have family meeting Mon to review overall goals of care and pt outcomes. Pt PO intake per dietary recall reveals pt eating regular meals but decreased variety and amount of foods. Pt has stockpile of ONS- pt consuming 1 per day. RD adjusting ONS, adding kcal ct to monitor pt ability to improve PO.      MALNUTRITION ASSESSMENT  Context: Acute illness or injury   Malnutrition Status: No malnutrition  Findings of the 6 clinical characteristics of malnutrition (Minimum of 2 out of 6 clinical characteristics is required to make the diagnosis of moderate or severe Protein Calorie Malnutrition based on AND/ASPEN Guidelines):  Energy Intake %: Less than or equal to 75% of estimated energy requirement  Energy Intake Time: Greater than or equal to 5 days  Interpretation of Weight Loss %: Unable to assess(wt increase d/t fluids )  Body Fat Status: No significant subcutaneous fat loss  Muscle Mass Status: No significant muscle mass loss  Fluid Accumulation Status: No significant fluid accumulation  Reduced  Strength: Not measured    NUTRITION DIAGNOSIS   Problem: Inadequate Oral Intake  Etiology: Insufficient energy/nutrient consumption  Signs & Symptoms: need for ONS and Intake 51-75%    NUTRITION INTERVENTION  Food and/or Nutrient Delivery:Continue Current diet  or Modify Current ONS   Nutrition education/counseling/coordination of care: Continue Inpatient Monitoring     NUTRITION MONITORING & EVALUATION:  Evaluation:Progressing towards goal   Goals: Pt to meet >50% of nutritional requirements from diet and ONS  Monitoring: Meal Intake , Pertinent Labs  or Supplement Intake      OBJECTIVE DATA:  · Nutrition-Focused Physical Findings:  +3 pitting edema BUE/BLE; + loose bm this AM   · Wounds Surgical Wound      Past Medical History:   Diagnosis Date    Aortic insufficiency     Atrial fibrillation (HCC)     cardioversion 8/10/12    Breast nodule 8/2012  right    excision-lumpectomy 8/2012    CAD (coronary artery disease)     Cardiomyopathy ([facility] [address] 8/2012    EF 20 %    CHF (congestive heart failure) (HCC)     Hyperlipidemia     Hypertension     Malignant lymphoma, non-Hodgkin's (HCC)     Microscopic hematuria 7/29/2012    Mitral regurgitation     Nausea & vomiting 7/29/2012    Osteoarthritis of knee     bilateral knees    Pneumonia     Rotator cuff tear 7/2/2013    Ventricular ectopy 7/29/2012        ANTHROPOMETRICS  Current Height: 6' 1\" (185.4 cm)  Current Weight: 194 lb (88 kg)    Admission weight: 177 lb 14.6 oz (80.7 kg)    Ideal Bodyweight 184 lb  Usual Bodyweight 180-190 lb       BMI BMI (Calculated): 25.6    Wt Readings from Last 50 Encounters:   02/21/20 183 lb 13.8 oz (83.4 kg)   02/21/20 184 lb 15.5 oz (83.9 kg)   01/28/20 178 lb (80.7 kg)   01/23/20 180 lb 6.4 oz (81.8 kg)   01/13/20 174 lb (78.9 kg)   01/02/20 181 lb (82.1 kg)   12/16/19 185 lb (83.9 kg)   12/10/19 189 lb (85.7 kg)   11/27/19 196 lb (88.9 kg)   11/07/19 193 lb (87.5 kg)   10/22/19 193 lb (87.5 kg) 10/08/19 192 lb (87.1 kg)   09/11/19 186 lb (84.4 kg)   08/07/19 188 lb 0.8 oz (85.3 kg)   08/02/19 188 lb (85.3 kg)   07/31/19 186 lb (84.4 kg)   07/18/19 189 lb (85.7 kg)   06/05/19 190 lb (86.2 kg)   03/25/19 188 lb (85.3 kg)       COMPARATIVE STANDARDS  Estimated Total Kcals/Day : 25-30  Current Bodyweight (83 kg) 5823-4891 kcal    Estimated Total Protein (g/day) : 1.2-1.4 Current Bodyweight (83 kg) 100-116 g/day  Estimated Daily Total Fluid (ml/day): 3850-4379 mL per day     Food / Nutrition-Related History  Pre-Admission / Home Diet:  Pre-Admission/Home Diet: General   Home Supplements / Herbals:    none noted  Food Restrictions / Cultural Requests:    none noted    Diet Orders / Intake / Nutrition Support  Current diet/supplement order: DIET GENERAL; No Drinking Straw  Dietary Nutrition Supplements: Diabetic Oral Supplement     NSG Recorded PO:   PO Fluids P.O.: 120 mL(Water)  PO Meals PO Meals Eaten (%): 76 - 100%(Oatmeal, apple sauce)   PO Intake: 26-50% and 51-75%  PO Supplement: Low Calorie/High Protein    PO Supplement Intake: 51-75%      NUTRITION RISK LEVEL: Risk Level:  Moderate    Zuri Kerr RD, ADIA  [place]:  [phone]  Office:  [phone]

## 2020-03-06 NOTE — PROGRESS NOTES
hours) at 3/6/2020 0747  Last data filed at 3/6/2020 0700  Gross per 24 hour   Intake 3702.51 ml   Output 1100 ml   Net 2602.51 ml       Vital Signs:  BP 97/79   Pulse 125   Temp 97.9 °F (36.6 °C) (Temporal)   Resp 24   Ht 6' 1\" (1.854 m)   Wt 194 lb (88 kg)   SpO2 96%   BMI 25.60 kg/m²     Weight:    Wt Readings from Last 3 Encounters:   03/06/20 194 lb (88 kg)   02/21/20 184 lb 15.5 oz (83.9 kg)   01/28/20 178 lb (80.7 kg)       General: Awake, alert and oriented, elderly and frail   HEENT: normocephalic, PERRL, no scleral erythema or icterus, Oral mucosa moist and intact, throat clear. Increasing lesion right nares  NECK: supple without palpable adenopathy  BACK: Straight negative CVAT  SKIN: warm dry and intact without lesions rashes or masses  CHEST: Clear bilaterally today  CV: tachycardic and irregluar  ABD: NT ND normoactive BS, no palpable masses or hepatosplenomegaly  EXTREMITIES: with 1 + edema in all 4 extremities, denies calf tenderness  NEURO: CN II - XII grossly intact  CATHETER: DL LUE PICC (1/29/20) - CDI    Data    CBC:   Recent Labs     03/04/20  0245 03/05/20  0331 03/06/20  0400   WBC 4.3 2.9* 1.9*   HGB 8.1* 7.7* 7.4*   HCT 24.9* 23.9* 23.2*   MCV 84.9 86.3 86.5   * 100* 84*     BMP/Mag:  Recent Labs     03/04/20  0245 03/05/20  0331 03/06/20  0400    135* 136   K 4.2 3.9 4.5    103 105   CO2 23 23 20*   PHOS 2.4* 2.9 2.7   BUN 33* 34* 30*   CREATININE 1.1 1.1 1.0   MG 2.40 2.20 2.10     LIVP:   No results for input(s): AST, ALT, LIPASE, BILIDIR, BILITOT, ALKPHOS in the last 72 hours. Invalid input(s): AMYLASE,  ALB  Coags:   No results for input(s): PROTIME, INR, APTT in the last 72 hours. Uric Acid   Recent Labs     03/04/20  0245 03/05/20  0331 03/06/20  0400   LABURIC 3.9 4.3 4.1     PATHOLOGY:  1. Left Inguinal & Left Acillary LN Bx 2/3/20:   The following consultation results were received from [facility] [place], with mild chronic inactive inflammation       - No evidence of intestinal metaplasia, dysplasia, or malignancy       B. Stomach, biopsy:       - Gastric mucosa with no pathologic change       - No evidence of intestinal metaplasia, dysplasia, or malignancy       - No morphologic evidence of helicobacter pylori infection       C. Esophagus, biopsy:       - Squamous mucosa with focal mild chronic inflammation       - No evidence of increased eosinophils, dysplasia, or malignancy       D. Ulcer, rectum, biopsy:       - Colonic mucosa with extensive ulceration and associated acute         inflammation       - No evidence of granulomas, dysplasia or malignancy       - No evidence of specific viral infection, as supported by negative         CMV and HSV immunohistochemical staining with appropriate controls    DIAGNOSTIC IMAGIN. CT Chest 20:  1. Extensive lymphadenopathy as well as suspected mild splenomegaly,   suspicious for lymphoproliferative disorder.  New areas of nodular soft   tissue density in the left breast may represent associated intramammary lymph   nodes but could also represent subcutaneous involvement. 2. Trace bilateral pleural effusions with bilateral lower lobe passive   atelectasis. 3. Mild cardiomegaly status post aortic valve replacement. 2. CT A/P 20: Increased retroperitoneal adenopathy compared to  raising the question of   underlying lymphoma.       Mild body wall anasarca and trace pelvic fluid suggesting fluid overload. PROBLEM LIST:             1. T-Cell Angioblastic NHL  2. MDS  3. NICM  4. Acute on Chronic HFrEF - LVEF 35-40% 20  5. MR  6. AS - S/p Aortic Valve replacement  7. PAF w/RVR  8. HTN  9. CKD III  10. Chronic Sinusitis  11. OA - h/o shoulder replacement  12. HLD    TREATMENT:            1. Brentuximab (Cycle 1 - 2)     ASSESSMENT AND PLAN:           1. T-cell angiommunoblastic lymphoma:    -Brentuximab 1.8mg/m2 iv x1 q 3-4 wks. Brentuximab Cycle 1, Day +8  - Cont Prednisone Taper (50mg daily started 2/22): Taper by 5 mg every 3 days until off (last dose 3/14)  - Family understands that any treatment we would offer is palliative in nature only, however  and wife are adamant that he remain a full code  - Palliative Care following    2. MDS: originally diagnosed May, 2017  - No current tx    3. ID: C.diff colitis w/septic & cardiogenic shock. Afebrile, but with hypotension and +c.diff  - Blood Cxs 3/3 - NGTD  - Lip lesion viral & bacterial cx's 3/4 - pending  - Cont valtrex ppx  - C. Diff Colitis:  - Cont PO Vancomycin Day +4 (3/3/20)  - Cxs from right nares 3/4/20 - pending. Bactroban empirically 3/5/20  - Sepsis:  - IV Abx discontinued 3/3 with source identification of c.diff and concern IV abx could exacerbate this  - Off pressors since 3/5 evening     Abx Hx:  Merrem 3/2-3/3  Vancomycin 3/2-3/3    4. Heme: Worsening pancytopenia 2/2 MDS +/- NHL  - Transfuse for Hgb < 7 and Platelets < 10 K  - No transfusion today   - G-CSF daily beginning 3/5/20    5. Metabolic / CKD III: Baseline SCr 1.3-1.4. +Fluid overload  - Replace Potassium and Magnesium per cardiac parameters  - Risk for Tumor Lysis Syndrome: No evidence at this time  - Cont Allopurinol 200 mg daily  - Lower IVF's to 50 ml/hr. Match IVF's with his diarrhea out put.    6. Cardiac: Significant h/o NICM, valvular disease, PAF w/RVR, acute on chronic HFrEF. Recently had runs of wide-complex tachycardia requiring DC CV  - Cardiology consulted & following - appreciate assistance  - Cont ASA 81mg daily    - VTach - s/p dc cardioversion 2/18/20 - ICD implantation is contraindicated given his poor prognosis and life expectancy of <12 mos  - Cont amiodarone + mexiletine  - Intermittent runs of V. Tach 3/3 - amiodarone gtt increased    - PAF w/RVR - Recurrent w/resultant cardiogenic shock  - S/p lopressor 2.5mg IVP, Digoxin 250mcg IV x1 3/2  - Cont amiodarone gtt (3/2/20)  - Cont AC

## 2020-03-06 NOTE — PROGRESS NOTES
4 Eyes Admission Assessment     I agree as the admission nurse that 2 RN's have performed a thorough Head to Toe Skin Assessment on the patient. ALL assessment sites listed below have been assessed on admission. Areas assessed by both nurses: Agusto Marin/Denisse Amaya  [x]   Head, Face, and Ears   [x]   Shoulders, Back, and Chest  [x]   Arms, Elbows, and Hands   [x]   Coccyx, Sacrum, and Ischum  [x]   Legs, Feet, and Heels        Does the Patient have Skin Breakdown?  Abrasion under nose (WOCN following), redness to buttocks (blanchable)         Rickey Prevention initiated:  YES  Wound Care Orders initiated:  NO      WOC nurse consulted for Pressure Injury (Stage 3,4, Unstageable, DTI, NWPT, and Complex wounds):  No    Nurse 1 eSignature: Electronically signed by Tatiana Johnson RN on 3/6/20 at 5:00 PM    **SHARE this note so that the co-signing nurse is able to place an eSignature**    Nurse 2 eSignature: Electronically signed by Dipak Carr RN on 3/6/20 at 10:45 PM

## 2020-03-06 NOTE — PROGRESS NOTES
Pulmonary/Critical Care Medicine  Progress Note      Hospital Day: 15                                                      Admit Date: 2/21/2020                                     PCP: Melisa Castillo MD      CC:  Shock                    Daily Plan:  3/6/2020    Subjective: Patient remains afebrile and on room air. Heart rate is around 120 with a blood pressure of 97/76. Victor Manuel-Synephrine has been off since last night at 8 PM. Diarrhea has not improved per patient but there is not much recorded on output. No lightheadedness, CP, dyspnea or cough. Medications:    Scheduled Meds:   mupirocin   Topical BID    tbo-filgrastim  300 mcg Subcutaneous QPM    predniSONE  15 mg Oral Daily    [START ON 3/9/2020] predniSONE  10 mg Oral Daily    [START ON 3/12/2020] predniSONE  5 mg Oral Daily    fluconazole  200 mg Oral Daily    vancomycin  125 mg Oral Q6H    amiodarone bolus  150 mg Intravenous Once    valACYclovir  500 mg Oral BID    insulin lispro  0-12 Units Subcutaneous TID WC    insulin lispro  0-6 Units Subcutaneous Nightly    insulin lispro  5 Units Subcutaneous TID WC    Venelex   Topical BID    enoxaparin  80 mg Subcutaneous BID    aspirin  81 mg Oral Daily    atorvastatin  10 mg Oral Daily    pantoprazole  40 mg Oral QAM AC    mexiletine  200 mg Oral 3 times per day    sodium chloride flush  10 mL Intravenous 2 times per day      Continuous Infusions:   phenylephrine (VICTOR MANUEL-SYNEPHRINE) 50mg/250mL infusion Stopped (03/05/20 2016)    sodium chloride 100 mL/hr at 03/06/20 0034    amiodarone 450mg/250ml D5W infusion 1 mg/min (03/06/20 0358)    dextrose       PRN Meds:potassium chloride, magnesium sulfate, alteplase, prochlorperazine **OR** prochlorperazine, ondansetron **OR** ondansetron, glucose, dextrose, glucagon (rDNA), dextrose, lidocaine, sodium chloride flush, acetaminophen, acetaminophen, polyethylene glycol, magic (miracle) mouthwash with nystatin    Allergies:    Allergies

## 2020-03-06 NOTE — PLAN OF CARE
Problem: Risk for Impaired Skin Integrity  Goal: Tissue integrity - skin and mucous membranes  3/5/2020 1928 by Jay Cooley RN  Outcome: Ongoing     Problem: Falls - Risk of:  Goal: Will remain free from falls  3/5/2020 1928 by Jay Cooley RN  Outcome: Ongoing     Problem: Venous Thromboembolism:  Goal: Absence of signs or symptoms of impaired coagulation  Outcome: Ongoing     Problem: Bleeding:  Goal: Will show no signs and symptoms of excessive bleeding  3/5/2020 1928 by Jay Cooley RN  Outcome: Ongoing     Problem: Infection - Central Venous Catheter-Associated Bloodstream Infection:  Goal: Will show no infection signs and symptoms  3/5/2020 1928 by Jay Cooley RN  Outcome: Ongoing     Problem: Serum Glucose Level - Abnormal:  Goal: Ability to maintain appropriate glucose levels will improve to within specified parameters  3/5/2020 1928 by Jay Cooley RN  Outcome: Ongoing

## 2020-03-06 NOTE — PROGRESS NOTES
CC: VT, AF  HPI:     S: Denies cp, sob, or palpitations    Tele: A flutter      O:  Physical Exam:  BP 89/70   Pulse 116   Temp 97.6 °F (36.4 °C) (Oral)   Resp 21   Ht 6' 1\" (1.854 m)   Wt 194 lb (88 kg)   SpO2 100%   BMI 25.60 kg/m²    General (appearance):  No acute distress  Eyes: anicteric   Neck: soft, No JVD  Ears/Nose/Mouth/Thorat: No cyanosis  CV: Irreg, tachy   Respiratory:  Clear, no wheeze, normal effort  GI: soft, non-tender, non-distended  Skin: Warm, dry. No rashes  Neuro/Psych: Alert and cooperative. Appropriate behavior  Ext:  No c/c. 1+ edema  Pulses:  2+ radial     I.O's= +3.3 L     Weight  Admission: Weight: 177 lb 14.6 oz (80.7 kg)   Today: Weight: 194 lb (88 kg)    CBC:   Recent Labs     03/04/20  0245 03/05/20  0331 03/06/20  0400   WBC 4.3 2.9* 1.9*   HGB 8.1* 7.7* 7.4*   HCT 24.9* 23.9* 23.2*   MCV 84.9 86.3 86.5   * 100* 84*     BMP:   Recent Labs     03/04/20  0245 03/05/20  0331 03/06/20  0400    135* 136   K 4.2 3.9 4.5    103 105   CO2 23 23 20*   PHOS 2.4* 2.9 2.7   BUN 33* 34* 30*   CREATININE 1.1 1.1 1.0     Mag:   Lab Results   Component Value Date    MG 2.10 03/06/2020     Pro-BNP:   Lab Results   Component Value Date    PROBNP 7,910 02/21/2020    PROBNP 3,707 02/13/2020    PROBNP 6,971 01/28/2020     Imaging:    3/4/2020 ECG: Aflutter 2:1.     3/2/2020 CXR:     Small left effusion. 2/20/2020 Echo:   -Overall left ventricular systolic function is moderately depressed .   -Ejection fraction is visually estimated to be 35-40 %.  E/e'= 8.4 cm.   -Mild concentric left ventricular hypertrophy is present.   -There is moderate hypokinesis of the apical and inferior walls.   -Indeterminate diastolic function.   -The tip of the anterior leaflet appears to be slightly bulbous, and is   suggestive of some myxomatous change and minimal prolapse.   -A bioprosthetic artificial aortic valve appears well seated with a maximum   velocity of 3.84m/s and a mean

## 2020-03-06 NOTE — PLAN OF CARE
this time. Patient verbalizes understanding of all instructions. Will continue to assess and implement POC. Call light within reach and hourly rounding in place. Problem: Infection - Central Venous Catheter-Associated Bloodstream Infection:  Goal: Will show no infection signs and symptoms  Description  Will show no infection signs and symptoms  Outcome: Ongoing  Note:   CVC site remains free of signs/symptoms of infection. No drainage, edema, erythema, pain, or warmth noted at site. Dressing changes continue per protocol and on an as needed basis - see flowsheet. Compliant with [facility] Bath Protocol:  Performed CHG bath today per [facility] protocol utilizing Bed bath with CHG wipes. CVC site cleansed with CHG wipe over dressing, skin surrounding dressing, and first 6\" of IV tubing. Pt tolerated well. Continued to encourage daily CHG bathing per [facility] protocol. Problem: Fluid Volume - Imbalance:  Goal: Absence of imbalanced fluid volume signs and symptoms  Description  Absence of imbalanced fluid volume signs and symptoms  Outcome: Ongoing  Note:   Pt with CHF. Pt continues with edema and skin weeping. Lungs are clear to diminished. Will continue to monitor. Problem: Pain:  Goal: Pain level will decrease  Description  Pain level will decrease  Outcome: Ongoing  Note:   No complaints of pain noted this shift. Will continue to monitor. Problem: Serum Glucose Level - Abnormal:  Goal: Ability to maintain appropriate glucose levels will improve to within specified parameters  Description  Ability to maintain appropriate glucose levels will improve to within specified parameters  Outcome: Ongoing  Note:   Pt currently on Acchchecks ACHS. No s/s of hyper/hypoglycemia noted at this time. Will continue to monitor.

## 2020-03-06 NOTE — PROGRESS NOTES
Palliative Medicine Progress Note    Admit Date: 2/21/2020  Hospital Day:  Hospital Day: 15     CC: Insomnia, weakness  HPI: HPI PMH of Afib (on coumadin), aortic stenosis (s/p porcine AVR), CHF (EF 35%), MDS, chronic leukopenia, and CAD, [facility] after a 20 day admission at an outside hospital for sepsis and Afib and new diagnosis of T-cell lymphoma. He has received palliative treatment with brentuximab. Has had trouble with Afib with RVR and hypotension, concern for cardiogenic shock. Off pressors now. Recommendations:   Pt is up in the chair, c/o fatigue and not sleeping well at night. Daughter Taya Lentz at the bedside. Discussed having a meeting with the attending physician Monday morning and Taya Lentz says they will be here at 5 and she'll call if they need to change the time. Taya Lentz will notify her mother and sister. 1. Goals of Care/Advanced Care planning/Code status: Full code. Pt hopes to get stronger at SNF so that he can return home to an acceptable quality of life. 2. Pain: pt denies pain and does not appear to be in distress. 3. SOB: denies sob and is breathing comfortably on room air. 4. Insomnia: pt c/o not sleeping well. We discussed nonpharmacologic measures like turning off TV, staying awake during the day. Will try melatonin qhs as well. 5. Generalized weakness: pt c/o severe weakness and feeling tired after getting up to the chair. He has worked with PT/OT and scored 17 and 16 on AMPAC scale. Considering ARU vs SNF.   6. Disposition: pt hopes to go to SNF when medically ready    Subjective:     Scheduled Meds:   melatonin  3 mg Oral Nightly    mupirocin   Topical BID    tbo-filgrastim  300 mcg Subcutaneous QPM    predniSONE  15 mg Oral Daily    [START ON 3/9/2020] predniSONE  10 mg Oral Daily    [START ON 3/12/2020] predniSONE  5 mg Oral Daily    fluconazole  200 mg Oral Daily    vancomycin  125 mg Oral Q6H    amiodarone bolus  150 mg Intravenous Once    valACYclovir 500 mg Oral BID    insulin lispro  0-12 Units Subcutaneous TID WC    insulin lispro  0-6 Units Subcutaneous Nightly    insulin lispro  5 Units Subcutaneous TID WC    Venelex   Topical BID    enoxaparin  80 mg Subcutaneous BID    aspirin  81 mg Oral Daily    atorvastatin  10 mg Oral Daily    pantoprazole  40 mg Oral QAM AC    mexiletine  200 mg Oral 3 times per day    sodium chloride flush  10 mL Intravenous 2 times per day       Continuous Infusions:   sodium chloride 50 mL/hr at 03/06/20 1051    amiodarone 450mg/250ml D5W infusion 1 mg/min (03/06/20 1050)    dextrose         PRN Meds:potassium chloride, magnesium sulfate, alteplase, prochlorperazine **OR** prochlorperazine, ondansetron **OR** ondansetron, glucose, dextrose, glucagon (rDNA), dextrose, lidocaine, sodium chloride flush, acetaminophen, acetaminophen, polyethylene glycol, magic (miracle) mouthwash with nystatin    Review of Systems -   General ROS: positive for  - fatigue, insomnia  Psychological ROS: negative for - depression, anxiety  ENT ROS: negative  Hematological and Lymphatic ROS: positive for - blood transfusions and fatigue  Respiratory ROS: no cough, shortness of breath, or wheezing  Cardiovascular ROS: no chest pain or dyspnea on exertion  Gastrointestinal ROS: positive for - appetite loss  Genito-Urinary ROS: no dysuria, trouble voiding, or hematuria  Musculoskeletal ROS: positive for - muscular weakness  Neurological ROS: no TIA or stroke symptoms     Performance status 30%    Objective:     Patient Vitals for the past 8 hrs:   BP Temp Temp src Pulse Resp SpO2   03/06/20 0900 89/70 -- -- 116 21 --   03/06/20 0800 97/76 97.6 °F (36.4 °C) Oral 115 18 100 %   03/06/20 0700 -- -- -- 125 24 --   03/06/20 0645 97/79 -- -- 121 17 --   03/06/20 0630 86/71 -- -- 124 19 --   03/06/20 0615 96/84 -- -- 121 15 --   03/06/20 0600 89/77 -- -- 123 19 --   03/06/20 0545 100/83 -- -- 123 20 --   03/06/20 0530 101/82 -- -- 120 18 --   03/06/20

## 2020-03-06 NOTE — PLAN OF CARE
Thrombocytopenia Precautions in place. Patient showing no signs or symptoms of active bleeding. Transfusion not indicated at this time. Patient verbalizes understanding of all instructions. Will continue to assess and implement POC. Call light within reach and hourly rounding in place. Problem: Infection - Central Venous Catheter-Associated Bloodstream Infection:  Goal: Will show no infection signs and symptoms  Description  Will show no infection signs and symptoms  Outcome: Ongoing   CVC site remains free of signs/symptoms of infection. No drainage, edema, erythema, pain, or warmth noted at site. Dressing changes continue per protocol and on an as needed basis - see flowsheet. Performed CHG bath today per [facility] protocol utilizing Bed bath with CHG wipes. CVC site cleansed with CHG wipe over dressing, skin surrounding dressing, and first 6\" of IV tubing. Pt tolerated well. Continued to encourage daily CHG bathing per [facility] protocol. Problem: Bowel Function - Altered:  Goal: Bowel elimination is within specified parameters  Description  Bowel elimination is within specified parameters  Outcome: Ongoing  Pt with multiple small soft stools this shift. Treatment for C. Diff with PO Vancomycin continued. Will continue to monitor. Problem: Cardiac Output - Decreased:  Goal: Hemodynamic stability will improve  Description  Hemodynamic stability will improve  Outcome: Ongoing   Pt has been off vasopressors since very early this AM. BP with MAP > than 65. Will continue to monitor. Problem: Fluid Volume - Imbalance:  Goal: Absence of imbalanced fluid volume signs and symptoms  Description  Absence of imbalanced fluid volume signs and symptoms  Outcome: Ongoing  Pt continues to be very swollen. Extremities elevated with applicable. Will continue to monitor.

## 2020-03-07 NOTE — PROGRESS NOTES
[address] Daily Progress Note      Admit Date:  2/21/2020    Subjective:  Mr. Corinna Brian was seen and examined. F/U Lymphoma/CHF/cardiomyopathy/AVR/AS/VT/afib. No complaints this am.   Denies  cp/sob. Got some rest       Objective:   /87   Pulse 126   Temp 98 °F (36.7 °C) (Oral)   Resp 16   Ht 6' 1\" (1.854 m)   Wt 194 lb (88 kg)   SpO2 98%   BMI 25.60 kg/m²       Intake/Output Summary (Last 24 hours) at 3/7/2020 2249  Last data filed at 3/7/2020 0602  Gross per 24 hour   Intake 2857 ml   Output 650 ml   Net 2207 ml       TELEMETRY: A flutter    Physical Exam:  General:  Awake, alert, NAD  Skin:  Warm and dry  Neck:  JVP difficult  Chest: decreased BS, respiration normal  Cardiovascular:  RRR S1S2, 2/6 syst m  Abdomen:  Soft nontender  Extremities:  + edema upper extremities, + edema lower.      Medications:    melatonin  3 mg Oral Nightly    digoxin  125 mcg Oral Daily    mupirocin   Topical BID    tbo-filgrastim  300 mcg Subcutaneous QPM    predniSONE  15 mg Oral Daily    [START ON 3/9/2020] predniSONE  10 mg Oral Daily    [START ON 3/12/2020] predniSONE  5 mg Oral Daily    fluconazole  200 mg Oral Daily    vancomycin  125 mg Oral Q6H    amiodarone bolus  150 mg Intravenous Once    valACYclovir  500 mg Oral BID    insulin lispro  0-12 Units Subcutaneous TID WC    insulin lispro  0-6 Units Subcutaneous Nightly    insulin lispro  5 Units Subcutaneous TID WC    Venelex   Topical BID    enoxaparin  80 mg Subcutaneous BID    aspirin  81 mg Oral Daily    atorvastatin  10 mg Oral Daily    pantoprazole  40 mg Oral QAM AC    mexiletine  200 mg Oral 3 times per day    sodium chloride flush  10 mL Intravenous 2 times per day      phenylephrine (VICTOR MANUEL-SYNEPHRINE) 50mg/250mL infusion      sodium chloride 50 mL/hr at 03/07/20 0803    amiodarone 450mg/250ml D5W infusion 1 mg/min (03/07/20 0340)    dextrose       potassium chloride, magnesium sulfate, alteplase, prochlorperazine **OR** prochlorperazine, ondansetron **OR** ondansetron, glucose, dextrose, glucagon (rDNA), dextrose, lidocaine, sodium chloride flush, acetaminophen, acetaminophen, polyethylene glycol, magic (miracle) mouthwash with nystatin    Lab Data:  CBC:   Recent Labs     03/05/20  0331 03/06/20  0400 03/07/20  0400   WBC 2.9* 1.9* 1.4*   HGB 7.7* 7.4* 7.6*   HCT 23.9* 23.2* 23.0*   MCV 86.3 86.5 84.9   * 84* 82*     BMP:   Recent Labs     03/05/20  0331 03/06/20  0400 03/07/20  0400   * 136 137   K 3.9 4.5 4.3    105 107   CO2 23 20* 20*   PHOS 2.9 2.7 2.6   BUN 34* 30* 31*   CREATININE 1.1 1.0 1.0     LIVER PROFILE:   No results for input(s): AST, ALT, LIPASE, BILIDIR, BILITOT, ALKPHOS in the last 72 hours. Invalid input(s): AMYLASE,  ALB  PT/INR:   No results for input(s): PROTIME, INR in the last 72 hours. APTT:   No results for input(s): APTT in the last 72 hours. BNP:  No results for input(s): BNP in the last 72 hours.   IMAGING:     Assessment:  Patient Active Problem List    Diagnosis Date Noted    Hypotension 11/28/2012     Priority: High    Moderate malnutrition ([facility] 03/06/2020    DNR (do not resuscitate) discussion     Generalized weakness     Encounter for palliative care     Goals of care, counseling/discussion     VT (ventricular tachycardia) ([facility] [facility] 02/22/2020    Nonrheumatic aortic valve stenosis 02/22/2020    Lymphoma ([facility] [facility] 02/21/2020    Fever     Malignant lymphoma, non-Hodgkin's (HCC)     Metabolic encephalopathy     Tobacco abuse counseling     Lymphadenopathy     Hypervolemia     Sepsis ([facility] [facility] 01/28/2020    Septic shock (HCC)     Atrial fibrillation with RVR (HCC)     Elevated international normalized ratio (INR)     Streptococcal pharyngitis     Leukopenia     Chronic ethmoidal sinusitis     TYRELL (acute kidney injury) ([facility] [address] [facility]     Ex-heavy cigarette smoker (20-39 per day)     PAF (paroxysmal atrial fibrillation) ([facility] 09/06/2019    Essential hypertension 09/06/2019  Finger amputation, traumatic, 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 Primary localized osteoarthrosis, pelvic region and thigh 05/04/2015    H/O total shoulder replacement 05/04/2015    Hyponatremia 04/22/2015    Primary localized osteoarthrosis of shoulder region 02/02/2015    Encounter for medication counseling 01/13/2014    Arthritis of knee 12/05/2013    Acute on chronic combined systolic and diastolic congestive heart failure ([facility] [address] 03/17/2013    S/P AVR (aortic valve replacement) 09/26/2012    Nausea 07/29/2012    Cardiomyopathy ([facility] 07/29/2012    Nonrheumatic aortic valve insufficiency 07/29/2012    Mitral regurgitation 07/29/2012    Microscopic hematuria 07/29/2012     Imp: Lymphoma/CHF/cardiomyop/AVR/AS/VT/Afib    Plan:  Rhythm tachy. Continues  flutter. Remains asx. Denies chest pain/sob. On RA. Cr stable. Edema persists/stable.      Core Measures:  · Discharge instructions:   · LVEF documented:   · ACEI for LV dysfunction:   · Smoking Cessation:    Mai Murry MD 3/7/2020 8:32 AM

## 2020-03-07 NOTE — PROGRESS NOTES
Calorie Count Note    Type and Reason for Visit: Calorie Count    Nutrition Assessment:  Calorie count day 1; pt continues to tolerate consistent attempts at meals- took 3 ONS yesterday. Small amounts of foods at each meal.     Current diet and supplement order:  DIET GENERAL; No Drinking Straw  Dietary Nutrition Supplements: Diabetic Oral Supplement        COMPARATIVE STANDARDS  Estimated Total Kcals/Day : 25-30  Current Bodyweight (83 kg) 7229-1527 kcal    Estimated Total Protein (g/day) : 1.2-1.4 Current Bodyweight (83 kg) 100-116 g/day  Estimated Daily Total Fluid (ml/day): 9040-0633 mL per day     Date Consumed PO Intake Kcal %   Kcal met PO Intake grams protein %  Protein met   Comments   3/6 ~1000 kcal 48% ~44g 44% B 100% oatmeal; applesauce  L- 100% 1/2 cup Egg Salad  D < 25% each of hamburger/rice;   100% - 3 Glucernas    3/7     B:                                    **Results will be posted as available. Intervention & Recommendations:   1.   Continue Calorie Count    Electronically signed by Leonard Ward RD, ADIA on 3/7/20 at 9:17 AM EST    Contact Number: [phone]

## 2020-03-07 NOTE — PLAN OF CARE
Problem: Falls - Risk of:  Goal: Will remain free from falls  Description: Will remain free from falls  Outcome: Met This Shift  Note: Patient is a high risk for falls per fall assessment. Patient in bed for this shift. Bed in low position with brake and bed alarm on, call light in reach, floor pads in place next to bed and patient remains free from falls this shift. Problem: Venous Thromboembolism:  Goal: Will show no signs or symptoms of venous thromboembolism  Description: Will show no signs or symptoms of venous thromboembolism  Outcome: Met This Shift  Note: No signs or symptoms of DVT. Patient on lovenox for DVT prophylaxis     Problem: Bleeding:  Goal: Will show no signs and symptoms of excessive bleeding  Description: Will show no signs and symptoms of excessive bleeding  Outcome: Met This Shift  Note: No active signs of bleeding  Patient hemoglobin and platelet count stable at 7.6 and 82,000. No transfusions today per MD standing order. Problem: Infection - Central Venous Catheter-Associated Bloodstream Infection:  Goal: Will show no infection signs and symptoms  Description: Will show no infection signs and symptoms  Outcome: Met This Shift  Note: Patient afebrile this shift. No signs or symptoms of infection. Problem: Pain:  Goal: Pain level will decrease  Description: Pain level will decrease  Outcome: Met This Shift  Note: Patient without complaints of pain this shift. Problem: Serum Glucose Level - Abnormal:  Goal: Ability to maintain appropriate glucose levels will improve to within specified parameters  Description: Ability to maintain appropriate glucose levels will improve to within specified parameters  Outcome: Met This Shift  Note: Blood glucose being monitored AC and HS. No insulin given per insuline sliding scale. See MAR.      Problem: Risk for Impaired Skin Integrity  Goal: Tissue integrity - skin and mucous membranes  Description: Structural intactness and normal physiological function of skin and  mucous membranes. Outcome: Ongoing  Note: See skin assessment on flow sheet. Patient turned and repositioned every 2 hours to maintain skin integrity.

## 2020-03-07 NOTE — PROGRESS NOTES
4 Eyes Admission Assessment     I agree as the admission nurse that 2 RN's have performed a thorough Head to Toe Skin Assessment on the patient. ALL assessment sites listed below have been assessed on admission. Areas assessed by both nurses: ***  [x]   Head, Face, and Ears   [x]   Shoulders, Back, and Chest  [x]   Arms, Elbows, and Hands   [x]   Coccyx, Sacrum, and Ischum  [x]   Legs, Feet, and Heels        Does the Patient have Skin Breakdown?   Abrasion under nose and upper lip (WOCN following) redness to buttocks  (blanchable)        Rickey Prevention initiated:  Yes   Wound Care Orders initiated:  No      WO nurse consulted for Pressure Injury (Stage 3,4, Unstageable, DTI, NWPT, and Complex wounds):  No      Nurse 1 eSignature: Electronically signed by Roya López RN on 3/7/20 at 4:57 AM EST    **SHARE this note so that the co-signing nurse is able to place an eSignature**    Nurse 2 eSignature: {Esignature:[id number]}

## 2020-03-07 NOTE — PROGRESS NOTES
[facility] Progress Note    3/7/2020     Zak Bolivar    MRN: [id number]    : 1940    Referring MD: Ryley Callaway MD  [address], [facility] [address]    SUBJECTIVE:  He cont to be weak and tired.      ECOG PS:  (3) Capable of limited self-care, confined to bed or chair > 50% of waking hours    KPS: 40% Disabled; requires special care and assistance    Isolation: None    Medications    Scheduled Meds:   melatonin  3 mg Oral Nightly    digoxin  125 mcg Oral Daily    mupirocin   Topical BID    tbo-filgrastim  300 mcg Subcutaneous QPM    predniSONE  15 mg Oral Daily    [START ON 3/9/2020] predniSONE  10 mg Oral Daily    [START ON 3/12/2020] predniSONE  5 mg Oral Daily    fluconazole  200 mg Oral Daily    vancomycin  125 mg Oral Q6H    amiodarone bolus  150 mg Intravenous Once    valACYclovir  500 mg Oral BID    insulin lispro  0-12 Units Subcutaneous TID WC    insulin lispro  0-6 Units Subcutaneous Nightly    insulin lispro  5 Units Subcutaneous TID WC    Venelex   Topical BID    enoxaparin  80 mg Subcutaneous BID    aspirin  81 mg Oral Daily    atorvastatin  10 mg Oral Daily    pantoprazole  40 mg Oral QAM AC    mexiletine  200 mg Oral 3 times per day    sodium chloride flush  10 mL Intravenous 2 times per day     Continuous Infusions:   phenylephrine (VICTOR MANUEL-SYNEPHRINE) 50mg/250mL infusion      sodium chloride 50 mL/hr at 20 0803    amiodarone 450mg/250ml D5W infusion 1 mg/min (20 0340)    dextrose       PRN Meds:.potassium chloride, magnesium sulfate, alteplase, prochlorperazine **OR** prochlorperazine, ondansetron **OR** ondansetron, glucose, dextrose, glucagon (rDNA), dextrose, lidocaine, sodium chloride flush, acetaminophen, acetaminophen, polyethylene glycol, magic (miracle) mouthwash with nystatin    ROS:  As noted above, otherwise remainder of 10-point ROS negative    Physical Exam:     I&O:      Intake/Output Summary (Last 24 hours) at 3/7/2020 8930  Last data filed at 3/7/2020 0602  Gross per 24 hour   Intake 2857 ml   Output 650 ml   Net 2207 ml       Vital Signs:  BP (!) 80/55   Pulse 120   Temp 97.4 °F (36.3 °C) (Oral)   Resp 18   Ht 6' 1\" (1.854 m)   Wt 194 lb (88 kg)   SpO2 100%   BMI 25.60 kg/m²     Weight:    Wt Readings from Last 3 Encounters:   03/06/20 194 lb (88 kg)   02/21/20 184 lb 15.5 oz (83.9 kg)   01/28/20 178 lb (80.7 kg)       General: Awake, alert and oriented, elderly and frail   HEENT: normocephalic, PERRL, no scleral erythema or icterus, Oral mucosa moist and intact, throat clear. Increasing lesion right nares  NECK: supple without palpable adenopathy  BACK: Straight negative CVAT  SKIN: warm dry and intact without lesions rashes or masses  CHEST: Clear bilaterally today  CV: tachycardic and irregluar  ABD: NT ND normoactive BS, no palpable masses or hepatosplenomegaly  EXTREMITIES: with 1 + edema in all 4 extremities, denies calf tenderness  NEURO: CN II - XII grossly intact  CATHETER: DL LUE PICC (1/29/20) - CDI    Data    CBC:   Recent Labs     03/05/20  0331 03/06/20  0400 03/07/20  0400   WBC 2.9* 1.9* 1.4*   HGB 7.7* 7.4* 7.6*   HCT 23.9* 23.2* 23.0*   MCV 86.3 86.5 84.9   * 84* 82*     BMP/Mag:  Recent Labs     03/05/20  0331 03/06/20  0400 03/07/20  0400   * 136 137   K 3.9 4.5 4.3    105 107   CO2 23 20* 20*   PHOS 2.9 2.7 2.6   BUN 34* 30* 31*   CREATININE 1.1 1.0 1.0   MG 2.20 2.10 2.00     LIVP:   No results for input(s): AST, ALT, LIPASE, BILIDIR, BILITOT, ALKPHOS in the last 72 hours. Invalid input(s): AMYLASE,  ALB  Coags:   No results for input(s): PROTIME, INR, APTT in the last 72 hours. Uric Acid   Recent Labs     03/05/20  0331 03/06/20  0400 03/07/20  0400   LABURIC 4.3 4.1 3.9     PATHOLOGY:  1. Left Inguinal & Left Acillary LN Bx 2/3/20:   The following consultation results were received from [facility] [place] [id number] inactive inflammation       - No evidence of intestinal metaplasia, dysplasia, or malignancy       B. Stomach, biopsy:       - Gastric mucosa with no pathologic change       - No evidence of intestinal metaplasia, dysplasia, or malignancy       - No morphologic evidence of helicobacter pylori infection       C. Esophagus, biopsy:       - Squamous mucosa with focal mild chronic inflammation       - No evidence of increased eosinophils, dysplasia, or malignancy       D. Ulcer, rectum, biopsy:       - Colonic mucosa with extensive ulceration and associated acute         inflammation       - No evidence of granulomas, dysplasia or malignancy       - No evidence of specific viral infection, as supported by negative         CMV and HSV immunohistochemical staining with appropriate controls    DIAGNOSTIC IMAGIN. CT Chest 20:  1. Extensive lymphadenopathy as well as suspected mild splenomegaly,   suspicious for lymphoproliferative disorder.  New areas of nodular soft   tissue density in the left breast may represent associated intramammary lymph   nodes but could also represent subcutaneous involvement. 2. Trace bilateral pleural effusions with bilateral lower lobe passive   atelectasis. 3. Mild cardiomegaly status post aortic valve replacement. 2. CT A/P 20: Increased retroperitoneal adenopathy compared to  raising the question of   underlying lymphoma.       Mild body wall anasarca and trace pelvic fluid suggesting fluid overload. PROBLEM LIST:             1. T-Cell Angioblastic NHL  2. MDS  3. NICM  4. Acute on Chronic HFrEF - LVEF 35-40% 20  5. MR  6. AS - S/p Aortic Valve replacement  7. PAF w/RVR  8. HTN  9. CKD III  10. Chronic Sinusitis  11. OA - h/o shoulder replacement  12. HLD    TREATMENT:            1. Brentuximab (Cycle 1 - 20)     ASSESSMENT AND PLAN:           1. T-cell angiommunoblastic lymphoma:    -Brentuximab 1.8mg/m2 iv x1 q 3-4 wks.      Brentuximab Cycle 1, Day +9    - Cont Prednisone Taper (50mg daily started 2/22): Taper by 5 mg every 3 days until off (last dose 3/14)  - Family understands that any treatment we would offer is palliative in nature only, however  and wife are adamant that he remain a full code  - Palliative Care following    2. MDS: originally diagnosed May, 2017  - No current tx    3. ID: C.diff colitis w/septic & cardiogenic shock. Afebrile, but with hypotension and +c.diff  - Blood Cxs 3/3 - NGTD  - Lip lesion viral & bacterial cx's 3/4 - pending  - Cont valtrex ppx  - C. Diff Colitis:  - Cont PO Vancomycin Day +5 (3/3/20)  - Cxs from right nares 3/4/20 - pending. Bactroban empirically 3/5/20  - Sepsis:  - IV Abx discontinued 3/3 with source identification of c.diff and concern IV abx could exacerbate this  - Off pressors since 3/5 evening     Abx Hx:  Merrem 3/2-3/3  Vancomycin 3/2-3/3    4. Heme: pancytopenia 2/2 MDS +/- NHL   - Transfuse for Hgb < 7 and Platelets < 10 K  - No transfusion today   - G-CSF daily beginning 3/5/20    5. Metabolic / CKD III: met acidosis + hypervolemia + hyperglycemia   - Replace Potassium and Magnesium per cardiac parameters  - Risk for Tumor Lysis Syndrome: No evidence at this time  - Cont Allopurinol 200 mg daily    6. Cardiac: Significant h/o NICM, valvular disease, PAF w/RVR, acute on chronic HFrEF. Recently had runs of wide-complex tachycardia requiring DC CV  - Cardiology consulted & following - appreciate assistance  - Cont ASA 81mg daily    - VTach - s/p dc cardioversion 2/18/20 - ICD implantation is contraindicated given his poor prognosis and life expectancy of <12 mos  - Cont amiodarone + mexiletine  - Intermittent runs of V. Tach 3/3 - amiodarone gtt increased    - PAF w/RVR - Recurrent w/resultant cardiogenic shock  - S/p lopressor 2.5mg IVP, Digoxin 250mcg IV x1 3/2  - Cont amiodarone gtt (3/2/20)  - Cont AC with lovenox 80mg BID (2/25/20) - d/c if plts <50K    - Aortic Stenosis - s/p porcine AVR  - Avoid diuretics if at all possible    - Acute on Chronic HFrEF - LVEF 35%  - Previously on diuretics, however avoiding now with severe AS & hypotension    - Cardiogenic Shock: Recurrent 2/2 a.fib w/RVR, resolving  - Off pressors since 3/5 evening  - Critical care following    7. GI: +Rectal ulcers & LGI bleed. +C.diff 3/2  - S/p EGD & flex sig 2/10  - Cont PPI daily  - Cont ID tx for c.diff    8. Nutrition: Severe malnutrition POA  - Cont low microbial diet  - Dietary to follow closely   - Encourage supplements as tolerated  - Consider Enteral nutrition. Start calorie counts 3/6 - pending    9. DM2: chronic, steroid induced hyperglycemia  - Cont accuchecks & SSI - Low    10. Acute Debilitation: 2/2 new NHL diagnosis +/- age   - Cont PT/OT - on hold now with hypotension & shock  - Consult SW - ARU denied. Once stable will restart precert for SNF  - Palliative care also following, not unreasonable to d/c home with palliative care if he has good support at home given his limited prognosis    11. Code Status: Full code at this time  - Palliative care following  - Multiple discussions have been had informing the patient and his family that this therapy is palliative at best and that his lymphoma is a terminal disease. In addition to that he has severe cardiac disease which is not helping overall prognosis. Despite repeated discussions, pt and wife insist on full code and wanting \"everything done\" including intubation, CPR, & defibrillation.   - Will ask palliative care to set up meeting with family Monday to discuss realistic expectations of care and life expectancy    - DVT Prophylaxis: Cont full-dose AC w/lovenox - will d/c if plts drop <50K  Contraindications to pharmacologic prophylaxis: None  Contraindications to mechanical prophylaxis: None    - Disposition: Uncertain at this time.  Overall poor prognosis in the setting of cardiogenic shock & incurable aggressive Daniel Christensen MD

## 2020-03-08 NOTE — PROGRESS NOTES
lymphoma:    -Brentuximab 1.8mg/m2 iv x1 q 3-4 wks. Brentuximab Cycle 1, Day +10    - Cont Prednisone Taper (50mg daily started 2/22): Taper by 5 mg every 3 days until off (last dose 3/14)  - Family understands that any treatment we would offer is palliative in nature only, however  and wife are adamant that he remain a full code  - Palliative Care following    2. MDS: originally diagnosed May, 2017  - No current tx    3. ID: C.diff colitis w/septic & cardiogenic shock. Afebrile, but with hypotension and +c.diff  - Blood Cxs 3/3 - NGTD  - Lip lesion viral & bacterial cx's 3/4 - pending  - Cont valtrex ppx  - C. Diff Colitis:  - Cont PO Vancomycin Day +5 (3/3/20)  - Cxs from right nares 3/4/20 - pending. Bactroban empirically 3/5/20  - Sepsis:  - IV Abx discontinued 3/3 with source identification of c.diff and concern IV abx could exacerbate this  - Off pressors since 3/5 evening     Abx Hx:  Merrem 3/2-3/3  Vancomycin 3/2-3/3    4. Heme: pancytopenia 2/2 MDS +/- NHL   - Transfuse for Hgb < 7 and Platelets < 10 K  - No transfusion today   - G-CSF daily beginning 3/5/20    5. Metabolic / CKD III: met acidosis + hypervolemia + hyperglycemia   - Replace Potassium and Magnesium per cardiac parameters  - Risk for Tumor Lysis Syndrome: No evidence at this time  - Cont Allopurinol 200 mg daily    6. Cardiac: Significant h/o NICM, valvular disease, PAF w/RVR, acute on chronic HFrEF. Recently had runs of wide-complex tachycardia requiring DC CV  - Cardiology consulted & following - appreciate assistance  - Cont ASA 81mg daily    - VTach - s/p dc cardioversion 2/18/20 - ICD implantation is contraindicated given his poor prognosis and life expectancy of <12 mos  - Cont amiodarone + mexiletine  - Intermittent runs of V. Tach 3/3 - amiodarone gtt increased    - PAF w/RVR - Recurrent w/resultant cardiogenic shock  - S/p lopressor 2.5mg IVP, Digoxin 250mcg IV x1 3/2  - Cont amiodarone gtt (3/2/20)  - Cont AC with lovenox 80mg BID (2/25/20) - d/c if plts <50K    - Aortic Stenosis - s/p porcine AVR  - Avoid diuretics if at all possible    - Acute on Chronic HFrEF - LVEF 35%  - Previously on diuretics, however avoiding now with severe AS & hypotension    - Cardiogenic Shock: Recurrent 2/2 a.fib w/RVR, resolving  - Off pressors since 3/5 evening  - Critical care following    7. GI: +Rectal ulcers & LGI bleed. +C.diff 3/2  - S/p EGD & flex sig 2/10  - Cont PPI daily  - Cont ID tx for c.diff    8. Nutrition: Severe malnutrition POA  - Cont low microbial diet  - Dietary to follow closely   - Encourage supplements as tolerated  - Consider Enteral nutrition. Start calorie counts 3/6 - pending    9. DM2: chronic, steroid induced hyperglycemia  - Cont accuchecks & SSI - Low    10. Acute Debilitation: 2/2 new NHL diagnosis +/- age   - Cont PT/OT - on hold now with hypotension & shock  - Consult SW - ARU denied. Once stable will restart precert for SNF  - Palliative care also following, not unreasonable to d/c home with palliative care if he has good support at home given his limited prognosis    11. Code Status: Full code at this time  - Palliative care following  - Multiple discussions have been had informing the patient and his family that this therapy is palliative at best and that his lymphoma is a terminal disease. In addition to that he has severe cardiac disease which is not helping overall prognosis. Despite repeated discussions, pt and wife insist on full code and wanting \"everything done\" including intubation, CPR, & defibrillation.   - Will ask palliative care to set up meeting with family Monday to discuss realistic expectations of care and life expectancy    - DVT Prophylaxis: Cont full-dose AC w/lovenox - will d/c if plts drop <50K  Contraindications to pharmacologic prophylaxis: None  Contraindications to mechanical prophylaxis: None    - Disposition: Uncertain at this time.  Overall poor prognosis in the setting of cardiogenic shock & incurable aggressive Denise Daugherty MD

## 2020-03-08 NOTE — PROGRESS NOTES
[address] Daily Progress Note      Admit Date:  2/21/2020    Subjective:  Mr. Samir Garza was seen and examined. F/U Lymphoma/CHF/cardiomyopathy/AVR/AS/VT/afib. Denies  cp/sob. Did not sleep well. Diarrhea problem still       Objective:   BP (!) 85/57   Pulse 121   Temp 98.4 °F (36.9 °C) (Oral)   Resp 17   Ht 6' 1\" (1.854 m)   Wt 194 lb (88 kg)   SpO2 94%   BMI 25.60 kg/m²       Intake/Output Summary (Last 24 hours) at 3/8/2020 7999  Last data filed at 3/8/2020 0535  Gross per 24 hour   Intake 2109 ml   Output 1250 ml   Net 859 ml       TELEMETRY: A flutter    Physical Exam:  General:  Awakens, NAD  Skin:  Warm and dry  Neck:  JVP difficult  Chest: decreased BS, respiration normal  Cardiovascular:  RRR S1S2, 2/6 syst m  Abdomen:  Soft nontender  Extremities:  + edema upper extremities, + edema lower.      Medications:    melatonin  3 mg Oral Nightly    digoxin  125 mcg Oral Daily    mupirocin   Topical BID    tbo-filgrastim  300 mcg Subcutaneous QPM    predniSONE  15 mg Oral Daily    [START ON 3/9/2020] predniSONE  10 mg Oral Daily    [START ON 3/12/2020] predniSONE  5 mg Oral Daily    fluconazole  200 mg Oral Daily    vancomycin  125 mg Oral Q6H    amiodarone bolus  150 mg Intravenous Once    valACYclovir  500 mg Oral BID    insulin lispro  0-12 Units Subcutaneous TID WC    insulin lispro  0-6 Units Subcutaneous Nightly    insulin lispro  5 Units Subcutaneous TID WC    Venelex   Topical BID    enoxaparin  80 mg Subcutaneous BID    aspirin  81 mg Oral Daily    atorvastatin  10 mg Oral Daily    pantoprazole  40 mg Oral QAM AC    mexiletine  200 mg Oral 3 times per day    sodium chloride flush  10 mL Intravenous 2 times per day      phenylephrine (VICTOR MANUEL-SYNEPHRINE) 50mg/250mL infusion      sodium chloride 50 mL/hr at 03/08/20 0436    amiodarone 450mg/250ml D5W infusion 0.5 mg/min (03/08/20 0051)    dextrose       potassium chloride, magnesium sulfate, alteplase, prochlorperazine **OR** prochlorperazine, ondansetron **OR** ondansetron, glucose, dextrose, glucagon (rDNA), dextrose, lidocaine, sodium chloride flush, acetaminophen, acetaminophen, polyethylene glycol, magic (miracle) mouthwash with nystatin    Lab Data:  CBC:   Recent Labs     03/06/20  0400 03/07/20  0400 03/08/20  0430   WBC 1.9* 1.4* 0.9*   HGB 7.4* 7.6* 7.0*   HCT 23.2* 23.0* 21.4*   MCV 86.5 84.9 85.2   PLT 84* 82* 80*     BMP:   Recent Labs     03/06/20 0400 03/07/20  0400 03/08/20  0430    137 135*   K 4.5 4.3 4.4    107 107   CO2 20* 20* 20*   PHOS 2.7 2.6 2.7   BUN 30* 31* 30*   CREATININE 1.0 1.0 1.1     LIVER PROFILE:   No results for input(s): AST, ALT, LIPASE, BILIDIR, BILITOT, ALKPHOS in the last 72 hours. Invalid input(s): AMYLASE,  ALB  PT/INR:   No results for input(s): PROTIME, INR in the last 72 hours. APTT:   No results for input(s): APTT in the last 72 hours. BNP:  No results for input(s): BNP in the last 72 hours.   IMAGING:     Assessment:  Patient Active Problem List    Diagnosis Date Noted    Hypotension 11/28/2012     Priority: High    Moderate malnutrition ([facility] 03/06/2020    DNR (do not resuscitate) discussion     Generalized weakness     Encounter for palliative care     Goals of care, counseling/discussion     VT (ventricular tachycardia) ([facility] 02/22/2020    Nonrheumatic aortic valve stenosis 02/22/2020    Lymphoma ([facility] 02/21/2020    Fever     Malignant lymphoma, non-Hodgkin's (HCC)     Metabolic encephalopathy     Tobacco abuse counseling     Lymphadenopathy     Hypervolemia     Sepsis ([address] 01/28/2020    Septic shock (HCC)     Atrial fibrillation with RVR (HCC)     Elevated international normalized ratio (INR)     Streptococcal pharyngitis     Leukopenia     Chronic ethmoidal sinusitis     [facility]     Ex-heavy cigarette smoker (20-39 per day)     PAF (paroxysmal atrial fibrillation) ([facility] 09/06/2019    Essential

## 2020-03-08 NOTE — PROGRESS NOTES
MD Grace Garvin notified of pt's intermittent hypotension with direction to notify cardiology if MAP is less than 60. MD also notified of lack of standing orders to transfuse with HBG currently at 7. No new orders placed at this time. Will continue to monitor.

## 2020-03-08 NOTE — PROGRESS NOTES
Occupational Therapy   Occupational Therapy Re-Assessment and Treatment  Date: 3/8/2020   Patient Name: Alexis Guillen  MRN: [id number]     : 1940    Date of Service: 3/8/2020    Discharge Recommendations:  Lucretia Muller Schirmer scored a  on the AM-PAC ADL Inpatient form. Current research shows that an AM-PAC score of 17 or less is typically not associated with a discharge to the patient's home setting. Based on the patients AM-PAC score and their current ADL deficits, it is recommended that the patient have 3-5 sessions per week of Occupational Therapy at d/c to increase the patients independence. OT Equipment Recommendations  Equipment Needed: No  Other: continue to assess for needs    Assessment   Performance deficits / Impairments: Decreased ADL status; Decreased endurance;Decreased strength;Decreased fine motor control;Decreased functional mobility   Assessment: Re-assessment completed today. Pt and wife very motivated for pt to work with therapy, but pt was limited by hypotension. Pt tolerated rolling in bed for toileting well and able to complete UE exercises with assist. Plan to assess supine>Sit transfer and sitting balance next session as pt able to tolerate. Recommend continued OT tx upon d/c. Treatment Diagnosis: Decreased functional mobility/ADLs and decreased activity tolerance  OT Education: Home Exercise Program;OT Role;Plan of Care  Patient Education: pt and wife verb understanding  REQUIRES OT FOLLOW UP: Yes  Activity Tolerance  Activity Tolerance: Patient limited by fatigue;Treatment limited secondary to medical complications (free text)  Activity Tolerance: HR 80's at rest, increased to 122 with UE AROM in bed. Unable to complete sitting assessment 2/2 hypotension  Safety Devices  Safety Devices in place: Yes  Type of devices: Nurse notified;Call light within reach; Left in bed(bed alarm not working, RN aware)           Treatment Diagnosis: Decreased functional mobility/ADLs and decreased activity tolerance      Restrictions  Restrictions/Precautions  Restrictions/Precautions: Contact Precautions(c-diff)  Required Braces or Orthoses?: No  Position Activity Restriction  Other position/activity restrictions: up with assist    Subjective   General  Chart Reviewed: Yes  Patient assessed for rehabilitation services?: Yes  Additional Pertinent Hx: Pt presented to the [facility] ED 1/28/2020 by emergency medical services from the office of Dr. Griselda Dunn due to hypotension, decreased white blood cell count and sore throat. He was treated with antibiotics and was almost ready to discharge to rehab after a 20 day admission, when he developed Afib with RVR and hypotension, for which he was cardioverted and return to sinus rhythm. 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 Hem/onc did not find pt to be a good candidate for aggressive chemotherapy, but pt was transferred to [facility] for a second opinion, per family's request. He had an episode of BRBPR and flex sigmoidoscopy found gastritis and rectal ulcers. He received a unit of blood and was transferred to [facility] ICU, and started on levophed with concern for cardiogenic shock vs sepsis. PMHx includes Aortic insufficiency, Atrial fibrillation ([address] Breast nodule, CAD (coronary artery disease), Cardiomyopathy ([address] CHF (congestive heart failure) ([address] Hyperlipidemia, Hypertension, Microscopic hematuria, Mitral regurgitation, Nausea & vomiting, Osteoarthritis of knee, Pneumonia, Rotator cuff tear, and Ventricular ectopy. Family / Caregiver Present: Cameron Batesole)  Referring Practitioner: Neela Salcedo MD  Diagnosis: Lymphoma   Subjective  Subjective: Pt in bed, agreeable to therapy and eager to try moving \"I don't want to get weaker\". Reported need to get cleaned up after BM.  RN approval obtained prior to entry  General Comment  Comments:    Patient Currently in Pain: Yes(neck stiffness, not rated)    Social/Functional History  Social/Functional History  Lives With: Spouse  Type of Home: House  Home Layout: Two level, Bed/Bath upstairs(13 steps btween levels with rail)  Home Access: Stairs to enter without rails  Entrance Stairs - Number of Steps: 2 OLLIE  Bathroom Shower/Tub: Walk-in shower  Bathroom Toilet: Standard  Bathroom Equipment: Shower chair, 3-in-1 commode, Toilet raiser  Home Equipment: Rolling walker, Cane  Receives Help From: Family  ADL Assistance: Independent  Homemaking Assistance: Independent  Homemaking Responsibilities: Yes  Ambulation Assistance: Independent  Transfer Assistance: Independent  Active : Yes  Mode of Transportation: Car  Additional Comments: Reports 1 \"fall\" involving slipping off side of bed when sitting. Objective   Vision: Impaired  Vision Exceptions: Wears glasses for reading  Hearing: Exceptions to [facility]  Hearing Exceptions: Hard of hearing/hearing concerns    Orientation  Overall Orientation Status: Within Functional Limits  Observation/Palpation  Observation: BUE edema noted forearms and hands, Weeping L UE     ADL  Toileting: Dependent/Total(incontinent of BM; assist for hygiene at bed level; pt attempted to use urinal seated in bed but unable to urinate)  Coordination  Movements Are Fluid And Coordinated: Yes  Coordination and Movement description: Fine motor impairments;Right UE;Left UE;Gross motor impairments;Decreased accuracy     Bed mobility  Rolling to Left: Stand by assistance  Rolling to Right: Stand by assistance  Comment: completed rolling each direction x2 for pericare and repositioning. Unable to complete supine>sit due to hypotension, BP noted 79/62. RN called into room and made aware.         Cognition  Overall Cognitive Status: WFL           Type of ROM/Therapeutic Exercise  Type of ROM/Therapeutic Exercise: AAROM;AROM  Comment: Exercises while seated in bed.  encouraged to continue performing gentle UE AROM  several times/day  Exercises  Shoulder Flexion: x5 AAROM  Elbow Flexion: x 10 AROM  Elbow Extension: x 10 AROM  Wrist Flexion: x 10 AROM  Wrist Extension: x 10 AROM  Grasp/Release: x 10 AROM  Other: ankle pumps x10, glute squeezes with 3 second hold x6     LUE AROM (degrees)  LUE AROM : WFL(AAROM shoulder; AROM distally)  RUE AROM (degrees)  RUE AROM : (AAROM shoulder, AROM distally )  LUE Strength  Gross LUE Strength: Exceptions to WFL(3-/5 shoulder, at least 3+/5 distally)  RUE Strength  Gross RUE Strength: (3-/5 shoulder (has some strength deficits at baseline); at least 3+/5 distally)           Treatment consisted of:  Bed level ADLs, bed mobility education on activity promotion and fall precautions. Plan   Plan  Times per week: 2-5  Times per day: Daily  Current Treatment Recommendations: Functional Mobility Training, Endurance Training, Patient/Caregiver Education & Training, Self-Care / ADL      AM-[facility] Score        -[facility] Inpatient Daily Activity Raw Score: 11 (03/08/20 1323)  -PAC Inpatient ADL T-Scale Score : 29.04 (03/08/20 1323)  ADL Inpatient CMS 0-100% Score: 70.42 (03/08/20 1323)  ADL Inpatient CMS G-Code Modifier : CL (03/08/20 1323)    Goals  Short term goals  Time Frame for Short term goals: By discharge  Short term goal 1: Pt complete UE AROM x15 reps for strengthening for ADLs- not met  Short term goal 2: pt tolerate supine>Sit assessment- not met  Short term goal 3: Pt will complete LB dressing with SBA (not met)  Short term goal 4: chair pushups x 5, CGA for increased functional strength (not met)  Short term goal 5:    Patient Goals   Patient goals :  To go home       Therapy Time   Individual Concurrent Group Co-treatment   Time In 1110         Time Out 1150         Minutes 40         Timed Code Treatment Minutes:   25  Total Treatment Minutes:  36     If patient is d/c prior to next treatment session, this note will serve as the discharge summary    River Tariq OTR/L, [id number]

## 2020-03-08 NOTE — PLAN OF CARE
excessive bleeding  3/8/2020 0802 by Adelia Corona RN  Outcome: Ongoing  Note: Patient's hemoglobin this AM:   Recent Labs     03/08/20  0430   HGB 7.0*     Patient's platelet count this AM:   Recent Labs     03/08/20  0430   PLT 80*    Thrombocytopenia Precautions in place. Patient showing no signs or symptoms of active bleeding. Transfusion not indicated at this time. Patient verbalizes understanding of all instructions. Will continue to assess and implement POC. Call light within reach and hourly rounding in place. Problem: Infection - Central Venous Catheter-Associated Bloodstream Infection:  Goal: Will show no infection signs and symptoms  Description: Will show no infection signs and symptoms  3/8/2020 1759 by Heidi Odom RN  Note: Pt afebrile this shift. PICC line and dressing are clean, dry, and intact. Will continue to monitor. Problem: Bowel Function - Altered:  Goal: Bowel elimination is within specified parameters  Description: Bowel elimination is within specified parameters  Outcome: Ongoing  Note: Pt continues with diarrhea this shift, with several episodes of incontinence. Will continue to monitor. Problem: Serum Glucose Level - Abnormal:  Goal: Ability to maintain appropriate glucose levels will improve to within specified parameters  Description: Ability to maintain appropriate glucose levels will improve to within specified parameters  3/8/2020 1759 by Heidi Odom RN  Outcome: Ongoing  Note: Pt continues to be ACHS this shift. Insulin provided per order, with no sliding scale coverage needed. Will continue to monitor. Problem: Cardiac Output - Decreased:  Goal: Hemodynamic stability will improve  Description: Hemodynamic stability will improve  Note: Pt continues to be in A-fib or A-flutter throughout shift. Pt continues on Amio gtt. Per order. Pt also with hypotension this shift: MAP consistently above 60. MD aware.

## 2020-03-09 NOTE — PLAN OF CARE
Precautions in place. Patient showing no signs or symptoms of active bleeding. No transfusion orders in place at this time. Patient verbalizes understanding of all instructions. Will continue to assess and implement POC. Call light within reach and hourly rounding in place. Problem: Infection - Central Venous Catheter-Associated Bloodstream Infection:  Goal: Will show no infection signs and symptoms  Description: Will show no infection signs and symptoms  3/9/2020 0448 by Favio Felix RN  Outcome: Ongoing  Note: CVC site remains free of signs/symptoms of infection. No drainage, edema, erythema, pain, or warmth noted at site. Dressing changes continue per protocol and on an as needed basis - see flowsheet. Compliant with [facility] Bath Protocol:  Performed CHG bath today per [facility] protocol utilizing Bed bath with CHG wipes. CVC site cleansed with CHG wipe over dressing, skin surrounding dressing, and first 6\" of IV tubing. Pt tolerated well. Continued to encourage daily CHG bathing [facility]. Problem: Bowel Function - Altered:  Goal: Bowel elimination is within specified parameters  Description: Bowel elimination is within specified parameters  3/9/2020 0448 by Favio Felix RN  Outcome: Ongoing  Note: So far this shift, patient has had 3 episodes of stool incontinence. Scheduled vancomycin administered (see MAR). Changed patient's linens and applied zinc barrier cream to bottom each time. Will continue to monitor. Problem: Pain:  Goal: Pain level will decrease  Description: Pain level will decrease  3/9/2020 0448 by Favio Felix RN  Outcome: Ongoing  Note: Keren Daniel complained of neck stiffness from head being tilted to one side while asleep. Repositioned and adjusted head of bead elevation. Will continue to monitor.      Problem: Pain:  Goal: Pain level will decrease  Description: Pain level will decrease  3/9/2020 0448 by Favio Felix RN  Outcome: Ongoing  Note: [place] Daniel complained of neck

## 2020-03-09 NOTE — PROGRESS NOTES
24 hour   Intake 2036 ml   Output 1525 ml   Net 511 ml       Vital Signs:  BP 90/66   Pulse 85   Temp 98.7 °F (37.1 °C) (Oral)   Resp 18   Ht 6' 1\" (1.854 m)   Wt 194 lb (88 kg)   SpO2 96%   BMI 25.60 kg/m²     Weight:    Wt Readings from Last 3 Encounters:   03/06/20 194 lb (88 kg)   02/21/20 184 lb 15.5 oz (83.9 kg)   01/28/20 178 lb (80.7 kg)       General: Awake, alert and oriented, elderly and frail   HEENT: normocephalic, PERRL, no scleral erythema or icterus, Oral mucosa moist and intact, throat clear. Increasing lesion right nares  NECK: supple without palpable adenopathy  BACK: Straight negative CVAT  SKIN: warm dry and intact without lesions rashes or masses  CHEST: Clear bilaterally today  CV: tachycardic and irregluar  ABD: NT ND normoactive BS, no palpable masses or hepatosplenomegaly  EXTREMITIES: with 1 + edema in all 4 extremities, denies calf tenderness  NEURO: CN II - XII grossly intact  CATHETER: DL LUE PICC (1/29/20) - CDI    Data    CBC:   Recent Labs     03/07/20  0400 03/08/20  0430 03/09/20  0340   WBC 1.4* 0.9* 0.9*   HGB 7.6* 7.0* 6.4*   HCT 23.0* 21.4* 20.4*   MCV 84.9 85.2 86.6   PLT 82* 80* 70*     BMP/Mag:  Recent Labs     03/07/20  0400 03/08/20  0430 03/09/20  0340    135* 137   K 4.3 4.4 4.4    107 107   CO2 20* 20* 19*   PHOS 2.6 2.7 2.9   BUN 31* 30* 32*   CREATININE 1.0 1.1 1.2   MG 2.00 2.10 2.20     LIVP:   No results for input(s): AST, ALT, LIPASE, BILIDIR, BILITOT, ALKPHOS in the last 72 hours. Invalid input(s): AMYLASE,  ALB  Coags:   No results for input(s): PROTIME, INR, APTT in the last 72 hours. Uric Acid   Recent Labs     03/07/20  0400 03/08/20  0430 03/09/20  0340   LABURIC 3.9 3.8 3.7     PATHOLOGY:  1. Left Inguinal & Left Acillary LN Bx 2/3/20: The following consultation results were received from [facility] - [address] [place] [address] (Case  Number: EX-)  1.  Lymph node, left inguinal, biopsy metaplasia, dysplasia, or malignancy       B. Stomach, biopsy:       - Gastric mucosa with no pathologic change       - No evidence of intestinal metaplasia, dysplasia, or malignancy       - No morphologic evidence of helicobacter pylori infection       C. Esophagus, biopsy:       - Squamous mucosa with focal mild chronic inflammation       - No evidence of increased eosinophils, dysplasia, or malignancy       D. Ulcer, rectum, biopsy:       - Colonic mucosa with extensive ulceration and associated acute         inflammation       - No evidence of granulomas, dysplasia or malignancy       - No evidence of specific viral infection, as supported by negative         CMV and HSV immunohistochemical staining with appropriate controls    DIAGNOSTIC IMAGIN. CT Chest 20:  1. Extensive lymphadenopathy as well as suspected mild splenomegaly,   suspicious for lymphoproliferative disorder.  New areas of nodular soft   tissue density in the left breast may represent associated intramammary lymph   nodes but could also represent subcutaneous involvement. 2. Trace bilateral pleural effusions with bilateral lower lobe passive   atelectasis. 3. Mild cardiomegaly status post aortic valve replacement. 2. CT A/P 20: Increased retroperitoneal adenopathy compared to  raising the question of   underlying lymphoma.       Mild body wall anasarca and trace pelvic fluid suggesting fluid overload. PROBLEM LIST:             1. T-Cell Angioblastic NHL  2. MDS  3. NICM  4. Acute on Chronic HFrEF - LVEF 35-40% 20  5. MR  6. AS - S/p Aortic Valve replacement  7. PAF w/RVR  8. HTN  9. CKD III  10. Chronic Sinusitis  11. OA - h/o shoulder replacement  12. HLD    TREATMENT:            1. Brentuximab (Cycle 1 - 2)     ASSESSMENT AND PLAN:           1. T-cell angiommunoblastic lymphoma:    -Brentuximab 1.8mg/m2 iv x1 q 3-4 wks.      Brentuximab Cycle 1, Day +11  - Cont Prednisone Taper (50mg daily started 2/22): Taper by 5 mg every 3 days until off (last dose 3/14)  - Family understands that any treatment we would offer is palliative in nature only, however  and wife are adamant that he remain a full code  - Palliative Care following    2. MDS: originally diagnosed May, 2017  - No current tx    3. ID: C.diff colitis w/septic & cardiogenic shock. Afebrile, but with hypotension and +c.diff  - Blood Cxs 3/3 - NGTD  - Lip lesion viral & bacterial cx's 3/4 - +for HSV  - Increase valtrex ppx to tx dose Day +1 (3/9/20)  - C. Diff Colitis:  - Cont PO Vancomycin Day +7 (3/3/20)  - Sepsis:  - IV Abx discontinued 3/3 with source identification of c.diff and concern IV abx could exacerbate this  - Off pressors since 3/5 evening     Abx Hx:  Merrem 3/2-3/3  Vancomycin 3/2-3/3    4. Heme: pancytopenia 2/2 MDS +/- NHL   - Transfuse for Hgb < 7 and Platelets < 10 K  - PRBC transfusion today   - G-CSF daily beginning 3/5/20 - has thus far had no improvement to WBC    5. Metabolic / CKD III: met acidosis + hypervolemia + hyperglycemia   - Replace Potassium and Magnesium per cardiac parameters  - Risk for Tumor Lysis Syndrome: No evidence at this time  - Cont Allopurinol 200 mg daily  - Cont IVFs: NS at 50mL/hr    6. Cardiac: Significant h/o NICM, valvular disease, PAF w/RVR, acute on chronic HFrEF. Recently had runs of wide-complex tachycardia requiring DC CV  - Cardiology consulted & following - appreciate assistance  - Cont ASA 81mg daily    - VTach - s/p dc cardioversion 2/18/20 - ICD implantation is contraindicated given his poor prognosis and life expectancy of <12 mos  - Cont amiodarone + mexiletine  - Intermittent runs of V. Tach 3/3 - amiodarone gtt increased.  Now on 0.5mg/min (decreased 3/7)    - PAF w/RVR - Recurrent w/resultant cardiogenic shock  - Cont amiodarone gtt (3/2/20)  - Cont digoxin 125mcg daily (3/6/20)  - Cont AC with lovenox 80mg BID (2/25/20) - d/c if plts <50K    - Aortic Stenosis - s/p porcine AVR  - plts drop <50K  Contraindications to pharmacologic prophylaxis: None  Contraindications to mechanical prophylaxis: None    - Disposition: Pending hospice eliu Contreras, APRN - CNP

## 2020-03-09 NOTE — CARE COORDINATION
Case Management Assessment           Daily Note                 Date/ Time of Note: 3/9/2020 10:07 AM         Note completed by: Mayco Jackson    Patient Name: Dipak Santo  YOB: 1940    Diagnosis:Lymphoma, unspecified body region, unspecified lymphoma type [facility]) [C85.90]  Patient Admission Status: Inpatient    Date of Admission:2/21/2020  6:23 PM Length of Stay: 17 GLOS:        Current Plan of Care: Home with hospice  ________________________________________________________________________________________  PT AM-PAC: 10 / 24 per last evaluation on:     OT AM-PAC: 11 / 24 per last evaluation on:     DME Needs for discharge: defer to hospice care    ________________________________________________________________________________________  Discharge Plan: Home with Hospice: [facility]    Tentative discharge date: 3/9/2020    Current barriers to discharge: selecting a hospice agency    Referrals completed: Hospice: [facility]    Resources/ information provided: Hospice List  ________________________________________________________________________________________  Case Management Notes: Attended family meeting this morning with palliative care, Dr. Angel Pelayo, and patient's nurse. Dr. Darcie Fermin discussed prognosis of patient and patient/family goals. Patient and family are ultimately agreeable to going home with hospice. Provided family with comprehensive hospice list. They selected [facility]. Their second choice is [facility]. Referral sent to [facility]. A liaison will be to the hospital within the hour. 12:33 PM  Patient's family has signed consents with [facility]. Plan for patient to discharge home with hospice services tomorrow afternoon at 2pm via [address] [facility]. Daughters and spouse are in agreement to this plan. Will provide family with information on private duty services.  Hospice has agreed to give round the clock care for the first 24 hours. 4:12 PM  Provided patient's spouse with comprehensive private duty list.      Ankit Hopson and his family were provided with choice of provider; he and his family are in agreement with the discharge plan.     Care Transition Patient: Yes    Donold Bamberger, [facility]  Case Management Department  Ph: [phone]  Fax: [phone]

## 2020-03-09 NOTE — PROGRESS NOTES
hematuria     S/P AVR (aortic valve replacement)     Hypotension     Acute on chronic combined systolic and diastolic congestive heart failure (HCC)     Arthritis of knee     Encounter for medication counseling     Primary localized osteoarthrosis of shoulder region     Hyponatremia     Primary localized osteoarthrosis, pelvic region and thigh     H/O total shoulder replacement     Hypercholesteremia     Finger amputation, traumatic, initial encounter     PAF (paroxysmal atrial fibrillation) ([facility])     Essential hypertension     Sepsis ([facility] [address]     Septic shock ([facility] [address]     Atrial fibrillation with RVR ([facility])     Elevated international normalized ratio (INR)     Streptococcal pharyngitis     Leukopenia     Chronic ethmoidal sinusitis     TYRELL (acute kidney injury) ([facility] [address]     Ex-heavy cigarette smoker (20-39 per day)     Tobacco abuse counseling     Lymphadenopathy     Hypervolemia     Fever     Malignant lymphoma, non-Hodgkin's (HCC)     Metabolic encephalopathy     Lymphoma (HCC)     VT (ventricular tachycardia) ([facility])     Nonrheumatic aortic valve stenosis     Generalized weakness     Encounter for palliative care     Goals of care, counseling/discussion        Assessment & Plan:        1. pAF  2. Sustained VT  3. NICMP - EF 30%  4. Non Hodgkin's lymphoma  5. Sev prosthetic AV stenosis     Cardiac Hx: Bimal Vargas is a 70 y.o. man with a h/o HLD, HTN, DM, CMP, AS, s/p bioprosthetic AVR (2012), postop AF with no recurrence, CAD, follwos with Dr. Rao Wilcox, Dr. Bello Trinh and Dr. Paramjit Patterson, recent dx of lymphoma, transferred to [facility] from [facility], had p/w flu-like s/s, PNA, sepsis, rash and CHF, noted to have   AF/RVR, started on an amio gtt, then developed a WCT not tolerated hemodynamically, requiring DCCV (x2), EF 30% per echo, now on amio and mexilitine. [id number] 5. TSH 3.91 (1/30/20).       pAF  - In AFL - HR ~ 100  - On amio gtt - will change back to po dose he had been taking - 400 mg BID, on mexilitine 200 mg Q 8 - continue  - Last dig level 2.3 - will hold for now  - Answered questions regarding cardiac issues at home  - ECG ordered and results personally reviewed      CMP  - EF 30% - new onset - had been 50% 9/11/2019  - NYHA class III  -   - BP has been low - no ACE/ARB or BB  - Patient going home with hospice tomorrow      EF of 12%  ACEi for systolic HF  ASA and Statin for CAD  Anticoagulation for AF and heart failure  No tobacco use.      All questions and concerns were addressed to the patient/family. Alternatives to my treatment were discussed. The note was completed using EMR.  Every effort was made to ensure accuracy; however, inadvertent computerized transcription errors may be present.      Chaz Rodriguez CNP  [address]

## 2020-03-09 NOTE — PROGRESS NOTES
glucagon (rDNA), dextrose, lidocaine, sodium chloride flush, acetaminophen, acetaminophen, polyethylene glycol, magic (miracle) mouthwash with nystatin    Lab Data:  CBC:   Recent Labs     03/07/20  0400 03/08/20 0430 03/09/20  0340   WBC 1.4* 0.9* 0.9*   HGB 7.6* 7.0* 6.4*   HCT 23.0* 21.4* 20.4*   MCV 84.9 85.2 86.6   PLT 82* 80* 70*     BMP:   Recent Labs     03/07/20  0400 03/08/20 0430 03/09/20  0340    135* 137   K 4.3 4.4 4.4    107 107   CO2 20* 20* 19*   PHOS 2.6 2.7 2.9   BUN 31* 30* 32*   CREATININE 1.0 1.1 1.2     LIVER PROFILE:   No results for input(s): AST, ALT, LIPASE, BILIDIR, BILITOT, ALKPHOS in the last 72 hours. Invalid input(s): AMYLASE,  ALB  PT/INR:   No results for input(s): PROTIME, INR in the last 72 hours. APTT:   No results for input(s): APTT in the last 72 hours. BNP:  No results for input(s): BNP in the last 72 hours.   IMAGING:     Assessment:  Patient Active Problem List    Diagnosis Date Noted    Hypotension 11/28/2012     Priority: High    Moderate malnutrition ([facility] [address] 03/06/2020    DNR (do not resuscitate) discussion     Generalized weakness     Encounter for palliative care     Goals of care, counseling/discussion     VT (ventricular tachycardia) ([facility] [address] 02/22/2020    Nonrheumatic aortic valve stenosis 02/22/2020    Lymphoma ([facility] [address] 02/21/2020    Fever     Malignant lymphoma, non-Hodgkin's (HCC)     Metabolic encephalopathy     Tobacco abuse counseling     Lymphadenopathy     Hypervolemia     Sepsis ([address] 01/28/2020    Septic shock (HCC)     Atrial fibrillation with RVR (HCC)     Elevated international normalized ratio (INR)     Streptococcal pharyngitis     Leukopenia     Chronic ethmoidal sinusitis     TYRELL (acute kidney injury) ([facility] [address]     Ex-heavy cigarette smoker (20-39 per day)     PAF (paroxysmal atrial fibrillation) ([facility] [address] 09/06/2019    Essential hypertension 09/06/2019    Finger amputation, traumatic, 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   

## 2020-03-09 NOTE — PROGRESS NOTES
Perfect serve [facility] Cardiology reguarding digoxan level and administration of medication. Will wait to hear from physician.

## 2020-03-09 NOTE — PROGRESS NOTES
[facility]    3/9/2020     Meeta Urias    MRN: [id number]    : 1940    Referring MD: Becky Carolina MD  [address], [facility] [address]    SUBJECTIVE:  Continues with weakness and hypotension.     ECOG PS:  (3) Capable of limited self-care, confined to bed or chair > 50% of waking hours    KPS: 40% Disabled; requires special care and assistance    Isolation: None    Medications    Scheduled Meds:   valACYclovir  1,000 mg Oral BID    sodium chloride  20 mL Intravenous Once    melatonin  3 mg Oral Nightly    digoxin  125 mcg Oral Daily    mupirocin   Topical BID    tbo-filgrastim  300 mcg Subcutaneous QPM    predniSONE  10 mg Oral Daily    [START ON 3/12/2020] predniSONE  5 mg Oral Daily    fluconazole  200 mg Oral Daily    vancomycin  125 mg Oral Q6H    amiodarone bolus  150 mg Intravenous Once    insulin lispro  0-12 Units Subcutaneous TID WC    insulin lispro  0-6 Units Subcutaneous Nightly    insulin lispro  5 Units Subcutaneous TID WC    Venelex   Topical BID    enoxaparin  80 mg Subcutaneous BID    aspirin  81 mg Oral Daily    atorvastatin  10 mg Oral Daily    pantoprazole  40 mg Oral QAM AC    mexiletine  200 mg Oral 3 times per day    sodium chloride flush  10 mL Intravenous 2 times per day     Continuous Infusions:   sodium chloride 50 mL/hr at 20 0436    amiodarone 450mg/250ml D5W infusion 0.5 mg/min (20 0350)    dextrose       PRN Meds:.potassium chloride, magnesium sulfate, alteplase, prochlorperazine **OR** prochlorperazine, ondansetron **OR** ondansetron, glucose, dextrose, glucagon (rDNA), dextrose, lidocaine, sodium chloride flush, acetaminophen, acetaminophen, polyethylene glycol, magic (miracle) mouthwash with nystatin    ROS:  As noted above, otherwise remainder of 10-point ROS negative    Physical Exam:     I&O:      Intake/Output Summary (Last 24 hours) at 3/9/2020 1012  Last data filed at 3/9/2020 0700  Gross per 24 hour ([facility] 02/03/2020):       Angioimmunoblastic T-cell lymphoma. See Note. 2. Lymph node, left axilla, excision (S-, 02/09/2020):       Angioimmunoblastic T-cell lymphoma. See Note. NOTE: Sections of an inguinal node (S-) and axillary node  (S-) show mode architecture effaced by polymorphous infiltrate  composed of lymphocytes, histiocytes, plasma cells, eosinophils, and an  increase in the number of high-endothelial venules. Sinuses are patent. The submitted immunostains (CD20, PAX5, CD3, BCL-6, CD10, CD21, CD45,  CD30, CD15, CD4, CD5, CD7, CD8, CD57) and JYOTHI for EBV (RICHARD) are  reviewed. Additional stains (CD21, CD3, CD79a, PD-1, ICOS, CCD23) and JYOTHI  for EBV (RICHARD) are performed at the [facility]. CD20 and PAX5 show markedly  fragmented B-cell areas and regressed follicles. CD79a shows similar  distribution and additionally marks cells with plasmacytoid morphology. CD3, CD4, CD5, and CD7 show abundant T-cells and highlights a mild  cytologic atypia. CD8 marks numerous cells (CD4 ~ CD8). PD-1 reveals  abundant Follicular [place] T-cells, and a somewhat similar pattern is  noted for ICOS. CD23 shows a disorganized and expanded dendritic cell  meshwork in the paracortex, not necessarily associated with B-cell areas. CD30 marks large scattered cells, positive for CD20, PAX-5 (dim), and  negative for CD15, CD10, and BCL-6. CD57 is noncontributory. RICHARD is  positive in cells ranging in size. Per reports, flow cytometry in the axillary node ([facility] [place] 02/10/2020)  shows small-size T-cells (6% of total lymphocytes) positive for CD2, CD4,  CD5, and CD7, and negative for sCD3, TCR alpha/beta, and TCR gamma/delta. In summary, the morphologic and immunohistochemistry findings are  consistent with the above diagnosis. 2. GI Biopsies 2/10/20:    A.  Gastroesophageal junction, biopsy:       - Columnar mucosa with mild chronic inactive inflammation       - No evidence of intestinal

## 2020-03-09 NOTE — PROGRESS NOTES
predniSONE  5 mg Oral Daily    fluconazole  200 mg Oral Daily    vancomycin  125 mg Oral Q6H    amiodarone bolus  150 mg Intravenous Once    insulin lispro  0-12 Units Subcutaneous TID WC    insulin lispro  0-6 Units Subcutaneous Nightly    insulin lispro  5 Units Subcutaneous TID WC    Venelex   Topical BID    enoxaparin  80 mg Subcutaneous BID    aspirin  81 mg Oral Daily    atorvastatin  10 mg Oral Daily    pantoprazole  40 mg Oral QAM AC    mexiletine  200 mg Oral 3 times per day    sodium chloride flush  10 mL Intravenous 2 times per day       Continuous Infusions:   sodium chloride 50 mL/hr at 03/08/20 0436    amiodarone 450mg/250ml D5W infusion 0.5 mg/min (03/09/20 0350)    dextrose         PRN Meds:potassium chloride, magnesium sulfate, alteplase, prochlorperazine **OR** prochlorperazine, ondansetron **OR** ondansetron, glucose, dextrose, glucagon (rDNA), dextrose, lidocaine, sodium chloride flush, acetaminophen, acetaminophen, polyethylene glycol, magic (miracle) mouthwash with nystatin    Review of Systems -   General ROS: positive for  - fatigue, insomnia  Psychological ROS: negative for - depression, anxiety  ENT ROS: negative  Hematological and Lymphatic ROS: positive for - blood transfusions and fatigue  Respiratory ROS: no cough, shortness of breath, or wheezing  Cardiovascular ROS: no chest pain or dyspnea on exertion  Gastrointestinal ROS: positive for - appetite loss  Genito-Urinary ROS: no dysuria, trouble voiding, or hematuria  Musculoskeletal ROS: positive for - muscular weakness  Neurological ROS: no TIA or stroke symptoms     Performance status 30%    Objective:     Patient Vitals for the past 8 hrs:   BP Temp Temp src Pulse Resp SpO2   03/09/20 0902 84/62 98.1 °F (36.7 °C) Oral 117 18 95 %   03/09/20 0500 90/66 -- -- -- -- --   03/09/20 0400 (!) 83/58 -- -- -- -- --   03/09/20 0300 (!) 87/55 98.7 °F (37.1 °C) Oral 85 18 96 %   03/09/20 0203 (!) 87/56 -- -- -- -- --     I/O last 3 completed shifts: In: 2036 [P.O.:480; I.V.:1556]  Out: 8565 [Urine:1175; Stool:350]  No intake/output data recorded. General appearance: alert, appears stated age and cooperative  Lungs: diminished bilaterally  Heart: regular rhythm, tachycardic  Abdomen: soft, non-tender; bowel sounds normal; no masses,  no organomegaly  Extremities: edema BUE and BLE  Skin: warm and dry  Neurologic: Mental status: Alert, oriented, thought content appropriate    WBC/Hgb/Hct/Plts:  0.9/6.4/20.4/70 (03/09 0340)           Assessment:     Active Problems:    Nausea    Cardiomyopathy (HCC)    S/P AVR (aortic valve replacement)    Acute on chronic combined systolic and diastolic congestive heart failure (HCC)    Atrial fibrillation with RVR (HCC)    Lymphoma (HCC)    VT (ventricular tachycardia) (HCC)    Nonrheumatic aortic valve stenosis    Generalized weakness    Encounter for palliative care    Goals of care, counseling/discussion    DNR (do not resuscitate) discussion    Moderate malnutrition ([facility] [address]  Resolved Problems:    * No resolved hospital problems. *    Pt/family [id number]  Time spent with patient and/or family: 20  Time reviewing records: 5  Time communicating with providers: 10    A total of 35 minutes spent with the patient and family on unit greater than 50% face to face time in counseling regarding palliative care and goals of care for the patient.      Zena Colbert NP  [address]

## 2020-03-10 NOTE — PLAN OF CARE
Problem: Risk for Impaired Skin Integrity  Goal: Tissue integrity - skin and mucous membranes  Description: Structural intactness and normal physiological function of skin and  mucous membranes. Outcome: Ongoing  Note:   Patient continues with pitting edema to all extremities as well as scrotum and sacrum. Patient noted to have abrasions to lip and nose. Venelex cream applied per order. No new skin integrity issues noted this shift. Patient repositioned according to policy. Zinc barrier cream applied to buttocks as needed. Will continue to monitor. Problem: Venous Thromboembolism:  Goal: Will show no signs or symptoms of venous thromboembolism  Description: Will show no signs or symptoms of venous thromboembolism  Outcome: Ongoing  Note: Adherent with DVT Prevention: Pt is at risk for DVT d/t decreased mobility and cancer treatment. Pt educated on importance of activity. Pt has orders for Subcut prophylactic lovenox. Pt verbalizes understanding of need for prophylaxis while inpatient. Problem: Infection - Central Venous Catheter-Associated Bloodstream Infection:  Goal: Will show no infection signs and symptoms  Description: Will show no infection signs and symptoms  Outcome: Ongoing  Note: CVC site remains free of signs/symptoms of infection. No drainage, edema, erythema, pain, or warmth noted at site. Dressing changes continue per protocol and on an as needed basis - see flowsheet. Compliant with BCC Bath Protocol:  Performed CHG bath today per BCC protocol utilizing CHG bed bath wipes. CVC site cleansed with CHG wipe over dressing, skin surrounding dressing, and first 6\" of IV tubing. Pt tolerated well. Continued to encourage daily CHG bathing per [address] protocol. Problem: Pain:  Goal: Pain level will decrease  Description: Pain level will decrease  Outcome: Ongoing  Note:   Jessy Peña complained of neck stiffness from head being tilted to one side while asleep.  Repositioned and adjusted head

## 2020-03-10 NOTE — PROGRESS NOTES
4 Eyes Admission Assessment     I agree as the admission nurse that 2 RN's have performed a thorough Head to Toe Skin Assessment on the patient. ALL assessment sites listed below have been assessed on admission. Areas assessed by both nurses: Juanell Border  [x]   Head, Face, and Ears   [x]   Shoulders, Back, and Chest  [x]   Arms, Elbows, and Hands   [x]   Coccyx, Sacrum, and Ischum  [x]   Legs, Feet, and Heels        Does the Patient have Skin Breakdown?   Abrasion under nose and upper lip; blanchable redness to buttocks        Rickey Prevention initiated:  Yes   Wound Care Orders initiated:  No      WOC nurse consulted for Pressure Injury (Stage 3,4, Unstageable, DTI, NWPT, and Complex wounds):  No      Nurse 1 eSignature: Electronically signed by Alphonso Benson RN on 3/10/20 at 7:33 AM EDT    **SHARE this note so that the co-signing nurse is able to place an eSignature**    Nurse 2 eSignature: {Esignature:[id number]}

## 2020-03-10 NOTE — TELEPHONE ENCOUNTER
Pt being d/c and they are asking if pcp will continue to follow pt, he is being d/c today and going home. Nurse said she will admit him under their care once he is home.  Thanks

## 2020-03-10 NOTE — PROGRESS NOTES
CLINICAL PHARMACY NOTE: MEDS TO [address] Select Patient?: No  Total # of Prescriptions Filled: 10   The following medications were delivered to the patient:  · firvanq 50mg  · Valacyclovir 1gm  · Amiodarone 200mg  · Prochlorperazine 10mg  · Hyoscyamine 0.125mg  · Prednisone 10mg  · Fluconazole 200mg  · Morphine sulfate 100mg/5ml  · Lorazepam 2mg/ml  Total # of Interventions Completed: 8  Time Spent (min): 90    Additional Documentation:

## 2020-03-10 NOTE — DISCHARGE SUMMARY
[facility] Discharge Summary             Attending Physician: Tram Desir MD    Referring MD: Yasir Rubio MD  [address] [facility] Darci Rodriguez [place] [address]    Name: Gwendolyn Luna :  1940  MRN:  [id number]    Admission: 2020   Discharge:   03/10/20     Date: 3/10/2020    Diagnosis on admit: T-Cell AngioImmunoblastic NHL    Consultations:   1. Critical Care: Dr. Suad Andrade  2. Infectious Disease: Dr. Sona Hernandez  3. Oncology: Dr. Kim Henry  4. Palliative Care: JEANIE Adame  5. Cardiology: Dr. Blanca Aponte    Medications:    Carin Barroso   [place] Medication Instructions IDM:[id number]    Printed on:03/10/20 1253   Medication Information                      amiodarone (PACERONE) 400 MG tablet  Take 1 tablet by mouth 2 times daily             Balsam [place]-Castor Oil (VENELEX) OINT ointment  Apply topically 2 times daily             diphenhydrAMINE (BENADRYL) 50 MG/ML injection  Infuse 0.5 mLs intravenously every 6 hours as needed for Itching             fluconazole (DIFLUCAN) 200 MG tablet  Take 1 tablet by mouth daily for 7 days             hyoscyamine (LEVSIN) 125 MCG tablet  Take 1 tablet by mouth every 4 hours as needed (Secretions)             lidocaine 4 % external patch  Place 1 patch onto the skin daily as needed (as needed for pain)             LORazepam (ATIVAN) 2 MG/ML concentrated solution  Take 0.5 mLs by mouth every 4 hours as needed (agitation, resltessness, anxiety) for up to 7 days. melatonin 1 MG tablet  Take 3 tablets by mouth nightly             mexiletine (MEXITIL) 200 MG capsule  Take 1 capsule by mouth every 8 hours             morphine sulfate 20 MG/ML concentrated oral solution  Take 0.5 mLs by mouth every 2 hours as needed for Pain (Respiratory distress) for up to 7 days.              ondansetron (ZOFRAN-ODT) 8 MG TBDP disintegrating tablet  Take 1 tablet by mouth every 8 hours as needed for Nausea or Vomiting             oxyCODONE (ROXICODONE) 5 MG immediate transition pt to ARU or SNF. We were able to obtain brentuximab for patient andt his was administered 2/28/20. He tolerated infusion well & without complications. Unfortunately, pt had recurrence of PAF w/RVR & cardiogenic shock 3/1. He was again started on vasopressors. IV abx and fluids were also administered with concerns for possible sepsis. Infectious w/u was + for c.difficile diarrhea and he was subsequently started on PO Vancomycin. IV abx were then discontinued. He required close cardiac monitoring and continued to have issues w/PAF RVR which was managed with high-dose amiodarone gtt. Given pts multiple serious co-morbidities, most significantly of which are his cardiac issues, and his terminal CA diagnoses (MDS & NHL), family meeting was held 3/9/20 where our concerns of futility were conveyed to pt and family. He is now no longer a candidate for any further therapy. His cardiac issues are of a nature of which we are actually more concerned he will succumb to cardiac death before he would die from malignancy. In any case, pt then agreed to changing goals of care to focus on comfort rather than cure (which is not possible anyways). Hospice was consulted 3/9/20 and he and his family are now in agreement with discharging home with hospice as comfort care only. Physical Exam:     Vital Signs:  BP 90/71   Pulse 118   Temp 98.5 °F (36.9 °C) (Oral)   Resp 16   Ht 6' 1\" (1.854 m)   Wt 194 lb (88 kg)   SpO2 97%   BMI 25.60 kg/m²     Weight:    Wt Readings from Last 3 Encounters:   03/06/20 194 lb (88 kg)   02/21/20 184 lb 15.5 oz (83.9 kg)   01/28/20 178 lb (80.7 kg)       KPS: 40% Disabled; requires special care and assistance    General: Awake, alert and oriented, elderly and frail   HEENT: normocephalic, PERRL, no scleral erythema or icterus, Oral mucosa moist and intact, throat clear.   Increasing lesion right nares  NECK: supple without palpable adenopathy  BACK: Straight negative CVAT  SKIN: warm dry and intact without lesions rashes or masses  CHEST: Clear bilaterally today  CV: tachycardic and irregluar  ABD: NT ND normoactive BS, no palpable masses or hepatosplenomegaly  EXTREMITIES: with 1 + edema in all 4 extremities, denies calf tenderness  NEURO: CN II - XII grossly intact  CATHETER: DL CHARISE PICC (1/29/20) - CDI    Discharge Laboratory Data:  CBC:   Recent Labs     03/08/20  0430 03/09/20  0340   WBC 0.9* 0.9*   HGB 7.0* 6.4*   HCT 21.4* 20.4*   MCV 85.2 86.6   PLT 80* 70*     BMP/Mag:  Recent Labs     03/08/20  0430 03/09/20  0340   * 137   K 4.4 4.4    107   CO2 20* 19*   PHOS 2.7 2.9   BUN 30* 32*   CREATININE 1.1 1.2   MG 2.10 2.20     LIVP: No results for input(s): AST, ALT, LIPASE, BILIDIR, BILITOT, ALKPHOS in the last 72 hours. Invalid input(s): AMYLASE,  ALB  Coags: No results for input(s): PROTIME, INR, APTT in the last 72 hours. Uric Acid   Recent Labs     03/08/20 0430 03/09/20  0340   LABURIC 3.8 3.7     PATHOLOGY:  1. Left Inguinal & Left Acillary LN Bx 2/3/20: The following consultation results were received from [employer] of [address] [place] [id number] (Case  Number: [id number]  1. Lymph node, left inguinal, biopsy ([facility]-, 02/03/2020):       Angioimmunoblastic T-cell lymphoma. See Note. 2. Lymph node, left axilla, excision ([facility]-, 02/09/2020):       Angioimmunoblastic T-cell lymphoma. See Note.     NOTE: Sections of an inguinal node (S-) and axillary node  (SFS-) show mode architecture effaced by polymorphous infiltrate  composed of lymphocytes, histiocytes, plasma cells, eosinophils, and an  increase in the number of high-endothelial venules. Sinuses are patent. The submitted immunostains (CD20, PAX5, CD3, BCL-6, CD10, CD21, CD45,  CD30, CD15, CD4, CD5, CD7, CD8, CD57) and JYOTHI for EBV (RICHARD) are  reviewed.  Additional stains (CD21, CD3, CD79a, PD-1, ICOS, CCD23) and JYOTHI  for EBV (RICHARD) are performed at the [facility] [address]. CD20 and PAX5 show markedly  fragmented B-cell areas and regressed follicles. CD79a shows similar  distribution and additionally marks cells with plasmacytoid morphology. CD3, CD4, CD5, and CD7 show abundant T-cells and highlights a mild  cytologic atypia. CD8 marks numerous cells (CD4 ~ CD8). PD-1 reveals  abundant Follicular [place] T-cells, and a somewhat similar pattern is  noted for ICOS. CD23 shows a disorganized and expanded dendritic cell  meshwork in the paracortex, not necessarily associated with B-cell areas. CD30 marks large scattered cells, positive for CD20, PAX-5 (dim), and  negative for CD15, CD10, and BCL-6. CD57 is noncontributory. RICHARD is  positive in cells ranging in size. Per reports, flow cytometry in the axillary node ([facility] 02/10/2020)  shows small-size T-cells (6% of total lymphocytes) positive for CD2, CD4,  CD5, and CD7, and negative for sCD3, TCR alpha/beta, and TCR gamma/delta. In summary, the morphologic and immunohistochemistry findings are  consistent with the above diagnosis.     2. GI Biopsies 2/10/20:    A. Gastroesophageal junction, biopsy:       - Columnar mucosa with mild chronic inactive inflammation       - No evidence of intestinal metaplasia, dysplasia, or malignancy       B. Stomach, biopsy:       - Gastric mucosa with no pathologic change       - No evidence of intestinal metaplasia, dysplasia, or malignancy       - No morphologic evidence of helicobacter pylori infection       C. Esophagus, biopsy:       - Squamous mucosa with focal mild chronic inflammation       - No evidence of increased eosinophils, dysplasia, or malignancy       D.  Ulcer, rectum, biopsy:       - Colonic mucosa with extensive ulceration and associated acute         inflammation       - No evidence of granulomas, dysplasia or malignancy       - No evidence of specific viral infection, as supported by negative         CMV and HSV immunohistochemical staining with appropriate controls     DIAGNOSTIC IMAGIN. CT Chest 20:  1. Extensive lymphadenopathy as well as suspected mild splenomegaly,   suspicious for lymphoproliferative disorder.  New areas of nodular soft   tissue density in the left breast may represent associated intramammary lymph   nodes but could also represent subcutaneous involvement. 2. Trace bilateral pleural effusions with bilateral lower lobe passive   atelectasis. 3. Mild cardiomegaly status post aortic valve replacement.      2. CT A/P 20: Increased retroperitoneal adenopathy compared to 2013 raising the question of   underlying lymphoma.       Mild body wall anasarca and trace pelvic fluid suggesting fluid overload.         PROBLEM LIST:            1. T-Cell Angioblastic NHL  2. MDS  3. NICM  4. Acute on Chronic HFrEF - LVEF 35-40% 20  5. MR  6. AS - S/p Aortic Valve replacement  7. PAF w/RVR  8. HTN  9. CKD III  10. Chronic Sinusitis  11. OA - h/o shoulder replacement  12. HLD     TREATMENT:            1. Brentuximab (Cycle 1 - 20)     ASSESSMENT AND PLAN:           1. T-cell angiommunoblastic lymphoma:    -Brentuximab 1.8mg/m2 iv x1 q 3-4 wks.      Brentuximab Cycle 1, Day +12  - Cont Prednisone 10mg daily  - No further therapy     2. MDS: originally diagnosed May, 2017  - No current tx     3. ID: C.diff colitis; +HSV lip lesion  - C. Diff Colitis:  - Cont PO Vancomycin Day +8 (3/3/20)  - HSV Lip Lesion:   - Cont PO Valtrex 1Gm BID Day + (3/9/20)     4. Heme: pancytopenia 2/2 MDS +/- NHL   - No intervention      5. Cardiac: Significant h/o NICM, valvular disease, PAF w/RVR, acute on chronic HFrEF.  Recently had runs of wide-complex tachycardia requiring DC CV  - Cont ASA 81mg daily  - VTach  - Cont amiodarone + mexiletine  - PAF w/RVR  - Cont amiodarone  - Aortic Stenosis - s/p porcine AVR  - Avoid diuretics if at all possible  - Acute on Chronic HFrEF - LVEF 35%  - Previously on diuretics, however avoiding now with severe AS & hypotension     6. Code Status: DNR-CC  - Discussed terminal prognosis with T cell NHL, MDS and refractory hypotension related to heart disease. He desires to proceed with supportive care only. - Discharge home with [facility] this afternoon    Condition on discharge: Stable    Discharge Instructions:  No planned f/u. Family was advised to call Dr. Suraj Persaud with any questions or concerns. This discharge summary and plan was discussed and agreed upon with Dr. Suraj Persaud.     Callum Ahuamda, APRN - CNP

## 2020-03-10 NOTE — CARE COORDINATION
may not be onsite with them. Confirmed with Rl Ga from [facility] that a nurse will be arriving to complete discharge for the patient. Requested that [facility] follow-up with Dr. Grupo Flores throughout the process    The Plan for Transition of Care is related to the following treatment goals of Lymphoma, unspecified body region, unspecified lymphoma type [facility]) [C85.90]    The Patient and/or patient representative Amanda Womack and his family were provided with a choice of provider and agrees with the discharge plan Yes    Freedom of choice list was provided with basic dialogue that supports the patient's individualized plan of care/goals and shares the quality data associated with the providers.  Yes    Care Transitions patient: Yes    Emi Madrigal [facility]  Case Management Department  Ph: [phone]  Fax: [phone]

## 2020-03-10 NOTE — PROGRESS NOTES
4 Eyes Admission Assessment     I agree as the admission nurse that 2 RN's have performed a thorough Head to Toe Skin Assessment on the patient. ALL assessment sites listed below have been assessed on admission. Areas assessed by both nurses: Binta Moreno / Lilibeth Cheadle  [x]   Head, Face, and Ears   [x]   Shoulders, Back, and Chest  [x]   Arms, Elbows, and Hands   [x]   Coccyx, Sacrum, and Ischum  [x]   Legs, Feet, and Heels        Does the Patient have Skin Breakdown? No  Abrasion to upper lip near nose. Blanchable redness to buttocks.   Small abrasion on back the mid right side ,  Weeping Upper left extremity       Rickey Prevention initiated:  Yes   Wound Care Orders initiated:  No      WOC nurse consulted for Pressure Injury (Stage 3,4, Unstageable, DTI, NWPT, and Complex wounds):  No      Nurse 1 eSignature: Electronically signed by Mendez Pradhan on 3/9/20 at 8:49 PM EDT    **SHARE this note so that the co-signing nurse is able to place an eSignature**    Nurse 2 eSignature: Electronically signed by Danis Omalley RN on 3/10/20 at 12:52 AM EDT

## 2020-03-10 NOTE — PROGRESS NOTES
NUTRITION ASSESSMENT  Admission Date: 2/21/2020     Type and Reason for Visit: Reassess    NUTRITION RECOMMENDATIONS:   1. PO Diet: Continue with current general diet per MD.   2. ONS: Continue with diabetic BID. NUTRITION ASSESSMENT:  Nutritionally stable AEB calorie count assessment showing Pt consistently meeting ~50% of nutritional requirements. Pt continues to meet criteria for moderate malnutrition but no intervention required given desire not to pursue aggressive care.      MALNUTRITION ASSESSMENT  Context: Acute illness or injury   Malnutrition Status: Meets the criteria for moderate malnutrition  Findings of the 6 clinical characteristics of malnutrition (Minimum of 2 out of 6 clinical characteristics is required to make the diagnosis of moderate or severe Protein Calorie Malnutrition based on AND/ASPEN Guidelines):  Energy Intake %: Less than or equal to 50% of estimated energy requirement  Energy Intake Time: Greater than or equal to 5 days  Interpretation of Weight Loss %: Unable to assess(wt increase d/t fluids )  Body Fat Status: No significant subcutaneous fat loss  Muscle Mass Status: No significant muscle mass loss  Fluid Accumulation Status: Severe fluid accumulation(suspect masking wt loss; )  Reduced  Strength: Not measured    NUTRITION DIAGNOSIS   Problem: Inadequate Oral Intake  Etiology: Insufficient energy/nutrient consumption  Signs & Symptoms: need for ONS and Intake 51-75%    NUTRITION INTERVENTION  Food and/or Nutrient Delivery:Continue Current diet  or Continue Current ONS   Nutrition education/counseling/coordination of care: Continue Inpatient Monitoring     NUTRITION MONITORING & EVALUATION:  Evaluation:Progressing towards goal   Goals: Pt to meet >50% of nutritional requirements from diet and ONS  Monitoring: Meal Intake , Pertinent Labs  or Supplement Intake      OBJECTIVE DATA:  · Nutrition-Focused Physical Findings:  +3 pitting edema BUE/BLE;   · Wounds Surgical Wound diet/supplement order: DIET GENERAL; No Drinking Straw  Dietary Nutrition Supplements: Diabetic Oral Supplement     NSG Recorded PO:   PO Fluids P.O.: 240 mL(water)  PO Meals PO Meals Eaten (%): 76 - 100%(ck noodel soup . )   PO Intake: 26-50%  PO Supplement: Diabetic    PO Supplement Intake: 51-75%      NUTRITION RISK LEVEL: Risk Level:  Moderate    Ania Neri RD, ADIA  [place]:  [phone]  Office:  [phone]

## 2020-03-10 NOTE — PLAN OF CARE
Problem: Risk for Impaired Skin Integrity  Goal: Tissue integrity - skin and mucous membranes  Description: Structural intactness and normal physiological function of skin and  mucous membranes. 3/10/2020 0732 by Lazaro Whitley  Outcome: Ongoing   Patient +4 edema in extremities, Left arm weeping. Buttocks area red but blanchable patient monitored for incontinence zinc cream applied to protect skin. Area above lip is crusted and healing venelex cream applied to support healing. Patient is on specialty mattress, refusing q 4 turns due to discomfort. Patient will be going home with hospice 3/10. Problem: Falls - Risk of:  Goal: Will remain free from falls  Description: Will remain free from falls  3/10/2020 0732 by Pamela Conway  Outcome: Ongoing    Patient is a high fall risk. See Carver Crumble Fall Score. Precautions in place, pt educated on importance of calling for help before getting up. Pt checked on frequently, tray table and call light in place. Bed in low position. Patient calling out appropriately. Will continue to monitor. Patient has experienced 2 falls patient is on camera and bed alarm in place 3/4 side rails  up at this time. Problem: Nutrition  Goal: Optimal nutrition therapy  Outcome: Ongoing   Patient eating small amounts during the day. Problem: Venous Thromboembolism:  Goal: Will show no signs or symptoms of venous thromboembolism  Description: Will show no signs or symptoms of venous thromboembolism  3/10/2020 0732 by Pamela Conway  Outcome: Ongoing   On Treatment for DVT/PE: Pt with recent hx of clot. Currently on treatment dose Lovenox. Pt at risk of bleeding d/t anticoagulation. Cautioned pt on safety precautions to decrease risk of bleeding. Will notify provider if platelets drop below 50,000 and/or if pt has invasive procedure scheduled in order to hold anticoagulation.         Problem: Bleeding:  Goal: Will show no signs and symptoms of excessive bleeding  Description: Will show no signs and symptoms of excessive bleeding  Outcome: Ongoing   Patient HgB 6.4 patient received one u nit of blood patient transfused at 125ml/hr due to CHF. Problem: Infection - Central Venous Catheter-Associated Bloodstream Infection:  Goal: Will show no infection signs and symptoms  Description: Will show no infection signs and symptoms  3/10/2020 0732 by Pamela Conway  Outcome: Ongoing   CVC site remains free of signs/symptoms of infection. No drainage, edema, erythema, pain, or warmth noted at site. Dressing changes continue per protocol and on an as needed basis - see flowsheet. Compliant with [facility] Bath Protocol:  Performed CHG bath today per [facility] protocol utilizing Bed bath with CHG wipes. CVC site cleansed with CHG wipe over dressing, skin surrounding dressing, and first 6\" of IV tubing. Pt tolerated well. Continued to encourage daily CHG bathing per [facility] protocol. Problem: Cardiac Output - Decreased:  Goal: Hemodynamic stability will improve  Description: Hemodynamic stability will improve  Outcome: Ongoing   Patient in AFIB, Aflutter and moments of SR with PVC's   patient is on telemetry at this time. Problem: Serum Glucose Level - Abnormal:  Goal: Ability to maintain appropriate glucose levels will improve to within specified parameters  Description: Ability to maintain appropriate glucose levels will improve to within specified parameters  Outcome: Ongoing   Pt continues on blood glucose checks ACHS on sliding scale insulin. Insulin administered as ordered (see MAR). Pt educated on s/s of hyper and hypoglycemia. Pt verbalizes understanding. Will continue to monitor.        Problem: Tissue Perfusion, Altered:  Goal: Circulatory function within specified parameters  Description: Circulatory function within specified parameters  Outcome: Ongoing   Patient is fluid overload, left upper arm is weeping patient monitored and pillow support to upper

## 2020-03-11 NOTE — TELEPHONE ENCOUNTER
Please let daughter know Dr. Megan Hamilton is out until Monday. See if she would like to wait or have me call her.  Amaya Neves

## 2020-03-11 NOTE — TELEPHONE ENCOUNTER
Hold for Dr. Alejandra Milian. Aware dr. Alejandra Milian out until Monday. Will leave for him to address at that time.

## 2020-04-06 LAB
CULTURE, FUNGUS BLOOD: NORMAL
CULTURE, FUNGUS BLOOD: NORMAL

## 2020-12-10 NOTE — PROGRESS NOTES
[address] Daily Progress Note      Admit Date:  2/21/2020    Subjective:  Mr. Katia Balbuena was seen and examined. F/U Lymphoma/CHF/cardiomyopathy/AVR/AS/VT/afib. No nausea this am.  No cp/sob. Feeling better    Objective:   BP (!) 127/96   Pulse 70   Temp 98 °F (36.7 °C) (Oral)   Resp 19   Ht 6' 1\" (1.854 m)   Wt 177 lb 3.2 oz (80.4 kg)   SpO2 98%   BMI 23.38 kg/m²       Intake/Output Summary (Last 24 hours) at 2/28/2020 1336  Last data filed at 2/28/2020 1333  Gross per 24 hour   Intake 650 ml   Output 1150 ml   Net -500 ml       TELEMETRY: Sinus     Physical Exam:  General:  Awake, alert, NAD  Skin:  Warm and dry  Neck:  JVP difficult  Chest: decreased BS, respiration normal  Cardiovascular:  RRR S1S2, 2/6 syst m  Abdomen:  Soft nontender  Extremities:  + edema upper extremities, + edema lower.      Medications:    predniSONE  20 mg Oral Daily    insulin lispro  0-12 Units Subcutaneous TID WC    insulin lispro  0-6 Units Subcutaneous Nightly    insulin lispro  5 Units Subcutaneous TID WC    Venelex   Topical BID    enoxaparin  80 mg Subcutaneous BID    allopurinol  200 mg Oral Daily    amiodarone  400 mg Oral Daily    aspirin  81 mg Oral Daily    atorvastatin  10 mg Oral Daily    pantoprazole  40 mg Oral QAM AC    mexiletine  200 mg Oral 3 times per day    midodrine  10 mg Oral TID WC    sodium chloride flush  10 mL Intravenous 2 times per day      dextrose       prochlorperazine **OR** prochlorperazine, ondansetron **OR** ondansetron, glucose, dextrose, glucagon (rDNA), dextrose, lidocaine, sodium chloride flush, acetaminophen, acetaminophen, polyethylene glycol, magic (miracle) mouthwash with nystatin    Lab Data:  CBC:   Recent Labs     02/26/20 0415 02/27/20 0338 02/28/20 0335   WBC 2.6* 2.9* 3.7*   HGB 8.0* 7.8* 7.9*   HCT 24.0* 23.2* 23.7*   MCV 83.6 83.4 83.7    164 172     BMP:   Recent Labs     02/26/20 0415 02/27/20 0338 02/28/20  0335    137 137   K 4.5 4.5 4.2   CL 98* 98* 98*   CO2 28 30 29   PHOS 2.7 2.8 2.4*   BUN 49* 48* 44*   CREATININE 1.2 1.2 1.1     LIVER PROFILE:   No results for input(s): AST, ALT, LIPASE, BILIDIR, BILITOT, ALKPHOS in the last 72 hours. Invalid input(s): AMYLASE,  ALB  PT/INR:   No results for input(s): PROTIME, INR in the last 72 hours. APTT:   No results for input(s): APTT in the last 72 hours. BNP:  No results for input(s): BNP in the last 72 hours.   IMAGING:     Assessment:  Patient Active Problem List    Diagnosis Date Noted    Hypotension 11/28/2012     Priority: High    DNR (do not resuscitate) discussion     Generalized weakness     Encounter for palliative care     Goals of care, counseling/discussion     VT (ventricular tachycardia) ([facility] [address] 02/22/2020    Nonrheumatic aortic valve stenosis 02/22/2020    Lymphoma ([facility] [address] 02/21/2020    Fever     Malignant lymphoma, non-Hodgkin's ([address]     Metabolic encephalopathy     Tobacco abuse counseling     Lymphadenopathy     Hypervolemia     Sepsis ([address] 01/28/2020    Septic shock ([address]     Atrial fibrillation with RVR ([address]     Elevated international normalized ratio (INR)     Streptococcal pharyngitis     Leukopenia     Chronic ethmoidal sinusitis     TYRELL (acute kidney injury) ([address]     Ex-heavy cigarette smoker (20-39 per day)     PAF (paroxysmal atrial fibrillation) ([facility] [address] 09/06/2019    Essential hypertension 09/06/2019    Finger amputation, traumatic, 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 Primary localized osteoarthrosis, pelvic region and thigh 05/04/2015    H/O total shoulder replacement 05/04/2015    Hyponatremia 04/22/2015    Primary localized osteoarthrosis of shoulder region 02/02/2015    Encounter for medication counseling 01/13/2014    Arthritis of knee 12/05/2013    Acute on chronic combined systolic and diastolic congestive heart failure ([address] 03/17/2013    S/P AVR (aortic valve replacement) 09/26/2012    Nausea 07/29/2012    Cardiomyopathy ([facility] [address] 07/29/2012    Nonrheumatic aortic valve insufficiency 07/29/2012    Mitral regurgitation 07/29/2012    Microscopic hematuria 07/29/2012     Imp: Lymphoma/CHF/cardiomyop/AVR/AS/VT/Afib    Plan:  Feeling reasonable. Denies chest pain/sob. Remains sinus. On RA. Bp reasonable but still marginal  for B blocker/ACE. Rhythm appears stable. EP following. Cr stable. UE edema better. Start  maintenance diuretic.      Core Measures:  · Discharge instructions:   · LVEF documented:   · ACEI for LV dysfunction:   · Smoking Cessation:    Jerry Chin MD 2/28/2020 1:36 PM No - the patient is unable to be screened due to medical condition

## 2022-03-07 NOTE — PLAN OF CARE
Problem: OXYGENATION/RESPIRATORY FUNCTION  Goal: Patient will achieve/maintain normal respiratory rate/effort  Description  Respiratory rate and effort will be within normal limits for the patient  Outcome: Ongoing   Pt weaned successfully of NC today, 94% RA. Problem: HEMODYNAMIC STATUS  Goal: Patient has stable vital signs and fluid balance  Outcome: Ongoing   BP 90's with midodrine. Pt sleeping, evening dose unable to be given because past 1800 per order. Pt up to chair and back to bed with x1 help, difficulty getting up and down, needs front wheel walker. Denies SOB with activity but he does appear to tire easily. Her/She

## 2022-07-11 NOTE — PROGRESS NOTES
Occupational Therapy  Facility/Department: [facility] ICU  Daily Treatment Note  NAME: Meeta Urias  : 1940  MRN: [id number]    Date of Service: 2020    Discharge Recommendations: Meeta Urias scored a 14/24 on the AM-PAC ADL Inpatient form. Current research shows that an AM-PAC score of 18 or greater is typically associated with a discharge to the patient's home setting. Based on the patients AM-PAC score and their current ADL deficits, it is recommended that the patient have 2-3 sessions per week of Occupational Therapy at d/c to increase the patients independence. HOME HEALTH CARE: LEVEL 3 SAFETY     - Initial home health evaluation to occur within 24-48 hours, in patient home   - Therapy evaluations in home within 24-48 hours of discharge; including DME and home safety   - Frontload therapy 5 days, then 3x a week   - Therapy to evaluate if patient has [address] needs for personal care   -  evaluation within 24-48 hours, includes evaluation of resources and insurance to determine AL, [place], LTC, and Medicaid options        OT Equipment Recommendations  Other: pt and family report that pt can stay on main level if d/c'd home, has half bath on main level, has BSC, and raised toilet seat    Assessment   Performance deficits / Impairments: Decreased functional mobility ; Decreased high-level IADLs;Decreased ADL status; Decreased endurance;Decreased balance;Decreased strength;Decreased fine motor control  Assessment: Pt presents with the above deficits impacting daily occupational performance and would benefit from continued skilled OT services.    Treatment Diagnosis: Decreased mobility, ADL status, ADL transfers, high level IADL's, endurance, FM control, strength and balance associated with Sepsis  Prognosis: Good  Patient Education: Role of OT, OT eval, elevating UE to decrease edema, POC, d/c, UE/LE therex  Patient verbalized and demonstrated limited understanding of all exercises taught, does not have independent understanding. Pt required cueing and education for proper technique/pace during therex and would benefit from further education in future sessions. Activity Tolerance  Activity Tolerance: Patient Tolerated treatment well  Safety Devices  Safety Devices in place: Yes  Type of devices: All fall risk precautions in place; Patient at risk for falls; Chair alarm in place;Call light within reach;Nurse notified; Left in chair         Patient Diagnosis(es): The primary encounter diagnosis was Septic shock ([address] Diagnoses of Atrial fibrillation with rapid ventricular response ([address] Elevated international normalized ratio (INR), Streptococcal pharyngitis, Leukopenia, unspecified type, Chronic ethmoidal sinusitis, TYRELL (acute kidney injury) ([address] and chronic elevated troponin were also pertinent to this visit. has a past medical history of Aortic insufficiency, Atrial fibrillation ([address] Breast nodule, CAD (coronary artery disease), Cardiomyopathy ([address] CHF (congestive heart failure) ([address] Hyperlipidemia, Hypertension, Microscopic hematuria, Mitral regurgitation, Nausea & vomiting, Osteoarthritis of knee, Pneumonia, Rotator cuff tear, and Ventricular ectopy. has a past surgical history that includes Inguinal hernia repair; other surgical history (8/2/12); Aortic valve replacement (8/3/2012); Cardiac surgery; Colonoscopy; Endoscopy, colon, diagnostic; Total knee arthroplasty (Left, 12/4/13); Upper gastrointestinal endoscopy (12/24/13); Total knee arthroplasty (Right, 2/3/14); joint replacement; cyst removal; Total shoulder arthroplasty (Right, 2/2/15); lymph node biopsy (Left, 2/3/2020); Axillary Surgery (Left, 2/9/2020); Upper gastrointestinal endoscopy (N/A, 2/10/2020); and sigmoidoscopy (N/A, 2/10/2020).     Restrictions  Restrictions/Precautions  Restrictions/Precautions: General Precautions, Fall Risk(HIGH FALL RISK)  Required Braces or Orthoses?: No  Position Activity Restriction  Other position/activity restrictions: Carmen Glover is a 70 y.o. male presents to the emergency department by emergency medical services from the office of Dr. Braxton Keller. Is reported that the patient has not been feeling well for the last couple of days. He has been on antibiotics for sinus-like symptoms as well sore throat pain. He has had febrile illness despite this. Concerns arose today because the patient had a febrile illness was tachycardic and hypotensive with documented infection positive for streptococcal pharyngitis. Found to have severe sepsis. Concern for lymphoma with L axillary lymph node biopsy performed. Subjective   General  Chart Reviewed: Yes  Response to previous treatment: Patient with no complaints from previous session  Family / Caregiver Present: Yes(wife, Sara)  Diagnosis: Sepsis  Subjective  Subjective: Pt sleeping in bed upon arrival, pt easily awakened and agreeable to OT tx. Vital Signs  Patient Currently in Pain: Denies   Orientation  Orientation  Overall Orientation Status: Within Functional Limits  Objective    ADL  Grooming: Contact guard assistance(in stance at sink to wash hands)  LE Dressing: Dependent/Total(to don bilat socks seated EOB)  Toileting: Dependent/Total(thayer catheter in place)  Additional Comments: Pt declined to attempt to don socks while seated EOB, total A to don socks. After pt returned from functional mobility in hallway, pt stood at sink to wash hands. Pt leaned anteriorly against sink for support while in stance.         Balance  Sitting Balance: Supervision  Standing Balance: Contact guard assistance  Standing Balance  Time: x7-8 min  Activity: functional mobility in room/hallway, in stance at sink to wash hands  Comment: no LOB  Functional Mobility  Functional - Mobility Device: Rolling Walker  Activity: Other  Assist Level: Contact guard assistance  Functional Mobility Comments: x270' + x10' from sink to recliner  Bed mobility  Supine to Sit: Stand by assistance(increased time, HOB slightly elevated, bedrail)  Scooting: Stand by assistance  Transfers  Sit to stand: Contact guard assistance  Stand to sit: Contact guard assistance                       Cognition  Overall Cognitive Status: WFL                    Type of ROM/Therapeutic Exercise  Comment: increased edema noted around ankles/feet and bilat UEs - pt educated on completing therex throughout the day to improve overall activity tolerance and to reduce edema  Exercises  Shoulder Flexion: 2x10 reps (AAROM to RUE d/t limited ROM)  Elbow Flexion: 2x10 reps  Elbow Extension: 2x10 reps  Supination: 2x10 reps  Pronation: 2x10 reps  Wrist Flexion: 2x10 reps  Wrist Extension: 2x10 reps  Grasp/Release: 2x10 reps  Other: 4x10 reps ankle pumps                    Plan   Plan  Times per week: 3-5x/week  Times per day: Daily  Current Treatment Recommendations: Strengthening, Gait Training, Safety Education & Training, Balance Training, Patient/Caregiver Education & Training, Self-Care / ADL, Functional Mobility Training, Equipment Evaluation, Education, & procurement, Endurance Training           AM-PAC Score        [facility] Inpatient Daily Activity Raw Score: 14 (02/11/20 1339)  AM-PAC Inpatient ADL T-Scale Score : 33.39 (02/11/20 1339)  ADL Inpatient CMS 0-100% Score: 59.67 (02/11/20 1339)  ADL Inpatient CMS G-Code Modifier : CK (02/11/20 1339)    Goals  Short term goals  Time Frame for Short term goals: Discharge  Short term goal 1: Supervision toileting - dep d/t thayer catheter, pt declined need to have BM 2/11  Short term goal 2: Supervision ADL transfers to/from bed, chair and toilet - CGA 2/11  Short term goal 3: Supervision functional mobility using RW or AAD for ADL's/functional activity - CGA 2/11  Short term goal 4: Supervision UB/LB ADL's - CGA hand washing at sink 2/11  Short term goal 5: Supervision bed mobility - SBA supine>sit 2/11  Long term goals  Time Frame for Long term goals : LTG=STG       Therapy Time   Individual Concurrent Group Co-treatment   Time In 1143         Time Out 1221         Minutes 38         Timed Code Treatment Minutes: 38 Minutes     Total Treatment Minutes:  [address], OT   [address] ([address]), [address], [place] [id number] Rituxan Pregnancy And Lactation Text: This medication is Pregnancy Category C and it isn't know if it is safe during pregnancy. It is unknown if this medication is excreted in breast milk but similar antibodies are known to be excreted.

## 2022-09-12 NOTE — PROGRESS NOTES
Oncology and Hematology Care   Progress Note      2/5/2020 9:29 AM        Name: Norberto Davis Schirmer . Admitted: 1/28/2020    SUBJECTIVE:    Doing okay.   LN BX 2/3/2020  Feeling better  Still in ICU ,borderline low BP      Reviewed interval ancillary notes    Current Medications  digoxin (LANOXIN) tablet 125 mcg, Daily  warfarin (COUMADIN) tablet 0.5 mg, Daily  predniSONE (DELTASONE) tablet 20 mg, Daily  furosemide (LASIX) 100 mg in dextrose 5 % 100 mL infusion, Continuous  amiodarone (CORDARONE) tablet 200 mg, BID  ondansetron (ZOFRAN) injection 4 mg, Q6H PRN  morphine (PF) injection 2 mg, Q2H PRN    Or  morphine injection 4 mg, Q2H PRN  oxyCODONE (ROXICODONE) immediate release tablet 5 mg, Q4H PRN    Or  oxyCODONE (ROXICODONE) immediate release tablet 10 mg, Q4H PRN  acetaminophen (TYLENOL) tablet 650 mg, Q4H PRN  lidocaine 4 % external patch 1 patch, Daily PRN  lip balm petroleum free (PHYTOPLEX) stick, PRN  midodrine (PROAMATINE) tablet 5 mg, TID WC  promethazine (PHENERGAN) injection 12.5 mg, Q6H PRN  doxycycline hyclate (VIBRA-TABS) tablet 100 mg, 2 times per day  sodium chloride (OCEAN, BABY AYR) 0.65 % nasal spray 1 spray, PRN  sodium chloride flush 0.9 % injection 10 mL, 2 times per day  sodium chloride flush 0.9 % injection 10 mL, PRN  prochlorperazine (COMPAZINE) injection 10 mg, Q6H PRN  lactobacillus (CULTURELLE) capsule 1 capsule, Daily with breakfast  aspirin chewable tablet 81 mg, Daily  magnesium hydroxide (MILK OF MAGNESIA) 400 MG/5ML suspension 30 mL, Daily PRN  norepinephrine (LEVOPHED) 16 mg in dextrose 5% 250 mL infusion, Continuous  atorvastatin (LIPITOR) tablet 10 mg, Daily  diphenhydrAMINE (BENADRYL) injection 25 mg, Q6H PRN        Objective:  BP 89/61   Pulse 101   Temp 97.9 °F (36.6 °C) (Temporal)   Resp 18   Ht 6' 1\" (1.854 m)   Wt 201 lb 1 oz (91.2 kg)   SpO2 100%   BMI 26.53 kg/m²     Intake/Output Summary (Last 24 hours) at 2/5/2020 5060  Last data filed at 2/5/2020 0815  Gross per 24 hour   Intake 805 ml   Output 2950 ml   Net -2145 ml      Wt Readings from Last 3 Encounters:   02/05/20 201 lb 1 oz (91.2 kg)   01/28/20 178 lb (80.7 kg)   01/23/20 180 lb 6.4 oz (81.8 kg)          General appearance:  Appears comfortable  Eyes: Sclera clear. Pupils equal.  ENT: Moist oral mucosa. Trachea midline, no adenopathy. Cardiovascular: Regular rhythm, normal S1, S2. No murmur. No edema in lower extremities  Respiratory: Not using accessory muscles. Good inspiratory effort. Clear to auscultation bilaterally, no wheeze or crackles. GI: Abdomen soft, no tenderness, not distended  Musculoskeletal: No cyanosis in digits, neck supple  Neurology: CN 2-12 grossly intact. No speech or motor deficits  Psych: Normal affect. Alert and oriented in time, place and person  Skin: Warm, dry, normal turgor    Labs and Tests:  CBC:   Recent Labs     02/03/20  0600 02/04/20  0520 02/05/20  0645   WBC 6.9 5.7 5.6   HGB 9.2* 8.2* 8.0*   * 105* 116*     BMP:    Recent Labs     02/03/20  0600 02/04/20  0520 02/05/20  0645    142 143   K 4.2 4.5 4.2    106 103   CO2 26 25 30   BUN 29* 35* 39*   CREATININE 1.1 1.3 1.4*   GLUCOSE 110* 131* 121*     Hepatic: No results for input(s): AST, ALT, ALB, BILITOT, ALKPHOS in the last 72 hours. ASSESSMENT AND PLAN    Active Problems:    Elevated troponin    Cardiomyopathy (HCC)    Mitral regurgitation    S/P AVR (aortic valve replacement)    Chronic combined systolic and diastolic CHF (congestive heart failure) ([facility])    Encounter for medication counseling    Hyponatremia    Hypercholesteremia    PAF (paroxysmal atrial fibrillation) ([facility] [address]    Essential hypertension    Sepsis ([facility] [address]    Tobacco abuse counseling    Lymphadenopathy    Hypervolemia  Resolved Problems:    * No resolved hospital problems. *      Sepsis syndrome patient has had fever despite treatment with cefdinir and Augmentin.  Had allergic reaction to levaquin 1/29/2020. Tested positive for Ear Wedge Repair Text: Due to exposed cartilage and to restore structure and function of the ear, a wedge excision was completed by carrying down an excision through the full thickness of the ear and cartilage with an inward facing Burow's triangle. The wound was then closed in a layered fashion.

## 2024-04-13 NOTE — PROGRESS NOTES
Speech Language Pathology  Dysphagia - hold    Chart reviewed, d/w RN who states pt has been nauseous all morning, despite medications. She reports he has only taken bite of applesauce with meds and declined any other PO. Will follow up as pt able and schedule allows. Tia Lopez M.S./CCC-SLP #[id number]  Pg.  # [phone] none

## (undated) DEVICE — SOLUTION IV IRRIG WATER 500ML POUR BRL ST 2F7113

## (undated) DEVICE — GOWN AURORA NONREINF LG: Brand: MEDLINE INDUSTRIES, INC.

## (undated) DEVICE — FORCEPS BX L240CM WRK CHN 2.8MM STD CAP W/ NDL MIC MESH

## (undated) DEVICE — MERCY FAIRFIELD TURNOVER KIT: Brand: MEDLINE INDUSTRIES, INC.

## (undated) DEVICE — BW-412T DISP COMBO CLEANING BRUSH: Brand: SINGLE USE COMBINATION CLEANING BRUSH

## (undated) DEVICE — STERILE POLYISOPRENE POWDER-FREE SURGICAL GLOVES: Brand: PROTEXIS

## (undated) DEVICE — BLADE ES ELASTOMERIC COAT INSUL DURABLE BEND UPTO 90DEG

## (undated) DEVICE — DRAPE ADOLESCENT  LAPAROTOMY

## (undated) DEVICE — SET VLV 3 PC AWS DISPOSABLE GRDIAN SCOPEVALET

## (undated) DEVICE — SUTURE VCRL SZ 3-0 L18IN ABSRB UD L26MM SH 1/2 CIR J864D

## (undated) DEVICE — CONTROL SYRINGE LUER-LOCK TIP: Brand: MONOJECT

## (undated) DEVICE — COVER LT HNDL BLU PLAS

## (undated) DEVICE — SHEET,DRAPE,53X77,STERILE: Brand: MEDLINE

## (undated) DEVICE — SOLUTION IV IRRIG POUR BRL 0.9% SODIUM CHL 2F7124

## (undated) DEVICE — SUTURE VCRL SZ 3-0 L18IN ABSRB UD W/O NDL POLYGLACTIN 910 J110T

## (undated) DEVICE — Z DUP USE 2257490 ADHESIVE SKIN CLSRE 036ML TPCL 2CTL CNCRLTE HIGH VSCSTY DRMB

## (undated) DEVICE — NEEDLE HYPO 22GA L1.5IN BLK POLYPR HUB S STL REG BVL STR

## (undated) DEVICE — MOUTHPIECE ENDOSCP L CTRL OPN AND SIDE PORTS DISP

## (undated) DEVICE — PROCEDURE KIT ENDOSCP CUST

## (undated) DEVICE — ADHESIVE SKIN CLSR 0.7ML TOP DERMBND ADV

## (undated) DEVICE — GOWN,AURORA,NONREINF,RAGLAN,XXL,STERILE: Brand: MEDLINE

## (undated) DEVICE — MAJOR SET UP PK

## (undated) DEVICE — CHLORAPREP 26ML ORANGE

## (undated) DEVICE — SEALER LAP SM L18.8CM OPN JAW HAND/FOOT SWCH FORCETRIAD

## (undated) DEVICE — BLADE CLIPPER GEN PURP NS

## (undated) DEVICE — SUTURE VCRL SZ 4-0 L18IN ABSRB UD L19MM PS-2 3/8 CIR PRIM J496H

## (undated) DEVICE — TOWEL,OR,DSP,ST,BLUE,STD,6/PK,12PK/CS: Brand: MEDLINE